# Patient Record
Sex: FEMALE | Race: WHITE | NOT HISPANIC OR LATINO | Employment: PART TIME | URBAN - METROPOLITAN AREA
[De-identification: names, ages, dates, MRNs, and addresses within clinical notes are randomized per-mention and may not be internally consistent; named-entity substitution may affect disease eponyms.]

---

## 2017-01-05 ENCOUNTER — GENERIC CONVERSION - ENCOUNTER (OUTPATIENT)
Dept: OTHER | Facility: OTHER | Age: 62
End: 2017-01-05

## 2017-01-17 ENCOUNTER — ALLSCRIPTS OFFICE VISIT (OUTPATIENT)
Dept: OTHER | Facility: OTHER | Age: 62
End: 2017-01-17

## 2017-01-17 DIAGNOSIS — N83.299 OTHER OVARIAN CYST, UNSPECIFIED SIDE: ICD-10-CM

## 2017-01-17 DIAGNOSIS — Z01.419 ENCOUNTER FOR GYNECOLOGICAL EXAMINATION WITHOUT ABNORMAL FINDING: ICD-10-CM

## 2017-01-17 DIAGNOSIS — Z12.31 ENCOUNTER FOR SCREENING MAMMOGRAM FOR MALIGNANT NEOPLASM OF BREAST: ICD-10-CM

## 2017-03-29 ENCOUNTER — ALLSCRIPTS OFFICE VISIT (OUTPATIENT)
Dept: OTHER | Facility: OTHER | Age: 62
End: 2017-03-29

## 2017-05-01 ENCOUNTER — ALLSCRIPTS OFFICE VISIT (OUTPATIENT)
Dept: OTHER | Facility: OTHER | Age: 62
End: 2017-05-01

## 2017-05-01 DIAGNOSIS — K76.0 FATTY (CHANGE OF) LIVER, NOT ELSEWHERE CLASSIFIED: ICD-10-CM

## 2017-05-01 DIAGNOSIS — R10.11 RIGHT UPPER QUADRANT PAIN: ICD-10-CM

## 2017-05-01 DIAGNOSIS — E03.9 HYPOTHYROIDISM: ICD-10-CM

## 2017-05-01 DIAGNOSIS — R73.9 HYPERGLYCEMIA: ICD-10-CM

## 2017-05-01 DIAGNOSIS — E78.5 HYPERLIPIDEMIA: ICD-10-CM

## 2017-05-16 ENCOUNTER — APPOINTMENT (OUTPATIENT)
Dept: LAB | Facility: CLINIC | Age: 62
End: 2017-05-16
Payer: COMMERCIAL

## 2017-05-16 ENCOUNTER — GENERIC CONVERSION - ENCOUNTER (OUTPATIENT)
Dept: OTHER | Facility: OTHER | Age: 62
End: 2017-05-16

## 2017-05-16 ENCOUNTER — HOSPITAL ENCOUNTER (OUTPATIENT)
Dept: ULTRASOUND IMAGING | Facility: CLINIC | Age: 62
Discharge: HOME | End: 2017-05-16
Payer: COMMERCIAL

## 2017-05-16 DIAGNOSIS — R10.11 RIGHT UPPER QUADRANT PAIN: ICD-10-CM

## 2017-05-16 DIAGNOSIS — R73.9 HYPERGLYCEMIA: ICD-10-CM

## 2017-05-16 DIAGNOSIS — E78.5 HYPERLIPIDEMIA: ICD-10-CM

## 2017-05-16 DIAGNOSIS — K76.0 FATTY (CHANGE OF) LIVER, NOT ELSEWHERE CLASSIFIED: ICD-10-CM

## 2017-05-16 DIAGNOSIS — E03.9 HYPOTHYROIDISM: ICD-10-CM

## 2017-05-16 LAB
ALBUMIN SERPL BCP-MCNC: 3.9 G/DL (ref 3.5–5)
ALP SERPL-CCNC: 84 U/L (ref 46–116)
ALT SERPL W P-5'-P-CCNC: 27 U/L (ref 12–78)
ANION GAP SERPL CALCULATED.3IONS-SCNC: 5 MMOL/L (ref 4–13)
AST SERPL W P-5'-P-CCNC: 14 U/L (ref 5–45)
BILIRUB SERPL-MCNC: 0.36 MG/DL (ref 0.2–1)
BUN SERPL-MCNC: 15 MG/DL (ref 5–25)
CALCIUM SERPL-MCNC: 8.9 MG/DL (ref 8.3–10.1)
CHLORIDE SERPL-SCNC: 103 MMOL/L (ref 100–108)
CHOLEST SERPL-MCNC: 154 MG/DL (ref 50–200)
CO2 SERPL-SCNC: 29 MMOL/L (ref 21–32)
CREAT SERPL-MCNC: 0.69 MG/DL (ref 0.6–1.3)
ERYTHROCYTE [DISTWIDTH] IN BLOOD BY AUTOMATED COUNT: 14.2 % (ref 11.6–15.1)
EST. AVERAGE GLUCOSE BLD GHB EST-MCNC: 131 MG/DL
GFR SERPL CREATININE-BSD FRML MDRD: >60 ML/MIN/1.73SQ M
GLUCOSE P FAST SERPL-MCNC: 105 MG/DL (ref 65–99)
HBA1C MFR BLD: 6.2 % (ref 4.2–6.3)
HCT VFR BLD AUTO: 44.3 % (ref 34.8–46.1)
HDLC SERPL-MCNC: 33 MG/DL (ref 40–60)
HGB BLD-MCNC: 14.7 G/DL (ref 11.5–15.4)
LDLC SERPL CALC-MCNC: 85 MG/DL (ref 0–100)
LIPASE SERPL-CCNC: 270 U/L (ref 73–393)
MCH RBC QN AUTO: 30.8 PG (ref 26.8–34.3)
MCHC RBC AUTO-ENTMCNC: 33.2 G/DL (ref 31.4–37.4)
MCV RBC AUTO: 93 FL (ref 82–98)
PLATELET # BLD AUTO: 230 THOUSANDS/UL (ref 149–390)
PMV BLD AUTO: 10.9 FL (ref 8.9–12.7)
POTASSIUM SERPL-SCNC: 3.8 MMOL/L (ref 3.5–5.3)
PROT SERPL-MCNC: 7.2 G/DL (ref 6.4–8.2)
RBC # BLD AUTO: 4.77 MILLION/UL (ref 3.81–5.12)
SODIUM SERPL-SCNC: 137 MMOL/L (ref 136–145)
TRIGL SERPL-MCNC: 178 MG/DL
TSH SERPL DL<=0.05 MIU/L-ACNC: 1.66 UIU/ML (ref 0.36–3.74)
WBC # BLD AUTO: 7.73 THOUSAND/UL (ref 4.31–10.16)

## 2017-05-16 PROCEDURE — 83036 HEMOGLOBIN GLYCOSYLATED A1C: CPT

## 2017-05-16 PROCEDURE — 80061 LIPID PANEL: CPT

## 2017-05-16 PROCEDURE — 84443 ASSAY THYROID STIM HORMONE: CPT

## 2017-05-16 PROCEDURE — 80053 COMPREHEN METABOLIC PANEL: CPT

## 2017-05-16 PROCEDURE — 76705 ECHO EXAM OF ABDOMEN: CPT

## 2017-05-16 PROCEDURE — 36415 COLL VENOUS BLD VENIPUNCTURE: CPT

## 2017-05-16 PROCEDURE — 85027 COMPLETE CBC AUTOMATED: CPT

## 2017-05-16 PROCEDURE — 83690 ASSAY OF LIPASE: CPT

## 2017-05-29 ENCOUNTER — GENERIC CONVERSION - ENCOUNTER (OUTPATIENT)
Dept: OTHER | Facility: OTHER | Age: 62
End: 2017-05-29

## 2017-07-24 ENCOUNTER — ALLSCRIPTS OFFICE VISIT (OUTPATIENT)
Dept: OTHER | Facility: OTHER | Age: 62
End: 2017-07-24

## 2017-07-24 DIAGNOSIS — K76.0 FATTY (CHANGE OF) LIVER, NOT ELSEWHERE CLASSIFIED: ICD-10-CM

## 2017-07-24 DIAGNOSIS — R10.9 ABDOMINAL PAIN: ICD-10-CM

## 2017-08-14 ENCOUNTER — TRANSCRIBE ORDERS (OUTPATIENT)
Dept: ADMINISTRATIVE | Facility: HOSPITAL | Age: 62
End: 2017-08-14

## 2017-08-14 ENCOUNTER — APPOINTMENT (OUTPATIENT)
Dept: LAB | Facility: CLINIC | Age: 62
End: 2017-08-14
Payer: COMMERCIAL

## 2017-08-14 DIAGNOSIS — R10.9 ABDOMINAL PAIN: ICD-10-CM

## 2017-08-14 DIAGNOSIS — K76.0 FATTY (CHANGE OF) LIVER, NOT ELSEWHERE CLASSIFIED: ICD-10-CM

## 2017-08-14 PROCEDURE — 86704 HEP B CORE ANTIBODY TOTAL: CPT

## 2017-08-14 PROCEDURE — 86705 HEP B CORE ANTIBODY IGM: CPT

## 2017-08-14 PROCEDURE — 86803 HEPATITIS C AB TEST: CPT

## 2017-08-14 PROCEDURE — 86255 FLUORESCENT ANTIBODY SCREEN: CPT

## 2017-08-14 PROCEDURE — 87340 HEPATITIS B SURFACE AG IA: CPT

## 2017-08-14 PROCEDURE — 83516 IMMUNOASSAY NONANTIBODY: CPT

## 2017-08-14 PROCEDURE — 82784 ASSAY IGA/IGD/IGG/IGM EACH: CPT

## 2017-08-14 PROCEDURE — 36415 COLL VENOUS BLD VENIPUNCTURE: CPT

## 2017-08-15 ENCOUNTER — GENERIC CONVERSION - ENCOUNTER (OUTPATIENT)
Dept: OTHER | Facility: OTHER | Age: 62
End: 2017-08-15

## 2017-08-15 LAB
ENDOMYSIUM IGA SER QL: NEGATIVE
GLIADIN PEPTIDE IGA SER-ACNC: 6 UNITS (ref 0–19)
GLIADIN PEPTIDE IGG SER-ACNC: 2 UNITS (ref 0–19)
HBV CORE AB SER QL: NORMAL
HBV CORE IGM SER QL: NORMAL
HBV SURFACE AG SER QL: NORMAL
HCV AB SER QL: NORMAL
IGA SERPL-MCNC: 215 MG/DL (ref 87–352)
TTG IGA SER-ACNC: <2 U/ML (ref 0–3)
TTG IGG SER-ACNC: <2 U/ML (ref 0–5)

## 2017-08-16 ENCOUNTER — GENERIC CONVERSION - ENCOUNTER (OUTPATIENT)
Dept: OTHER | Facility: OTHER | Age: 62
End: 2017-08-16

## 2017-09-18 ENCOUNTER — ALLSCRIPTS OFFICE VISIT (OUTPATIENT)
Dept: OTHER | Facility: OTHER | Age: 62
End: 2017-09-18

## 2017-10-04 RX ORDER — CLOTRIMAZOLE AND BETAMETHASONE DIPROPIONATE 10; .64 MG/G; MG/G
CREAM TOPICAL 2 TIMES DAILY
Status: ON HOLD | COMMUNITY
End: 2017-10-09 | Stop reason: ALTCHOICE

## 2017-10-04 RX ORDER — LOSARTAN POTASSIUM 50 MG/1
25 TABLET ORAL DAILY
COMMUNITY
End: 2018-02-06

## 2017-10-04 RX ORDER — LEVOTHYROXINE SODIUM 0.12 MG/1
125 TABLET ORAL EVERY MORNING
COMMUNITY
End: 2018-02-06

## 2017-10-07 ENCOUNTER — ANESTHESIA EVENT (OUTPATIENT)
Dept: GASTROENTEROLOGY | Facility: MEDICAL CENTER | Age: 62
End: 2017-10-07
Payer: COMMERCIAL

## 2017-10-09 ENCOUNTER — HOSPITAL ENCOUNTER (OUTPATIENT)
Facility: MEDICAL CENTER | Age: 62
Setting detail: OUTPATIENT SURGERY
Discharge: HOME | End: 2017-10-09
Attending: INTERNAL MEDICINE | Admitting: INTERNAL MEDICINE
Payer: COMMERCIAL

## 2017-10-09 ENCOUNTER — GENERIC CONVERSION - ENCOUNTER (OUTPATIENT)
Dept: GASTROENTEROLOGY | Facility: MEDICAL CENTER | Age: 62
End: 2017-10-09

## 2017-10-09 ENCOUNTER — ANESTHESIA (OUTPATIENT)
Dept: GASTROENTEROLOGY | Facility: MEDICAL CENTER | Age: 62
End: 2017-10-09
Payer: COMMERCIAL

## 2017-10-09 VITALS
RESPIRATION RATE: 18 BRPM | DIASTOLIC BLOOD PRESSURE: 65 MMHG | HEIGHT: 67 IN | SYSTOLIC BLOOD PRESSURE: 137 MMHG | OXYGEN SATURATION: 94 % | TEMPERATURE: 98.9 F | WEIGHT: 230 LBS | HEART RATE: 69 BPM | BODY MASS INDEX: 36.1 KG/M2

## 2017-10-09 DIAGNOSIS — Z12.9 ENCOUNTER FOR SCREENING FOR MALIGNANT NEOPLASM: ICD-10-CM

## 2017-10-09 PROCEDURE — 88305 TISSUE EXAM BY PATHOLOGIST: CPT | Performed by: INTERNAL MEDICINE

## 2017-10-09 RX ORDER — PROPOFOL 10 MG/ML
INJECTION, EMULSION INTRAVENOUS AS NEEDED
Status: DISCONTINUED | OUTPATIENT
Start: 2017-10-09 | End: 2017-10-09 | Stop reason: SURG

## 2017-10-09 RX ORDER — SODIUM CHLORIDE 9 MG/ML
125 INJECTION, SOLUTION INTRAVENOUS CONTINUOUS
Status: DISCONTINUED | OUTPATIENT
Start: 2017-10-09 | End: 2017-10-09 | Stop reason: HOSPADM

## 2017-10-09 RX ADMIN — SODIUM CHLORIDE 125 ML/HR: 0.9 INJECTION, SOLUTION INTRAVENOUS at 07:13

## 2017-10-09 RX ADMIN — PROPOFOL 50 MG: 10 INJECTION, EMULSION INTRAVENOUS at 07:39

## 2017-10-09 RX ADMIN — PROPOFOL 50 MG: 10 INJECTION, EMULSION INTRAVENOUS at 07:46

## 2017-10-09 RX ADMIN — PROPOFOL 150 MG: 10 INJECTION, EMULSION INTRAVENOUS at 07:36

## 2017-10-09 RX ADMIN — PROPOFOL 50 MG: 10 INJECTION, EMULSION INTRAVENOUS at 07:43

## 2017-10-09 RX ADMIN — PROPOFOL 50 MG: 10 INJECTION, EMULSION INTRAVENOUS at 07:41

## 2017-10-09 RX ADMIN — PROPOFOL 50 MG: 10 INJECTION, EMULSION INTRAVENOUS at 07:52

## 2017-10-09 NOTE — ANESTHESIA PREPROCEDURE EVALUATION
Review of Systems/Medical History  Patient summary reviewed  Chart reviewed  No history of anesthetic complications     Cardiovascular  Hyperlipidemia, Hypertension ,    Pulmonary  Smoker ex-smoker more than 10 packs per year , ,        GI/Hepatic  Negative GI/hepatic ROS          Kidney stones,        Endo/Other  History of thyroid disease , hypothyroidism,      GYN  Negative gynecology ROS          Hematology   Musculoskeletal  Obesity  morbid obesity,        Neurology  Negative neurology ROS      Psychology   Negative psychology ROS            Physical Exam    Airway    Mallampati score: III  TM Distance: >3 FB  Neck ROM: full     Dental   No notable dental hx     Cardiovascular  Rhythm: regular, Rate: normal, Murmur,     Pulmonary  Pulmonary exam normal Breath sounds clear to auscultation,     Other Findings        Anesthesia Plan  ASA Score- 2       Anesthesia Type- IV sedation with anesthesia        Induction- intravenous      Informed Consent  Anesthetic plan and risks discussed with patient

## 2017-10-09 NOTE — OP NOTE
**** GI/ENDOSCOPY REPORT ****     PATIENT NAME: Karly Daniels ------ VISIT ID:  Patient ID: [id number]   YOB: 1955     INTRODUCTION: Colonoscopy - A 62 female patient presents for an outpatient   Colonoscopy at [address] [place] [address]  PREVIOUS COLONOSCOPY:10 years ago     INDICATIONS: Average-risk screening  CONSENT:  The benefits, risks, and alternatives to the procedure were   discussed and informed consent was obtained from the patient  PREPARATION: EKG, pulse, pulse oximetry and blood pressure were monitored   throughout the procedure  The patient was identified by myself both   verbally and by visual inspection of ID band  Airway Assessment   Classification: Airway class 2 - Visualization of the soft palate, fauces   and uvula  ASA Classification: See anesthesia record  MEDICATIONS: Anesthesia-check records     PROCEDURE:  The endoscope was passed without difficulty through the anus   under direct visualization and advanced to the cecum, confirmed by   appendiceal orifice and ileocecal valve  The scope was withdrawn and the   mucosa was carefully examined  The quality of the preparation was   excellent  Cecal Intubation Time: 6 minutes(s) Scope Withdrawal Time: 12   minutes(s)     RECTAL EXAM: Normal rectal exam      FINDINGS:  A single polyp, measuring 4 mm in size, was found in the   ascending colon  The polyp was completely removed by cold biopsy   polypectomy  The polyp was retrieved  There was a single large area of   angioectasia/AVM in the descending colon  It was not bleeding  Two   polyps, measuring 3 and 4 mm in size, were found in the descending colon  All polyps were completely removed by cold biopsy polypectomy  One polyp   was on a stalk required a clip placement as it was oozing after   polypectomy  The polyps were retrieved  A single polyp, measuring 6 mm in   size, was found in the sigmoid colon      The polyp was completely removed by cold snare polypectomy  The polyp was retrieved  There was evidence of   moderately severe diverticulosis in the entire colon  Diminutive internal   hemorrhoids were found  COMPLICATIONS: There were no complications  IMPRESSIONS: A single polyp found in the ascending colon; removed by cold   biopsy polypectomy  Angioectasia/AVM found in the descending colon  Two   polyps found in the descending colon; all polyps removed by cold biopsy   polypectomy  A single polyp found in the sigmoid colon; removed by cold   snare polypectomy  Moderately severe diverticulosis found in the entire   colon  Internal hemorrhoids  RECOMMENDATIONS: Follow-up on the results of the biopsy specimens  Colonoscopy recommended in 3 years  ESTIMATED BLOOD LOSS:     PATHOLOGY SPECIMENS: Completely removed by cold biopsy polypectomy  All   polyps completely removed by cold biopsy polypectomy  Completely removed   by cold snare polypectomy  PROCEDURE CODES:     ICD-9 Codes: 211 3 Benign neoplasm of colon 747 61 Gastrointestinal vessel   anomaly 211 3 Benign neoplasm of colon 211 3 Benign neoplasm of colon   562 10 Diverticulosis of colon (without mention of hemorrhage)     ICD-10 Codes: Z12 11 Encounter for screening for malignant neoplasm of   colon K63 5 Polyp of colon K55 20 Angiodysplasia of colon without   hemorrhage K63 5 Polyp of colon K63 5 Polyp of colon K57 Diverticular   disease of intestine K64 9 Unspecified hemorrhoids     PERFORMED BY: ALAYNA Crowe  on 10/09/2017  Version 1, electronically signed by ALAYNA Arriaga  on 10/09/2017 at   08:05

## 2017-10-09 NOTE — DISCHARGE INSTRUCTIONS
Losartan (By mouth)   Losartan (fara-RISHI-tan)  Treats high blood pressure  Reduces the risk of stroke in patients with high blood pressure and an enlarged heart  Treats kidney disease in patients with diabetes  This medicine is an angiotensin receptor blocker (ARB)  Brand Name(s): Cozaar   There may be other brand names for this medicine  When This Medicine Should Not Be Used: This medicine is not right for everyone  Do not use it if you had an allergic reaction to losartan, or if you are pregnant  Do not use this medicine together with aliskiren if you have diabetes  How to Use This Medicine:   Tablet  · Take your medicine as directed  Your dose may need to be changed several times to find what works best for you  · Drink plenty of fluids if you exercise, sweat more than usual, or have diarrhea or vomiting  · Read and follow the patient instructions that come with this medicine  Talk to your doctor or pharmacist if you have any questions  · Missed dose: Take a dose as soon as you remember  If it is almost time for your next dose, wait until then and take a regular dose  Do not take extra medicine to make up for a missed dose  · Store the medicine in a closed container at room temperature, away from heat, moisture, and direct light  Drugs and Foods to Avoid:   Ask your doctor or pharmacist before using any other medicine, including over-the-counter medicines, vitamins, and herbal products  · Some medicines can affect how losartan works  Tell your doctor if you are using any of the following:   ¨ Lithium  ¨ Rifampin  ¨ A diuretic (water pill), such as spironolactone, triamterene, or amiloride  ¨ NSAID pain or arthritis medicine, such as aspirin, diclofenac, ibuprofen, or naproxen  ¨ Another blood pressure medicine, such as aliskiren, benazepril, enalapril, or lisinopril  · Ask your doctor before you use any medicine, supplement, or salt substitute that contains potassium    Warnings While Using This Medicine:   · It is not safe to take this medicine during pregnancy  It could harm an unborn baby  Tell your doctor right away if you become pregnant  · Tell your doctor if you are breastfeeding, or if you have kidney disease, liver disease, congestive heart failure, or diabetes  Tell your doctor if you have had angioedema that was caused by a blood pressure medicine  · This medicine could lower your blood pressure too much, especially when you first use it or if you are dehydrated  Stand or sit up slowly if you feel lightheaded or dizzy  · Your doctor will do lab tests at regular visits to check on the effects of this medicine  Keep all appointments  · Keep all medicine out of the reach of children  Never share your medicine with anyone  Possible Side Effects While Using This Medicine:   Call your doctor right away if you notice any of these side effects:  · Allergic reaction: Itching or hives, swelling in your face or hands, swelling or tingling in your mouth or throat, chest tightness, trouble breathing  · Change in how much or how often you urinate  · Confusion, weakness, uneven heartbeat, trouble breathing, numbness or tingling in your hands, feet, or lips  · Lightheadedness, dizziness, fainting  · Rapid weight gain, swelling in your hands, ankles, or feet  If you notice these less serious side effects, talk with your doctor:   · Diarrhea  · Tiredness  If you notice other side effects that you think are caused by this medicine, tell your doctor  Call your doctor for medical advice about side effects  You may report side effects to FDA at 5-199-FDA-6702  © 2017 [address] [employer] [employer] Information is for End User's use only and may not be sold, redistributed or otherwise used for commercial purposes  The above information is an  only  It is not intended as medical advice for individual conditions or treatments   Talk to your doctor, nurse or pharmacist before following any medical regimen to see if it is safe and effective for you  Colorectal Polyps   WHAT YOU NEED TO KNOW:   Colorectal polyps are small growths of tissue in the lining of the colon and rectum  Most polyps are hyperplastic polyps and are usually benign (noncancerous)  Certain types of polyps, called adenomatous polyps, may turn into cancer  DISCHARGE INSTRUCTIONS:   Follow up with your healthcare provider or gastroenterologist as directed: You may need to return for more tests, such as another colonoscopy  Write down your questions so you remember to ask them during your visits  Reduce your risk for colorectal polyps:   · Eat a variety of healthy foods:  Healthy foods include fruit, vegetables, whole-grain breads, low-fat dairy products, beans, lean meat, and fish  Ask if you need to be on a special diet  · Maintain a healthy weight:  Ask your healthcare provider if you need to lose weight and how much you need to lose  Ask for help with a weight loss program     · Exercise:  Begin to exercise slowly and do more as you get stronger  Talk with your healthcare provider before you start an exercise program      · Limit alcohol:  Your risk for polyps increases the more you drink  · Do not smoke: If you smoke, it is never too late to quit  Ask for information about how to stop  For support and more information:   · Randell Vega ([facility])  [address], [place] [address]  Phone: 4- 300 - [phone]  Web Address: www digestive  niddk nih gov  Contact your healthcare provider or gastroenterologist if:   · You have a fever  · You have chills, a cough, or feel weak and achy  · You have abdominal pain that does not go away or gets worse after you take medicine  · Your abdomen is swollen  · You are losing weight without trying  · You have questions or concerns about your condition or care    Seek care immediately or call 911 if:   · You have sudden shortness of breath  · You have a fast heart rate, fast breathing, or are too dizzy to stand up  · You have severe abdominal pain  · You see blood in your bowel movement  © 2017 [address] [employer] Information is for End User's use only and may not be sold, redistributed or otherwise used for commercial purposes  All illustrations and images included in CareNotes® are the copyrighted property of [employer]  or [employer]  The above information is an  only  It is not intended as medical advice for individual conditions or treatments  Talk to your doctor, nurse or pharmacist before following any medical regimen to see if it is safe and effective for you  Diverticulosis   WHAT YOU NEED TO KNOW:   What is diverticulosis? Diverticulosis is a condition that causes small pockets called diverticula to form in your intestine  These pockets make it difficult for bowel movements to pass through your digestive system  What causes diverticulosis? Diverticula form when muscles have to work hard to move bowel movements through the intestine  The force causes bulges to form at weak areas in the intestine  This may happen if you eat foods that are low in fiber  Fiber helps give your bowel movements more bulk so they are larger and easier to move through your colon  The following may increase your risk of diverticulosis:  · A history of constipation    · Age 36 or older    · Obesity    · Lack of exercise  What are the signs and symptoms of diverticulosis? Diverticulosis usually does not cause any signs or symptoms  It may cause any of the following in some people:  · Pain or discomfort in your lower abdomen    · Abdominal bloating    · Constipation or diarrhea  How is diverticulosis diagnosed? Your healthcare provider will examine you and ask about your bowel movements, diet, and symptoms   He or she will also ask about any medical conditions you have or medicines you take  You may need any of the following:  · Blood tests  may be done to check for signs of inflammation  · A barium enema  is an x-ray of your colon that may show diverticula  A tube is put into your anus, and a liquid called barium is put through the tube  Barium is used so that healthcare providers can see your colon more clearly  · Flexible sigmoidoscopy  is a test to look for any changes in your lower intestines and rectum  It may also show the cause of any bleeding or pain  A soft, bendable tube with a light on the end will be put into your anus  It will then be moved forward into your intestine  · A colonoscopy  is used to look at your whole colon  A scope (long bendable tube with a light on the end) is used to take pictures  This test may show diverticula  · A CT scan , or CAT scan, may show diverticula  You may be given contrast liquid before the scan  Tell the healthcare provider if you have ever had an allergic reaction to contrast liquid  How is diverticulosis managed? The goal of treatment is to manage any symptoms you have and prevent other problems such as diverticulitis  Diverticulitis is swelling or infection of the diverticula  Your healthcare provider may recommend any of the following:  · Eat a variety of high-fiber foods  High-fiber foods help you have regular bowel movements  High-fiber foods include cooked beans, fruits, vegetables, and some cereals  Most adults need 25 to 35 grams of fiber each day  Your healthcare provider may recommend that you have more  Ask your healthcare provider how much fiber you need  Increase fiber slowly  You may have abdominal discomfort, bloating, and gas if you add fiber to your diet too quickly  You may need to take a fiber supplement if you are not getting enough fiber from food  · Medicines  to soften your bowel movements may be given  You may also need medicines to treat symptoms such as bloating and pain      · Drink liquids as directed  You may need to drink 2 to 3 liters (8 to 12 cups) of liquids every day  Ask your healthcare provider how much liquid to drink each day and which liquids are best for you  · Apply heat  on your abdomen for 20 to 30 minutes every 2 hours for as many days as directed  Heat helps decrease pain and muscle spasms  How can I help prevent diverticulitis or other symptoms? The following may help decrease your risk for diverticulitis or symptoms, such as bleeding  Talk to your provider about these or other things you can do to prevent problems that may occur with diverticulosis  · Exercise regularly  Ask your healthcare provider about the best exercise plan for you  Exercise can help you have regular bowel movements  Get 30 minutes of exercise on most days of the week  · Maintain a healthy weight  Ask your healthcare provider how much you should weigh  Ask him or her to help you create a weight loss plan if you are overweight  · Do not smoke  Nicotine and other chemicals in cigarettes increase your risk for diverticulitis  Ask your healthcare provider for information if you currently smoke and need help to quit  E-cigarettes or smokeless tobacco still contain nicotine  Talk to your healthcare provider before you use these products  · Ask your healthcare provider if it is safe to take NSAIDs  NSAIDs may increase your risk of diverticulitis  When should I seek immediate care? · You have severe pain on the left side of your lower abdomen  · Your bowel movements are bright or dark red  When should I contact my healthcare provider? · You have a fever and chills  · You feel dizzy or lightheaded  · You have nausea, or you are vomiting  · You have a change in your bowel movements  · You have questions or concerns about your condition or care  CARE AGREEMENT:   You have the right to help plan your care  Learn about your health condition and how it may be treated  Discuss treatment options with your caregivers to decide what care you want to receive  You always have the right to refuse treatment  The above information is an  only  It is not intended as medical advice for individual conditions or treatments  Talk to your doctor, nurse or pharmacist before following any medical regimen to see if it is safe and effective for you  © 2017 [address] [employer] Information is for End User's use only and may not be sold, redistributed or otherwise used for commercial purposes  All illustrations and images included in CareNotes® are the copyrighted property of [employer]  or [address]  Diverticulosis Diet   AMBULATORY CARE:   A diverticulosis diet  includes high-fiber foods  High-fiber foods help you have regular bowel movements  Extra fiber may decrease your risk of forming new diverticula (small pockets) in your intestine  A high-fiber diet may also help prevent diverticulitis  Diverticulitis is a painful condition that occurs when diverticula become inflamed or infected  You do not need to avoid nuts, seeds, corn, or popcorn while you are on a diverticulosis diet  Contact your healthcare provider if:   · You have questions about a high-fiber diet  · You have a change in your bowel movements  · You have an upset stomach  · You have a fever  · You have pain in your lower abdomen on the left side  · You have questions about your condition or care  Amount of fiber you need: You may need 25 to 35 grams of fiber each day  Ask your dietitian or healthcare provider how much fiber you should have  Increase your intake of fiber slowly  When you eat more fiber, you may have gas and feel bloated  You may need to take a fiber supplement if you do not get enough fiber from food  Drink plenty of liquids as you increase the fiber in your diet  Your dietitian or healthcare provider may recommend 8 eight-ounce cups or more each day   Ask which liquids are best for you  Foods that are high in fiber:   · Foods with at least 4 grams of fiber per serving:      ¨ ? to ½ cup of high-fiber cereal (check the nutrition label on the box)    ¨ ½ cup of blackberries or raspberries    ¨ 4 dried prunes    ¨ 1 cooked artichoke    ¨ ½ cup of cooked legumes, such as lentils, or red, kidney, and wall beans    · Foods with 1 to 3 grams of fiber per serving:      ¨ 1 slice of whole-wheat, pumpernickel, or rye bread    ¨ 4 whole-wheat crackers    ¨ ½ cup of cereal with 1 to 3 grams of fiber per serving (check the nutrition label on the box)    ¨ 1 piece of fruit, such as an apple, banana, pear, kiwi, or orange    ¨ 3 dates    ¨ ½ cup of canned apricots, fruit cocktail, peaches, or pears    ¨ ½ cup of raw or cooked vegetables, such as carrots, cauliflower, cabbage, spinach, squash, or corn  © 2017 [address] [facility] Information is for End User's use only and may not be sold, redistributed or otherwise used for commercial purposes  All illustrations and images included in CareNotes® are the copyrighted property of [employer]  or [employer]  The above information is an  only  It is not intended as medical advice for individual conditions or treatments  Talk to your doctor, nurse or pharmacist before following any medical regimen to see if it is safe and effective for you  Hemorrhoids   WHAT YOU NEED TO KNOW:   Hemorrhoids are swollen blood vessels inside your rectum (internal hemorrhoids) or on your anus (external hemorrhoids)  Sometimes a hemorrhoid may prolapse  This means it extends out of your anus  DISCHARGE INSTRUCTIONS:   Seek care immediately if:   · You have severe pain in your rectum or around your anus  · You have severe pain in your abdomen and you are vomiting  · You have bleeding from your anus that soaks through your underwear    Contact your healthcare provider if:   · You have frequent and painful bowel movements  · Your hemorrhoid looks or feels more swollen than usual      · You do not have a bowel movement for 2 days or more  · You see or feel tissue coming through your anus  · You have questions or concerns about your condition or care  Medicines: You may  need any of the following:  · Medicine  may be given to decrease pain, swelling, and itching  The medicine may come as a pad, cream, or ointment  · Stool softeners  help treat or prevent constipation  · NSAIDs , such as ibuprofen, help decrease swelling, pain, and fever  NSAIDs can cause stomach bleeding or kidney problems in certain people  If you take blood thinner medicine, always ask your healthcare provider if NSAIDs are safe for you  Always read the medicine label and follow directions  · Take your medicine as directed  Contact your healthcare provider if you think your medicine is not helping or if you have side effects  Tell him or her if you are allergic to any medicine  Keep a list of the medicines, vitamins, and herbs you take  Include the amounts, and when and why you take them  Bring the list or the pill bottles to follow-up visits  Carry your medicine list with you in case of an emergency  Manage your symptoms:   · Apply ice on your anus for 15 to 20 minutes every hour or as directed  Use an ice pack, or put crushed ice in a plastic bag  Cover it with a towel before you apply it to your anus  Ice helps prevent tissue damage and decreases swelling and pain  · Take a sitz bath  Fill a bathtub with 4 to 6 inches of warm water  You may also use a sitz bath pan that fits inside a toilet bowl  Sit in the sitz bath for 15 minutes  Do this 3 times a day, and after each bowel movement  The warm water can help decrease pain and swelling  · Keep your anal area clean  Gently wash the area with warm water daily  Soap may irritate the area   After a bowel movement, wipe with moist towelettes or wet toilet paper  Dry toilet paper can irritate the area  Prevent hemorrhoids:   · Do not strain to have a bowel movement  Do not sit on the toilet too long  These actions can increase pressure on the tissues in your rectum and anus  · Drink plenty of liquids  Liquids can help prevent constipation  Ask how much liquid to drink each day and which liquids are best for you  · Eat a variety of high-fiber foods  Examples include fruits, vegetables, and whole grains  Ask your healthcare provider how much fiber you need each day  You may need to take a fiber supplement  · Exercise as directed  Exercise, such as walking, may make it easier to have a bowel movement  Ask your healthcare provider to help you create an exercise plan  · Do not have anal sex  Anal sex can weaken the skin around your rectum and anus  · Avoid heavy lifting  This can cause straining and increase your risk for another hemorrhoid  Follow up with your healthcare provider as directed:  Write down your questions so you remember to ask them during your visits  © 2017 [address] [facility] Information is for End User's use only and may not be sold, redistributed or otherwise used for commercial purposes  All illustrations and images included in CareNotes® are the copyrighted property of [employer]  or [employer]  The above information is an  only  It is not intended as medical advice for individual conditions or treatments  Talk to your doctor, nurse or pharmacist before following any medical regimen to see if it is safe and effective for you

## 2017-10-21 ENCOUNTER — GENERIC CONVERSION - ENCOUNTER (OUTPATIENT)
Dept: OTHER | Facility: OTHER | Age: 62
End: 2017-10-21

## 2017-10-30 ENCOUNTER — APPOINTMENT (OUTPATIENT)
Dept: URGENT CARE | Facility: CLINIC | Age: 62
End: 2017-10-30
Payer: OTHER MISCELLANEOUS

## 2017-10-30 PROCEDURE — 99283 EMERGENCY DEPT VISIT LOW MDM: CPT

## 2017-10-30 PROCEDURE — G0382 LEV 3 HOSP TYPE B ED VISIT: HCPCS

## 2017-12-07 ENCOUNTER — APPOINTMENT (OUTPATIENT)
Dept: LAB | Facility: CLINIC | Age: 62
End: 2017-12-07
Payer: COMMERCIAL

## 2017-12-07 ENCOUNTER — HOSPITAL ENCOUNTER (OUTPATIENT)
Dept: ULTRASOUND IMAGING | Facility: CLINIC | Age: 62
Discharge: HOME | End: 2017-12-07
Payer: COMMERCIAL

## 2017-12-07 ENCOUNTER — TRANSCRIBE ORDERS (OUTPATIENT)
Dept: ADMINISTRATIVE | Facility: HOSPITAL | Age: 62
End: 2017-12-07

## 2017-12-07 DIAGNOSIS — N83.299 OTHER OVARIAN CYST, UNSPECIFIED SIDE: ICD-10-CM

## 2017-12-07 DIAGNOSIS — R73.9 BLOOD GLUCOSE ELEVATED: Primary | ICD-10-CM

## 2017-12-07 DIAGNOSIS — R73.9 BLOOD GLUCOSE ELEVATED: ICD-10-CM

## 2017-12-07 DIAGNOSIS — Z12.31 ENCOUNTER FOR SCREENING MAMMOGRAM FOR MALIGNANT NEOPLASM OF BREAST: ICD-10-CM

## 2017-12-07 DIAGNOSIS — Z01.419 ENCOUNTER FOR GYNECOLOGICAL EXAMINATION WITHOUT ABNORMAL FINDING: ICD-10-CM

## 2017-12-07 LAB
ALBUMIN SERPL BCP-MCNC: 3.9 G/DL (ref 3.5–5)
ALP SERPL-CCNC: 79 U/L (ref 46–116)
ALT SERPL W P-5'-P-CCNC: 30 U/L (ref 12–78)
ANION GAP SERPL CALCULATED.3IONS-SCNC: 7 MMOL/L (ref 4–13)
AST SERPL W P-5'-P-CCNC: 23 U/L (ref 5–45)
BILIRUB SERPL-MCNC: 0.44 MG/DL (ref 0.2–1)
BUN SERPL-MCNC: 13 MG/DL (ref 5–25)
CALCIUM SERPL-MCNC: 8.7 MG/DL (ref 8.3–10.1)
CHLORIDE SERPL-SCNC: 102 MMOL/L (ref 100–108)
CHOLEST SERPL-MCNC: 162 MG/DL (ref 50–200)
CO2 SERPL-SCNC: 28 MMOL/L (ref 21–32)
CREAT SERPL-MCNC: 0.72 MG/DL (ref 0.6–1.3)
EST. AVERAGE GLUCOSE BLD GHB EST-MCNC: 126 MG/DL
GFR SERPL CREATININE-BSD FRML MDRD: 91 ML/MIN/1.73SQ M
GLUCOSE P FAST SERPL-MCNC: 105 MG/DL (ref 65–99)
HBA1C MFR BLD: 6 % (ref 4.2–6.3)
HDLC SERPL-MCNC: 35 MG/DL (ref 40–60)
LDLC SERPL CALC-MCNC: 93 MG/DL (ref 0–100)
POTASSIUM SERPL-SCNC: 3.7 MMOL/L (ref 3.5–5.3)
PROT SERPL-MCNC: 7.6 G/DL (ref 6.4–8.2)
SODIUM SERPL-SCNC: 137 MMOL/L (ref 136–145)
TRIGL SERPL-MCNC: 168 MG/DL
TSH SERPL DL<=0.05 MIU/L-ACNC: 1.52 UIU/ML (ref 0.36–3.74)

## 2017-12-07 PROCEDURE — 80061 LIPID PANEL: CPT

## 2017-12-07 PROCEDURE — 76830 TRANSVAGINAL US NON-OB: CPT

## 2017-12-07 PROCEDURE — 80053 COMPREHEN METABOLIC PANEL: CPT

## 2017-12-07 PROCEDURE — 84443 ASSAY THYROID STIM HORMONE: CPT

## 2017-12-07 PROCEDURE — 76856 US EXAM PELVIC COMPLETE: CPT

## 2017-12-07 PROCEDURE — 83036 HEMOGLOBIN GLYCOSYLATED A1C: CPT

## 2017-12-07 PROCEDURE — 36415 COLL VENOUS BLD VENIPUNCTURE: CPT

## 2017-12-13 ENCOUNTER — GENERIC CONVERSION - ENCOUNTER (OUTPATIENT)
Dept: OTHER | Facility: OTHER | Age: 62
End: 2017-12-13

## 2017-12-15 ENCOUNTER — GENERIC CONVERSION - ENCOUNTER (OUTPATIENT)
Dept: OTHER | Facility: OTHER | Age: 62
End: 2017-12-15

## 2018-01-09 ENCOUNTER — HOSPITAL ENCOUNTER (OUTPATIENT)
Dept: MAMMOGRAPHY | Facility: CLINIC | Age: 63
Discharge: HOME | End: 2018-01-09
Payer: COMMERCIAL

## 2018-01-09 DIAGNOSIS — Z12.31 ENCOUNTER FOR SCREENING MAMMOGRAM FOR MALIGNANT NEOPLASM OF BREAST: ICD-10-CM

## 2018-01-09 DIAGNOSIS — Z01.419 ENCOUNTER FOR GYNECOLOGICAL EXAMINATION WITHOUT ABNORMAL FINDING: ICD-10-CM

## 2018-01-09 PROCEDURE — 77063 BREAST TOMOSYNTHESIS BI: CPT

## 2018-01-09 PROCEDURE — 77067 SCR MAMMO BI INCL CAD: CPT

## 2018-01-10 NOTE — MISCELLANEOUS
Message   Recorded as Task   Date: 08/08/2016 08:22 AM, Created By: Courtney Torres   Task Name: Follow Up   Assigned To: [facility] [place] [facility]   Regarding Patient: Carmen Jiang, Status: In Progress   JermaineChase Blackwell - 08 Aug 2016 8:22 AM     TASK CREATED  Caller: Self; General Medical Question; [phone] (Home); [phone] x,,,,, (Work)  Received call from pt  on [facility] triage line  Pt  states that she is having severe pain in her R neck, shoulder, and arm; per pt  this pain also travels about half-way down her back  Pt  states that she had the same pain in 2011, and has epidural injections and they worked until now  Pt  states that this pain began last week  Pt  states she has been taking Aleve, which cuts down on the pain, but does not take the pain away  Pt  rates pain as a "twelve" out of 10  Pt  states that she can't turn her neck, and cannot sleep well because she cannot get comfortable  Pt  is scheduled for first available CON w/ Dr Haleigh Appiah on 8/31, however, pt  states that she is wondering if there is anything like an anti-inflammatory that could be called in prior to her appt  Pt  also wondering if Dr Haleigh Appiah would like any imaging done prior to her appt  such as an x-ray  Pt  was advised that I will s/w Dr Haleigh Appiah and c/b w/ his recommendations  Pt  appreciative, and states she will be at home until 1130 am, then she will be heading to work for the day and can be reached at her cell phone  Please advise  Thank you  Valentin Mon - 08 Aug 2016 9:03 AM     TASK REPLIED TO: Previously Assigned To Valentin Mon  can call in medrol dosepack and get mri? Dulce Rojas - 08 Aug 2016 9:15 AM     TASK REASSIGNED: Previously Assigned To Zulema Salas - 08 Aug 2016 9:24 AM     TASK EDITED  S/w pt  and advised of above  Pt  agreeable w/ plan  Pt  was advised to avoid any NSAIDs on Medrol Dose Bob, okay to take Tylenol if needed   Pt  verbalized understanding  Pt  also states that she works in [employer], will  MRI rx before she goes to work today  Pt  was advised that I will place MRI order at  once it is available  Pt  appreciative  Medrol Dose Bob Rx called into [employer] on file at this time  Valentin Mon - 08 Aug 2016 9:48 AM     TASK REPLIED TO: Previously Assigned To Valentin Mon  [facility] 08 Aug 2016 10:06 AM     TASK REPLIED TO: Previously Assigned To [facility] [place] [facility]  MRI order placed at  for  at this time  WillisRonald siddiqitlin - 08 Aug 2016 10:06 AM     TASK IN PROGRESS        Active Problems    1  Complex ovarian cyst (620 2) (N83 20)   2  Costochondritis (733 6) (M94 0)   3  Degeneration of cervical intervertebral disc (722 4) (M50 90)   4  Encounter for screening colonoscopy (V76 51) (Z12 11)   5  Endometriosis (617 9) (N80 9)   6  History of self breast exam   7  Hyperglycemia (790 29) (R73 9)   8  Hyperlipidemia (272 4) (E78 5)   9  Hypertension (401 9) (I10)   10  Hypothyroidism (244 9) (E03 9)   11  Insomnia due to medical condition (327 01) (G47 01)   12  Left low back pain (724 2) (M54 5)   13  Left ventricular hypertrophy (429 3) (I51 7)   14  Lumbosacral pain (724 2,724 6) (M54 5,M54 89)   15  Mass of right breast (611 72) (N63)   16  Mitral valvular disorder (424 0) (I05 9)   17  Nephrolithiasis (592 0) (N20 0)   18  Nicotine dependence (305 1) (F17 200)   19  Non-toxic multinodular goiter (241 1) (E04 2)   20  Obesity (278 00) (E66 9)   21  Pain of left sacroiliac joint (724 6) (M53 3)   22  Parametrial Neoplasm (239 5)   23  Primary insomnia (307 42) (F51 01)   24  Rhinitis (472 0) (J31 0)   25  Right cervical radiculopathy (723 4) (M54 12)   26  Thoracic neuritis (724 4) (M54 14)   27  Visit for screening mammogram (V76 12) (Z12 31)    Current Meds   1   Clotrimazole-Betamethasone 1-0 05 % External Cream; APPLY SPARINGLY TO THE AFFECTED AREA(S) TWICE DAILY; Therapy: 59CKH5700 to (Arnulfo Verde)  Requested for: 82CEA4474; Last   Rx:42Msw6927 Ordered   2  Fluticasone Propionate 50 MCG/ACT Nasal Suspension; USE 2 SPRAYS IN EACH   NOSTRIL ONCE DAILY; Therapy: 49SZB6840 to (Last Rx:42Vwu2268)  Requested for: 11JCJ7459 Ordered   3  Furosemide 20 MG Oral Tablet; TAKE ONE TABLET BY MOUTH IN THE MORNING AS   NEEDED FOR EDEMA; Therapy: 38QYZ6139 to (Valentino Adair)  Requested for: 08Apr2015; Last   Rx:08Apr2015 Ordered   4  Levothyroxine Sodium 125 MCG Oral Tablet; TAKE ONE TABLET BY MOUTH EVERY   MORNING; Therapy: 13YMA6663 to (Last Rx:87Fyo5186)  Requested for: 51Asr5492 Ordered   5  Losartan Potassium 50 MG Oral Tablet; take one tablet by mouth every day; Therapy: 17Fjp9309 to (Last Rx:57Ybu2888)  Requested for: 34Cae2237 Ordered   6  Medrol 4 MG Oral Tablet Therapy Pack (MethylPREDNISolone); TAKE AS DIRECTED   ON PACKAGE; Therapy: 08Aug2016 to Recorded   7  Phentermine HCl - 37 5 MG Oral Tablet (Adipex-P); TAKE ONE HALF TABLET BY   MOUTH IN THE MORNING AND TAKE ONE HALF TABLETAT NOON;   Therapy: 78RRP6800 to (Last Rx:18Apr2016) Ordered   8  Zolpidem Tartrate 5 MG Oral Tablet; TAKE 1 TABLET AT BEDTIME AS NEEDED; Therapy: 77Jwr4708 to (Evaluate:35Ncs4285); Last Rx:70Meh6294 Ordered    Allergies    1  Aztreonam   2  Carbapenems   3  Cephalosporins   4  Erythromycin Base TABS   5  FLU (SPLT)   6  Griseofulvin   7   Penicillins    Signatures   Electronically signed by : Sally Ceja RN; Aug  8 2016 10:06AM EST                       (Author)

## 2018-01-11 NOTE — PROGRESS NOTES
Assessment    1  Spondylosis of cervical region without myelopathy or radiculopathy (721 0) (G22 712)    Plan    · Follow-up visit in 1 year Evaluation and Treatment  Follow-up  Status: Hold For -  Scheduling  Requested for: 52ZNO3833   Ordered; For: Spondylosis of cervical region without myelopathy or radiculopathy; Ordered By: Gwendolyn Aranda Performed:  Due: 52ADQ6424    Discussion/Summary    67-year-old woman with cervical spondylosis and stenosis without radiculopathy or myelopathy  I reviewed her history, physical examination and detail with her today  A total of 12 minutes is spent with the patient which more and 50% was spent in direct counseling and coordination of care  She is doing very well clinically and has no objective findings of myelopathy or radiculopathy  She's not taking any regular pain medications  I reiterated that she may benefit slightly higher risk of acute spinal cord injury in the event of extreme range motion of the cervical spine, however surgical prophylactic decompression is not necessarily recommended  All her questions were answered to her satisfaction  I reviewed the signs and symptoms of progressive cervical radiculopathy and myelopathy with her today and asked her to contact my office should any concerns arise  Otherwise, I will see her again in one years time for ongoing clinical surveillance  She is in agreement with this course of action  The patient was counseled regarding instructions for management, risk factor reductions, prognosis, patient and family education, impressions, importance of compliance with treatment  total time of encounter was 12 minutes and 10 minutes was spent counseling  The patient has the current Goals: Maintain current level of functioning quality of life  The patent has the current Barriers: None  Patient is able to Self-Care       Self Referrals: No      Chief Complaint  Follow up      History of Present Illness  Pleasant 67-year-old woman last seen in August 2016  Since then she underwent cervical epidural steroid injection and had complete resolution of her right upper extremity radicular symptoms  She is not taking any regular pain medications at present  She has occasional neck pain which seems to be related to stress  Otherwise she denies any pain, weakness or numbness in her arms and legs or difficulties with bowel and bladder function or change in perineal sensation  She denies any difficulties with handwriting, fine motor tasks or with balance and gait  She denies any Lhermitte's phenomenon  Overall she feels very well and is please with her quality of life  Review of Systems    Constitutional: No fever, no chills, feels well, no tiredness, no recent weight gain or weight loss  Eyes: No complaints of eye pain, no red eyes, no eyesight problems, no discharge, no dry eyes, no itching of eyes  ENT: no complaints of earache, no loss of hearing, no nose bleeds, no nasal discharge, no sore throat, no hoarseness  Cardiovascular: No complaints of slow heart rate, no fast heart rate, no chest pain, no palpitations, no leg claudication, no lower extremity edema  Respiratory: No complaints of shortness of breath, no wheezing, no cough, no SOB on exertion, no orthopnea, no PND  Gastrointestinal: No complaints of abdominal pain, no constipation, no nausea or vomiting, no diarrhea, no bloody stools  Musculoskeletal: No complaints of arthralgias, no myalgias, no joint swelling or stiffness, no limb pain or swelling  Integumentary: No complaints of skin rash or lesions, no itching, no skin wounds, no breast pain or lump  Neurological: No complaints of headache, no confusion, no convulsions, no numbness, no dizziness or fainting, no tingling, no limb weakness, no difficulty walking  Psychiatric: Not suicidal, no sleep disturbance, no anxiety or depression, no change in personality, no emotional problems     Endocrine: No complaints of proptosis, no hot flashes, no muscle weakness, no deepening of the voice, no feelings of weakness  Hematologic/Lymphatic: No complaints of swollen glands, no swollen glands in the neck, does not bleed easily, does not bruise easily  ROS reviewed  Active Problems    1  Complex ovarian cyst (620 2) (N83 299)   2  Costochondritis (733 6) (M94 0)   3  Endometriosis (617 9) (N80 9)   4  History of self breast exam   5  Hyperglycemia (790 29) (R73 9)   6  Hyperlipidemia (272 4) (E78 5)   7  Hypertension (401 9) (I10)   8  Hypothyroidism (244 9) (E03 9)   9  Insomnia due to medical condition (327 01) (G47 01)   10  Left ventricular hypertrophy (429 3) (I51 7)   11  Lumbosacral pain (724 2,724 6) (M54 5)   12  Mass of right breast (611 72) (N63)   13  Mitral valvular disorder (394 9) (I05 9)   14  Nephrolithiasis (592 0) (N20 0)   15  Nicotine dependence (305 1) (F17 200)   16  Night sweats (780 8) (R61)   17  Non-toxic multinodular goiter (241 1) (E04 2)   18  Obesity (278 00) (E66 9)   19  Parametrial Neoplasm (239 5)   20  Primary insomnia (307 42) (F51 01)   21  Rhinitis (472 0) (J31 0)   22  Right cervical radiculopathy (723 4) (M54 12)   23  Thoracic neuritis (724 4) (M54 14)   24  Visit for routine gyn exam (V72 31) (Z01 419)   25  Visit for screening mammogram (V76 12) (Z12 31)    Past Medical History    1  History of Acute Cystitis (595 0)   2  History of Acute upper respiratory infection (465 9) (J06 9)   3  History of Candidiasis, cutaneous (112 3) (B37 2)   4  History of Cervical disc disease (722 91) (M50 90)   5  History of Cervical stenosis of spine (723 0) (M48 02)   6  History of Dry skin (701 1) (L85 3)   7  History of Encounter for screening mammogram for malignant neoplasm of breast   (V76 12) (Z12 31)   8  History of acute bronchitis (V12 69) (Z87 09)   9  History of aortic valve stenosis (V12 59) (Z86 79)   10  History of bicuspid aortic valve (V13 65) (Z87 74)   11   History of cardiac murmur (V12 59) (Z86 79)   12  History of hypertension (V12 59) (Z86 79)   13  History of hypothyroidism (V12 29) (Z86 39)   14  History of renal calculi (V13 01) (Z87 442)   15  History of seasonal allergies (V15 09) (Z88 9)   16  History of thyroid disease (V12 29) (Z86 39)   17  History of upper respiratory infection (V12 09) (Z87 09)   18  History of Left low back pain (724 2) (M54 5)   19  History of Pain of left sacroiliac joint (724 6) (M53 3)   20  History of Ptosis, left (374 30) (H02 402)   21  History of Screening for human papillomavirus (HPV) (V73 81) (Z11 51)   22  History of Summary Of Previous Pregnancies  3  (Total No )   23  History of Summary Of Previous Pregnancies Para 2  (Deliveries)   24  History of Tenosynovitis Of The Finger(S) (727 05)   25  History of Trigger ring finger of left hand (727 03) (M65 342)   26  History of Wears contact lenses (V41 0) (Z97 3)   27  History of Wears reading eyeglasses (V49 89) (Z78 9)    The active problems and past medical history were reviewed and updated today  Surgical History    1  History of  Section Low Transverse   2  History of Gallbladder Surgery   3  History of Neuroplasty Decompression Median Nerve At Carpal Tunnel   4  History of Neuroplasty Median Nerve At Carpal Tunnel   5  History of Plantar Fasciectomy   6  History of Reported Prior Surgical / Procedural History   7  History of Tubal Ligation    The surgical history was reviewed and updated today  Family History  Mother    1  Family history of    2  Family history of Diabetes Mellitus (V18 0)   3  Family history of Alzheimer's disease (V17 2) (Z82 0)   4  Family history of heart disease (V17 49) (Z82 49)   5  Family history of Hypertension (V17 49)  Family History    6  Family history of Back disorder   7  Family history of Cancer   8  Family history of heart disease (V17 49) (Z82 49)   9   Family history of thyroid disease (V18 19) (Z83 49)    The family history was reviewed and updated today  Social History    · Being A Social Drinker   · Caffeine Use   · Currently sexually active   · Drinks wine (V49 89) (Z78 9)   · Former smoker (V15 82) (V20 658)   · History of Former smoker (V15 82) (F13 109)   · Functioning activity level   · High school or GED   · Denied: History of drug use   · Lives with    · Lives with spouse   · Denied: History of Marijuana   · Marital History - Currently    · Occupation   · Part-time employment   · Buddhism Affiliation Nondenominational   · Tubal ligation (V26 51) (Z98 51)   · Two children   · Uses Safety Equipment - Seatbelts  The social history was reviewed and updated today  Current Meds   1  Chantix Starting Month Bob 0 5 MG X 11 & 1 MG X 42 Oral Tablet; TAKE ONE 0 5MG   TABLET DAILY ON DAYS 1-3, THEN ONE 0 5MG TABLET TWICE DAILY ON DAYS 4-7,   THEN ONE 1MG TABLET TWICE DAILY THEREAFTER; Therapy: 17Xaz0355 to (Last Rx:67Yko0653) Ordered   2  Clotrimazole-Betamethasone 1-0 05 % External Cream; APPLY SPARINGLY TO THE   AFFECTED AREA(S) TWICE DAILY; Therapy: 65OLF7181 to (UGGARKXO:19TUK4290)  Requested for: 48ZNS1670; Last   Rx:17Jan2017 Ordered   3  Levothyroxine Sodium 125 MCG Oral Tablet; TAKE ONE TABLET BY MOUTH EVERY   MORNING; Therapy: 72RUD4635 to (Last Rx:06Mar2017)  Requested for: 75QQK6309 Ordered   4  Losartan Potassium 50 MG Oral Tablet; take one tablet by mouth every day; Therapy: 01Ogt8520 to (Last Rx:06Mar2017)  Requested for: 74RZM8674 Ordered    The medication list was reviewed and updated today  Allergies    1  Aztreonam   2  Carbapenems   3  Cephalosporins   4  Erythromycin Base TABS   5  FLU (SPLT)   6  Griseofulvin   7   Penicillins    Vitals  Vital Signs    Recorded: 15PLC0704 09:05AM   Temperature 97 2 F   Heart Rate 80   Respiration 18   Systolic 368   Diastolic 90   Height 5 ft 7 in   Weight 232 lb    BMI Calculated 36 34   BSA Calculated 2 15   Pain Scale 0     Physical Exam Constitutional Patient appears the stated age  No signs of acute distress present  No involuntary movement  Patient is cooperative  obese  Musculo: Spine Cervical Spine: Normal    Skin warm and dry  No rashes, lesions or ecchymosis  Neurologic - Mental Status: Alert and Oriented x3  Mood and affect: Affect is normal   Memory is intact  Motor System - Upper Extremities: 5/5 power in upper extremities  Muscle tone: Normal bilaterally  Muscle Bulk: Normal bilaterally  Motor System - Lower Extremities: Muscle tone: Normal bilaterally  Muscle Bulk: Normal bilaterally  Reflexes: No clonus  Ram sign was not present:   Coordination: Finger to nose was normal    Sensory: Sensation grossly intact to light touch  Gait and Station: Able to heal toe gait and Romberg negative  Health Management  History of Encounter for screening colonoscopy   COLONOSCOPY; every 10 years; Last 53AQG8864; Next Due: 31DDH3862;  Active    Future Appointments    Date/Time Provider Specialty Site   05/01/2017 06:20 PM Yaneth Curry MD Family Medicine Elpidio Dumont MD     Signatures   Electronically signed by : ALAYNA Lin ; Mar 29 2017  9:28AM EST                       (Author)

## 2018-01-12 VITALS
HEIGHT: 67 IN | BODY MASS INDEX: 36.26 KG/M2 | WEIGHT: 231 LBS | DIASTOLIC BLOOD PRESSURE: 62 MMHG | SYSTOLIC BLOOD PRESSURE: 118 MMHG

## 2018-01-12 NOTE — RESULT NOTES
Verified Results  (1) CBC/PLT/DIFF 86LPP0810 07:52AM Jamal Mt     Test Name Result Flag Reference   WBC COUNT 7 12 Thousand/uL  4 31-10 16   RBC COUNT 4 69 Million/uL  3 81-5 12   HEMOGLOBIN 14 2 g/dL  11 5-15 4   HEMATOCRIT 43 1 %  34 8-46  1   MCV 92 fL  82-98   MCH 30 3 pg  26 8-34 3   MCHC 32 9 g/dL  31 4-37 4   RDW 14 2 %  11 6-15 1   MPV 10 8 fL  8 9-12 7   PLATELET COUNT 119 Thousands/uL  149-390   nRBC AUTOMATED 0 /100 WBCs     NEUTROPHILS RELATIVE PERCENT 54 %  43-75   LYMPHOCYTES RELATIVE PERCENT 31 %  14-44   MONOCYTES RELATIVE PERCENT 10 %  4-12   EOSINOPHILS RELATIVE PERCENT 5 %  0-6   BASOPHILS RELATIVE PERCENT 0 %  0-1   NEUTROPHILS ABSOLUTE COUNT 3 79 Thousands/µL  1 85-7 62   LYMPHOCYTES ABSOLUTE COUNT 2 22 Thousands/µL  0 60-4 47   MONOCYTES ABSOLUTE COUNT 0 68 Thousand/µL  0 17-1 22   EOSINOPHILS ABSOLUTE COUNT 0 38 Thousand/µL  0 00-0 61   BASOPHILS ABSOLUTE COUNT 0 03 Thousands/µL  0 00-0 10     (1) COMPREHENSIVE METABOLIC PANEL 13ZZV8840 64:91KJ [facility] Kidney Disease Education Program recommendations are as follows:  GFR calculation is accurate only with a steady state creatinine  Chronic Kidney disease less than 60 ml/min/1 73 sq  meters  Kidney failure less than 15 ml/min/1 73 sq  meters  Test Name Result Flag Reference   GLUCOSE,RANDM 101 mg/dL     If the patient is fasting, the ADA then defines impaired fasting glucose as > 100 mg/dL and diabetes as > or equal to 123 mg/dL     SODIUM 140 mmol/L  136-145   POTASSIUM 4 0 mmol/L  3 5-5 3   CHLORIDE 104 mmol/L  100-108   CARBON DIOXIDE 29 mmol/L  21-32   ANION GAP (CALC) 7 mmol/L  4-13   BLOOD UREA NITROGEN 14 mg/dL  5-25   CREATININE 0 75 mg/dL  0 60-1 30   Standardized to IDMS reference method   CALCIUM 8 8 mg/dL  8 3-10 1   BILI, TOTAL 0 47 mg/dL  0 20-1 00   ALK PHOSPHATAS 88 U/L     ALT (SGPT) 27 U/L  12-78   AST(SGOT) 21 U/L  5-45   ALBUMIN 3 9 g/dL  3 5-5 0   TOTAL PROTEIN 7 1 g/dL  6 4-8 2 eGFR Non-African American      >60 0 ml/min/1 73sq m     (1) HEMOGLOBIN A1C 19Apr2016 07:52AM Angelo Pacheco   5 7-6 4% impaired fasting glucose  >=6 5% diagnosis of diabetes    Falsely low levels are seen in conditions linked to short RBC life span-  hemolytic anemia, and splenomegaly  Falsely elevated levels are seen in situations where there is an increased production of RBC- receipt of erythropoietin or blood transfusions  Adopted from ADA-Clinical Practice Recommendations     Test Name Result Flag Reference   HEMOGLOBIN A1C 6 1 % H 4 0-5 6   EST  AVG  GLUCOSE 128 mg/dl       (1) LIPID PANEL, FASTING 19Apr2016 07:52AM Reymundo Fleming   Triglyceride:         Normal              <150 mg/dl       Borderline High    150-199 mg/dl       High               200-499 mg/dl       Very High          >499 mg/dl  Cholesterol:         Desirable        <200 mg/dl      Borderline High  200-239 mg/dl      High             >239 mg/dl  HDL Cholesterol:        High    >59 mg/dL      Low     <41 mg/dL  LDL CALCULATED:    This screening LDL is a calculated result  It does not have the accuracy of the Direct Measured LDL in the monitoring of patients with hyperlipidemia and/or statin therapy  Direct Measure LDL (YTW620) must be ordered separately in these patients  Test Name Result Flag Reference   CHOLESTEROL 165 mg/dL     HDL,DIRECT 33 mg/dL L 40-60   Specimen collection should occur prior to Metamizole administration due to the potential for falsely depressed results  LDL CHOLESTEROL CALCULATED 95 mg/dL  0-100   TRIGLYCERIDES 187 mg/dL H <=150   Specimen collection should occur prior to N-Acetylcysteine or Metamizole administration due to the potential for falsely depressed results  (1) TSH WITH FT4 REFLEX 19Apr2016 07:52AM Reymundo Fleming   Patients undergoing fluorescein dye angiography may retain small amounts of fluorescein in the body for 48-72 hours post procedure   Samples containing fluorescein can produce falsely depressed TSH values  If the patient had this procedure,a specimen should be resubmitted post fluorescein clearance  The recommended reference ranges for TSH during pregnancy are as follows:  First trimester 0 1 to 2 5 uIU/mL  Second trimester  0 2 to 3 0 uIU/mL  Third trimester 0 3 to 3 0 uIU/m     Test Name Result Flag Reference   TSH 2 010 uIU/mL  0 358-3 740       Discussion/Summary    All labs good for sugar, cholesterol and thyroid      Patient aware     Fabrizio Whitten MA   04/20/2016 8:35 am1       1 Amended By: Loreto Blakcburn; Apr 20 2016 8:34 AM EST

## 2018-01-12 NOTE — MISCELLANEOUS
Message   Recorded as Task   Date: 08/26/2016 11:21 AM, Created By: Yomi Amaya   Task Name: Follow Up   Assigned To: [facility] [place] [facility]   Regarding Patient: Aj Rocha, Status: Active   Comment:    Sofía Nichols - 26 Aug 2016 11:21 AM     TASK CREATED  Caller: Self; General Medical Question; [phone] (Home)  S/w pt, questions r/t medication and dental procedure  pt states she is having a crown put on next week  is Gabapentin ok? Advised pt - gabapentin is not a blood thinner or an opioid  Make your dentist aware of all medications  Pt verbalized understanding and appreciation  Active Problems    1  Cervical stenosis of spine (723 0) (M48 02)   2  Complex ovarian cyst (620 2) (N83 20)   3  Costochondritis (733 6) (M94 0)   4  Degeneration of cervical intervertebral disc (722 4) (M50 90)   5  Encounter for screening colonoscopy (V76 51) (Z12 11)   6  Endometriosis (617 9) (N80 9)   7  History of self breast exam   8  Hyperglycemia (790 29) (R73 9)   9  Hyperlipidemia (272 4) (E78 5)   10  Hypertension (401 9) (I10)   11  Hypothyroidism (244 9) (E03 9)   12  Insomnia due to medical condition (327 01) (G47 01)   13  Left low back pain (724 2) (M54 5)   14  Left ventricular hypertrophy (429 3) (I51 7)   15  Lumbosacral pain (724 2,724 6) (M54 5,M54 89)   16  Mass of right breast (611 72) (N63)   17  Mitral valvular disorder (424 0) (I05 9)   18  Nephrolithiasis (592 0) (N20 0)   19  Nicotine dependence (305 1) (F17 200)   20  Non-toxic multinodular goiter (241 1) (E04 2)   21  Obesity (278 00) (E66 9)   22  Pain of left sacroiliac joint (724 6) (M53 3)   23  Parametrial Neoplasm (239 5)   24  Primary insomnia (307 42) (F51 01)   25  Rhinitis (472 0) (J31 0)   26  Right cervical radiculopathy (723 4) (M54 12)   27  Thoracic neuritis (724 4) (M54 14)   28  Visit for screening mammogram (V76 12) (Z12 31)    Current Meds   1   Clotrimazole-Betamethasone 1-0 05 % External Cream; APPLY SPARINGLY TO THE   AFFECTED AREA(S) TWICE DAILY; Therapy: 65ZCE9055 to (Clayton Davis)  Requested for: 48SXM9774; Last   Rx:11Mfp6651 Ordered   2  Fluticasone Propionate 50 MCG/ACT Nasal Suspension; USE 2 SPRAYS IN EACH   NOSTRIL ONCE DAILY; Therapy: 65MBG8697 to (Last Rx:86Jqd4529)  Requested for: 38DJN0646 Ordered   3  Furosemide 20 MG Oral Tablet; TAKE ONE TABLET BY MOUTH IN THE MORNING AS   NEEDED FOR EDEMA; Therapy: 47OFK0908 to (Esme Steele)  Requested for: 08Apr2015; Last   Rx:08Apr2015 Ordered   4  Gabapentin 100 MG Oral Capsule; Take 1 po tid x 7 days, then 2 po tid x 7 days, then   3 po tid; Therapy: 63Qxe6005 to (Michelle Roberts)  Requested for: 45Kdj5913; Last   Rx:84Phc3175; Status: [address] to Pharmacy - Awaiting Verification   Ordered   5  Levothyroxine Sodium 125 MCG Oral Tablet; TAKE ONE TABLET BY MOUTH EVERY   MORNING; Therapy: 91CLJ0075 to (Last Rx:98Gsk2498)  Requested for: 14Fix2104 Ordered   6  Losartan Potassium 50 MG Oral Tablet; take one tablet by mouth every day; Therapy: 80Ndy8185 to (Last Rx:51Jkq9082)  Requested for: 83Yhb3338 Ordered   7  Phentermine HCl - 37 5 MG Oral Tablet (Adipex-P); TAKE ONE HALF TABLET BY   MOUTH IN THE MORNING AND TAKE ONE HALF TABLETAT NOON;   Therapy: 25IED8366 to (Last Rx:18Apr2016) Ordered    Allergies    1  Aztreonam   2  Carbapenems   3  Cephalosporins   4  Erythromycin Base TABS   5  FLU (SPLT)   6  Griseofulvin   7   Penicillins    Signatures   Electronically signed by : Jean-Claude Hagan, ; Aug 26 2016  2:18PM EST                       (Author)

## 2018-01-12 NOTE — RESULT NOTES
Discussion/Summary    No problem with pancreas or bile ducts but her liver is very enlarged with severe fatty liver  This can cause pain in the upper R side of abdomen  Advise evaluation with [facility] or with [facility] in [place]    pt aware ems 5/16/17         Verified Results  US ABDOMEN LIMITED 03NSX7465 07:06AM Simone Longo Order Number: [id number]    - Patient Instructions: To schedule this appointment, please contact Central Scheduling at [phone]  Test Name Result Flag Reference   US ABDOMEN LIMITED (Report)     RIGHT UPPER QUADRANT ULTRASOUND     INDICATION: Right upper quadrant abdominal pain  Prior cholecystectomy  Hepatic steatosis  COMPARISON: CT performed April 9, 2014  Ultrasound performed February 8, 2008  TECHNIQUE:  Real-time ultrasound of the right upper quadrant was performed with a curvilinear transducer with both volumetric sweeps and still imaging techniques  FINDINGS:     PANCREAS: Portions of the pancreas are obscured by bowel gas  Visualized portions of the pancreas are unremarkable  AORTA AND IVC: Visualized portions are normal for patient age  LIVER:   Size: Enlarged  The liver measures 22 cm in the midclavicular line  Contour: Surface contour is smooth  Parenchyma: There is marked diffuse increased echogenicity with smooth echotexture and significant beam attenuation with loss of periportal echogenicity  Most consistent with severe hepatic steatosis  No evidence of suspicious sonographically detectable solid hepatic mass  Limited imaging of the main portal vein shows it to be patent and hepatopetal       BILIARY:   Gallbladder is surgically absent  No intrahepatic biliary dilatation  CBD measures 5 mm  No choledocholithiasis  KIDNEY:    Right kidney measures 12 5 cm  Within normal limits  ASCITES:  None  IMPRESSION:     Hepatomegaly and hepatic steatosis         Workstation performed: [id number]     Signed by: Jonh Espinal MD   5/16/17

## 2018-01-12 NOTE — RESULT NOTES
Discussion/Summary   Celiac serologies are neg     Verified Results  (1) CELIAC DISEASE AB PROFILE 17Inj6619 08:10AM Judson Pinon Order Number: [id number]     Test Name Result Flag Reference   tTG IGG <2 U/mL  0 - 5   Negative        0 - 5                                Weak Positive   6 - 9                                Positive           >9   tTG IGA <2 U/mL  0 - 3   Negative        0 -  3                                Weak Positive   4 - 10                                Positive           >10   Tissue Transglutaminase (tTG) has been identified   as the endomysial antigen  Studies have demonstr-   ated that endomysial IgA antibodies have over 99%   specificity for gluten sensitive enteropathy     GLIADA 6 units  0 - 19   Negative                   0 - 19                     Weak Positive             20 - 30                     Moderate to Strong Positive   >30   GLIADG 2 units  0 - 19   Negative                   0 - 19                     Weak Positive             20 - 30                     Moderate to Strong Positive   >30   ENDOMYSIAL AB IGA Negative  Negative   Performed at:  [facility] [place] [address]  [id number]  : Germaine Marin MD, Phone:  [phone]    mg/dL  26 - 033

## 2018-01-13 VITALS
DIASTOLIC BLOOD PRESSURE: 92 MMHG | SYSTOLIC BLOOD PRESSURE: 152 MMHG | TEMPERATURE: 97.8 F | WEIGHT: 233.8 LBS | HEIGHT: 67 IN | BODY MASS INDEX: 36.7 KG/M2 | HEART RATE: 88 BPM

## 2018-01-13 VITALS
HEIGHT: 67 IN | BODY MASS INDEX: 36.41 KG/M2 | SYSTOLIC BLOOD PRESSURE: 164 MMHG | WEIGHT: 232 LBS | TEMPERATURE: 97.2 F | HEART RATE: 80 BPM | RESPIRATION RATE: 18 BRPM | DIASTOLIC BLOOD PRESSURE: 90 MMHG

## 2018-01-13 NOTE — RESULT NOTES
Verified Results  * XR SPINE LUMBAR 2 OR 3 VIEWS 20Apr2016 12:27PM Alison Elinor Order Number: [id number]     Test Name Result Flag Reference   XR SPINE LUMBAR 2 OR 3 VIEWS (Report)     LUMBAR SPINE     INDICATION: Chronic low back pain  COMPARISON: CT abdomen pelvis April 9, 2014     VIEWS: AP, lateral and coned-down projections; 3 images     FINDINGS:     Grade 1 anterolisthesis L4 on L5, unchanged  There is no radiographic evidence of acute fracture or destructive osseous lesion  Osseous structures demineralized  Disc space narrowing throughout the lumbar spine, most pronounced L3-L4  Diffuse facet hypertrophic changes  Visualized soft tissues appear unremarkable  IMPRESSION:   Stable degenerative changes  Workstation performed: [id number]     Signed by:   Sandra Roger DO   4/20/16     XR SPINE THORACIC 2 VIEW 20Apr2016 12:27PM Alison Elinor Order Number: [id number]     Test Name Result Flag Reference   XR SPINE THORACIC 2 VW (Report)     THORACIC SPINE     INDICATION: Chronic mid back pain  COMPARISON: None     VIEWS: AP and lateral projections; 2 images     FINDINGS:     Thoracic vertebrae demonstrate normal stature and alignment  Osseous structures demineralized  Increase in the thoracic kyphotic curvature  Disc space narrowing diffusely throughout the thoracic spine with degenerative endplate changes  Anterior osteophytic spur formation and lateral syndesmophyte formation  Diffuse facet hypertrophic changes  There is no fracture or pathologic bone lesion  There is no displacement of the paraspinal line  The pedicles are intact  IMPRESSION:   Moderate degenerative changes of the thoracic spine  Workstation performed: [id number]     Signed by:   Sandra Roger DO   4/20/16       Discussion/Summary    Lots of bone spurs along the thoracic spine likely cause for her pain     [facility] (Dr Chuy Brock)    pt aware ems 4/20/20161       1 Amended By: Ronaldo Parrish; Apr 20 2016 5:23 PM EST

## 2018-01-14 VITALS
WEIGHT: 232.6 LBS | HEART RATE: 92 BPM | TEMPERATURE: 98.4 F | HEIGHT: 67 IN | DIASTOLIC BLOOD PRESSURE: 80 MMHG | SYSTOLIC BLOOD PRESSURE: 148 MMHG | BODY MASS INDEX: 36.51 KG/M2

## 2018-01-14 NOTE — MISCELLANEOUS
Message   Recorded as Task   Date: 11/28/2016 09:56 AM, Created By: Dimitrios Guadarrama   Task Name: Med Renewal Request   Assigned To: [facility] [place] [facility]   Regarding Patient: Sofi Kulkarni, Status: In Progress   Comment:    Rosenda Duong - 28 Nov 2016 9:56 AM     TASK CREATED  Caller: Self; Renew Medication; [phone] (Home); [phone] x,,,,, (Work)  received a vmlom from [birth year]36 today from the pt  requesting a refill on the Gabapentin 400mg QID, last prescribed on 10/21 , she stated she uses [facility]  thanks   Dillon Fan - 28 Nov 2016 10:14 AM     TASK REPLIED TO: Previously Assigned To [facility] [place] [facility]  I escribed a 90 day supply to [facility]  However, she will need an OV in 3 months before we can send any further scripts in the future  Rosenda Duong - 28 Nov 2016 10:53 AM     TASK EDITED  Attempted to call the pt  and left a detailed mom in regards to the previous task  Rosenda Duong - 28 Nov 2016 10:53 AM     TASK IN PROGRESS        Active Problems    1  Cervical disc disease (722 91) (M50 90)   2  Cervical stenosis of spine (723 0) (M48 02)   3  Complex ovarian cyst (620 2) (N83 299)   4  Costochondritis (733 6) (M94 0)   5  Dry skin (701 1) (L85 3)   6  Encounter for screening colonoscopy (V76 51) (Z12 11)   7  Endometriosis (617 9) (N80 9)   8  History of self breast exam   9  Hyperglycemia (790 29) (R73 9)   10  Hyperlipidemia (272 4) (E78 5)   11  Hypertension (401 9) (I10)   12  Hypothyroidism (244 9) (E03 9)   13  Insomnia due to medical condition (327 01) (G47 01)   14  Left low back pain (724 2) (M54 5)   15  Left ventricular hypertrophy (429 3) (I51 7)   16  Lumbosacral pain (724 2,724 6) (M54 5,M54 89)   17  Mass of right breast (611 72) (N63)   18  Mitral valvular disorder (424 0) (I05 9)   19  Nephrolithiasis (592 0) (N20 0)   20  Nicotine dependence (305 1) (F17 200)   21  Night sweats (780 8) (R61)   22   Non-toxic multinodular goiter (241 1) (E04 2) 23  Obesity (278 00) (E66 9)   24  Pain of left sacroiliac joint (724 6) (M53 3)   25  Parametrial Neoplasm (239 5)   26  Primary insomnia (307 42) (F51 01)   27  Rhinitis (472 0) (J31 0)   28  Right cervical radiculopathy (723 4) (M54 12)   29  Thoracic neuritis (724 4) (M54 14)   30  Visit for screening mammogram (V76 12) (Z12 31)    Current Meds   1  Clotrimazole-Betamethasone 1-0 05 % External Cream; APPLY SPARINGLY TO THE   AFFECTED AREA(S) TWICE DAILY; Therapy: 52VFN1451 to (Valdensaniya Breed)  Requested for: 90VVA6373; Last   Rx:79Vpy5040 Ordered   2  Fluticasone Propionate 50 MCG/ACT Nasal Suspension; USE 2 SPRAYS IN EACH   NOSTRIL ONCE DAILY; Therapy: 08NDL8915 to (Last Rx:44Ome0838)  Requested for: 88OCA0442 Ordered   3  Furosemide 20 MG Oral Tablet; TAKE ONE TABLET BY MOUTH IN THE MORNING AS   NEEDED FOR EDEMA; Therapy: 74LRL6310 to ([id number])  Requested for: 08Apr2015; Last   Rx:23Ned8064 Ordered   4  Gabapentin 400 MG Oral Capsule; TAKE 1 CAPSULE 4 TIMES DAILY; Therapy: 99Vdc2863 to (Evaluate:77Yxi1457)  Requested for: 70CBP2134; Last   Rx:64Iji4275 Ordered   5  Levothyroxine Sodium 125 MCG Oral Tablet; TAKE ONE TABLET BY MOUTH EVERY   MORNING; Therapy: 70YIN6036 to (Last Rx:99Tab2378)  Requested for: 27Fpp4481 Ordered   6  Losartan Potassium 50 MG Oral Tablet; take one tablet by mouth every day; Therapy: 86Rir6725 to (Last Rx:94Eaz1338)  Requested for: 01Kod7635 Ordered    Allergies    1  Aztreonam   2  Carbapenems   3  Cephalosporins   4  Erythromycin Base TABS   5  FLU (SPLT)   6  Griseofulvin   7   Penicillins    Signatures   Electronically signed by : Preeti Bundy, ; Nov 30 2016  8:15AM EST                       (Author)

## 2018-01-14 NOTE — RESULT NOTES
Discussion/Summary   All labs excellent for sugar, cholesterol and thyroid     Verified Results  (1) CBC/ PLT (NO DIFF) 02VXV7820 07:57AM [employer] [employer] Order Number: [id number]     Test Name Result Flag Reference   HEMATOCRIT 44 3 %  34 8-46 1   HEMOGLOBIN 14 7 g/dL  11 5-15 4   MCHC 33 2 g/dL  31 4-37 4   MCH 30 8 pg  26 8-34 3   MCV 93 fL  82-98   PLATELET COUNT 724 Thousands/uL  149-390   RBC COUNT 4 77 Million/uL  3 81-5 12   RDW 14 2 %  11 6-15 1   WBC COUNT 7 73 Thousand/uL  4 31-10 16   MPV 10 9 fL  8 9-12 7     (1) COMPREHENSIVE METABOLIC PANEL 24WDB9473 68:47XN [employer] Order Number: [id number]     Test Name Result Flag Reference   SODIUM 137 mmol/L  136-145   POTASSIUM 3 8 mmol/L  3 5-5 3   CHLORIDE 103 mmol/L  100-108   CARBON DIOXIDE 29 mmol/L  21-32   ANION GAP (CALC) 5 mmol/L  4-13   BLOOD UREA NITROGEN 15 mg/dL  5-25   CREATININE 0 69 mg/dL  0 60-1 30   Standardized to IDMS reference method   CALCIUM 8 9 mg/dL  8 3-10 1   BILI, TOTAL 0 36 mg/dL  0 20-1 00   ALK PHOSPHATAS 84 U/L     ALT (SGPT) 27 U/L  12-78   AST(SGOT) 14 U/L  5-45   ALBUMIN 3 9 g/dL  3 5-5 0   TOTAL PROTEIN 7 2 g/dL  6 4-8 2   eGFR Non-African American      >60 0 ml/min/1 73sq [facility] Disease Education Program recommendations are as follows:  GFR calculation is accurate only with a steady state creatinine  Chronic Kidney disease less than 60 ml/min/1 73 sq  meters  Kidney failure less than 15 ml/min/1 73 sq  meters  GLUCOSE FASTING 105 mg/dL H 65-99     (1) HEMOGLOBIN A1C 54CSK7525 07:57AM [employer] Order Number: [id number]     Test Name Result Flag Reference   HEMOGLOBIN A1C 6 2 %  4 2-6 3   EST  AVG   GLUCOSE 131 mg/dl       (1) LIPID PANEL, FASTING 34FUL3796 07:57AM [employer] Order Number: [id number]     Test Name Result Flag Reference   CHOLESTEROL 154 mg/dL     HDL,DIRECT 33 mg/dL L 40-60   Specimen collection should occur prior to Metamizole administration due to the potential for falsely depressed results  LDL CHOLESTEROL CALCULATED 85 mg/dL  0-100   Triglyceride:         Normal              <150 mg/dl       Borderline High    150-199 mg/dl       High               200-499 mg/dl       Very High          >499 mg/dl  Cholesterol:         Desirable        <200 mg/dl      Borderline High  200-239 mg/dl      High             >239 mg/dl  HDL Cholesterol:        High    >59 mg/dL      Low     <41 mg/dL  LDL CALCULATED:    This screening LDL is a calculated result  It does not have the accuracy of the Direct Measured LDL in the monitoring of patients with hyperlipidemia and/or statin therapy  Direct Measure LDL (VRB877) must be ordered separately in these patients  TRIGLYCERIDES 178 mg/dL H <=150   Specimen collection should occur prior to N-Acetylcysteine or Metamizole administration due to the potential for falsely depressed results  (1) LIPASE 30HUA2748 07:57AM [employer] Order Number: [id number]     Test Name Result Flag Reference   LIPASE 270 u/L       (1) TSH WITH FT4 REFLEX 66DAH9145 07:57AM [employer] Order Number: [id number]     Test Name Result Flag Reference   TSH 1 660 uIU/mL  0 358-3 740   Patients undergoing fluorescein dye angiography may retain small amounts of fluorescein in the body for 48-72 hours post procedure  Samples containing fluorescein can produce falsely depressed TSH values  If the patient had this procedure,a specimen should be resubmitted post fluorescein clearance            The recommended reference ranges for TSH during pregnancy are as follows:  First trimester 0 1 to 2 5 uIU/mL  Second trimester  0 2 to 3 0 uIU/mL  Third trimester 0 3 to 3 0 uIU/m

## 2018-01-15 ENCOUNTER — ALLSCRIPTS OFFICE VISIT (OUTPATIENT)
Dept: OTHER | Facility: OTHER | Age: 63
End: 2018-01-15

## 2018-01-15 VITALS
BODY MASS INDEX: 36.26 KG/M2 | SYSTOLIC BLOOD PRESSURE: 140 MMHG | HEART RATE: 86 BPM | WEIGHT: 231 LBS | DIASTOLIC BLOOD PRESSURE: 80 MMHG | TEMPERATURE: 97.6 F | HEIGHT: 67 IN | OXYGEN SATURATION: 97 %

## 2018-01-15 DIAGNOSIS — Z12.31 ENCOUNTER FOR SCREENING MAMMOGRAM FOR MALIGNANT NEOPLASM OF BREAST: ICD-10-CM

## 2018-01-15 NOTE — RESULT NOTES
Message   Recorded as Task   Date: 10/24/2016 03:24 PM, Created By: Elena Umaña   Task Name: Follow Up   Assigned To: [facility] [place] [facility]   Regarding Patient: Lashawn Chaudhari, Status: In Progress   Comment:    Elena Umaña - 24 Oct 2016 3:24 PM     TASK CREATED  S/p BRIAN on 10/21/16 w/Dr Peewee Ma in [place]  No f/u scheduled    Please call 10/28/16   Marcy Pritchard - 27 Oct 2016 1:50 PM     TASK EDITED                 Msg left C# for pt to cb   [facility] - 28 Oct 2016 11:47 AM     TASK EDITED                 I spoke with pt, states this was her third injection and didn't get any releif  States the numbness and tingling comes and goes, doesn't matter what she is doing  Continues with Gabapentin 400 mg QID  I did schedule follow up for 11/8/16 at 0815 with DR Mon  Advised I will discuss with DR Peewee Ma and CB if there would be any recommendations prior to OV  *************   Valentin Mon - 28 Oct 2016 12:21 PM     TASK REPLIED TO: Previously Assigned To [facility] [place] [facility]  I don't necessarily have anything else to offer except the gabapentin and to follow through with surgical reevaluation   Sofía Nichols - 28 Oct 2016 1:02 PM     TASK EDITED  Lmom to cb on home #  Left a detailed message on machine as per release of info on file  Advised pt of above  If no refills / questions re: gabapentin - may want to consider rescheduling / cancelling ov  Provided cb number to discuss          Signatures   Electronically signed by : Tracy Quinones, ; Oct 31 2016  4:15PM EST                       (Author)

## 2018-01-15 NOTE — RESULT NOTES
Message   Recorded as Task   Date: 09/02/2016 11:29 AM, Created By: Leonardo Campo   Task Name: Follow Up   Assigned To: Betty Carney   Regarding Patient: Kory Gary, Status: Active   Comment:    Thelma Gaytan - 02 Sep 2016 11:29 AM     TASK CREATED  Patient stopped in at the office today stating   -she seen Dr Lefty Daley, he recommends that patient should try a cervical injection before jumping into surgery  -patient is now having right arm numbness down to the hand, does not go into the fingers  -still taking the Gabapentin with a little relief    -patient would like to know if she should be seeing a neurologist?  -can patient be scheduled for injection?  -patient's call back number [phone]    -Dr Hola Gallo notes are on your desk  Valentin Mon - 02 Sep 2016 12:14 PM     TASK REPLIED TO: Previously Assigned To Betty Carney  BRIAN X 2 , cervical stenosis and cervical radicul;itis   Betty Carney - 02 Sep 2016 1:15 PM     TASK REPLIED TO: Previously Assigned To Betty Carney  procedures scheduled #1 on 9/9/2016-#2 on 9/23/2016  pt has a f/u on 9/13/2016 w/you for her Gabapentin she was wondering if she should keep or change?      pt denies bld thinners/antbx, pt aware if becomes ill/fever/antbx to call office to r/s, npo 1 hr prior, wear easy to remove blouse no earrings/necklaces, need for , pt verbally understands  ************   Valentin Mon - 02 Sep 2016 1:31 PM     TASK REPLIED TO: Previously Assigned To [facility] [place] [facility]  can change until after 2nd inj-2 weeks later, how is she doing with gabapentin?    Sofía Nichols - 02 Sep 2016 1:58 PM     TASK REASSIGNED: Previously Assigned To [facility] [place] [facility]   Betty Carney - 08 Sep 2016 1:25 PM     TASK EDITED  late entry l/m for pt to rtc in regards to gabapentin and changing f/u appt on 9/7/2016   Betty Carney - 09 Sep 2016 9:14 AM     TASK REPLIED TO: Previously Assigned To Valentin Mon  pt stopped by my office to r/s f/u   pt states she is fine with the gabapentin no reactions to the med, feels helping very little pt is taking 3 pills 3x's a day should she continue with dosage       Valentin Mon - 09 Sep 2016 9:19 AM     TASK REPLIED TO: Previously Assigned To Valentin Mon  [place]

## 2018-01-15 NOTE — RESULT NOTES
Discussion/Summary   all of the polyp besides the one in the ascending colon were benign  The one in ascending colon was a tubular adenoma  Repeat colonoscopy should be performed in 5y based on this     Verified Results  (1) TISSUE EXAM 27VEM2868 07:45AM Nilsa Stephenson     Test Name Result Flag Reference   LAB AP CASE REPORT (Report)     Surgical Pathology Report             Case: [id number]                   Authorizing Provider: Netta Jones MD      Collected:      10/09/2017 0745        Ordering Location:   [facility]    Received:      10/09/2017 C/ Juanito Jc  Endoscopy                            Pathologist:      Magdalena Castellon MD                                 Specimens:  A) - Polyp, Colorectal, ascending colon polyp (cold forceps)                      B) - Polyp, Colorectal, descending colon polyp (cold forceps) x 2                   C) - Polyp, Colorectal, sigmoid polyp (hot snare)   LAB AP FINAL DIAGNOSIS (Report)     A  Ascending colon polyp (biopsy):  - Fragments of tubular adenoma  - No high grade dysplasia     B  Descending colon polyp x2 (biopsy):  - Fragments of hyperplastic polyp  - Negative for dysplasia     C  Sigmoid colon polyp (polypectomy):  - Cauterized hyperplastic polyp  - Negative for dysplasia     Case signed out at [facility], [address] [place] [address] 89      Electronically signed by Magdalena Castellon MD on 10/16/2017 at 1:54 PM   LAB AP SURGICAL ADDITIONAL INFORMATION (Report)     All controls performed with the immunohistochemical stains reported above   reacted appropriately  These tests were developed and their performance   characteristics determined by [facility] or   [facility]  They may not be cleared or approved by the [place]  Food and Drug Administration  The FDA has determined that such clearance   or approval is not necessary  These tests are used for clinical purposes     They should not be regarded as investigational or for research  This   laboratory has been approved by [place] 88, designated as a high-complexity   laboratory and is qualified to perform these tests  LAB AP GROSS DESCRIPTION (Report)     A  The specimen is received in formalin, labeled with the patient's name   and medical record number, and is designated ascending colon polyp  The   specimen consists of 2 tan-pink soft tissue fragments measuring 0 4 cm and   0 6 cm in greatest dimension  Entirely submitted  One cassette  B  The specimen is received in formalin, labeled with the patient's name   and medical record number, and is designated Descending colon polyp ? ?2  The specimen consists of 2 tan-pink soft tissue fragments measuring 0 2 cm   and 0 6 cm in greatest dimension  Entirely submitted  One cassette  C  The specimen is received in formalin, labeled with the patient's name   and medical record number, and is designated Sigmoid polyp  The   specimen consists of one tan-pink soft tissue fragment measuring 0 3 cm in   greatest dimension  Entirely submitted  One cassette  Note: The estimated total formalin fixation time based upon information   provided by the submitting clinician and the standard processing schedule   is under 72 hours   MAS

## 2018-01-15 NOTE — MISCELLANEOUS
Message   Recorded as Task   Date: 01/05/2017 09:48 AM, Created By: Dawood Moses   Task Name: Medical Complaint Callback   Assigned To: [address]   Regarding Patient: Troy Sevilla, Status: Active   Comment:    CaseMaudet - 05 Jan 2017 9:48 AM     TASK CREATED  Caller: Self; [phone] (Home); [phone] (Work)  Tad Tess and  both has the Adacel shot about 6 yrs ago  They were friends over the weekend and now the friends were diagnosed with pertussis  The wife is in the hospital  Alex Gurpreet is worried about this, should they be concerned? Is their shot still effective? Brandi Millan - 05 Jan 2017 11:41 AM     TASK EDITED  Left message   Brandi Millan - 05 Jan 2017 2:26 PM     TASK EDITED  Patient notified - should be fine since she has had shot within 10 years  Active Problems    1  Cervical disc disease (722 91) (M50 90)   2  Cervical stenosis of spine (723 0) (M48 02)   3  Complex ovarian cyst (620 2) (N83 299)   4  Costochondritis (733 6) (M94 0)   5  Dry skin (701 1) (L85 3)   6  Encounter for screening colonoscopy (V76 51) (Z12 11)   7  Endometriosis (617 9) (N80 9)   8  History of self breast exam   9  Hyperglycemia (790 29) (R73 9)   10  Hyperlipidemia (272 4) (E78 5)   11  Hypertension (401 9) (I10)   12  Hypothyroidism (244 9) (E03 9)   13  Insomnia due to medical condition (327 01) (G47 01)   14  Left low back pain (724 2) (M54 5)   15  Left ventricular hypertrophy (429 3) (I51 7)   16  Lumbosacral pain (724 2,724 6) (M54 5)   17  Mass of right breast (611 72) (N63)   18  Mitral valvular disorder (394 9) (I05 9)   19  Nephrolithiasis (592 0) (N20 0)   20  Nicotine dependence (305 1) (F17 200)   21  Night sweats (780 8) (R61)   22  Non-toxic multinodular goiter (241 1) (E04 2)   23  Obesity (278 00) (E66 9)   24  Pain of left sacroiliac joint (724 6) (M53 3)   25  Parametrial Neoplasm (239 5)   26  Primary insomnia (307 42) (F51 01)   27   Ptosis, left (374 30) (H02 402)   28  Rhinitis (472 0) (J31 0)   29  Right cervical radiculopathy (723 4) (M54 12)   30  Thoracic neuritis (724 4) (M54 14)   31  Visit for screening mammogram (V76 12) (Z12 31)    Current Meds   1  Chantix Starting Month Bob 0 5 MG X 11 & 1 MG X 42 Oral Tablet; TAKE ONE 0 5MG   TABLET DAILY ON DAYS 1-3, THEN ONE 0 5MG TABLET TWICE DAILY ON DAYS 4-7,   THEN ONE 1MG TABLET TWICE DAILY THEREAFTER; Therapy: 46Rhh5632 to (Last Rx:89Mig3133) Ordered   2  Clotrimazole-Betamethasone 1-0 05 % External Cream; APPLY SPARINGLY TO THE   AFFECTED AREA(S) TWICE DAILY; Therapy: 37FYO7383 to (Lulu Cuello)  Requested for: 89UCS4211; Last   Rx:93Pfl5869 Ordered   3  Fluticasone Propionate 50 MCG/ACT Nasal Suspension; USE 2 SPRAYS IN EACH   NOSTRIL ONCE DAILY; Therapy: 46IER4525 to (Last Rx:92Yjv9454)  Requested for: 09YJP9835 Ordered   4  Levothyroxine Sodium 125 MCG Oral Tablet; TAKE ONE TABLET BY MOUTH EVERY   MORNING; Therapy: 01OVF5209 to (Evaluate:50Lst3910)  Requested for: 88Rwi6459; Last   Rx:92Uwz7283 Ordered   5  Losartan Potassium 50 MG Oral Tablet; take one tablet by mouth every day; Therapy: 50Obx0199 to (Evaluate:31Nbk9997)  Requested for: 95Fis6356; Last   Rx:37Jhr2689 Ordered   6  Zolpidem Tartrate 5 MG Oral Tablet; Take 1 tablet by mouth at bedtime; Therapy: 43Low7132 to (Evaluate:31Mar2017)  Requested for: 50Qsq8845; Last   Rx:83Mhn4228 Ordered    Allergies    1  Aztreonam   2  Carbapenems   3  Cephalosporins   4  Erythromycin Base TABS   5  FLU (SPLT)   6  Griseofulvin   7   Penicillins    Signatures   Electronically signed by : Jefe Doan, ; Jan 5 2017  2:28PM EST                       (Author)

## 2018-01-16 NOTE — MISCELLANEOUS
Message   Recorded as Task   Date: 05/11/2016 08:18 AM, Created By: Clarence Vargas   Task Name: Miscellaneous   Assigned To: [facility] [place]   Regarding Patient: Cori Churchill, Status: In Progress   Tonya Berumen - 11 May 2016 8:18 AM     TASK CREATED  Patient called and Prednisone was to be sent to [facility] and was sent to [facility] mail order instead  Please send to [facility]  @ [phone] so she can start taking right away  Thank you  Dillon Fan - 11 May 2016 8:56 AM     TASK REASSIGNED: Previously Assigned To Dillon Fan  Please call the patient and advise her that I called and left a message to cancel the [facility] script for Prednisone  I escribed it to the [facility] as she requested  She is to take the Prednisone as per the printed instructions that were given to her at the [address] [place] [address] Rd  Stephanie Menjivar - 11 May 2016 9:42 AM     TASK IN PROGRESS   Stephanie Menjivar - 11 May 2016 9:45 AM     TASK EDITED  Lmom for pt to return call  Sofía Nichols - 11 May 2016 9:52 AM     TASK EDITED  s/w pt, advised of above  Pt verbalized understanding and appreciation  Will c/b w/ any question or concerns  Active Problems    1  Candidiasis, cutaneous (112 3) (B37 2)   2  Complex ovarian cyst (620 2) (N83 20)   3  Costochondritis (733 6) (M94 0)   4  Degeneration of cervical intervertebral disc (722 4) (M50 90)   5  Encounter for screening colonoscopy (V76 51) (Z12 11)   6  Endometriosis (617 9) (N80 9)   7  History of self breast exam   8  Hyperglycemia (790 29) (R73 9)   9  Hyperlipidemia (272 4) (E78 5)   10  Hypertension (401 9) (I10)   11  Hypothyroidism (244 9) (E03 9)   12  Insomnia due to medical condition (327 01) (G47 01)   13  Left low back pain (724 2) (M54 5)   14  Left ventricular hypertrophy (429 3) (I51 7)   15  Lumbosacral pain (724 2,724 6) (M54 5,M54 89)   16  Mass of right breast (611 72) (N63)   17  Mitral valvular disorder (424 0) (I05 9)   18   Nephrolithiasis (592 0) (N20 0)   19  Nicotine dependence (305 1) (F17 200)   20  Non-toxic multinodular goiter (241 1) (E04 2)   21  Obesity (278 00) (E66 9)   22  Pain of left sacroiliac joint (724 6) (M53 3)   23  Parametrial Neoplasm (239 5)   24  Primary insomnia (307 42) (F51 01)   25  Thoracic neuritis (724 4) (M54 14)   26  Visit for screening mammogram (V76 12) (Z12 31)    Current Meds   1  Clotrimazole-Betamethasone 1-0 05 % External Cream; APPLY SPARINGLY TO THE   AFFECTED AREA(S) TWICE DAILY; Therapy: 20IEN6897 to (Nito Boswell)  Requested for: 53DGL1594; Last   Rx:86Awb6099 Ordered   2  Furosemide 20 MG Oral Tablet; TAKE ONE TABLET BY MOUTH IN THE MORNING AS   NEEDED FOR EDEMA; Therapy: 47RKM5663 to (Lulú Glynn)  Requested for: 08Apr2015; Last   Rx:08Apr2015 Ordered   3  Levothyroxine Sodium 125 MCG Oral Tablet; TAKE ONE TABLET BY MOUTH EVERY   MORNING; Therapy: 84ZHZ2319 to (Last Rx:71Wbn1208)  Requested for: 14Hdo4675 Ordered   4  Losartan Potassium 50 MG Oral Tablet; take one tablet by mouth every day; Therapy: 00Ake8836 to (Last Rx:41Njl1229)  Requested for: 64Pse7057 Ordered   5  Phentermine HCl - 37 5 MG Oral Tablet (Adipex-P); TAKE ONE HALF TABLET BY   MOUTH IN THE MORNING AND TAKE ONE HALF TABLETAT NOON;   Therapy: 94VLN5127 to (Last Rx:18Apr2016) Ordered   6  PredniSONE 5 MG Oral Tablet; Take as directed according to titration schedule given; Therapy: 68YMK5034 to (Evaluate:57Btd4036)  Requested for: 77LYV1003; Last   Rx:93Yvq2442 Ordered   7  Zolpidem Tartrate 5 MG Oral Tablet; TAKE 1 TABLET AT BEDTIME AS NEEDED; Therapy: 25Apr2016 to (Evaluate:67Wgj8204); Last Rx:26Cio8569 Ordered    Allergies    1  Aztreonam   2  Carbapenems   3  Cephalosporins   4  Erythromycin Base TABS   5  FLU (SPLT)   6  Griseofulvin   7   Penicillins    Signatures   Electronically signed by : Preeti Bundy, ; May 11 2016  9:53AM EST                       (Author)

## 2018-01-16 NOTE — PROGRESS NOTES
Assessment   1  Hypertension (401 9) (I10)  2  Hypothyroidism (244 9) (E03 9)  3  Hyperlipidemia (272 4) (E78 5)  4  Hyperglycemia (790 29) (R73 9)  5  Obesity (278 00) (E66 9)1   6  Non-toxic multinodular goiter (241 1) (E04 2)1      1 Amended By: Angela Hernández; [place] 15 2018 6:58 PM EST      Discussion/Summary      BP adequate control good initial response to Contrave, continue at same dose, reviewed in detail and copy given to patient good profile with adequate HDL 6 0, encouraged ongoing weight loss for optimum control ovarian nodule and thickened endometrium- will be having endometrial biopsy with Dr Jaret Gómez  Possible side effects of new medications were reviewed with the patient/guardian today  The treatment plan was reviewed with the patient/guardian  The patient/guardian understands and agrees with the treatment plan       Self Referrals: No      Chief Complaint   Patient is here today for follow up of chronic conditions described in HPI  History of Present Illness   4 month followup  with Contrave- some gassiness and brain was in a fog, cut back to 2 and 1  Having strong dreams cuts her cravings  flashes are present  with Dr Jaret Gómez for L ovarian cyst and thickened endometrium      Review of Systems        Constitutional: recent 5 lb weight loss, but-- not feeling poorly  Cardiovascular: no chest pain,-- no intermittent leg claudication,-- no palpitations-- and-- no lower extremity edema  Respiratory: no shortness of breath,-- no cough-- and-- no shortness of breath during exertion  Gastrointestinal: no abdominal pain  Musculoskeletal: no arthralgias-- and-- no joint swelling  Integumentary: no rashes  Neurological: no headache  Hematologic/Lymphatic: no tendency for easy bruising  Active Problems   1  Abdominal pain (789 00) (R10 9)  2  Abdominal pain, RUQ (789 01) (R10 11)  3  Acute constipation (564 00) (K59 00)  4  Cancer screening (V76 9) (Z12 9)  5  Complex ovarian cyst (620 2) (N83 299)  6  Costochondritis (733 6) (M94 0)  7  Dry eye syndrome (375 15) (H04 129)  8  Encounter for screening mammogram for malignant neoplasm of breast (V76 12)     (Z12 31)  9  Fatty infiltration of liver (571 8) (K76 0)  10  Hyperglycemia (790 29) (R73 9)  11  Hyperlipidemia (272 4) (E78 5)  12  Hypertension (401 9) (I10)  13  Hypothyroidism (244 9) (E03 9)  14  Insomnia due to medical condition (327 01) (G47 01)  15  Left ventricular hypertrophy (429 3) (I51 7)  16  Lumbosacral pain (724 2,724 6) (M54 5)  17  Mitral valvular disorder (394 9) (I05 9)  18  Nephrolithiasis (592 0) (N20 0)  19  Nicotine dependence (305 1) (F17 200)  20  Night sweats (780 8) (R61)  21  Non-toxic multinodular goiter (241 1) (E04 2)  22  Obesity (278 00) (E66 9)  23  Rhinitis (472 0) (J31 0)  24  Spondylosis of cervical region without myelopathy or radiculopathy (721 0) (M47 812)  25  Thoracic neuritis (724 4) (M54 14)    Past Medical History   1  History of Cervical disc disease (722 91) (M50 90)  2  History of Cervical stenosis of spine (723 0) (M48 02)  3  History of bicuspid aortic valve (V13 65) (Z87 74)  4  History of endometriosis (V13 29) (Z87 42)  5  History of hypertension (V12 59) (Z86 79)  6  History of hypothyroidism (V12 29) (Z86 39)  7  History of renal calculi (V13 01) (Z87 442)  8  History of seasonal allergies (V15 09) (Z88 9)  9  History of Left low back pain (724 2) (M54 5)  10  History of Pain of left sacroiliac joint (724 6) (M53 3)  11  History of Ptosis, left (374 30) (H02 402)  12  History of Right cervical radiculopathy (723 4) (M54 12)  13  History of Wears contact lenses (V41 0) (Z97 3)  14  History of Wears reading eyeglasses (V49 89) (Z97 3)     The active problems and past medical history were reviewed and updated today  Surgical History   1  History of  Section Low Transverse  2  History of Gallbladder Surgery  3   History of Neuroplasty Decompression Median Nerve At Carpal Tunnel  4  History of Neuroplasty Median Nerve At Carpal Tunnel  5  History of Plantar Fasciectomy  6  History of Reported Prior Surgical / Procedural History  7  History of Tubal Ligation     The surgical history was reviewed and updated today  Family History   Mother   1  Family history of   2  Family history of Diabetes Mellitus (V18 0)  3  Family history of Alzheimer's disease (V17 2) (Z82 0)  4  Family history of heart disease (V17 49) (Z82 49)  5  Family history of Hypertension (V17 49)  Family History   6  Family history of Back disorder  7  Family history of Cancer  8  Family history of heart disease (V17 49) (Z82 49)  9  Family history of thyroid disease (V18 19) (Z83 49)     The family history was reviewed and updated today  Social History    · Being A Social Drinker   · Caffeine Use   · Currently sexually active   · Drinks wine (V49 89) (Z78 9)   · Former smoker (V15 82) (Z87 891)   · High school or GED   · Denied: History of drug use   · Lives with    · Denied: History of Marijuana   · Marital History - Currently    · Part-time employment   · [employer] Affiliation Hindu[employer]   · Tubal ligation (V26 51) (Z98 51)   · Two children   · Uses Safety Equipment - Seatbelts  The social history was reviewed and updated today  The social history was reviewed and is unchanged  Current Meds   1  Clotrimazole-Betamethasone 1-0 05 % External Cream; APPLY SPARINGLY TO THE     AFFECTED AREA(S) TWICE DAILY; Therapy: 64IZH0500 to (Evaluate:04Xyj4238)  Requested for: 07GOL6191; Last     Rx:85Zzx1778 Ordered  2  Contrave 8-90 MG Oral Tablet Extended Release 12 Hour; TAKE ONE TO TWO TABLETS     BY MOUTH EVERY DAY TO TWICE DAILY AS PER dosing schedule; Therapy: 25Csk0574 to (Evaluate:09Rsw2399)  Requested for: 51GBA9096; Last     Rx:61Bjr2034 Ordered  3   Hyoscyamine Sulfate 0 125 MG Sublingual Tablet Sublingual; PLACE 1 TABLET UNDER     THE TONGUE  3 TIMES DAILY AS NEEDED; Therapy: 98YNU6265 to (Deonte Lazar)  Requested for: 72YFG0442; Last     Rx:21Thq4022 Ordered  4  Levothyroxine Sodium 125 MCG Oral Tablet; TAKE ONE TABLET BY MOUTH EVERY     MORNING; Therapy: 46BQX9921 to (Last Rx:06Mar2017)  Requested for: 61OQC9087 Ordered  5  Losartan Potassium 50 MG Oral Tablet; take one tablet by mouth every day; Therapy: 37Wls9113 to (Last Rx:06Mar2017)  Requested for: 18RHK7254 Ordered  6  Polyethylene Glycol 3350 Oral Packet; MIX 1 PACKET IN 8 OUNCES OF LIQUID AND     DRINK ONCE DAILY; Therapy: 64NQN7555 to (Evaluate:70Mwq8536)  Requested for: 11ICL2599; Last     Rx:06Pef5600 Ordered  7  Suprep Bowel Prep Kit 17 5-3 13-1 6 GM/180ML Oral Solution; USE AS DIRECTED; Therapy: 56FFH6341 to (Last Rx:94Oxw6766)  Requested for: 16Sdo0754 Ordered     The medication list was reviewed and updated today  Allergies   1  Aztreonam  2  Carbapenems  3  Cephalosporins  4  Erythromycin Base TABS  5  FLU (SPLT)  6  Griseofulvin  7  Penicillins    Vitals   Vital Signs    Recorded: 16QCM9704 06:28PM   Temperature 99 1 F   Heart Rate 76   Respiration 14   Systolic 718   Diastolic 82   Height 5 ft 7 in   Weight 227 lb 6 4 oz   BMI Calculated 35 62   BSA Calculated 2 14     Physical Exam        Constitutional      General appearance: No acute distress, well appearing and well nourished  Pulmonary      Respiratory effort: No increased work of breathing or signs of respiratory distress  Auscultation of lungs: Clear to auscultation  Cardiovascular      Auscultation of heart: Normal rate and rhythm, normal S1 and S2, without murmurs  Examination of extremities for edema and/or varicosities: Normal        Carotid pulses: Normal        Lymphatic      Palpation of lymph nodes in neck: No lymphadenopathy         Musculoskeletal      Gait and station: Normal        Psychiatric      Orientation to person, place, and time: Normal        Mood and affect: Normal  Health Management   History of Encounter for screening colonoscopy   COLONOSCOPY; every 10 years;  Last 69IWJ8866; Next Due: 22YOP2889; Near Due    Future Appointments      Date/Time Provider Specialty Site   01/29/2018 10:05 AM Quinn Chu DO Obstetrics/Gynecology [facility]   02/06/2018 08:30 AM Quinn Chu DO [facility]stetrics[facility]necology [facility]   02/06/2018 09:00 AM Clive Score, Nurse Schedule  [facility]   02/06/2018 08:15 AM Clive Score, Ultrasound [facility]     Signatures    Electronically signed by : Hailey Vogel MD; [place] 15 2018  6:57PM EST                       (Author)     Electronically signed by : Hailey Vogel MD; [place] 15 2018  6:58PM EST                       (Author)

## 2018-01-16 NOTE — MISCELLANEOUS
Message   Recorded as Task   Date: 04/25/2016 10:00 AM, Created By: Berta Anthony   Task Name: Medical Complaint Callback   [facility]   Regarding Patient: Candelaria Jhaveri, Status: Active   Comment:    Berta Anthony - 25 Apr 2016 10:00 AM     TASK CREATED  Patient calling because she was started on Fentermine last week and she was told to call if she has trouble sleeping  She stated that she is having trouble sleeping and is asking for something to be called into [employer] [phone]  Jacqueline can be reached at [phone]  Cristal Kearns - 25 Apr 2016 10:02 AM     TASK REASSIGNED: Previously Assigned To [address] - 25 Apr 2016 12:46 PM     TASK REPLIED TO: Previously Assigned To Harsh RODRIGUEZ to call Zolpidem Rx   Cristal Kearns - 25 Apr 2016 1:07 PM     TASK EDITED  LM informing pt   Cristal Kearns - 25 Apr 2016 1:07 PM     TASK EDITED  med called in        Active Problems    1  Candidiasis, cutaneous (112 3) (B37 2)   2  Complex ovarian cyst (620 2) (N83 20)   3  Costochondritis (733 6) (M94 0)   4  Degeneration of cervical intervertebral disc (722 4) (M50 90)   5  Encounter for screening colonoscopy (V76 51) (Z12 11)   6  Endometriosis (617 9) (N80 9)   7  History of self breast exam   8  Hyperglycemia (790 29) (R73 9)   9  Hyperlipidemia (272 4) (E78 5)   10  Hypertension (401 9) (I10)   11  Hypothyroidism (244 9) (E03 9)   12  Insomnia due to medical condition (327 01) (G47 01)   13  Left ventricular hypertrophy (429 3) (I51 7)   14  Lumbosacral pain (724 2,724 6) (M54 5,M54 89)   15  Mass of right breast (611 72) (N63)   16  Mitral valvular disorder (424 0) (I05 9)   17  Nephrolithiasis (592 0) (N20 0)   18  Nicotine dependence (305 1) (F17 200)   19  Non-toxic multinodular goiter (241 1) (E04 2)   20  Obesity (278 00) (E66 9)   21  Parametrial Neoplasm (239 5)   22  Primary insomnia (307 42) (F51 01)   23   Thoracic neuritis (724 4) (M54 14)   24  Visit for screening mammogram (V76 12) (Z12 31)    Current Meds   1  Clotrimazole-Betamethasone 1-0 05 % External Cream; APPLY SPARINGLY TO THE   AFFECTED AREA(S) TWICE DAILY; Therapy: 13HAF9381 to (Marcial Jurist)  Requested for: 03KFE8007; Last   Rx:54Qmc1645 Ordered   2  Furosemide 20 MG Oral Tablet; TAKE ONE TABLET BY MOUTH IN THE MORNING AS   NEEDED FOR EDEMA; Therapy: 90IGL2057 to (21 )  Requested for: 41Dcj8558; Last   Rx:43Fgk2740 Ordered   3  Levothyroxine Sodium 125 MCG Oral Tablet; TAKE ONE TABLET BY MOUTH EVERY   MORNING; Therapy: 37ECW2335 to (Last Rx:84Gur3967)  Requested for: 06Gak3996 Ordered   4  Losartan Potassium 50 MG Oral Tablet; take one tablet by mouth every day; Therapy: 95Ozp9666 to (Last Rx:73Awe9041)  Requested for: 43Ntz5812 Ordered   5  Phentermine HCl - 37 5 MG Oral Tablet; TAKE ONE HALF TABLET BY MOUTH IN THE   MORNING AND TAKE ONE HALF TABLETAT NOON;   Therapy: 32SOE5018 to (Last Rx:08Rjy5155) Ordered    Allergies    1  Aztreonam   2  Carbapenems   3  Cephalosporins   4  Erythromycin Base TABS   5  FLU (SPLT)   6  Griseofulvin   7   Penicillins    Plan  Insomnia due to medical condition    · Zolpidem Tartrate 5 MG Oral Tablet; TAKE 1 TABLET AT BEDTIME AS NEEDED    Signatures   Electronically signed by : Maria G Sorenson MD; Apr 25 2016  1:28PM EST                       (Co-author)

## 2018-01-17 NOTE — MISCELLANEOUS
Message   Date: 19 Oct 2016 9:09 AM EST, Recorded By: Xuan Cabello For: Elena Parker: Elvin Steven, Self   Phone: [phone] (Home), [phone] x,,,,, (Work)   Reason: Other   script sent for [employer]        Active Problems    1  Cervical disc disease (722 91) (M50 90)   2  Cervical stenosis of spine (723 0) (M48 02)   3  Complex ovarian cyst (620 2) (N83 299)   4  Costochondritis (733 6) (M94 0)   5  Dry skin (701 1) (L85 3)   6  Encounter for screening colonoscopy (V76 51) (Z12 11)   7  Endometriosis (617 9) (N80 9)   8  History of self breast exam   9  Hyperglycemia (790 29) (R73 9)   10  Hyperlipidemia (272 4) (E78 5)   11  Hypertension (401 9) (I10)   12  Hypothyroidism (244 9) (E03 9)   13  Insomnia due to medical condition (327 01) (G47 01)   14  Left low back pain (724 2) (M54 5)   15  Left ventricular hypertrophy (429 3) (I51 7)   16  Lumbosacral pain (724 2,724 6) (M54 5,M54 89)   17  Mass of right breast (611 72) (N63)   18  Mitral valvular disorder (424 0) (I05 9)   19  Nephrolithiasis (592 0) (N20 0)   20  Nicotine dependence (305 1) (F17 200)   21  Night sweats (780 8) (R61)   22  Non-toxic multinodular goiter (241 1) (E04 2)   23  Obesity (278 00) (E66 9)   24  Pain of left sacroiliac joint (724 6) (M53 3)   25  Parametrial Neoplasm (239 5)   26  Primary insomnia (307 42) (F51 01)   27  Rhinitis (472 0) (J31 0)   28  Right cervical radiculopathy (723 4) (M54 12)   29  Thoracic neuritis (724 4) (M54 14)   30  Visit for screening mammogram (V76 12) (Z12 31)    Current Meds   1  Clotrimazole-Betamethasone 1-0 05 % External Cream; APPLY SPARINGLY TO THE   AFFECTED AREA(S) TWICE DAILY; Therapy: 76OXL7699 to (Abby Gipson)  Requested for: 82AKQ1649; Last   Rx:70Pxm0559 Ordered   2  Fluticasone Propionate 50 MCG/ACT Nasal Suspension; USE 2 SPRAYS IN EACH   NOSTRIL ONCE DAILY; Therapy: 37NBV4927 to (Last Rx:01Gix3473)  Requested for: 58TGM3025 Ordered   3   Furosemide 20 MG Oral Tablet; TAKE ONE TABLET BY MOUTH IN THE MORNING AS   NEEDED FOR EDEMA; Therapy: 57XIS6774 to (485 9679)  Requested for: 08Apr2015; Last   Rx:08Apr2015 Ordered   4  Gabapentin 400 MG Oral Capsule; TAKE 1 CAPSULE 4 TIMES DAILY; Therapy: 00Chk9792 to (Evaluate:23Oct2016)  Requested for: 70STQ3604; Last   Rx:55Jqb0563 Ordered   5  Levothyroxine Sodium 125 MCG Oral Tablet; TAKE ONE TABLET BY MOUTH EVERY   MORNING; Therapy: 81PPK7957 to (Last Rx:98Ccr4642)  Requested for: 98Ued1887 Ordered   6  Losartan Potassium 50 MG Oral Tablet; take one tablet by mouth every day; Therapy: 56Ezx5121 to (Last Rx:63Ckj6589)  Requested for: 04Pae4322 Ordered    Allergies    1  Aztreonam   2  Carbapenems   3  Cephalosporins   4  Erythromycin Base TABS   5  FLU (SPLT)   6  Griseofulvin   7  Penicillins    Plan  Visit for screening mammogram    · * MAMMO SCREENING BILATERAL W CAD; Status:Hold For - Scheduling,Retrospective  Authorization;  Requested IIS:17HPK7374;     Signatures   Electronically signed by : Nina Lindsay, ; Oct 19 2016  9:10AM EST                       (Author)

## 2018-01-18 NOTE — RESULT NOTES
Discussion/Summary   Hepatitis panel within normal limits     Verified Results  (1) CHRONIC HEPATITIS PANEL 87Naz5744 08:10AM [facility] Order Number: [id number]     Test Name Result Flag Reference   HEPATITIS B SURFACE ANTIGEN Non-reactive  Non-reactive, NonReactive - Confirmed   HEPATITIS C ANTIBODY Non-reactive  Non-reactive   HEPATITIS B CORE IGM ANTIBODY Non-reactive  Non-reactive   HEPATITIS B CORE TOTAL ANTIBODY Non-reactive  Non-reactive

## 2018-01-23 VITALS
RESPIRATION RATE: 14 BRPM | HEIGHT: 67 IN | SYSTOLIC BLOOD PRESSURE: 142 MMHG | TEMPERATURE: 99.1 F | DIASTOLIC BLOOD PRESSURE: 82 MMHG | BODY MASS INDEX: 35.69 KG/M2 | WEIGHT: 227.4 LBS | HEART RATE: 76 BPM

## 2018-01-23 NOTE — MISCELLANEOUS
Message   Recorded as Task   Date: 12/12/2017 08:03 AM, Created By: Elliott Chacon   Task Name: Go to Result   Assigned To: Vishnu Pepe   Regarding Patient: Argenis Rousseau, Status: In Progress   Comment:    Elliott Chacon - 12 Dec 2017 8:03 AM     TASK CREATED  ovarian nodule is table but her endometrium is thickend with a possible cyst, should schedule a SIS and possible emb to further evaluate   Liliya Leal - 12 Dec 2017 1:05 PM     TASK IN PROGRESS   Liliya Leal - 13 Dec 2017 10:46 AM     TASK EDITED  pt will schedule appt           Active Problems    1  Abdominal pain (789 00) (R10 9)   2  Abdominal pain, RUQ (789 01) (R10 11)   3  Acute constipation (564 00) (K59 00)   4  Cancer screening (V76 9) (Z12 9)   5  Complex ovarian cyst (620 2) (N83 299)   6  Costochondritis (733 6) (M94 0)   7  Dry eye syndrome (375 15) (H04 129)   8  Fatty infiltration of liver (571 8) (K76 0)   9  Hyperglycemia (790 29) (R73 9)   10  Hyperlipidemia (272 4) (E78 5)   11  Hypertension (401 9) (I10)   12  Hypothyroidism (244 9) (E03 9)   13  Insomnia due to medical condition (327 01) (G47 01)   14  Left ventricular hypertrophy (429 3) (I51 7)   15  Lumbosacral pain (724 2,724 6) (M54 5)   16  Mitral valvular disorder (394 9) (I05 9)   17  Nephrolithiasis (592 0) (N20 0)   18  Nicotine dependence (305 1) (F17 200)   19  Night sweats (780 8) (R61)   20  Non-toxic multinodular goiter (241 1) (E04 2)   21  Obesity (278 00) (E66 9)   22  Rhinitis (472 0) (J31 0)   23  Spondylosis of cervical region without myelopathy or radiculopathy (721 0) (M47 812)   24  Thoracic neuritis (724 4) (M54 14)    Current Meds   1  Clotrimazole-Betamethasone 1-0 05 % External Cream; APPLY SPARINGLY TO THE   AFFECTED AREA(S) TWICE DAILY; Therapy: 54GEG0922 to (Evaluate:06Xpi8681)  Requested for: 38OUE9680; Last   Rx:97Vzr9570 Ordered   2   Contrave 8-90 MG Oral Tablet Extended Release 12 Hour; TAKE ONE TO TWO TABLETS   BY MOUTH EVERY DAY TO TWICE DAILY AS PER dosing schedule; Therapy: 26Kcb8921 to (Evaluate:36Bld9908); Last Rx:11Any3041 Ordered   3  Hyoscyamine Sulfate 0 125 MG Sublingual Tablet Sublingual; PLACE 1 TABLET   UNDER THE TONGUE  3 TIMES DAILY AS NEEDED; Therapy: 53ACQ7433 to [phone])  Requested for: 32SCJ1353; Last   Rx:29Wei4753 Ordered   4  Levothyroxine Sodium 125 MCG Oral Tablet; TAKE ONE TABLET BY MOUTH EVERY   MORNING; Therapy: 19VZK4344 to (Last Rx:06Mar2017)  Requested for: 77TUH9981 Ordered   5  Losartan Potassium 50 MG Oral Tablet; take one tablet by mouth every day; Therapy: 07Ubi9519 to (Last Rx:06Mar2017)  Requested for: 64QEP6748 Ordered   6  Polyethylene Glycol 3350 Oral Packet; MIX 1 PACKET IN 8 OUNCES OF LIQUID AND   DRINK ONCE DAILY; Therapy: 88QCF5966 to (Evaluate:07Rmw9069)  Requested for: 95LVM7956; Last   Rx:08Tls1434 Ordered   7  Suprep Bowel Prep Kit 17 5-3 13-1 6 GM/180ML Oral Solution; USE AS DIRECTED; Therapy: 10YMY8691 to (Last Rx:47Utz4630)  Requested for: 77Eef5351 Ordered    Allergies    1  Aztreonam   2  Carbapenems   3  Cephalosporins   4  Erythromycin Base TABS   5  FLU (SPLT)   6  Griseofulvin   7   Penicillins    Signatures   Electronically signed by : Jose Granados, ; Dec 13 2017 10:46AM EST                       (Author)

## 2018-01-29 ENCOUNTER — OFFICE VISIT (OUTPATIENT)
Dept: OBGYN CLINIC | Facility: CLINIC | Age: 63
End: 2018-01-29
Payer: COMMERCIAL

## 2018-01-29 VITALS
DIASTOLIC BLOOD PRESSURE: 82 MMHG | HEIGHT: 67 IN | WEIGHT: 227 LBS | BODY MASS INDEX: 35.63 KG/M2 | SYSTOLIC BLOOD PRESSURE: 138 MMHG

## 2018-01-29 DIAGNOSIS — Z01.419 ENCOUNTER FOR ANNUAL ROUTINE GYNECOLOGICAL EXAMINATION: ICD-10-CM

## 2018-01-29 DIAGNOSIS — Z12.4 CERVICAL CANCER SCREENING: Primary | ICD-10-CM

## 2018-01-29 DIAGNOSIS — Z11.51 SCREENING FOR HPV (HUMAN PAPILLOMAVIRUS): ICD-10-CM

## 2018-01-29 DIAGNOSIS — Z12.31 ENCOUNTER FOR SCREENING MAMMOGRAM FOR BREAST CANCER: ICD-10-CM

## 2018-01-29 DIAGNOSIS — N76.3 CHRONIC VULVITIS: ICD-10-CM

## 2018-01-29 PROBLEM — K76.0 FATTY INFILTRATION OF LIVER: Status: ACTIVE | Noted: 2017-05-01

## 2018-01-29 PROBLEM — M47.812 SPONDYLOSIS OF CERVICAL REGION WITHOUT MYELOPATHY OR RADICULOPATHY: Status: ACTIVE | Noted: 2017-03-29

## 2018-01-29 PROBLEM — R10.9 ABDOMINAL PAIN: Status: ACTIVE | Noted: 2017-07-24

## 2018-01-29 PROBLEM — R10.11 ABDOMINAL PAIN, RUQ: Status: ACTIVE | Noted: 2017-05-01

## 2018-01-29 PROBLEM — H04.129 DRY EYE SYNDROME: Status: ACTIVE | Noted: 2017-09-18

## 2018-01-29 PROCEDURE — 87624 HPV HI-RISK TYP POOLED RSLT: CPT | Performed by: OBSTETRICS & GYNECOLOGY

## 2018-01-29 PROCEDURE — G0145 SCR C/V CYTO,THINLAYER,RESCR: HCPCS | Performed by: OBSTETRICS & GYNECOLOGY

## 2018-01-29 PROCEDURE — 99396 PREV VISIT EST AGE 40-64: CPT | Performed by: OBSTETRICS & GYNECOLOGY

## 2018-01-29 RX ORDER — NALTREXONE HYDROCHLORIDE AND BUPROPION HYDROCHLORIDE 8; 90 MG/1; MG/1
2 TABLET, EXTENDED RELEASE ORAL 2 TIMES DAILY
COMMUNITY
Start: 2017-12-20 | End: 2018-12-06 | Stop reason: ALTCHOICE

## 2018-01-29 RX ORDER — LIFITEGRAST 50 MG/ML
SOLUTION/ DROPS OPHTHALMIC
COMMUNITY
Start: 2017-12-26

## 2018-01-29 RX ORDER — LEVOTHYROXINE SODIUM 0.12 MG/1
1 TABLET ORAL
COMMUNITY
Start: 2012-11-14 | End: 2018-02-06

## 2018-01-29 RX ORDER — CLOTRIMAZOLE AND BETAMETHASONE DIPROPIONATE 10; .64 MG/G; MG/G
CREAM TOPICAL
COMMUNITY
Start: 2017-12-01 | End: 2018-01-29 | Stop reason: SDUPTHER

## 2018-01-29 RX ORDER — LOSARTAN POTASSIUM 50 MG/1
1 TABLET ORAL DAILY
COMMUNITY
Start: 2012-08-02 | End: 2018-02-06

## 2018-01-29 RX ORDER — CLOTRIMAZOLE AND BETAMETHASONE DIPROPIONATE 10; .64 MG/G; MG/G
CREAM TOPICAL 2 TIMES DAILY
Qty: 30 G | Refills: 1 | Status: SHIPPED | OUTPATIENT
Start: 2018-01-29 | End: 2019-02-06

## 2018-01-29 NOTE — PROGRESS NOTES
Assessment/Plan:    She had a normal 3D mammogram earlier this month this was reviewed with her    Echogenic focus on her left ovary - stable, will continue to follow with US    Endometrial thickening on US -she is asymptomatic, her endometrium measured 8 mm  She is scheduled for a sonohysterogram next week along with a possible endometrial biopsy  I reviewed the ultrasound in detail with her including her ovary and the endometrium and we discussed the plan  She is aware that she may need a D&C if it appears that she has an endometrial polyp  Pap and HPV done    Lotrisone renewed for her vulvitis  No problem-specific Assessment & Plan notes found for this encounter  Diagnoses and all orders for this visit:    Encounter for annual routine gynecological examination    Encounter for screening mammogram for breast cancer  -     Mammo screening bilateral w 3d & cad; Future    Chronic vulvitis  -     clotrimazole-betamethasone (LOTRISONE) 1-0 05 % cream; Apply topically 2 (two) times a day    Other orders  -     CONTRAVE 8-90 MG TB12; Take 90 mg by mouth daily  -     Discontinue: clotrimazole-betamethasone (LOTRISONE) 1-0 05 % cream;   -     XIIDRA 5 % op solution;           Subjective:      Patient ID: Melania Manning is a 65 y o  female  Patient here for yearly, she has no gyn complaints  She had an ultrasound last month to follow a stable small echogenic area on her left ovary  It is felt to be an old endometrioma or hemorrhagic cyst   It has been getting smaller although the last 2 ultrasounds have fluctuated slightly  Incidentally, her endometrium was noted to be 8 mm  She has had no bleeding  She has an SIS scheduled for next week  She just had a normal mammogram   She is due for a Pap today  She has no other complaints        Gynecologic Exam         The following portions of the patient's history were reviewed and updated as appropriate: allergies, current medications, past family history, past medical history, past social history, past surgical history and problem list     Review of Systems   All other systems reviewed and are negative  Objective:     Physical Exam   Constitutional: She appears well-developed  Neck: No tracheal deviation present  No thyromegaly present  Cardiovascular: Normal rate and regular rhythm  Pulmonary/Chest: Effort normal and breath sounds normal  Right breast exhibits no inverted nipple, no mass, no nipple discharge, no skin change and no tenderness  Left breast exhibits no inverted nipple, no mass, no nipple discharge, no skin change and no tenderness  Breasts are symmetrical    Examined sitting and supine   Abdominal: Soft  She exhibits no distension and no mass  There is no tenderness  Genitourinary: Rectum normal, vagina normal and uterus normal  There is no rash, tenderness, lesion or injury on the right labia  There is no rash, tenderness, lesion or injury on the left labia  Cervix exhibits no motion tenderness, no discharge and no friability  Right adnexum displays no mass, no tenderness and no fullness  Left adnexum displays no mass, no tenderness and no fullness

## 2018-01-31 LAB — HPV RRNA GENITAL QL NAA+PROBE: NORMAL

## 2018-02-01 LAB
LAB AP GYN PRIMARY INTERPRETATION: NORMAL
Lab: NORMAL

## 2018-02-02 ENCOUNTER — TELEPHONE (OUTPATIENT)
Dept: OBGYN CLINIC | Facility: CLINIC | Age: 63
End: 2018-02-02

## 2018-02-06 ENCOUNTER — ULTRASOUND (OUTPATIENT)
Dept: OBGYN CLINIC | Facility: CLINIC | Age: 63
End: 2018-02-06
Payer: COMMERCIAL

## 2018-02-06 ENCOUNTER — PROCEDURE VISIT (OUTPATIENT)
Dept: OBGYN CLINIC | Facility: CLINIC | Age: 63
End: 2018-02-06
Payer: COMMERCIAL

## 2018-02-06 ENCOUNTER — CLINICAL SUPPORT (OUTPATIENT)
Dept: OBGYN CLINIC | Facility: CLINIC | Age: 63
End: 2018-02-06
Payer: COMMERCIAL

## 2018-02-06 VITALS
DIASTOLIC BLOOD PRESSURE: 80 MMHG | HEIGHT: 67 IN | BODY MASS INDEX: 35.16 KG/M2 | WEIGHT: 224 LBS | SYSTOLIC BLOOD PRESSURE: 122 MMHG

## 2018-02-06 DIAGNOSIS — R93.89 INCREASED ENDOMETRIAL STRIPE THICKNESS: Primary | ICD-10-CM

## 2018-02-06 DIAGNOSIS — N84.0 ENDOMETRIAL POLYP: Primary | ICD-10-CM

## 2018-02-06 DIAGNOSIS — R93.89 ENDOMETRIAL THICKENING ON ULTRA SOUND: Primary | ICD-10-CM

## 2018-02-06 DIAGNOSIS — N84.0 ENDOMETRIAL POLYP: ICD-10-CM

## 2018-02-06 PROCEDURE — 58340 CATHETER FOR HYSTEROGRAPHY: CPT | Performed by: OBSTETRICS & GYNECOLOGY

## 2018-02-06 PROCEDURE — PREOP: Performed by: OBSTETRICS & GYNECOLOGY

## 2018-02-06 PROCEDURE — 99213 OFFICE O/P EST LOW 20 MIN: CPT | Performed by: OBSTETRICS & GYNECOLOGY

## 2018-02-06 NOTE — PROGRESS NOTES
Sonohysterogram  Date/Time: 2/6/2018 8:57 AM  Performed by: Sofie Mason  Authorized by: Sofie Mason     Consent:     Consent obtained:  Verbal and written    Consent given by:  Patient    Procedural risks discussed:  Bleeding    Patient questions answered: yes      Patient agrees, verbalizes understanding, and wants to proceed: yes      Educational handouts given: no    Pre-procedure:     Prepped with: Betadine    Procedure:     Cervix cleaned and prepped: yes      Cervix dilated: no      Tenaculum applied to cervix: no      Uterus sounded: no      Catheter inserted: yes      Uterine cavity distended with saline: yes    Post-procedure:     Patient observed: yes            There is a filling defect on the SIS  Most likely, this is an endometrial polyp  I reviewed this with the patient on the ultrasound monitor  I recommended a D&C, hysteroscopy to remove the polyp and sample her endometrium  D&C, hysteroscopy reviewed with pt in detail  Risks reviewed including bleeding, infection, 1% risk of uterine perforation with rare risk of injury to surrounding structures and need for further surgery  Post op course reviewed in detail as well including activity restrictions  Pt's questions were answered  Pt to schedule          Derrell Goddard DO

## 2018-02-06 NOTE — PROGRESS NOTES
AMB US Pelvic Non OB  Date/Time: 2/6/2018 8:09 AM  Performed by: Ike Garnica  Authorized by: Grecia Tan     Procedure details:     Indications comment:  Postmenopausal endometrial thickening    Technique:  Transvaginal US, Non-OB    Position: lithotomy exam    Uterine findings:     Diameter (mm):  35    Length (mm):  76    Width (mm):  37    Endometrium thickness (mm):  9 3  Cervix findings:      Nabothian cysts are present within the cervix  Left ovary findings:     Diameter (mm):  13 3    Length (mm):  25 8    Width (mm):  17 4    Flow:  Present  Right ovary findings:     Diameter (mm):  15 1    Length (mm):  24    Width (mm):  14 7    Flow:  Present  Other findings:     Free pelvic fluid: not identified      Free peritoneal fluid: not identified    Post-Procedure Details:     Impression:  Anteverted uterus is inhomogeneous throughout and contains varicosities throughout the myometrium  The endometrium is thickened and contains some fluid as well  The left ovary demonstrates a 1 0cm echogenic mass with posterior shadowing consistent with a dermoid  The right ovary appears within normal limits  Tolerance: Tolerated well, no immediate complications    Complications: no complications    Sonohysterogram  Date/Time: 2/6/2018 8:28 AM  Performed by: Grecia Tan  Authorized by: Grecia Tan     Consent:     Consent obtained:  Written    Consent given by:  Patient    Patient questions answered: yes      Patient agrees, verbalizes understanding, and wants to proceed: yes    Pre-procedure:     Prepped with: Betadine    Procedure:     Cervix cleaned and prepped: yes      Catheter inserted: yes      Uterine cavity distended with saline: yes    Post-procedure:     No complications: no      Post procedure instructions given to patient: yes    Comments:      Sonohysterogram demonstrates a polyp within the endometrium

## 2018-02-08 PROBLEM — N84.0 ENDOMETRIAL POLYP: Status: ACTIVE | Noted: 2018-02-08

## 2018-02-14 ENCOUNTER — PREP FOR PROCEDURE (OUTPATIENT)
Dept: OBGYN CLINIC | Facility: CLINIC | Age: 63
End: 2018-02-14

## 2018-02-14 DIAGNOSIS — Z01.818 PREOP TESTING: Primary | ICD-10-CM

## 2018-02-15 ENCOUNTER — PREP FOR PROCEDURE (OUTPATIENT)
Dept: OBGYN CLINIC | Facility: CLINIC | Age: 63
End: 2018-02-15

## 2018-02-15 DIAGNOSIS — N84.0 ENDOMETRIAL POLYP: Primary | ICD-10-CM

## 2018-02-15 RX ORDER — SODIUM CHLORIDE, SODIUM LACTATE, POTASSIUM CHLORIDE, CALCIUM CHLORIDE 600; 310; 30; 20 MG/100ML; MG/100ML; MG/100ML; MG/100ML
125 INJECTION, SOLUTION INTRAVENOUS CONTINUOUS
Status: CANCELLED | OUTPATIENT
Start: 2018-02-26

## 2018-02-15 RX ORDER — LOSARTAN POTASSIUM 50 MG/1
25 TABLET ORAL DAILY
COMMUNITY
End: 2018-03-08 | Stop reason: SDUPTHER

## 2018-02-15 RX ORDER — LEVOTHYROXINE SODIUM 0.12 MG/1
125 TABLET ORAL DAILY
COMMUNITY
End: 2018-03-08 | Stop reason: SDUPTHER

## 2018-02-15 NOTE — PRE-PROCEDURE INSTRUCTIONS
Pre-Surgery Instructions:   Medication Instructions    clotrimazole-betamethasone (LOTRISONE) 1-0 05 % cream Patient was instructed by Physician and understands   CONTRAVE 8-90 MG TB12 Instructed patient per Anesthesia Guidelines   levothyroxine 125 mcg tablet Instructed patient per Anesthesia Guidelines   losartan (COZAAR) 50 mg tablet Instructed patient per Anesthesia Guidelines   XIIDRA 5 % op solution Instructed patient per Anesthesia Guidelines  Before your operation, you play an important role in decreasing your risk for infection by washing with special antiseptic soap  This is an effective way to reduce bacteria on the skin which may help to prevent infections at the surgical site  Please read the following directions in advance  1  In the week before your operation purchase a 4 ounce bottle of antiseptic soap containing chlorhexidine gluconate 4%  Some brand names include: Aplicare, Endure, and Hibiclens  The cost is usually less than $5 00  · For your convenience, the [address] [place] [address] carries the soap  · It may also be available at your doctor's office or pre-admission testing center, and at most retail pharmacies  · If you are allergic or sensitive to soaps containing chlorhexidine gluconate (CHG), please let your doctor know so another antiseptic soap can be suggested  · CHG antiseptic soap is for external use only  2      The day before your operation follow these directions carefully to get ready  · Place clean lines (sheets) on your bed; you should sleep on clean sheets after your evening shower  · Get clean towels and washcloths ready - you need enough for 2 showers  · Set aside clean underwear, pajamas, and clothing to wear after the shower  Reminders:  · DO NOT use any other soap or body rinse on your skin during or after the antiseptic showers    · DO NOT use lotion , powder, deodorant, or perfume/aftershave of any kind on your skin after your antiseptic shower  · DO NOT shave any body parts in the 24 hours/the day before your operation  · DO NOT get the antiseptic soap in your eyes, ears, nose, mouth, or vaginal area  3      You will need to shower the night before AND the morning of your Surgery  Shower 1:  · The evening before your operation, take the fist shower  · First, shampoo your hair with regular shampoo and rinse it completely before you use the anitseptic soap  After washing and rinsing your hair, rinse your body  · Next, use a clean wash cloth to apply the antiseptic soap and wash your body from the neck down to your toes using 1/2 bottle of the antiseptic soap  You will use the other 1/2 bottle for the second shower  · Clean the area where your incision will be; later this area well for about 2 minutes  · If you ar having head or neck surgery, wash areas with the antiseptic soap  · Rinse yourself completely with running water  · Use a clean towel to dry off  · Wear clean underwear and clothing/pajamas  Shower 2:  · The Morning of your operation, take the second shower following the same steps as Shower 1 using the second 1/2 of the bottle of antiseptic soap  · Use clean cloths and towels to was and dry yourself off  · Wear clean underwear and clothing

## 2018-02-20 ENCOUNTER — HOSPITAL ENCOUNTER (OUTPATIENT)
Dept: NON INVASIVE DIAGNOSTICS | Facility: HOSPITAL | Age: 63
Discharge: HOME | End: 2018-02-20
Attending: OBSTETRICS & GYNECOLOGY
Payer: COMMERCIAL

## 2018-02-20 ENCOUNTER — APPOINTMENT (OUTPATIENT)
Dept: LAB | Facility: HOSPITAL | Age: 63
End: 2018-02-20
Attending: OBSTETRICS & GYNECOLOGY
Payer: COMMERCIAL

## 2018-02-20 DIAGNOSIS — N84.0 ENDOMETRIAL POLYP: ICD-10-CM

## 2018-02-20 LAB
ERYTHROCYTE [DISTWIDTH] IN BLOOD BY AUTOMATED COUNT: 14.3 % (ref 11.6–15.1)
HCT VFR BLD AUTO: 41.9 % (ref 34.8–46.1)
HGB BLD-MCNC: 14.1 G/DL (ref 11.5–15.4)
MCH RBC QN AUTO: 31.1 PG (ref 26.8–34.3)
MCHC RBC AUTO-ENTMCNC: 33.7 G/DL (ref 31.4–37.4)
MCV RBC AUTO: 92 FL (ref 82–98)
PLATELET # BLD AUTO: 214 THOUSANDS/UL (ref 149–390)
PMV BLD AUTO: 10.5 FL (ref 8.9–12.7)
RBC # BLD AUTO: 4.54 MILLION/UL (ref 3.81–5.12)
WBC # BLD AUTO: 8.56 THOUSAND/UL (ref 4.31–10.16)

## 2018-02-20 PROCEDURE — 36415 COLL VENOUS BLD VENIPUNCTURE: CPT

## 2018-02-20 PROCEDURE — 85027 COMPLETE CBC AUTOMATED: CPT

## 2018-02-20 PROCEDURE — 93005 ELECTROCARDIOGRAM TRACING: CPT

## 2018-02-20 PROCEDURE — 93010 ELECTROCARDIOGRAM REPORT: CPT | Performed by: INTERNAL MEDICINE

## 2018-02-21 LAB
ATRIAL RATE: 68 BPM
P AXIS: 53 DEGREES
PR INTERVAL: 178 MS
QRS AXIS: 42 DEGREES
QRSD INTERVAL: 92 MS
QT INTERVAL: 394 MS
QTC INTERVAL: 418 MS
T WAVE AXIS: 48 DEGREES
VENTRICULAR RATE: 68 BPM

## 2018-02-22 ENCOUNTER — ANESTHESIA EVENT (OUTPATIENT)
Dept: PERIOP | Facility: HOSPITAL | Age: 63
End: 2018-02-22
Payer: COMMERCIAL

## 2018-02-22 ENCOUNTER — TELEPHONE (OUTPATIENT)
Dept: FAMILY MEDICINE CLINIC | Facility: HOSPITAL | Age: 63
End: 2018-02-22

## 2018-02-22 NOTE — TELEPHONE ENCOUNTER
Do you want her to schedule a pre op visit? Dr Duc Argueta office said she is having a hysterectomy on Monday and they wanted you to sign off for surgery  Please advise

## 2018-02-22 NOTE — TELEPHONE ENCOUNTER
James Henry from Dr Gordy Vidal office called  Dr Debbie Grewal has Genia Garner scheduled for surgery on Monday 2/26  Anesthesia wants a clearance from Dr Pipo Henry [phone]

## 2018-02-23 NOTE — TELEPHONE ENCOUNTER
Spoke to Dr Bernardino Lee office and Herlinda Aase is out today  Information is being given to the   They will call back if they have any questions

## 2018-02-23 NOTE — TELEPHONE ENCOUNTER
I reviewed her EKG and did a letter of medical clearance for Dr Harleen Combs  She doesn't need an appointment

## 2018-02-26 ENCOUNTER — ANESTHESIA (OUTPATIENT)
Dept: PERIOP | Facility: HOSPITAL | Age: 63
End: 2018-02-26
Payer: COMMERCIAL

## 2018-02-26 ENCOUNTER — HOSPITAL ENCOUNTER (OUTPATIENT)
Facility: HOSPITAL | Age: 63
Setting detail: OUTPATIENT SURGERY
Discharge: HOME | End: 2018-02-26
Attending: OBSTETRICS & GYNECOLOGY | Admitting: OBSTETRICS & GYNECOLOGY
Payer: COMMERCIAL

## 2018-02-26 VITALS
BODY MASS INDEX: 34.53 KG/M2 | OXYGEN SATURATION: 94 % | TEMPERATURE: 98.3 F | RESPIRATION RATE: 16 BRPM | HEART RATE: 70 BPM | SYSTOLIC BLOOD PRESSURE: 164 MMHG | DIASTOLIC BLOOD PRESSURE: 81 MMHG | WEIGHT: 220 LBS | HEIGHT: 67 IN

## 2018-02-26 DIAGNOSIS — N84.0 ENDOMETRIAL POLYP: ICD-10-CM

## 2018-02-26 PROCEDURE — 58558 HYSTEROSCOPY BIOPSY: CPT | Performed by: OBSTETRICS & GYNECOLOGY

## 2018-02-26 PROCEDURE — 88305 TISSUE EXAM BY PATHOLOGIST: CPT | Performed by: OBSTETRICS & GYNECOLOGY

## 2018-02-26 PROCEDURE — 88305 TISSUE EXAM BY PATHOLOGIST: CPT | Performed by: PATHOLOGY

## 2018-02-26 RX ORDER — MIDAZOLAM HYDROCHLORIDE 1 MG/ML
INJECTION INTRAMUSCULAR; INTRAVENOUS AS NEEDED
Status: DISCONTINUED | OUTPATIENT
Start: 2018-02-26 | End: 2018-02-26 | Stop reason: SURG

## 2018-02-26 RX ORDER — FENTANYL CITRATE/PF 50 MCG/ML
25 SYRINGE (ML) INJECTION
Status: DISCONTINUED | OUTPATIENT
Start: 2018-02-26 | End: 2018-02-26 | Stop reason: HOSPADM

## 2018-02-26 RX ORDER — ONDANSETRON 2 MG/ML
INJECTION INTRAMUSCULAR; INTRAVENOUS AS NEEDED
Status: DISCONTINUED | OUTPATIENT
Start: 2018-02-26 | End: 2018-02-26 | Stop reason: SURG

## 2018-02-26 RX ORDER — MAGNESIUM HYDROXIDE 1200 MG/15ML
LIQUID ORAL AS NEEDED
Status: DISCONTINUED | OUTPATIENT
Start: 2018-02-26 | End: 2018-02-26 | Stop reason: HOSPADM

## 2018-02-26 RX ORDER — FENTANYL CITRATE 50 UG/ML
INJECTION, SOLUTION INTRAMUSCULAR; INTRAVENOUS AS NEEDED
Status: DISCONTINUED | OUTPATIENT
Start: 2018-02-26 | End: 2018-02-26 | Stop reason: SURG

## 2018-02-26 RX ORDER — PROPOFOL 10 MG/ML
INJECTION, EMULSION INTRAVENOUS AS NEEDED
Status: DISCONTINUED | OUTPATIENT
Start: 2018-02-26 | End: 2018-02-26 | Stop reason: SURG

## 2018-02-26 RX ORDER — ONDANSETRON 2 MG/ML
4 INJECTION INTRAMUSCULAR; INTRAVENOUS EVERY 6 HOURS PRN
Status: DISCONTINUED | OUTPATIENT
Start: 2018-02-26 | End: 2018-02-26 | Stop reason: HOSPADM

## 2018-02-26 RX ORDER — ONDANSETRON 2 MG/ML
4 INJECTION INTRAMUSCULAR; INTRAVENOUS ONCE AS NEEDED
Status: DISCONTINUED | OUTPATIENT
Start: 2018-02-26 | End: 2018-02-26 | Stop reason: HOSPADM

## 2018-02-26 RX ORDER — IBUPROFEN 400 MG/1
600 TABLET ORAL EVERY 6 HOURS PRN
Status: DISCONTINUED | OUTPATIENT
Start: 2018-02-26 | End: 2018-02-26 | Stop reason: HOSPADM

## 2018-02-26 RX ORDER — SODIUM CHLORIDE, SODIUM LACTATE, POTASSIUM CHLORIDE, CALCIUM CHLORIDE 600; 310; 30; 20 MG/100ML; MG/100ML; MG/100ML; MG/100ML
125 INJECTION, SOLUTION INTRAVENOUS CONTINUOUS
Status: DISCONTINUED | OUTPATIENT
Start: 2018-02-26 | End: 2018-02-26 | Stop reason: HOSPADM

## 2018-02-26 RX ORDER — LIDOCAINE HYDROCHLORIDE 10 MG/ML
INJECTION, SOLUTION EPIDURAL; INFILTRATION; INTRACAUDAL; PERINEURAL AS NEEDED
Status: DISCONTINUED | OUTPATIENT
Start: 2018-02-26 | End: 2018-02-26 | Stop reason: HOSPADM

## 2018-02-26 RX ADMIN — FENTANYL CITRATE 25 MCG: 50 INJECTION INTRAMUSCULAR; INTRAVENOUS at 13:39

## 2018-02-26 RX ADMIN — PROPOFOL 200 MG: 10 INJECTION, EMULSION INTRAVENOUS at 12:30

## 2018-02-26 RX ADMIN — SODIUM CHLORIDE, SODIUM LACTATE, POTASSIUM CHLORIDE, AND CALCIUM CHLORIDE 125 ML/HR: .6; .31; .03; .02 INJECTION, SOLUTION INTRAVENOUS at 11:21

## 2018-02-26 RX ADMIN — ONDANSETRON 4 MG: 2 INJECTION INTRAMUSCULAR; INTRAVENOUS at 12:30

## 2018-02-26 RX ADMIN — MIDAZOLAM HYDROCHLORIDE 2 MG: 1 INJECTION, SOLUTION INTRAMUSCULAR; INTRAVENOUS at 12:30

## 2018-02-26 RX ADMIN — SODIUM CHLORIDE, SODIUM LACTATE, POTASSIUM CHLORIDE, AND CALCIUM CHLORIDE: .6; .31; .03; .02 INJECTION, SOLUTION INTRAVENOUS at 12:27

## 2018-02-26 RX ADMIN — FENTANYL CITRATE 100 MCG: 50 INJECTION, SOLUTION INTRAMUSCULAR; INTRAVENOUS at 12:30

## 2018-02-26 NOTE — ANESTHESIA POSTPROCEDURE EVALUATION
Post-Op Assessment Note      CV Status:  Stable    Mental Status:  Alert and awake    Hydration Status:  Euvolemic    PONV Controlled:  Controlled    Airway Patency:  Patent    Post Op Vitals Reviewed: Yes          Staff: Anesthesiologist           BP (P) 158/78 (02/26/18 1315)    Temp (P) 98 4 °F (36 9 °C) (02/26/18 1315)    Pulse (P) 83 (02/26/18 1315)   Resp (P) 20 (02/26/18 1315)    SpO2

## 2018-02-26 NOTE — ANESTHESIA PREPROCEDURE EVALUATION
Review of Systems/Medical History  Patient summary reviewed  Chart reviewed      Cardiovascular   Pulmonary       GI/Hepatic            Endo/Other     GYN       Hematology   Musculoskeletal       Neurology   Psychology           Physical Exam    Airway    Mallampati score: II  TM Distance: >3 FB  Neck ROM: full     Dental   No notable dental hx     Cardiovascular  Rhythm: regular, Rate: normal, Cardiovascular exam normal    Pulmonary  Pulmonary exam normal     Other Findings        Anesthesia Plan  ASA Score- 2     Anesthesia Type- general with ASA Monitors  Additional Monitors:   Airway Plan:         Plan Factors-Patient not instructed to abstain from smoking on day of procedure  Patient did not smoke on day of surgery  Induction- intravenous  Postoperative Plan- Plan for postoperative opioid use  Informed Consent- Anesthetic plan and risks discussed with patient

## 2018-02-27 NOTE — OP NOTE
OPERATIVE REPORT  PATIENT NAME: Melania Manning    :  1955  MRN: [id number]  Pt Location: [facility] OR ROOM 01    SURGERY DATE: 2018    Surgeon(s) and Role:     Thom Colin DO - Primary    Preop Diagnosis:  Endometrial polyp [N84 0]    Post-Op Diagnosis Codes:     * Endometrial polyp [N84 0]    Procedure(s) (LRB):  DILATATION AND CURETTAGE (D&C) WITH HYSTEROSCOPY (N/A)    Specimen(s):  ID Type Source Tests Collected by Time Destination   1 : endometrial curettings and pollyp Tissue Endometrium TISSUE EXAM Jacki Gil DO 2018  1:00 PM        Estimated Blood Loss:   Minimal    Hysteroscopic I 275cc    O 225cc    US - 200cc clear urine    Drains:       Anesthesia Type:   General    Operative Indications:  Endometrial polyp [N84 0]  Endometrial thickening    Operative Findings:  Uterine sounded to 9cm  Polyp noted in endometrium  Ostia appeared normal  No adnexal masses  Normal sized, anteverted uterus    Complications:   None    Procedure and Technique:  Patient brought to the operating room and identified as Moon Ma  She was placed under general anesthesia without difficulty  In the dorsal lithotomy position she was sterilely prepped and draped  Time-out procedure was performed  Her bladder was emptied of 200 cc of clear urine  A weighted speculum and a Chu retractor were placed in the vagina the anterior lip of the cervix was grasped with a tenaculum  The uterus was then sounded to 9 cm  The cervix was then dilated up to a 20   5 dilator  The diagnostic hysteroscope was introduced into the uterus without difficulty  There was an endometrial polyp noted that seemed to be coming from the fundus  The fundus was smooth and atrophic looking in both ostia appeared normal  It seemed that there was tissue in the lower uterine segment  The hysteroscope was removed and a small sharp curette was used to curette the endometrium    Only a small amount of tissue was noted and a uterine cry was noticed almost immediately  The myoma graspers were then used to remove the polyp without difficulty  Another look was taken with the hysteroscope and it was felt that all tissue was adequately sampled  At this time it was decided to terminate the procedure  All instruments were removed from the cervix and vagina  All sponge, needle and instrument counts were normal   The patient was awakened and taken to the recovery room in good condition  This is the end of the operative report on Leatha Iraheta     I was present for the entire procedure    Patient Disposition:  PACU     SIGNATURE: Ezekiel Colin DO  DATE: February 26, 2018  TIME: 7:51 PM

## 2018-03-01 ENCOUNTER — TELEPHONE (OUTPATIENT)
Dept: OBGYN CLINIC | Facility: CLINIC | Age: 63
End: 2018-03-01

## 2018-03-01 NOTE — TELEPHONE ENCOUNTER
----- Message from Carrington Osorio DO sent at 3/1/2018  1:15 PM EST -----  Please let Blair Magallanes know her [facility]  pathology showed benign endometrium and a benign polyp

## 2018-03-05 ENCOUNTER — OFFICE VISIT (OUTPATIENT)
Dept: OBGYN CLINIC | Facility: CLINIC | Age: 63
End: 2018-03-05
Payer: COMMERCIAL

## 2018-03-05 VITALS — DIASTOLIC BLOOD PRESSURE: 84 MMHG | SYSTOLIC BLOOD PRESSURE: 136 MMHG | WEIGHT: 226.4 LBS | BODY MASS INDEX: 35.46 KG/M2

## 2018-03-05 DIAGNOSIS — N83.202 CYST OF LEFT OVARY: ICD-10-CM

## 2018-03-05 DIAGNOSIS — N84.0 ENDOMETRIAL POLYP: Primary | ICD-10-CM

## 2018-03-05 PROCEDURE — 99213 OFFICE O/P EST LOW 20 MIN: CPT | Performed by: OBSTETRICS & GYNECOLOGY

## 2018-03-05 NOTE — PROGRESS NOTES
Patient presents for postop visit  She is status post D&C hysteroscopy for an incidental finding of a mildly thickened endometrium and an endometrial polyp noted on SIS  Her pathology showed normal benign polyp and inactive endometrium  She did very well after her D&C  She did start spotting on Friday and still has a small amount of spotting  She is wearing a panty liner and changing it once a day, the flow is very light  No other complaints    She has had a long standing stable left ovarian cyst, it has the appearance of an old endometrioma or possibly a dermoid  She just had this checked in December and we will continue to check it yearly  Ultrasound ordered for December of this year  She will then return for her yearly next January  The bleeding should subside but she will call with any concerns

## 2018-03-08 DIAGNOSIS — E03.9 ACQUIRED HYPOTHYROIDISM: Primary | ICD-10-CM

## 2018-03-08 DIAGNOSIS — I10 ESSENTIAL HYPERTENSION: ICD-10-CM

## 2018-03-08 RX ORDER — LOSARTAN POTASSIUM 50 MG/1
TABLET ORAL
Qty: 90 TABLET | Refills: 0 | Status: SHIPPED | OUTPATIENT
Start: 2018-03-08 | End: 2018-05-23 | Stop reason: SDUPTHER

## 2018-03-08 RX ORDER — LEVOTHYROXINE SODIUM 0.12 MG/1
TABLET ORAL
Qty: 90 TABLET | Refills: 0 | Status: SHIPPED | OUTPATIENT
Start: 2018-03-08 | End: 2018-05-23 | Stop reason: SDUPTHER

## 2018-05-10 PROCEDURE — 88305 TISSUE EXAM BY PATHOLOGIST: CPT | Performed by: PATHOLOGY

## 2018-05-11 ENCOUNTER — LAB REQUISITION (OUTPATIENT)
Dept: LAB | Facility: HOSPITAL | Age: 63
End: 2018-05-11
Payer: COMMERCIAL

## 2018-05-11 DIAGNOSIS — D48.5 NEOPLASM OF UNCERTAIN BEHAVIOR OF SKIN: ICD-10-CM

## 2018-05-23 ENCOUNTER — OFFICE VISIT (OUTPATIENT)
Dept: FAMILY MEDICINE CLINIC | Facility: HOSPITAL | Age: 63
End: 2018-05-23
Payer: COMMERCIAL

## 2018-05-23 VITALS
SYSTOLIC BLOOD PRESSURE: 130 MMHG | HEIGHT: 67 IN | BODY MASS INDEX: 36.7 KG/M2 | TEMPERATURE: 98.1 F | HEART RATE: 68 BPM | DIASTOLIC BLOOD PRESSURE: 84 MMHG | WEIGHT: 233.8 LBS

## 2018-05-23 DIAGNOSIS — Q23.1 BICUSPID AORTIC VALVE: ICD-10-CM

## 2018-05-23 DIAGNOSIS — I10 ESSENTIAL HYPERTENSION: Primary | ICD-10-CM

## 2018-05-23 DIAGNOSIS — C44.622 SQUAMOUS CELL CANCER OF SKIN OF HAND, RIGHT: ICD-10-CM

## 2018-05-23 DIAGNOSIS — E66.09 CLASS 2 OBESITY DUE TO EXCESS CALORIES WITHOUT SERIOUS COMORBIDITY WITH BODY MASS INDEX (BMI) OF 36.0 TO 36.9 IN ADULT: ICD-10-CM

## 2018-05-23 DIAGNOSIS — E03.9 ACQUIRED HYPOTHYROIDISM: ICD-10-CM

## 2018-05-23 DIAGNOSIS — E78.2 MIXED HYPERLIPIDEMIA: ICD-10-CM

## 2018-05-23 PROBLEM — Q23.81 BICUSPID AORTIC VALVE: Status: ACTIVE | Noted: 2018-05-23

## 2018-05-23 PROCEDURE — 99214 OFFICE O/P EST MOD 30 MIN: CPT | Performed by: FAMILY MEDICINE

## 2018-05-23 RX ORDER — LEVOTHYROXINE SODIUM 0.12 MG/1
125 TABLET ORAL DAILY
Qty: 90 TABLET | Refills: 3 | Status: SHIPPED | OUTPATIENT
Start: 2018-05-23 | End: 2018-06-05 | Stop reason: SDUPTHER

## 2018-05-23 RX ORDER — LOSARTAN POTASSIUM 50 MG/1
50 TABLET ORAL DAILY
Qty: 90 TABLET | Refills: 3 | Status: SHIPPED | OUTPATIENT
Start: 2018-05-23 | End: 2018-06-05 | Stop reason: SDUPTHER

## 2018-05-23 NOTE — PROGRESS NOTES
Assessment/Plan:         Diagnoses and all orders for this visit:    Essential hypertension  -     losartan (COZAAR) 50 mg tablet; Take 1 tablet (50 mg total) by mouth daily    Acquired hypothyroidism  -     levothyroxine 125 mcg tablet; Take 1 tablet (125 mcg total) by mouth daily    Mixed hyperlipidemia    Class 2 obesity due to excess calories without serious comorbidity with body mass index (BMI) of 36 0 to 36 9 in adult  Comments:  OK to D/C Contrave for lack of efficacy    Bicuspid aortic valve  -     Echo complete with contrast if indicated; Future    Squamous cell cancer of skin of hand, right  Comments: Follow with Dr Lucina Caceres          Subjective:      Patient ID: Uri Cordero is a 65 y o  female  Feeling well overall  No recent illness or injury   " Obsessive use of hand "  Has squamous cell ca on R hand, to have complete excision on June 7th  Very frustrated with inability to lose weight  Cant really say she is making progress with Contrave and in fact gained weight  May go with Weight Watchers  The following portions of the patient's history were reviewed and updated as appropriate: allergies, current medications, past family history, past medical history, past social history, past surgical history and problem list     Review of Systems   Constitutional: Negative for fatigue and fever  HENT: Negative for congestion and sinus pressure  Respiratory: Negative for cough  Endocrine: Negative  Genitourinary: Negative for pelvic pain  Musculoskeletal: Negative  Hematological: Negative  Objective:      /84 (Patient Position: Sitting, Cuff Size: Large)   Pulse 68   Temp 98 1 °F (36 7 °C) (Tympanic)   Ht 5' 7" (1 702 m)   Wt 106 kg (233 lb 12 8 oz)   BMI 36 62 kg/m²          Physical Exam   Constitutional: She is oriented to person, place, and time  She appears well-developed and well-nourished  Neck: Thyromegaly present     Cardiovascular: Normal rate and regular rhythm  Pulmonary/Chest: Effort normal and breath sounds normal    Neurological: She is alert and oriented to person, place, and time  Psychiatric: She has a normal mood and affect  Her behavior is normal  Judgment and thought content normal    Nursing note and vitals reviewed

## 2018-05-30 ENCOUNTER — TELEPHONE (OUTPATIENT)
Dept: FAMILY MEDICINE CLINIC | Facility: HOSPITAL | Age: 63
End: 2018-05-30

## 2018-05-30 ENCOUNTER — HOSPITAL ENCOUNTER (OUTPATIENT)
Dept: RADIOLOGY | Facility: HOSPITAL | Age: 63
Discharge: HOME | End: 2018-05-30
Payer: COMMERCIAL

## 2018-05-30 DIAGNOSIS — N20.0 NEPHROLITHIASIS: ICD-10-CM

## 2018-05-30 DIAGNOSIS — N20.0 NEPHROLITHIASIS: Primary | ICD-10-CM

## 2018-05-30 PROCEDURE — 74018 RADEX ABDOMEN 1 VIEW: CPT

## 2018-05-30 NOTE — TELEPHONE ENCOUNTER
Pt wants to know if we could possibly put in an xray order to check for kidney stones since she's been seen prior for them and is having pain again

## 2018-06-01 ENCOUNTER — TELEPHONE (OUTPATIENT)
Dept: FAMILY MEDICINE CLINIC | Facility: HOSPITAL | Age: 63
End: 2018-06-01

## 2018-06-01 NOTE — TELEPHONE ENCOUNTER
PATIENT IS ASKING FOR XRAY RESULTS DONE 05/30/2018 - [facility] AT WORK AT [facility]

## 2018-06-05 DIAGNOSIS — E03.9 ACQUIRED HYPOTHYROIDISM: ICD-10-CM

## 2018-06-05 DIAGNOSIS — I10 ESSENTIAL HYPERTENSION: ICD-10-CM

## 2018-06-05 RX ORDER — LEVOTHYROXINE SODIUM 0.12 MG/1
125 TABLET ORAL DAILY
Qty: 90 TABLET | Refills: 1 | Status: SHIPPED | OUTPATIENT
Start: 2018-06-05 | End: 2018-10-03 | Stop reason: SDUPTHER

## 2018-06-05 RX ORDER — LOSARTAN POTASSIUM 50 MG/1
50 TABLET ORAL DAILY
Qty: 90 TABLET | Refills: 1 | Status: SHIPPED | OUTPATIENT
Start: 2018-06-05 | End: 2018-10-03 | Stop reason: SDUPTHER

## 2018-06-06 ENCOUNTER — OFFICE VISIT (OUTPATIENT)
Dept: FAMILY MEDICINE CLINIC | Facility: HOSPITAL | Age: 63
End: 2018-06-06
Payer: COMMERCIAL

## 2018-06-06 VITALS
BODY MASS INDEX: 37.1 KG/M2 | DIASTOLIC BLOOD PRESSURE: 86 MMHG | HEART RATE: 60 BPM | TEMPERATURE: 98.8 F | HEIGHT: 67 IN | WEIGHT: 236.4 LBS | SYSTOLIC BLOOD PRESSURE: 154 MMHG

## 2018-06-06 DIAGNOSIS — S39.012A STRAIN OF LUMBAR REGION, INITIAL ENCOUNTER: Primary | ICD-10-CM

## 2018-06-06 PROCEDURE — 99213 OFFICE O/P EST LOW 20 MIN: CPT | Performed by: FAMILY MEDICINE

## 2018-06-06 PROCEDURE — 3008F BODY MASS INDEX DOCD: CPT | Performed by: FAMILY MEDICINE

## 2018-06-06 RX ORDER — CELECOXIB 200 MG/1
200 CAPSULE ORAL 2 TIMES DAILY
Qty: 30 CAPSULE | Refills: 0 | Status: SHIPPED | OUTPATIENT
Start: 2018-06-06 | End: 2018-11-27 | Stop reason: ALTCHOICE

## 2018-06-06 NOTE — PROGRESS NOTES
Assessment/Plan:         Diagnoses and all orders for this visit:    Strain of lumbar region, initial encounter  Comments:  Use SalonPas patch to the area since she is limited on medication prior to surgery  Orders:  -     celecoxib (CeleBREX) 200 mg capsule; Take 1 capsule (200 mg total) by mouth 2 (two) times a day      May start this after surgery is completed tomorrow    Subjective:      Patient ID: Aileen Burger is a 65 y o  female  Having L flank pain that she feels is MS related  Worse when sh is twisting her trunk    Pain over the past 2 weeks and was using Aleve with benefit but having surgery tomorrow    Had KUB for her suspicion that it was related to past kidney stones  The following portions of the patient's history were reviewed and updated as appropriate: allergies, current medications, past family history, past medical history, past social history, past surgical history and problem list     Review of Systems   Constitutional: Negative for fever  HENT: Negative for congestion  Eyes: Negative for visual disturbance  Respiratory: Negative  Cardiovascular: Negative  Gastrointestinal: Negative for abdominal pain, constipation and diarrhea  Endocrine: Negative  Genitourinary: Negative for dysuria and urgency  Musculoskeletal: Positive for back pain and myalgias  Skin: Negative for rash  Objective:      /86 (BP Location: Left arm, Patient Position: Sitting, Cuff Size: Large)   Pulse 60   Temp 98 8 °F (37 1 °C) (Tympanic)   Ht 5' 7" (1 702 m)   Wt 107 kg (236 lb 6 4 oz)   BMI 37 03 kg/m²          Physical Exam   Constitutional: She is oriented to person, place, and time  She appears well-developed and well-nourished  Cardiovascular: Normal rate and regular rhythm  Pulmonary/Chest: Effort normal and breath sounds normal    Musculoskeletal: She exhibits no edema  Thoracic back: She exhibits tenderness          Back:    Neurological: She is alert and oriented to person, place, and time  Psychiatric: She has a normal mood and affect   Her behavior is normal  Judgment and thought content normal

## 2018-06-25 ENCOUNTER — HOSPITAL ENCOUNTER (OUTPATIENT)
Dept: NON INVASIVE DIAGNOSTICS | Facility: CLINIC | Age: 63
Discharge: HOME | End: 2018-06-25
Payer: COMMERCIAL

## 2018-06-25 DIAGNOSIS — Q23.1 BICUSPID AORTIC VALVE: ICD-10-CM

## 2018-06-25 PROCEDURE — 93306 TTE W/DOPPLER COMPLETE: CPT

## 2018-06-25 PROCEDURE — 93306 TTE W/DOPPLER COMPLETE: CPT | Performed by: INTERNAL MEDICINE

## 2018-07-07 ENCOUNTER — APPOINTMENT (OUTPATIENT)
Dept: LAB | Facility: CLINIC | Age: 63
End: 2018-07-07
Payer: COMMERCIAL

## 2018-07-07 ENCOUNTER — TRANSCRIBE ORDERS (OUTPATIENT)
Dept: ADMINISTRATIVE | Facility: HOSPITAL | Age: 63
End: 2018-07-07

## 2018-07-07 DIAGNOSIS — Z00.8 HEALTH EXAMINATION IN POPULATION SURVEY: ICD-10-CM

## 2018-07-07 DIAGNOSIS — Z00.8 HEALTH EXAMINATION IN POPULATION SURVEY: Primary | ICD-10-CM

## 2018-07-07 LAB
CHOLEST SERPL-MCNC: 141 MG/DL (ref 50–200)
EST. AVERAGE GLUCOSE BLD GHB EST-MCNC: 120 MG/DL
HBA1C MFR BLD: 5.8 % (ref 4.2–6.3)
HDLC SERPL-MCNC: 30 MG/DL (ref 40–60)
LDLC SERPL CALC-MCNC: 74 MG/DL (ref 0–100)
NONHDLC SERPL-MCNC: 111 MG/DL
TRIGL SERPL-MCNC: 184 MG/DL

## 2018-07-07 PROCEDURE — 80061 LIPID PANEL: CPT

## 2018-07-07 PROCEDURE — 83036 HEMOGLOBIN GLYCOSYLATED A1C: CPT

## 2018-07-07 PROCEDURE — 36415 COLL VENOUS BLD VENIPUNCTURE: CPT

## 2018-09-11 ENCOUNTER — TELEPHONE (OUTPATIENT)
Dept: OBGYN CLINIC | Facility: CLINIC | Age: 63
End: 2018-09-11

## 2018-10-03 ENCOUNTER — OFFICE VISIT (OUTPATIENT)
Dept: FAMILY MEDICINE CLINIC | Facility: HOSPITAL | Age: 63
End: 2018-10-03
Payer: COMMERCIAL

## 2018-10-03 VITALS
DIASTOLIC BLOOD PRESSURE: 84 MMHG | WEIGHT: 238.2 LBS | SYSTOLIC BLOOD PRESSURE: 138 MMHG | HEART RATE: 68 BPM | BODY MASS INDEX: 38.28 KG/M2 | TEMPERATURE: 98.1 F | HEIGHT: 66 IN

## 2018-10-03 DIAGNOSIS — R73.9 HYPERGLYCEMIA: ICD-10-CM

## 2018-10-03 DIAGNOSIS — I10 ESSENTIAL HYPERTENSION: Primary | ICD-10-CM

## 2018-10-03 DIAGNOSIS — E03.9 ACQUIRED HYPOTHYROIDISM: ICD-10-CM

## 2018-10-03 DIAGNOSIS — E66.01 CLASS 2 SEVERE OBESITY WITH SERIOUS COMORBIDITY AND BODY MASS INDEX (BMI) OF 38.0 TO 38.9 IN ADULT, UNSPECIFIED OBESITY TYPE (HCC): ICD-10-CM

## 2018-10-03 DIAGNOSIS — E78.2 MIXED HYPERLIPIDEMIA: ICD-10-CM

## 2018-10-03 PROCEDURE — 99213 OFFICE O/P EST LOW 20 MIN: CPT | Performed by: FAMILY MEDICINE

## 2018-10-03 PROCEDURE — 3075F SYST BP GE 130 - 139MM HG: CPT | Performed by: FAMILY MEDICINE

## 2018-10-03 PROCEDURE — 3079F DIAST BP 80-89 MM HG: CPT | Performed by: FAMILY MEDICINE

## 2018-10-03 RX ORDER — LOSARTAN POTASSIUM 50 MG/1
50 TABLET ORAL DAILY
Qty: 90 TABLET | Refills: 3 | Status: SHIPPED | OUTPATIENT
Start: 2018-10-03 | End: 2019-12-02 | Stop reason: SDUPTHER

## 2018-10-03 RX ORDER — LEVOTHYROXINE SODIUM 0.12 MG/1
125 TABLET ORAL DAILY
Qty: 90 TABLET | Refills: 3 | Status: SHIPPED | OUTPATIENT
Start: 2018-10-03 | End: 2019-12-02 | Stop reason: SDUPTHER

## 2018-10-03 NOTE — PROGRESS NOTES
Assessment/Plan:         Diagnoses and all orders for this visit:    Essential hypertension  Comments:  Labile but stable BP control  Orders:  -     losartan (COZAAR) 50 mg tablet; Take 1 tablet (50 mg total) by mouth daily  -     CBC and differential; Future    Acquired hypothyroidism  Comments:  Clinically euthyroid  Orders:  -     levothyroxine 125 mcg tablet; Take 1 tablet (125 mcg total) by mouth daily  -     TSH, 3rd generation with Free T4 reflex; Future    Class 2 severe obesity with serious comorbidity and body mass index (BMI) of 38 0 to 38 9 in adult, unspecified obesity type ([facility] [address] )  Comments:  Urged continue to work on weight loss  Hyperglycemia  -     Comprehensive metabolic panel; Future  -     HEMOGLOBIN A1C W/ EAG ESTIMATION; Future    Mixed hyperlipidemia  -     Lipid Panel with Direct LDL reflex; Future          Subjective:      Patient ID: Cornel Adorno is a 65 y o  female  4 month followup  Feeling well overall  No recent illness or injury  Discussed echocardiogram in detail  Aortic valve not well visualized but no stenosis  BP's have been consistently good  Not using Contrave for weight control        The following portions of the patient's history were reviewed and updated as appropriate: allergies, current medications, past family history, past medical history, past social history, past surgical history and problem list     Review of Systems   Cardiovascular: Negative for chest pain  Gastrointestinal: Negative for abdominal pain  Musculoskeletal: Negative for arthralgias  Neurological: Negative for dizziness  Psychiatric/Behavioral: Negative for decreased concentration and dysphoric mood  All other systems reviewed and are negative  Objective:      /84   Pulse 68   Temp 98 1 °F (36 7 °C) (Tympanic)   Ht 5' 6" (1 676 m)   Wt 108 kg (238 lb 3 2 oz)   BMI 38 45 kg/m²          Physical Exam   Constitutional: She is oriented to person, place, and time  She appears well-developed and well-nourished  Neck: Thyromegaly present  Cardiovascular: Normal rate and regular rhythm  Murmur heard  Pulmonary/Chest: Effort normal and breath sounds normal    Musculoskeletal: She exhibits no edema  Neurological: She is alert and oriented to person, place, and time  Psychiatric: She has a normal mood and affect  Her behavior is normal  Judgment and thought content normal    Nursing note and vitals reviewed

## 2018-10-30 ENCOUNTER — TELEPHONE (OUTPATIENT)
Dept: OBGYN CLINIC | Facility: HOSPITAL | Age: 63
End: 2018-10-30

## 2018-10-30 NOTE — TELEPHONE ENCOUNTER
Patient sees Dr Octavio Leahy   [phone]  Will not be available between 10:30am-17pm    Patient has last seen Dr Octavio Leahy on 11/8/2016  Patient is having lt side back pain now & would like to schedule  Please advise if the patient needs a new intake or how to schedule  Patient has not seen another pain management doc, patient states that she has been feeling great since she was last seen

## 2018-11-05 ENCOUNTER — APPOINTMENT (OUTPATIENT)
Dept: RADIOLOGY | Facility: CLINIC | Age: 63
End: 2018-11-05
Payer: COMMERCIAL

## 2018-11-05 ENCOUNTER — CONSULT (OUTPATIENT)
Dept: PAIN MEDICINE | Facility: CLINIC | Age: 63
End: 2018-11-05
Payer: COMMERCIAL

## 2018-11-05 VITALS
WEIGHT: 237 LBS | BODY MASS INDEX: 38.09 KG/M2 | HEART RATE: 80 BPM | HEIGHT: 66 IN | SYSTOLIC BLOOD PRESSURE: 138 MMHG | DIASTOLIC BLOOD PRESSURE: 88 MMHG

## 2018-11-05 DIAGNOSIS — R10.9 LEFT FLANK PAIN: Primary | ICD-10-CM

## 2018-11-05 DIAGNOSIS — M79.10 MUSCLE PAIN: ICD-10-CM

## 2018-11-05 DIAGNOSIS — R10.9 LEFT FLANK PAIN: ICD-10-CM

## 2018-11-05 PROCEDURE — 99244 OFF/OP CNSLTJ NEW/EST MOD 40: CPT | Performed by: ANESTHESIOLOGY

## 2018-11-05 PROCEDURE — 73502 X-RAY EXAM HIP UNI 2-3 VIEWS: CPT

## 2018-11-05 PROCEDURE — 72114 X-RAY EXAM L-S SPINE BENDING: CPT

## 2018-11-05 RX ORDER — PREDNISONE 10 MG/1
TABLET ORAL
Qty: 21 TABLET | Refills: 0 | Status: SHIPPED | OUTPATIENT
Start: 2018-11-05 | End: 2018-11-05 | Stop reason: ALTCHOICE

## 2018-11-05 RX ORDER — PREDNISONE 10 MG/1
TABLET ORAL
Qty: 21 TABLET | Refills: 0 | Status: SHIPPED | OUTPATIENT
Start: 2018-11-05 | End: 2018-11-27 | Stop reason: ALTCHOICE

## 2018-11-05 NOTE — PROGRESS NOTES
Assessment:  1  Left flank pain    2  Muscle pain        Plan: At this point the patient's pain persists despite time, relative rest, activity modification, and nonsteroidal anti-inflammatories  Her pain is significantly interfering with her daily living activities  I believe is appropriate to order an radiograph of the lumbar spine to rule out any significant etiology of her symptoms  I will start her on a titrating dose of oral prednisone to address any inflammatory component of the patient's pain  She understands she should not take nonsteroidal anti-inflammatories until she is finished with this steroid  If she has any problems or questions she will give us a call  I would like her to undergo a course of physical therapy, myofascial release TENS unit ultrasound, a prescription was provided  We will read group in approximately 3-4 weeks time certainly sooner if needed  My impressions and treatment recommendations were discussed in detail with the patient who verbalized understanding and had no further questions  Discharge instructions were provided  I personally saw and examined the patient and I agree with the above discussed plan of care  Orders Placed This Encounter   Procedures    X-ray lumbar spine complete with bending     Standing Status:   Future     Standing Expiration Date:   11/5/2022     Scheduling Instructions:      Bring along any outside films relating to this procedure  Order Specific Question:   Reason for Exam:     Answer:   Left-sided low back pain    XR hip/pelv 2-3 vws left if performed     Standing Status:   Future     Standing Expiration Date:   11/5/2022     Scheduling Instructions:      Bring along any outside films relating to this procedure             Order Specific Question:   Reason for Exam:     Answer:   pain    Ambulatory referral to Physical Therapy     Standing Status:   Future     Standing Expiration Date:   5/5/2019     Referral Priority: Routine     Referral Type:   Physical Therapy     Referral Reason:   Specialty Services Required     Requested Specialty:   Physical Therapy     Number of Visits Requested:   1     Expiration Date:   11/5/2019     New Medications Ordered This Visit   Medications    predniSONE 10 mg tablet     Sig: Take according to titration schedule     Dispense:  21 tablet     Refill:  0       History of Present Illness:    Harpreet Austin is a 59 y o  female with 4-6 week history of left-sided flank pain radiating superiorly  She is unaware of any clear precipitating event denies any trauma or injury  She denies any bowel or bladder difficulty she denies any abdominal pain  At time her pain is severe rating is 10/10 on the visual analog scale is intermittent  D and relaxation aggravated by standing, bending, walking, exercise and coughing and sneezing  Exercise heat nice have provided no to moderate relief  She has taken Aleve which helps take the edge off   escribes pain as burning sharp and shooting denies weakness of the upper lower limbs  Her pain is decreased with sitting    I have personally reviewed and/or updated the patient's past medical history, past surgical history, family history, social history, current medications, allergies, and vital signs today  Review of Systems:    Review of Systems   Constitutional: Negative for fever and unexpected weight change  HENT: Negative for trouble swallowing  Eyes: Negative for visual disturbance  Respiratory: Negative for shortness of breath and wheezing  Cardiovascular: Negative for chest pain and palpitations  Gastrointestinal: Negative for constipation, diarrhea, nausea and vomiting  Endocrine: Negative for cold intolerance, heat intolerance and polydipsia  Genitourinary: Negative for difficulty urinating and frequency  Musculoskeletal: Negative for arthralgias, gait problem, joint swelling and myalgias  Skin: Negative for rash     Neurological: Negative for dizziness, seizures, syncope, weakness and headaches  Hematological: Does not bruise/bleed easily  Psychiatric/Behavioral: Negative for dysphoric mood  All other systems reviewed and are negative        Patient Active Problem List   Diagnosis    Complex ovarian cyst    Dry eye syndrome    Fatty infiltration of liver    Hyperglycemia    Abdominal pain, RUQ    Abdominal pain    Hyperlipidemia    Hypertension    Hypothyroidism    Insomnia due to medical condition    Left ventricular hypertrophy    Lumbosacral pain    Mitral valvular disorder    Nephrolithiasis    Nicotine dependence    Night sweats    Non-toxic multinodular goiter    Obesity    Rhinitis    Spondylosis of cervical region without myelopathy or radiculopathy    Thoracic neuritis    Endometrial polyp    Bicuspid aortic valve    Squamous cell cancer of skin of hand, right    Left flank pain    Muscle pain       Past Medical History:   Diagnosis Date    Abdominal pain     Bicuspid aortic valve     LAST ASSESSED [id number]    Cervical disc disease     last assessed [id number]    Cervical stenosis of spine     LAST ASSESSED [id number]    Disease of thyroid gland     hypothyroid    Dry eyes     Endometriosis     Hyperglycemia     Hyperlipidemia     Hypertension     Hypothyroidism     Left ventricular hypertrophy     Mitral valvular disorder     Nephrolithiasis     Ovarian cyst, complex     Ptosis     LAST ASSESSED [id number]    Renal calculi     Right cervical radiculopathy     LAST ASSESSED [id number]    Seasonal allergies        Past Surgical History:   Procedure Laterality Date    CARPAL TUNNEL RELEASE       SECTION      CHOLECYSTECTOMY      COLONOSCOPY      NEUROPLASTY / TRANSPOSITION MEDIAN NERVE AT CARPAL TUNNEL      NEUROPLASTY / TRANSPOSITION MEDIAN NERVE AT CARPAL TUNNEL  [facility]    OTHER SURGICAL HISTORY      surgically removed skin patch for skin cancer    PLANTAR FASCIECTOMY      MI COLONOSCOPY FLX DX W/COLLJ SPEC WHEN PFRMD N/A 10/9/2017    Procedure: COLONOSCOPY;  Surgeon: Marleny Gonzalez MD;  Location: [facility]; Service: Gastroenterology    MI HYSTEROSCOPY,W/ENDO BX N/A 2/26/2018    Procedure: DILATATION AND CURETTAGE (D&C) WITH HYSTEROSCOPY;  Surgeon: Ortiz Guevara DO;  Location:  MAIN OR;  Service: Gynecology    TUBAL LIGATION         Family History   Problem Relation Age of Onset    Diabetes Mother     Alzheimer's disease Mother     Heart disease Mother     Hypertension Mother     Other Family         back disorder    Cancer Family     Heart disease Family     Thyroid disease Family     No Known Problems Father        Social History     Occupational History    part time employment      Social History Main Topics    Smoking status: Former Smoker     Packs/day: 1 00     Years: 20 00     Quit date: 2012    Smokeless tobacco: Never Used    Alcohol use Yes      Comment: occasional drinks wine    Drug use: No    Sexual activity: Yes     Partners: Male      Comment: tubal ligation        Current Outpatient Prescriptions on File Prior to Visit   Medication Sig    celecoxib (CeleBREX) 200 mg capsule Take 1 capsule (200 mg total) by mouth 2 (two) times a day    clotrimazole-betamethasone (LOTRISONE) 1-0 05 % cream Apply topically 2 (two) times a day    CONTRAVE 8-90 MG TB12 Take 2 tablets by mouth 2 (two) times a day      levothyroxine 125 mcg tablet Take 1 tablet (125 mcg total) by mouth daily    losartan (COZAAR) 50 mg tablet Take 1 tablet (50 mg total) by mouth daily    XIIDRA 5 % op solution      No current facility-administered medications on file prior to visit          Allergies   Allergen Reactions    Erythromycin GI Intolerance     Reaction Date: 11Jul2011;     Penicillins Other (See Comments)     unknown    Aztreonam Rash     Action Taken: Allergy added by [employer] to replace Penicillins [employer];     Carbapenems Rash     Action Taken: Allergy added by [employer] to replace Penicillins [employer];     Cephalosporins Rash     Action Taken: Allergy added by [employer] to replace Penicillins [employer];     Flu Virus Vaccine Hives, Rash and GI Intolerance    Griseofulvin Rash     Action Taken: Allergy added by [employer] to replace Penicillins [employer]; Physical Exam:    /88   Pulse 80   Ht 5' 6" (1 676 m)   Wt 108 kg (237 lb)   BMI 38 25 kg/m²     Constitutional: normal, well developed, well nourished, alert, in no distress and non-toxic and no overt pain behavior  and obese  Eyes: anicteric  HEENT: grossly intact  Neck: supple, symmetric, trachea midline and no masses   Pulmonary:even and unlabored  Cardiovascular:No edema or pitting edema present  Skin:Normal without rashes or lesions and well hydrated  Psychiatric:Mood and affect appropriate  Neurologic:Cranial Nerves II-XII grossly intact  Musculoskeletal:normal   Inspection:  Normal station and gait  Normal lumbar lordotic curve with no significant scoliosis or spinal step-off  Skin intact without erythema  No gross mass or muscle atrophy  Palpation:  There is no tenderness to palpation overlying the sacroiliac joint as well as the ischial bursa bilateral   No significant tenderness over the greater trochanteric bursa bilaterally  There is tenderness superior to the left ASIS  Motor/Strength:  5/5 strength in the bilateral lower limbs  The patient is able to heel and/toe walk  Tandem gait is intact  Reflexes: Deep tendon reflex are 2+ and symmetrical bilateral patella and Achilles  Sensation:   Sensation intact to light touch and pinprick in the bilateral lower limbs  Proprioception is intact at bilateral hallux  Maneuvers: Negative bilateral straight leg raising  Negative Milind's maneuver              Imaging    XRAY ABDOMEN 5/30/18 @[facility]      INDICATION:   N20 0: Calculus of kidney      COMPARISON:  November 18, 2015     VIEWS:  AP supine        FINDINGS:     No radiopaque renal calculi seen      No radiopaque ureteral calculi identified      Nonobstructive bowel gas pattern  Surgical clips are seen in the right upper quadrant      No acute osseous abnormality is seen      IMPRESSION:     No radiopaque urinary tract calculi          XRAY LUMBAR SPINE  4/20/18 @ [facility]      INDICATION:  Chronic low back pain      COMPARISON:  CT abdomen pelvis April 9, 2014     VIEWS:  AP, lateral and coned-down projections; 3 images     FINDINGS:     Grade 1 anterolisthesis L4 on L5, unchanged      There is no radiographic evidence of acute fracture or destructive osseous lesion      Osseous structures demineralized  Disc space narrowing throughout the lumbar spine, most pronounced L3-L4  Diffuse facet hypertrophic changes      Visualized soft tissues appear unremarkable      IMPRESSION:  Stable degenerative changes  I have personally reviewed pertinent films in PACS

## 2018-11-06 ENCOUNTER — TELEPHONE (OUTPATIENT)
Dept: PAIN MEDICINE | Facility: CLINIC | Age: 63
End: 2018-11-06

## 2018-11-06 ENCOUNTER — EVALUATION (OUTPATIENT)
Dept: PHYSICAL THERAPY | Facility: CLINIC | Age: 63
End: 2018-11-06
Payer: COMMERCIAL

## 2018-11-06 DIAGNOSIS — R10.9 LEFT FLANK PAIN: ICD-10-CM

## 2018-11-06 DIAGNOSIS — M79.10 MUSCLE PAIN: ICD-10-CM

## 2018-11-06 PROCEDURE — G8990 OTHER PT/OT CURRENT STATUS: HCPCS | Performed by: PHYSICAL THERAPIST

## 2018-11-06 PROCEDURE — 97112 NEUROMUSCULAR REEDUCATION: CPT | Performed by: PHYSICAL THERAPIST

## 2018-11-06 PROCEDURE — 97162 PT EVAL MOD COMPLEX 30 MIN: CPT | Performed by: PHYSICAL THERAPIST

## 2018-11-06 PROCEDURE — 88305 TISSUE EXAM BY PATHOLOGIST: CPT | Performed by: PATHOLOGY

## 2018-11-06 PROCEDURE — G8991 OTHER PT/OT GOAL STATUS: HCPCS | Performed by: PHYSICAL THERAPIST

## 2018-11-06 NOTE — TELEPHONE ENCOUNTER
----- Message from Usama Rascon DO sent at 11/6/2018 10:55 AM EST -----  Radiographs lumbar spine:Persistent multilevel degenerative spondylosis and grade 1 anterolisthesis L4-5    Mild dynamic instability exhibited by the degenerative anterolisthesis at the L4-5 level

## 2018-11-06 NOTE — PROGRESS NOTES
PT Evaluation     Today's date: 2018  Patient name: Torrey Seals  : 1955  MRN: [id number]  Referring provider: Bailey Giraldo DO  Dx:   Encounter Diagnosis     ICD-10-CM    1  Left flank pain R10 9 Ambulatory referral to Physical Therapy   2  Muscle pain M79 10 Ambulatory referral to Physical Therapy       Start Time: 9518  Stop Time: 1044  Total time in clinic (min): 46 minutes    Assessment/Plan     This patient presents w/ L lumbar pain of several months duration  Exhibits symptoms consistent w/ directional classification - flexion preference  She exhibits decreased ROM lumbar spine and hips, decreased core strength, impaired function and pain  This patient is a good candidate for skilled physical therapy to reduce pain and improve function  Interventions to include manual therapy, ROM, stretching, strengthening, therapeutic activities, neuromuscular re-ed and instruction in HEP  Frequency and duration: 2 x/week for 6 weeks    STGs: 4 weeks  1  Increase ROM B hip extension to 5 degrees  2  Decrease pain 2-3 levels  3  Increase ROM L hip IR to at least 20 degrees                LTGs: 6-8 weeks  1  Independent HEP  2  Increase core strength  3  Increase FOTO score to predicted level: 65  4  Able to perform usual activities, including bending, without pain 2/10  5  Able to walk usual distances without pain >2/10  6           Able to tolerate standing for at least 30 minutes without pain >2/10        Subjective   This is a 59 y o  female who reports onset of symptoms several months ago (at least May, 2018)  Mechanism of injury: insidious onset  Current complaint: sharp pain L lumbar region; pain is not always there but when it is, it is excruciating and last about 5 minutes; denies sleep disturbance  Aggravating factors: standing, vacuuming, bending, walking; denies paresthesia, denies bowel/ bladder changes  Relieving factors: Aleve, lying down, sititng  Previous treatment: none for this  Dx tests: x-rays  Occupation:  Leisure: gardening, walking    Patients goal: be pain-free      Objective  Pain scale: 0-10/10  Posture: L iliac crest elevated, L greater trochanter elevated in stance    Trunk ROM     Flexion AROM     Severe limitation         Extension  AROM   Moderate limitation *          R lateral flexion AROM    WNL         L lateral flexion AROM    WNL*           R rotation AROM   WNL          L rotation AROM     WNL           * pain / comparable sign    Special tests:    PSLR (-)    Palpation: tender L QL region, comparable sign w/ PA's L3,4,5    Myotomal testing    L2 - Psoas =  5/5    L3 - Quadriceps =  5/5    L4/5 - Ant   Tibialis = 5/5    L5 - Glut med = 5/5    L5 - EHL = 5/5    S1-2 -  Glut charles = 5/5       Hip                   R  AROM  L      R  PROM   L    R  Strength  L  Flexion  WFL     WFL    Extension  0                 0    Abd  WFL      WFL    IR  30                3            Flowsheet Rows      Most Recent Value   PT/OT G-Codes   Current Score  48   Projected Score  65   FOTO information reviewed  Yes   Assessment Type  Evaluation   G code set  Other PT/OT Primary   Other PT Primary Current Status ()  CK   Other PT Primary Goal Status ()  CJ          Precautions: none    Daily Treatment Diary     Manual  11/6            PAs/UPA's L3,4,5             SL mob             TrP release L QL                                           Exercise Diary  11/6            Bike             TM -elevated             K->C             LTR             Pelvic tilts A-P instruc            Abdominal bracing             Seated flexion stretch             QL stretch 20"x4            Partial curl ups             TB: LPD             Hip flexor stretch standing 1'ea                                                                                                                                     Modalities              CP prn

## 2018-11-06 NOTE — TELEPHONE ENCOUNTER
Reviewed hip and lumbar spine imaging results as per [facility]  Pt verbalized understanding  Stated that she continues w/ oral steroid and PT  Pt confirmed 11/27 ov w/ DG  Stated that she would prefer to see [facility]  Rescheduled 11/27 ov for 11/27 at 1100 w/ [facility]  Pt verbalized understanding and appreciation       Forwarding to [employer]

## 2018-11-06 NOTE — TELEPHONE ENCOUNTER
----- Message from Rosetta Katz DO sent at 11/6/2018 10:55 AM EST -----  Hip radiographs:  Progressive mild degenerative arthritis both hips    No acute osseous abnormality

## 2018-11-07 ENCOUNTER — OFFICE VISIT (OUTPATIENT)
Dept: PHYSICAL THERAPY | Facility: CLINIC | Age: 63
End: 2018-11-07
Payer: COMMERCIAL

## 2018-11-07 ENCOUNTER — LAB REQUISITION (OUTPATIENT)
Dept: LAB | Facility: HOSPITAL | Age: 63
End: 2018-11-07
Payer: COMMERCIAL

## 2018-11-07 DIAGNOSIS — R10.9 LEFT FLANK PAIN: Primary | ICD-10-CM

## 2018-11-07 DIAGNOSIS — D48.5 NEOPLASM OF UNCERTAIN BEHAVIOR OF SKIN: ICD-10-CM

## 2018-11-07 DIAGNOSIS — M79.10 MUSCLE PAIN: ICD-10-CM

## 2018-11-07 PROCEDURE — 97112 NEUROMUSCULAR REEDUCATION: CPT | Performed by: PHYSICAL THERAPIST

## 2018-11-07 PROCEDURE — 97110 THERAPEUTIC EXERCISES: CPT | Performed by: PHYSICAL THERAPIST

## 2018-11-07 NOTE — PROGRESS NOTES
Daily Note     Today's date: 2018  Patient name: Franklin Calderon  : 1955  MRN: [id number]  Referring provider: Marshal Gregg DO  Dx:   Encounter Diagnosis     ICD-10-CM    1  Left flank pain R10 9    2  Muscle pain M79 10                   Subjective: received x-ray results - revealed degenerative changes lumbar spine and hips (anterolisthesis / spondylosis most pronounced at L3-4 and L4-5      Objective: See treatment diary below      Assessment: Progressed exercises  Tolerated treatment well  Patient would benefit from continued PT      Plan: Continue per plan of care   with emphasis on core strengthening, increasing hip extension ROM and flexion based exercises        Precautions: none    Daily Treatment Diary     Manual             UPA's L3,4,5  Gr lll  JR           SL mob             TrP release L QL                                           Exercise Diary             Bike             TM -elevated             K->C             LTR             Pelvic tilts A-P instruc 10x           Abdominal bracing w/ pressure cuff  10x15"           Seated flexion stretch pball  10x10"           QL stretch 20"x4 20"x4           Partial curl ups  10x5"           TB: LPD             Hip flexor stretch standing 1'ea 1'ea           DLS supine marches  02X                                                                                                                       Modalities              CP prn

## 2018-11-12 ENCOUNTER — OFFICE VISIT (OUTPATIENT)
Dept: PHYSICAL THERAPY | Facility: CLINIC | Age: 63
End: 2018-11-12
Payer: COMMERCIAL

## 2018-11-12 DIAGNOSIS — R10.9 LEFT FLANK PAIN: Primary | ICD-10-CM

## 2018-11-12 DIAGNOSIS — M79.10 MUSCLE PAIN: ICD-10-CM

## 2018-11-12 PROCEDURE — 97112 NEUROMUSCULAR REEDUCATION: CPT | Performed by: PHYSICAL THERAPIST

## 2018-11-12 PROCEDURE — 97110 THERAPEUTIC EXERCISES: CPT | Performed by: PHYSICAL THERAPIST

## 2018-11-12 NOTE — PROGRESS NOTES
Daily Note     Today's date: 2018  Patient name: Sara Wilson  : 1955  MRN: [id number]  Referring provider: Erich Cowden, DO  Dx:   Encounter Diagnosis     ICD-10-CM    1  Left flank pain R10 9    2  Muscle pain M79 10                   Subjective: states that her pain is intermittent, cannot associate it with any particular activity or position but notes that when she has pain stretching helps    Objective: See treatment diary below      Assessment: Tolerated treatment well  Mild pain w/ L PA L 4, L5 - improved w/ mobilization  Patient would benefit from continued PT      Plan: Continue per plan of care   with emphasis on core strengthening, increasing hip extension ROM and flexion based exercises        Precautions: none    Daily Treatment Diary     Manual            UPA's L3,4,5  Gr lll  JR JR          SL mob grade ll             TrP release L QL                                           Exercise Diary            Bike   8'          TM -elevated             K->C B w/ pball   20"x5          LTR w/ Pball   10x          Pelvic tilts A-P instruc 10x 10x          Abdominal bracing w/ pressure cuff  10x 15" 10x15"          Seated flexion stretch pball  10x 10" 10x 10"          QL stretch 20"x4 20"x4 20"x4          Partial curl ups  10x5" 10x5"          TB: LPD             Hip flexor stretch standing 1'ea 1'ea 1' ea          DLS supine marches  96M 10x w/cuff                                                                                                                      Modalities              CP prn

## 2018-11-14 ENCOUNTER — OFFICE VISIT (OUTPATIENT)
Dept: PHYSICAL THERAPY | Facility: CLINIC | Age: 63
End: 2018-11-14
Payer: COMMERCIAL

## 2018-11-14 DIAGNOSIS — M79.10 MUSCLE PAIN: ICD-10-CM

## 2018-11-14 DIAGNOSIS — R10.9 LEFT FLANK PAIN: Primary | ICD-10-CM

## 2018-11-14 PROCEDURE — 97112 NEUROMUSCULAR REEDUCATION: CPT | Performed by: PHYSICAL THERAPIST

## 2018-11-14 PROCEDURE — 97140 MANUAL THERAPY 1/> REGIONS: CPT | Performed by: PHYSICAL THERAPIST

## 2018-11-14 NOTE — PROGRESS NOTES
Daily Note     Today's date: 2018  Patient name: Lorene Tapia  : 1955  MRN: [id number]  Referring provider: Bautista Centeno DO  Dx:   Encounter Diagnosis     ICD-10-CM    1  Left flank pain R10 9    2  Muscle pain M79 10                   Subjective: states her back feels good today; has some other aches from doing exercises      Objective: See treatment diary below      Assessment: Tolerated treatment well  Trial of IASTM lumbar paraspinals; myofascial tightness especially noted in L4-5 region  Reported feeling better p manuals  Patient would benefit from continued PT      Plan: Continue per plan of care   with emphasis on core strengthening, increasing hip extension ROM and flexion based exercises        Precautions: none    Daily Treatment Diary     Manual           UPA's L3,4,5  Gr lll  JR JR          SL mob grade ll             TrP release L QL             IASTM B lumbar paraspinas    JR                          Exercise Diary           Bike   8' 8'         TM -elevated             K->C B w/ pball   20"x5 20"x5         LTR w/ Pball   10x 10x         Pelvic tilts A-P instruc 10x 10x 15x         Abdominal bracing w/ pressure cuff  10x 15" 10x 15" 10x 15"         Seated flexion stretch pball  10x 10" 10x 10" 10x10"         QL stretch 20"x4 20"x4 20"x4 20"x4         Partial curl ups w/ pball  10x5" 10x5" 10x5"         TB: LPD    GTB 20x         Hip flexor stretch standing 1'ea 1'ea 1' ea 1' ea         DLS supine marches  35I 10x w/cuff 10x w/cuff                                                                                                                     Modalities              CP prn

## 2018-11-19 ENCOUNTER — OFFICE VISIT (OUTPATIENT)
Dept: PHYSICAL THERAPY | Facility: CLINIC | Age: 63
End: 2018-11-19
Payer: COMMERCIAL

## 2018-11-19 DIAGNOSIS — R10.9 LEFT FLANK PAIN: Primary | ICD-10-CM

## 2018-11-19 DIAGNOSIS — M79.10 MUSCLE PAIN: ICD-10-CM

## 2018-11-19 PROCEDURE — 97140 MANUAL THERAPY 1/> REGIONS: CPT | Performed by: PHYSICAL THERAPIST

## 2018-11-19 PROCEDURE — 97112 NEUROMUSCULAR REEDUCATION: CPT | Performed by: PHYSICAL THERAPIST

## 2018-11-19 NOTE — PROGRESS NOTES
Daily Note     Today's date: 2018  Patient name: Sara Wilson  : 1955  MRN: [id number]  Referring provider: Erich Cowden, DO  Dx:   Encounter Diagnosis     ICD-10-CM    1  Left flank pain R10 9    2  Muscle pain M79 10                   Subjective: Reports she has not had much pain since last visit, did a lot of housework over the weekend as was sore afterward but feels good this morning      Objective: See treatment diary below      Assessment: Tolerated treatment well  Significant myofascial tightness B lumbar region (R > L)  Patient would benefit from continued PT      Plan: Continue per plan of care   with emphasis on core strengthening, increasing hip extension ROM and flexion based exercises        Precautions: none    Daily Treatment Diary     Manual          UPA's L3,4,5  Gr lll  JR JR          SL mob grade ll             TrP release L QL             IASTM B lumbar Orlie Picket            Exercise Diary          Bike   8' 8' 8'        TM -elevated             K->C B w/ pball   20"x5 20"x5 20"x5        LTR w/ Pball   10x 10x 10x        Pelvic tilts A-P instruc 10x 10x 15x 15x        Abdominal bracing w/ pressure cuff  10x 15" 10x 15" 10x 15" w/ex        Seated flexion stretch pball  10x 10" 10x 10" 10x10" 10x10"        QL stretch 20"x4 20"x4 20"x4 20"x4 20"x4        Partial curl ups w/ pball  10x5" 10x5" 10x5" 10x5"        TB: LPD    GTB 20x nv        Hip flexor stretch standing 1'ea 1'ea 1' ea 1' ea 1' ea        DLS supine marches  81T 10x w/cuff 10x w/cuff 10x no cuf                                                                                                                    Modalities              CP prn

## 2018-11-20 ENCOUNTER — APPOINTMENT (OUTPATIENT)
Dept: PHYSICAL THERAPY | Facility: CLINIC | Age: 63
End: 2018-11-20
Payer: COMMERCIAL

## 2018-11-26 ENCOUNTER — OFFICE VISIT (OUTPATIENT)
Dept: PHYSICAL THERAPY | Facility: CLINIC | Age: 63
End: 2018-11-26
Payer: COMMERCIAL

## 2018-11-26 DIAGNOSIS — M79.10 MUSCLE PAIN: ICD-10-CM

## 2018-11-26 DIAGNOSIS — R10.9 LEFT FLANK PAIN: Primary | ICD-10-CM

## 2018-11-26 PROCEDURE — 97112 NEUROMUSCULAR REEDUCATION: CPT | Performed by: PHYSICAL THERAPIST

## 2018-11-26 PROCEDURE — 97140 MANUAL THERAPY 1/> REGIONS: CPT | Performed by: PHYSICAL THERAPIST

## 2018-11-26 NOTE — PROGRESS NOTES
Daily Note     Today's date: 2018  Patient name: Cornel Adorno  : 1955  MRN: [id number]  Referring provider: Malika Henry DO  Dx:   Encounter Diagnosis     ICD-10-CM    1  Left flank pain R10 9    2  Muscle pain M79 10        Start Time: 1529  Stop Time: 1615  Total time in clinic (min): 46 minutes    Subjective: Reports she she felt good p lv but later that night developed severe pain in the L side which has not resolved since; has tried heat and exercises but they don't relieve it      Objective: See treatment diary below      Assessment: Multiple TrP's L QL  Tolerated treatment well w/ significant relief p TrP release  Patient would benefit from continued PT      Plan: Continue per plan of care   with emphasis on core strengthening, increasing hip extension ROM and flexion based exercises        Precautions: none    Daily Treatment Diary     Manual         UPA's L3,4,5  Gr lll  JR JR          SL mob grade ll             TrP release / stretch L QL      JR       IASTM B lumbar Nani Lobstein            Exercise Diary         Bike   8' 8' 8' np       TM -elevated             K->C B w/ pball   20"x5 20"x5 20"x5 20"x5       LTR w/ Pball   10x 10x 10x 10x       Pelvic tilts A-P instruc 10x 10x 15x 15x 15x       Abdominal bracing w/ pressure cuff  10x 15" 10x 15" 10x 15" w/ex 10x10" w/cuff       Seated flexion stretch pball  10x 10" 10x 10" 10x10" 10x10" 10x10"       QL stretch 20"x4 20"x4 20"x4 20"x4 20"x4 20"x4        Partial curl ups w/ pball  10x5" 10x5" 10x5" 10x5" np       TB: LPD    GTB 20x nv nv       Hip flexor stretch standing 1'ea 1'ea 1' ea 1' ea 1' ea np       DLS supine marches  01N 10x w/cuff 10x w/cuff 10x no cuf 10x w/cuff                                                                                                                    Modalities              CP prn

## 2018-11-27 ENCOUNTER — OFFICE VISIT (OUTPATIENT)
Dept: PAIN MEDICINE | Facility: CLINIC | Age: 63
End: 2018-11-27
Payer: COMMERCIAL

## 2018-11-27 VITALS
BODY MASS INDEX: 38.09 KG/M2 | WEIGHT: 237 LBS | HEIGHT: 66 IN | DIASTOLIC BLOOD PRESSURE: 90 MMHG | SYSTOLIC BLOOD PRESSURE: 140 MMHG | HEART RATE: 70 BPM

## 2018-11-27 DIAGNOSIS — M79.18 MYOFASCIAL PAIN SYNDROME: ICD-10-CM

## 2018-11-27 DIAGNOSIS — M47.816 LUMBAR SPONDYLOSIS: Primary | ICD-10-CM

## 2018-11-27 PROCEDURE — 99214 OFFICE O/P EST MOD 30 MIN: CPT | Performed by: ANESTHESIOLOGY

## 2018-11-27 RX ORDER — METHOCARBAMOL 750 MG/1
750 TABLET, FILM COATED ORAL EVERY 8 HOURS SCHEDULED
Qty: 90 TABLET | Refills: 0 | Status: SHIPPED | OUTPATIENT
Start: 2018-11-27 | End: 2019-02-06

## 2018-11-27 NOTE — PROGRESS NOTES
Assessment:  1  Lumbar spondylosis    2  Myofascial pain syndrome        Plan:    The patient's low back pain persists despite time, relative rest, activity modification and therapy  Based on the patient's symptoms and examination, I suspect that her pain is being generated by the lumbar facet joints  The facet joints are only one of many possible low back pain generators  Unfortunately, studies have demonstrated that history and examination alone are unreliable  We will schedule the patient for diagnostic lumbar medial branch blockade using a double block paradigm  If the patient receives significant pain relief of appropriate duration with bupivicaine 0 25%, we will confirm with bupivicaine 0 75%  If the patient demonstrates appropriate response to medial branch blockade we will schedule for radiofrequency ablation of the blocked nerves to provide long-term pain relief  In the interim I am prescribe Robaxin 750 mg 1 tab p o  T i d  P r n  Pain  I did review the potential side effects of Robaxin, not limited to, but including dizziness, drowsiness, weakness, tired feeling, nausea, diarrhea, constipation, blurred vision, headache, swelling, dry mouth; or loss of balance or coordination  In the office today, we reviewed the nature of the patient's pathology in depth using  diagrams and models  We discussed the approach we would use for the medial branch block and provided literature for home review  The patient understands the risks associated with the procedure including bleeding, infection, tissue injury, allergic reaction and paralysis and provided written and verbal consent  in the office today  My impressions and treatment recommendations were discussed in detail with the patient who verbalized understanding and had no further questions  Discharge instructions were provided  I personally saw and examined the patient and I agree with the above discussed plan of care      Orders Placed This Encounter   Procedures    FL spine and pain procedure     Standing Status:   Future     Standing Expiration Date:   11/27/2022     Order Specific Question:   Reason for Exam:     Answer:   Left L2,3,4,5 MBB #1     Order Specific Question:   Anticoagulant hold needed? Answer:   no     New Medications Ordered This Visit   Medications    methocarbamol (ROBAXIN) 750 mg tablet     Sig: Take 1 tablet (750 mg total) by mouth every 8 (eight) hours     Dispense:  90 tablet     Refill:  0       History of Present Illness:    Mohan Harding is a 59 y o  female who presents and follow-up from evaluation on November 5th  She has been undergoing physical therapy 2 times a week has tried a course oral steroids but her pain persists which she rates a 7 to 8/10 on the visual analog scale  Described as burning and sharp and shooting left side of her low back without radiation  Standing and walking increases or symptoms while sitting relieves it  I have personally reviewed and/or updated the patient's past medical history, past surgical history, family history, social history, current medications, allergies, and vital signs today  Review of Systems:    Review of Systems   Respiratory: Negative for shortness of breath  Cardiovascular: Negative for chest pain  Gastrointestinal: Negative for constipation, diarrhea, nausea and vomiting  Musculoskeletal: Positive for gait problem (Difficulty walking )  Negative for arthralgias, joint swelling and myalgias  Skin: Negative for rash  Neurological: Negative for dizziness, seizures and weakness  All other systems reviewed and are negative        Patient Active Problem List   Diagnosis    Complex ovarian cyst    Dry eye syndrome    Fatty infiltration of liver    Hyperglycemia    Abdominal pain, RUQ    Abdominal pain    Hyperlipidemia    Hypertension    Hypothyroidism    Insomnia due to medical condition    Left ventricular hypertrophy    Lumbosacral pain    Mitral valvular disorder    Nephrolithiasis    Nicotine dependence    Night sweats    Non-toxic multinodular goiter    Obesity    Rhinitis    Spondylosis of cervical region without myelopathy or radiculopathy    Thoracic neuritis    Endometrial polyp    Bicuspid aortic valve    Squamous cell cancer of skin of hand, right    Left flank pain    Muscle pain       Past Medical History:   Diagnosis Date    Abdominal pain     Bicuspid aortic valve     LAST ASSESSED [id number]    Cervical disc disease     last assessed [id number]    Cervical stenosis of spine     LAST ASSESSED [id number]    Disease of thyroid gland     hypothyroid    Dry eyes     Endometriosis     Hyperglycemia     Hyperlipidemia     Hypertension     Hypothyroidism     Left ventricular hypertrophy     Mitral valvular disorder     Nephrolithiasis     Ovarian cyst, complex     Ptosis     LAST ASSESSED [id number]    Renal calculi     Right cervical radiculopathy     LAST ASSESSED [id number]    Seasonal allergies        Past Surgical History:   Procedure Laterality Date    CARPAL TUNNEL RELEASE       SECTION      CHOLECYSTECTOMY      COLONOSCOPY      NEUROPLASTY / TRANSPOSITION MEDIAN NERVE AT CARPAL TUNNEL      NEUROPLASTY / TRANSPOSITION MEDIAN NERVE AT CARPAL TUNNEL      [facility]    OTHER SURGICAL HISTORY      surgically removed skin patch for skin cancer    PLANTAR FASCIECTOMY      [place] COLONOSCOPY FLX DX W/COLLJ SPEC WHEN PFRMD N/A 10/9/2017    Procedure: COLONOSCOPY;  Surgeon: Huong Ma MD;  Location: [facility];   Service: Gastroenterology    [place] HYSTEROSCOPY,W/ENDO BX N/A 2018    Procedure: DILATATION AND CURETTAGE (D&C) WITH HYSTEROSCOPY;  Surgeon: Daya Hickey DO;  Location: [facility];  Service: Gynecology    TUBAL LIGATION         Family History   Problem Relation Age of Onset    Diabetes Mother     Alzheimer's disease Mother     Heart disease Mother    Fede Polio Hypertension Mother     Other Family         back disorder    Cancer Family     Heart disease Family     Thyroid disease Family     No Known Problems Father        Social History     Occupational History    part time employment      Social History Main Topics    Smoking status: Former Smoker     Packs/day: 1 00     Years: 20 00     Quit date: 2012    Smokeless tobacco: Never Used    Alcohol use Yes      Comment: occasional drinks wine    Drug use: No    Sexual activity: Yes     Partners: Male      Comment: tubal ligation        Current Outpatient Prescriptions on File Prior to Visit   Medication Sig    clotrimazole-betamethasone (LOTRISONE) 1-0 05 % cream Apply topically 2 (two) times a day    CONTRAVE 8-90 MG TB12 Take 2 tablets by mouth 2 (two) times a day      levothyroxine 125 mcg tablet Take 1 tablet (125 mcg total) by mouth daily    losartan (COZAAR) 50 mg tablet Take 1 tablet (50 mg total) by mouth daily    XIIDRA 5 % op solution     [DISCONTINUED] celecoxib (CeleBREX) 200 mg capsule Take 1 capsule (200 mg total) by mouth 2 (two) times a day    [DISCONTINUED] predniSONE 10 mg tablet Take according to titration schedule (Patient not taking: Reported on 11/27/2018 )     No current facility-administered medications on file prior to visit  Allergies   Allergen Reactions    Erythromycin GI Intolerance     Reaction Date: 11Jul2011;     Penicillins Other (See Comments)     unknown    Aztreonam Rash     Action Taken: Allergy added by [employer] to replace Penicillins Cross Reactors;     Carbapenems Rash     Action Taken: Allergy added by [employer] to replace Penicillins [employer];     Cephalosporins Rash     Action Taken: Allergy added by [employer] to replace Penicillins [employer];     Flu Virus Vaccine Hives, Rash and GI Intolerance    Griseofulvin Rash     Action Taken: Allergy added by [employer] to replace Penicillins [employer];         Physical Exam:    /90 Pulse 70   Ht 5' 6" (1 676 m)   Wt 108 kg (237 lb)   BMI 38 25 kg/m²     Constitutional: normal, well developed, well nourished, alert, in no distress and non-toxic and no overt pain behavior   and obese  Eyes: anicteric  HEENT: grossly intact  Neck: supple, symmetric, trachea midline and no masses   Pulmonary:even and unlabored  Cardiovascular:No edema or pitting edema present  Skin:Normal without rashes or lesions and well hydrated  Psychiatric:Mood and affect appropriate  Neurologic:Cranial Nerves II-XII grossly intact  Musculoskeletal:normal, no motor deficit appreciated the lower limbs there is pain with lumbar extension    Imaging  XRAY LEFT HIP  11/5/18 @[facility]     INDICATION: 81-year-old female, left-sided pain     COMPARISON:  11/18/2015 [facility]     VIEWS:  XR HIP/PELV 2-3 VWS LEFT  W PELVIS   Images: 3     FINDINGS:  Mild degenerative arthritis both hips has progressed     There is no acute fracture or dislocation      No lytic or blastic osseous lesions      Soft tissues are unremarkable      The visualized lumbar spine is unremarkable      IMPRESSION:  Progressive mild degenerative arthritis both hips     No acute osseous abnormality    XRAY LUMBAR SPINE 11/5/18 @[facility]     INDICATION: 81-year-old female, left-sided pain     COMPARISON:  4/20/2016 x-rays     VIEWS:  XR SPINE LUMBAR COMPLETE W BENDING MINIMUM 6 VIEWS  Images: 9, including neutral, flexion and extension direct lateral views     FINDINGS:  Grade 1 degenerative anterolisthesis is again evident at the L4-5 level which appears slightly increased in severity on the flexion views      There is no evidence of acute fracture or destructive osseous lesion      Persistent multilevel degenerative spondylosis, most pronounced L3-4, L4-5     The pedicles appear intact      Soft tissues are unremarkable      IMPRESSION:  Persistent multilevel degenerative spondylosis and grade 1 anterolisthesis L4-5     Mild dynamic instability exhibited by the degenerative anterolisthesis at the L4-5 level       I have personally reviewed pertinent films in PACS

## 2018-11-28 ENCOUNTER — APPOINTMENT (OUTPATIENT)
Dept: PHYSICAL THERAPY | Facility: CLINIC | Age: 63
End: 2018-11-28
Payer: COMMERCIAL

## 2018-12-03 ENCOUNTER — APPOINTMENT (OUTPATIENT)
Dept: PHYSICAL THERAPY | Facility: CLINIC | Age: 63
End: 2018-12-03
Payer: COMMERCIAL

## 2018-12-05 ENCOUNTER — OFFICE VISIT (OUTPATIENT)
Dept: PHYSICAL THERAPY | Facility: CLINIC | Age: 63
End: 2018-12-05
Payer: COMMERCIAL

## 2018-12-05 DIAGNOSIS — M79.10 MUSCLE PAIN: ICD-10-CM

## 2018-12-05 DIAGNOSIS — R10.9 LEFT FLANK PAIN: Primary | ICD-10-CM

## 2018-12-05 PROCEDURE — 97112 NEUROMUSCULAR REEDUCATION: CPT | Performed by: PHYSICAL THERAPIST

## 2018-12-05 PROCEDURE — G8991 OTHER PT/OT GOAL STATUS: HCPCS | Performed by: PHYSICAL THERAPIST

## 2018-12-05 PROCEDURE — 97140 MANUAL THERAPY 1/> REGIONS: CPT | Performed by: PHYSICAL THERAPIST

## 2018-12-05 PROCEDURE — G8990 OTHER PT/OT CURRENT STATUS: HCPCS | Performed by: PHYSICAL THERAPIST

## 2018-12-05 NOTE — PROGRESS NOTES
PT Re-Evaluation     Today's date: 2018  Patient name: Artis Del Valle  : 1955  MRN: [id number]  Referring provider: Garrett Gil DO  Dx:   Encounter Diagnosis     ICD-10-CM    1  Left flank pain R10 9    2  Muscle pain M79 10        Start Time: 94  Stop Time:   Total time in clinic (min): 49 minutes    Assessment/Plan    This patient demonstrates some improvement since beginning therapy but continues to have high pain levels  Patient would benefit from continued therapy to reduce pain and improve function  Interventions to include manual therapy, ROM, stretching, strengthening,  therapeutic activities, neuromuscular re-ed and instruction in HEP  Frequency: 2 times weekly  Duration:  4-6 weeks    Frequency and duration:  x/week for  weeks    STGs: 4 weeks  1  Increase ROM by 10-20 degrees  2  Decrease pain 2-3 levels  3  LTGs: 6-8 weeks  1  Independent HEP  2  Increase strength  3  Increase FOTO score to predicted level            STGs: 4 weeks  1  Increase ROM B hip extension to 5 degrees not  met  2  Decrease pain 2-3 levels - met  3  Increase ROM L hip IR to at least 20 degrees - partially met             LTGs: 6-8 weeks  1  Independent HEP - partially met  2  Increase core strength  3  Increase FOTO score to predicted level: 65 - partially met  4  Able to perform usual activities, including bending, without pain 2/10  5  Able to walk usual distances without pain >2/10  6  Able to tolerate standing for at least 30 minutes without pain >2/10      Subjective  L lower thoracic and upper lumbar pain wraps around side; has been taking Robaxin since Tue last week; Now is also having L sided neck pain; follow up with pain management tomorrow; considering nerve ablation     Back symptoms are always there but vary in intensity; patient states that symptoms do not seem to be linked to any particular position or activity; states that she gets some relief with holding her side with her hand    Patients goal: be pain-free - not met    Objective  Pain scale: 2-7/10    Trunk ROM     Flexion AROM     Severe limitation         Extension  AROM   Moderate limitation *          R lateral flexion AROM    WNL         L lateral flexion AROM    WNL        R rotation AROM   WNL          L rotation AROM     WNL           * pain / comparable sign    Palpation: tender L QL region    Myotomal testing    L2 - Psoas =  5/5    L3 - Quadriceps =  5/5    L4/5 - Ant   Tibialis = 5/5    L5 - Glut med = 5/5    L5 - EHL = 5/5    S1-2 -  Glut charles = 5/5       Hip                   R  AROM  L      R  PROM   L    R  Strength  L  Flexion  [facility]    Extension  -13             -5    Abd  [facility]    IR  35              11              Precautions: none    Daily Treatment Diary     Manual  11/6 11/7 11/12 11/14 11/19 11/26 12/5      UPA's L3,4,5  Gr lll  JR JR          SL mob grade lll       JR      TrP release / stretch L QL      JR JR      IASTM B lumbar [facility]     JR            Exercise Diary  11/6 11/7 11/12 11/14 11/19 11/26 12/5      Bike   8' 8' 8' np       TM -elevated             K->C B w/ pball   20"x5 20"x5 20"x5 20"x5       LTR w/ Pball   10x 10x 10x 10x 10x      Pelvic tilts A-P instruc 10x 10x 15x 15x 15x       Abdominal bracing w/ pressure cuff  10x 15" 10x 15" 10x 15" w/ex 10x10" w/cuff       Seated flexion stretch pball  10x 10" 10x 10" 10x10" 10x10" 10x10" 10x10"      QL stretch 20"x4 20"x4 20"x4 20"x4 20"x4 20"x4  20"x4      Partial curl ups w/ pball  10x5" 10x5" 10x5" 10x5" np       TB: LPD    GTB 20x nv nv       Hip flexor stretch standing 1'ea 1'ea 1' ea 1' ea 1' ea np       DLS supine marches  93S 10x w/cuff 10x w/cuff 10x no cuf 10x w/cuff                                                                                                                    Modalities              CP prn

## 2018-12-06 ENCOUNTER — HOSPITAL ENCOUNTER (OUTPATIENT)
Dept: RADIOLOGY | Facility: CLINIC | Age: 63
Discharge: HOME | End: 2018-12-06
Admitting: ANESTHESIOLOGY
Payer: COMMERCIAL

## 2018-12-06 VITALS
RESPIRATION RATE: 18 BRPM | HEART RATE: 78 BPM | TEMPERATURE: 97.9 F | OXYGEN SATURATION: 96 % | SYSTOLIC BLOOD PRESSURE: 176 MMHG | DIASTOLIC BLOOD PRESSURE: 84 MMHG

## 2018-12-06 DIAGNOSIS — M47.816 LUMBAR SPONDYLOSIS: ICD-10-CM

## 2018-12-06 PROCEDURE — 64494 INJ PARAVERT F JNT L/S 2 LEV: CPT | Performed by: ANESTHESIOLOGY

## 2018-12-06 PROCEDURE — 64495 INJ PARAVERT F JNT L/S 3 LEV: CPT | Performed by: ANESTHESIOLOGY

## 2018-12-06 PROCEDURE — 64493 INJ PARAVERT F JNT L/S 1 LEV: CPT | Performed by: ANESTHESIOLOGY

## 2018-12-06 RX ORDER — BUPIVACAINE HCL/PF 2.5 MG/ML
10 VIAL (ML) INJECTION ONCE
Status: COMPLETED | OUTPATIENT
Start: 2018-12-06 | End: 2018-12-06

## 2018-12-06 RX ADMIN — BUPIVACAINE HYDROCHLORIDE 4 ML: 2.5 INJECTION, SOLUTION EPIDURAL; INFILTRATION; INTRACAUDAL at 10:05

## 2018-12-06 NOTE — H&P
History of Present Illness: The patient is a 59 y o  female who presents with complaints of low back pain      Patient Active Problem List   Diagnosis    Complex ovarian cyst    Dry eye syndrome    Fatty infiltration of liver    Hyperglycemia    Abdominal pain, RUQ    Abdominal pain    Hyperlipidemia    Hypertension    Hypothyroidism    Insomnia due to medical condition    Left ventricular hypertrophy    Lumbosacral pain    Mitral valvular disorder    Nephrolithiasis    Nicotine dependence    Night sweats    Non-toxic multinodular goiter    Obesity    Rhinitis    Spondylosis of cervical region without myelopathy or radiculopathy    Thoracic neuritis    Endometrial polyp    Bicuspid aortic valve    Squamous cell cancer of skin of hand, right    Left flank pain    Muscle pain       Past Medical History:   Diagnosis Date    Abdominal pain     Bicuspid aortic valve     LAST ASSESSED [id number]    Cervical disc disease     last assessed [id number]    Cervical stenosis of spine     LAST ASSESSED [id number]    Disease of thyroid gland     hypothyroid    Dry eyes     Endometriosis     Hyperglycemia     Hyperlipidemia     Hypertension     Hypothyroidism     Left ventricular hypertrophy     Mitral valvular disorder     Nephrolithiasis     Ovarian cyst, complex     Ptosis     LAST ASSESSED [id number]    Renal calculi     Right cervical radiculopathy     LAST ASSESSED [id number]    Seasonal allergies        Past Surgical History:   Procedure Laterality Date    CARPAL TUNNEL RELEASE       SECTION      CHOLECYSTECTOMY      COLONOSCOPY      NEUROPLASTY / TRANSPOSITION MEDIAN NERVE AT CARPAL TUNNEL      NEUROPLASTY / TRANSPOSITION MEDIAN NERVE AT CARPAL TUNNEL      [facility]    OTHER SURGICAL HISTORY      surgically removed skin patch for skin cancer    PLANTAR FASCIECTOMY      [place] COLONOSCOPY FLX DX W/COLLJ SPEC WHEN PFRMD N/A 10/9/2017    Procedure: COLONOSCOPY;  Surgeon: Leopold Ewings, MD;  Location: [facility]; Service: Gastroenterology    [place] HYSTEROSCOPY,W/ENDO BX N/A 2/26/2018    Procedure: DILATATION AND CURETTAGE (D&C) WITH HYSTEROSCOPY;  Surgeon: Kacie Armas DO;  Location: [facility];  Service: Gynecology    TUBAL LIGATION           Current Outpatient Prescriptions:     clotrimazole-betamethasone (LOTRISONE) 1-0 05 % cream, Apply topically 2 (two) times a day, Disp: 30 g, Rfl: 1    CONTRAVE 8-90 MG TB12, Take 2 tablets by mouth 2 (two) times a day  , Disp: , Rfl:     levothyroxine 125 mcg tablet, Take 1 tablet (125 mcg total) by mouth daily, Disp: 90 tablet, Rfl: 3    losartan (COZAAR) 50 mg tablet, Take 1 tablet (50 mg total) by mouth daily, Disp: 90 tablet, Rfl: 3    methocarbamol (ROBAXIN) 750 mg tablet, Take 1 tablet (750 mg total) by mouth every 8 (eight) hours, Disp: 90 tablet, Rfl: 0    XIIDRA 5 % op solution, , Disp: , Rfl:     Current Facility-Administered Medications:     bupivacaine (PF) (MARCAINE) 0 25 % injection 10 mL, 10 mL, Perineural, Once, Valentin Mon DO    Allergies   Allergen Reactions    Erythromycin GI Intolerance     Reaction Date: 11Jul2011;     Penicillins Other (See Comments)     unknown    Aztreonam Rash     Action Taken: Allergy added by [employer] to replace Penicillins Cross [employer]ors;     Carbapenems Rash     Action Taken: Allergy added by [employer] to replace Penicillins [employer];     Cephalosporins Rash     Action Taken: Allergy added by [employer] to replace Penicillins [employer];     Flu Virus Vaccine Hives, Rash and GI Intolerance    Griseofulvin Rash     Action Taken: Allergy added by [employer] to replace Penicillins [employer]; Physical Exam:   General: Awake, Alert, Oriented x 3, Mood and affect appropriate  Respiratory: Respirations even and unlabored  Cardiovascular: Peripheral pulses intact; no edema  Musculoskeletal Exam:  Pain with lumbar extension    ASA Score: III         Assessment:   1  Lumbar spondylosis        Plan: Left L2,3,4,5 MBB #1

## 2018-12-06 NOTE — DISCHARGE INSTRUCTIONS

## 2018-12-07 ENCOUNTER — TELEPHONE (OUTPATIENT)
Dept: PAIN MEDICINE | Facility: CLINIC | Age: 63
End: 2018-12-07

## 2018-12-07 NOTE — TELEPHONE ENCOUNTER
S/w pt, reviewed pain diary  Confirmed, pt had no relief / sometimes worse except for ~ 2 hours (3-4 hours after the procedure) while she was sitting down, watching tv  Pt stated that she sometimes has relief while sitting, but sometimes has shooting pain while sitting  Pt stated that her pain varies  Advised pt, will dw dr Earnest Faustin and cb on Monday to advise of the next step  Pt verbalized understanding and appreciation

## 2018-12-10 ENCOUNTER — APPOINTMENT (OUTPATIENT)
Dept: PHYSICAL THERAPY | Facility: CLINIC | Age: 63
End: 2018-12-10
Payer: COMMERCIAL

## 2018-12-10 NOTE — TELEPHONE ENCOUNTER
S/w pt, advised of above  Pt stated taht she cannot see Dr Teodora Stanton because she has [facility]  Questioned why she would be referred to a doctor outside of [facility] if she has [facility] [employer]  Advised pt, per , no doctor in [facility] - to Dr Maxine Campbell knowledge - offers the same tx  Offered an appt w/ DG for TPI's per   Pt stated taht she would like to see a neruologist, not a PA  Advised pt again, there is no neurologist to 's knowledge in the [facility]'s network that offers the same tx as Dr Teodora Stanton  DG does perform tpi's - an injection of steroid and lidocane into a bundle of nerves designed to reduce swelling and numb the areas in an effort to provide relief  Pt verbalized understanding and scheduled ov w/ DG on 12/31 at 10:30

## 2018-12-12 ENCOUNTER — OFFICE VISIT (OUTPATIENT)
Dept: PHYSICAL THERAPY | Facility: CLINIC | Age: 63
End: 2018-12-12
Payer: COMMERCIAL

## 2018-12-12 DIAGNOSIS — R10.9 LEFT FLANK PAIN: ICD-10-CM

## 2018-12-12 DIAGNOSIS — M79.10 MUSCLE PAIN: Primary | ICD-10-CM

## 2018-12-12 PROCEDURE — 97112 NEUROMUSCULAR REEDUCATION: CPT | Performed by: PHYSICAL THERAPIST

## 2018-12-12 NOTE — PROGRESS NOTES
Daily Note     Today's date: 2018  Patient name: Sara Wilson  : 1955  MRN: [id number]  Referring provider: Erich Cowden, DO  Dx:   Encounter Diagnosis     ICD-10-CM    1  Muscle pain M79 10    2  Left flank pain R10 9        Start Time: 1533  Stop Time: 1615  Total time in clinic (min): 42 minutes    Subjective: states her back is feeling better today      Objective: See treatment diary below      Assessment: Exhibits decreased sensory awareness in back, impaired proprioception  Trial of laterality training revealed poor accuracy (60-70%) and increased processing speed (2 5-3 sec)  Would likely benefit from [address] in addtition to current therapy program  Tolerated treatment well  Reported feeling good at end of session  Patient would benefit from continued PT      Plan: Continue per plan of care   Incorporate pain neuroscience education and GMI into program           Precautions: none    Daily Treatment Diary     Manual       UPA's L3,4,5  Gr lll  JR JR          SL mob grade lll       JR      TrP release / stretch L QL      JR JR      IASTM B lumbar [facility]     JR            Exercise Diary       Bike   8' 8' 8' np       TM -elevated             K->C B w/ pball   20"x5 20"x5 20"x5 20"x5 20"x5 20"x5     LTR w/ Pball   10x 10x 10x 10x 10x 10x     Pelvic tilts A-P instruc 10x 10x 15x 15x 15x       Abdominal bracing w/ pressure cuff  10x 15" 10x 15" 10x 15" w/ex 10x10" w/cuff       Seated flexion stretch pball  10x 10" 10x 10" 10x10" 10x10" 10x10" 10x10" 10x10"     QL stretch 20"x4 20"x4 20"x4 20"x4 20"x4 20"x4  20"x4 20"x5     Partial curl ups w/ pball  10x5" 10x5" 10x5" 10x5" np 10x5" 10x5"     TB: LPD    GTB 20x nv nv       Hip flexor stretch standing 1'ea 1'ea 1' ea 1' ea 1' ea np       DLS supine marches  64L 10x w/cuff 10x w/cuff 10x no cuf 10x w/cuff  10x w/cuff 10x w/ cuff     Bird dog 10x                                                                                   GMI        See below     GMI: 12/12 - performed laterality training w/ recognize nena (context back)    Modalities              CP prn

## 2018-12-17 ENCOUNTER — APPOINTMENT (OUTPATIENT)
Dept: PHYSICAL THERAPY | Facility: CLINIC | Age: 63
End: 2018-12-17
Payer: COMMERCIAL

## 2018-12-19 ENCOUNTER — OFFICE VISIT (OUTPATIENT)
Dept: PHYSICAL THERAPY | Facility: CLINIC | Age: 63
End: 2018-12-19
Payer: COMMERCIAL

## 2018-12-19 DIAGNOSIS — R10.9 LEFT FLANK PAIN: ICD-10-CM

## 2018-12-19 DIAGNOSIS — M79.10 MUSCLE PAIN: Primary | ICD-10-CM

## 2018-12-19 PROCEDURE — 97110 THERAPEUTIC EXERCISES: CPT | Performed by: PHYSICAL THERAPIST

## 2018-12-19 PROCEDURE — 97112 NEUROMUSCULAR REEDUCATION: CPT | Performed by: PHYSICAL THERAPIST

## 2018-12-19 NOTE — PROGRESS NOTES
Daily Note     Today's date: 2018  Patient name: Usama Mccarthy  : 1955  MRN: [id number]  Referring provider: Love Castillo DO  Dx:   Encounter Diagnosis     ICD-10-CM    1  Muscle pain M79 10    2  Left flank pain R10 9        Start Time: 1538          Subjective: reports feeling good today and didn't even take the Robaxin today      Objective: See treatment diary below      Assessment: Reported no pain during today's session  Is responding well to therapy with significant pain reduction  Difficulty w/ laterality exercises - decreased accuracy, increased processing time  Will continue with the addition of GMI as well as pain neuroscience education as indicated  Patient would benefit from continued PT       Plan: Continue per plan of care   Incorporate pain neuroscience education and GMI into program           Precautions: none    Daily Treatment Diary     Manual      UPA's L3,4,5  Gr lll  JR JR          SL mob grade lll       JR      TrP release / stretch L QL      JR JR      IASTM B lumbar [facility]     JR            Exercise Diary      Bike   8' 8' 8' np       TM -elevated             K->C B w/ pball   20"x5 20"x5 20"x5 20"x5 20"x5 20"x5 20"x5    LTR w/ Pball   10x 10x 10x 10x 10x 10x 10x    Pelvic tilts A-P instruc 10x 10x 15x 15x 15x       Abdominal bracing w/ pressure cuff  10x 15" 10x 15" 10x 15" w/ex 10x10" w/cuff   10x10"    Seated flexion stretch pball  10x 10" 10x 10" 10x10" 10x10" 10x10" 10x10" 10x10" 10x10"    QL stretch 20"x4 20"x4 20"x4 20"x4 20"x4 20"x4  20"x4 20"x5 20"x5    Partial curl ups w/ pball  10x5" 10x5" 10x5" 10x5" np 10x5" 10x5" 10x5"    TB: LPD    GTB 20x nv nv   GTB 20x    Hip flexor stretch standing 1'ea 1'ea 1' ea 1' ea 1' ea np   1' ea    DLS supine marches  95U 10x w/cuff 10x w/cuff 10x no cuf 10x w/cuff  10x w/cuff 10x w/ cuff 10x w/cuff    Bird dog 10x 10x                                                                                  GMI        See below See below    GMI: 12/12 - performed laterality training w/ recognize nena (context back)  12/19- recognize back context and vanilla 50-70% accuracy  Modalities              CP prn

## 2019-01-02 ENCOUNTER — OFFICE VISIT (OUTPATIENT)
Dept: PHYSICAL THERAPY | Facility: CLINIC | Age: 64
End: 2019-01-02
Payer: COMMERCIAL

## 2019-01-02 DIAGNOSIS — R10.9 LEFT FLANK PAIN: ICD-10-CM

## 2019-01-02 DIAGNOSIS — M79.10 MUSCLE PAIN: Primary | ICD-10-CM

## 2019-01-02 PROCEDURE — 97110 THERAPEUTIC EXERCISES: CPT | Performed by: PHYSICAL THERAPIST

## 2019-01-02 PROCEDURE — 97112 NEUROMUSCULAR REEDUCATION: CPT | Performed by: PHYSICAL THERAPIST

## 2019-01-02 PROCEDURE — G8992 OTHER PT/OT  D/C STATUS: HCPCS | Performed by: PHYSICAL THERAPIST

## 2019-01-02 PROCEDURE — G8991 OTHER PT/OT GOAL STATUS: HCPCS | Performed by: PHYSICAL THERAPIST

## 2019-01-02 NOTE — PROGRESS NOTES
PT Discharge    Today's date: 2019  Patient name: Eleni Zarate  : 1955  MRN: [id number]  Referring provider: Luis Cagle DO  Dx:   Encounter Diagnosis     ICD-10-CM    1  Muscle pain M79 10    2  Left flank pain R10 9        Start Time: 1700  Stop Time: 1752  Total time in clinic (min): 52 minutes    Assessment/Plan  This patient demonstrates improvement since beginning therapy and has met most of the LTG's which were set for her  She is independent with HEP and is ready for DC to self-directed program     STGs: 4 weeks  1  Increase ROM B hip extension to 5 degrees MET  2  Decrease pain 2-3 levels - MET  3  Increase ROM L hip IR to at least 20 degrees - partially met             LTGs: 6-8 weeks  1  Independent HEP - MET  2  Increase core strength -  MET  3  Increase FOTO score to predicted level: 65 - MET  4  Able to perform usual activities, including bending, without pain 2/10 - MET  5  Able to walk usual distances without pain >2/10 - MET  6  Able to tolerate standing for at least 30 minutes without pain >2/10 - MET        Subjective  States that her back feels good and has been good for the past week; some difficulty w/ prolonged standing over the holidays but symptoms resolve quickly    Patients goal: be pain-free - MET      Objective  Pain scale: 0-1/10    Trunk ROM     Flexion AROM     Severe limitation         Extension  AROM   Moderate limitation        R lateral flexion AROM    WNL         L lateral flexion AROM    WNL        R rotation AROM   WNL          L rotation AROM     WNL          Myotomal testing    L2 - Psoas =  5/5    L3 - Quadriceps =  5/5    L4/5 - Ant   Tibialis = 5/5    L5 - Glut med = 5/5    L5 - EHL = 5/5    S1-2 -  Glut charles = 5/5     Abdominal strength 3/5    Hip                   R  AROM  L      R  PROM   L    R  Strength  L  Flexion  [facility]     WFL    Extension  0            0    Abd  [facility]      WFL    IR  33            10      Standing tolerance : one hour  Ambulation: able to walk usual distances without difficulty    Precautions: none    Daily Treatment Diary     Manual  11/6 11/7 11/12 11/14 11/19 11/26 12/5 12/12 12/19 1/2/19   UPA's L3,4,5  Gr lll  JR JR          SL mob grade lll       JR      TrP release / stretch L QL      JR JR      IASTM B lumbar [facility]     JR            Exercise Diary  11/6 11/7 11/12 11/14 11/19 11/26 12/5 12/12 12/19 1/2/19   Bike   8' 8' 8' np       TM -elevated             K->C B w/ pball   20"x5 20"x5 20"x5 20"x5 20"x5 20"x5 20"x5 20"x5   LTR w/ Pball   10x 10x 10x 10x 10x 10x 10x 10x   Pelvic tilts A-P instruc 10x 10x 15x 15x 15x       Abdominal bracing w/ pressure cuff  10x 15" 10x 15" 10x 15" w/ex 10x10" w/cuff   10x10" 10x10"   Seated flexion stretch pball  10x 10" 10x 10" 10x10" 10x10" 10x10" 10x10" 10x10" 10x10" 10x10"   QL stretch 20"x4 20"x4 20"x4 20"x4 20"x4 20"x4  20"x4 20"x5 20"x5 DC   Partial curl ups w/ pball  10x5" 10x5" 10x5" 10x5" np 10x5" 10x5" 10x5" 10x5"   TB: LPD    GTB 20x nv nv   GTB 20x np   Hip flexor stretch standing 1'ea 1'ea 1' ea 1' ea 1' ea np   1' ea 1'ea   DLS supine marches  77N 10x w/cuff 10x w/cuff 10x no cuf 10x w/cuff  10x w/cuff 10x w/ cuff 10x w/cuff 10x w/cuff   Bird dog        10x 10x 10x                                                                                 GMI        See below See below    GMI: 12/12 - performed laterality training w/ recognize nena (context back)  12/19- recognize back context and vanilla 50-70% accuracy    1/2/19: issued written hep  Modalities              CP prn

## 2019-01-04 ENCOUNTER — TELEPHONE (OUTPATIENT)
Dept: FAMILY MEDICINE CLINIC | Facility: HOSPITAL | Age: 64
End: 2019-01-04

## 2019-01-04 NOTE — TELEPHONE ENCOUNTER
Pt takes losartan and levothyroxine  She was informed both were recalled, she would like alternatives rx'd please to [facility], could we possibly please call her with the names of the alternative meds rxd?

## 2019-01-07 ENCOUNTER — APPOINTMENT (OUTPATIENT)
Dept: PHYSICAL THERAPY | Facility: CLINIC | Age: 64
End: 2019-01-07
Payer: COMMERCIAL

## 2019-01-07 NOTE — TELEPHONE ENCOUNTER
Patient called back - advised that she contact her pharmacy to see if the brands that were dispensed are part of the recall  She will let us know if we need to do anything else for her

## 2019-01-09 ENCOUNTER — APPOINTMENT (OUTPATIENT)
Dept: PHYSICAL THERAPY | Facility: CLINIC | Age: 64
End: 2019-01-09
Payer: COMMERCIAL

## 2019-01-14 ENCOUNTER — APPOINTMENT (OUTPATIENT)
Dept: PHYSICAL THERAPY | Facility: CLINIC | Age: 64
End: 2019-01-14
Payer: COMMERCIAL

## 2019-01-16 ENCOUNTER — APPOINTMENT (OUTPATIENT)
Dept: PHYSICAL THERAPY | Facility: CLINIC | Age: 64
End: 2019-01-16
Payer: COMMERCIAL

## 2019-02-06 ENCOUNTER — OFFICE VISIT (OUTPATIENT)
Dept: FAMILY MEDICINE CLINIC | Facility: HOSPITAL | Age: 64
End: 2019-02-06
Payer: COMMERCIAL

## 2019-02-06 VITALS
SYSTOLIC BLOOD PRESSURE: 138 MMHG | OXYGEN SATURATION: 98 % | WEIGHT: 234.4 LBS | HEIGHT: 65 IN | TEMPERATURE: 98.1 F | HEART RATE: 72 BPM | DIASTOLIC BLOOD PRESSURE: 86 MMHG | BODY MASS INDEX: 39.05 KG/M2

## 2019-02-06 DIAGNOSIS — C44.311 BASAL CELL CARCINOMA (BCC) OF ALA NASI: ICD-10-CM

## 2019-02-06 DIAGNOSIS — E78.2 MIXED HYPERLIPIDEMIA: ICD-10-CM

## 2019-02-06 DIAGNOSIS — I10 ESSENTIAL HYPERTENSION: Primary | ICD-10-CM

## 2019-02-06 DIAGNOSIS — E03.9 ACQUIRED HYPOTHYROIDISM: ICD-10-CM

## 2019-02-06 DIAGNOSIS — E66.01 CLASS 2 SEVERE OBESITY WITH SERIOUS COMORBIDITY AND BODY MASS INDEX (BMI) OF 39.0 TO 39.9 IN ADULT, UNSPECIFIED OBESITY TYPE (HCC): ICD-10-CM

## 2019-02-06 PROCEDURE — 3075F SYST BP GE 130 - 139MM HG: CPT | Performed by: FAMILY MEDICINE

## 2019-02-06 PROCEDURE — 3079F DIAST BP 80-89 MM HG: CPT | Performed by: FAMILY MEDICINE

## 2019-02-06 PROCEDURE — 99214 OFFICE O/P EST MOD 30 MIN: CPT | Performed by: FAMILY MEDICINE

## 2019-02-06 PROCEDURE — 3008F BODY MASS INDEX DOCD: CPT | Performed by: FAMILY MEDICINE

## 2019-02-06 NOTE — PROGRESS NOTES
Assessment/Plan:         Diagnoses and all orders for this visit:    Essential hypertension    Basal cell carcinoma (BCC) of ala nasi    Mixed hyperlipidemia    Acquired hypothyroidism    Class 2 severe obesity with serious comorbidity and body mass index (BMI) of 39 0 to 39 9 in adult, unspecified obesity type (HCC)          Subjective:      Patient ID: Maria A Wilkes is a 59 y o  female  4 month follow up  Had evaluation with Dr Panda Viavr for facet blocks and was advised to seek trigger injections with  Dr Randall Burch  Basal cell ca removed from nose by Dr La Bateman, had Mohs surgery as well           The following portions of the patient's history were reviewed and updated as appropriate: allergies, current medications, past family history, past medical history, past social history, past surgical history and problem list     Review of Systems   Constitutional: Negative for unexpected weight change  HENT: Negative  Eyes: Negative for visual disturbance  Respiratory: Negative  Cardiovascular: Negative  Gastrointestinal: Negative  Musculoskeletal: Positive for arthralgias  Neurological: Negative  Hematological: Negative  Psychiatric/Behavioral: Negative  All other systems reviewed and are negative  Objective:      /86 (Patient Position: Sitting, Cuff Size: Standard)   Pulse 72   Temp 98 1 °F (36 7 °C) (Tympanic)   Ht 5' 5" (1 651 m)   Wt 106 kg (234 lb 6 4 oz)   SpO2 98%   BMI 39 01 kg/m²          Physical Exam   Constitutional: She appears well-developed and well-nourished  HENT:   Nose:       Neck: Thyromegaly present  Cardiovascular: Normal rate and regular rhythm  Murmur heard  Pulmonary/Chest: Breath sounds normal    Musculoskeletal: She exhibits no edema  Skin: No rash noted  Psychiatric: She has a normal mood and affect  Her behavior is normal  Judgment and thought content normal    Nursing note and vitals reviewed  BMI Counseling:  Body mass index is 39 01 kg/m²  Discussed the patient's BMI with her  The BMI is above average  BMI counseling and education was provided to the patient  Nutrition recommendations include reducing portion sizes and moderation in carbohydrate intake  Exercise recommendations include exercising 3-5 times per week

## 2019-02-11 ENCOUNTER — HOSPITAL ENCOUNTER (OUTPATIENT)
Dept: MAMMOGRAPHY | Facility: CLINIC | Age: 64
Discharge: HOME | End: 2019-02-11
Payer: COMMERCIAL

## 2019-02-11 VITALS — HEIGHT: 65 IN | BODY MASS INDEX: 38.99 KG/M2 | WEIGHT: 234 LBS

## 2019-02-11 DIAGNOSIS — Z12.31 ENCOUNTER FOR SCREENING MAMMOGRAM FOR BREAST CANCER: ICD-10-CM

## 2019-02-11 PROCEDURE — 77063 BREAST TOMOSYNTHESIS BI: CPT

## 2019-02-11 PROCEDURE — 77067 SCR MAMMO BI INCL CAD: CPT

## 2019-03-25 ENCOUNTER — OFFICE VISIT (OUTPATIENT)
Dept: FAMILY MEDICINE CLINIC | Facility: HOSPITAL | Age: 64
End: 2019-03-25
Payer: COMMERCIAL

## 2019-03-25 VITALS
DIASTOLIC BLOOD PRESSURE: 88 MMHG | WEIGHT: 235 LBS | BODY MASS INDEX: 39.15 KG/M2 | SYSTOLIC BLOOD PRESSURE: 140 MMHG | HEART RATE: 78 BPM | TEMPERATURE: 99 F | HEIGHT: 65 IN

## 2019-03-25 DIAGNOSIS — R68.89 FLU-LIKE SYMPTOMS: Primary | ICD-10-CM

## 2019-03-25 LAB
FLUAV AG SPEC QL: NOT DETECTED
FLUBV AG SPEC QL: NOT DETECTED
RSV B RNA SPEC QL NAA+PROBE: NOT DETECTED

## 2019-03-25 PROCEDURE — 87631 RESP VIRUS 3-5 TARGETS: CPT | Performed by: NURSE PRACTITIONER

## 2019-03-25 PROCEDURE — 99214 OFFICE O/P EST MOD 30 MIN: CPT | Performed by: NURSE PRACTITIONER

## 2019-03-25 PROCEDURE — 3008F BODY MASS INDEX DOCD: CPT | Performed by: NURSE PRACTITIONER

## 2019-03-25 PROCEDURE — 1036F TOBACCO NON-USER: CPT | Performed by: NURSE PRACTITIONER

## 2019-03-25 RX ORDER — OSELTAMIVIR PHOSPHATE 75 MG/1
75 CAPSULE ORAL EVERY 12 HOURS SCHEDULED
Qty: 10 CAPSULE | Refills: 0 | Status: SHIPPED | OUTPATIENT
Start: 2019-03-25 | End: 2019-03-30

## 2019-03-25 NOTE — PROGRESS NOTES
Assessment/Plan:     Possible influenza although had flu shot  Pt requesting checking flu swab  Treat with tamiflu in meantime  Recommend Coricidin for symptoms  Call with worsening sob and wheezing  Diagnoses and all orders for this visit:    Flu-like symptoms  -     oseltamivir (TAMIFLU) 75 mg capsule; Take 1 capsule (75 mg total) by mouth every 12 (twelve) hours for 5 days  -     INFLUENZA A/B AND RSV, PCR          Subjective:     Patient ID: Don Concepcion is a 59 y o  female  Body aches and cough  Chest feels tight  Sneezing  No sore throat  Had flu shot  Some sob and wheezing  Started yesterday  No fever  Had chills yesterday  Was just on a cruise  Unsure of flu exposure  No ear pain  Sinus pressure  Has HA>       Review of Systems   Constitutional: Positive for chills, fatigue and fever  HENT: Positive for congestion and sinus pressure  Negative for ear pain and sore throat  Respiratory: Positive for cough, shortness of breath and wheezing  Gastrointestinal: Positive for diarrhea  Negative for abdominal pain, nausea and vomiting  Musculoskeletal: Positive for myalgias  Neurological: Positive for headaches  The following portions of the patient's history were reviewed and updated as appropriate: allergies, current medications, past family history, past medical history, past social history, past surgical history and problem list     Objective:  Vitals:    03/25/19 0959   BP: 140/88   Pulse: 78   Temp: 99 °F (37 2 °C)      Physical Exam   Constitutional: She is oriented to person, place, and time  She appears well-developed and well-nourished  HENT:   Right Ear: Tympanic membrane, external ear and ear canal normal    Left Ear: Tympanic membrane, external ear and ear canal normal    Mouth/Throat: Uvula is midline, oropharynx is clear and moist and mucous membranes are normal    Cardiovascular: Normal rate, regular rhythm and normal heart sounds     No murmur heard   Pulmonary/Chest: Effort normal and breath sounds normal    Lymphadenopathy:     She has no cervical adenopathy  Neurological: She is alert and oriented to person, place, and time  Skin: Skin is warm and dry  Psychiatric: She has a normal mood and affect

## 2019-04-08 ENCOUNTER — APPOINTMENT (OUTPATIENT)
Dept: LAB | Facility: CLINIC | Age: 64
End: 2019-04-08
Payer: COMMERCIAL

## 2019-04-08 DIAGNOSIS — I10 ESSENTIAL HYPERTENSION: ICD-10-CM

## 2019-04-08 DIAGNOSIS — E03.9 ACQUIRED HYPOTHYROIDISM: ICD-10-CM

## 2019-04-08 DIAGNOSIS — R73.9 HYPERGLYCEMIA: ICD-10-CM

## 2019-04-08 DIAGNOSIS — E78.2 MIXED HYPERLIPIDEMIA: ICD-10-CM

## 2019-04-08 LAB
ALBUMIN SERPL BCP-MCNC: 4 G/DL (ref 3.5–5)
ALP SERPL-CCNC: 79 U/L (ref 46–116)
ALT SERPL W P-5'-P-CCNC: 40 U/L (ref 12–78)
ANION GAP SERPL CALCULATED.3IONS-SCNC: 4 MMOL/L (ref 4–13)
AST SERPL W P-5'-P-CCNC: 29 U/L (ref 5–45)
BASOPHILS # BLD AUTO: 0.09 THOUSANDS/ΜL (ref 0–0.1)
BASOPHILS NFR BLD AUTO: 1 % (ref 0–1)
BILIRUB SERPL-MCNC: 0.52 MG/DL (ref 0.2–1)
BUN SERPL-MCNC: 22 MG/DL (ref 5–25)
CALCIUM SERPL-MCNC: 8.7 MG/DL (ref 8.3–10.1)
CHLORIDE SERPL-SCNC: 106 MMOL/L (ref 100–108)
CHOLEST SERPL-MCNC: 152 MG/DL (ref 50–200)
CO2 SERPL-SCNC: 30 MMOL/L (ref 21–32)
CREAT SERPL-MCNC: 0.74 MG/DL (ref 0.6–1.3)
EOSINOPHIL # BLD AUTO: 0.89 THOUSAND/ΜL (ref 0–0.61)
EOSINOPHIL NFR BLD AUTO: 10 % (ref 0–6)
ERYTHROCYTE [DISTWIDTH] IN BLOOD BY AUTOMATED COUNT: 14.2 % (ref 11.6–15.1)
EST. AVERAGE GLUCOSE BLD GHB EST-MCNC: 128 MG/DL
GFR SERPL CREATININE-BSD FRML MDRD: 87 ML/MIN/1.73SQ M
GLUCOSE P FAST SERPL-MCNC: 117 MG/DL (ref 65–99)
HBA1C MFR BLD: 6.1 % (ref 4.2–6.3)
HCT VFR BLD AUTO: 43.1 % (ref 34.8–46.1)
HDLC SERPL-MCNC: 32 MG/DL (ref 40–60)
HGB BLD-MCNC: 14.3 G/DL (ref 11.5–15.4)
IMM GRANULOCYTES # BLD AUTO: 0.03 THOUSAND/UL (ref 0–0.2)
IMM GRANULOCYTES NFR BLD AUTO: 0 % (ref 0–2)
LDLC SERPL CALC-MCNC: 86 MG/DL (ref 0–100)
LYMPHOCYTES # BLD AUTO: 2.29 THOUSANDS/ΜL (ref 0.6–4.47)
LYMPHOCYTES NFR BLD AUTO: 25 % (ref 14–44)
MCH RBC QN AUTO: 31.1 PG (ref 26.8–34.3)
MCHC RBC AUTO-ENTMCNC: 33.2 G/DL (ref 31.4–37.4)
MCV RBC AUTO: 94 FL (ref 82–98)
MONOCYTES # BLD AUTO: 0.85 THOUSAND/ΜL (ref 0.17–1.22)
MONOCYTES NFR BLD AUTO: 9 % (ref 4–12)
NEUTROPHILS # BLD AUTO: 4.93 THOUSANDS/ΜL (ref 1.85–7.62)
NEUTS SEG NFR BLD AUTO: 55 % (ref 43–75)
NRBC BLD AUTO-RTO: 0 /100 WBCS
PLATELET # BLD AUTO: 222 THOUSANDS/UL (ref 149–390)
PMV BLD AUTO: 10.8 FL (ref 8.9–12.7)
POTASSIUM SERPL-SCNC: 4.3 MMOL/L (ref 3.5–5.3)
PROT SERPL-MCNC: 7.2 G/DL (ref 6.4–8.2)
RBC # BLD AUTO: 4.6 MILLION/UL (ref 3.81–5.12)
SODIUM SERPL-SCNC: 140 MMOL/L (ref 136–145)
TRIGL SERPL-MCNC: 172 MG/DL
TSH SERPL DL<=0.05 MIU/L-ACNC: 1.38 UIU/ML (ref 0.36–3.74)
WBC # BLD AUTO: 9.08 THOUSAND/UL (ref 4.31–10.16)

## 2019-04-08 PROCEDURE — 83036 HEMOGLOBIN GLYCOSYLATED A1C: CPT

## 2019-04-08 PROCEDURE — 85025 COMPLETE CBC W/AUTO DIFF WBC: CPT

## 2019-04-08 PROCEDURE — 36415 COLL VENOUS BLD VENIPUNCTURE: CPT

## 2019-04-08 PROCEDURE — 80061 LIPID PANEL: CPT

## 2019-04-08 PROCEDURE — 80053 COMPREHEN METABOLIC PANEL: CPT

## 2019-04-08 PROCEDURE — 84443 ASSAY THYROID STIM HORMONE: CPT

## 2019-05-06 ENCOUNTER — CLINICAL SUPPORT (OUTPATIENT)
Dept: FAMILY MEDICINE CLINIC | Facility: HOSPITAL | Age: 64
End: 2019-05-06
Payer: COMMERCIAL

## 2019-05-06 DIAGNOSIS — Z23 ENCOUNTER FOR IMMUNIZATION: Primary | ICD-10-CM

## 2019-05-06 PROCEDURE — 90471 IMMUNIZATION ADMIN: CPT | Performed by: FAMILY MEDICINE

## 2019-05-06 PROCEDURE — 90750 HZV VACC RECOMBINANT IM: CPT | Performed by: FAMILY MEDICINE

## 2019-05-31 ENCOUNTER — OFFICE VISIT (OUTPATIENT)
Dept: URGENT CARE | Facility: CLINIC | Age: 64
End: 2019-05-31
Payer: COMMERCIAL

## 2019-05-31 VITALS
SYSTOLIC BLOOD PRESSURE: 163 MMHG | WEIGHT: 230 LBS | DIASTOLIC BLOOD PRESSURE: 79 MMHG | HEIGHT: 66 IN | OXYGEN SATURATION: 96 % | BODY MASS INDEX: 36.96 KG/M2 | RESPIRATION RATE: 16 BRPM | TEMPERATURE: 98.1 F | HEART RATE: 80 BPM

## 2019-05-31 DIAGNOSIS — J20.9 ACUTE BRONCHITIS, UNSPECIFIED ORGANISM: Primary | ICD-10-CM

## 2019-05-31 PROCEDURE — 99213 OFFICE O/P EST LOW 20 MIN: CPT | Performed by: PHYSICIAN ASSISTANT

## 2019-05-31 PROCEDURE — S9088 SERVICES PROVIDED IN URGENT: HCPCS | Performed by: PHYSICIAN ASSISTANT

## 2019-05-31 RX ORDER — AZITHROMYCIN 250 MG/1
TABLET, FILM COATED ORAL
Qty: 6 TABLET | Refills: 0 | Status: SHIPPED | OUTPATIENT
Start: 2019-05-31 | End: 2019-06-06

## 2019-05-31 RX ORDER — BENZONATATE 100 MG/1
100 CAPSULE ORAL 3 TIMES DAILY PRN
Qty: 21 CAPSULE | Refills: 0 | Status: SHIPPED | OUTPATIENT
Start: 2019-05-31 | End: 2019-06-07

## 2019-06-03 ENCOUNTER — OFFICE VISIT (OUTPATIENT)
Dept: FAMILY MEDICINE CLINIC | Facility: HOSPITAL | Age: 64
End: 2019-06-03
Payer: COMMERCIAL

## 2019-06-03 VITALS
DIASTOLIC BLOOD PRESSURE: 76 MMHG | HEIGHT: 66 IN | SYSTOLIC BLOOD PRESSURE: 130 MMHG | HEART RATE: 78 BPM | OXYGEN SATURATION: 98 % | WEIGHT: 232.8 LBS | BODY MASS INDEX: 37.41 KG/M2 | TEMPERATURE: 98.7 F

## 2019-06-03 DIAGNOSIS — E03.9 ACQUIRED HYPOTHYROIDISM: ICD-10-CM

## 2019-06-03 DIAGNOSIS — E66.01 CLASS 2 SEVERE OBESITY DUE TO EXCESS CALORIES WITH SERIOUS COMORBIDITY AND BODY MASS INDEX (BMI) OF 38.0 TO 38.9 IN ADULT (HCC): ICD-10-CM

## 2019-06-03 DIAGNOSIS — K76.0 FATTY INFILTRATION OF LIVER: ICD-10-CM

## 2019-06-03 DIAGNOSIS — I10 ESSENTIAL HYPERTENSION: Primary | ICD-10-CM

## 2019-06-03 DIAGNOSIS — J20.9 BRONCHITIS, ACUTE, WITH BRONCHOSPASM: ICD-10-CM

## 2019-06-03 PROCEDURE — 3078F DIAST BP <80 MM HG: CPT | Performed by: FAMILY MEDICINE

## 2019-06-03 PROCEDURE — 3075F SYST BP GE 130 - 139MM HG: CPT | Performed by: FAMILY MEDICINE

## 2019-06-03 PROCEDURE — 99214 OFFICE O/P EST MOD 30 MIN: CPT | Performed by: FAMILY MEDICINE

## 2019-06-03 RX ORDER — ALBUTEROL SULFATE 90 UG/1
2 AEROSOL, METERED RESPIRATORY (INHALATION) EVERY 6 HOURS PRN
Qty: 1 INHALER | Refills: 2 | Status: SHIPPED | OUTPATIENT
Start: 2019-06-03 | End: 2020-03-10 | Stop reason: SDUPTHER

## 2019-06-18 PROCEDURE — 87186 SC STD MICRODIL/AGAR DIL: CPT | Performed by: NURSE PRACTITIONER

## 2019-06-18 PROCEDURE — 87147 CULTURE TYPE IMMUNOLOGIC: CPT | Performed by: NURSE PRACTITIONER

## 2019-06-18 PROCEDURE — 87070 CULTURE OTHR SPECIMN AEROBIC: CPT | Performed by: NURSE PRACTITIONER

## 2019-06-18 PROCEDURE — 87205 SMEAR GRAM STAIN: CPT | Performed by: NURSE PRACTITIONER

## 2019-06-18 PROCEDURE — 87077 CULTURE AEROBIC IDENTIFY: CPT | Performed by: NURSE PRACTITIONER

## 2019-06-19 ENCOUNTER — LAB REQUISITION (OUTPATIENT)
Dept: LAB | Facility: HOSPITAL | Age: 64
End: 2019-06-19
Payer: COMMERCIAL

## 2019-06-19 DIAGNOSIS — L30.8 OTHER SPECIFIED DERMATITIS: ICD-10-CM

## 2019-06-21 LAB
BACTERIA WND AEROBE CULT: ABNORMAL
BACTERIA WND AEROBE CULT: ABNORMAL
GRAM STN SPEC: ABNORMAL
GRAM STN SPEC: ABNORMAL

## 2019-06-26 ENCOUNTER — TELEPHONE (OUTPATIENT)
Dept: FAMILY MEDICINE CLINIC | Facility: HOSPITAL | Age: 64
End: 2019-06-26

## 2019-06-26 DIAGNOSIS — J45.991 COUGH VARIANT ASTHMA: Primary | ICD-10-CM

## 2019-06-26 RX ORDER — PREDNISONE 10 MG/1
TABLET ORAL
Qty: 16 TABLET | Refills: 0 | Status: SHIPPED | OUTPATIENT
Start: 2019-06-26 | End: 2019-09-20

## 2019-06-27 ENCOUNTER — TELEPHONE (OUTPATIENT)
Dept: FAMILY MEDICINE CLINIC | Facility: HOSPITAL | Age: 64
End: 2019-06-27

## 2019-06-28 ENCOUNTER — OFFICE VISIT (OUTPATIENT)
Dept: FAMILY MEDICINE CLINIC | Facility: HOSPITAL | Age: 64
End: 2019-06-28
Payer: COMMERCIAL

## 2019-06-28 ENCOUNTER — APPOINTMENT (OUTPATIENT)
Dept: LAB | Facility: HOSPITAL | Age: 64
End: 2019-06-28
Payer: COMMERCIAL

## 2019-06-28 ENCOUNTER — APPOINTMENT (OUTPATIENT)
Dept: RADIOLOGY | Facility: CLINIC | Age: 64
End: 2019-06-28
Payer: COMMERCIAL

## 2019-06-28 ENCOUNTER — TELEPHONE (OUTPATIENT)
Dept: FAMILY MEDICINE CLINIC | Facility: HOSPITAL | Age: 64
End: 2019-06-28

## 2019-06-28 VITALS
SYSTOLIC BLOOD PRESSURE: 148 MMHG | BODY MASS INDEX: 36.8 KG/M2 | HEART RATE: 82 BPM | WEIGHT: 229 LBS | DIASTOLIC BLOOD PRESSURE: 82 MMHG | TEMPERATURE: 98.3 F | HEIGHT: 66 IN

## 2019-06-28 DIAGNOSIS — R05.8 SPASMODIC COUGH: ICD-10-CM

## 2019-06-28 DIAGNOSIS — R05.9 COUGH: Primary | ICD-10-CM

## 2019-06-28 DIAGNOSIS — R93.89 ABNORMAL CXR: ICD-10-CM

## 2019-06-28 DIAGNOSIS — R05.8 SPASMODIC COUGH: Primary | ICD-10-CM

## 2019-06-28 PROBLEM — J20.9 BRONCHITIS, ACUTE, WITH BRONCHOSPASM: Status: ACTIVE | Noted: 2019-06-28

## 2019-06-28 LAB
ANION GAP SERPL CALCULATED.3IONS-SCNC: 4 MMOL/L (ref 4–13)
BASOPHILS # BLD MANUAL: 0.08 THOUSAND/UL (ref 0–0.1)
BASOPHILS NFR MAR MANUAL: 1 % (ref 0–1)
BUN SERPL-MCNC: 18 MG/DL (ref 5–25)
CALCIUM SERPL-MCNC: 9.7 MG/DL (ref 8.3–10.1)
CHLORIDE SERPL-SCNC: 105 MMOL/L (ref 100–108)
CO2 SERPL-SCNC: 31 MMOL/L (ref 21–32)
CREAT SERPL-MCNC: 0.83 MG/DL (ref 0.6–1.3)
EOSINOPHIL # BLD MANUAL: 0.08 THOUSAND/UL (ref 0–0.4)
EOSINOPHIL NFR BLD MANUAL: 1 % (ref 0–6)
ERYTHROCYTE [DISTWIDTH] IN BLOOD BY AUTOMATED COUNT: 13.4 % (ref 11.6–15.1)
GFR SERPL CREATININE-BSD FRML MDRD: 76 ML/MIN/1.73SQ M
GLUCOSE SERPL-MCNC: 148 MG/DL (ref 65–140)
HCT VFR BLD AUTO: 43.9 % (ref 34.8–46.1)
HGB BLD-MCNC: 14.5 G/DL (ref 11.5–15.4)
LYMPHOCYTES # BLD AUTO: 1.01 THOUSAND/UL (ref 0.6–4.47)
LYMPHOCYTES # BLD AUTO: 13 % (ref 14–44)
MCH RBC QN AUTO: 30.7 PG (ref 26.8–34.3)
MCHC RBC AUTO-ENTMCNC: 33 G/DL (ref 31.4–37.4)
MCV RBC AUTO: 93 FL (ref 82–98)
MONOCYTES # BLD AUTO: 0.31 THOUSAND/UL (ref 0–1.22)
MONOCYTES NFR BLD: 4 % (ref 4–12)
NEUTROPHILS # BLD MANUAL: 6.28 THOUSAND/UL (ref 1.85–7.62)
NEUTS SEG NFR BLD AUTO: 81 % (ref 43–75)
NRBC BLD AUTO-RTO: 0 /100 WBCS
PLATELET # BLD AUTO: 217 THOUSANDS/UL (ref 149–390)
PLATELET BLD QL SMEAR: ADEQUATE
PMV BLD AUTO: 10.1 FL (ref 8.9–12.7)
POTASSIUM SERPL-SCNC: 3.8 MMOL/L (ref 3.5–5.3)
RBC # BLD AUTO: 4.72 MILLION/UL (ref 3.81–5.12)
RBC MORPH BLD: NORMAL
SODIUM SERPL-SCNC: 140 MMOL/L (ref 136–145)
TOTAL CELLS COUNTED SPEC: 100
WBC # BLD AUTO: 7.75 THOUSAND/UL (ref 4.31–10.16)

## 2019-06-28 PROCEDURE — 85007 BL SMEAR W/DIFF WBC COUNT: CPT | Performed by: INTERNAL MEDICINE

## 2019-06-28 PROCEDURE — 99214 OFFICE O/P EST MOD 30 MIN: CPT | Performed by: INTERNAL MEDICINE

## 2019-06-28 PROCEDURE — 85027 COMPLETE CBC AUTOMATED: CPT | Performed by: INTERNAL MEDICINE

## 2019-06-28 PROCEDURE — 71046 X-RAY EXAM CHEST 2 VIEWS: CPT

## 2019-06-28 PROCEDURE — 3008F BODY MASS INDEX DOCD: CPT | Performed by: INTERNAL MEDICINE

## 2019-06-28 PROCEDURE — 36415 COLL VENOUS BLD VENIPUNCTURE: CPT | Performed by: INTERNAL MEDICINE

## 2019-06-28 PROCEDURE — 1036F TOBACCO NON-USER: CPT | Performed by: INTERNAL MEDICINE

## 2019-06-28 PROCEDURE — 80048 BASIC METABOLIC PNL TOTAL CA: CPT | Performed by: INTERNAL MEDICINE

## 2019-06-28 RX ORDER — PROMETHAZINE HYDROCHLORIDE AND CODEINE PHOSPHATE 6.25; 1 MG/5ML; MG/5ML
5 SYRUP ORAL EVERY 8 HOURS PRN
Qty: 180 ML | Refills: 0 | Status: SHIPPED | OUTPATIENT
Start: 2019-06-28 | End: 2019-09-20

## 2019-06-28 RX ORDER — FLUTICASONE PROPIONATE 50 MCG
2 SPRAY, SUSPENSION (ML) NASAL DAILY
Qty: 1 BOTTLE | Refills: 3 | Status: SHIPPED | OUTPATIENT
Start: 2019-06-28 | End: 2020-03-16

## 2019-07-01 ENCOUNTER — TELEPHONE (OUTPATIENT)
Dept: FAMILY MEDICINE CLINIC | Facility: HOSPITAL | Age: 64
End: 2019-07-01

## 2019-07-01 NOTE — TELEPHONE ENCOUNTER
Saw Dr Osorio Bender on Friday  She is still coughing and feels that she may have some allergies  She recently got a new prescription of her Losartan and she is questioning that maybe there is a new ingredient in her medication and that is what is causing her cough  She works at the [employer] [place], and one of the doctors told her that GERD can cause a cough  She wanted to know if you were able to review her chart from last week and then also what your opinion was on the chronic cough  She also said the doctor from work said that she could possibly stop her blood pressure medication for a week and see if that helps her cough  She didn't want to do that with out talking to you about it  Please advise

## 2019-07-01 NOTE — TELEPHONE ENCOUNTER
PATIENT WAS SEEN FRIDAY - SHE WAS TOLD THAT DR ANTUNEZ WOULD REVIEW HER VISIT - SHE WANTS HIS OPINION - PATIENT WOULD ALSO LIKE TO SPEAK TO A NURSE, SHE HAS A FEW QUESTIONS - PLEASE CALL BACK ON MOBILE

## 2019-07-01 NOTE — TELEPHONE ENCOUNTER
Yes she should hold the Losartan for a week to see  Unlikely that GERD is the cause of that severe a cough  She still does need the chest CT because of the abnormal CXR

## 2019-07-02 ENCOUNTER — HOSPITAL ENCOUNTER (OUTPATIENT)
Dept: CT IMAGING | Facility: CLINIC | Age: 64
Discharge: HOME | End: 2019-07-02
Payer: COMMERCIAL

## 2019-07-02 DIAGNOSIS — R05.9 COUGH: ICD-10-CM

## 2019-07-02 DIAGNOSIS — R93.89 ABNORMAL CXR: ICD-10-CM

## 2019-07-02 DIAGNOSIS — R93.89 ABNORMAL CXR: Primary | ICD-10-CM

## 2019-07-02 PROCEDURE — 71260 CT THORAX DX C+: CPT

## 2019-07-02 RX ORDER — LEVOFLOXACIN 750 MG/1
750 TABLET ORAL EVERY 24 HOURS
Qty: 5 TABLET | Refills: 0 | Status: SHIPPED | OUTPATIENT
Start: 2019-07-02 | End: 2019-07-07

## 2019-07-02 RX ADMIN — IOHEXOL 85 ML: 350 INJECTION, SOLUTION INTRAVENOUS at 09:06

## 2019-07-08 ENCOUNTER — CLINICAL SUPPORT (OUTPATIENT)
Dept: FAMILY MEDICINE CLINIC | Facility: HOSPITAL | Age: 64
End: 2019-07-08
Payer: COMMERCIAL

## 2019-07-08 ENCOUNTER — TELEPHONE (OUTPATIENT)
Dept: FAMILY MEDICINE CLINIC | Facility: HOSPITAL | Age: 64
End: 2019-07-08

## 2019-07-08 DIAGNOSIS — Z23 ENCOUNTER FOR IMMUNIZATION: Primary | ICD-10-CM

## 2019-07-08 DIAGNOSIS — J18.9 PNEUMONIA OF RIGHT LOWER LOBE DUE TO INFECTIOUS ORGANISM: Primary | ICD-10-CM

## 2019-07-08 PROCEDURE — 90471 IMMUNIZATION ADMIN: CPT | Performed by: FAMILY MEDICINE

## 2019-07-08 PROCEDURE — 90750 HZV VACC RECOMBINANT IM: CPT | Performed by: FAMILY MEDICINE

## 2019-07-08 NOTE — TELEPHONE ENCOUNTER
Patient recently had cxr and chest ct w/ results of probable pneumonia  Advised to repeat ct in 3 mo to check for malignancy  Patient worried about waiting so long    Wondering if she could have repeat cxr sooner to check resolution of the pneumonia     pcb

## 2019-07-15 ENCOUNTER — APPOINTMENT (OUTPATIENT)
Dept: RADIOLOGY | Facility: CLINIC | Age: 64
End: 2019-07-15
Payer: COMMERCIAL

## 2019-07-15 DIAGNOSIS — J18.9 PNEUMONIA OF RIGHT LOWER LOBE DUE TO INFECTIOUS ORGANISM: ICD-10-CM

## 2019-07-15 PROCEDURE — 71046 X-RAY EXAM CHEST 2 VIEWS: CPT

## 2019-07-16 ENCOUNTER — TELEPHONE (OUTPATIENT)
Dept: FAMILY MEDICINE CLINIC | Facility: HOSPITAL | Age: 64
End: 2019-07-16

## 2019-07-16 NOTE — TELEPHONE ENCOUNTER
Gena CXR was basically unchanged from the last one and it can often take awhile to actually improve by imaging  Is her cough doing better?

## 2019-07-16 NOTE — TELEPHONE ENCOUNTER
Test ordered by Dr Clarence Sapp and is a Dr Clarence Sapp pt - likely still in his task box - can send request to him to address

## 2019-09-20 ENCOUNTER — OFFICE VISIT (OUTPATIENT)
Dept: PULMONOLOGY | Facility: CLINIC | Age: 64
End: 2019-09-20
Payer: COMMERCIAL

## 2019-09-20 VITALS
DIASTOLIC BLOOD PRESSURE: 90 MMHG | SYSTOLIC BLOOD PRESSURE: 160 MMHG | OXYGEN SATURATION: 97 % | WEIGHT: 233 LBS | HEART RATE: 68 BPM | TEMPERATURE: 98.1 F | BODY MASS INDEX: 36.57 KG/M2 | HEIGHT: 67 IN

## 2019-09-20 DIAGNOSIS — J18.9 COMMUNITY ACQUIRED PNEUMONIA OF RIGHT LOWER LOBE OF LUNG: ICD-10-CM

## 2019-09-20 DIAGNOSIS — R05.8 COUGH WITH SPUTUM: Primary | ICD-10-CM

## 2019-09-20 DIAGNOSIS — Z87.891 FORMER SMOKER: ICD-10-CM

## 2019-09-20 PROBLEM — R10.11 ABDOMINAL PAIN, RUQ: Status: RESOLVED | Noted: 2017-05-01 | Resolved: 2019-09-20

## 2019-09-20 PROBLEM — R10.9 LEFT FLANK PAIN: Status: RESOLVED | Noted: 2018-11-05 | Resolved: 2019-09-20

## 2019-09-20 PROBLEM — M79.10 MUSCLE PAIN: Status: RESOLVED | Noted: 2018-11-05 | Resolved: 2019-09-20

## 2019-09-20 PROCEDURE — 99244 OFF/OP CNSLTJ NEW/EST MOD 40: CPT | Performed by: INTERNAL MEDICINE

## 2019-09-20 RX ORDER — GUAIFENESIN 600 MG
1200 TABLET, EXTENDED RELEASE 12 HR ORAL EVERY 12 HOURS SCHEDULED
COMMUNITY
End: 2020-09-11 | Stop reason: ALTCHOICE

## 2019-09-20 NOTE — ASSESSMENT & PLAN NOTE
Reviewed CT scan from July, demonstrating this area of infiltrate in the right lower lobe  There may also be an area of ill-defined ground-glass opacification in the anterior right upper lobe  She has about 3 months out from her last CT scan, and it is the appropriate time to repeat this scan  Will order a dry CT of the chest to evaluate

## 2019-09-20 NOTE — ASSESSMENT & PLAN NOTE
Reviewed common etiologies with patient  Postnasal drip is possible, but patient has lifelong history of allergies and has never had this issue before  Late onset asthma is a possibility, will evaluate with pulmonary function testing  She is on an angiotensin receptor blocker, which I would defer changing to another medication until other, more likely etiologies have been ruled out  Finally, GERD is possible but again will leave for treating once more likely etiologies ruled out  As a former smoker, COPD is also in the differential, although she does not know wheezing  Regardless, will evaluate for fixed obstruction with PFTs  Finally, CT scan will rule in or out the presence of a persistent lesion in either of the 2 areas described above, which we can intervene upon if present

## 2019-09-20 NOTE — PROGRESS NOTES
Pulmonary Consultation   Jose Diaz 59 y o  female MRN: [id number]  9/20/2019      Assessment:    Community acquired pneumonia of right lower lobe of lung [facility])  Reviewed CT scan from July, demonstrating this area of infiltrate in the right lower lobe  There may also be an area of ill-defined ground-glass opacification in the anterior right upper lobe  She has about 3 months out from her last CT scan, and it is the appropriate time to repeat this scan  Will order a dry CT of the chest to evaluate  Cough with sputum  Reviewed common etiologies with patient  Postnasal drip is possible, but patient has lifelong history of allergies and has never had this issue before  Late onset asthma is a possibility, will evaluate with pulmonary function testing  She is on an angiotensin receptor blocker, which I would defer changing to another medication until other, more likely etiologies have been ruled out  Finally, GERD is possible but again will leave for treating once more likely etiologies ruled out  As a former smoker, COPD is also in the differential, although she does not know wheezing  Regardless, will evaluate for fixed obstruction with PFTs  Finally, CT scan will rule in or out the presence of a persistent lesion in either of the 2 areas described above, which we can intervene upon if present  Plan:    Diagnoses and all orders for this visit:    Cough with sputum  -     Complete PFT without post bronchodilator; Future    Community acquired pneumonia of right lower lobe of lung ([address] )  -     CT chest without contrast; Future    Former smoker  -     Complete PFT without post bronchodilator; Future    Return in about 3 months (around 12/20/2019)  History of Present Illness   HPI:  Jose Diaz is a 59 y o  female who presents with a chief complaint of chronic cough    The patient notes that in May, she started to developed the sudden onset of cough with bronchospasm, initially not productive of any sputum  She was treated in an urgent care with an oral antibiotic and steroids, but noticed no improvement  At the time, she was demonstrating spasms of cough, which were persistent and triggered by things like having conversation  She did not have associated shortness of breath  She did not have fevers, chills, or other infectious symptoms aside from the cough  She had 1 episode which was provoked by exposure to a new cleaning solvent, but otherwise no significant exposures of which she was aware  She does have a cabin up in the [facility], where she is not known to have exposures to mold but has likely more exposure to environmental allergens  She persisted with symptoms until July, when she visited her PCP  A chest x-ray was performed, which identified a right lower lobe lesion  This was followed up with CT scan, demonstrating probable pneumonia in that location, some more medial basilar atelectasis, and by my interpretation an area of ground-glass opacity in the anterior or apical segment of the right upper lobe  A follow-up chest x-ray was performed shortly after, which did not show any significant interval change  She was again prescribed an antibiotic, which did not seem to help much either  She is however, markedly improved today, which she thinks is attributable to nothing other than time  At present she still has occasional episodes of coughing, but much less regularly  She has some clear sputum production  She has been taking guaifenesin with dextromethorphan for months, which she thinks may be helping  She has not had any ongoing fevers  She does have issues with sinus congestion, although that seems to be improved as well  Twelve point review of systems otherwise revealed only the presence of night sweats, which sound more likely related to being menopausal, and are non drenching  She has not had changes in her weight      Review of Systems   Constitutional: Negative for appetite change, chills, diaphoresis, fever and unexpected weight change  HENT: Positive for postnasal drip and sneezing  Negative for ear pain, rhinorrhea, sore throat and trouble swallowing  Respiratory: Positive for cough  Cardiovascular: Negative for chest pain  Musculoskeletal: Negative for myalgias  Neurological: Negative for headaches  All other systems reviewed and are negative  Historical Information   Past Medical History:   Diagnosis Date    Abdominal pain     Bicuspid aortic valve     LAST ASSESSED [id number]    Cervical disc disease     last assessed [id number]    Cervical stenosis of spine     LAST ASSESSED [id number]    Disease of thyroid gland     hypothyroid    Dry eyes     Endometriosis     Hyperglycemia     Hyperlipidemia     Hypertension     Hypothyroidism     Left ventricular hypertrophy     Mitral valvular disorder     Nephrolithiasis     Ovarian cyst, complex     Ptosis     LAST ASSESSED [id number]    Renal calculi     Right cervical radiculopathy     LAST ASSESSED [id number]    Seasonal allergies      Past Surgical History:   Procedure Laterality Date    BASAL CELL CARCINOMA EXCISION      CARPAL TUNNEL RELEASE       SECTION      CHOLECYSTECTOMY      COLONOSCOPY      NEUROPLASTY / TRANSPOSITION MEDIAN NERVE AT CARPAL TUNNEL      NEUROPLASTY / TRANSPOSITION MEDIAN NERVE AT CARPAL TUNNEL  [facility]    OTHER SURGICAL HISTORY      surgically removed skin patch for skin cancer    PLANTAR FASCIECTOMY      LA COLONOSCOPY FLX DX W/COLLJ SPEC WHEN PFRMD N/A 10/9/2017    Procedure: COLONOSCOPY;  Surgeon: Turner Ruiz MD;  Location: [facility];   Service: Gastroenterology    LA HYSTEROSCOPY,W/ENDO BX N/A 2018    Procedure: DILATATION AND CURETTAGE (D&C) WITH HYSTEROSCOPY;  Surgeon: Toya Tsang DO;  Location: [facility] OR;  Service: Gynecology    TUBAL LIGATION       Family History   Problem Relation Age of Onset    Diabetes Mother    [facility] Alzheimer's disease Mother     Heart disease Mother     Hypertension Mother     Other Family         back disorder    Cancer Family     Heart disease Family     Thyroid disease Family     No Known Problems Father      Meds/Allergies     Current Outpatient Medications:     albuterol (VENTOLIN HFA) 90 mcg/act inhaler, Inhale 2 puffs every 6 (six) hours as needed for wheezing, Disp: 1 Inhaler, Rfl: 2    fluticasone (FLONASE) 50 mcg/act nasal spray, 2 sprays into each nostril daily, Disp: 1 Bottle, Rfl: 3    guaiFENesin (MUCINEX) 600 mg 12 hr tablet, Take 1,200 mg by mouth every 12 (twelve) hours, Disp: , Rfl:     levothyroxine 125 mcg tablet, Take 1 tablet (125 mcg total) by mouth daily, Disp: 90 tablet, Rfl: 3    Loratadine (CLARITIN PO), Take by mouth, Disp: , Rfl:     losartan (COZAAR) 50 mg tablet, Take 1 tablet (50 mg total) by mouth daily, Disp: 90 tablet, Rfl: 3    XIIDRA 5 % op solution, , Disp: , Rfl:   Allergies   Allergen Reactions    Erythromycin GI Intolerance     Reaction Date: 11Jul2011;     Penicillins Other (See Comments)     unknown    Aztreonam Rash     Action Taken: Allergy added by [employer] to replace Penicillins [employer];     Carbapenems Rash     Action Taken: Allergy added by [employer] to replace Penicillins [employer];     Cephalosporins Rash     Action Taken: Allergy added by [employer] to replace Penicillins [employer];     Flu Virus Vaccine Hives, Rash and GI Intolerance    Griseofulvin Rash     Action Taken: Allergy added by [employer] to replace Penicillins [employer];        Vitals: Blood pressure 160/90, pulse 68, temperature 98 1 °F (36 7 °C), temperature source Tympanic, height 5' 7" (1 702 m), weight 106 kg (233 lb), SpO2 97 %  Body mass index is 36 49 kg/m²  Oxygen Therapy  SpO2: 97 %  Oxygen Therapy: None (Room air)      Physical Exam  Physical Exam   Constitutional: She is oriented to person, place, and time   She appears well-developed and well-nourished  No distress  HENT:   Head: Normocephalic and atraumatic  Mouth/Throat: No oropharyngeal exudate  Eyes: Pupils are equal, round, and reactive to light  EOM are normal    Neck: Neck supple  No JVD present  Cardiovascular: Normal rate and regular rhythm  Exam reveals no gallop and no friction rub  Murmur heard  There is a slight, 1 to 2/6 murmur heard along the left sternal border  Pulmonary/Chest: Effort normal and breath sounds normal  No respiratory distress  She has no wheezes  She has no rales  Musculoskeletal: She exhibits no edema  Neurological: She is alert and oriented to person, place, and time  Skin: Skin is warm and dry  No rash noted  Labs: I have personally reviewed pertinent lab results  Lab Results   Component Value Date    WBC 7 75 06/28/2019    HGB 14 5 06/28/2019    HCT 43 9 06/28/2019    MCV 93 06/28/2019     06/28/2019     Lab Results   Component Value Date    GLUCOSE 104 08/14/2015    CALCIUM 9 7 06/28/2019     08/14/2015    K 3 8 06/28/2019    CO2 31 06/28/2019     06/28/2019    BUN 18 06/28/2019    CREATININE 0 83 06/28/2019     No results found for: IGE  Lab Results   Component Value Date    ALT 40 04/08/2019    AST 29 04/08/2019    ALKPHOS 79 04/08/2019    BILITOT 0 42 08/14/2015       No significant abnormalities noted on routine blood work  There is a wound culture from back in June growing Serratia and Staph aureus  Imaging and other studies: I have personally reviewed pertinent films in PACS  Findings as discussed above  EKG, Pathology, and Other Studies: I have personally reviewed pertinent films in PACS    ALAYNA Dukes    [facility]  Answers for HPI/ROS submitted by the patient on 9/13/2019   Primary symptoms  Do you experience frequent throat clearing?: Yes  Do you have a wet cough?: Yes  Chronicity: chronic  When did you first notice your symptoms?: more than 1 month ago  How often do your symptoms occur?: 2 to 4 times per day  Since you first noticed this problem, how has it changed?: gradually improving  Do you have shortness of breath that occurs with effort or exertion?: No  Do you have ear congestion?: No  Do you have heartburn?: No  Do you have fatigue?: No  Do you have nasal congestion?: Yes  Do you have shortness of breath when lying flat?: No  Do you have shortness of breath when you wake up?: No  Do you have sweats?: Yes  Have you experienced weight loss?: No  Which of the following makes your symptoms worse?: change in weather, exposure to fumes  Which of the following makes your symptoms better?: cold air, OTC cough suppressant, steroid inhaler

## 2019-09-25 ENCOUNTER — HOSPITAL ENCOUNTER (OUTPATIENT)
Dept: PULMONOLOGY | Facility: HOSPITAL | Age: 64
Discharge: HOME | End: 2019-09-25
Attending: INTERNAL MEDICINE
Payer: COMMERCIAL

## 2019-09-25 DIAGNOSIS — Z87.891 FORMER SMOKER: ICD-10-CM

## 2019-09-25 DIAGNOSIS — R05.8 COUGH WITH SPUTUM: ICD-10-CM

## 2019-09-25 PROCEDURE — 94760 N-INVAS EAR/PLS OXIMETRY 1: CPT

## 2019-09-25 PROCEDURE — 94726 PLETHYSMOGRAPHY LUNG VOLUMES: CPT | Performed by: INTERNAL MEDICINE

## 2019-09-25 PROCEDURE — 94729 DIFFUSING CAPACITY: CPT | Performed by: INTERNAL MEDICINE

## 2019-09-25 PROCEDURE — 94726 PLETHYSMOGRAPHY LUNG VOLUMES: CPT

## 2019-09-25 PROCEDURE — 94060 EVALUATION OF WHEEZING: CPT

## 2019-09-25 PROCEDURE — 94729 DIFFUSING CAPACITY: CPT

## 2019-09-25 PROCEDURE — 94010 BREATHING CAPACITY TEST: CPT | Performed by: INTERNAL MEDICINE

## 2019-09-25 RX ORDER — ALBUTEROL SULFATE 2.5 MG/3ML
2.5 SOLUTION RESPIRATORY (INHALATION) ONCE AS NEEDED
Status: DISCONTINUED | OUTPATIENT
Start: 2019-09-25 | End: 2019-09-29 | Stop reason: HOSPADM

## 2019-09-26 ENCOUNTER — HOSPITAL ENCOUNTER (OUTPATIENT)
Dept: CT IMAGING | Facility: CLINIC | Age: 64
Discharge: HOME | End: 2019-09-26
Payer: COMMERCIAL

## 2019-09-26 DIAGNOSIS — J18.9 COMMUNITY ACQUIRED PNEUMONIA OF RIGHT LOWER LOBE OF LUNG: ICD-10-CM

## 2019-09-26 PROCEDURE — 71250 CT THORAX DX C-: CPT

## 2019-10-02 ENCOUNTER — DOCUMENTATION (OUTPATIENT)
Dept: PULMONOLOGY | Facility: CLINIC | Age: 64
End: 2019-10-02

## 2019-10-02 NOTE — PROGRESS NOTES
Called patient to advise her that CT scan showed resolution of her previous opacity  PFTs are likely inconsistent with obstruction but there is some abnormality to the flow-volume loop, in conjunction with decreased FVC compared to SVC, suggesting some obstruction may exist   Would repeat in a year or so  Will space appointment out to that date

## 2019-10-07 ENCOUNTER — TELEPHONE (OUTPATIENT)
Dept: FAMILY MEDICINE CLINIC | Facility: HOSPITAL | Age: 64
End: 2019-10-07

## 2019-10-08 ENCOUNTER — CLINICAL SUPPORT (OUTPATIENT)
Dept: FAMILY MEDICINE CLINIC | Facility: HOSPITAL | Age: 64
End: 2019-10-08
Payer: COMMERCIAL

## 2019-10-08 DIAGNOSIS — Z23 ENCOUNTER FOR IMMUNIZATION: Primary | ICD-10-CM

## 2019-10-08 PROCEDURE — 90471 IMMUNIZATION ADMIN: CPT | Performed by: FAMILY MEDICINE

## 2019-10-08 PROCEDURE — 90682 RIV4 VACC RECOMBINANT DNA IM: CPT | Performed by: FAMILY MEDICINE

## 2019-10-08 PROCEDURE — 90732 PPSV23 VACC 2 YRS+ SUBQ/IM: CPT | Performed by: FAMILY MEDICINE

## 2019-10-08 PROCEDURE — 90472 IMMUNIZATION ADMIN EACH ADD: CPT | Performed by: FAMILY MEDICINE

## 2019-11-04 ENCOUNTER — OFFICE VISIT (OUTPATIENT)
Dept: FAMILY MEDICINE CLINIC | Facility: HOSPITAL | Age: 64
End: 2019-11-04
Payer: COMMERCIAL

## 2019-11-04 VITALS
TEMPERATURE: 97.2 F | BODY MASS INDEX: 37.04 KG/M2 | WEIGHT: 236 LBS | HEART RATE: 71 BPM | HEIGHT: 67 IN | DIASTOLIC BLOOD PRESSURE: 90 MMHG | SYSTOLIC BLOOD PRESSURE: 142 MMHG

## 2019-11-04 DIAGNOSIS — E66.01 CLASS 2 SEVERE OBESITY DUE TO EXCESS CALORIES WITH SERIOUS COMORBIDITY AND BODY MASS INDEX (BMI) OF 36.0 TO 36.9 IN ADULT (HCC): ICD-10-CM

## 2019-11-04 DIAGNOSIS — E03.9 ACQUIRED HYPOTHYROIDISM: ICD-10-CM

## 2019-11-04 DIAGNOSIS — J18.9 COMMUNITY ACQUIRED PNEUMONIA OF RIGHT LOWER LOBE OF LUNG: ICD-10-CM

## 2019-11-04 DIAGNOSIS — K76.0 FATTY INFILTRATION OF LIVER: ICD-10-CM

## 2019-11-04 DIAGNOSIS — E78.2 MIXED HYPERLIPIDEMIA: ICD-10-CM

## 2019-11-04 DIAGNOSIS — I10 ESSENTIAL HYPERTENSION: Primary | ICD-10-CM

## 2019-11-04 PROCEDURE — 99214 OFFICE O/P EST MOD 30 MIN: CPT | Performed by: FAMILY MEDICINE

## 2019-11-04 NOTE — PROGRESS NOTES
Assessment/Plan:         Diagnoses and all orders for this visit:    Essential hypertension  Comments:  Good BP control    Acquired hypothyroidism  Comments:  Clinically euthyroid    Mixed hyperlipidemia    Class 2 severe obesity due to excess calories with serious comorbidity and body mass index (BMI) of 36 0 to 36 9 in adult [facility])    Community acquired pneumonia of right lower lobe of lung ([address] )  Comments:  Clinically resolved    Fatty infiltration of liver          Subjective:      Patient ID: Cee Boss is a 62 y o  female  4 month follow up  Had a prolonged episode of pneumonia over three months    CT showed resolved RLL abnormality  Had several antibiotics without benefit, Mucinex did well  Finally is feeling better, cough is no longer interfering  The following portions of the patient's history were reviewed and updated as appropriate: allergies, current medications, past family history, past medical history, past social history, past surgical history and problem list     Review of Systems   Constitutional: Negative for unexpected weight change  HENT: Negative  Respiratory: Positive for cough and wheezing  Cardiovascular: Negative  Gastrointestinal: Negative  Musculoskeletal: Negative  Neurological: Negative  Hematological: Negative  Psychiatric/Behavioral: Negative  All other systems reviewed and are negative  Objective:      /90   Pulse 71   Temp (!) 97 2 °F (36 2 °C)   Ht 5' 7" (1 702 m)   Wt 107 kg (236 lb)   BMI 36 96 kg/m²          Physical Exam   Constitutional: She is oriented to person, place, and time  She appears well-developed and well-nourished  Neck: Thyromegaly present  Cardiovascular: Normal rate and regular rhythm  Murmur heard  Pulmonary/Chest: Effort normal and breath sounds normal    Neurological: She is alert and oriented to person, place, and time  Psychiatric: She has a normal mood and affect   Her behavior is normal  Thought content normal    Nursing note and vitals reviewed

## 2019-11-27 ENCOUNTER — TELEPHONE (OUTPATIENT)
Dept: FAMILY MEDICINE CLINIC | Facility: HOSPITAL | Age: 64
End: 2019-11-27

## 2019-11-27 DIAGNOSIS — B00.9 HERPES SIMPLEX: Primary | ICD-10-CM

## 2019-11-27 RX ORDER — VALACYCLOVIR HYDROCHLORIDE 500 MG/1
500 TABLET, FILM COATED ORAL 2 TIMES DAILY
Qty: 10 TABLET | Refills: 0 | Status: SHIPPED | OUTPATIENT
Start: 2019-11-27 | End: 2020-03-16

## 2019-11-27 NOTE — TELEPHONE ENCOUNTER
Patient has a cold sore on her lip and is asking if Dr Bonny Vo can call [employer] in for her  She said he's prescribed this for her in the past  She uses [employer] in [facility] by [address]

## 2019-12-02 DIAGNOSIS — I10 ESSENTIAL HYPERTENSION: ICD-10-CM

## 2019-12-02 DIAGNOSIS — E03.9 ACQUIRED HYPOTHYROIDISM: ICD-10-CM

## 2019-12-02 RX ORDER — LEVOTHYROXINE SODIUM 0.12 MG/1
125 TABLET ORAL DAILY
Qty: 90 TABLET | Refills: 3 | Status: SHIPPED | OUTPATIENT
Start: 2019-12-02 | End: 2020-12-01

## 2019-12-02 RX ORDER — LOSARTAN POTASSIUM 50 MG/1
50 TABLET ORAL DAILY
Qty: 90 TABLET | Refills: 3 | Status: SHIPPED | OUTPATIENT
Start: 2019-12-02 | End: 2020-03-18

## 2019-12-16 ENCOUNTER — LAB REQUISITION (OUTPATIENT)
Dept: LAB | Facility: HOSPITAL | Age: 64
End: 2019-12-16
Payer: COMMERCIAL

## 2019-12-16 DIAGNOSIS — L73.8 OTHER SPECIFIED FOLLICULAR DISORDERS: ICD-10-CM

## 2019-12-16 DIAGNOSIS — L08.0 PYODERMA: ICD-10-CM

## 2019-12-16 PROCEDURE — 87205 SMEAR GRAM STAIN: CPT | Performed by: NURSE PRACTITIONER

## 2019-12-16 PROCEDURE — 88305 TISSUE EXAM BY PATHOLOGIST: CPT | Performed by: PATHOLOGY

## 2019-12-16 PROCEDURE — 87147 CULTURE TYPE IMMUNOLOGIC: CPT | Performed by: NURSE PRACTITIONER

## 2019-12-16 PROCEDURE — 87077 CULTURE AEROBIC IDENTIFY: CPT | Performed by: NURSE PRACTITIONER

## 2019-12-16 PROCEDURE — 87070 CULTURE OTHR SPECIMN AEROBIC: CPT | Performed by: NURSE PRACTITIONER

## 2019-12-16 PROCEDURE — 87186 SC STD MICRODIL/AGAR DIL: CPT | Performed by: NURSE PRACTITIONER

## 2019-12-17 ENCOUNTER — LAB REQUISITION (OUTPATIENT)
Dept: LAB | Facility: HOSPITAL | Age: 64
End: 2019-12-17
Payer: COMMERCIAL

## 2019-12-17 DIAGNOSIS — D48.5 NEOPLASM OF UNCERTAIN BEHAVIOR OF SKIN: ICD-10-CM

## 2019-12-19 LAB
BACTERIA WND AEROBE CULT: ABNORMAL
GRAM STN SPEC: ABNORMAL

## 2020-02-03 ENCOUNTER — OFFICE VISIT (OUTPATIENT)
Dept: PAIN MEDICINE | Facility: CLINIC | Age: 65
End: 2020-02-03
Payer: COMMERCIAL

## 2020-02-03 ENCOUNTER — TELEPHONE (OUTPATIENT)
Dept: PAIN MEDICINE | Facility: CLINIC | Age: 65
End: 2020-02-03

## 2020-02-03 VITALS
HEIGHT: 67 IN | SYSTOLIC BLOOD PRESSURE: 138 MMHG | WEIGHT: 236 LBS | BODY MASS INDEX: 37.04 KG/M2 | DIASTOLIC BLOOD PRESSURE: 88 MMHG | HEART RATE: 73 BPM

## 2020-02-03 DIAGNOSIS — G89.29 CHRONIC BILATERAL LOW BACK PAIN WITHOUT SCIATICA: ICD-10-CM

## 2020-02-03 DIAGNOSIS — M54.50 CHRONIC BILATERAL LOW BACK PAIN WITHOUT SCIATICA: ICD-10-CM

## 2020-02-03 DIAGNOSIS — M54.2 NECK PAIN: Primary | ICD-10-CM

## 2020-02-03 PROCEDURE — 3008F BODY MASS INDEX DOCD: CPT | Performed by: ANESTHESIOLOGY

## 2020-02-03 PROCEDURE — 3079F DIAST BP 80-89 MM HG: CPT | Performed by: ANESTHESIOLOGY

## 2020-02-03 PROCEDURE — 3075F SYST BP GE 130 - 139MM HG: CPT | Performed by: ANESTHESIOLOGY

## 2020-02-03 PROCEDURE — 99214 OFFICE O/P EST MOD 30 MIN: CPT | Performed by: ANESTHESIOLOGY

## 2020-02-03 RX ORDER — PREDNISONE 10 MG/1
TABLET ORAL
Qty: 21 TABLET | Refills: 0 | Status: SHIPPED | OUTPATIENT
Start: 2020-02-03 | End: 2020-02-03 | Stop reason: ALTCHOICE

## 2020-02-03 RX ORDER — PREDNISONE 10 MG/1
TABLET ORAL
Qty: 21 TABLET | Refills: 0 | Status: SHIPPED | OUTPATIENT
Start: 2020-02-03 | End: 2020-03-16

## 2020-02-03 NOTE — PROGRESS NOTES
Assessment:  1  Neck pain    2  Chronic bilateral low back pain without sciatica        Plan:  Patient presents in follow-up regarding her chronic neck and low back pain  Overall she is improving but she did have a significant flare up which again is slowly resolving  Will start her on a titrating dose of oral prednisone to address any inflammatory component of the patient's pain  Patient understands she will not take any nonsteroidal anti-inflammatories until she is finished with course oral steroids  Regards to her low back pain depending how she is seeing would consider diagnostic medial branch blocks but will re-evaluate if needed  This note is created using dictation transcription  It may contain typographical errors, grammatical errors, improperly dictated words, background noise and other errors  History of Present Illness: The patient is a 62 y o  female last seen on 12/6/18 who presents for a follow up office visit in regards to secondary to left-sided neck and low back pain     The patient currently reports she was doing well until approximately two weeks ago when she had a significant flare up specifically of her neck and left arm  This has come down somewhat currently reports her pain as 4/10 on the visual analog scale  Pain is intermittent describes burning sharp and shooting worse with standing and walking relieved with sitting and relaxation  Her pain is significant interfering with daily living activities  I have personally reviewed and/or updated the patient's past medical history, past surgical history, family history, social history, current medications, allergies, and vital signs today  Review of Systems:    Review of Systems   Respiratory: Negative for shortness of breath  Cardiovascular: Negative for chest pain  Gastrointestinal: Negative for constipation, diarrhea, nausea and vomiting  Musculoskeletal: Positive for gait problem   Negative for arthralgias, joint swelling and myalgias  Skin: Negative for rash  Neurological: Negative for dizziness, seizures and weakness  All other systems reviewed and are negative  Past Medical History:   Diagnosis Date    Abdominal pain     Bicuspid aortic valve     LAST ASSESSED [id number]    Cervical disc disease     last assessed [id number]    Cervical stenosis of spine     LAST ASSESSED [id number]    Disease of thyroid gland     hypothyroid    Dry eyes     Endometriosis     Hyperglycemia     Hyperlipidemia     Hypertension     Hypothyroidism     Left ventricular hypertrophy     Mitral valvular disorder     Nephrolithiasis     Ovarian cyst, complex     Ptosis     LAST ASSESSED [id number]    Renal calculi     Right cervical radiculopathy     LAST ASSESSED [id number]    Seasonal allergies        Past Surgical History:   Procedure Laterality Date    BASAL CELL CARCINOMA EXCISION      CARPAL TUNNEL RELEASE       SECTION      CHOLECYSTECTOMY      COLONOSCOPY      NEUROPLASTY / TRANSPOSITION MEDIAN NERVE AT CARPAL TUNNEL      NEUROPLASTY / TRANSPOSITION MEDIAN NERVE AT CARPAL TUNNEL      [facility]    OTHER SURGICAL HISTORY      surgically removed skin patch for skin cancer    PLANTAR FASCIECTOMY      PA COLONOSCOPY FLX DX W/COLLJ SPEC WHEN PFRMD N/A 10/9/2017    Procedure: COLONOSCOPY;  Surgeon: Michael Cantor MD;  Location: [facility] GI LAB;   Service: Gastroenterology    PA HYSTEROSCOPY,W/ENDO BX N/A 2018    Procedure: DILATATION AND CURETTAGE (D&C) WITH HYSTEROSCOPY;  Surgeon: Domenico Potter DO;  Location: [facility] OR;  Service: Gynecology    TUBAL LIGATION         Family History   Problem Relation Age of Onset    Diabetes Mother     Alzheimer's disease Mother     Heart disease Mother     Hypertension Mother     Other Family         back disorder    Cancer Family     Heart disease Family     Thyroid disease Family     No Known Problems Father        Social History Occupational History    Occupation: part time employment   Tobacco Use    Smoking status: Current Some Day Smoker     Packs/day: 1 00     Years: 20 00     Pack years: 20 00     Last attempt to quit: 2012     Years since quittin 0    Smokeless tobacco: Never Used   Substance and Sexual Activity    Alcohol use: Yes     Comment: occasional drinks wine    Drug use: No    Sexual activity: Yes     Partners: Male     Comment: tubal ligation          Current Outpatient Medications:     albuterol (VENTOLIN HFA) 90 mcg/act inhaler, Inhale 2 puffs every 6 (six) hours as needed for wheezing, Disp: 1 Inhaler, Rfl: 2    levothyroxine 125 mcg tablet, Take 1 tablet (125 mcg total) by mouth daily, Disp: 90 tablet, Rfl: 3    Loratadine (CLARITIN PO), Take by mouth, Disp: , Rfl:     losartan (COZAAR) 50 mg tablet, Take 1 tablet (50 mg total) by mouth daily, Disp: 90 tablet, Rfl: 3    valACYclovir (VALTREX) 500 mg tablet, Take 1 tablet (500 mg total) by mouth 2 (two) times a day for 5 days, Disp: 10 tablet, Rfl: 0    XIIDRA 5 % op solution, , Disp: , Rfl:     fluticasone (FLONASE) 50 mcg/act nasal spray, 2 sprays into each nostril daily (Patient not taking: Reported on 2019), Disp: 1 Bottle, Rfl: 3    guaiFENesin (MUCINEX) 600 mg 12 hr tablet, Take 1,200 mg by mouth every 12 (twelve) hours, Disp: , Rfl:     predniSONE 10 mg tablet, Take according to titration schedule, Disp: 21 tablet, Rfl: 0    Allergies   Allergen Reactions    Erythromycin GI Intolerance     Reaction Date: 2011;     Penicillins Other (See Comments)     unknown    Aztreonam Rash     Action Taken: Allergy added by [employer] to replace Penicillins [employer]ors;     Carbapenems Rash     Action Taken: Allergy added by [employer] to replace Penicillins [employer];     Cephalosporins Rash     Action Taken: Allergy added by [employer] to replace Penicillins [employer];     Flu Virus Vaccine Hives, Rash and GI Intolerance    Griseofulvin Rash     Action Taken: Allergy added by [employer] to replace Penicillins [place]; Physical Exam:    /88 (BP Location: Left arm, Patient Position: Sitting, Cuff Size: Large)   Pulse 73   Ht 5' 7" (1 702 m)   Wt 107 kg (236 lb)   BMI 36 96 kg/m²     Constitutional:normal, well developed, well nourished, alert, in no distress and non-toxic and no overt pain behavior  and obese  Eyes:anicteric  HEENT:grossly intact  Neck:supple, symmetric, trachea midline and no masses   Pulmonary:even and unlabored  Cardiovascular:No edema or pitting edema present  Skin:Normal without rashes or lesions and well hydrated  Psychiatric:Mood and affect appropriate  Neurologic:Cranial Nerves II-XII grossly intact  Musculoskeletal:normal, no motor deficits appreciated in the bilateral upper limbs  There is tenderness palpation left superior trapezius and left cervical spine her spinals  Negative Spurling maneuver positive pain with lumbar extension      Imaging  MRI CERVICAL SPINE WITHOUT CONTRAST     INDICATION:  Right shoulder pain radiating [facility] down by     COMPARISON:  MR 1/20/2010     TECHNIQUE:  Sagittal T1, sagittal T2, sagittal inversion recovery, axial T2, axial  2D merge          IMAGE QUALITY:  Diagnostic     FINDINGS:     ALIGNMENT:  Straightening of normal cervical lordosis with multilevel mid cervical disc disease resulting in posterior displacement of the cord from the C4-5 to the C6-7 disc space inclusive      MARROW SIGNAL:  Normal marrow signal is identified within the visualized bony structures    No discrete marrow lesion      CERVICAL AND VISUALIZED THORACIC CORD:  Normal signal within the visualized cord      PREVERTEBRAL AND PARASPINAL SOFT TISSUES:  Prevertebral and paraspinal soft tissues are unremarkable      VISUALIZED POSTERIOR FOSSA:  The visualized posterior fossa demonstrates no abnormal signal      CERVICAL DISC SPACES:          C2-C3:  Minor increase in tiny central protrusion  Slight progression of bilateral facet arthrosis     C3-C4:  Tiny central protrusion  Minor bilateral facet arthrosis which has progressed      C4-C5:  Reduction disc height  Increase in volume of central protrusion which extends asymmetrically to the left  The AP dimension of the canal is reduced to approximately 6 mm, the cord is deformed  Significant bilateral foraminal stenosis resulting   from progression of facet and uncinate arthrosis  Correlate for bilateral C5 radiculitis      C5-C6:  Broad-based circumferential bulge with complex protrusion  Cord is deformed, canal less than 6 mm in AP dimension, severe bilateral foraminal stenosis  Disease has progressed  Bilateral foraminal stenosis more severe to the left likely   significant  Correlate for bilateral C6 radiculitis      C6-C7:  Broad-based protrusion, asymmetric to the left  Sac reduced to approximately 9 mm centrally      C7-T1:  Normal      UPPER THORACIC DISC SPACES:  Normal      IMPRESSION:     Progression of cervical spondylosis and osteoarthritis resulting in significant canal stenosis at C4-C5 and C5-C6  Elevation of the posterior longitudinal ligament C5 and C6 which could be segmentally ossified  Despite central flattening of the cord,   cord signal remains normal      Potentially significant left foraminal stenosis C4-5 through C6-7 inclusive, correlate for bilateral C5, C6 and C7 radiculitis  I have personally reviewed pertinent films in PACS

## 2020-02-04 ENCOUNTER — TELEPHONE (OUTPATIENT)
Dept: PAIN MEDICINE | Facility: CLINIC | Age: 65
End: 2020-02-04

## 2020-02-06 ENCOUNTER — TRANSCRIBE ORDERS (OUTPATIENT)
Dept: ADMINISTRATIVE | Facility: HOSPITAL | Age: 65
End: 2020-02-06

## 2020-02-06 DIAGNOSIS — Z12.31 ENCOUNTER FOR SCREENING MAMMOGRAM FOR MALIGNANT NEOPLASM OF BREAST: Primary | ICD-10-CM

## 2020-02-07 DIAGNOSIS — Z12.31 VISIT FOR SCREENING MAMMOGRAM: Primary | ICD-10-CM

## 2020-02-14 NOTE — TELEPHONE ENCOUNTER
S/w pt for update after steroid pack, she said it did help  Pt said she went for a massage on Monday and her neck felt like it was being compressed for the first few days after but now today it feels better  Pt asked if she can resume her Aleve, RN informed pt that she can  Pt wants to know if there is anything SL can give her to take along as needed for pain for a cruise that she has in 2 weeks?

## 2020-02-14 NOTE — TELEPHONE ENCOUNTER
Her call us in one week to see how she is doing and then can prescribe something if still in pain after resuming Aleve

## 2020-02-15 ENCOUNTER — HOSPITAL ENCOUNTER (OUTPATIENT)
Dept: BONE DENSITY | Facility: CLINIC | Age: 65
Discharge: HOME | End: 2020-02-15
Payer: COMMERCIAL

## 2020-02-15 VITALS — WEIGHT: 236 LBS | HEIGHT: 67 IN | BODY MASS INDEX: 37.04 KG/M2

## 2020-02-15 DIAGNOSIS — Z12.31 ENCOUNTER FOR SCREENING MAMMOGRAM FOR MALIGNANT NEOPLASM OF BREAST: ICD-10-CM

## 2020-02-15 PROCEDURE — 77067 SCR MAMMO BI INCL CAD: CPT

## 2020-02-15 PROCEDURE — 77063 BREAST TOMOSYNTHESIS BI: CPT

## 2020-02-21 ENCOUNTER — OFFICE VISIT (OUTPATIENT)
Dept: FAMILY MEDICINE CLINIC | Facility: HOSPITAL | Age: 65
End: 2020-02-21
Payer: COMMERCIAL

## 2020-02-21 VITALS
TEMPERATURE: 97.5 F | WEIGHT: 237 LBS | HEART RATE: 64 BPM | HEIGHT: 67 IN | SYSTOLIC BLOOD PRESSURE: 148 MMHG | DIASTOLIC BLOOD PRESSURE: 88 MMHG | BODY MASS INDEX: 37.2 KG/M2

## 2020-02-21 DIAGNOSIS — K11.20 SIALOADENITIS: Primary | ICD-10-CM

## 2020-02-21 DIAGNOSIS — H61.21 IMPACTED CERUMEN OF RIGHT EAR: ICD-10-CM

## 2020-02-21 PROCEDURE — 3077F SYST BP >= 140 MM HG: CPT | Performed by: FAMILY MEDICINE

## 2020-02-21 PROCEDURE — 69209 REMOVE IMPACTED EAR WAX UNI: CPT | Performed by: FAMILY MEDICINE

## 2020-02-21 PROCEDURE — 3079F DIAST BP 80-89 MM HG: CPT | Performed by: FAMILY MEDICINE

## 2020-02-21 PROCEDURE — 99213 OFFICE O/P EST LOW 20 MIN: CPT | Performed by: FAMILY MEDICINE

## 2020-02-21 RX ORDER — CLINDAMYCIN HYDROCHLORIDE 150 MG/1
150 CAPSULE ORAL EVERY 8 HOURS SCHEDULED
Qty: 21 CAPSULE | Refills: 0 | Status: SHIPPED | OUTPATIENT
Start: 2020-02-21 | End: 2020-02-26 | Stop reason: SDUPTHER

## 2020-02-21 NOTE — PROGRESS NOTES
Ear cerumen removal  Date/Time: 2/21/2020 3:56 PM  Performed by: Willy Willis MD  Authorized by: Willy Willis MD     Patient location:  Clinic  Other Assisting Provider: No    Consent:     Consent obtained:  Verbal    Consent given by:  Patient    Risks discussed:  Pain and incomplete removal    Alternatives discussed:  Observation  Universal protocol:     Procedure explained and questions answered to patient or proxy's satisfaction: yes      Patient identity confirmed:  Verbally with patient  Procedure details:     Local anesthetic:  None    Location:  R ear    Procedure type: irrigation only      Approach:  Natural orifice  Post-procedure details:     Complication:  None    Patient tolerance of procedure: Tolerated well, no immediate complications      Assessment/Plan:         Diagnoses and all orders for this visit:    Sialoadenitis  Comments:  Unlikely parotitis since afebrile and unilateral   Orders:  -     clindamycin (CLEOCIN) 150 mg capsule; Take 1 capsule (150 mg total) by mouth every 8 (eight) hours for 7 days    Impacted cerumen of right ear  -     Ear cerumen removal          Subjective:      Patient ID: Piter Martino is a 62 y o  female  Acute visit for R ear pain  Noted over past 2 days  Will be traveling next week    Hurts to bite or chew in the mandibular, not temporal area    No hearing difficulty or drainage, no sinus congestion    Had a tooth R maxillary worked on about 2 weeks ago, was given antibiotic at that time      The following portions of the patient's history were reviewed and updated as appropriate: allergies, current medications, past family history, past medical history, past social history, past surgical history and problem list     Review of Systems   Constitutional: Negative for unexpected weight change  HENT: Positive for ear pain  Negative for congestion, dental problem, sinus pain and trouble swallowing  Eyes: Negative  Respiratory: Negative      Cardiovascular: Negative  Gastrointestinal: Negative  Musculoskeletal: Negative  Neurological: Negative  Hematological: Negative  Psychiatric/Behavioral: Negative  All other systems reviewed and are negative  Objective:      /88   Pulse 64   Temp 97 5 °F (36 4 °C)   Ht 5' 7" (1 702 m)   Wt 108 kg (237 lb)   BMI 37 12 kg/m²          Physical Exam   Constitutional: She appears well-developed and well-nourished  HENT:   Head: Normocephalic  Right Ear: External ear normal  A foreign body is present  Left Ear: Tympanic membrane and ear canal normal    Mouth/Throat: Uvula is midline, oropharynx is clear and moist and mucous membranes are normal    Tenderness and mild swelling without erythema   Neck: No thyroid mass present  Cardiovascular: Normal rate, regular rhythm, normal heart sounds and intact distal pulses  Pulmonary/Chest: Effort normal and breath sounds normal    Nursing note and vitals reviewed

## 2020-02-26 ENCOUNTER — TELEPHONE (OUTPATIENT)
Dept: FAMILY MEDICINE CLINIC | Facility: HOSPITAL | Age: 65
End: 2020-02-26

## 2020-02-26 DIAGNOSIS — K11.20 SIALOADENITIS: ICD-10-CM

## 2020-02-26 RX ORDER — CLINDAMYCIN HYDROCHLORIDE 300 MG/1
300 CAPSULE ORAL EVERY 8 HOURS SCHEDULED
Qty: 21 CAPSULE | Refills: 0 | Status: SHIPPED | OUTPATIENT
Start: 2020-02-26 | End: 2020-03-04

## 2020-02-26 RX ORDER — CLINDAMYCIN HYDROCHLORIDE 300 MG/1
300 CAPSULE ORAL EVERY 8 HOURS SCHEDULED
Qty: 21 CAPSULE | Refills: 0 | Status: SHIPPED | OUTPATIENT
Start: 2020-02-26 | End: 2020-02-26 | Stop reason: SDUPTHER

## 2020-02-26 NOTE — TELEPHONE ENCOUNTER
Pt was on antibiotics, going away and she is leaving on vacation  She is hacking and wants antibiotics to take along    PCB

## 2020-02-26 NOTE — TELEPHONE ENCOUNTER
I did a higher strength of the Clindamycin  I'm sorry I sent it at first to [facility]- please call there to cancel that  I did send it to [facility]

## 2020-02-26 NOTE — TELEPHONE ENCOUNTER
Patient on last day of abx  Still is not feeling well - only slightly better  Said she was instructed to call when she finished abx if no better    [facility]ses [facility]    PCB - ok to leave message

## 2020-03-09 ENCOUNTER — OFFICE VISIT (OUTPATIENT)
Dept: FAMILY MEDICINE CLINIC | Facility: HOSPITAL | Age: 65
End: 2020-03-09
Payer: COMMERCIAL

## 2020-03-09 ENCOUNTER — HOSPITAL ENCOUNTER (OUTPATIENT)
Dept: RADIOLOGY | Facility: HOSPITAL | Age: 65
Discharge: HOME | End: 2020-03-09
Payer: COMMERCIAL

## 2020-03-09 VITALS
HEIGHT: 67 IN | TEMPERATURE: 98 F | WEIGHT: 239 LBS | HEART RATE: 87 BPM | SYSTOLIC BLOOD PRESSURE: 156 MMHG | DIASTOLIC BLOOD PRESSURE: 88 MMHG | BODY MASS INDEX: 37.51 KG/M2

## 2020-03-09 DIAGNOSIS — R05.8 SPASMODIC COUGH: ICD-10-CM

## 2020-03-09 DIAGNOSIS — R05.8 SPASMODIC COUGH: Primary | ICD-10-CM

## 2020-03-09 DIAGNOSIS — I10 ESSENTIAL HYPERTENSION: ICD-10-CM

## 2020-03-09 PROCEDURE — 3077F SYST BP >= 140 MM HG: CPT | Performed by: FAMILY MEDICINE

## 2020-03-09 PROCEDURE — 99213 OFFICE O/P EST LOW 20 MIN: CPT | Performed by: FAMILY MEDICINE

## 2020-03-09 PROCEDURE — 3008F BODY MASS INDEX DOCD: CPT | Performed by: FAMILY MEDICINE

## 2020-03-09 PROCEDURE — 3079F DIAST BP 80-89 MM HG: CPT | Performed by: FAMILY MEDICINE

## 2020-03-09 RX ORDER — PROMETHAZINE HYDROCHLORIDE AND CODEINE PHOSPHATE 6.25; 1 MG/5ML; MG/5ML
5 SYRUP ORAL EVERY 4 HOURS PRN
Qty: 120 ML | Refills: 0 | Status: SHIPPED | OUTPATIENT
Start: 2020-03-09 | End: 2020-08-24 | Stop reason: ALTCHOICE

## 2020-03-09 RX ORDER — MONTELUKAST SODIUM 10 MG/1
10 TABLET ORAL
Qty: 30 TABLET | Refills: 1 | Status: SHIPPED | OUTPATIENT
Start: 2020-03-09 | End: 2020-04-07 | Stop reason: SDUPTHER

## 2020-03-09 NOTE — PROGRESS NOTES
Assessment/Plan:         Diagnoses and all orders for this visit:    Spasmodic cough  -     Cancel: XR chest pa & lateral; Future  -     montelukast (SINGULAIR) 10 mg tablet; Take 1 tablet (10 mg total) by mouth daily at bedtime  -     promethazine-codeine (PHENERGAN WITH CODEINE) 6 25-10 mg/5 mL syrup; Take 5 mL by mouth every 4 (four) hours as needed for cough    Essential hypertension  Comments:  Again consider Losartan as factor in cough exacerbation despite being on it for years          Subjective:      Patient ID: Fady Lee is a 62 y o  female  Acute visit for cough  Was on cruise to [place] and developed a dry cough the day before she got home    Completed the Cleocin and is using Albuterol inhaler with benefit      The following portions of the patient's history were reviewed and updated as appropriate: allergies, current medications, past family history, past medical history, past social history, past surgical history and problem list     Review of Systems   HENT: Negative  Respiratory: Positive for cough, choking, chest tightness and wheezing  Cardiovascular: Negative  Gastrointestinal: Negative  All other systems reviewed and are negative  Objective:      /88   Pulse 87   Temp 98 °F (36 7 °C)   Ht 5' 7" (1 702 m)   Wt 108 kg (239 lb)   BMI 37 43 kg/m²          Physical Exam   Constitutional: She is oriented to person, place, and time  She appears well-developed and well-nourished  Cardiovascular: Normal rate, regular rhythm, normal heart sounds and intact distal pulses  Pulmonary/Chest: Effort normal  She has wheezes  She has no rales  Neurological: She is alert and oriented to person, place, and time  Nursing note and vitals reviewed

## 2020-03-10 DIAGNOSIS — J20.9 BRONCHITIS, ACUTE, WITH BRONCHOSPASM: ICD-10-CM

## 2020-03-10 RX ORDER — ALBUTEROL SULFATE 90 UG/1
2 AEROSOL, METERED RESPIRATORY (INHALATION) EVERY 6 HOURS PRN
Qty: 1 INHALER | Refills: 2 | Status: SHIPPED | OUTPATIENT
Start: 2020-03-10 | End: 2021-04-26 | Stop reason: SDUPTHER

## 2020-03-11 ENCOUNTER — TELEPHONE (OUTPATIENT)
Dept: FAMILY MEDICINE CLINIC | Facility: HOSPITAL | Age: 65
End: 2020-03-11

## 2020-03-11 DIAGNOSIS — I10 ESSENTIAL HYPERTENSION: Primary | ICD-10-CM

## 2020-03-11 RX ORDER — AMLODIPINE BESYLATE 5 MG/1
5 TABLET ORAL DAILY
Qty: 30 TABLET | Refills: 5 | Status: SHIPPED | OUTPATIENT
Start: 2020-03-11 | End: 2020-03-25 | Stop reason: SDUPTHER

## 2020-03-11 NOTE — TELEPHONE ENCOUNTER
Spoke to patient  Still waking up at night choking from the cough  She has been taking singular and a dose of promethazine-codeine at bedtime  Does not take the cough medication during the day  Also using the inhaler when she wakes up in the middle of the night  Symptoms the same since her appt  Occasional phlegm with cough  No runny nose  No fever

## 2020-03-12 ENCOUNTER — TELEPHONE (OUTPATIENT)
Dept: FAMILY MEDICINE CLINIC | Facility: HOSPITAL | Age: 65
End: 2020-03-12

## 2020-03-12 DIAGNOSIS — J20.9 BRONCHITIS, ACUTE, WITH BRONCHOSPASM: Primary | ICD-10-CM

## 2020-03-12 RX ORDER — BENZONATATE 200 MG/1
200 CAPSULE ORAL 3 TIMES DAILY PRN
Qty: 90 CAPSULE | Refills: 1 | Status: SHIPPED | OUTPATIENT
Start: 2020-03-12 | End: 2020-09-08

## 2020-03-12 NOTE — TELEPHONE ENCOUNTER
PATIENT WANTS REFILL ON HER BENZONATATE 200MG SENT TO [facility] - I COULD NOT FIND ON MED LIST - PLEASE ADVISE

## 2020-03-16 ENCOUNTER — OFFICE VISIT (OUTPATIENT)
Dept: PULMONOLOGY | Facility: CLINIC | Age: 65
End: 2020-03-16
Payer: COMMERCIAL

## 2020-03-16 VITALS
HEART RATE: 82 BPM | SYSTOLIC BLOOD PRESSURE: 160 MMHG | DIASTOLIC BLOOD PRESSURE: 90 MMHG | OXYGEN SATURATION: 97 % | BODY MASS INDEX: 35.28 KG/M2 | WEIGHT: 224.8 LBS | TEMPERATURE: 99.2 F | HEIGHT: 67 IN

## 2020-03-16 DIAGNOSIS — J45.20 MILD INTERMITTENT ASTHMA WITHOUT COMPLICATION: Primary | ICD-10-CM

## 2020-03-16 DIAGNOSIS — R05.8 SPASMODIC COUGH: ICD-10-CM

## 2020-03-16 PROBLEM — J20.9 BRONCHITIS, ACUTE, WITH BRONCHOSPASM: Status: RESOLVED | Noted: 2019-06-28 | Resolved: 2020-03-16

## 2020-03-16 PROBLEM — J18.9 COMMUNITY ACQUIRED PNEUMONIA OF RIGHT LOWER LOBE OF LUNG: Status: RESOLVED | Noted: 2019-09-20 | Resolved: 2020-03-16

## 2020-03-16 PROCEDURE — 3080F DIAST BP >= 90 MM HG: CPT | Performed by: INTERNAL MEDICINE

## 2020-03-16 PROCEDURE — 3008F BODY MASS INDEX DOCD: CPT | Performed by: INTERNAL MEDICINE

## 2020-03-16 PROCEDURE — 3077F SYST BP >= 140 MM HG: CPT | Performed by: INTERNAL MEDICINE

## 2020-03-16 PROCEDURE — 1036F TOBACCO NON-USER: CPT | Performed by: INTERNAL MEDICINE

## 2020-03-16 PROCEDURE — 99214 OFFICE O/P EST MOD 30 MIN: CPT | Performed by: INTERNAL MEDICINE

## 2020-03-16 RX ORDER — FLUTICASONE FUROATE AND VILANTEROL 100; 25 UG/1; UG/1
1 POWDER RESPIRATORY (INHALATION) DAILY
Qty: 1 INHALER | Refills: 5 | Status: SHIPPED | OUTPATIENT
Start: 2020-03-16 | End: 2020-09-08 | Stop reason: SDUPTHER

## 2020-03-16 NOTE — PROGRESS NOTES
Pulmonary Follow Up Note   Aristeo Lundy 62 y o  female MRN: [id number]  3/16/2020      Assessment:    Spasmodic cough  The current episode appears to be precipitated by an acute viral illness from which the patient is suffering, although it is possible that her allergies are playing component as well  I feel like the latter is less likely due to the absence of ongoing sinus disease  She responds well to the p r n  Albuterol  To be clear: This patient does not have fevers, shortness of breath, does not have known exposures on her trip, and has a clear chest x-ray, and so I do not feel she merits testing for corona virus  Mild intermittent asthma without complication  Pulmonary function testing performed after her last visit was negative for a fixed obstruction  Given her significant allergic symptoms, and hyper reactivity to non noxious stimuli including different sense and perfumes, I suspect asthma is her most likely diagnosis  She is responding appropriately to bronchodilators subjectively  Since her triggers include occasional viral illnesses and are somewhat unpredictable, we elected to start Breo 100/25, and I gave the patient the flexibility to take it only during seasons which tend to be provoking her allergic symptoms  Plan:    Diagnoses and all orders for this visit:    Mild intermittent asthma without complication  -     fluticasone-vilanterol (BREO ELLIPTA) 100-25 mcg/inh inhaler; Inhale 1 puff daily Rinse mouth after use  Spasmodic cough      Return in about 6 months (around 9/16/2020)  History of Present Illness   HPI:  Aristeo Lundy is a 62 y o  female who presents for follow-up of cough  Of note, the patient was recently on a cruise in [place] and had the onset of a cough 1 week prior to presentation  She previously saw her primary care provider who ordered a chest x-ray which was clear  The patient has not had any fevers  The cough is not productive    The patient has not had any shortness of breath, and there were no known sick contacts or cases of corona virus on the cruise ship she was on  Her symptoms are relatively stagnant, not getting better or getting worse  She has coughing spasms multiple times per day, responsive to albuterol  These are worse at night  She had no other concerning symptoms  At her last visit, we put her through pulmonary function testing which did not reveal fixed obstruction  She did not have a bronchodilator administered  She does note that she has significant sinus disease and postnasal drip, along with bronchospastic reactions to things like perfumes and colognes  She will otherwise have bronchospasm during times of acute illness and during change of seasons when her allergies are out of control  I told her most likely underlying diagnosis was asthma, although we do not have testing that confirms that  Review of Systems   Constitutional: Negative for chills, fatigue and fever  HENT: Negative for postnasal drip, sinus pressure, sinus pain and sneezing  Respiratory: Positive for cough  Negative for shortness of breath and wheezing  Skin: Negative for rash  All other systems reviewed and are negative        Historical Information   Past Medical History:   Diagnosis Date    Abdominal pain     Bicuspid aortic valve     LAST ASSESSED [id number]    Cervical disc disease     last assessed [id number]    Cervical stenosis of spine     LAST ASSESSED [id number]    Disease of thyroid gland     hypothyroid    Dry eyes     Endometriosis     Hyperglycemia     Hyperlipidemia     Hypertension     Hypothyroidism     Left ventricular hypertrophy     Mitral valvular disorder     Nephrolithiasis     Ovarian cyst, complex     Ptosis     LAST ASSESSED [id number]    Renal calculi     Right cervical radiculopathy     LAST ASSESSED [id number]    Seasonal allergies      Past Surgical History:   Procedure Laterality Date    BASAL CELL CARCINOMA EXCISION      CARPAL TUNNEL RELEASE       SECTION      CHOLECYSTECTOMY      COLONOSCOPY      NEUROPLASTY / TRANSPOSITION MEDIAN NERVE AT CARPAL TUNNEL      NEUROPLASTY / TRANSPOSITION MEDIAN NERVE AT CARPAL TUNNEL      [facility]    OTHER SURGICAL HISTORY      surgically removed skin patch for skin cancer    PLANTAR FASCIECTOMY      NC COLONOSCOPY FLX DX W/COLLJ SPEC WHEN PFRMD N/A 10/9/2017    Procedure: COLONOSCOPY;  Surgeon: Michael Cantor MD;  Location: [facility];   Service: Gastroenterology    NC HYSTEROSCOPY,W/ENDO BX N/A 2018    Procedure: DILATATION AND CURETTAGE (D&C) WITH HYSTEROSCOPY;  Surgeon: Domenico Potter DO;  Location:  MAIN OR;  Service: Gynecology    TUBAL LIGATION       Family History   Problem Relation Age of Onset    Diabetes Mother     Alzheimer's disease Mother     Heart disease Mother     Hypertension Mother     Other Family         back disorder    Cancer Family     Heart disease Family     Thyroid disease Family     No Known Problems Father     No Known Problems Sister     No Known Problems Daughter     No Known Problems Sister      Meds/Allergies     Current Outpatient Medications:     albuterol (Ventolin HFA) 90 mcg/act inhaler, Inhale 2 puffs every 6 (six) hours as needed for wheezing, Disp: 1 Inhaler, Rfl: 2    amLODIPine (NORVASC) 5 mg tablet, Take 1 tablet (5 mg total) by mouth daily, Disp: 30 tablet, Rfl: 5    benzonatate (TESSALON) 200 MG capsule, Take 1 capsule (200 mg total) by mouth 3 (three) times a day as needed for cough, Disp: 90 capsule, Rfl: 1    guaiFENesin (MUCINEX) 600 mg 12 hr tablet, Take 1,200 mg by mouth every 12 (twelve) hours, Disp: , Rfl:     levothyroxine 125 mcg tablet, Take 1 tablet (125 mcg total) by mouth daily, Disp: 90 tablet, Rfl: 3    Loratadine (CLARITIN PO), Take by mouth, Disp: , Rfl:     losartan (COZAAR) 50 mg tablet, Take 1 tablet (50 mg total) by mouth daily, Disp: 90 tablet, Rfl: 3    montelukast (SINGULAIR) 10 mg tablet, Take 1 tablet (10 mg total) by mouth daily at bedtime, Disp: 30 tablet, Rfl: 1    promethazine-codeine (PHENERGAN WITH CODEINE) 6 25-10 mg/5 mL syrup, Take 5 mL by mouth every 4 (four) hours as needed for cough, Disp: 120 mL, Rfl: 0    XIIDRA 5 % op solution, , Disp: , Rfl:     fluticasone-vilanterol (BREO ELLIPTA) 100-25 mcg/inh inhaler, Inhale 1 puff daily Rinse mouth after use , Disp: 1 Inhaler, Rfl: 5  Allergies   Allergen Reactions    Erythromycin GI Intolerance     Reaction Date: 11Jul2011;     Penicillins Other (See Comments)     unknown    Aztreonam Rash     Action Taken: Allergy added by [employer] to replace Penicillins [employer];     Carbapenems Rash     Action Taken: Allergy added by [employer] to replace Penicillins [employer];     Cephalosporins Rash     Action Taken: Allergy added by [employer] to replace Penicillins [employer];     Flu Virus Vaccine Hives, Rash and GI Intolerance    Griseofulvin Rash     Action Taken: Allergy added by [employer] to replace Penicillins [employer];        Vitals: Blood pressure 160/90, pulse 82, temperature 99 2 °F (37 3 °C), temperature source Tympanic, height 5' 7" (1 702 m), weight 102 kg (224 lb 12 8 oz), SpO2 97 %  Body mass index is 35 21 kg/m²  Oxygen Therapy  SpO2: 97 %  Oxygen Therapy: None (Room air)      Physical Exam  Physical Exam   Constitutional: She is oriented to person, place, and time  She appears well-developed and well-nourished  No distress  HENT:   Head: Normocephalic and atraumatic  Mouth/Throat: No oropharyngeal exudate  Neck: No JVD present  Cardiovascular: Normal rate, regular rhythm and normal heart sounds  Exam reveals no gallop and no friction rub  No murmur heard  Pulmonary/Chest: Effort normal and breath sounds normal  No respiratory distress  She has no wheezes  She has no rales  Lymphadenopathy:     She has no cervical adenopathy     Neurological: She is alert and oriented to person, place, and time  Skin: Skin is warm and dry  No rash noted  Vitals reviewed  Labs: I have personally reviewed pertinent lab results  Lab Results   Component Value Date    WBC 7 75 06/28/2019    HGB 14 5 06/28/2019    HCT 43 9 06/28/2019    MCV 93 06/28/2019     06/28/2019     Lab Results   Component Value Date    GLUCOSE 104 08/14/2015    CALCIUM 9 7 06/28/2019     08/14/2015    K 3 8 06/28/2019    CO2 31 06/28/2019     06/28/2019    BUN 18 06/28/2019    CREATININE 0 83 06/28/2019     No results found for: IGE  Lab Results   Component Value Date    ALT 40 04/08/2019    AST 29 04/08/2019    ALKPHOS 79 04/08/2019    BILITOT 0 42 08/14/2015     Imaging and other studies: I have personally reviewed pertinent films in PACS  Agree that chest x-ray from Thursday was within normal limits  Pulmonary function testing:   FEV1/FVC ratio 78 %    FEV1 73 % predicted  FVC 73 % predicted   % predicted   % predicted  DLCO corrected for hemoglobin 65 % predicted    EKG, Pathology, and Other Studies: I have personally reviewed pertinent films in PACS    [facility]

## 2020-03-16 NOTE — ASSESSMENT & PLAN NOTE
The current episode appears to be precipitated by an acute viral illness from which the patient is suffering, although it is possible that her allergies are playing component as well  I feel like the latter is less likely due to the absence of ongoing sinus disease  She responds well to the p r n  Albuterol  To be clear: This patient does not have fevers, shortness of breath, does not have known exposures on her trip, and has a clear chest x-ray, and so I do not feel she merits testing for corona virus

## 2020-03-16 NOTE — ASSESSMENT & PLAN NOTE
Pulmonary function testing performed after her last visit was negative for a fixed obstruction  Given her significant allergic symptoms, and hyper reactivity to non noxious stimuli including different sense and perfumes, I suspect asthma is her most likely diagnosis  She is responding appropriately to bronchodilators subjectively  Since her triggers include occasional viral illnesses and are somewhat unpredictable, we elected to start Breo 100/25, and I gave the patient the flexibility to take it only during seasons which tend to be provoking her allergic symptoms

## 2020-03-18 ENCOUNTER — OFFICE VISIT (OUTPATIENT)
Dept: FAMILY MEDICINE CLINIC | Facility: HOSPITAL | Age: 65
End: 2020-03-18
Payer: COMMERCIAL

## 2020-03-18 VITALS
HEART RATE: 82 BPM | WEIGHT: 238.4 LBS | HEIGHT: 65 IN | OXYGEN SATURATION: 94 % | DIASTOLIC BLOOD PRESSURE: 78 MMHG | BODY MASS INDEX: 39.72 KG/M2 | SYSTOLIC BLOOD PRESSURE: 162 MMHG | TEMPERATURE: 97.9 F

## 2020-03-18 DIAGNOSIS — K76.0 FATTY INFILTRATION OF LIVER: ICD-10-CM

## 2020-03-18 DIAGNOSIS — E66.01 CLASS 2 SEVERE OBESITY DUE TO EXCESS CALORIES WITH SERIOUS COMORBIDITY AND BODY MASS INDEX (BMI) OF 39.0 TO 39.9 IN ADULT (HCC): ICD-10-CM

## 2020-03-18 DIAGNOSIS — Q23.1 BICUSPID AORTIC VALVE: ICD-10-CM

## 2020-03-18 DIAGNOSIS — E78.2 MIXED HYPERLIPIDEMIA: ICD-10-CM

## 2020-03-18 DIAGNOSIS — J45.20 MILD INTERMITTENT ASTHMA WITHOUT COMPLICATION: ICD-10-CM

## 2020-03-18 DIAGNOSIS — R73.9 HYPERGLYCEMIA: ICD-10-CM

## 2020-03-18 DIAGNOSIS — I10 ESSENTIAL HYPERTENSION: Primary | ICD-10-CM

## 2020-03-18 DIAGNOSIS — E03.9 ACQUIRED HYPOTHYROIDISM: ICD-10-CM

## 2020-03-18 PROCEDURE — 3077F SYST BP >= 140 MM HG: CPT | Performed by: FAMILY MEDICINE

## 2020-03-18 PROCEDURE — 99214 OFFICE O/P EST MOD 30 MIN: CPT | Performed by: FAMILY MEDICINE

## 2020-03-18 PROCEDURE — 3078F DIAST BP <80 MM HG: CPT | Performed by: FAMILY MEDICINE

## 2020-03-18 PROCEDURE — 1036F TOBACCO NON-USER: CPT | Performed by: FAMILY MEDICINE

## 2020-03-18 NOTE — PROGRESS NOTES
Assessment/Plan:         Diagnoses and all orders for this visit:    Essential hypertension  -     CBC and differential; Future  -     Comprehensive metabolic panel; Future    Acquired hypothyroidism  -     TSH, 3rd generation with Free T4 reflex; Future    Mild intermittent asthma without complication    Fatty infiltration of liver    Class 2 severe obesity due to excess calories with serious comorbidity and body mass index (BMI) of 39 0 to 39 9 in adult (HCC)    Hyperglycemia  -     HEMOGLOBIN A1C W/ EAG ESTIMATION; Future    Mixed hyperlipidemia  -     Lipid Panel with Direct LDL reflex; Future    Bicuspid aortic valve  -     Echo complete with contrast if indicated; Future          Subjective:      Patient ID: Jyoti Almonte is a 62 y o  female  4 month follow up    Recent evaluation with Dr Milad Xavier for evaluation of cough  We discussed adding in Nexium at bedtime for possible silent reflux especially since cough is worse at night when lying down  Reviewed studies done so far  Discussed need also for recheck of echo in view of bicuspid aortic valve  The following portions of the patient's history were reviewed and updated as appropriate: allergies, current medications, past family history, past medical history, past social history, past surgical history and problem list     Review of Systems   Constitutional: Negative for unexpected weight change  HENT: Positive for postnasal drip  Respiratory: Positive for cough and wheezing  Cardiovascular: Negative  Gastrointestinal: Negative  Negative for abdominal pain  Genitourinary: Negative  Musculoskeletal: Negative  Neurological: Negative  Hematological: Negative  Psychiatric/Behavioral: Negative  All other systems reviewed and are negative          Objective:      /78   Pulse 82   Temp 97 9 °F (36 6 °C) (Tympanic)   Ht 5' 5" (1 651 m)   Wt 108 kg (238 lb 6 4 oz)   SpO2 94%   BMI 39 67 kg/m²          Physical Exam Constitutional: She is oriented to person, place, and time  She appears well-developed and well-nourished  HENT:   Head: Normocephalic and atraumatic  Right Ear: External ear normal    Left Ear: External ear normal    Eyes: Pupils are equal, round, and reactive to light  Neck: Thyromegaly present  Cardiovascular: Normal rate and regular rhythm  Murmur heard  Pulmonary/Chest: Effort normal  She has decreased breath sounds  She has no wheezes  She has no rhonchi  She has no rales  Musculoskeletal: She exhibits no edema  Neurological: She is oriented to person, place, and time  Skin: No erythema  Psychiatric: She has a normal mood and affect  Her behavior is normal  Judgment and thought content normal    Nursing note and vitals reviewed

## 2020-03-25 DIAGNOSIS — I10 ESSENTIAL HYPERTENSION: ICD-10-CM

## 2020-03-25 RX ORDER — AMLODIPINE BESYLATE 5 MG/1
10 TABLET ORAL DAILY
Qty: 30 TABLET | Refills: 5 | Status: SHIPPED | OUTPATIENT
Start: 2020-03-25 | End: 2020-03-25 | Stop reason: SDUPTHER

## 2020-03-25 RX ORDER — AMLODIPINE BESYLATE 10 MG/1
10 TABLET ORAL DAILY
Qty: 30 TABLET | Refills: 0 | Status: SHIPPED | OUTPATIENT
Start: 2020-03-25 | End: 2020-04-20 | Stop reason: SDUPTHER

## 2020-03-25 NOTE — TELEPHONE ENCOUNTER
10mgs once daily- this is the new rx per Dr Quintana Handler at last visit    Pt only wants 30 days to [facility] [place] this time and then will call back later to change to bigger supply to mail order

## 2020-04-07 DIAGNOSIS — R05.8 SPASMODIC COUGH: ICD-10-CM

## 2020-04-07 RX ORDER — MONTELUKAST SODIUM 10 MG/1
10 TABLET ORAL
Qty: 30 TABLET | Refills: 1 | Status: SHIPPED | OUTPATIENT
Start: 2020-04-07 | End: 2020-08-24 | Stop reason: ALTCHOICE

## 2020-04-20 DIAGNOSIS — I10 ESSENTIAL HYPERTENSION: ICD-10-CM

## 2020-04-20 RX ORDER — AMLODIPINE BESYLATE 10 MG/1
10 TABLET ORAL DAILY
Qty: 90 TABLET | Refills: 2 | Status: SHIPPED | OUTPATIENT
Start: 2020-04-20 | End: 2020-11-13

## 2020-06-12 ENCOUNTER — TELEPHONE (OUTPATIENT)
Dept: FAMILY MEDICINE CLINIC | Facility: HOSPITAL | Age: 65
End: 2020-06-12

## 2020-06-12 DIAGNOSIS — R60.1 GENERALIZED EDEMA: Primary | ICD-10-CM

## 2020-06-12 RX ORDER — FUROSEMIDE 20 MG/1
20 TABLET ORAL DAILY
Qty: 30 TABLET | Refills: 5 | Status: SHIPPED | OUTPATIENT
Start: 2020-06-12 | End: 2020-09-11 | Stop reason: ALTCHOICE

## 2020-07-14 ENCOUNTER — HOSPITAL ENCOUNTER (OUTPATIENT)
Dept: NON INVASIVE DIAGNOSTICS | Age: 65
Discharge: HOME | End: 2020-07-14
Payer: COMMERCIAL

## 2020-07-14 DIAGNOSIS — Q23.1 BICUSPID AORTIC VALVE: ICD-10-CM

## 2020-07-14 PROCEDURE — 93306 TTE W/DOPPLER COMPLETE: CPT | Performed by: INTERNAL MEDICINE

## 2020-07-14 PROCEDURE — 93306 TTE W/DOPPLER COMPLETE: CPT

## 2020-07-21 ENCOUNTER — TELEPHONE (OUTPATIENT)
Dept: FAMILY MEDICINE CLINIC | Facility: HOSPITAL | Age: 65
End: 2020-07-21

## 2020-07-21 NOTE — TELEPHONE ENCOUNTER
Would advise to try the 20 mg 1/2 tab bid rather then 20 mg bid - increasing dose of diuretic can lead to electrolyte abnormalities and kidney failure - would rather try 20 mg 1/2 tab bid - swelling always worse later in day as we are on our feet all day - would advise elevate legs intermittently though out the day and avoid adding salt to diet and avoid high sodium foods (chips/pretzels/deli meats/boxed or canned foods), also avoiding NSAIDS (Ibuprofen/Aleve/Advil/Motrin/Excedrin) as they cause swelling as well

## 2020-08-24 ENCOUNTER — OFFICE VISIT (OUTPATIENT)
Dept: FAMILY MEDICINE CLINIC | Facility: HOSPITAL | Age: 65
End: 2020-08-24
Payer: COMMERCIAL

## 2020-08-24 VITALS
HEIGHT: 65 IN | BODY MASS INDEX: 38.15 KG/M2 | DIASTOLIC BLOOD PRESSURE: 86 MMHG | HEART RATE: 93 BPM | SYSTOLIC BLOOD PRESSURE: 144 MMHG | TEMPERATURE: 97.7 F | WEIGHT: 229 LBS

## 2020-08-24 DIAGNOSIS — E78.2 MIXED HYPERLIPIDEMIA: ICD-10-CM

## 2020-08-24 DIAGNOSIS — I10 ESSENTIAL HYPERTENSION: Primary | ICD-10-CM

## 2020-08-24 DIAGNOSIS — Q23.1 BICUSPID AORTIC VALVE: ICD-10-CM

## 2020-08-24 DIAGNOSIS — E03.9 ACQUIRED HYPOTHYROIDISM: ICD-10-CM

## 2020-08-24 DIAGNOSIS — R73.9 HYPERGLYCEMIA: ICD-10-CM

## 2020-08-24 DIAGNOSIS — Z23 ENCOUNTER FOR IMMUNIZATION: ICD-10-CM

## 2020-08-24 DIAGNOSIS — I05.9 MITRAL VALVULAR DISORDER: ICD-10-CM

## 2020-08-24 DIAGNOSIS — K76.0 FATTY INFILTRATION OF LIVER: ICD-10-CM

## 2020-08-24 PROCEDURE — 1036F TOBACCO NON-USER: CPT | Performed by: FAMILY MEDICINE

## 2020-08-24 PROCEDURE — 3008F BODY MASS INDEX DOCD: CPT | Performed by: FAMILY MEDICINE

## 2020-08-24 PROCEDURE — 99214 OFFICE O/P EST MOD 30 MIN: CPT | Performed by: FAMILY MEDICINE

## 2020-08-24 PROCEDURE — 90715 TDAP VACCINE 7 YRS/> IM: CPT

## 2020-08-24 PROCEDURE — 3079F DIAST BP 80-89 MM HG: CPT | Performed by: FAMILY MEDICINE

## 2020-08-24 PROCEDURE — 90471 IMMUNIZATION ADMIN: CPT

## 2020-08-24 PROCEDURE — 3077F SYST BP >= 140 MM HG: CPT | Performed by: FAMILY MEDICINE

## 2020-08-24 NOTE — PROGRESS NOTES
Assessment/Plan:         Diagnoses and all orders for this visit:    Essential hypertension  Comments:  Labile hypertension  Moderate edema from Amlodipine  Keep same until cardiac evaluation  Orders:  -     Ambulatory referral to Cardiology; Future    Mitral valvular disorder  Comments:  Moderate mitral disease, likely reason for murmur  Orders:  -     Ambulatory referral to Cardiology; Future    Bicuspid aortic valve  Comments:  Uncertain diagnosis- indicates assessed in 2012 but subsequent echos dont bear this out  Acquired hypothyroidism  Comments:  Clinically euthyroid    Hyperglycemia    Fatty infiltration of liver    Mixed hyperlipidemia    Encounter for immunization  -     Cancel: influenza vaccine, quadrivalent, recombinant, PF, 0 5 mL, for patients 18 yr+ (FLUBLOK)  -     TDAP VACCINE GREATER THAN OR EQUAL TO 8YO IM          Subjective:      Patient ID: Maria A Wilkes is a 62 y o  female  3 month follow up  No recent illness or injury  No COVID contact  Hasnt been dining out     Medication list reviewed, not thrilled about her medication for BP  Having a lot of edema even with taking 1/2 BID of Amlodipine  No benefit with Furosemide  Reviewed most recent echo- poorly viewed aortic valve  Moderate sclerosis of mitral valve      The following portions of the patient's history were reviewed and updated as appropriate: allergies, current medications, past family history, past medical history, past social history, past surgical history and problem list     Review of Systems   Constitutional: Positive for fatigue  Negative for unexpected weight change  HENT: Negative  Respiratory: Positive for cough  Negative for shortness of breath and wheezing  Cardiovascular: Positive for leg swelling  Genitourinary: Negative  Musculoskeletal: Negative  Neurological: Negative  Psychiatric/Behavioral: Negative  All other systems reviewed and are negative          Objective:      /86 Pulse 93   Temp 97 7 °F (36 5 °C)   Ht 5' 5" (1 651 m)   Wt 104 kg (229 lb)   BMI 38 11 kg/m²          Physical Exam  Vitals signs and nursing note reviewed  Constitutional:       Appearance: Normal appearance  HENT:      Head: Normocephalic  Cardiovascular:      Rate and Rhythm: Normal rate and regular rhythm  Heart sounds: Murmur present  Pulmonary:      Effort: Pulmonary effort is normal       Breath sounds: Normal breath sounds  Musculoskeletal:         General: Swelling present  Right lower leg: Edema present  Left lower leg: Edema present  Neurological:      Mental Status: She is alert  Psychiatric:         Mood and Affect: Mood normal          Behavior: Behavior normal          Thought Content: Thought content normal          Judgment: Judgment normal        BMI Counseling: Body mass index is 38 11 kg/m²  The BMI is above normal  Nutrition recommendations include reducing portion sizes, decreasing overall calorie intake and moderation in carbohydrate intake  Exercise recommendations include exercising 3-5 times per week

## 2020-08-25 ENCOUNTER — APPOINTMENT (OUTPATIENT)
Dept: LAB | Facility: CLINIC | Age: 65
End: 2020-08-25
Payer: COMMERCIAL

## 2020-08-25 DIAGNOSIS — E78.2 MIXED HYPERLIPIDEMIA: ICD-10-CM

## 2020-08-25 DIAGNOSIS — E03.9 ACQUIRED HYPOTHYROIDISM: ICD-10-CM

## 2020-08-25 DIAGNOSIS — I10 ESSENTIAL HYPERTENSION: ICD-10-CM

## 2020-08-25 DIAGNOSIS — R73.9 HYPERGLYCEMIA: ICD-10-CM

## 2020-08-25 LAB
ALBUMIN SERPL BCP-MCNC: 4 G/DL (ref 3.5–5)
ALP SERPL-CCNC: 92 U/L (ref 46–116)
ALT SERPL W P-5'-P-CCNC: 38 U/L (ref 12–78)
ANION GAP SERPL CALCULATED.3IONS-SCNC: 6 MMOL/L (ref 4–13)
AST SERPL W P-5'-P-CCNC: 25 U/L (ref 5–45)
BASOPHILS # BLD AUTO: 0.09 THOUSANDS/ΜL (ref 0–0.1)
BASOPHILS NFR BLD AUTO: 1 % (ref 0–1)
BILIRUB SERPL-MCNC: 0.67 MG/DL (ref 0.2–1)
BUN SERPL-MCNC: 16 MG/DL (ref 5–25)
CALCIUM SERPL-MCNC: 9.1 MG/DL (ref 8.3–10.1)
CHLORIDE SERPL-SCNC: 105 MMOL/L (ref 100–108)
CHOLEST SERPL-MCNC: 170 MG/DL (ref 50–200)
CO2 SERPL-SCNC: 28 MMOL/L (ref 21–32)
CREAT SERPL-MCNC: 0.73 MG/DL (ref 0.6–1.3)
EOSINOPHIL # BLD AUTO: 0.73 THOUSAND/ΜL (ref 0–0.61)
EOSINOPHIL NFR BLD AUTO: 8 % (ref 0–6)
ERYTHROCYTE [DISTWIDTH] IN BLOOD BY AUTOMATED COUNT: 13.7 % (ref 11.6–15.1)
EST. AVERAGE GLUCOSE BLD GHB EST-MCNC: 131 MG/DL
GFR SERPL CREATININE-BSD FRML MDRD: 88 ML/MIN/1.73SQ M
GLUCOSE P FAST SERPL-MCNC: 119 MG/DL (ref 65–99)
HBA1C MFR BLD: 6.2 %
HCT VFR BLD AUTO: 46.7 % (ref 34.8–46.1)
HDLC SERPL-MCNC: 31 MG/DL
HGB BLD-MCNC: 15.4 G/DL (ref 11.5–15.4)
IMM GRANULOCYTES # BLD AUTO: 0.04 THOUSAND/UL (ref 0–0.2)
IMM GRANULOCYTES NFR BLD AUTO: 0 % (ref 0–2)
LDLC SERPL CALC-MCNC: 104 MG/DL (ref 0–100)
LYMPHOCYTES # BLD AUTO: 1.76 THOUSANDS/ΜL (ref 0.6–4.47)
LYMPHOCYTES NFR BLD AUTO: 18 % (ref 14–44)
MCH RBC QN AUTO: 30.1 PG (ref 26.8–34.3)
MCHC RBC AUTO-ENTMCNC: 33 G/DL (ref 31.4–37.4)
MCV RBC AUTO: 91 FL (ref 82–98)
MONOCYTES # BLD AUTO: 0.96 THOUSAND/ΜL (ref 0.17–1.22)
MONOCYTES NFR BLD AUTO: 10 % (ref 4–12)
NEUTROPHILS # BLD AUTO: 6.14 THOUSANDS/ΜL (ref 1.85–7.62)
NEUTS SEG NFR BLD AUTO: 63 % (ref 43–75)
NRBC BLD AUTO-RTO: 0 /100 WBCS
PLATELET # BLD AUTO: 239 THOUSANDS/UL (ref 149–390)
PMV BLD AUTO: 10.6 FL (ref 8.9–12.7)
POTASSIUM SERPL-SCNC: 3.3 MMOL/L (ref 3.5–5.3)
PROT SERPL-MCNC: 7.8 G/DL (ref 6.4–8.2)
RBC # BLD AUTO: 5.12 MILLION/UL (ref 3.81–5.12)
SODIUM SERPL-SCNC: 139 MMOL/L (ref 136–145)
TRIGL SERPL-MCNC: 173 MG/DL
TSH SERPL DL<=0.05 MIU/L-ACNC: 1.69 UIU/ML (ref 0.36–3.74)
WBC # BLD AUTO: 9.72 THOUSAND/UL (ref 4.31–10.16)

## 2020-08-25 PROCEDURE — 84443 ASSAY THYROID STIM HORMONE: CPT

## 2020-08-25 PROCEDURE — 85025 COMPLETE CBC W/AUTO DIFF WBC: CPT

## 2020-08-25 PROCEDURE — 80053 COMPREHEN METABOLIC PANEL: CPT

## 2020-08-25 PROCEDURE — 36415 COLL VENOUS BLD VENIPUNCTURE: CPT

## 2020-08-25 PROCEDURE — 83036 HEMOGLOBIN GLYCOSYLATED A1C: CPT

## 2020-08-25 PROCEDURE — 80061 LIPID PANEL: CPT

## 2020-09-08 ENCOUNTER — OFFICE VISIT (OUTPATIENT)
Dept: PULMONOLOGY | Facility: CLINIC | Age: 65
End: 2020-09-08
Payer: COMMERCIAL

## 2020-09-08 VITALS
SYSTOLIC BLOOD PRESSURE: 140 MMHG | BODY MASS INDEX: 38.15 KG/M2 | HEART RATE: 88 BPM | HEIGHT: 65 IN | WEIGHT: 229 LBS | TEMPERATURE: 97.4 F | OXYGEN SATURATION: 98 % | DIASTOLIC BLOOD PRESSURE: 80 MMHG

## 2020-09-08 DIAGNOSIS — R05.8 SPASMODIC COUGH: ICD-10-CM

## 2020-09-08 DIAGNOSIS — J31.0 CHRONIC RHINITIS: Primary | ICD-10-CM

## 2020-09-08 DIAGNOSIS — J45.20 MILD INTERMITTENT ASTHMA WITHOUT COMPLICATION: ICD-10-CM

## 2020-09-08 PROCEDURE — 99214 OFFICE O/P EST MOD 30 MIN: CPT | Performed by: INTERNAL MEDICINE

## 2020-09-08 RX ORDER — PANTOPRAZOLE SODIUM 40 MG/1
40 TABLET, DELAYED RELEASE ORAL DAILY
Qty: 30 TABLET | Refills: 5 | Status: SHIPPED | OUTPATIENT
Start: 2020-09-08 | End: 2020-09-22 | Stop reason: SDUPTHER

## 2020-09-08 RX ORDER — FEXOFENADINE HCL 180 MG/1
180 TABLET ORAL DAILY
Qty: 30 TABLET | Refills: 5 | Status: SHIPPED | OUTPATIENT
Start: 2020-09-08 | End: 2021-05-17 | Stop reason: SDUPTHER

## 2020-09-08 RX ORDER — FLUTICASONE FUROATE AND VILANTEROL 100; 25 UG/1; UG/1
1 POWDER RESPIRATORY (INHALATION) DAILY
Qty: 3 INHALER | Refills: 1 | Status: SHIPPED | OUTPATIENT
Start: 2020-09-08 | End: 2020-09-22 | Stop reason: SDUPTHER

## 2020-09-08 NOTE — PROGRESS NOTES
Pulmonary Follow Up Note   Gretchen Sprague 62 y o  female MRN: [id number]  9/8/2020      Assessment:    Spasmodic cough  Her asthma is very well controlled, and she has no ongoing allergic rhinitis symptoms, so I think the most likely etiology is GERD  I started her on protonix, and advised her to call if she is not improved in 2 weeks  Mild intermittent asthma without complication  She is currently not requiring rescue inhaler usage  I would like to see her go a full calendar year without symptoms but at her next visit we can consider de-escalation to flovent  Plan:    Diagnoses and all orders for this visit:    Chronic rhinitis  -     fexofenadine (ALLEGRA) 180 MG tablet; Take 1 tablet (180 mg total) by mouth daily    Mild intermittent asthma without complication  -     fluticasone-vilanterol (BREO ELLIPTA) 100-25 mcg/inh inhaler; Inhale 1 puff daily Rinse mouth after use  Spasmodic cough  -     pantoprazole (PROTONIX) 40 mg tablet; Take 1 tablet (40 mg total) by mouth daily    Return in about 6 months (around 3/8/2021)  History of Present Illness   HPI:  Gretchen Sprague is a 62 y o  female who returns for follow-up of asthma  In the interim since her last appointment, she has started the [facility] and no longer needs her rescue inhaler  She continues to have allergic rhinitis if she does not take a second-generation antihistamine, but feels that the Claritin is causing her to have issues with insomnia  She was looking for a change in that therapy today  Her primary complaint today was a persistent cough  This cough is present on a daily basis, occurs in spasms about 8 or 9 times per day  But is more common when she is active, and when she lays down at night  She denies significant heartburn, but has not been on a PPI previously  The cough is mostly dry, she feels that she liberates some mucus but that it never fully comes out  She denies fevers or other signs of infection      Answers for HPI/ROS submitted by the patient on 2020   Primary symptoms  Do you experience frequent throat clearing?: Yes  Do you have a wet cough?: Yes  Chronicity: chronic  When did you first notice your symptoms?: more than 1 month ago  How often do your symptoms occur?: constantly  Since you first noticed this problem, how has it changed?: gradually improving  Do you have shortness of breath that occurs with effort or exertion?: Yes  Do you have ear congestion?: No  Do you have heartburn?: No  Do you have fatigue?: Yes  Do you have nasal congestion?: No  Do you have shortness of breath when lying flat?: No  Do you have shortness of breath when you wake up?: No  Do you have sweats?: Yes  Have you experienced weight loss?: No    Review of Systems   Constitutional: Negative for appetite change and fever  HENT: Positive for postnasal drip  Negative for ear pain, rhinorrhea, sneezing, sore throat and trouble swallowing  Respiratory: Positive for cough  Cardiovascular: Negative for chest pain  Neurological: Negative for headaches  All other systems reviewed and are negative      Historical Information   Past Medical History:   Diagnosis Date    Abdominal pain     Bicuspid aortic valve     LAST ASSESSED [id number]    Cervical disc disease     last assessed [id number]    Cervical stenosis of spine     LAST ASSESSED [id number]    Disease of thyroid gland     hypothyroid    Dry eyes     Endometriosis     Hyperglycemia     Hyperlipidemia     Hypertension     Hypothyroidism     Left ventricular hypertrophy     Mitral valvular disorder     Nephrolithiasis     Ovarian cyst, complex     Ptosis     LAST ASSESSED [id number]    Renal calculi     Right cervical radiculopathy     LAST ASSESSED [id number]    Seasonal allergies      Past Surgical History:   Procedure Laterality Date    BASAL CELL CARCINOMA EXCISION      CARPAL TUNNEL RELEASE       SECTION      CHOLECYSTECTOMY      COLONOSCOPY      NEUROPLASTY / TRANSPOSITION MEDIAN NERVE AT CARPAL TUNNEL      NEUROPLASTY / TRANSPOSITION MEDIAN NERVE AT CARPAL TUNNEL  [facility]    OTHER SURGICAL HISTORY      surgically removed skin patch for skin cancer    PLANTAR FASCIECTOMY      NH COLONOSCOPY FLX DX W/COLLJ SPEC WHEN PFRMD N/A 10/9/2017    Procedure: COLONOSCOPY;  Surgeon: Molina Cannon MD;  Location: [facility] LAB;   Service: Gastroenterology    NH HYSTEROSCOPY,W/ENDO BX N/A 2/26/2018    Procedure: DILATATION AND CURETTAGE (D&C) WITH HYSTEROSCOPY;  Surgeon: Tabby Henriquez DO;  Location: [facility] OR;  Service: Gynecology    TUBAL LIGATION       Family History   Problem Relation Age of Onset    Diabetes Mother     Alzheimer's disease Mother     Heart disease Mother     Hypertension Mother     Other Family         back disorder    Cancer Family     Heart disease Family     Thyroid disease Family     No Known Problems Father     No Known Problems Sister     No Known Problems Daughter     No Known Problems Sister      Meds/Allergies     Current Outpatient Medications:     albuterol (Ventolin HFA) 90 mcg/act inhaler, Inhale 2 puffs every 6 (six) hours as needed for wheezing, Disp: 1 Inhaler, Rfl: 2    amLODIPine (NORVASC) 10 mg tablet, Take 1 tablet (10 mg total) by mouth daily, Disp: 90 tablet, Rfl: 2    fluticasone-vilanterol (BREO ELLIPTA) 100-25 mcg/inh inhaler, Inhale 1 puff daily Rinse mouth after use , Disp: 3 Inhaler, Rfl: 1    furosemide (LASIX) 20 mg tablet, Take 1 tablet (20 mg total) by mouth daily, Disp: 30 tablet, Rfl: 5    guaiFENesin (MUCINEX) 600 mg 12 hr tablet, Take 1,200 mg by mouth every 12 (twelve) hours, Disp: , Rfl:     levothyroxine 125 mcg tablet, Take 1 tablet (125 mcg total) by mouth daily, Disp: 90 tablet, Rfl: 3    XIIDRA 5 % op solution, , Disp: , Rfl:     fexofenadine (ALLEGRA) 180 MG tablet, Take 1 tablet (180 mg total) by mouth daily, Disp: 30 tablet, Rfl: 5    pantoprazole (PROTONIX) 40 mg tablet, Take 1 tablet (40 mg total) by mouth daily, Disp: 30 tablet, Rfl: 5  Allergies   Allergen Reactions    Erythromycin GI Intolerance     Reaction Date: 11Jul2011;     Penicillins Other (See Comments)     unknown    Aztreonam Rash     Action Taken: Allergy added by [employer] to replace Penicillins [employer];     Carbapenems Rash     Action Taken: Allergy added by [employer] to replace Penicillins [employer];     Cephalosporins Rash     Action Taken: Allergy added by [employer] to replace Penicillins [employer];     Flu Virus Vaccine Hives, Rash and GI Intolerance    Griseofulvin Rash     Action Taken: Allergy added by [employer] to replace Penicillins [employer];      Vitals: Blood pressure 140/80, pulse 88, temperature (!) 97 4 °F (36 3 °C), height 5' 5" (1 651 m), weight 104 kg (229 lb), SpO2 98 %  Body mass index is 38 11 kg/m²  Oxygen Therapy  SpO2: 98 %  Oxygen Therapy: None (Room air)    Physical Exam  Physical Exam  Vitals signs reviewed  Constitutional:       General: She is not in acute distress  Appearance: Normal appearance  HENT:      Head: Normocephalic and atraumatic  Eyes:      General: No scleral icterus  Conjunctiva/sclera: Conjunctivae normal    Cardiovascular:      Rate and Rhythm: Normal rate and regular rhythm  Heart sounds: Murmur present  No friction rub  No gallop  Comments: 3/6 systolic ejection murmur along the upper bilateral sternal borders  There is a crisp A2 component to her 2nd heart sound  Pulmonary:      Effort: Pulmonary effort is normal  No respiratory distress  Breath sounds: Normal breath sounds  No wheezing, rhonchi or rales  Skin:     General: Skin is warm and dry  Findings: No rash  Neurological:      Mental Status: She is alert and oriented to person, place, and time  Mental status is at baseline  Labs: I have personally reviewed pertinent lab results    Lab Results   Component Value Date    WBC 9 72 08/25/2020    HGB 15 4 08/25/2020    HCT 46 7 (H) 08/25/2020    MCV 91 08/25/2020     08/25/2020     Lab Results   Component Value Date    GLUCOSE 104 08/14/2015    CALCIUM 9 1 08/25/2020     08/14/2015    K 3 3 (L) 08/25/2020    CO2 28 08/25/2020     08/25/2020    BUN 16 08/25/2020    CREATININE 0 73 08/25/2020     No results found for: IGE  Lab Results   Component Value Date    ALT 38 08/25/2020    AST 25 08/25/2020    ALKPHOS 92 08/25/2020    BILITOT 0 42 08/14/2015     Imaging and other studies: I have personally reviewed pertinent films in PACS    Pulmonary function testing:   FEV1/FVC ratio 70 a %    FEV1 73 % predicted  FVC 73 % predicted  Not assessed for response to bronchodilators   % predicted   % predicted  DLCO corrected for hemoglobin 65 % predicted    EKG, Pathology, and Other Studies: I have personally reviewed pertinent films in [facility]

## 2020-09-08 NOTE — ASSESSMENT & PLAN NOTE
She is currently not requiring rescue inhaler usage  I would like to see her go a full calendar year without symptoms but at her next visit we can consider de-escalation to flovent

## 2020-09-08 NOTE — ASSESSMENT & PLAN NOTE
Her asthma is very well controlled, and she has no ongoing allergic rhinitis symptoms, so I think the most likely etiology is GERD  I started her on protonix, and advised her to call if she is not improved in 2 weeks

## 2020-09-11 ENCOUNTER — CONSULT (OUTPATIENT)
Dept: CARDIOLOGY CLINIC | Facility: CLINIC | Age: 65
End: 2020-09-11
Payer: COMMERCIAL

## 2020-09-11 VITALS
BODY MASS INDEX: 38.15 KG/M2 | DIASTOLIC BLOOD PRESSURE: 88 MMHG | WEIGHT: 229 LBS | TEMPERATURE: 98.2 F | SYSTOLIC BLOOD PRESSURE: 168 MMHG | HEART RATE: 86 BPM | HEIGHT: 65 IN

## 2020-09-11 DIAGNOSIS — I34.0 NONRHEUMATIC MITRAL VALVE REGURGITATION: ICD-10-CM

## 2020-09-11 DIAGNOSIS — I35.9 AORTIC VALVE DISEASE: Primary | ICD-10-CM

## 2020-09-11 DIAGNOSIS — I10 ESSENTIAL HYPERTENSION: ICD-10-CM

## 2020-09-11 PROCEDURE — 99244 OFF/OP CNSLTJ NEW/EST MOD 40: CPT | Performed by: INTERNAL MEDICINE

## 2020-09-11 PROCEDURE — 93000 ELECTROCARDIOGRAM COMPLETE: CPT | Performed by: INTERNAL MEDICINE

## 2020-09-11 RX ORDER — LOSARTAN POTASSIUM 50 MG/1
50 TABLET ORAL DAILY
Qty: 90 TABLET | Refills: 3 | Status: SHIPPED | OUTPATIENT
Start: 2020-09-11 | End: 2020-11-13

## 2020-09-11 NOTE — PROGRESS NOTES
Cardiology Follow Up    Lizzy Marilee  1955  [id number]  [address] [facility]  [address]  [address]  [phone]    1  Aortic valve disease     2  Essential hypertension  Ambulatory referral to Cardiology    Labile hypertension  Moderate edema from Amlodipine  Keep same until cardiac evaluation   3  Nonrheumatic mitral valve regurgitation  Ambulatory referral to Cardiology    POCT ECG    Moderate mitral disease, likely reason for murmur  History:  60-year-old female with longstanding history of hypertension presents today is new patient to establish herself for treatment of hypertension and abnormal valvular function on 2D echocardiogram   She has had hypertension for many years  She had been low on losartan 50 mg daily which she tolerated well  She had some asthma and her losartan was discontinued  No improvement in symptoms of asthma  She was placed on amlodipine 10 mg daily  Blood pressure continues to run high and she has dependent lower extremity edema particularly at the end of the day  She has mild dyspnea with excessive exertion but she states nothing out of the ordinary  Recent cough and sputum production  She denies chest pain orthopnea paroxysmal nocturnal dyspnea or syncope      Patient Active Problem List   Diagnosis    Complex ovarian cyst    Dry eye syndrome    Fatty infiltration of liver    Hyperglycemia    Abdominal pain    Mixed hyperlipidemia    Essential hypertension    Acquired hypothyroidism    Left ventricular hypertrophy    Lumbosacral pain    Mitral valvular disorder    Nephrolithiasis    Non-toxic multinodular goiter    Class 2 severe obesity with serious comorbidity and body mass index (BMI) of 39 0 to 39 9 in adult (HCC)    Rhinitis    Spondylosis of cervical region without myelopathy or radiculopathy    Thoracic neuritis    Endometrial polyp    Bicuspid aortic valve    Squamous cell cancer of skin of hand, right    Basal cell carcinoma (BCC) of ala nasi    Former smoker    Cough with sputum    Sialoadenitis    Impacted cerumen of right ear    Spasmodic cough    Mild intermittent asthma without complication    Aortic valve disease     Past Medical History:   Diagnosis Date    Abdominal pain     Bicuspid aortic valve     LAST ASSESSED [id number]    Cervical disc disease     last assessed [id number]    Cervical stenosis of spine     LAST ASSESSED [id number]    Disease of thyroid gland     hypothyroid    Dry eyes     Endometriosis     Hyperglycemia     Hyperlipidemia     Hypertension     Hypothyroidism     Left ventricular hypertrophy     Mitral valvular disorder     Nephrolithiasis     Ovarian cyst, complex     Ptosis     LAST ASSESSED [id number]    Renal calculi     Right cervical radiculopathy     LAST ASSESSED [id number]    Seasonal allergies      Social History     Socioeconomic History    Marital status: /Civil Union     Spouse name: Not on file    Number of children: 2    Years of education: Not on file    Highest education level: Not on file   Occupational History    Occupation: part time employment   Social Needs    Financial resource strain: Not on file    Food insecurity     Worry: Not on file     Inability: Not on file   [employer] needs     Medical: Not on file     Non-medical: Not on file   Tobacco Use    Smoking status: Former Smoker     Packs/day: 1 00     Years: 20 00     Pack years: 20 00     Last attempt to quit:      Years since quittin 7    Smokeless tobacco: Never Used   Substance and Sexual Activity    Alcohol use: Yes     Comment: occasional drinks wine    Drug use: No    Sexual activity: Yes     Partners: Male     Comment: tubal ligation    Lifestyle    Physical activity     Days per week: Not on file     Minutes per session: Not on file    Stress: Not on file   Relationships  Social connections     Talks on phone: Not on file     Gets together: Not on file     Attends Sabianist service: Not on file     Active member of club or organization: Not on file     Attends meetings of clubs or organizations: Not on file     Relationship status: Not on file    Intimate partner violence     Fear of current or ex partner: Not on file     Emotionally abused: Not on file     Physically abused: Not on file     Forced sexual activity: Not on file   Other Topics Concern    Not on file   Social History Narrative    Caffeine use     Highschool or GED    Lives with      Confucianist affiliation Gnosticist    Always uses seatbelt      Family History   Problem Relation Age of Onset    Diabetes Mother     Alzheimer's disease Mother     Heart disease Mother     Hypertension Mother     Other Family         back disorder    Cancer Family     Heart disease Family     Thyroid disease Family     No Known Problems Father     No Known Problems Sister     No Known Problems Daughter     No Known Problems Sister      Past Surgical History:   Procedure Laterality Date    BASAL CELL CARCINOMA EXCISION      CARPAL TUNNEL RELEASE       SECTION      CHOLECYSTECTOMY      COLONOSCOPY      NEUROPLASTY / [address] AT CARPAL TUNNEL      NEUROPLASTY / [address]  [address] [facility]    OTHER SURGICAL HISTORY      surgically removed skin patch for skin cancer    PLANTAR FASCIECTOMY      VA COLONOSCOPY FLX DX W/COLLJ SPEC WHEN PFRMD N/A 10/9/2017    Procedure: COLONOSCOPY;  Surgeon: Damien Mancia MD;  Location: [facility] GI LAB;   Service: Gastroenterology    VA HYSTEROSCOPY,W/ENDO BX N/A 2018    Procedure: DILATATION AND CURETTAGE (D&C) WITH HYSTEROSCOPY;  Surgeon: Ezekiel Colin DO;  Location: [facility] OR;  Service: Gynecology    TUBAL LIGATION         Current Outpatient Medications:     albuterol (Ventolin HFA) 90 mcg/act inhaler, Inhale 2 puffs every 6 (six) hours as needed for wheezing, Disp: 1 Inhaler, Rfl: 2    amLODIPine (NORVASC) 10 mg tablet, Take 1 tablet (10 mg total) by mouth daily, Disp: 90 tablet, Rfl: 2    fexofenadine (ALLEGRA) 180 MG tablet, Take 1 tablet (180 mg total) by mouth daily, Disp: 30 tablet, Rfl: 5    fluticasone-vilanterol (BREO ELLIPTA) 100-25 mcg/inh inhaler, Inhale 1 puff daily Rinse mouth after use , Disp: 3 Inhaler, Rfl: 1    levothyroxine 125 mcg tablet, Take 1 tablet (125 mcg total) by mouth daily, Disp: 90 tablet, Rfl: 3    pantoprazole (PROTONIX) 40 mg tablet, Take 1 tablet (40 mg total) by mouth daily, Disp: 30 tablet, Rfl: 5    XIIDRA 5 % op solution, , Disp: , Rfl:   Allergies   Allergen Reactions    Erythromycin GI Intolerance     Reaction Date: 11Jul2011;     Penicillins Other (See Comments)     unknown    Aztreonam Rash     Action Taken: Allergy added by [employer] to replace Penicillins [employer];     Carbapenems Rash     Action Taken: Allergy added by [employer] to replace Penicillins [employer];     Cephalosporins Rash     Action Taken: Allergy added by [employer] to replace Penicillins [employer];     Flu Virus Vaccine Hives, Rash and GI Intolerance    Griseofulvin Rash     Action Taken: Allergy added by [employer] to replace Penicillins [employer];        Labs:  Appointment on 08/25/2020   Component Date Value    WBC 08/25/2020 9 72     RBC 08/25/2020 5 12     Hemoglobin 08/25/2020 15 4     Hematocrit 08/25/2020 46 7*    MCV 08/25/2020 91     MCH 08/25/2020 30 1     MCHC 08/25/2020 33 0     RDW 08/25/2020 13 7     MPV 08/25/2020 10 6     Platelets 61/26/9207 239     nRBC 08/25/2020 0     Neutrophils Relative 08/25/2020 63     Immat GRANS % 08/25/2020 0     Lymphocytes Relative 08/25/2020 18     Monocytes Relative 08/25/2020 10     Eosinophils Relative 08/25/2020 8*    Basophils Relative 08/25/2020 1     Neutrophils Absolute 08/25/2020 6 14     Immature Grans Absolute 08/25/2020 0 04     Lymphocytes Absolute 08/25/2020 1 76     Monocytes Absolute 08/25/2020 0 96     Eosinophils Absolute 08/25/2020 0 73*    Basophils Absolute 08/25/2020 0 09     Sodium 08/25/2020 139     Potassium 08/25/2020 3 3*    Chloride 08/25/2020 105     CO2 08/25/2020 28     ANION GAP 08/25/2020 6     BUN 08/25/2020 16     Creatinine 08/25/2020 0 73     Glucose, Fasting 08/25/2020 119*    Calcium 08/25/2020 9 1     AST 08/25/2020 25     ALT 08/25/2020 38     Alkaline Phosphatase 08/25/2020 92     Total Protein 08/25/2020 7 8     Albumin 08/25/2020 4 0     Total Bilirubin 08/25/2020 0 67     eGFR 08/25/2020 88     Hemoglobin A1C 08/25/2020 6 2*    EAG 08/25/2020 131     Cholesterol 08/25/2020 170     Triglycerides 08/25/2020 173*    HDL, Direct 08/25/2020 31*    LDL Calculated 08/25/2020 104*    TSH 3RD GENERATON 08/25/2020 1 690      Imaging: No results found  Review of Systems:  Review of Systems   Constitutional: Negative for fatigue  HENT: Negative for hearing loss  Eyes: Negative for discharge  Respiratory: Positive for cough  Negative for shortness of breath  Cardiovascular: Positive for leg swelling  Negative for chest pain and palpitations  Gastrointestinal: Negative for abdominal pain  Endocrine: Negative for polyuria  Genitourinary: Positive for frequency  Negative for dysuria  Musculoskeletal: Positive for back pain  Negative for myalgias  Skin: Negative for rash  Neurological: Negative for syncope  Hematological: Does not bruise/bleed easily  Psychiatric/Behavioral: Negative for confusion and sleep disturbance  Physical Exam:  Physical Exam  Vitals signs and nursing note reviewed  Constitutional:       Appearance: She is well-developed  HENT:      Head: Normocephalic and atraumatic  Neck:      Musculoskeletal: Normal range of motion and neck supple  Vascular: No JVD     Cardiovascular:      Rate and Rhythm: Normal rate and regular rhythm  Heart sounds: Murmur present  Systolic murmur present with a grade of 1/6  Pulmonary:      Effort: Pulmonary effort is normal       Breath sounds: Normal breath sounds  Abdominal:      General: Bowel sounds are normal       Palpations: Abdomen is soft  Musculoskeletal: Normal range of motion  Skin:     General: Skin is warm and dry  Neurological:      Mental Status: She is alert and oriented to person, place, and time  Psychiatric:         Behavior: Behavior normal          Thought Content: Thought content normal          Judgment: Judgment normal          Discussion/Summary:  I reviewed her 2D echocardiogram   She has a mild increase in her transaortic velocity which has been persistent since since 2012  Her velocity is 2 33 across the valve is 2 3 m/sec  There has been mention of a potential bicuspid aortic valve in the past however other echocardiograms have said that she has a tricuspid valve  Upon review of the most recent echocardiogram the the valve is hard to see well secondary to her body habitus  She may have a fused commissure which result it is in the slightly increased velocity  She also has mild mitral regurgitation with mitral annular calcification and mild concentric left ventricular hypertrophy with preserved left her systolic function  In any event at this point none of her valvular pathology is significant and my recommendation would be to check an echocardiogram again next year  If if there is evidence of progression in disease we can always do a transesophageal echocardiogram   I suspect her murmur is from the increased aortic velocity  We also discussed her hypertension  As noted she had recently been switched from losartan to amlodipine 10 mg daily  With a 10 mg of will amlodipine she has had some lower extremity edema will which she has found to be a nuisance    At this time I will reinstitute losartan 50 mg daily and cut her amlodipine back to 5  I will see her in 2 months for blood pressure check

## 2020-09-16 DIAGNOSIS — I10 ESSENTIAL HYPERTENSION: Primary | ICD-10-CM

## 2020-09-22 DIAGNOSIS — R05.8 SPASMODIC COUGH: ICD-10-CM

## 2020-09-22 DIAGNOSIS — J45.20 MILD INTERMITTENT ASTHMA WITHOUT COMPLICATION: ICD-10-CM

## 2020-09-22 RX ORDER — FLUTICASONE FUROATE AND VILANTEROL 100; 25 UG/1; UG/1
1 POWDER RESPIRATORY (INHALATION) DAILY
Qty: 3 INHALER | Refills: 3 | Status: SHIPPED | OUTPATIENT
Start: 2020-09-22 | End: 2021-03-23

## 2020-09-22 RX ORDER — PANTOPRAZOLE SODIUM 40 MG/1
40 TABLET, DELAYED RELEASE ORAL DAILY
Qty: 90 TABLET | Refills: 3 | Status: SHIPPED | OUTPATIENT
Start: 2020-09-22 | End: 2021-09-07 | Stop reason: SDUPTHER

## 2020-10-19 ENCOUNTER — LAB (OUTPATIENT)
Dept: LAB | Facility: HOSPITAL | Age: 65
End: 2020-10-19
Attending: INTERNAL MEDICINE
Payer: COMMERCIAL

## 2020-10-19 DIAGNOSIS — I10 ESSENTIAL HYPERTENSION: ICD-10-CM

## 2020-10-19 LAB
ANION GAP SERPL CALCULATED.3IONS-SCNC: 2 MMOL/L (ref 4–13)
BUN SERPL-MCNC: 14 MG/DL (ref 5–25)
CALCIUM SERPL-MCNC: 9.3 MG/DL (ref 8.3–10.1)
CHLORIDE SERPL-SCNC: 106 MMOL/L (ref 100–108)
CO2 SERPL-SCNC: 31 MMOL/L (ref 21–32)
CREAT SERPL-MCNC: 0.9 MG/DL (ref 0.6–1.3)
GFR SERPL CREATININE-BSD FRML MDRD: 68 ML/MIN/1.73SQ M
GLUCOSE P FAST SERPL-MCNC: 114 MG/DL (ref 65–99)
POTASSIUM SERPL-SCNC: 3.5 MMOL/L (ref 3.5–5.3)
SODIUM SERPL-SCNC: 139 MMOL/L (ref 136–145)

## 2020-10-19 PROCEDURE — 36415 COLL VENOUS BLD VENIPUNCTURE: CPT

## 2020-10-19 PROCEDURE — 80048 BASIC METABOLIC PNL TOTAL CA: CPT

## 2020-11-13 ENCOUNTER — OFFICE VISIT (OUTPATIENT)
Dept: CARDIOLOGY CLINIC | Facility: CLINIC | Age: 65
End: 2020-11-13
Payer: COMMERCIAL

## 2020-11-13 VITALS
SYSTOLIC BLOOD PRESSURE: 150 MMHG | DIASTOLIC BLOOD PRESSURE: 82 MMHG | WEIGHT: 231 LBS | OXYGEN SATURATION: 97 % | HEART RATE: 84 BPM | HEIGHT: 65 IN | BODY MASS INDEX: 38.49 KG/M2

## 2020-11-13 DIAGNOSIS — I10 ESSENTIAL HYPERTENSION: Primary | ICD-10-CM

## 2020-11-13 DIAGNOSIS — I35.9 AORTIC VALVE DISEASE: ICD-10-CM

## 2020-11-13 DIAGNOSIS — I05.9 MITRAL VALVULAR DISORDER: ICD-10-CM

## 2020-11-13 PROCEDURE — 99214 OFFICE O/P EST MOD 30 MIN: CPT | Performed by: INTERNAL MEDICINE

## 2020-11-13 RX ORDER — AMLODIPINE BESYLATE 5 MG/1
5 TABLET ORAL DAILY
Qty: 90 TABLET | Refills: 3 | Status: SHIPPED | OUTPATIENT
Start: 2020-11-13 | End: 2021-11-05 | Stop reason: SDUPTHER

## 2020-11-13 RX ORDER — LOSARTAN POTASSIUM 100 MG/1
100 TABLET ORAL DAILY
Qty: 90 TABLET | Refills: 3 | Status: SHIPPED | OUTPATIENT
Start: 2020-11-13 | End: 2021-12-06

## 2020-11-16 ENCOUNTER — OFFICE VISIT (OUTPATIENT)
Dept: DERMATOLOGY | Facility: CLINIC | Age: 65
End: 2020-11-16
Payer: COMMERCIAL

## 2020-11-16 VITALS — BODY MASS INDEX: 38.32 KG/M2 | HEIGHT: 65 IN | TEMPERATURE: 97.6 F | WEIGHT: 230 LBS

## 2020-11-16 DIAGNOSIS — L91.8 SKIN TAG: ICD-10-CM

## 2020-11-16 DIAGNOSIS — L30.9 DERMATITIS: Primary | ICD-10-CM

## 2020-11-16 DIAGNOSIS — D22.70 MULTIPLE BENIGN MELANOCYTIC NEVI OF UPPER AND LOWER EXTREMITIES AND TRUNK: ICD-10-CM

## 2020-11-16 DIAGNOSIS — L81.4 SOLAR LENTIGO: ICD-10-CM

## 2020-11-16 DIAGNOSIS — D18.01 CHERRY ANGIOMA: ICD-10-CM

## 2020-11-16 DIAGNOSIS — L82.1 SEBORRHEIC KERATOSIS: ICD-10-CM

## 2020-11-16 DIAGNOSIS — D22.5 MULTIPLE BENIGN MELANOCYTIC NEVI OF UPPER AND LOWER EXTREMITIES AND TRUNK: ICD-10-CM

## 2020-11-16 DIAGNOSIS — D22.60 MULTIPLE BENIGN MELANOCYTIC NEVI OF UPPER AND LOWER EXTREMITIES AND TRUNK: ICD-10-CM

## 2020-11-16 DIAGNOSIS — D48.5 NEOPLASM OF UNCERTAIN BEHAVIOR OF SKIN: ICD-10-CM

## 2020-11-16 PROCEDURE — 99204 OFFICE O/P NEW MOD 45 MIN: CPT | Performed by: DERMATOLOGY

## 2020-11-16 PROCEDURE — 88305 TISSUE EXAM BY PATHOLOGIST: CPT | Performed by: STUDENT IN AN ORGANIZED HEALTH CARE EDUCATION/TRAINING PROGRAM

## 2020-11-16 PROCEDURE — 11102 TANGNTL BX SKIN SINGLE LES: CPT | Performed by: DERMATOLOGY

## 2020-11-16 RX ORDER — CLINDAMYCIN PHOSPHATE 10 MG/ML
SOLUTION TOPICAL
Qty: 69 PACKAGE | Refills: 7 | Status: SHIPPED | OUTPATIENT
Start: 2020-11-16 | End: 2021-11-15 | Stop reason: SDUPTHER

## 2020-11-23 ENCOUNTER — COSMETIC (OUTPATIENT)
Dept: DERMATOLOGY | Facility: CLINIC | Age: 65
End: 2020-11-23

## 2020-11-23 ENCOUNTER — TELEMEDICINE (OUTPATIENT)
Dept: FAMILY MEDICINE CLINIC | Facility: HOSPITAL | Age: 65
End: 2020-11-23
Payer: COMMERCIAL

## 2020-11-23 VITALS
WEIGHT: 239 LBS | HEART RATE: 79 BPM | DIASTOLIC BLOOD PRESSURE: 87 MMHG | SYSTOLIC BLOOD PRESSURE: 151 MMHG | HEIGHT: 65 IN | BODY MASS INDEX: 39.82 KG/M2

## 2020-11-23 VITALS — WEIGHT: 230 LBS | BODY MASS INDEX: 36.1 KG/M2 | TEMPERATURE: 98.7 F | HEIGHT: 67 IN

## 2020-11-23 DIAGNOSIS — E03.9 ACQUIRED HYPOTHYROIDISM: ICD-10-CM

## 2020-11-23 DIAGNOSIS — I05.9 MITRAL VALVULAR DISORDER: ICD-10-CM

## 2020-11-23 DIAGNOSIS — I10 ESSENTIAL HYPERTENSION: Primary | ICD-10-CM

## 2020-11-23 DIAGNOSIS — R73.9 HYPERGLYCEMIA: ICD-10-CM

## 2020-11-23 DIAGNOSIS — I35.9 AORTIC VALVE DISEASE: ICD-10-CM

## 2020-11-23 DIAGNOSIS — E66.01 CLASS 2 SEVERE OBESITY DUE TO EXCESS CALORIES WITH SERIOUS COMORBIDITY AND BODY MASS INDEX (BMI) OF 39.0 TO 39.9 IN ADULT (HCC): ICD-10-CM

## 2020-11-23 DIAGNOSIS — E78.2 MIXED HYPERLIPIDEMIA: ICD-10-CM

## 2020-11-23 DIAGNOSIS — L91.8 SKIN TAG: Primary | ICD-10-CM

## 2020-11-23 PROCEDURE — NC001 PR NO CHARGE: Performed by: DERMATOLOGY

## 2020-11-23 PROCEDURE — 99214 OFFICE O/P EST MOD 30 MIN: CPT | Performed by: FAMILY MEDICINE

## 2020-12-01 DIAGNOSIS — E03.9 ACQUIRED HYPOTHYROIDISM: ICD-10-CM

## 2020-12-01 RX ORDER — LEVOTHYROXINE SODIUM 0.12 MG/1
TABLET ORAL
Qty: 90 TABLET | Refills: 1 | Status: SHIPPED | OUTPATIENT
Start: 2020-12-01 | End: 2021-05-31

## 2020-12-30 ENCOUNTER — IMMUNIZATIONS (OUTPATIENT)
Dept: FAMILY MEDICINE CLINIC | Facility: HOSPITAL | Age: 65
End: 2020-12-30
Payer: COMMERCIAL

## 2020-12-30 DIAGNOSIS — Z23 ENCOUNTER FOR IMMUNIZATION: ICD-10-CM

## 2020-12-30 PROCEDURE — 91301 SARS-COV-2 / COVID-19 MRNA VACCINE (MODERNA) 100 MCG: CPT

## 2020-12-30 PROCEDURE — 0011A SARS-COV-2 / COVID-19 MRNA VACCINE (MODERNA) 100 MCG: CPT

## 2021-01-18 ENCOUNTER — OFFICE VISIT (OUTPATIENT)
Dept: DERMATOLOGY | Facility: CLINIC | Age: 66
End: 2021-01-18
Payer: COMMERCIAL

## 2021-01-18 VITALS — WEIGHT: 236 LBS | HEIGHT: 67 IN | TEMPERATURE: 98.4 F | BODY MASS INDEX: 37.04 KG/M2

## 2021-01-18 DIAGNOSIS — B07.9 VERRUCA VULGARIS: Primary | ICD-10-CM

## 2021-01-18 PROCEDURE — 88305 TISSUE EXAM BY PATHOLOGIST: CPT | Performed by: STUDENT IN AN ORGANIZED HEALTH CARE EDUCATION/TRAINING PROGRAM

## 2021-01-18 PROCEDURE — 11301 SHAVE SKIN LESION 0.6-1.0 CM: CPT | Performed by: DERMATOLOGY

## 2021-01-18 NOTE — PROGRESS NOTES
[facility] Follow Up Note    Patient Name: Bibi Bird  Encounter Date: 1/18/2021    Today's Chief Concerns:  [facility] Concern #1:  Itchy raised lesion on left inner thigh    Current Medications:    Current Outpatient Medications:     albuterol (Ventolin HFA) 90 mcg/act inhaler, Inhale 2 puffs every 6 (six) hours as needed for wheezing, Disp: 1 Inhaler, Rfl: 2    amLODIPine (NORVASC) 5 mg tablet, Take 1 tablet (5 mg total) by mouth daily, Disp: 90 tablet, Rfl: 3    clindamycin (CLEOCIN T) 1 %, Apply to underarms once daily, Disp: 69 Package, Rfl: 7    fexofenadine (ALLEGRA) 180 MG tablet, Take 1 tablet (180 mg total) by mouth daily, Disp: 30 tablet, Rfl: 5    fluticasone-vilanterol (BREO ELLIPTA) 100-25 mcg/inh inhaler, Inhale 1 puff daily Rinse mouth after use , Disp: 3 Inhaler, Rfl: 3    levothyroxine 125 mcg tablet, TAKE ONE TABLET BY MOUTH EVERY DAY , Disp: 90 tablet, Rfl: 1    losartan (COZAAR) 100 MG tablet, Take 1 tablet (100 mg total) by mouth daily (Patient taking differently: Take 200 mg by mouth daily Taking 200 mg), Disp: 90 tablet, Rfl: 3    pantoprazole (PROTONIX) 40 mg tablet, Take 1 tablet (40 mg total) by mouth daily, Disp: 90 tablet, Rfl: 3    XIIDRA 5 % op solution, , Disp: , Rfl:     CONSTITUTIONAL:   There were no vitals filed for this visit  Today's Height:   5'7  Today's Weight (in kilograms):   107 049 kgs  Today's Temperature:   98 4 F    Specific Alerts:    Have you been seen by a [facility]atologist in the last 3 years? YES    Are you pregnant or planning to become pregnant? No    Are you currently or planning to be nursing or breast feeding?  No    Allergies   Allergen Reactions    Erythromycin GI Intolerance     Reaction Date: 11Jul2011;     Penicillins Other (See Comments)     unknown    Aztreonam Rash     Action Taken: Allergy added by [employer] to replace Penicillins [employer];     Carbapenems Rash     Action Taken: Allergy added by [employer] to replace Penicillins [employer];     Cephalosporins Rash     Action Taken: Allergy added by [employer] to replace Penicillins [employer];     Flu Virus Vaccine Hives, Rash and GI Intolerance    Griseofulvin Rash     Action Taken: Allergy added by [employer] to replace Penicillins [employer]; May we call your Preferred Phone number to discuss your specific medical information? YES    May we leave a detailed message that includes your specific medical information? YES    Have you traveled outside of the [facility] in the past 3 months? YES    Do you currently have a pacemaker or defibrillator? No    Do you have any artificial heart valves, joints, plates, screws, rods, stents, pins, etc? No   - If Yes, were any placed within the last 2 years? Do you require any medications prior to a surgical procedure? No   - If Yes, for which procedure? - If Yes, what medications to you require? Are you taking any medications that cause you to bleed more easily ("blood thinners") No    Have you ever experienced a rapid heartbeat with epinephrine? No    Have you ever been treated with "gold" (gold sodium thiomalate) therapy? No    [facility] can help with wrinkles, "laugh lines," facial volume loss, "double chin," "love handles," age spots, and more  Are you interested in learning today about some of the skin enhancement procedures that we offer? (If Yes, please provide more detail) No    Review of Systems:  Have you recently had or currently have any of the following?     · Fever or chills: No  · Night Sweats: No  · Headaches: No  · Weight Gain: No  · Weight Loss: No  · Blurry Vision: No  · Nausea: No  · Vomiting: No  · Diarrhea: No  · Blood in Stool: No  · Abdominal Pain: No  · Itchy Skin: YES  · Painful Joints: No  · Swollen Joints: No  · Muscle Pain: No  · Irregular Mole: No  · Sun Burn: No  · Dry Skin: No  · Skin Color Changes: No  · Scar or Keloid: No  · Cold Sores/Fever Blisters: No  · Bacterial Infections/MRSA: No  · Anxiety: No  · Depression: No  · Suicidal or Homicidal Thoughts: No      PSYCH: Normal mood and affect  EYES: Normal conjunctiva  ENT: Normal lips and oral mucosa  CARDIOVASCULAR: No edema  RESPIRATORY: Normal respirations  HEME/LYMPH/IMMUNO:  No regional lymphadenopathy except as noted below in ASSESSMENT AND PLAN BY DIAGNOSIS    FULL ORGAN SYSTEM SKIN EXAM (SKIN)   Left Leg, Foot, Toes Normal except as noted below in Assessment     Ears, Face Normal except as noted below in Assessment   Neck, Cervical Chain Nodes Normal except as noted below in Assessment   Right Hand/Fingers Normal except as noted below in Assessment   Left Hand/Fingers Normal except as noted below in Assessment         1  VERRUCA VULGARIS ("Common Warts")    Physical Exam:   Anatomic Location Affected:  Left upper thigh   Morphological Description:   0 6 cm pink scaly papule   Pertinent Negatives: No Lymphadenopathy    Additional History of Present Condition:  Patient had shave biopsy of area in November 2020  Pathology showed Verruca Vulgaris  Wart has reoccurred and the patient states it is very itchy     Assessment and Plan:  Based on a thorough discussion of this condition and the management approach to it (including a comprehensive discussion of the known risks, side effects and potential benefits of treatment), the patient (family) agrees to implement the following specific plan:   Discussed Cryotherapy performed in office   Discussed shave removal in office  Patient chose this option        PROCEDURE SHAVE REMOVAL NOTE:     Performing Physician: Maia Moss   Anatomic Location; Clinical Description with size (cm); Pre-Op Diagnosis:   o Left upper thigh,  0 6 cm pink scaly papule, Verruca Vulgaris   Post-op diagnosis: Same      Local anesthesia: 1% xylocaine with epi       Topical anesthesia: None     Hemostasis: Electrocautery       After obtaining informed consent at which time there was a discussion about the purpose of biopsy  and low risks of infection and bleeding  The area was prepped and draped in the usual fashion  Anesthesia was obtained with 1% lidocaine with epinephrine  A shave biopsy to an appropriate sampling depth was obtained with a sterile blade (such as a 15-blade or DermaBlade)  The resulting wound was covered with surgical ointment and bandaged appropriately  The patient tolerated the procedure well without complications and was without signs of functional compromise  Specimen has been sent for review by Dermatopathology  Standard post-procedure care has been explained and has been included in written form within the patient's copy of Informed Consent  INFORMED CONSENT DISCUSSION AND POST-OPERATIVE INSTRUCTIONS FOR PATIENT    I   RATIONALE FOR PROCEDURE  I understand that a skin biopsy allows the Dermatologist to test a lesion or rash under the microscope to obtain a diagnosis  It usually involves numbing the area with numbing medication and removing a small piece of skin; sometimes the area will be closed with sutures  In this specific procedure, sutures are not usually needed  If any sutures are placed, then they are usually need to be removed in 2 weeks or less  I understand that my Dermatologist recommends that a skin "shave" biopsy be performed today  A local anesthetic, similar to the kind that a dentist uses when filling a cavity, will be injected with a very small needle into the skin area to be sampled  The injected skin and tissue underneath "will go to sleep and become numb so no pain should be felt afterwards  An instrument shaped like a tiny "razor blade" (shave biopsy instrument) will be used to cut a small piece of tissue and skin from the area so that a sample of tissue can be taken and examined more closely under the microscope    A slight amount of bleeding will occur, but it will be stopped with direct pressure and a pressure bandage and any other appropriate methods  I understands that a scar will form where the wound was created  Surgical ointment will be applied to help protect the wound  Sutures are not usually needed  II   RISKS AND POTENTIAL COMPLICATIONS   I understand the risks and potential complications of a skin biopsy include but are not limited to the following:   Bleeding   Infection   Pain   Scar/keloid   Skin discoloration   Incomplete Removal   Recurrence   Nerve Damage/Numbness/Loss of Function   Allergic Reaction to Anesthesia   Biopsies are diagnostic procedures and based on findings additional treatment or evaluation may be required   Loss or destruction of specimen resulting in no additional findings    My Dermatologist has explained to me the nature of the condition, the nature of the procedure, and the benefits to be reasonably expected compared with alternative approaches  My Dermatologist has discussed the likelihood of major risks or complications of this procedure including the specific risks listed above, such as bleeding, infection, and scarring/keloid  I understand that a scar is expected after this procedure  I understand that my physician cannot predict if the scar will form a "keloid," which extends beyond the borders of the wound that is created  A keloid is a thick, painful, and bumpy scar  A keloid can be difficult to treat, as it does not always respond well to therapy, which includes injecting cortisone directly into the keloid every few weeks  While this usually reduces the pain and size of the scar, it does not eliminate it  I understand that photographs may be taken before and after the procedure  These will be maintained as part of the medical providers confidential records and may not be made available to me    I further authorize the medical provider to use the photographs for teaching purposes or to illustrate scientific papers, books, or lectures if in his/her judgment, medical research, education, or science may benefit from its use  I have had an opportunity to fully inquire about the risks and benefits of this procedure and its alternatives  I have been given ample time and opportunity to ask questions and to seek a second opinion if I wished to do so  I acknowledge that there have specifically been no guarantees as to the cosmetic results from the procedure  I am aware that with any procedure there is always the possibility of an unexpected complication  III  POST-PROCEDURAL CARE (WHAT YOU WILL NEED TO DO "AFTER THE BIOPSY" TO OPTIMIZE HEALING)     Keep the area clean and dry  Try NOT to remove the bandage or get it wet for the first 24 hours   Gently clean the area and apply surgical ointment (such as Vaseline petrolatum ointment, which is available "over the counter" and not a prescription) to the biopsy site for up to 2 weeks straight  This acts to protect the wound from the outside world   Sutures are not usually placed in this procedure  If any sutures were placed, return for suture removal as instructed (generally 1 week for the face, 2 weeks for the body)   Take Acetaminophen (Tylenol) for discomfort, if no contraindications  Ibuprofen or aspirin could make bleeding worse   Call our office immediately for signs of infection: fever, chills, increased redness, warmth, tenderness, discomfort/pain, or pus or foul smell coming from the wound  WHAT TO DO IF THERE IS ANY BLEEDING? If a small amount of bleeding is noticed, place a clean cloth over the area and apply firm pressure for ten minutes  Check the wound after 10 minutes of direct pressure  If bleeding persists, try one more time for an additional 10 minutes of direct pressure on the area    If the bleeding becomes heavier or does not stop after the second attempt, or if you have any other questions about this procedure, then please call your [facility]ologist by calling [phone] (SKIN)  I hereby acknowledge that I have reviewed and verified the site with my Dermatologist and have requested and authorized my Dermatologist to proceed with the procedure          Scribe Attestation    I,:  Ramón Henriquez am acting as a scribe while in the presence of the attending physician :       I,:  Yg Ocasio MD personally performed the services described in this documentation    as scribed in my presence :

## 2021-01-18 NOTE — PATIENT INSTRUCTIONS
1  VERRUCA VULGARIS ("Common Warts")      Verruca Vulgaris  A verruca is a common growth of the skin caused by infection by human papilloma virus (HPV)  There are many strains of the virus that cause different types of warts on the body  The virus infects the most superficial layers of the skin, causing increased production of skin cells and thickening  Warts can be spread through direct contact with infected skin and may spread to other parts of the body if scratched or picked  A verruca is more commonly called a "wart " Warts are particularly common in school-aged children but can arise at any age  Patients who have a history of eczema are especially prone due to impaired skin barrier  Those taking immunosuppressive drugs or with HIV infections may experience prolonged symptoms despite treatment  Warts generally have a rough surface with a tiny black dot sometimes observed in the middle of each scaly spot  They can range in size from a small bump to large scaly growths  Common warts are often found on the backs of fingers or toes, around the nails, and on the knees  Plantar warts can grow inwardly on the soles of the feet causing pain  There are many possible ways to treat warts and sometimes several different treatments are needed to get the warts to go away completely  There is no single perfect treatment for warts, and successful treatment can take many months  In-office treatments usually require multiple visits, and include:  1) Cryotherapy  a cold spray with liquid nitrogen will destroy the infected cells but may lead to discomfort and blistering  It may also leave a permanent white cj or scar  2) Electrosurgery (curettage and cautery) can be used for large resistant warts which involves shaving the growth down and burning the base  It is performed under local anesthesia and may leave a permanent scar    3) Candida (yeast) antigen injections   These are extracts of the common yeast (Candida) that cannot cause an infection  The medication is injected into/under the wart  It is thought to stimulate the immune system to recognize the wart virus and attack it  Multiple injections are often needed about one month apart  There are also several at-home wart treatments:    1) Soak the warts in warm water for 5 minutes every night followed by gentle filing with a nail file or pumice stone  2) Topical salicylic acid or similar compounds work by removing the dead surface skin cells  a  Apply the medicine directly to the wart, wait for it to dry completely, then cover with duct tape overnight   b  Repeat until the wart is gone, which can take 2-4 months  c  Do not use on the face or groin area   d  If the wart paint makes the skin sore, stop treatment until the discomfort has settled, then recommence as above   e  Take care to keep the chemical off normal skin  3) Podophyllin is a cytotoxic agent used in some products and must not be used in pregnancy or women considering pregnancy  4) Some prescription medications include   a  Topical retinoids (adapalene, tretinoin, tazarotene), 5-fluorouracil (Efudex) or imiquimod (Aldara) creams are sometimes used to treat flat warts or warts on the face and other sensitive anatomical areas  They are usually applied directly to the warts once a day for 2-4 months and can be irritating  These treatments should only be used as directed by your health care provider  b  Systemic treatment with oral cimetidine (Tagamet) may help boost the immune system against the wart virus in patients, some of the time  Initiation of cimetidine therapy should ONLY be done under the supervision of your health care provider, who can discuss possible side effects and drug-to-drug interactions of this specific treatment         PROCEDURE SHAVE BIOPSY NOTE:      After obtaining informed consent  at which time there was a discussion about the purpose of biopsy  and low risks of infection and bleeding  The area was prepped and draped in the usual fashion  Anesthesia was obtained with 1% lidocaine with epinephrine  A shave biopsy to an appropriate sampling depth was obtained with a sterile blade (such as a 15-blade or DermaBlade)  The resulting wound was covered with surgical ointment and bandaged appropriately  The patient tolerated the procedure well without complications and was without signs of functional compromise  Specimen has been sent for review by Dermatopathology  Standard post-procedure care has been explained and has been included in written form within the patient's copy of Informed Consent  INFORMED CONSENT DISCUSSION AND POST-OPERATIVE INSTRUCTIONS FOR PATIENT    I   RATIONALE FOR PROCEDURE  I understand that a skin biopsy allows the Dermatologist to test a lesion or rash under the microscope to obtain a diagnosis  It usually involves numbing the area with numbing medication and removing a small piece of skin; sometimes the area will be closed with sutures  In this specific procedure, sutures are not usually needed  If any sutures are placed, then they are usually need to be removed in 2 weeks or less  I understand that my Dermatologist recommends that a skin "shave" biopsy be performed today  A local anesthetic, similar to the kind that a dentist uses when filling a cavity, will be injected with a very small needle into the skin area to be sampled  The injected skin and tissue underneath "will go to sleep and become numb so no pain should be felt afterwards  An instrument shaped like a tiny "razor blade" (shave biopsy instrument) will be used to cut a small piece of tissue and skin from the area so that a sample of tissue can be taken and examined more closely under the microscope  A slight amount of bleeding will occur, but it will be stopped with direct pressure and a pressure bandage and any other appropriate methods    I understands that a scar will form where the wound was created  Surgical ointment will be applied to help protect the wound  Sutures are not usually needed  II   RISKS AND POTENTIAL COMPLICATIONS   I understand the risks and potential complications of a skin biopsy include but are not limited to the following:   Bleeding   Infection   Pain   Scar/keloid   Skin discoloration   Incomplete Removal   Recurrence   Nerve Damage/Numbness/Loss of Function   Allergic Reaction to Anesthesia   Biopsies are diagnostic procedures and based on findings additional treatment or evaluation may be required   Loss or destruction of specimen resulting in no additional findings    My Dermatologist has explained to me the nature of the condition, the nature of the procedure, and the benefits to be reasonably expected compared with alternative approaches  My Dermatologist has discussed the likelihood of major risks or complications of this procedure including the specific risks listed above, such as bleeding, infection, and scarring/keloid  I understand that a scar is expected after this procedure  I understand that my physician cannot predict if the scar will form a "keloid," which extends beyond the borders of the wound that is created  A keloid is a thick, painful, and bumpy scar  A keloid can be difficult to treat, as it does not always respond well to therapy, which includes injecting cortisone directly into the keloid every few weeks  While this usually reduces the pain and size of the scar, it does not eliminate it  I understand that photographs may be taken before and after the procedure  These will be maintained as part of the medical providers confidential records and may not be made available to me    I further authorize the medical provider to use the photographs for teaching purposes or to illustrate scientific papers, books, or lectures if in his/her judgment, medical research, education, or science may benefit from its use     I have had an opportunity to fully inquire about the risks and benefits of this procedure and its alternatives  I have been given ample time and opportunity to ask questions and to seek a second opinion if I wished to do so  I acknowledge that there have specifically been no guarantees as to the cosmetic results from the procedure  I am aware that with any procedure there is always the possibility of an unexpected complication  III  POST-PROCEDURAL CARE (WHAT YOU WILL NEED TO DO "AFTER THE BIOPSY" TO OPTIMIZE HEALING)     Keep the area clean and dry  Try NOT to remove the bandage or get it wet for the first 24 hours   Gently clean the area and apply surgical ointment (such as Vaseline petrolatum ointment, which is available "over the counter" and not a prescription) to the biopsy site for up to 2 weeks straight  This acts to protect the wound from the outside world   Sutures are not usually placed in this procedure  If any sutures were placed, return for suture removal as instructed (generally 1 week for the face, 2 weeks for the body)   Take Acetaminophen (Tylenol) for discomfort, if no contraindications  Ibuprofen or aspirin could make bleeding worse   Call our office immediately for signs of infection: fever, chills, increased redness, warmth, tenderness, discomfort/pain, or pus or foul smell coming from the wound  WHAT TO DO IF THERE IS ANY BLEEDING? If a small amount of bleeding is noticed, place a clean cloth over the area and apply firm pressure for ten minutes  Check the wound after 10 minutes of direct pressure  If bleeding persists, try one more time for an additional 10 minutes of direct pressure on the area  If the bleeding becomes heavier or does not stop after the second attempt, or if you have any other questions about this procedure, then please call your [facility] Dermatologist by calling [phone] (SKIN)       I hereby acknowledge that I have reviewed and verified the site with my Dermatologist and have requested and authorized my Dermatologist to proceed with the procedure

## 2021-01-21 ENCOUNTER — TELEPHONE (OUTPATIENT)
Dept: DERMATOLOGY | Facility: CLINIC | Age: 66
End: 2021-01-21

## 2021-01-21 NOTE — TELEPHONE ENCOUNTER
Please call patient to discuss biopsy results   Wart as expected  We burned the base at tie of biopsy; no further treatment needed  Call our office if it ever regrows

## 2021-01-22 NOTE — TELEPHONE ENCOUNTER
Relayed information to patient  Patient verbalized understanding and agrees with plan  Patient will call with any other concerns

## 2021-01-25 ENCOUNTER — HOSPITAL ENCOUNTER (OUTPATIENT)
Dept: MAMMOGRAPHY | Facility: CLINIC | Age: 66
Discharge: HOME | End: 2021-01-25
Payer: COMMERCIAL

## 2021-01-25 ENCOUNTER — APPOINTMENT (OUTPATIENT)
Dept: LAB | Facility: CLINIC | Age: 66
End: 2021-01-25
Payer: COMMERCIAL

## 2021-01-25 ENCOUNTER — TRANSCRIBE ORDERS (OUTPATIENT)
Dept: ADMINISTRATIVE | Facility: HOSPITAL | Age: 66
End: 2021-01-25

## 2021-01-25 VITALS — HEIGHT: 67 IN | WEIGHT: 236 LBS | BODY MASS INDEX: 37.04 KG/M2

## 2021-01-25 DIAGNOSIS — I10 ESSENTIAL HYPERTENSION: ICD-10-CM

## 2021-01-25 DIAGNOSIS — E03.9 ACQUIRED HYPOTHYROIDISM: ICD-10-CM

## 2021-01-25 DIAGNOSIS — E78.2 MIXED HYPERLIPIDEMIA: ICD-10-CM

## 2021-01-25 DIAGNOSIS — Z12.31 VISIT FOR SCREENING MAMMOGRAM: ICD-10-CM

## 2021-01-25 DIAGNOSIS — Z12.31 SCREENING MAMMOGRAM, ENCOUNTER FOR: Primary | ICD-10-CM

## 2021-01-25 DIAGNOSIS — R73.9 HYPERGLYCEMIA: ICD-10-CM

## 2021-01-25 LAB
ALBUMIN SERPL BCP-MCNC: 4.2 G/DL (ref 3.5–5)
ALP SERPL-CCNC: 102 U/L (ref 46–116)
ALT SERPL W P-5'-P-CCNC: 37 U/L (ref 12–78)
ANION GAP SERPL CALCULATED.3IONS-SCNC: 4 MMOL/L (ref 4–13)
AST SERPL W P-5'-P-CCNC: 23 U/L (ref 5–45)
BILIRUB SERPL-MCNC: 0.52 MG/DL (ref 0.2–1)
BUN SERPL-MCNC: 20 MG/DL (ref 5–25)
CALCIUM SERPL-MCNC: 9.8 MG/DL (ref 8.3–10.1)
CHLORIDE SERPL-SCNC: 107 MMOL/L (ref 100–108)
CHOLEST SERPL-MCNC: 159 MG/DL (ref 50–200)
CO2 SERPL-SCNC: 30 MMOL/L (ref 21–32)
CREAT SERPL-MCNC: 0.77 MG/DL (ref 0.6–1.3)
ERYTHROCYTE [DISTWIDTH] IN BLOOD BY AUTOMATED COUNT: 13.9 % (ref 11.6–15.1)
EST. AVERAGE GLUCOSE BLD GHB EST-MCNC: 128 MG/DL
GFR SERPL CREATININE-BSD FRML MDRD: 82 ML/MIN/1.73SQ M
GLUCOSE P FAST SERPL-MCNC: 121 MG/DL (ref 65–99)
HBA1C MFR BLD: 6.1 %
HCT VFR BLD AUTO: 47.6 % (ref 34.8–46.1)
HDLC SERPL-MCNC: 37 MG/DL
HGB BLD-MCNC: 15.5 G/DL (ref 11.5–15.4)
LDLC SERPL CALC-MCNC: 99 MG/DL (ref 0–100)
MCH RBC QN AUTO: 30 PG (ref 26.8–34.3)
MCHC RBC AUTO-ENTMCNC: 32.6 G/DL (ref 31.4–37.4)
MCV RBC AUTO: 92 FL (ref 82–98)
PLATELET # BLD AUTO: 237 THOUSANDS/UL (ref 149–390)
PMV BLD AUTO: 10.2 FL (ref 8.9–12.7)
POTASSIUM SERPL-SCNC: 4.3 MMOL/L (ref 3.5–5.3)
PROT SERPL-MCNC: 7.8 G/DL (ref 6.4–8.2)
RBC # BLD AUTO: 5.17 MILLION/UL (ref 3.81–5.12)
SODIUM SERPL-SCNC: 141 MMOL/L (ref 136–145)
TRIGL SERPL-MCNC: 113 MG/DL
TSH SERPL DL<=0.05 MIU/L-ACNC: 2.19 UIU/ML (ref 0.36–3.74)
WBC # BLD AUTO: 10.22 THOUSAND/UL (ref 4.31–10.16)

## 2021-01-25 PROCEDURE — 36415 COLL VENOUS BLD VENIPUNCTURE: CPT

## 2021-01-25 PROCEDURE — 80061 LIPID PANEL: CPT

## 2021-01-25 PROCEDURE — 80053 COMPREHEN METABOLIC PANEL: CPT

## 2021-01-25 PROCEDURE — 84443 ASSAY THYROID STIM HORMONE: CPT

## 2021-01-25 PROCEDURE — 85027 COMPLETE CBC AUTOMATED: CPT

## 2021-01-25 PROCEDURE — 83036 HEMOGLOBIN GLYCOSYLATED A1C: CPT

## 2021-01-26 ENCOUNTER — IMMUNIZATIONS (OUTPATIENT)
Dept: FAMILY MEDICINE CLINIC | Facility: HOSPITAL | Age: 66
End: 2021-01-26

## 2021-01-26 DIAGNOSIS — Z23 ENCOUNTER FOR IMMUNIZATION: Primary | ICD-10-CM

## 2021-01-26 PROCEDURE — 0012A SARS-COV-2 / COVID-19 MRNA VACCINE (MODERNA) 100 MCG: CPT

## 2021-01-26 PROCEDURE — 91301 SARS-COV-2 / COVID-19 MRNA VACCINE (MODERNA) 100 MCG: CPT

## 2021-03-08 ENCOUNTER — HOSPITAL ENCOUNTER (OUTPATIENT)
Dept: MAMMOGRAPHY | Facility: CLINIC | Age: 66
Discharge: HOME | End: 2021-03-08
Payer: COMMERCIAL

## 2021-03-08 VITALS — BODY MASS INDEX: 37.04 KG/M2 | HEIGHT: 67 IN | WEIGHT: 236 LBS

## 2021-03-08 DIAGNOSIS — Z12.31 SCREENING MAMMOGRAM, ENCOUNTER FOR: ICD-10-CM

## 2021-03-08 PROCEDURE — 77063 BREAST TOMOSYNTHESIS BI: CPT

## 2021-03-08 PROCEDURE — 77067 SCR MAMMO BI INCL CAD: CPT

## 2021-03-22 ENCOUNTER — OFFICE VISIT (OUTPATIENT)
Dept: FAMILY MEDICINE CLINIC | Facility: HOSPITAL | Age: 66
End: 2021-03-22
Payer: COMMERCIAL

## 2021-03-22 VITALS
SYSTOLIC BLOOD PRESSURE: 126 MMHG | TEMPERATURE: 96.5 F | HEIGHT: 67 IN | BODY MASS INDEX: 37.35 KG/M2 | DIASTOLIC BLOOD PRESSURE: 80 MMHG | HEART RATE: 88 BPM | WEIGHT: 238 LBS

## 2021-03-22 DIAGNOSIS — E03.9 ACQUIRED HYPOTHYROIDISM: ICD-10-CM

## 2021-03-22 DIAGNOSIS — E66.01 CLASS 2 SEVERE OBESITY DUE TO EXCESS CALORIES WITH SERIOUS COMORBIDITY AND BODY MASS INDEX (BMI) OF 37.0 TO 37.9 IN ADULT (HCC): ICD-10-CM

## 2021-03-22 DIAGNOSIS — K58.8 OTHER IRRITABLE BOWEL SYNDROME: Primary | ICD-10-CM

## 2021-03-22 DIAGNOSIS — R73.9 HYPERGLYCEMIA: ICD-10-CM

## 2021-03-22 DIAGNOSIS — J45.20 MILD INTERMITTENT ASTHMA WITHOUT COMPLICATION: ICD-10-CM

## 2021-03-22 DIAGNOSIS — I10 ESSENTIAL HYPERTENSION: ICD-10-CM

## 2021-03-22 PROCEDURE — 99214 OFFICE O/P EST MOD 30 MIN: CPT | Performed by: FAMILY MEDICINE

## 2021-03-22 RX ORDER — DICYCLOMINE HYDROCHLORIDE 10 MG/1
10 CAPSULE ORAL
Qty: 30 CAPSULE | Refills: 1 | Status: SHIPPED | OUTPATIENT
Start: 2021-03-22 | End: 2021-09-07

## 2021-03-22 NOTE — PROGRESS NOTES
Assessment/Plan:         Diagnoses and all orders for this visit:    Other irritable bowel syndrome  -     dicyclomine (BENTYL) 10 mg capsule; Take 1 capsule (10 mg total) by mouth 4 (four) times a day (before meals and at bedtime)    Essential hypertension    Acquired hypothyroidism    Mild intermittent asthma without complication    Class 2 severe obesity due to excess calories with serious comorbidity and body mass index (BMI) of 37 0 to 37 9 in adult (HCC)    Hyperglycemia  -     metFORMIN (GLUCOPHAGE) 500 mg tablet; Take 1 tablet (500 mg total) by mouth daily with breakfast  -     Comprehensive metabolic panel; Future  -     HEMOGLOBIN A1C W/ EAG ESTIMATION; Future          Subjective:      Patient ID: Maricarmen Mejias is a 60 y o  female  3 month follow up    Feeling well overall  No recent illness or injury  No COVID concern  Had COVID vax without problem    Artur Gonzalez to Pain Management for LBP next week  Limiting her walking    Seeing Dr Osiris Degroot for Pulmonary follow up tomorrow  Cough is overall better, sticking with Breo and Augmentin    Discussed return to Metformin as she had been on before  Did well on it at the time    Seeing Dr Jayde Palmer for 6 month check in May  The following portions of the patient's history were reviewed and updated as appropriate: allergies, current medications, past family history, past medical history, past social history, past surgical history and problem list     Review of Systems      Objective:      /80 (BP Location: Left arm, Patient Position: Sitting, Cuff Size: Large)   Pulse 88   Temp (!) 96 5 °F (35 8 °C) (Temporal)   Ht 5' 7" (1 702 m)   Wt 108 kg (238 lb)   BMI 37 28 kg/m²          Physical Exam  Vitals signs and nursing note reviewed  Constitutional:       Appearance: She is obese  HENT:      Head: Normocephalic and atraumatic  Neck:      Vascular: No carotid bruit  Cardiovascular:      Rate and Rhythm: Normal rate and regular rhythm        Pulses: Normal pulses  Heart sounds: Normal heart sounds  Pulmonary:      Effort: Pulmonary effort is normal       Breath sounds: Normal breath sounds  Abdominal:      General: There is distension  Palpations: Abdomen is soft  Neurological:      General: No focal deficit present  Mental Status: She is alert and oriented to person, place, and time  Psychiatric:         Mood and Affect: Mood normal          Behavior: Behavior normal          Thought Content:  Thought content normal          Judgment: Judgment normal

## 2021-03-23 ENCOUNTER — OFFICE VISIT (OUTPATIENT)
Dept: PULMONOLOGY | Facility: CLINIC | Age: 66
End: 2021-03-23
Payer: COMMERCIAL

## 2021-03-23 VITALS
HEIGHT: 67 IN | SYSTOLIC BLOOD PRESSURE: 144 MMHG | OXYGEN SATURATION: 100 % | TEMPERATURE: 97.1 F | HEART RATE: 78 BPM | BODY MASS INDEX: 37.23 KG/M2 | WEIGHT: 237.2 LBS | DIASTOLIC BLOOD PRESSURE: 80 MMHG

## 2021-03-23 DIAGNOSIS — J30.89 NON-SEASONAL ALLERGIC RHINITIS DUE TO OTHER ALLERGIC TRIGGER: Primary | ICD-10-CM

## 2021-03-23 DIAGNOSIS — J45.20 MILD INTERMITTENT ASTHMA WITHOUT COMPLICATION: ICD-10-CM

## 2021-03-23 PROCEDURE — 99213 OFFICE O/P EST LOW 20 MIN: CPT | Performed by: INTERNAL MEDICINE

## 2021-03-23 RX ORDER — FLUTICASONE PROPIONATE 50 MCG
2 SPRAY, SUSPENSION (ML) NASAL 2 TIMES DAILY
Qty: 3 BOTTLE | Refills: 3 | Status: SHIPPED | OUTPATIENT
Start: 2021-03-23 | End: 2022-03-21

## 2021-03-23 NOTE — PROGRESS NOTES
Pulmonary Follow Up Note   Suman Abbott 60 y o  female MRN: [id number]  3/23/2021    Assessment:    Rhinitis  I initiated flonase 2 sprays each side BID  Continue allegra  Mild intermittent asthma without complication  I will try to de-escalate to Arnuity, but advised her if the cost is higher or if she has worsening symptoms we will re-initiate Breo  Continue albuterol PRN with usage < 2x/week  Plan:    Diagnoses and all orders for this visit:    Non-seasonal allergic rhinitis due to other allergic trigger  -     fluticasone (FLONASE) 50 mcg/act nasal spray; 2 sprays into each nostril 2 (two) times a day    Mild intermittent asthma without complication  -     fluticasone (ARNUITY ELLIPTA) 100 MCG/ACT AEPB inhaler; Inhale 1 puff daily Rinse mouth after use  Return in about 6 months (around 9/23/2021)  History of Present Illness   HPI:  Suman Abbott is a 60 y o  female who returns for routine follow up  She reports no new issues since her last appointment  Rescue inhaler usage is less than 2 times per week by a significant margin  She is about complete with her [place] and we are interested in stepping down to Arnuity, especially considering a relatively high cost to her Breo  She does unfortunately report continued cough, especially at night, with associated postnasal drip  This is controlled fairly well during the day but her Allegra does not last the full 24 hours  She has never used a nasal spray  She otherwise reports no new complaints      Answers for HPI/ROS submitted by the patient on 3/22/2021   Primary symptoms  Chronicity: chronic  When did you first notice your symptoms?: more than 1 year ago  How often do your symptoms occur?: constantly  Since you first noticed this problem, how has it changed?: gradually improving  Do you have shortness of breath that occurs with effort or exertion?: No  Do you have ear congestion?: No  Do you have heartburn?: No  Do you have fatigue?: No  Do you have nasal congestion?: Yes  Do you have shortness of breath when lying flat?: No  Do you have shortness of breath when you wake up?: No  Do you have sweats?: No  Have you experienced weight loss?: No  Which of the following makes your symptoms worse?: change in weather, pollen  Which of the following makes your symptoms better?: cold air, steroid inhaler    Review of Systems   Constitutional: Negative for appetite change and fever  HENT: Positive for postnasal drip  Negative for ear pain, rhinorrhea, sneezing, sore throat and trouble swallowing  Respiratory: Positive for cough  Cardiovascular: Negative for chest pain  Musculoskeletal: Negative for myalgias  Neurological: Negative for headaches  All other systems reviewed and are negative        Historical Information   Past Medical History:   Diagnosis Date    Abdominal pain     Asthma     Basal cell carcinoma     Bicuspid aortic valve     LAST ASSESSED [id number]    Cervical disc disease     last assessed [id number]    Cervical stenosis of spine     LAST ASSESSED [id number]    Disease of thyroid gland     hypothyroid    Dry eyes     Endometriosis     Hyperglycemia     Hyperlipidemia     Hypertension     Hypothyroidism     Left ventricular hypertrophy     Mitral valvular disorder     Nephrolithiasis     Ovarian cyst, complex     Ptosis     LAST ASSESSED [id number]    Renal calculi     Right cervical radiculopathy     LAST ASSESSED [id number]    Seasonal allergies     Squamous cell skin cancer     Thyroid disease      Past Surgical History:   Procedure Laterality Date    BASAL CELL CARCINOMA EXCISION      CARPAL TUNNEL RELEASE       SECTION      CHOLECYSTECTOMY      COLONOSCOPY      NEUROPLASTY / TRANSPOSITION MEDIAN NERVE AT CARPAL TUNNEL      NEUROPLASTY / TRANSPOSITION MEDIAN NERVE AT CARPAL TUNNEL      [facility]    OTHER SURGICAL HISTORY      surgically removed skin patch for skin cancer    PLANTAR FASCIECTOMY  OR COLONOSCOPY FLX DX W/COLLJ SPEC WHEN PFRMD N/A 10/9/2017    Procedure: COLONOSCOPY;  Surgeon: Jazmine Kapoor MD;  Location: [facility] GI LAB;   Service: Gastroenterology    OR HYSTEROSCOPY,W/ENDO BX N/A 2/26/2018    Procedure: DILATATION AND CURETTAGE (D&C) WITH HYSTEROSCOPY;  Surgeon: Romaine Fajardo DO;  Location[facility] OR;  Service: Gynecology    SKIN BIOPSY      TUBAL LIGATION       Family History   Problem Relation Age of Onset    Diabetes Mother     Alzheimer's disease Mother     Heart disease Mother     Hypertension Mother     Other Family         back disorder    Cancer Family     Heart disease Family     Thyroid disease Family     No Known Problems Father     No Known Problems Sister     No Known Problems Daughter     No Known Problems Maternal Grandmother     No Known Problems Maternal Grandfather     No Known Problems Paternal Grandmother     No Known Problems Paternal Grandfather     No Known Problems Sister     No Known Problems Maternal Aunt     No Known Problems Maternal Aunt     No Known Problems Maternal Aunt     No Known Problems Maternal Aunt     No Known Problems Maternal Aunt          Meds/Allergies     Current Outpatient Medications:     albuterol (Ventolin HFA) 90 mcg/act inhaler, Inhale 2 puffs every 6 (six) hours as needed for wheezing, Disp: 1 Inhaler, Rfl: 2    amLODIPine (NORVASC) 5 mg tablet, Take 1 tablet (5 mg total) by mouth daily, Disp: 90 tablet, Rfl: 3    clindamycin (CLEOCIN T) 1 %, Apply to underarms once daily, Disp: 69 Package, Rfl: 7    dicyclomine (BENTYL) 10 mg capsule, Take 1 capsule (10 mg total) by mouth 4 (four) times a day (before meals and at bedtime), Disp: 30 capsule, Rfl: 1    fexofenadine (ALLEGRA) 180 MG tablet, Take 1 tablet (180 mg total) by mouth daily, Disp: 30 tablet, Rfl: 5    levothyroxine 125 mcg tablet, TAKE ONE TABLET BY MOUTH EVERY DAY , Disp: 90 tablet, Rfl: 1    losartan (COZAAR) 100 MG tablet, Take 1 tablet (100 mg total) by mouth daily (Patient taking differently: Take 200 mg by mouth daily Taking 200 mg), Disp: 90 tablet, Rfl: 3    metFORMIN (GLUCOPHAGE) 500 mg tablet, Take 1 tablet (500 mg total) by mouth daily with breakfast, Disp: 90 tablet, Rfl: 3    pantoprazole (PROTONIX) 40 mg tablet, Take 1 tablet (40 mg total) by mouth daily, Disp: 90 tablet, Rfl: 3    XIIDRA 5 % op solution, , Disp: , Rfl:     fluticasone (ARNUITY ELLIPTA) 100 MCG/ACT AEPB inhaler, Inhale 1 puff daily Rinse mouth after use , Disp: 3 Inhaler, Rfl: 3    fluticasone (FLONASE) 50 mcg/act nasal spray, 2 sprays into each nostril 2 (two) times a day, Disp: 3 Bottle, Rfl: 3  Allergies   Allergen Reactions    Erythromycin GI Intolerance     Reaction Date: 11Jul2011;     Penicillins Other (See Comments)     unknown    Aztreonam Rash     Action Taken: Allergy added by [employer] to replace Penicillins Cross Reactors;     Carbapenems Rash     Action Taken: Allergy added by [employer] to replace Penicillins [employer];     Cephalosporins Rash     Action Taken: Allergy added by [employer] to replace Penicillins [employer];     Flu Virus Vaccine Hives, Rash and GI Intolerance    Griseofulvin Rash     Action Taken: Allergy added by [employer] to replace Penicillins Cross [employer]ors;      Vitals: Blood pressure 144/80, pulse 78, temperature (!) 97 1 °F (36 2 °C), temperature source Tympanic, height 5' 7" (1 702 m), weight 108 kg (237 lb 3 2 oz), SpO2 100 %  Body mass index is 37 15 kg/m²  Oxygen Therapy  SpO2: 100 %  Oxygen Therapy: None (Room air)    Physical Exam  Physical Exam  Vitals signs reviewed  Constitutional:       General: She is not in acute distress  Appearance: Normal appearance  She is well-developed  She is not ill-appearing  HENT:      Head: Normocephalic and atraumatic  Eyes:      General: No scleral icterus  Conjunctiva/sclera: Conjunctivae normal    Neck:      Musculoskeletal: Neck supple  Vascular: No JVD  Cardiovascular:      Rate and Rhythm: Normal rate and regular rhythm  Heart sounds: Murmur present  No friction rub  No gallop  Comments: Stable 2/6 LENNIE at the [facility]  Pulmonary:      Effort: Pulmonary effort is normal  No respiratory distress  Breath sounds: Normal breath sounds  No wheezing or rales  Skin:     General: Skin is warm and dry  Findings: No rash  Neurological:      General: No focal deficit present  Mental Status: She is alert and oriented to person, place, and time  Mental status is at baseline  Psychiatric:         Mood and Affect: Mood normal          Behavior: Behavior normal        Labs: I have personally reviewed pertinent lab results  Lab Results   Component Value Date    WBC 10 22 (H) 01/25/2021    HGB 15 5 (H) 01/25/2021    HCT 47 6 (H) 01/25/2021    MCV 92 01/25/2021     01/25/2021     Lab Results   Component Value Date    GLUCOSE 104 08/14/2015    CALCIUM 9 8 01/25/2021     08/14/2015    K 4 3 01/25/2021    CO2 30 01/25/2021     01/25/2021    BUN 20 01/25/2021    CREATININE 0 77 01/25/2021     No results found for: IGE  Lab Results   Component Value Date    ALT 37 01/25/2021    AST 23 01/25/2021    ALKPHOS 102 01/25/2021    BILITOT 0 42 08/14/2015     No prior allergy testing  Labs per above otherwise normal     Imaging and other studies: I have personally reviewed pertinent reports  EKG, Pathology, and Other Studies: I have personally reviewed pertinent reports  Janet Chessman, M D    [place] [facility]

## 2021-03-23 NOTE — ASSESSMENT & PLAN NOTE
I will try to de-escalate to Arnuity, but advised her if the cost is higher or if she has worsening symptoms we will re-initiate Breo  Continue albuterol PRN with usage < 2x/week

## 2021-03-30 ENCOUNTER — OFFICE VISIT (OUTPATIENT)
Dept: PAIN MEDICINE | Facility: CLINIC | Age: 66
End: 2021-03-30
Payer: COMMERCIAL

## 2021-03-30 VITALS
HEART RATE: 72 BPM | WEIGHT: 237 LBS | BODY MASS INDEX: 37.2 KG/M2 | TEMPERATURE: 98.6 F | HEIGHT: 67 IN | DIASTOLIC BLOOD PRESSURE: 68 MMHG | SYSTOLIC BLOOD PRESSURE: 132 MMHG

## 2021-03-30 DIAGNOSIS — G89.29 CHRONIC BILATERAL LOW BACK PAIN WITHOUT SCIATICA: ICD-10-CM

## 2021-03-30 DIAGNOSIS — M47.816 LUMBAR SPONDYLOSIS: Primary | ICD-10-CM

## 2021-03-30 DIAGNOSIS — M54.50 CHRONIC BILATERAL LOW BACK PAIN WITHOUT SCIATICA: ICD-10-CM

## 2021-03-30 PROCEDURE — 99214 OFFICE O/P EST MOD 30 MIN: CPT | Performed by: ANESTHESIOLOGY

## 2021-03-30 NOTE — PROGRESS NOTES
Assessment  1  Lumbar spondylosis    2  Chronic bilateral low back pain without sciatica        Plan      The patient's low back pain persists despite time, relative rest, activity modification and therapy  She did well in the initial medial branch in 2018, we will confirm with bupivicaine 0 75%  If the patient demonstrates appropriate response to medial branch blockade we will schedule for radiofrequency ablation of the blocked nerves to provide long-term pain relief  In the office today, we reviewed the nature of the patient's pathology in depth using  diagrams and models  We discussed the approach we would use for the medial branch block and provided literature for home review  The patient understands the risks associated with the procedure including bleeding, infection, tissue injury, allergic reaction and paralysis and provided written and verbal consent  in the office today  My impressions and treatment recommendations were discussed in detail with the patient who verbalized understanding and had no further questions  Discharge instructions were provided  I personally saw and examined the patient and I agree with the above discussed plan of care  This note is created using dictation transcription  It may contain typographical errors, grammatical errors, improperly dictated words, background noise and other errors  Orders Placed This Encounter   Procedures    FL spine and pain procedure     Standing Status:   Future     Standing Expiration Date:   3/30/2025     Order Specific Question:   Reason for Exam:     Answer:   bilateral L3,4,5 MBB with 0 75% bupiv     Order Specific Question:   Anticoagulant hold needed? Answer:   no     No orders of the defined types were placed in this encounter  History of Present Illness    Harpreet Shah is a 60 y o  female  I seen in the past underwent one diagnostic medial branch block but due to insurance issues and never followed through    She did well which she obtained over 80% reduction symptoms  Her pain is across her low back without radiation worse with bending somewhat relieved with rest and relaxation  She currently rates between eight to 9/10 on the visual analog scale is intermittent denies any bowel or bladder dysfunction  I have personally reviewed and/or updated the patient's past medical history, past surgical history, family history, social history, current medications, allergies, and vital signs today  Review of Systems   Constitutional: Negative for fever and unexpected weight change  HENT: Negative for trouble swallowing  Eyes: Negative for visual disturbance  Respiratory: Negative for shortness of breath and wheezing  Cardiovascular: Negative for chest pain and palpitations  Gastrointestinal: Negative for constipation, diarrhea, nausea and vomiting  Endocrine: Negative for cold intolerance, heat intolerance and polydipsia  Genitourinary: Negative for difficulty urinating and frequency  Musculoskeletal: Negative for arthralgias, gait problem, joint swelling and myalgias  Skin: Negative for rash  Neurological: Negative for dizziness, seizures, syncope, weakness and headaches  Hematological: Does not bruise/bleed easily  Psychiatric/Behavioral: Negative for dysphoric mood  All other systems reviewed and are negative        Patient Active Problem List   Diagnosis    Complex ovarian cyst    Dry eye syndrome    Fatty infiltration of liver    Hyperglycemia    Abdominal pain    Mixed hyperlipidemia    Essential hypertension    Acquired hypothyroidism    Left ventricular hypertrophy    Lumbosacral pain    Mitral valvular disorder    Nephrolithiasis    Non-toxic multinodular goiter    Class 2 severe obesity with serious comorbidity and body mass index (BMI) of 37 0 to 37 9 in adult (HCC)    Rhinitis    Spondylosis of cervical region without myelopathy or radiculopathy    Thoracic neuritis    Endometrial polyp    Bicuspid aortic valve    Squamous cell cancer of skin of hand, right    Basal cell carcinoma (BCC) of [place]    Former smoker    Cough with sputum    Sialoadenitis    Impacted cerumen of right ear    Spasmodic cough    Mild intermittent asthma without complication    Aortic valve disease    Lumbar spondylosis       Past Medical History:   Diagnosis Date    Abdominal pain     Asthma     Basal cell carcinoma     Bicuspid aortic valve     LAST ASSESSED [id number]    Cervical disc disease     last assessed [id number]    Cervical stenosis of spine     LAST ASSESSED [id number]    Disease of thyroid gland     hypothyroid    Dry eyes     Endometriosis     Hyperglycemia     Hyperlipidemia     Hypertension     Hypothyroidism     Left ventricular hypertrophy     Mitral valvular disorder     Nephrolithiasis     Ovarian cyst, complex     Ptosis     LAST ASSESSED [id number]    Renal calculi     Right cervical radiculopathy     LAST ASSESSED [id number]    Seasonal allergies     Squamous cell skin cancer     Thyroid disease        Past Surgical History:   Procedure Laterality Date    BASAL CELL CARCINOMA EXCISION      CARPAL TUNNEL RELEASE       SECTION      CHOLECYSTECTOMY      COLONOSCOPY      NEUROPLASTY / TRANSPOSITION MEDIAN NERVE AT CARPAL TUNNEL      NEUROPLASTY / TRANSPOSITION MEDIAN NERVE AT CARPAL TUNNEL  [facility]    OTHER SURGICAL HISTORY      surgically removed skin patch for skin cancer    PLANTAR FASCIECTOMY      ND COLONOSCOPY FLX DX W/COLLJ SPEC WHEN PFRMD N/A 10/9/2017    Procedure: COLONOSCOPY;  Surgeon: Clayton Jones MD;  Location: [facility] GI LAB;   Service: Gastroenterology    ND HYSTEROSCOPY,W/ENDO BX N/A 2018    Procedure: DILATATION AND CURETTAGE (D&C) WITH HYSTEROSCOPY;  Surgeon: Vicente Simpson DO;  Location: [facility];  Service: Gynecology    SKIN BIOPSY      TUBAL LIGATION         Family History   Problem Relation Age of Onset    Diabetes Mother     Alzheimer's disease Mother     Heart disease Mother     Hypertension Mother     Other Family         back disorder    Cancer Family     Heart disease Family     Thyroid disease Family     No Known Problems Father     No Known Problems Sister     No Known Problems Daughter     No Known Problems Maternal Grandmother     No Known Problems Maternal Grandfather     No Known Problems Paternal Grandmother     No Known Problems Paternal Grandfather     No Known Problems Sister     No Known Problems Maternal Aunt     No Known Problems Maternal Aunt     No Known Problems Maternal Aunt     No Known Problems Maternal Aunt     No Known Problems Maternal Aunt        Social History     Occupational History    Occupation: part time employment   Tobacco Use    Smoking status: Former Smoker     Packs/day: 1 00     Years: 20 00     Pack years: 20 00     Types: Cigarettes     Quit date:      Years since quittin 2    Smokeless tobacco: Never Used   Substance and Sexual Activity    Alcohol use:  Yes     Alcohol/week: 8 0 standard drinks     Types: 4 Glasses of wine, 4 Standard drinks or equivalent per week     Frequency: Monthly or less     Comment: ocassionally     Drug use: No    Sexual activity: Yes     Partners: Male     Birth control/protection: Female Sterilization     Comment: tubal ligation        Current Outpatient Medications on File Prior to Visit   Medication Sig    albuterol (Ventolin HFA) 90 mcg/act inhaler Inhale 2 puffs every 6 (six) hours as needed for wheezing    amLODIPine (NORVASC) 5 mg tablet Take 1 tablet (5 mg total) by mouth daily    clindamycin (CLEOCIN T) 1 % Apply to underarms once daily    dicyclomine (BENTYL) 10 mg capsule Take 1 capsule (10 mg total) by mouth 4 (four) times a day (before meals and at bedtime)    fexofenadine (ALLEGRA) 180 MG tablet Take 1 tablet (180 mg total) by mouth daily    fluticasone (ARNUITY ELLIPTA) 100 MCG/ACT AEPB inhaler Inhale 1 puff daily Rinse mouth after use   fluticasone (FLONASE) 50 mcg/act nasal spray 2 sprays into each nostril 2 (two) times a day    levothyroxine 125 mcg tablet TAKE ONE TABLET BY MOUTH EVERY DAY   losartan (COZAAR) 100 MG tablet Take 1 tablet (100 mg total) by mouth daily (Patient taking differently: Take 200 mg by mouth daily Taking 200 mg)    metFORMIN (GLUCOPHAGE) 500 mg tablet Take 1 tablet (500 mg total) by mouth daily with breakfast    pantoprazole (PROTONIX) 40 mg tablet Take 1 tablet (40 mg total) by mouth daily    XIIDRA 5 % op solution      No current facility-administered medications on file prior to visit  Allergies   Allergen Reactions    Erythromycin GI Intolerance     Reaction Date: 11Jul2011;     Penicillins Other (See Comments)     unknown    Aztreonam Rash     Action Taken: Allergy added by [employer] to replace Penicillins Cross [employer]ors;     Carbapenems Rash     Action Taken: Allergy added by [employer] to replace Penicillins [employer];     Cephalosporins Rash     Action Taken: Allergy added by [employer] to replace Penicillins [employer];     Flu Virus Vaccine Hives, Rash and GI Intolerance    Griseofulvin Rash     Action Taken: Allergy added by [employer] to replace Penicillins [employer]; Physical Exam    /68 (BP Location: Left arm, Patient Position: Sitting, Cuff Size: Standard)   Pulse 72   Temp 98 6 °F (37 °C)   Ht 5' 7" (1 702 m)   Wt 108 kg (237 lb)   BMI 37 12 kg/m²     Constitutional: normal, well developed, well nourished, alert, in no distress and non-toxic and no overt pain behavior   and obese  Eyes: anicteric  HEENT: grossly intact  Neck: supple, symmetric, trachea midline and no masses   Pulmonary:even and unlabored  Cardiovascular:No edema or pitting edema present  Skin:Normal without rashes or lesions and well hydrated  Psychiatric:Mood and affect appropriate  Neurologic:Cranial Nerves II-XII grossly intact  Musculoskeletal:normal,   Inspection:  Normal station and gait  Normal lumbar lordotic curve with no significant scoliosis or spinal step-off  Skin intact without erythema  No gross mass or muscle atrophy  Palpation:  There is no tenderness to palpation overlying the sacroiliac joint as well as the ischial bursa bilateral   No significant tenderness over the greater trochanteric bursa bilaterally  Motor/Strength:  5/5 strength in the bilateral lower limbs  The patient is able to heel and/toe walk  Tandem gait is intact  Reflexes: Deep tendon reflex are  Diminished but symmetrical bilateral patella and Achilles  Sensation:   Sensation intact to light touch and pinprick in the bilateral lower limbs  Proprioception is intact at bilateral hallux  Maneuvers: Negative bilateral straight leg raising  Negative Milind's maneuver  Positive pain with lumbar extension positive lumbar facet loading            Imaging  LUMBAR SPINE     INDICATION: 78-year-old female, left-sided pain     COMPARISON:  4/20/2016 x-rays     VIEWS:  XR SPINE LUMBAR COMPLETE W BENDING MINIMUM 6 VIEWS  Images: 9, including neutral, flexion and extension direct lateral views     FINDINGS:  Grade 1 degenerative anterolisthesis is again evident at the L4-5 level which appears slightly increased in severity on the flexion views      There is no evidence of acute fracture or destructive osseous lesion      Persistent multilevel degenerative spondylosis, most pronounced L3-4, L4-5     The pedicles appear intact      Soft tissues are unremarkable      IMPRESSION:  Persistent multilevel degenerative spondylosis and grade 1 anterolisthesis L4-5     Mild dynamic instability exhibited by the degenerative anterolisthesis at the L4-5 level       I have personally reviewed pertinent films in PACS

## 2021-04-15 ENCOUNTER — HOSPITAL ENCOUNTER (OUTPATIENT)
Dept: RADIOLOGY | Facility: CLINIC | Age: 66
Discharge: HOME | End: 2021-04-15
Attending: ANESTHESIOLOGY | Admitting: ANESTHESIOLOGY
Payer: COMMERCIAL

## 2021-04-15 VITALS
SYSTOLIC BLOOD PRESSURE: 160 MMHG | TEMPERATURE: 97.8 F | DIASTOLIC BLOOD PRESSURE: 84 MMHG | OXYGEN SATURATION: 98 % | HEART RATE: 65 BPM | RESPIRATION RATE: 20 BRPM

## 2021-04-15 DIAGNOSIS — M47.816 LUMBAR SPONDYLOSIS: ICD-10-CM

## 2021-04-15 PROCEDURE — 64493 INJ PARAVERT F JNT L/S 1 LEV: CPT | Performed by: ANESTHESIOLOGY

## 2021-04-15 PROCEDURE — 64494 INJ PARAVERT F JNT L/S 2 LEV: CPT | Performed by: ANESTHESIOLOGY

## 2021-04-15 RX ORDER — BUPIVACAINE HYDROCHLORIDE 7.5 MG/ML
10 INJECTION, SOLUTION EPIDURAL; RETROBULBAR ONCE
Status: COMPLETED | OUTPATIENT
Start: 2021-04-15 | End: 2021-04-15

## 2021-04-15 RX ADMIN — BUPIVACAINE HYDROCHLORIDE 6 ML: 7.5 INJECTION, SOLUTION EPIDURAL; RETROBULBAR at 14:47

## 2021-04-15 NOTE — H&P
History of Present Illness: The patient is a 60 y o  female who presents with complaints of  Lobectomy      Patient Active Problem List   Diagnosis    Complex ovarian cyst    Dry eye syndrome    Fatty infiltration of liver    Hyperglycemia    Abdominal pain    Mixed hyperlipidemia    Essential hypertension    Acquired hypothyroidism    Left ventricular hypertrophy    Lumbosacral pain    Mitral valvular disorder    Nephrolithiasis    Non-toxic multinodular goiter    Class 2 severe obesity with serious comorbidity and body mass index (BMI) of 37 0 to 37 9 in adult (HCC)    Rhinitis    Spondylosis of cervical region without myelopathy or radiculopathy    Thoracic neuritis    Endometrial polyp    Bicuspid aortic valve    Squamous cell cancer of skin of hand, right    Basal cell carcinoma (BCC) of ala nasi    Former smoker    Cough with sputum    Sialoadenitis    Impacted cerumen of right ear    Spasmodic cough    Mild intermittent asthma without complication    Aortic valve disease    Lumbar spondylosis       Past Medical History:   Diagnosis Date    Abdominal pain     Asthma     Basal cell carcinoma     Bicuspid aortic valve     LAST ASSESSED [id number]    Cervical disc disease     last assessed [id number]    Cervical stenosis of spine     LAST ASSESSED [id number]    Disease of thyroid gland     hypothyroid    Dry eyes     Endometriosis     Hyperglycemia     Hyperlipidemia     Hypertension     Hypothyroidism     Left ventricular hypertrophy     Mitral valvular disorder     Nephrolithiasis     Ovarian cyst, complex     Ptosis     LAST ASSESSED [id number]    Renal calculi     Right cervical radiculopathy     LAST ASSESSED [id number]    Seasonal allergies     Squamous cell skin cancer     Thyroid disease        Past Surgical History:   Procedure Laterality Date    BASAL CELL CARCINOMA EXCISION      CARPAL TUNNEL RELEASE       SECTION      CHOLECYSTECTOMY      COLONOSCOPY      NEUROPLASTY / TRANSPOSITION MEDIAN NERVE AT CARPAL TUNNEL      NEUROPLASTY / TRANSPOSITION MEDIAN NERVE AT CARPAL TUNNEL  2001    [facility]    OTHER SURGICAL HISTORY      surgically removed skin patch for skin cancer    PLANTAR FASCIECTOMY      [place] COLONOSCOPY FLX DX W/COLLJ SPEC WHEN PFRMD N/A 10/9/2017    Procedure: COLONOSCOPY;  Surgeon: Jolie Valerio MD;  Location: [facility];   Service: Gastroenterology    [place] HYSTEROSCOPY,W/ENDO BX N/A 2/26/2018    Procedure: DILATATION AND CURETTAGE (D&C) WITH HYSTEROSCOPY;  Surgeon: Lucio Iyer DO;  Location: [facility] OR;  Service: Gynecology    SKIN BIOPSY      TUBAL LIGATION           Current Outpatient Medications:     albuterol (Ventolin HFA) 90 mcg/act inhaler, Inhale 2 puffs every 6 (six) hours as needed for wheezing, Disp: 1 Inhaler, Rfl: 2    amLODIPine (NORVASC) 5 mg tablet, Take 1 tablet (5 mg total) by mouth daily, Disp: 90 tablet, Rfl: 3    clindamycin (CLEOCIN T) 1 %, Apply to underarms once daily, Disp: 69 Package, Rfl: 7    dicyclomine (BENTYL) 10 mg capsule, Take 1 capsule (10 mg total) by mouth 4 (four) times a day (before meals and at bedtime), Disp: 30 capsule, Rfl: 1    fexofenadine (ALLEGRA) 180 MG tablet, Take 1 tablet (180 mg total) by mouth daily, Disp: 30 tablet, Rfl: 5    fluticasone (ARNUITY ELLIPTA) 100 MCG/ACT AEPB inhaler, Inhale 1 puff daily Rinse mouth after use , Disp: 3 Inhaler, Rfl: 3    fluticasone (FLONASE) 50 mcg/act nasal spray, 2 sprays into each nostril 2 (two) times a day, Disp: 3 Bottle, Rfl: 3    levothyroxine 125 mcg tablet, TAKE ONE TABLET BY MOUTH EVERY DAY , Disp: 90 tablet, Rfl: 1    losartan (COZAAR) 100 MG tablet, Take 1 tablet (100 mg total) by mouth daily (Patient taking differently: Take 200 mg by mouth daily Taking 200 mg), Disp: 90 tablet, Rfl: 3    metFORMIN (GLUCOPHAGE) 500 mg tablet, Take 1 tablet (500 mg total) by mouth daily with breakfast, Disp: 90 tablet, Rfl: 3    pantoprazole (PROTONIX) 40 mg tablet, Take 1 tablet (40 mg total) by mouth daily, Disp: 90 tablet, Rfl: 3    XIIDRA 5 % op solution, , Disp: , Rfl:     Current Facility-Administered Medications:     bupivacaine (PF) (MARCAINE) 0 75 % injection 10 mL, 10 mL, Other, Once, Valentin Mon,     Allergies   Allergen Reactions    Erythromycin GI Intolerance     Reaction Date: 11Jul2011;     Penicillins Other (See Comments)     unknown    Aztreonam Rash     Action Taken: Allergy added by [employer] to replace Penicillins Cross Reactors;     Carbapenems Rash     Action Taken: Allergy added by [employer] to replace Penicillins [employer];     Cephalosporins Rash     Action Taken: Allergy added by [employer] to replace Penicillins [employer];     Flu Virus Vaccine Hives, Rash and GI Intolerance    Griseofulvin Rash     Action Taken: Allergy added by [employer] to replace Penicillins [employer]; Physical Exam:   General: Awake, Alert, Oriented x 3, Mood and affect appropriate  Respiratory: Respirations even and unlabored  Cardiovascular: Peripheral pulses intact; no edema  Musculoskeletal Exam:  Pain with lumbar extension    ASA Score: III         Assessment:   1   Lumbar spondylosis        Plan: bilateral L3,4,5 MBB with 0 75% bupiv

## 2021-04-15 NOTE — DISCHARGE INSTRUCTIONS
ACTIVITY  · Please do activities that will bring on the normal pain that we are rating  For example, if vacuuming or walking increases the pain, do that  This will give the most accurate response to the diary  · You may shower, but no tub baths today, or applied heat  CARE OF THE INJECTION SITE  · This area may be numb for several hours after the injection  · Notify [facility] if you have any of the following:  redness, drainage, swelling, or fever above 100°F     SPECIAL INSTRUCTIONS  · Please return the MBB diary to our office by mail, fax, or drop it off  MEDICATIONS  · Please do not take any break through or short acting pain medications for 8 hours after the block  · Continue to take all routine medications  · Our office may have instructed you to hold some medications  As no general anesthesia was used in today's procedure, you should not experience any side effects related to anesthesia  If you have a problem specifically related to your procedure, please call our office at [phone]  Problems not related to your procedure should be directed to your primary care physician

## 2021-04-19 ENCOUNTER — TELEPHONE (OUTPATIENT)
Dept: RADIOLOGY | Facility: CLINIC | Age: 66
End: 2021-04-19

## 2021-04-20 NOTE — TELEPHONE ENCOUNTER
Went over pre procedure instructions, no vaccinations 2 weeks prior/post proc, NPO 1 hr prior, if sick or on abx needs to call to rs, wear loose, comf clothing- no buttons/zippers, needs   Pt verbalized understanding  COVID disclaimer/ screening completed  Pt states they have NOT had COVID/no COVID vaccine and are aware no vaccines two weeks before and two weeks after procedure  Because of possible immunosuppression due to steroid, pt is aware that he should practice more strict social distancing, masking, handwashing for 2-3 weeks following procedure  Reviewed check in process with pt- will enter building no earlier than arrival time given, agreed to wear mask for duration of appt,  will wait in car

## 2021-04-26 DIAGNOSIS — J20.9 BRONCHITIS, ACUTE, WITH BRONCHOSPASM: ICD-10-CM

## 2021-04-26 RX ORDER — ALBUTEROL SULFATE 90 UG/1
2 AEROSOL, METERED RESPIRATORY (INHALATION) EVERY 6 HOURS PRN
Qty: 3 INHALER | Refills: 3 | Status: SHIPPED | OUTPATIENT
Start: 2021-04-26

## 2021-05-03 ENCOUNTER — OFFICE VISIT (OUTPATIENT)
Dept: DERMATOLOGY | Facility: CLINIC | Age: 66
End: 2021-05-03
Payer: COMMERCIAL

## 2021-05-03 VITALS — HEIGHT: 67 IN | BODY MASS INDEX: 35 KG/M2 | WEIGHT: 223 LBS | TEMPERATURE: 98.7 F

## 2021-05-03 DIAGNOSIS — D48.5 NEOPLASM OF UNCERTAIN BEHAVIOR OF SKIN: Primary | ICD-10-CM

## 2021-05-03 PROCEDURE — 88305 TISSUE EXAM BY PATHOLOGIST: CPT | Performed by: STUDENT IN AN ORGANIZED HEALTH CARE EDUCATION/TRAINING PROGRAM

## 2021-05-03 PROCEDURE — 11102 TANGNTL BX SKIN SINGLE LES: CPT | Performed by: STUDENT IN AN ORGANIZED HEALTH CARE EDUCATION/TRAINING PROGRAM

## 2021-05-03 PROCEDURE — 99213 OFFICE O/P EST LOW 20 MIN: CPT | Performed by: STUDENT IN AN ORGANIZED HEALTH CARE EDUCATION/TRAINING PROGRAM

## 2021-05-03 NOTE — PROGRESS NOTES
[facility] Follow Up Note    Patient Name: Lizzy Hunt  Encounter Date: 5/3/2021    Today's Chief Concerns:  Bj Mujica Concern #1:  Warts   Concern #2:    Bj Mujica Concern #3:      Current Medications:    Current Outpatient Medications:     albuterol (Ventolin HFA) 90 mcg/act inhaler, Inhale 2 puffs every 6 (six) hours as needed for wheezing, Disp: 3 Inhaler, Rfl: 3    amLODIPine (NORVASC) 5 mg tablet, Take 1 tablet (5 mg total) by mouth daily, Disp: 90 tablet, Rfl: 3    clindamycin (CLEOCIN T) 1 %, Apply to underarms once daily, Disp: 69 Package, Rfl: 7    dicyclomine (BENTYL) 10 mg capsule, Take 1 capsule (10 mg total) by mouth 4 (four) times a day (before meals and at bedtime), Disp: 30 capsule, Rfl: 1    fexofenadine (ALLEGRA) 180 MG tablet, Take 1 tablet (180 mg total) by mouth daily, Disp: 30 tablet, Rfl: 5    fluticasone (ARNUITY ELLIPTA) 100 MCG/ACT AEPB inhaler, Inhale 1 puff daily Rinse mouth after use , Disp: 3 Inhaler, Rfl: 3    fluticasone (FLONASE) 50 mcg/act nasal spray, 2 sprays into each nostril 2 (two) times a day, Disp: 3 Bottle, Rfl: 3    levothyroxine 125 mcg tablet, TAKE ONE TABLET BY MOUTH EVERY DAY , Disp: 90 tablet, Rfl: 1    metFORMIN (GLUCOPHAGE) 500 mg tablet, Take 1 tablet (500 mg total) by mouth daily with breakfast, Disp: 90 tablet, Rfl: 3    pantoprazole (PROTONIX) 40 mg tablet, Take 1 tablet (40 mg total) by mouth daily, Disp: 90 tablet, Rfl: 3    XIIDRA 5 % op solution, , Disp: , Rfl:     losartan (COZAAR) 100 MG tablet, Take 1 tablet (100 mg total) by mouth daily (Patient not taking: Reported on 5/3/2021), Disp: 90 tablet, Rfl: 3    CONSTITUTIONAL:   Vitals:    05/03/21 0851   Temp: 98 7 °F (37 1 °C)   Weight: 101 kg (223 lb)   Height: 5' 7" (1 702 m)       Specific Alerts:    Have you been seen by a [facility]atologist in the last 3 years? YES    Are you pregnant or planning to become pregnant?  No    Are you currently or planning to be nursing or breast feeding? No    Allergies   Allergen Reactions    Erythromycin GI Intolerance     Reaction Date: 11Jul2011;     Penicillins Other (See Comments)     unknown    Aztreonam Rash     Action Taken: Allergy added by [employer] to replace Penicillins [employer];     Carbapenems Rash     Action Taken: Allergy added by [employer] to replace Penicillins [employer];     Cephalosporins Rash     Action Taken: Allergy added by [employer] to replace Penicillins [employer];     Flu Virus Vaccine Hives, Rash and GI Intolerance    Griseofulvin Rash     Action Taken: Allergy added by [employer] to replace Penicillins [employer]; May we call your Preferred Phone number to discuss your specific medical information? YES    May we leave a detailed message that includes your specific medical information? YES    Have you traveled outside of the [facility] in the past 3 months? No    Do you currently have a pacemaker or defibrillator? No    Do you have any artificial heart valves, joints, plates, screws, rods, stents, pins, etc? No   - If Yes, were any placed within the last 2 years? Do you require any medications prior to a surgical procedure? No   - If Yes, for which procedure? - If Yes, what medications to you require? Are you taking any medications that cause you to bleed more easily ("blood thinners") No    Have you ever experienced a rapid heartbeat with epinephrine? No    Have you ever been treated with "gold" (gold sodium thiomalate) therapy? No    [facility] can help with wrinkles, "laugh lines," facial volume loss, "double chin," "love handles," age spots, and more  Are you interested in learning today about some of the skin enhancement procedures that we offer? (If Yes, please provide more detail) No    Review of Systems:  Have you recently had or currently have any of the following?     · Fever or chills: No  · Night Sweats: No  · Headaches: No  · Weight Gain: No  · Weight Loss: No  · Blurry Vision: No  · Nausea: No  · Vomiting: No  · Diarrhea: No  · Blood in Stool: No  · Abdominal Pain: No  · Itchy Skin: No  · Painful Joints: No  · Swollen Joints: No  · Muscle Pain: No  · Irregular Mole: No  · Sun Burn: No  · Dry Skin: No  · Skin Color Changes: No  · Scar or Keloid: No  · Cold Sores/Fever Blisters: No  · Bacterial Infections/MRSA: No  · Anxiety: No  · Depression: No  · Suicidal or Homicidal Thoughts: No      PSYCH: Normal mood and affect  EYES: Normal conjunctiva  ENT: Normal lips and oral mucosa  CARDIOVASCULAR: No edema  RESPIRATORY: Normal respirations  HEME/LYMPH/IMMUNO:  No regional lymphadenopathy except as noted below in ASSESSMENT AND PLAN BY DIAGNOSIS    FULL ORGAN SYSTEM SKIN EXAM (SKIN)  Left Arm/Hand/Fingers Normal except as noted below in Assessment       NEOPLASM OF UNCERTAIN BEHAVIOR OF SKIN    Physical Exam:   (Anatomic Location); (Size and Morphological Description); (Differential Diagnosis):  o Left elbow; 6 x 6 mm Keratotic papule; Squamous Cell Carcinoma versus Verruca    Assessment and Plan:   I have discussed with the patient that a sample of skin via a "skin biopsy would be potentially helpful to further make a specific diagnosis under the microscope   Based on a thorough discussion of this condition and the management approach to it (including a comprehensive discussion of the known risks, side effects and potential benefits of treatment), the patient (family) agrees to implement the following specific plan:    o Procedure:  Skin Biopsy  After a thorough discussion of treatment options and risk/benefits/alternatives (including but not limited to local pain, scarring, dyspigmentation, blistering, possible superinfection, and inability to confirm a diagnosis via histopathology), verbal and written consent were obtained and portion of the rash was biopsied for tissue sample    See below for consent that was obtained from patient and subsequent Procedure Note  PROCEDURE SHAVE BIOPSY NOTE:     Performing Physician: Vasyl Lock Anatomic Location; Clinical Description with size (cm); Pre-Op Diagnosis:   o Left elbow; 6 x 6 mm Keratotic papule; Squamous Cell Carcinoma versus Verruca   Post-op diagnosis: Same      Local anesthesia: 1% xylocaine with epi       Topical anesthesia: None     Hemostasis: Aluminum chloride       After obtaining informed consent  at which time there was a discussion about the purpose of biopsy  and low risks of infection and bleeding  The area was prepped and draped in the usual fashion  Anesthesia was obtained with 1% lidocaine with epinephrine  A shave biopsy to an appropriate sampling depth was obtained with a sterile blade (such as a 15-blade or DermaBlade)  The resulting wound was covered with surgical ointment and bandaged appropriately  The patient tolerated the procedure well without complications and was without signs of functional compromise  Specimen has been sent for review by Dermatopathology  Standard post-procedure care has been explained and has been included in written form within the patient's copy of Informed Consent  INFORMED CONSENT DISCUSSION AND POST-OPERATIVE INSTRUCTIONS FOR PATIENT    I   RATIONALE FOR PROCEDURE  I understand that a skin biopsy allows the Dermatologist to test a lesion or rash under the microscope to obtain a diagnosis  It usually involves numbing the area with numbing medication and removing a small piece of skin; sometimes the area will be closed with sutures  In this specific procedure, sutures are not usually needed  If any sutures are placed, then they are usually need to be removed in 2 weeks or less  I understand that my Dermatologist recommends that a skin "shave" biopsy be performed today  A local anesthetic, similar to the kind that a dentist uses when filling a cavity, will be injected with a very small needle into the skin area to be sampled    The injected skin and tissue underneath "will go to sleep and become numb so no pain should be felt afterwards  An instrument shaped like a tiny "razor blade" (shave biopsy instrument) will be used to cut a small piece of tissue and skin from the area so that a sample of tissue can be taken and examined more closely under the microscope  A slight amount of bleeding will occur, but it will be stopped with direct pressure and a pressure bandage and any other appropriate methods  I understands that a scar will form where the wound was created  Surgical ointment will be applied to help protect the wound  Sutures are not usually needed  II   RISKS AND POTENTIAL COMPLICATIONS   I understand the risks and potential complications of a skin biopsy include but are not limited to the following:   Bleeding   Infection   Pain   Scar/keloid   Skin discoloration   Incomplete Removal   Recurrence   Nerve Damage/Numbness/Loss of Function   Allergic Reaction to Anesthesia   Biopsies are diagnostic procedures and based on findings additional treatment or evaluation may be required   Loss or destruction of specimen resulting in no additional findings    My Dermatologist has explained to me the nature of the condition, the nature of the procedure, and the benefits to be reasonably expected compared with alternative approaches  My Dermatologist has discussed the likelihood of major risks or complications of this procedure including the specific risks listed above, such as bleeding, infection, and scarring/keloid  I understand that a scar is expected after this procedure  I understand that my physician cannot predict if the scar will form a "keloid," which extends beyond the borders of the wound that is created  A keloid is a thick, painful, and bumpy scar  A keloid can be difficult to treat, as it does not always respond well to therapy, which includes injecting cortisone directly into the keloid every few weeks  While this usually reduces the pain and size of the scar, it does not eliminate it  I understand that photographs may be taken before and after the procedure  These will be maintained as part of the medical providers confidential records and may not be made available to me  I further authorize the medical provider to use the photographs for teaching purposes or to illustrate scientific papers, books, or lectures if in his/her judgment, medical research, education, or science may benefit from its use  I have had an opportunity to fully inquire about the risks and benefits of this procedure and its alternatives  I have been given ample time and opportunity to ask questions and to seek a second opinion if I wished to do so  I acknowledge that there have specifically been no guarantees as to the cosmetic results from the procedure  I am aware that with any procedure there is always the possibility of an unexpected complication  III  POST-PROCEDURAL CARE (WHAT YOU WILL NEED TO DO "AFTER THE BIOPSY" TO OPTIMIZE HEALING)     Keep the area clean and dry  Try NOT to remove the bandage or get it wet for the first 24 hours   Gently clean the area and apply surgical ointment (such as Vaseline petrolatum ointment, which is available "over the counter" and not a prescription) to the biopsy site for up to 2 weeks straight  This acts to protect the wound from the outside world   Sutures are not usually placed in this procedure  If any sutures were placed, return for suture removal as instructed (generally 1 week for the face, 2 weeks for the body)   Take Acetaminophen (Tylenol) for discomfort, if no contraindications  Ibuprofen or aspirin could make bleeding worse   Call our office immediately for signs of infection: fever, chills, increased redness, warmth, tenderness, discomfort/pain, or pus or foul smell coming from the wound  WHAT TO DO IF THERE IS ANY BLEEDING?   If a small amount of bleeding is noticed, place a clean cloth over the area and apply firm pressure for ten minutes  Check the wound after 10 minutes of direct pressure  If bleeding persists, try one more time for an additional 10 minutes of direct pressure on the area  If the bleeding becomes heavier or does not stop after the second attempt, or if you have any other questions about this procedure, then please call your [address] [place] [address]'s Dermatologist by calling [phone] (SKIN)  I hereby acknowledge that I have reviewed and verified the site with my Dermatologist and have requested and authorized my Dermatologist to proceed with the procedure                Scribe Attestation    I,:  Yumiko Muniz am acting as a scribe while in the presence of the attending physician :       I,:  Emy Palencia MD personally performed the services described in this documentation    as scribed in my presence :

## 2021-05-03 NOTE — PATIENT INSTRUCTIONS
NEOPLASM OF UNCERTAIN BEHAVIOR OF SKIN      Assessment and Plan:   I have discussed with the patient that a sample of skin via a "skin biopsy would be potentially helpful to further make a specific diagnosis under the microscope   Based on a thorough discussion of this condition and the management approach to it (including a comprehensive discussion of the known risks, side effects and potential benefits of treatment), the patient (family) agrees to implement the following specific plan:    o Procedure:  Skin Biopsy  After a thorough discussion of treatment options and risk/benefits/alternatives (including but not limited to local pain, scarring, dyspigmentation, blistering, possible superinfection, and inability to confirm a diagnosis via histopathology), verbal and written consent were obtained and portion of the rash was biopsied for tissue sample  See below for consent that was obtained from patient and subsequent Procedure Note  PROCEDURE SHAVE BIOPSY NOTE:      After obtaining informed consent  at which time there was a discussion about the purpose of biopsy  and low risks of infection and bleeding  The area was prepped and draped in the usual fashion  Anesthesia was obtained with 1% lidocaine with epinephrine  A shave biopsy to an appropriate sampling depth was obtained with a sterile blade (such as a 15-blade or DermaBlade)  The resulting wound was covered with surgical ointment and bandaged appropriately  The patient tolerated the procedure well without complications and was without signs of functional compromise  Specimen has been sent for review by Dermatopathology  Standard post-procedure care has been explained and has been included in written form within the patient's copy of Informed Consent      INFORMED CONSENT DISCUSSION AND POST-OPERATIVE INSTRUCTIONS FOR PATIENT    I   RATIONALE FOR PROCEDURE  I understand that a skin biopsy allows the Dermatologist to test a lesion or rash under the microscope to obtain a diagnosis  It usually involves numbing the area with numbing medication and removing a small piece of skin; sometimes the area will be closed with sutures  In this specific procedure, sutures are not usually needed  If any sutures are placed, then they are usually need to be removed in 2 weeks or less  I understand that my Dermatologist recommends that a skin "shave" biopsy be performed today  A local anesthetic, similar to the kind that a dentist uses when filling a cavity, will be injected with a very small needle into the skin area to be sampled  The injected skin and tissue underneath "will go to sleep and become numb so no pain should be felt afterwards  An instrument shaped like a tiny "razor blade" (shave biopsy instrument) will be used to cut a small piece of tissue and skin from the area so that a sample of tissue can be taken and examined more closely under the microscope  A slight amount of bleeding will occur, but it will be stopped with direct pressure and a pressure bandage and any other appropriate methods  I understands that a scar will form where the wound was created  Surgical ointment will be applied to help protect the wound  Sutures are not usually needed  II   RISKS AND POTENTIAL COMPLICATIONS   I understand the risks and potential complications of a skin biopsy include but are not limited to the following:   Bleeding   Infection   Pain   Scar/keloid   Skin discoloration   Incomplete Removal   Recurrence   Nerve Damage/Numbness/Loss of Function   Allergic Reaction to Anesthesia   Biopsies are diagnostic procedures and based on findings additional treatment or evaluation may be required   Loss or destruction of specimen resulting in no additional findings    My Dermatologist has explained to me the nature of the condition, the nature of the procedure, and the benefits to be reasonably expected compared with alternative approaches  My Dermatologist has discussed the likelihood of major risks or complications of this procedure including the specific risks listed above, such as bleeding, infection, and scarring/keloid  I understand that a scar is expected after this procedure  I understand that my physician cannot predict if the scar will form a "keloid," which extends beyond the borders of the wound that is created  A keloid is a thick, painful, and bumpy scar  A keloid can be difficult to treat, as it does not always respond well to therapy, which includes injecting cortisone directly into the keloid every few weeks  While this usually reduces the pain and size of the scar, it does not eliminate it  I understand that photographs may be taken before and after the procedure  These will be maintained as part of the medical providers confidential records and may not be made available to me  I further authorize the medical provider to use the photographs for teaching purposes or to illustrate scientific papers, books, or lectures if in his/her judgment, medical research, education, or science may benefit from its use  I have had an opportunity to fully inquire about the risks and benefits of this procedure and its alternatives  I have been given ample time and opportunity to ask questions and to seek a second opinion if I wished to do so  I acknowledge that there have specifically been no guarantees as to the cosmetic results from the procedure  I am aware that with any procedure there is always the possibility of an unexpected complication  III  POST-PROCEDURAL CARE (WHAT YOU WILL NEED TO DO "AFTER THE BIOPSY" TO OPTIMIZE HEALING)     Keep the area clean and dry  Try NOT to remove the bandage or get it wet for the first 24 hours       Gently clean the area and apply surgical ointment (such as Vaseline petrolatum ointment, which is available "over the counter" and not a prescription) to the biopsy site for up to 2 weeks straight  This acts to protect the wound from the outside world   Sutures are not usually placed in this procedure  If any sutures were placed, return for suture removal as instructed (generally 1 week for the face, 2 weeks for the body)   Take Acetaminophen (Tylenol) for discomfort, if no contraindications  Ibuprofen or aspirin could make bleeding worse   Call our office immediately for signs of infection: fever, chills, increased redness, warmth, tenderness, discomfort/pain, or pus or foul smell coming from the wound  WHAT TO DO IF THERE IS ANY BLEEDING? If a small amount of bleeding is noticed, place a clean cloth over the area and apply firm pressure for ten minutes  Check the wound after 10 minutes of direct pressure  If bleeding persists, try one more time for an additional 10 minutes of direct pressure on the area  If the bleeding becomes heavier or does not stop after the second attempt, or if you have any other questions about this procedure, then please call your [facility]'s Dermatologist by calling [phone] (SKIN)  I hereby acknowledge that I have reviewed and verified the site with my Dermatologist and have requested and authorized my Dermatologist to proceed with the procedure

## 2021-05-06 NOTE — RESULT ENCOUNTER NOTE
Please call the patient regarding her normal result showing wart, as expected  Would schedule for a follow up freezing treatment in office in around 4-6 weeks  Can cancel if lesion does not recur  TY!

## 2021-05-07 ENCOUNTER — TELEPHONE (OUTPATIENT)
Dept: DERMATOLOGY | Facility: CLINIC | Age: 66
End: 2021-05-07

## 2021-05-07 NOTE — TELEPHONE ENCOUNTER
Spoke with patient discussed biopsy results   Patient understands and will call office if it returns

## 2021-05-07 NOTE — TELEPHONE ENCOUNTER
----- Message from Uzma Gilliland MD sent at 5/6/2021  4:59 PM EDT -----  Please call the patient regarding her normal result showing wart, as expected  Would schedule for a follow up freezing treatment in office in around 4-6 weeks  Can cancel if lesion does not recur  TY!

## 2021-05-07 NOTE — TELEPHONE ENCOUNTER
I have left a message for the patient to call back in regards to her biopsy results and instructions from Dr Maryanne Franco

## 2021-05-17 DIAGNOSIS — J31.0 CHRONIC RHINITIS: ICD-10-CM

## 2021-05-17 RX ORDER — FEXOFENADINE HCL 180 MG/1
180 TABLET ORAL DAILY
Qty: 30 TABLET | Refills: 5 | Status: SHIPPED | OUTPATIENT
Start: 2021-05-17 | End: 2022-04-11

## 2021-05-20 ENCOUNTER — HOSPITAL ENCOUNTER (OUTPATIENT)
Dept: RADIOLOGY | Facility: CLINIC | Age: 66
Discharge: HOME | End: 2021-05-20
Attending: ANESTHESIOLOGY | Admitting: ANESTHESIOLOGY
Payer: COMMERCIAL

## 2021-05-20 ENCOUNTER — TELEPHONE (OUTPATIENT)
Dept: PAIN MEDICINE | Facility: CLINIC | Age: 66
End: 2021-05-20

## 2021-05-20 VITALS
RESPIRATION RATE: 20 BRPM | TEMPERATURE: 97.2 F | DIASTOLIC BLOOD PRESSURE: 89 MMHG | SYSTOLIC BLOOD PRESSURE: 160 MMHG | OXYGEN SATURATION: 93 % | HEART RATE: 70 BPM

## 2021-05-20 DIAGNOSIS — M47.816 LUMBAR SPONDYLOSIS: ICD-10-CM

## 2021-05-20 PROCEDURE — 64635 DESTROY LUMB/SAC FACET JNT: CPT | Performed by: ANESTHESIOLOGY

## 2021-05-20 PROCEDURE — 64636 DESTROY L/S FACET JNT ADDL: CPT | Performed by: ANESTHESIOLOGY

## 2021-05-20 RX ADMIN — LIDOCAINE HYDROCHLORIDE 3 ML: 20 INJECTION, SOLUTION EPIDURAL; INFILTRATION; INTRACAUDAL at 14:51

## 2021-05-20 NOTE — TELEPHONE ENCOUNTER
Patient is S/P a Right L3-L5 RFA c/ SL on 5/20/21  Next RFA scheduled for 6/3/21  Next OVS scheduled for 7/12/21  Please call on 5/21/21 post RFA   Thanks

## 2021-05-20 NOTE — H&P
History of Present Illness:  The patient is a 60 y o  female who presents with complaints of low back pain    Patient Active Problem List   Diagnosis    Complex ovarian cyst    Dry eye syndrome    Fatty infiltration of liver    Hyperglycemia    Abdominal pain    Mixed hyperlipidemia    Essential hypertension    Acquired hypothyroidism    Left ventricular hypertrophy    Lumbosacral pain    Mitral valvular disorder    Nephrolithiasis    Non-toxic multinodular goiter    Class 2 severe obesity with serious comorbidity and body mass index (BMI) of 37 0 to 37 9 in adult (HCC)    Rhinitis    Spondylosis of cervical region without myelopathy or radiculopathy    Thoracic neuritis    Endometrial polyp    Bicuspid aortic valve    Squamous cell cancer of skin of hand, right    Basal cell carcinoma (BCC) of ala nasi    Former smoker    Cough with sputum    Sialoadenitis    Impacted cerumen of right ear    Spasmodic cough    Mild intermittent asthma without complication    Aortic valve disease    Lumbar spondylosis       Past Medical History:   Diagnosis Date    Abdominal pain     Asthma     Basal cell carcinoma     Bicuspid aortic valve     LAST ASSESSED [id number]    Cervical disc disease     last assessed [id number]    Cervical stenosis of spine     LAST ASSESSED [id number]    Disease of thyroid gland     hypothyroid    Dry eyes     Endometriosis     Hyperglycemia     Hyperlipidemia     Hypertension     Hypothyroidism     Left ventricular hypertrophy     Mitral valvular disorder     Nephrolithiasis     Ovarian cyst, complex     Ptosis     LAST ASSESSED [id number]    Renal calculi     Right cervical radiculopathy     LAST ASSESSED [id number]    Seasonal allergies     Squamous cell skin cancer     Thyroid disease        Past Surgical History:   Procedure Laterality Date    BASAL CELL CARCINOMA EXCISION      CARPAL TUNNEL RELEASE       SECTION      CHOLECYSTECTOMY      COLONOSCOPY      NEUROPLASTY / TRANSPOSITION MEDIAN NERVE AT CARPAL TUNNEL      NEUROPLASTY / TRANSPOSITION MEDIAN NERVE AT CARPAL TUNNEL  2001    [facility]    OTHER SURGICAL HISTORY      surgically removed skin patch for skin cancer    PLANTAR FASCIECTOMY      MS COLONOSCOPY FLX DX W/COLLJ SPEC WHEN PFRMD N/A 10/9/2017    Procedure: COLONOSCOPY;  Surgeon: Jimmie Carrasco MD;  Location: [facility] GI LAB;   Service: Gastroenterology    MS HYSTEROSCOPY,W/ENDO BX N/A 2/26/2018    Procedure: DILATATION AND CURETTAGE (D&C) WITH HYSTEROSCOPY;  Surgeon: Shashi Gallagher DO;  Location: [facility];  Service: Gynecology    SKIN BIOPSY      TUBAL LIGATION           Current Outpatient Medications:     albuterol (Ventolin HFA) 90 mcg/act inhaler, Inhale 2 puffs every 6 (six) hours as needed for wheezing, Disp: 3 Inhaler, Rfl: 3    amLODIPine (NORVASC) 5 mg tablet, Take 1 tablet (5 mg total) by mouth daily, Disp: 90 tablet, Rfl: 3    clindamycin (CLEOCIN T) 1 %, Apply to underarms once daily, Disp: 69 Package, Rfl: 7    dicyclomine (BENTYL) 10 mg capsule, Take 1 capsule (10 mg total) by mouth 4 (four) times a day (before meals and at bedtime), Disp: 30 capsule, Rfl: 1    fexofenadine (ALLEGRA) 180 MG tablet, Take 1 tablet (180 mg total) by mouth daily, Disp: 30 tablet, Rfl: 5    fluticasone (ARNUITY ELLIPTA) 100 MCG/ACT AEPB inhaler, Inhale 1 puff daily Rinse mouth after use , Disp: 3 Inhaler, Rfl: 3    fluticasone (FLONASE) 50 mcg/act nasal spray, 2 sprays into each nostril 2 (two) times a day, Disp: 3 Bottle, Rfl: 3    levothyroxine 125 mcg tablet, TAKE ONE TABLET BY MOUTH EVERY DAY , Disp: 90 tablet, Rfl: 1    losartan (COZAAR) 100 MG tablet, Take 1 tablet (100 mg total) by mouth daily (Patient not taking: Reported on 5/3/2021), Disp: 90 tablet, Rfl: 3    metFORMIN (GLUCOPHAGE) 500 mg tablet, Take 1 tablet (500 mg total) by mouth daily with breakfast, Disp: 90 tablet, Rfl: 3    pantoprazole (PROTONIX) 40 mg tablet, Take 1 tablet (40 mg total) by mouth daily, Disp: 90 tablet, Rfl: 3    XIIDRA 5 % op solution, , Disp: , Rfl:     Current Facility-Administered Medications:     lidocaine (PF) (XYLOCAINE-MPF) 2 % injection 5 mL, 5 mL, Perineural, Once, Valentin Mon,     Allergies   Allergen Reactions    Erythromycin GI Intolerance     Reaction Date: 11Jul2011;     Penicillins Other (See Comments)     unknown    Aztreonam Rash     Action Taken: Allergy added by [employer] to replace Penicillins Cross Reactors;     Carbapenems Rash     Action Taken: Allergy added by [employer] to replace Penicillins [employer];     Cephalosporins Rash     Action Taken: Allergy added by [employer] to replace Penicillins [employer];     Flu Virus Vaccine Hives, Rash and GI Intolerance    Griseofulvin Rash     Action Taken: Allergy added by [employer] to replace Penicillins [employer]; Physical Exam:   General: Awake, Alert, Oriented x 3, Mood and affect appropriate  Respiratory: Respirations even and unlabored  Cardiovascular: Peripheral pulses intact; no edema  Musculoskeletal Exam:  Pain with lumbar extension    ASA Score: III         Assessment:   1   Lumbar spondylosis        Plan: LEFT L3-5 RFA

## 2021-05-20 NOTE — DISCHARGE INSTRUCTIONS

## 2021-05-21 NOTE — TELEPHONE ENCOUNTER
S/w pt, c/o pain s/t procedure  + relief w/ heat  Advised pt to allow 2-3 days for relief of pain s/t procedure, allow 2-6 weeks for the full effect  Cb if s/s infection present: redness, drainage, swelling at the site or fever 100+, or if a sunburn like sensation in the area of the procedure presents  Ok to continue w/ rest, ice / heat, medication as prescribed  Pt verbalized understanding and appreciation  Confirmed rfa of 6/3/2021 and fu ov of 7/12/2021  Will cb if questions / concerns arise

## 2021-05-31 DIAGNOSIS — E03.9 ACQUIRED HYPOTHYROIDISM: ICD-10-CM

## 2021-05-31 RX ORDER — LEVOTHYROXINE SODIUM 0.12 MG/1
TABLET ORAL
Qty: 90 TABLET | Refills: 0 | Status: SHIPPED | OUTPATIENT
Start: 2021-05-31 | End: 2021-09-02

## 2021-06-01 DIAGNOSIS — E03.9 ACQUIRED HYPOTHYROIDISM: ICD-10-CM

## 2021-06-01 RX ORDER — LEVOTHYROXINE SODIUM 0.12 MG/1
TABLET ORAL
Qty: 90 TABLET | Refills: 0 | OUTPATIENT
Start: 2021-06-01

## 2021-06-03 ENCOUNTER — HOSPITAL ENCOUNTER (OUTPATIENT)
Dept: RADIOLOGY | Facility: CLINIC | Age: 66
Discharge: HOME | End: 2021-06-03
Attending: ANESTHESIOLOGY
Payer: COMMERCIAL

## 2021-06-03 ENCOUNTER — TELEPHONE (OUTPATIENT)
Dept: PAIN MEDICINE | Facility: CLINIC | Age: 66
End: 2021-06-03

## 2021-06-03 VITALS
SYSTOLIC BLOOD PRESSURE: 157 MMHG | TEMPERATURE: 97.7 F | RESPIRATION RATE: 20 BRPM | HEART RATE: 70 BPM | OXYGEN SATURATION: 93 % | DIASTOLIC BLOOD PRESSURE: 101 MMHG

## 2021-06-03 DIAGNOSIS — M47.816 LUMBAR SPONDYLOSIS: ICD-10-CM

## 2021-06-03 PROCEDURE — 64636 DESTROY L/S FACET JNT ADDL: CPT | Performed by: ANESTHESIOLOGY

## 2021-06-03 PROCEDURE — 64635 DESTROY LUMB/SAC FACET JNT: CPT | Performed by: ANESTHESIOLOGY

## 2021-06-03 RX ADMIN — LIDOCAINE HYDROCHLORIDE 3 ML: 20 INJECTION, SOLUTION EPIDURAL; INFILTRATION; INTRACAUDAL at 13:54

## 2021-06-03 NOTE — TELEPHONE ENCOUNTER
Patient is S/P a Right L3-L5 RFA c/ SL on 6/3/21  Next OVS scheduled for 7/12/21  Please call on 6/4/21 post RFA   Thanks

## 2021-06-03 NOTE — H&P
History of Present Illness:  The patient is a 60 y o  female who presents with complaints of low back pain    Patient Active Problem List   Diagnosis    Complex ovarian cyst    Dry eye syndrome    Fatty infiltration of liver    Hyperglycemia    Abdominal pain    Mixed hyperlipidemia    Essential hypertension    Acquired hypothyroidism    Left ventricular hypertrophy    Lumbosacral pain    Mitral valvular disorder    Nephrolithiasis    Non-toxic multinodular goiter    Class 2 severe obesity with serious comorbidity and body mass index (BMI) of 37 0 to 37 9 in adult (HCC)    Rhinitis    Spondylosis of cervical region without myelopathy or radiculopathy    Thoracic neuritis    Endometrial polyp    Bicuspid aortic valve    Squamous cell cancer of skin of hand, right    Basal cell carcinoma (BCC) of ala nasi    Former smoker    Cough with sputum    Sialoadenitis    Impacted cerumen of right ear    Spasmodic cough    Mild intermittent asthma without complication    Aortic valve disease    Lumbar spondylosis       Past Medical History:   Diagnosis Date    Abdominal pain     Asthma     Basal cell carcinoma     Bicuspid aortic valve     LAST ASSESSED [id number]    Cervical disc disease     last assessed [id number]    Cervical stenosis of spine     LAST ASSESSED [id number]    Disease of thyroid gland     hypothyroid    Dry eyes     Endometriosis     Hyperglycemia     Hyperlipidemia     Hypertension     Hypothyroidism     Left ventricular hypertrophy     Mitral valvular disorder     Nephrolithiasis     Ovarian cyst, complex     Ptosis     LAST ASSESSED [id number]    Renal calculi     Right cervical radiculopathy     LAST ASSESSED [id number]    Seasonal allergies     Squamous cell skin cancer     Thyroid disease        Past Surgical History:   Procedure Laterality Date    BASAL CELL CARCINOMA EXCISION      CARPAL TUNNEL RELEASE       SECTION      CHOLECYSTECTOMY      COLONOSCOPY      NEUROPLASTY / TRANSPOSITION MEDIAN NERVE AT CARPAL TUNNEL      NEUROPLASTY / TRANSPOSITION MEDIAN NERVE AT CARPAL TUNNEL  2001    [facility]    OTHER SURGICAL HISTORY      surgically removed skin patch for skin cancer    PLANTAR FASCIECTOMY      WI COLONOSCOPY FLX DX W/COLLJ SPEC WHEN PFRMD N/A 10/9/2017    Procedure: COLONOSCOPY;  Surgeon: Jose L Bean MD;  Location: [facility];   Service: Gastroenterology    WI HYSTEROSCOPY,W/ENDO BX N/A 2/26/2018    Procedure: DILATATION AND CURETTAGE (D&C) WITH HYSTEROSCOPY;  Surgeon: Ras Arroyo DO;  Location: [facility];  Service: Gynecology    SKIN BIOPSY      TUBAL LIGATION           Current Outpatient Medications:     albuterol (Ventolin HFA) 90 mcg/act inhaler, Inhale 2 puffs every 6 (six) hours as needed for wheezing, Disp: 3 Inhaler, Rfl: 3    amLODIPine (NORVASC) 5 mg tablet, Take 1 tablet (5 mg total) by mouth daily, Disp: 90 tablet, Rfl: 3    clindamycin (CLEOCIN T) 1 %, Apply to underarms once daily, Disp: 69 Package, Rfl: 7    dicyclomine (BENTYL) 10 mg capsule, Take 1 capsule (10 mg total) by mouth 4 (four) times a day (before meals and at bedtime), Disp: 30 capsule, Rfl: 1    fexofenadine (ALLEGRA) 180 MG tablet, Take 1 tablet (180 mg total) by mouth daily, Disp: 30 tablet, Rfl: 5    fluticasone (ARNUITY ELLIPTA) 100 MCG/ACT AEPB inhaler, Inhale 1 puff daily Rinse mouth after use , Disp: 3 Inhaler, Rfl: 3    fluticasone (FLONASE) 50 mcg/act nasal spray, 2 sprays into each nostril 2 (two) times a day, Disp: 3 Bottle, Rfl: 3    levothyroxine 125 mcg tablet, TAKE ONE TABLET BY MOUTH EVERY DAY , Disp: 90 tablet, Rfl: 0    losartan (COZAAR) 100 MG tablet, Take 1 tablet (100 mg total) by mouth daily (Patient not taking: Reported on 5/3/2021), Disp: 90 tablet, Rfl: 3    metFORMIN (GLUCOPHAGE) 500 mg tablet, Take 1 tablet (500 mg total) by mouth daily with breakfast, Disp: 90 tablet, Rfl: 3    pantoprazole (PROTONIX) 40 mg tablet, Take 1 tablet (40 mg total) by mouth daily, Disp: 90 tablet, Rfl: 3    XIIDRA 5 % op solution, , Disp: , Rfl:     Current Facility-Administered Medications:     lidocaine (PF) (XYLOCAINE-MPF) 2 % injection 5 mL, 5 mL, Perineural, Once, Valentin Mon,     Allergies   Allergen Reactions    Erythromycin GI Intolerance     Reaction Date: 11Jul2011;     Penicillins Other (See Comments)     unknown    Aztreonam Rash     Action Taken: Allergy added by [employer] to replace Penicillins Cross [employer]ors;     Carbapenems Rash     Action Taken: Allergy added by [employer] to replace Penicillins [employer];     Cephalosporins Rash     Action Taken: Allergy added by [employer] to replace Penicillins [employer];     Flu Virus Vaccine Hives, Rash and GI Intolerance    Griseofulvin Rash     Action Taken: Allergy added by [employer] to replace Penicillins [employer]; Physical Exam:   General: Awake, Alert, Oriented x 3, Mood and affect appropriate  Respiratory: Respirations even and unlabored  Cardiovascular: Peripheral pulses intact; no edema  Musculoskeletal Exam:  Decreased range of motion lumbar spine    ASA Score: III         Assessment:   1   Lumbar spondylosis        Plan: RIGHT L3-5 RFA

## 2021-06-03 NOTE — DISCHARGE INSTRUCTIONS

## 2021-06-04 NOTE — TELEPHONE ENCOUNTER
S/w pt, stated that she is doing very well  Advised pt to allow 2-6 weeks for the full effect  Cb if s/s infection present: redness, drainage, swelling at the site or fever 100+, or if a sunburn like sensation in the area of the procedure presents  Ok to continue w/ rest, ice / heat, medication as prescribed/ directed  Confirmed 7/12/2021 fu ov  cb if questions / concerns arise  Pt verbalized understanding and appreciation

## 2021-06-14 ENCOUNTER — APPOINTMENT (OUTPATIENT)
Dept: LAB | Facility: CLINIC | Age: 66
End: 2021-06-14

## 2021-06-14 ENCOUNTER — TRANSCRIBE ORDERS (OUTPATIENT)
Dept: ADMINISTRATIVE | Facility: HOSPITAL | Age: 66
End: 2021-06-14

## 2021-06-14 ENCOUNTER — APPOINTMENT (OUTPATIENT)
Dept: LAB | Facility: CLINIC | Age: 66
End: 2021-06-14
Payer: COMMERCIAL

## 2021-06-14 DIAGNOSIS — R73.9 HYPERGLYCEMIA: ICD-10-CM

## 2021-06-14 DIAGNOSIS — Z00.8 HEALTH EXAMINATION IN POPULATION SURVEYS: Primary | ICD-10-CM

## 2021-06-14 DIAGNOSIS — Z00.8 HEALTH EXAMINATION IN POPULATION SURVEYS: ICD-10-CM

## 2021-06-14 LAB
ALBUMIN SERPL BCP-MCNC: 4.3 G/DL (ref 3.5–5)
ALP SERPL-CCNC: 81 U/L (ref 46–116)
ALT SERPL W P-5'-P-CCNC: 34 U/L (ref 12–78)
ANION GAP SERPL CALCULATED.3IONS-SCNC: 5 MMOL/L (ref 4–13)
AST SERPL W P-5'-P-CCNC: 27 U/L (ref 5–45)
BILIRUB SERPL-MCNC: 0.51 MG/DL (ref 0.2–1)
BUN SERPL-MCNC: 18 MG/DL (ref 5–25)
CALCIUM SERPL-MCNC: 9.6 MG/DL (ref 8.3–10.1)
CHLORIDE SERPL-SCNC: 107 MMOL/L (ref 100–108)
CHOLEST SERPL-MCNC: 160 MG/DL (ref 50–200)
CO2 SERPL-SCNC: 28 MMOL/L (ref 21–32)
CREAT SERPL-MCNC: 0.63 MG/DL (ref 0.6–1.3)
EST. AVERAGE GLUCOSE BLD GHB EST-MCNC: 126 MG/DL
GFR SERPL CREATININE-BSD FRML MDRD: 95 ML/MIN/1.73SQ M
GLUCOSE P FAST SERPL-MCNC: 104 MG/DL (ref 65–99)
HBA1C MFR BLD: 6 %
HDLC SERPL-MCNC: 32 MG/DL
LDLC SERPL CALC-MCNC: 92 MG/DL (ref 0–100)
NONHDLC SERPL-MCNC: 128 MG/DL
POTASSIUM SERPL-SCNC: 3.9 MMOL/L (ref 3.5–5.3)
PROT SERPL-MCNC: 7.7 G/DL (ref 6.4–8.2)
SODIUM SERPL-SCNC: 140 MMOL/L (ref 136–145)
TRIGL SERPL-MCNC: 180 MG/DL

## 2021-06-14 PROCEDURE — 83036 HEMOGLOBIN GLYCOSYLATED A1C: CPT

## 2021-06-14 PROCEDURE — 80061 LIPID PANEL: CPT

## 2021-06-14 PROCEDURE — 80053 COMPREHEN METABOLIC PANEL: CPT

## 2021-06-14 PROCEDURE — 36415 COLL VENOUS BLD VENIPUNCTURE: CPT

## 2021-06-28 ENCOUNTER — OFFICE VISIT (OUTPATIENT)
Dept: FAMILY MEDICINE CLINIC | Facility: HOSPITAL | Age: 66
End: 2021-06-28
Payer: COMMERCIAL

## 2021-06-28 VITALS
WEIGHT: 230.6 LBS | TEMPERATURE: 98.8 F | HEART RATE: 76 BPM | HEIGHT: 67 IN | SYSTOLIC BLOOD PRESSURE: 128 MMHG | OXYGEN SATURATION: 96 % | BODY MASS INDEX: 36.19 KG/M2 | DIASTOLIC BLOOD PRESSURE: 76 MMHG

## 2021-06-28 DIAGNOSIS — E78.2 MIXED HYPERLIPIDEMIA: ICD-10-CM

## 2021-06-28 DIAGNOSIS — I10 ESSENTIAL HYPERTENSION: Primary | ICD-10-CM

## 2021-06-28 DIAGNOSIS — E66.01 CLASS 2 SEVERE OBESITY DUE TO EXCESS CALORIES WITH SERIOUS COMORBIDITY AND BODY MASS INDEX (BMI) OF 36.0 TO 36.9 IN ADULT (HCC): ICD-10-CM

## 2021-06-28 DIAGNOSIS — R73.9 HYPERGLYCEMIA: ICD-10-CM

## 2021-06-28 DIAGNOSIS — J45.20 MILD INTERMITTENT ASTHMA WITHOUT COMPLICATION: ICD-10-CM

## 2021-06-28 DIAGNOSIS — E03.9 ACQUIRED HYPOTHYROIDISM: ICD-10-CM

## 2021-06-28 PROCEDURE — 99214 OFFICE O/P EST MOD 30 MIN: CPT | Performed by: FAMILY MEDICINE

## 2021-06-28 NOTE — PROGRESS NOTES
Assessment/Plan:         Diagnoses and all orders for this visit:    Essential hypertension  Comments:  Excellent BP control    Acquired hypothyroidism  Comments:  Clinically euthyroid    Mild intermittent asthma without complication  Comments:  Stable pulmonary status    Mixed hyperlipidemia  Comments:  Adequate lipid profile on Zetia    Class 2 severe obesity due to excess calories with serious comorbidity and body mass index (BMI) of 36 0 to 36 9 in adult [facility])    Hyperglycemia  Comments:  Excellent A1c at 6 0   Continue Metformin          Subjective:      Patient ID: Irma Munoz is a 60 y o  female  3 month follow up  Happy with ablation done by Dr Anupam Reid, happy with result  Had R and L side done consecutively  No pain at this point    No recent illness or injury    Lost 8 lbs and is at a plateau  Does protein shakes, lunch salad  Advised her to increase exercise    Reviewed labs in detail  A1c down to 6 0 on Metformin  Lipids some decrease in HDL- urged increase exercise    No asthma flares      The following portions of the patient's history were reviewed and updated as appropriate: allergies, current medications, past family history, past medical history, past social history, past surgical history and problem list     Review of Systems   Constitutional: Positive for unexpected weight change  HENT: Negative  Eyes: Negative for visual disturbance  Respiratory: Negative  Cardiovascular: Negative  Gastrointestinal: Negative  Genitourinary: Negative  Musculoskeletal: Negative  Neurological: Negative  Psychiatric/Behavioral: Negative  All other systems reviewed and are negative          Objective:      /76   Pulse 76   Temp 98 8 °F (37 1 °C) (Tympanic)   Ht 5' 7" (1 702 m)   Wt 105 kg (230 lb 9 6 oz)   SpO2 96%   BMI 36 12 kg/m²          Physical Exam

## 2021-07-13 ENCOUNTER — OFFICE VISIT (OUTPATIENT)
Dept: PAIN MEDICINE | Facility: CLINIC | Age: 66
End: 2021-07-13
Payer: COMMERCIAL

## 2021-07-13 VITALS
HEIGHT: 67 IN | BODY MASS INDEX: 35.63 KG/M2 | TEMPERATURE: 97.3 F | SYSTOLIC BLOOD PRESSURE: 148 MMHG | DIASTOLIC BLOOD PRESSURE: 86 MMHG | HEART RATE: 76 BPM | WEIGHT: 227 LBS

## 2021-07-13 DIAGNOSIS — M47.816 LUMBAR SPONDYLOSIS: Primary | ICD-10-CM

## 2021-07-13 PROCEDURE — 99213 OFFICE O/P EST LOW 20 MIN: CPT | Performed by: ANESTHESIOLOGY

## 2021-07-13 NOTE — PROGRESS NOTES
Assessment  1  Lumbar spondylosis        Plan    This point time patient has complete resolution of her low back pain  I did discuss through [facility] regeneration her pain may return anywhere from nine months to two years  If that occurs we could repeat one diagnostic medial branch block followed by radiofrequency  If she has any problems or questions in the future she understands to give our office a call  My impressions and treatment recommendations were discussed in detail with the patient who verbalized understanding and had no further questions  Discharge instructions were provided  I personally saw and examined the patient and I agree with the above discussed plan of care  History of Present Illness    Malachi Low is a 60 y o  female presents and follow-up from lumbar radiofrequency  Overall she has complete resolution of symptoms  She denies any pain she has many but increase her daily living activities  I have personally reviewed and/or updated the patient's past medical history, past surgical history, family history, social history, current medications, allergies, and vital signs today  Review of Systems   Constitutional: Negative for fever and unexpected weight change  HENT: Negative for trouble swallowing  Eyes: Negative for visual disturbance  Respiratory: Negative for shortness of breath and wheezing  Cardiovascular: Negative for chest pain and palpitations  Gastrointestinal: Negative for constipation, diarrhea, nausea and vomiting  Endocrine: Negative for cold intolerance, heat intolerance and polydipsia  Genitourinary: Negative for difficulty urinating and frequency  Musculoskeletal: Negative for arthralgias, gait problem, joint swelling and myalgias  Skin: Negative for rash  Neurological: Negative for dizziness, seizures, syncope, weakness and headaches  Hematological: Does not bruise/bleed easily     Psychiatric/Behavioral: Negative for dysphoric mood    All other systems reviewed and are negative        Patient Active Problem List   Diagnosis    Complex ovarian cyst    Dry eye syndrome    Fatty infiltration of liver    Hyperglycemia    Abdominal pain    Mixed hyperlipidemia    Essential hypertension    Acquired hypothyroidism    Left ventricular hypertrophy    Lumbosacral pain    Mitral valvular disorder    Nephrolithiasis    Non-toxic multinodular goiter    Class 2 severe obesity with serious comorbidity and body mass index (BMI) of 36 0 to 36 9 in adult (HCC)    Rhinitis    Spondylosis of cervical region without myelopathy or radiculopathy    Thoracic neuritis    Endometrial polyp    Bicuspid aortic valve    Squamous cell cancer of skin of hand, right    Basal cell carcinoma (BCC) of [place]    Former smoker    Cough with sputum    Sialoadenitis    Impacted cerumen of right ear    Spasmodic cough    Mild intermittent asthma without complication    Aortic valve disease    Lumbar spondylosis       Past Medical History:   Diagnosis Date    Abdominal pain     Asthma     Basal cell carcinoma     Bicuspid aortic valve     LAST ASSESSED [id number]    Cervical disc disease     last assessed [id number]    Cervical stenosis of spine     LAST ASSESSED [id number]    Disease of thyroid gland     hypothyroid    Dry eyes     Endometriosis     Hyperglycemia     Hyperlipidemia     Hypertension     Hypothyroidism     Left ventricular hypertrophy     Mitral valvular disorder     Nephrolithiasis     Ovarian cyst, complex     Ptosis     LAST ASSESSED [id number]    Renal calculi     Right cervical radiculopathy     LAST ASSESSED [id number]    Seasonal allergies     Squamous cell skin cancer     Thyroid disease        Past Surgical History:   Procedure Laterality Date    BASAL CELL CARCINOMA EXCISION      CARPAL TUNNEL RELEASE       SECTION      CHOLECYSTECTOMY      COLONOSCOPY      NEUROPLASTY / TRANSPOSITION MEDIAN NERVE AT CARPAL TUNNEL      NEUROPLASTY / TRANSPOSITION MEDIAN NERVE AT CARPAL TUNNEL      [facility]    OTHER SURGICAL HISTORY      surgically removed skin patch for skin cancer    PLANTAR FASCIECTOMY      VT COLONOSCOPY FLX DX W/COLLJ SPEC WHEN PFRMD N/A 10/9/2017    Procedure: COLONOSCOPY;  Surgeon: Autry Jeans, MD;  Location: [facility] GI LAB; Service: Gastroenterology    VT HYSTEROSCOPY,W/ENDO BX N/A 2018    Procedure: DILATATION AND CURETTAGE (D&C) WITH HYSTEROSCOPY;  Surgeon: Heladio Smith DO;  Location: [facility] OR;  Service: Gynecology    SKIN BIOPSY      TUBAL LIGATION         Family History   Problem Relation Age of Onset    Diabetes Mother     Alzheimer's disease Mother     Heart disease Mother     Hypertension Mother     Other Family         back disorder    Cancer Family     Heart disease Family     Thyroid disease Family     No Known Problems Father     No Known Problems Sister     No Known Problems Daughter     No Known Problems Maternal Grandmother     No Known Problems Maternal Grandfather     No Known Problems Paternal Grandmother     No Known Problems Paternal Grandfather     No Known Problems Sister     No Known Problems Maternal Aunt     No Known Problems Maternal Aunt     No Known Problems Maternal Aunt     No Known Problems Maternal Aunt     No Known Problems Maternal Aunt        Social History     Occupational History    Occupation: part time employment   Tobacco Use    Smoking status: Former Smoker     Packs/day: 1 00     Years: 20 00     Pack years: 20 00     Types: Cigarettes     Quit date:      Years since quittin 5    Smokeless tobacco: Never Used   Vaping Use    Vaping Use: Never used   Substance and Sexual Activity    Alcohol use:  Yes     Alcohol/week: 8 0 standard drinks     Types: 4 Glasses of wine, 4 Standard drinks or equivalent per week     Comment: ocassionally     Drug use: No    Sexual activity: Yes     Partners: Male     Birth control/protection: Female Sterilization     Comment: tubal ligation        Current Outpatient Medications on File Prior to Visit   Medication Sig    albuterol (Ventolin HFA) 90 mcg/act inhaler Inhale 2 puffs every 6 (six) hours as needed for wheezing    amLODIPine (NORVASC) 5 mg tablet Take 1 tablet (5 mg total) by mouth daily    clindamycin (CLEOCIN T) 1 % Apply to underarms once daily    fexofenadine (ALLEGRA) 180 MG tablet Take 1 tablet (180 mg total) by mouth daily    fluticasone (ARNUITY ELLIPTA) 100 MCG/ACT AEPB inhaler Inhale 1 puff daily Rinse mouth after use   fluticasone (FLONASE) 50 mcg/act nasal spray 2 sprays into each nostril 2 (two) times a day    levothyroxine 125 mcg tablet TAKE ONE TABLET BY MOUTH EVERY DAY   losartan (COZAAR) 100 MG tablet Take 1 tablet (100 mg total) by mouth daily    metFORMIN (GLUCOPHAGE) 500 mg tablet Take 1 tablet (500 mg total) by mouth daily with breakfast    pantoprazole (PROTONIX) 40 mg tablet Take 1 tablet (40 mg total) by mouth daily    XIIDRA 5 % op solution     dicyclomine (BENTYL) 10 mg capsule Take 1 capsule (10 mg total) by mouth 4 (four) times a day (before meals and at bedtime) (Patient not taking: Reported on 6/28/2021)     No current facility-administered medications on file prior to visit  Allergies   Allergen Reactions    Erythromycin GI Intolerance     Reaction Date: 11Jul2011;     Penicillins Other (See Comments)     unknown    Aztreonam Rash     Action Taken: Allergy added by [employer] to replace Penicillins Cross Reactors;     Carbapenems Rash     Action Taken: Allergy added by [employer] to replace Penicillins [employer];     Cephalosporins Rash     Action Taken: Allergy added by [employer] to replace Penicillins [employer];     Flu Virus Vaccine Hives, Rash and GI Intolerance    Griseofulvin Rash     Action Taken: Allergy added by [employer] to replace Penicillins [employer];         Physical Exam    /86 (BP Location: Left arm, Patient Position: Sitting, Cuff Size: Standard)   Pulse 76   Temp (!) 97 3 °F (36 3 °C)   Ht 5' 7" (1 702 m)   Wt 103 kg (227 lb)   BMI 35 55 kg/m²     Constitutional: normal, well developed, well nourished, alert, in no distress and non-toxic and no overt pain behavior   and obese  Eyes: anicteric  HEENT: grossly intact  Neck: supple, symmetric, trachea midline and no masses   Pulmonary:even and unlabored  Cardiovascular:No edema or pitting edema present  Skin:Normal without rashes or lesions and well hydrated  Psychiatric:Mood and affect appropriate  Neurologic:Cranial Nerves II-XII grossly intact  Musculoskeletal:normal

## 2021-08-04 ENCOUNTER — OFFICE VISIT (OUTPATIENT)
Dept: OBGYN CLINIC | Facility: CLINIC | Age: 66
End: 2021-08-04
Payer: COMMERCIAL

## 2021-08-04 ENCOUNTER — APPOINTMENT (OUTPATIENT)
Dept: RADIOLOGY | Facility: AMBULARY SURGERY CENTER | Age: 66
End: 2021-08-04
Attending: SURGERY
Payer: COMMERCIAL

## 2021-08-04 VITALS
WEIGHT: 231 LBS | BODY MASS INDEX: 36.26 KG/M2 | HEIGHT: 67 IN | SYSTOLIC BLOOD PRESSURE: 143 MMHG | HEART RATE: 68 BPM | RESPIRATION RATE: 17 BRPM | DIASTOLIC BLOOD PRESSURE: 83 MMHG

## 2021-08-04 DIAGNOSIS — M79.644 PAIN OF RIGHT THUMB: Primary | ICD-10-CM

## 2021-08-04 DIAGNOSIS — M18.11 PRIMARY OSTEOARTHRITIS OF FIRST CARPOMETACARPAL JOINT OF RIGHT HAND: ICD-10-CM

## 2021-08-04 DIAGNOSIS — M79.644 PAIN OF RIGHT THUMB: ICD-10-CM

## 2021-08-04 PROCEDURE — 20600 DRAIN/INJ JOINT/BURSA W/O US: CPT | Performed by: SURGERY

## 2021-08-04 PROCEDURE — 99204 OFFICE O/P NEW MOD 45 MIN: CPT | Performed by: SURGERY

## 2021-08-04 PROCEDURE — 73140 X-RAY EXAM OF FINGER(S): CPT

## 2021-08-04 RX ORDER — LIDOCAINE HYDROCHLORIDE 10 MG/ML
0.5 INJECTION, SOLUTION EPIDURAL; INFILTRATION; INTRACAUDAL; PERINEURAL
Status: COMPLETED | OUTPATIENT
Start: 2021-08-04 | End: 2021-08-04

## 2021-08-04 RX ORDER — TRIAMCINOLONE ACETONIDE 40 MG/ML
20 INJECTION, SUSPENSION INTRA-ARTICULAR; INTRAMUSCULAR
Status: COMPLETED | OUTPATIENT
Start: 2021-08-04 | End: 2021-08-04

## 2021-08-04 RX ORDER — BUPIVACAINE HYDROCHLORIDE 2.5 MG/ML
0.5 INJECTION, SOLUTION INFILTRATION; PERINEURAL
Status: COMPLETED | OUTPATIENT
Start: 2021-08-04 | End: 2021-08-04

## 2021-08-04 RX ADMIN — BUPIVACAINE HYDROCHLORIDE 0.5 ML: 2.5 INJECTION, SOLUTION INFILTRATION; PERINEURAL at 16:47

## 2021-08-04 RX ADMIN — LIDOCAINE HYDROCHLORIDE 0.5 ML: 10 INJECTION, SOLUTION EPIDURAL; INFILTRATION; INTRACAUDAL; PERINEURAL at 16:47

## 2021-08-04 RX ADMIN — TRIAMCINOLONE ACETONIDE 20 MG: 40 INJECTION, SUSPENSION INTRA-ARTICULAR; INTRAMUSCULAR at 16:47

## 2021-08-04 NOTE — PATIENT INSTRUCTIONS
What is it [facility] Arthritis? In a normal joint, cartilage covers the end of the bones and serves as a shock absorber to allow smooth, pain-free movement  In osteoarthritis (OA, or degenerative arthritis) the cartilage layer wears out, resulting in direct contact between the bones and producing pain and deformity  One of the most common joints to develop OA in the hand is the base of the thumb  The thumb basal joint, also called the carpometacarpal [place]) joint, is a specialized saddle shaped joint that is formed by a small bone of the wrist (trapezium) and the first bone of the thumb (metacarpal)  The saddle shaped joint allows the thumb to have a wide range of motions, including up, down, across the palm, and the ability to pinch (see Figure 1)  Who gets it? OA at the base of the thumb is more commonly seen in women over the age of 36  The exact cause is unknown, but genetics, previous injuries such as fractures or dislocations, and generalized joint laxity may predispose towards development of this type of arthritis  What are the symptoms and signs? The most common symptom is pain at the base of the thumb  The pain can be aggravated by activities that require pinching, such as opening jars, turning door knobs or keys, and writing  Severity can also progress to pain at rest and pain at night  In more severe cases, progressive destruction and mal-alignment of the joint occurs, and a bump develops at the base of the thumb as the metacarpal moves out of the saddle joint  This shift in the joint can cause limited motion and weakness, making pinch difficult (see Figure 2)  The next joint above the [facility] may compensate by loosening, causing it to bend further back (hyperextension)  How is the diagnosis made? The diagnosis is made by history and physical evaluation  Pressure and movement such as twisting will produce pain at the joint  A grinding sensation may also be present at the joint (see Figure 3)  X-rays are used to confirm the diagnosis, although symptom severity often does not correlate with x-ray findings  What are the treatment options? Less severe thumb arthritis will usually respond to non-surgical care  Arthritis medication, splinting and limited cortisone injections may help alleviate pain  A hand therapist might provide a variety of rigid and non-rigid splints which can be used while sleeping or during activities  Patients with advanced disease or who fail non-surgical treatment may be candidates for surgical reconstruction  A variety of surgical techniques are available that can successfully reduce or eliminate pain  Surgical procedures include removal of arthritic bone and joint reconstruction (arthroplasty), joint fusion, bone realignment, and even arthroscopy in select cases  A consultation with your hand surgeon can help decide the best option for you (see Figure 4)  © 2012 American [employer] for [employer] of the Hand  www handcare  org

## 2021-08-04 NOTE — PROGRESS NOTES
Lizbeth ANDRADE  Attending, Orthopaedic Surgery  Hand, Wrist, and Elbow Surgery  [facility]      ORTHOPAEDIC HAND, WRIST, AND ELBOW OFFICE  VISIT       ASSESSMENT/PLAN:      60 yr old female with Right [facility] OA  Patient provided with a right CMC joint injection today  Comfort cool brace for day time activities   Voltaren gel sent to pharmacy on file    The patient verbalized understanding of exam findings and treatment plan  We engaged in the shared decision-making process and treatment options were discussed at length with the patient  Surgical and conservative management discussed today along with risks and benefits  Diagnoses and all orders for this visit:    Pain of right thumb  -     XR thumb right first digit-min 2v; Future  -     Thumb Cude comf/Cool  -     Small joint arthrocentesis: R thumb CMC  -     Diclofenac Sodium (VOLTAREN) 1 %; Apply 2 g topically 4 (four) times a day    Primary osteoarthritis of first carpometacarpal joint of right hand  -     Thumb Cude comf/Cool  -     Small joint arthrocentesis: R thumb CMC  -     Diclofenac Sodium (VOLTAREN) 1 %; Apply 2 g topically 4 (four) times a day        Follow Up:  Return for 6-8 weeks  To Do Next Visit:  Re-evaluation of current issue      General Discussions:  [facility] Arthritis: The anatomy and physiology of carpometacarpal joint arthritis was discussed with the patient today in the office  Deterioration of the articular cartilage eventually leads to hypermobility at the thumb [facility] joint, resulting in joint subluxation, osteophyte formation, cystic changes within the trapezium and base of the first metacarpal, as well as subchondral sclerosis  Eventually, pain, limited mobility, and compensatory hyperextension at the metacarpophalangeal joint may develop  While normal activity and usage of the thumb joint may provide a painful experience to the patient, this typically does not result in damage to the thumb or hand  Treatment options include resting thumb spica splints to decreased joint edema, pain, and inflammation  Therapy exercises to strengthen the thenar musculature may relieve pain, but do not alter the overall continued development of osteoarthritis  Oral medications, topical medications, corticosteroid injections may decrease pain and increase overall function  Eventually, approximately 5% of patients may require surgical intervention  ____________________________________________________________________________________________________________________________________________      CHIEF COMPLAINT:  Chief Complaint   Patient presents with    Right Hand - Pain    Right Thumb - Pain       SUBJECTIVE:  Sylvia Orosco is a 60y o  year old RHD female who presents for initial evaluation of right thumb pain  Patient reports pain started a few months ago without injury  She has increased pain with gripping activities  She denies any numbness or tingling          Pain/symptom timing:  Worse during the day when active  Pain/symptom context:  Worse with activites and work  Pain/symptom modifying factors:  Rest makes better, activities make worse  Pain/symptom associated signs/symptoms: none    Prior treatment   · NSAIDsYes   · Injections No   · Bracing/Orthotics No    Physical Therapy No     I have personally reviewed all the relevant PMH, PSH, SH, FH, Medications and allergies      PAST MEDICAL HISTORY:  Past Medical History:   Diagnosis Date    Abdominal pain     Asthma     Basal cell carcinoma     Bicuspid aortic valve     LAST ASSESSED [id number]    Cervical disc disease     last assessed [id number]    Cervical stenosis of spine     LAST ASSESSED [id number]    Disease of thyroid gland     hypothyroid    Dry eyes     Endometriosis  Hyperglycemia     Hyperlipidemia     Hypertension     Hypothyroidism     Left ventricular hypertrophy     Mitral valvular disorder     Nephrolithiasis     Ovarian cyst, complex     Ptosis     LAST ASSESSED [id number]    Renal calculi     Right cervical radiculopathy     LAST ASSESSED [id number]    Seasonal allergies     Squamous cell skin cancer     Thyroid disease        PAST SURGICAL HISTORY:  Past Surgical History:   Procedure Laterality Date    BASAL CELL CARCINOMA EXCISION      CARPAL TUNNEL RELEASE       SECTION      CHOLECYSTECTOMY      COLONOSCOPY      NEUROPLASTY / TRANSPOSITION MEDIAN NERVE AT CARPAL TUNNEL      NEUROPLASTY / TRANSPOSITION MEDIAN NERVE AT CARPAL TUNNEL      [facility]    OTHER SURGICAL HISTORY      surgically removed skin patch for skin cancer    PLANTAR FASCIECTOMY      MN COLONOSCOPY FLX DX W/COLLJ SPEC WHEN PFRMD N/A 10/9/2017    Procedure: COLONOSCOPY;  Surgeon: Anastacio Ponce MD;  Location: [facility] GI LAB;   Service: Gastroenterology    [place] HYSTEROSCOPY,W/ENDO BX N/A 2018    Procedure: DILATATION AND CURETTAGE (D&C) WITH HYSTEROSCOPY;  Surgeon: Felicita Dash DO;  Location: [facility] OR;  Service: Gynecology    SKIN BIOPSY      TUBAL LIGATION         FAMILY HISTORY:  Family History   Problem Relation Age of Onset    Diabetes Mother     Alzheimer's disease Mother     Heart disease Mother     Hypertension Mother     Other Family         back disorder    Cancer Family     Heart disease Family     Thyroid disease Family     No Known Problems Father     No Known Problems Sister     No Known Problems Daughter     No Known Problems Maternal Grandmother     No Known Problems Maternal Grandfather     No Known Problems Paternal Grandmother     No Known Problems Paternal Grandfather     No Known Problems Sister     No Known Problems Maternal Aunt     No Known Problems Maternal Aunt     No Known Problems Maternal Aunt     No Known Problems Maternal Aunt     No Known Problems Maternal Aunt        SOCIAL HISTORY:  Social History     Tobacco Use    Smoking status: Former Smoker     Packs/day: 1 00     Years: 20 00     Pack years: 20 00     Types: Cigarettes     Quit date:      Years since quittin 5    Smokeless tobacco: Never Used   Vaping Use    Vaping Use: Never used   Substance Use Topics    Alcohol use:  Yes     Alcohol/week: 8 0 standard drinks     Types: 4 Glasses of wine, 4 Standard drinks or equivalent per week     Comment: ocassionally     Drug use: No       MEDICATIONS:    Current Outpatient Medications:     albuterol (Ventolin HFA) 90 mcg/act inhaler, Inhale 2 puffs every 6 (six) hours as needed for wheezing, Disp: 3 Inhaler, Rfl: 3    amLODIPine (NORVASC) 5 mg tablet, Take 1 tablet (5 mg total) by mouth daily, Disp: 90 tablet, Rfl: 3    clindamycin (CLEOCIN T) 1 %, Apply to underarms once daily, Disp: 69 Package, Rfl: 7    fexofenadine (ALLEGRA) 180 MG tablet, Take 1 tablet (180 mg total) by mouth daily, Disp: 30 tablet, Rfl: 5    fluticasone (ARNUITY ELLIPTA) 100 MCG/ACT AEPB inhaler, Inhale 1 puff daily Rinse mouth after use , Disp: 3 Inhaler, Rfl: 3    fluticasone (FLONASE) 50 mcg/act nasal spray, 2 sprays into each nostril 2 (two) times a day, Disp: 3 Bottle, Rfl: 3    levothyroxine 125 mcg tablet, TAKE ONE TABLET BY MOUTH EVERY DAY , Disp: 90 tablet, Rfl: 0    losartan (COZAAR) 100 MG tablet, Take 1 tablet (100 mg total) by mouth daily, Disp: 90 tablet, Rfl: 3    metFORMIN (GLUCOPHAGE) 500 mg tablet, Take 1 tablet (500 mg total) by mouth daily with breakfast, Disp: 90 tablet, Rfl: 3    pantoprazole (PROTONIX) 40 mg tablet, Take 1 tablet (40 mg total) by mouth daily, Disp: 90 tablet, Rfl: 3    XIIDRA 5 % op solution, , Disp: , Rfl:     dicyclomine (BENTYL) 10 mg capsule, Take 1 capsule (10 mg total) by mouth 4 (four) times a day (before meals and at bedtime) (Patient not taking: Reported on 2021), Disp: 30 capsule, Rfl: 1    ALLERGIES:  Allergies   Allergen Reactions    Erythromycin GI Intolerance     Reaction Date: 11Jul2011;     Penicillins Other (See Comments)     unknown    Aztreonam Rash     Action Taken: Allergy added by [employer] to replace Penicillins [employer];     Carbapenems Rash     Action Taken: Allergy added by [employer] to replace Penicillins [employer];     Cephalosporins Rash     Action Taken: Allergy added by [employer] to replace Penicillins [employer];     Flu Virus Vaccine Hives, Rash and GI Intolerance    Griseofulvin Rash     Action Taken: Allergy added by [employer] to replace Penicillins [employer];            REVIEW OF SYSTEMS:  Review of Systems    VITALS:  Vitals:    08/04/21 1615   BP: 143/83   Pulse: 68   Resp: 17       LABS:  HgA1c:   Lab Results   Component Value Date    HGBA1C 6 0 (H) 06/14/2021     BMP:   Lab Results   Component Value Date    GLUCOSE 104 08/14/2015    CALCIUM 9 6 06/14/2021     08/14/2015    K 3 9 06/14/2021    CO2 28 06/14/2021     06/14/2021    BUN 18 06/14/2021    CREATININE 0 63 06/14/2021       _____________________________________________________  PHYSICAL EXAMINATION:  General: well developed and well nourished, alert, oriented times 3 and appears comfortable  Psychiatric: Normal  HEENT: Normocephalic, Atraumatic Trachea Midline, No torticollis  Pulmonary: No audible wheezing or respiratory distress   Abdomen/GI: Non tender, non distended   Cardiovascular: No pitting edema, 2+ radial pulse   Skin: No masses, erythema, lacerations, fluctation, ulcerations  Neurovascular: Sensation Intact to the Median, Ulnar, Radial Nerve, Motor Intact to the Median, Ulnar, Radial Nerve and Pulses Intact  Musculoskeletal: Normal, except as noted in detailed exam and in HPI        MUSCULOSKELETAL EXAMINATION:  right CMC Exam:  No adduction contracture  No hyperextension deformity of MCP joint  Positive localized tenderness over radial and dorsal aspect of thumb (CMC joint)  Grind test is Positive for pain and Negative for crepitus  Metacarpal load shift test positive  No triggering or tenderness over the A1 pulley  No pain with Finkelsteins maneuver     ___________________________________________________  STUDIES REVIEWED:  I have personally reviewed AP lateral and oblique radiographs of right hand  which demonstrate no fracture or dislocation, moderate degenerative changes CMC joint           PROCEDURES PERFORMED:  Small joint arthrocentesis: R thumb CMC  [place] Protocol:  Consent given by: patient  Patient understanding: patient states understanding of the procedure being performed  Site marked: the operative site was marked  Patient identity confirmed: verbally with patient    Supporting Documentation  Indications: pain   Procedure Details  Location: thumb - R thumb CMC  Preparation: Patient was prepped and draped in the usual sterile fashion  Needle size: 25 G  Ultrasound guidance: no  Approach: radial  Medications administered: 0 5 mL lidocaine (PF) 1 %; 20 mg triamcinolone acetonide 40 mg/mL; 0 5 mL bupivacaine 0 25 %    Patient tolerance: patient tolerated the procedure well with no immediate complications  Dressing:  Sterile dressing applied             _____________________________________________________      Scribe Attestation    I,:  Robert Leon am acting as a scribe while in the presence of the attending physician :       I,:  Nusrat Resendez MD personally performed the services described in this documentation    as scribed in my presence :

## 2021-08-25 ENCOUNTER — TELEPHONE (OUTPATIENT)
Dept: DERMATOLOGY | Facility: CLINIC | Age: 66
End: 2021-08-25

## 2021-09-02 DIAGNOSIS — E03.9 ACQUIRED HYPOTHYROIDISM: ICD-10-CM

## 2021-09-02 RX ORDER — LEVOTHYROXINE SODIUM 0.12 MG/1
TABLET ORAL
Qty: 90 TABLET | Refills: 0 | Status: SHIPPED | OUTPATIENT
Start: 2021-09-02 | End: 2021-11-20

## 2021-09-07 ENCOUNTER — OFFICE VISIT (OUTPATIENT)
Dept: PULMONOLOGY | Facility: CLINIC | Age: 66
End: 2021-09-07
Payer: COMMERCIAL

## 2021-09-07 VITALS
WEIGHT: 229.8 LBS | DIASTOLIC BLOOD PRESSURE: 80 MMHG | HEART RATE: 71 BPM | HEIGHT: 67 IN | TEMPERATURE: 98 F | BODY MASS INDEX: 36.07 KG/M2 | SYSTOLIC BLOOD PRESSURE: 130 MMHG | OXYGEN SATURATION: 97 %

## 2021-09-07 DIAGNOSIS — R05.8 SPASMODIC COUGH: ICD-10-CM

## 2021-09-07 DIAGNOSIS — J45.20 MILD INTERMITTENT ASTHMA WITHOUT COMPLICATION: ICD-10-CM

## 2021-09-07 DIAGNOSIS — Z87.891 FORMER SMOKER: Primary | ICD-10-CM

## 2021-09-07 PROCEDURE — 99214 OFFICE O/P EST MOD 30 MIN: CPT | Performed by: INTERNAL MEDICINE

## 2021-09-07 RX ORDER — PANTOPRAZOLE SODIUM 40 MG/1
40 TABLET, DELAYED RELEASE ORAL 2 TIMES DAILY
Qty: 180 TABLET | Refills: 0 | Status: SHIPPED | OUTPATIENT
Start: 2021-09-07 | End: 2022-02-04 | Stop reason: SDUPTHER

## 2021-09-07 NOTE — ASSESSMENT & PLAN NOTE
We have attributed this finding to a combination acid reflux, likely associated with her hiatal hernia, as well as postnasal drip  Continue Flonase  At the patient's request, will trial increasing Protonix to b i d

## 2021-09-07 NOTE — PROGRESS NOTES
Pulmonary Follow Up Note   Jackson Heller 60 y o  female MRN: [id number]  9/7/2021    Assessment:    Mild intermittent asthma without complication  Hortensia Sandra is doing well Arnuity, Allegra, Flonase  I advised her that it may be reasonable to try coming off Arnuity, especially with how expensive that medication can be, but she is unsure if she feels comfortable with this  Rescue inhaler use is minimal   Continue current medications  Will follow-up again in 6 months  Former smoker    Hortensia Sandra is a candidate for lung cancer screening and I ordered that test for her today  Spasmodic cough    We have attributed this finding to a combination acid reflux, likely associated with her hiatal hernia, as well as postnasal drip  Continue Flonase  At the patient's request, will trial increasing Protonix to b i d  Plan:    Diagnoses and all orders for this visit:    Former smoker  -     CT lung screening program; Future    Spasmodic cough  -     pantoprazole (PROTONIX) 40 mg tablet; Take 1 tablet (40 mg total) by mouth 2 (two) times a day    Mild intermittent asthma without complication      Return in about 6 months (around 3/7/2022)  History of Present Illness   HPI:  Jackson Heller is a 60 y o  female who   Returns for follow-up of asthma and chronic cough  The patient reports no acute complaints since her last appointment  She did not notice any significant difference changing from [facility] to [address] [place] [address] [place], although she notes the Arnuity is only approximately 20 dollars cheaper per month  Her rescue inhaler use remains fairly minimal, she estimates this to be no more than 3 times per month  She continues to have throat clearing with some mucus production, which is likely attributable to allergic rhinitis with postnasal drip, verses some persistent reflux related to her hiatal hernia  She was interested in increasing her proton pump inhibitor to twice daily    She has had no exacerbations of her lung disease since her last appointment  She had no other acute complaints  We did discuss the utility of lung cancer screening, including risks and benefits  [facility] decision support tool was used for impact estimation  Patient was agreeable to proceed to screening       Review of Systems   HENT: Negative for postnasal drip and rhinorrhea  Respiratory: Positive for cough  Negative for shortness of breath and wheezing  All other systems reviewed and are negative  Historical Information   Past Medical History:   Diagnosis Date    Abdominal pain     Asthma     Basal cell carcinoma     Bicuspid aortic valve     LAST ASSESSED [id number]    Cervical disc disease     last assessed [id number]    Cervical stenosis of spine     LAST ASSESSED [id number]    Disease of thyroid gland     hypothyroid    Dry eyes     Endometriosis     Hyperglycemia     Hyperlipidemia     Hypertension     Hypothyroidism     Left ventricular hypertrophy     Mitral valvular disorder     Nephrolithiasis     Ovarian cyst, complex     Ptosis     LAST ASSESSED [id number]    Renal calculi     Right cervical radiculopathy     LAST ASSESSED [id number]    Seasonal allergies     Squamous cell skin cancer     Thyroid disease      Past Surgical History:   Procedure Laterality Date    BASAL CELL CARCINOMA EXCISION      CARPAL TUNNEL RELEASE       SECTION      CHOLECYSTECTOMY      COLONOSCOPY      NEUROPLASTY / TRANSPOSITION MEDIAN NERVE AT CARPAL TUNNEL      NEUROPLASTY / TRANSPOSITION MEDIAN NERVE AT CARPAL TUNNEL  [facility]    OTHER SURGICAL HISTORY      surgically removed skin patch for skin cancer    PLANTAR FASCIECTOMY      [place] COLONOSCOPY FLX DX W/COLLJ SPEC WHEN PFRMD N/A 10/9/2017    Procedure: COLONOSCOPY;  Surgeon: Grace Lyman MD;  Location: [facility] GI LAB;   Service: Gastroenterology    [place] HYSTEROSCOPY,W/ENDO BX N/A 2018    Procedure: DILATATION AND CURETTAGE (D&C) WITH HYSTEROSCOPY;  Surgeon: Du DO Lake;  Location: [facility] OR;  Service: Gynecology    SKIN BIOPSY      TUBAL LIGATION       Family History   Problem Relation Age of Onset    Diabetes Mother     Alzheimer's disease Mother     Heart disease Mother     Hypertension Mother     Other Family         back disorder    Cancer Family     Heart disease Family     Thyroid disease Family     No Known Problems Father     No Known Problems Sister     No Known Problems Daughter     No Known Problems Maternal Grandmother     No Known Problems Maternal Grandfather     No Known Problems Paternal Grandmother     No Known Problems Paternal Grandfather     No Known Problems Sister     No Known Problems Maternal Aunt     No Known Problems Maternal Aunt     No Known Problems Maternal Aunt     No Known Problems Maternal Aunt     No Known Problems Maternal Aunt        Meds/Allergies     Current Outpatient Medications:     albuterol (Ventolin HFA) 90 mcg/act inhaler, Inhale 2 puffs every 6 (six) hours as needed for wheezing, Disp: 3 Inhaler, Rfl: 3    amLODIPine (NORVASC) 5 mg tablet, Take 1 tablet (5 mg total) by mouth daily, Disp: 90 tablet, Rfl: 3    clindamycin (CLEOCIN T) 1 %, Apply to underarms once daily, Disp: 69 Package, Rfl: 7    Diclofenac Sodium (VOLTAREN) 1 %, Apply 2 g topically 4 (four) times a day, Disp: 150 g, Rfl: 2    fexofenadine (ALLEGRA) 180 MG tablet, Take 1 tablet (180 mg total) by mouth daily, Disp: 30 tablet, Rfl: 5    fluticasone (ARNUITY ELLIPTA) 100 MCG/ACT AEPB inhaler, Inhale 1 puff daily Rinse mouth after use , Disp: 3 Inhaler, Rfl: 3    fluticasone (FLONASE) 50 mcg/act nasal spray, 2 sprays into each nostril 2 (two) times a day, Disp: 3 Bottle, Rfl: 3    levothyroxine 125 mcg tablet, TAKE ONE TABLET BY MOUTH EVERY DAY , Disp: 90 tablet, Rfl: 0    losartan (COZAAR) 100 MG tablet, Take 1 tablet (100 mg total) by mouth daily, Disp: 90 tablet, Rfl: 3    metFORMIN (GLUCOPHAGE) 500 mg tablet, Take 1 tablet (500 mg total) by mouth daily with breakfast, Disp: 90 tablet, Rfl: 3    pantoprazole (PROTONIX) 40 mg tablet, Take 1 tablet (40 mg total) by mouth 2 (two) times a day, Disp: 180 tablet, Rfl: 0    XIIDRA 5 % op solution, , Disp: , Rfl:   Allergies   Allergen Reactions    Erythromycin GI Intolerance     Reaction Date: 11Jul2011;     Penicillins Other (See Comments)     unknown    Aztreonam Rash     Action Taken: Allergy added by [employer] to replace Penicillins [employer];     Carbapenems Rash     Action Taken: Allergy added by [employer] to replace Penicillins [employer];     Cephalosporins Rash     Action Taken: Allergy added by [employer] to replace Penicillins [employer];     Flu Virus Vaccine Hives, Rash and GI Intolerance    Griseofulvin Rash     Action Taken: Allergy added by [employer] to replace Penicillins [employer];        Vitals: Blood pressure 130/80, pulse 71, temperature 98 °F (36 7 °C), height 5' 7" (1 702 m), weight 104 kg (229 lb 12 8 oz), SpO2 97 %  Body mass index is 35 99 kg/m²  Oxygen Therapy  SpO2: 97 %  Oxygen Therapy: None (Room air)    Physical Exam  Physical Exam  Vitals reviewed  Constitutional:       General: She is not in acute distress  Appearance: Normal appearance  She is well-developed  She is not ill-appearing  HENT:      Head: Normocephalic and atraumatic  Eyes:      General: No scleral icterus  Conjunctiva/sclera: Conjunctivae normal    Neck:      Vascular: No JVD  Cardiovascular:      Rate and Rhythm: Normal rate and regular rhythm  Heart sounds: Murmur heard  No friction rub  No gallop  Comments: Stable 3/6 systolic ejection murmur at the right upper sternal border consistent with the patient's known bicuspid aortic valve  Pulmonary:      Effort: Pulmonary effort is normal  No respiratory distress  Breath sounds: Normal breath sounds  No wheezing or rales  Musculoskeletal:      Cervical back: Neck supple  Right lower leg: No edema  Left lower leg: No edema  Skin:     General: Skin is warm and dry  Findings: No rash  Neurological:      General: No focal deficit present  Mental Status: She is alert and oriented to person, place, and time  Mental status is at baseline  Psychiatric:         Mood and Affect: Mood normal          Behavior: Behavior normal      Labs: I have personally reviewed pertinent lab results  Lab Results   Component Value Date    WBC 10 22 (H) 01/25/2021    HGB 15 5 (H) 01/25/2021    HCT 47 6 (H) 01/25/2021    MCV 92 01/25/2021     01/25/2021     Lab Results   Component Value Date    GLUCOSE 104 08/14/2015    CALCIUM 9 6 06/14/2021     08/14/2015    K 3 9 06/14/2021    CO2 28 06/14/2021     06/14/2021    BUN 18 06/14/2021    CREATININE 0 63 06/14/2021     No results found for: IGE  Lab Results   Component Value Date    ALT 34 06/14/2021    AST 27 06/14/2021    ALKPHOS 81 06/14/2021    BILITOT 0 42 08/14/2015       Imaging and other studies: I have personally reviewed pertinent films in PACS    EKG, Pathology, and Other Studies: I have personally reviewed pertinent reports  [facility]

## 2021-09-07 NOTE — ASSESSMENT & PLAN NOTE
Aide Steele is doing well Arnuity, Allegra, Flonase  I advised her that it may be reasonable to try coming off Arnuity, especially with how expensive that medication can be, but she is unsure if she feels comfortable with this  Rescue inhaler use is minimal   Continue current medications  Will follow-up again in 6 months

## 2021-09-15 ENCOUNTER — TELEPHONE (OUTPATIENT)
Dept: FAMILY MEDICINE CLINIC | Facility: HOSPITAL | Age: 66
End: 2021-09-15

## 2021-09-17 ENCOUNTER — TELEPHONE (OUTPATIENT)
Dept: FAMILY MEDICINE CLINIC | Facility: HOSPITAL | Age: 66
End: 2021-09-17

## 2021-09-17 NOTE — TELEPHONE ENCOUNTER
Patient called asking if patient needs blood work done for her appt in Oct?? Please call cell you can leave a mesg

## 2021-09-21 ENCOUNTER — ANNUAL EXAM (OUTPATIENT)
Dept: OBGYN CLINIC | Facility: CLINIC | Age: 66
End: 2021-09-21
Payer: COMMERCIAL

## 2021-09-21 VITALS
BODY MASS INDEX: 37.51 KG/M2 | WEIGHT: 233.4 LBS | DIASTOLIC BLOOD PRESSURE: 76 MMHG | HEIGHT: 66 IN | SYSTOLIC BLOOD PRESSURE: 138 MMHG

## 2021-09-21 DIAGNOSIS — Z12.31 ENCOUNTER FOR SCREENING MAMMOGRAM FOR BREAST CANCER: ICD-10-CM

## 2021-09-21 DIAGNOSIS — N83.299 COMPLEX OVARIAN CYST: ICD-10-CM

## 2021-09-21 DIAGNOSIS — Z11.51 SCREENING FOR HPV (HUMAN PAPILLOMAVIRUS): ICD-10-CM

## 2021-09-21 DIAGNOSIS — Z12.4 CERVICAL CANCER SCREENING: Primary | ICD-10-CM

## 2021-09-21 DIAGNOSIS — Z78.0 ASYMPTOMATIC MENOPAUSE: ICD-10-CM

## 2021-09-21 DIAGNOSIS — R23.2 HOT FLASHES: ICD-10-CM

## 2021-09-21 DIAGNOSIS — Z01.419 ENCOUNTER FOR ANNUAL ROUTINE GYNECOLOGICAL EXAMINATION: ICD-10-CM

## 2021-09-21 DIAGNOSIS — B37.2 CUTANEOUS CANDIDIASIS: ICD-10-CM

## 2021-09-21 PROCEDURE — 99396 PREV VISIT EST AGE 40-64: CPT | Performed by: OBSTETRICS & GYNECOLOGY

## 2021-09-21 PROCEDURE — G0476 HPV COMBO ASSAY CA SCREEN: HCPCS | Performed by: OBSTETRICS & GYNECOLOGY

## 2021-09-21 PROCEDURE — G0145 SCR C/V CYTO,THINLAYER,RESCR: HCPCS | Performed by: OBSTETRICS & GYNECOLOGY

## 2021-09-21 RX ORDER — KETOCONAZOLE 20 MG/G
CREAM TOPICAL DAILY
Qty: 30 G | Refills: 0 | Status: SHIPPED | OUTPATIENT
Start: 2021-09-21 | End: 2021-12-06

## 2021-09-21 NOTE — PROGRESS NOTES
Assessment/Plan:     Pap and HPV done today    mammogram reviewed with her including breast density  RX given for March     Discussed self breast exams    colon cancer screening - colonoscopy is up to date    Hot flashes - discussed possible causes, will order TSH, CBC, CMP  It may be related to her blood sugar  We discussed conservative measures for hot flashes , including dietary changes, exercise and over-the-counter options  Ovarian nodule - US ordered     baseline DEXA ordered for after her    discussed preventive care, regular exercise and a healthy diet , smoking cessation      No problem-specific Assessment & Plan notes found for this encounter  Diagnoses and all orders for this visit:    Cervical cancer screening  -     Liquid-based pap, screening  -     HPV High Risk    Encounter for annual routine gynecological examination    Complex ovarian cyst  -     US pelvis complete w transvaginal; Future    Encounter for screening mammogram for breast cancer  -     Mammo screening bilateral w 3d & cad; Future    Asymptomatic menopause  -     DXA bone density spine hip and pelvis; Future    Hot flashes  -     Comprehensive metabolic panel; Future  -     TSH, 3rd generation with Free T4 reflex; Future  -     CBC and differential; Future    Screening for HPV (human papillomavirus)  -     Liquid-based pap, screening  -     HPV High Risk    Cutaneous candidiasis  -     ketoconazole (NIZORAL) 2 % cream; Apply topically daily for 7 days          Subjective:      Patient ID: Edson Alberto is a 60 y o  female  Patient here for yearly  She has not been here since 2018 when she was postop from a D&C, hysteroscopy  Approximately 3-4 months ago she started having hot flashes  She is sleeping well  She has flashes during the day and at night  She went through menopause approximately 20 years ago and did not have significant flashes at the time      She was started on metformin earlier this year for elevated blood sugar  She has a very small nodule on her left ovary either an old endometrioma or a dermoid that we have been watching with ultrasound every year  She is overdue for this  Normal 3D mammogram in March showed scattered fibroglandular densities and average risk  The following portions of the patient's history were reviewed and updated as appropriate: allergies, current medications, past family history, past medical history, past social history, past surgical history and problem list     Review of Systems   Constitutional: Negative  Gastrointestinal: Negative  Genitourinary: Negative  Objective:      /76   Ht 5' 6" (1 676 m)   Wt 106 kg (233 lb 6 4 oz)   BMI 37 67 kg/m²          Physical Exam  Vitals reviewed  Constitutional:       Appearance: She is well-developed  Neck:      Thyroid: No thyromegaly  Trachea: No tracheal deviation  Cardiovascular:      Rate and Rhythm: Normal rate and regular rhythm  Pulmonary:      Effort: Pulmonary effort is normal       Breath sounds: Normal breath sounds  Chest:      Breasts: Breasts are symmetrical          Right: No inverted nipple, mass, nipple discharge, skin change or tenderness  Left: No inverted nipple, mass, nipple discharge, skin change or tenderness  Abdominal:      General: There is no distension  Palpations: Abdomen is soft  There is no mass  Tenderness: There is no abdominal tenderness  Genitourinary:     Labia:         Right: No rash, tenderness, lesion or injury  Left: No rash, tenderness, lesion or injury  Vagina: Normal       Cervix: No cervical motion tenderness, discharge or friability  Adnexa:         Right: No mass, tenderness or fullness  Left: No mass, tenderness or fullness          Rectum: Normal

## 2021-09-24 LAB
HPV HR 12 DNA CVX QL NAA+PROBE: NEGATIVE
HPV16 DNA CVX QL NAA+PROBE: NEGATIVE
HPV18 DNA CVX QL NAA+PROBE: NEGATIVE

## 2021-09-28 LAB
LAB AP GYN PRIMARY INTERPRETATION: NORMAL
Lab: NORMAL

## 2021-10-02 ENCOUNTER — APPOINTMENT (OUTPATIENT)
Dept: LAB | Facility: CLINIC | Age: 66
End: 2021-10-02
Payer: COMMERCIAL

## 2021-10-02 DIAGNOSIS — R73.9 HYPERGLYCEMIA: ICD-10-CM

## 2021-10-02 DIAGNOSIS — R23.2 HOT FLASHES: ICD-10-CM

## 2021-10-02 LAB
ALBUMIN SERPL BCP-MCNC: 3.8 G/DL (ref 3.5–5)
ALP SERPL-CCNC: 86 U/L (ref 46–116)
ALT SERPL W P-5'-P-CCNC: 26 U/L (ref 12–78)
ANION GAP SERPL CALCULATED.3IONS-SCNC: 2 MMOL/L (ref 4–13)
AST SERPL W P-5'-P-CCNC: 19 U/L (ref 5–45)
BASOPHILS # BLD AUTO: 0.1 THOUSANDS/ΜL (ref 0–0.1)
BASOPHILS NFR BLD AUTO: 1 % (ref 0–1)
BILIRUB SERPL-MCNC: 0.43 MG/DL (ref 0.2–1)
BUN SERPL-MCNC: 15 MG/DL (ref 5–25)
CALCIUM SERPL-MCNC: 9.7 MG/DL (ref 8.3–10.1)
CHLORIDE SERPL-SCNC: 104 MMOL/L (ref 100–108)
CO2 SERPL-SCNC: 31 MMOL/L (ref 21–32)
CREAT SERPL-MCNC: 0.67 MG/DL (ref 0.6–1.3)
EOSINOPHIL # BLD AUTO: 0.96 THOUSAND/ΜL (ref 0–0.61)
EOSINOPHIL NFR BLD AUTO: 10 % (ref 0–6)
ERYTHROCYTE [DISTWIDTH] IN BLOOD BY AUTOMATED COUNT: 14 % (ref 11.6–15.1)
EST. AVERAGE GLUCOSE BLD GHB EST-MCNC: 131 MG/DL
GFR SERPL CREATININE-BSD FRML MDRD: 93 ML/MIN/1.73SQ M
GLUCOSE P FAST SERPL-MCNC: 113 MG/DL (ref 65–99)
HBA1C MFR BLD: 6.2 %
HCT VFR BLD AUTO: 46 % (ref 34.8–46.1)
HGB BLD-MCNC: 15 G/DL (ref 11.5–15.4)
IMM GRANULOCYTES # BLD AUTO: 0.05 THOUSAND/UL (ref 0–0.2)
IMM GRANULOCYTES NFR BLD AUTO: 1 % (ref 0–2)
LYMPHOCYTES # BLD AUTO: 2 THOUSANDS/ΜL (ref 0.6–4.47)
LYMPHOCYTES NFR BLD AUTO: 21 % (ref 14–44)
MCH RBC QN AUTO: 30.4 PG (ref 26.8–34.3)
MCHC RBC AUTO-ENTMCNC: 32.6 G/DL (ref 31.4–37.4)
MCV RBC AUTO: 93 FL (ref 82–98)
MONOCYTES # BLD AUTO: 0.9 THOUSAND/ΜL (ref 0.17–1.22)
MONOCYTES NFR BLD AUTO: 9 % (ref 4–12)
NEUTROPHILS # BLD AUTO: 5.65 THOUSANDS/ΜL (ref 1.85–7.62)
NEUTS SEG NFR BLD AUTO: 58 % (ref 43–75)
NRBC BLD AUTO-RTO: 0 /100 WBCS
PLATELET # BLD AUTO: 220 THOUSANDS/UL (ref 149–390)
PMV BLD AUTO: 10.7 FL (ref 8.9–12.7)
POTASSIUM SERPL-SCNC: 4.1 MMOL/L (ref 3.5–5.3)
PROT SERPL-MCNC: 7.8 G/DL (ref 6.4–8.2)
RBC # BLD AUTO: 4.93 MILLION/UL (ref 3.81–5.12)
SODIUM SERPL-SCNC: 137 MMOL/L (ref 136–145)
TSH SERPL DL<=0.05 MIU/L-ACNC: 2.31 UIU/ML (ref 0.36–3.74)
WBC # BLD AUTO: 9.66 THOUSAND/UL (ref 4.31–10.16)

## 2021-10-02 PROCEDURE — 84443 ASSAY THYROID STIM HORMONE: CPT

## 2021-10-02 PROCEDURE — 80053 COMPREHEN METABOLIC PANEL: CPT

## 2021-10-02 PROCEDURE — 36415 COLL VENOUS BLD VENIPUNCTURE: CPT

## 2021-10-02 PROCEDURE — 83036 HEMOGLOBIN GLYCOSYLATED A1C: CPT

## 2021-10-02 PROCEDURE — 85025 COMPLETE CBC W/AUTO DIFF WBC: CPT

## 2021-10-04 ENCOUNTER — OFFICE VISIT (OUTPATIENT)
Dept: FAMILY MEDICINE CLINIC | Facility: HOSPITAL | Age: 66
End: 2021-10-04
Payer: COMMERCIAL

## 2021-10-04 VITALS
DIASTOLIC BLOOD PRESSURE: 78 MMHG | TEMPERATURE: 98.5 F | HEIGHT: 66 IN | WEIGHT: 233 LBS | BODY MASS INDEX: 37.45 KG/M2 | SYSTOLIC BLOOD PRESSURE: 126 MMHG | HEART RATE: 79 BPM

## 2021-10-04 DIAGNOSIS — K44.9 HIATAL HERNIA: ICD-10-CM

## 2021-10-04 DIAGNOSIS — K76.0 FATTY INFILTRATION OF LIVER: ICD-10-CM

## 2021-10-04 DIAGNOSIS — K21.00 GASTROESOPHAGEAL REFLUX DISEASE WITH ESOPHAGITIS WITHOUT HEMORRHAGE: ICD-10-CM

## 2021-10-04 DIAGNOSIS — E78.2 MIXED HYPERLIPIDEMIA: ICD-10-CM

## 2021-10-04 DIAGNOSIS — R73.9 HYPERGLYCEMIA: ICD-10-CM

## 2021-10-04 DIAGNOSIS — Z23 ENCOUNTER FOR IMMUNIZATION: ICD-10-CM

## 2021-10-04 DIAGNOSIS — I10 ESSENTIAL HYPERTENSION: Primary | ICD-10-CM

## 2021-10-04 DIAGNOSIS — J45.20 MILD INTERMITTENT ASTHMA WITHOUT COMPLICATION: ICD-10-CM

## 2021-10-04 DIAGNOSIS — E03.9 ACQUIRED HYPOTHYROIDISM: ICD-10-CM

## 2021-10-04 PROCEDURE — 99214 OFFICE O/P EST MOD 30 MIN: CPT | Performed by: FAMILY MEDICINE

## 2021-10-04 PROCEDURE — 90682 RIV4 VACC RECOMBINANT DNA IM: CPT

## 2021-10-04 PROCEDURE — 90471 IMMUNIZATION ADMIN: CPT

## 2021-10-12 ENCOUNTER — HOSPITAL ENCOUNTER (OUTPATIENT)
Dept: ULTRASOUND IMAGING | Facility: HOSPITAL | Age: 66
Discharge: HOME | End: 2021-10-12
Attending: OBSTETRICS & GYNECOLOGY
Payer: COMMERCIAL

## 2021-10-12 DIAGNOSIS — N83.299 COMPLEX OVARIAN CYST: ICD-10-CM

## 2021-10-12 PROCEDURE — 76856 US EXAM PELVIC COMPLETE: CPT

## 2021-10-12 PROCEDURE — 76830 TRANSVAGINAL US NON-OB: CPT

## 2021-11-05 DIAGNOSIS — I10 ESSENTIAL HYPERTENSION: ICD-10-CM

## 2021-11-07 RX ORDER — AMLODIPINE BESYLATE 5 MG/1
5 TABLET ORAL DAILY
Qty: 90 TABLET | Refills: 3 | Status: SHIPPED | OUTPATIENT
Start: 2021-11-07 | End: 2022-02-16 | Stop reason: SDUPTHER

## 2021-11-08 ENCOUNTER — CONSULT (OUTPATIENT)
Dept: GASTROENTEROLOGY | Facility: CLINIC | Age: 66
End: 2021-11-08
Payer: COMMERCIAL

## 2021-11-08 VITALS
WEIGHT: 237 LBS | HEART RATE: 68 BPM | OXYGEN SATURATION: 98 % | DIASTOLIC BLOOD PRESSURE: 72 MMHG | TEMPERATURE: 98.4 F | BODY MASS INDEX: 38.09 KG/M2 | HEIGHT: 66 IN | SYSTOLIC BLOOD PRESSURE: 132 MMHG

## 2021-11-08 DIAGNOSIS — R10.13 EPIGASTRIC ABDOMINAL PAIN: ICD-10-CM

## 2021-11-08 DIAGNOSIS — R05.8 COUGH WITH SPUTUM: ICD-10-CM

## 2021-11-08 DIAGNOSIS — K21.00 GASTROESOPHAGEAL REFLUX DISEASE WITH ESOPHAGITIS WITHOUT HEMORRHAGE: ICD-10-CM

## 2021-11-08 DIAGNOSIS — K44.9 HIATAL HERNIA: ICD-10-CM

## 2021-11-08 DIAGNOSIS — Z86.010 HISTORY OF COLON POLYPS: Primary | ICD-10-CM

## 2021-11-08 PROCEDURE — 99244 OFF/OP CNSLTJ NEW/EST MOD 40: CPT | Performed by: INTERNAL MEDICINE

## 2021-11-11 ENCOUNTER — TELEPHONE (OUTPATIENT)
Dept: GASTROENTEROLOGY | Facility: AMBULARY SURGERY CENTER | Age: 66
End: 2021-11-11

## 2021-11-15 ENCOUNTER — APPOINTMENT (OUTPATIENT)
Dept: LAB | Facility: CLINIC | Age: 66
End: 2021-11-15
Payer: COMMERCIAL

## 2021-11-15 ENCOUNTER — OFFICE VISIT (OUTPATIENT)
Dept: DERMATOLOGY | Facility: CLINIC | Age: 66
End: 2021-11-15
Payer: COMMERCIAL

## 2021-11-15 VITALS — TEMPERATURE: 99.3 F | HEIGHT: 67 IN | BODY MASS INDEX: 36.88 KG/M2 | WEIGHT: 235 LBS

## 2021-11-15 DIAGNOSIS — L82.1 SEBORRHEIC KERATOSIS: Primary | ICD-10-CM

## 2021-11-15 DIAGNOSIS — B07.9 VERRUCA VULGARIS: ICD-10-CM

## 2021-11-15 DIAGNOSIS — R10.13 EPIGASTRIC ABDOMINAL PAIN: ICD-10-CM

## 2021-11-15 DIAGNOSIS — L73.9 FOLLICULITIS: ICD-10-CM

## 2021-11-15 DIAGNOSIS — L30.9 DERMATITIS: ICD-10-CM

## 2021-11-15 PROCEDURE — 83516 IMMUNOASSAY NONANTIBODY: CPT

## 2021-11-15 PROCEDURE — 36415 COLL VENOUS BLD VENIPUNCTURE: CPT

## 2021-11-15 PROCEDURE — 86255 FLUORESCENT ANTIBODY SCREEN: CPT

## 2021-11-15 PROCEDURE — 99213 OFFICE O/P EST LOW 20 MIN: CPT | Performed by: DERMATOLOGY

## 2021-11-15 PROCEDURE — 82784 ASSAY IGA/IGD/IGG/IGM EACH: CPT

## 2021-11-15 RX ORDER — CLINDAMYCIN PHOSPHATE 10 MG/ML
SOLUTION TOPICAL
Qty: 60 EACH | Refills: 0 | Status: SHIPPED | OUTPATIENT
Start: 2021-11-15 | End: 2022-01-10 | Stop reason: ALTCHOICE

## 2021-11-17 LAB
ENDOMYSIUM IGA SER QL: NEGATIVE
GLIADIN PEPTIDE IGA SER-ACNC: 4 UNITS (ref 0–19)
GLIADIN PEPTIDE IGG SER-ACNC: 2 UNITS (ref 0–19)
IGA SERPL-MCNC: 268 MG/DL (ref 87–352)
TTG IGA SER-ACNC: <2 U/ML (ref 0–3)
TTG IGG SER-ACNC: <2 U/ML (ref 0–5)

## 2021-11-20 DIAGNOSIS — E03.9 ACQUIRED HYPOTHYROIDISM: ICD-10-CM

## 2021-11-20 RX ORDER — LEVOTHYROXINE SODIUM 0.12 MG/1
TABLET ORAL
Qty: 90 TABLET | Refills: 0 | Status: SHIPPED | OUTPATIENT
Start: 2021-11-20 | End: 2022-02-23

## 2021-11-29 ENCOUNTER — TELEPHONE (OUTPATIENT)
Dept: DERMATOLOGY | Facility: CLINIC | Age: 66
End: 2021-11-29

## 2021-12-06 ENCOUNTER — TELEPHONE (OUTPATIENT)
Dept: CARDIOLOGY CLINIC | Facility: CLINIC | Age: 66
End: 2021-12-06

## 2021-12-06 ENCOUNTER — OFFICE VISIT (OUTPATIENT)
Dept: CARDIOLOGY CLINIC | Facility: CLINIC | Age: 66
End: 2021-12-06
Payer: COMMERCIAL

## 2021-12-06 VITALS
HEIGHT: 67 IN | WEIGHT: 237.8 LBS | SYSTOLIC BLOOD PRESSURE: 122 MMHG | BODY MASS INDEX: 37.32 KG/M2 | DIASTOLIC BLOOD PRESSURE: 70 MMHG | HEART RATE: 70 BPM

## 2021-12-06 DIAGNOSIS — I10 ESSENTIAL HYPERTENSION: Primary | ICD-10-CM

## 2021-12-06 DIAGNOSIS — I35.9 AORTIC VALVE DISEASE: ICD-10-CM

## 2021-12-06 PROBLEM — Q23.81 BICUSPID AORTIC VALVE: Status: RESOLVED | Noted: 2018-05-23 | Resolved: 2021-12-06

## 2021-12-06 PROBLEM — Q23.1 BICUSPID AORTIC VALVE: Status: RESOLVED | Noted: 2018-05-23 | Resolved: 2021-12-06

## 2021-12-06 PROCEDURE — 99214 OFFICE O/P EST MOD 30 MIN: CPT | Performed by: INTERNAL MEDICINE

## 2021-12-06 PROCEDURE — 93000 ELECTROCARDIOGRAM COMPLETE: CPT | Performed by: INTERNAL MEDICINE

## 2021-12-06 RX ORDER — LOSARTAN POTASSIUM AND HYDROCHLOROTHIAZIDE 25; 100 MG/1; MG/1
1 TABLET ORAL DAILY
Qty: 90 TABLET | Refills: 3 | Status: SHIPPED | OUTPATIENT
Start: 2022-01-03 | End: 2022-01-19 | Stop reason: SDUPTHER

## 2021-12-06 RX ORDER — LOSARTAN POTASSIUM AND HYDROCHLOROTHIAZIDE 25; 100 MG/1; MG/1
1 TABLET ORAL DAILY
Qty: 30 TABLET | Refills: 0 | Status: SHIPPED | OUTPATIENT
Start: 2021-12-06 | End: 2022-02-16

## 2021-12-14 ENCOUNTER — TELEPHONE (OUTPATIENT)
Dept: DERMATOLOGY | Facility: CLINIC | Age: 66
End: 2021-12-14

## 2021-12-16 ENCOUNTER — APPOINTMENT (OUTPATIENT)
Dept: LAB | Facility: HOSPITAL | Age: 66
End: 2021-12-16
Attending: INTERNAL MEDICINE
Payer: COMMERCIAL

## 2021-12-16 ENCOUNTER — HOSPITAL ENCOUNTER (OUTPATIENT)
Dept: NON INVASIVE DIAGNOSTICS | Facility: HOSPITAL | Age: 66
Discharge: HOME | End: 2021-12-16
Payer: COMMERCIAL

## 2021-12-16 VITALS
BODY MASS INDEX: 37.2 KG/M2 | HEIGHT: 67 IN | DIASTOLIC BLOOD PRESSURE: 70 MMHG | SYSTOLIC BLOOD PRESSURE: 122 MMHG | WEIGHT: 237 LBS | HEART RATE: 67 BPM

## 2021-12-16 DIAGNOSIS — I10 ESSENTIAL HYPERTENSION: ICD-10-CM

## 2021-12-16 DIAGNOSIS — I35.9 AORTIC VALVE DISEASE: ICD-10-CM

## 2021-12-16 LAB
ANION GAP SERPL CALCULATED.3IONS-SCNC: 9 MMOL/L (ref 4–13)
BUN SERPL-MCNC: 19 MG/DL (ref 5–25)
CALCIUM SERPL-MCNC: 9.1 MG/DL (ref 8.3–10.1)
CHLORIDE SERPL-SCNC: 101 MMOL/L (ref 100–108)
CO2 SERPL-SCNC: 30 MMOL/L (ref 21–32)
CREAT SERPL-MCNC: 0.79 MG/DL (ref 0.6–1.3)
GFR SERPL CREATININE-BSD FRML MDRD: 78 ML/MIN/1.73SQ M
GLUCOSE P FAST SERPL-MCNC: 105 MG/DL (ref 65–99)
POTASSIUM SERPL-SCNC: 3.8 MMOL/L (ref 3.5–5.3)
SODIUM SERPL-SCNC: 140 MMOL/L (ref 136–145)

## 2021-12-16 PROCEDURE — 36415 COLL VENOUS BLD VENIPUNCTURE: CPT

## 2021-12-16 PROCEDURE — 80048 BASIC METABOLIC PNL TOTAL CA: CPT

## 2021-12-16 PROCEDURE — 93306 TTE W/DOPPLER COMPLETE: CPT | Performed by: INTERNAL MEDICINE

## 2021-12-16 PROCEDURE — 93306 TTE W/DOPPLER COMPLETE: CPT

## 2021-12-17 LAB
AORTIC ROOT: 3 CM
AORTIC VALVE MEAN VELOCITY: 15.8 M/S
APICAL FOUR CHAMBER EJECTION FRACTION: 75 %
ASCENDING AORTA: 2.8 CM
AV AREA BY CONTINUOUS VTI: 2.1 CM2
AV AREA PEAK VELOCITY: 2 CM2
AV LVOT MEAN GRADIENT: 7 MMHG
AV LVOT PEAK GRADIENT: 10 MMHG
AV MEAN GRADIENT: 11 MMHG
AV PEAK GRADIENT: 21 MMHG
AV VALVE AREA: 2.14 CM2
AVA (PLAN): 1.7 CM2
DOP CALC AO VTI: 44.47 CM
DOP CALC LVOT AREA: 2.83 CM2
DOP CALC LVOT DIAMETER: 1.9 CM
DOP CALC LVOT PEAK VEL VTI: 33.63 CM
DOP CALC LVOT PEAK VEL: 1.62 M/S
DOP CALC LVOT STROKE INDEX: 43.6 ML/M2
DOP CALC LVOT STROKE VOLUME: 95.3 CM3
DOP CALC MV VTI: 36.66 CM
E WAVE DECELERATION TIME: 331 MS
FRACTIONAL SHORTENING: 46 % (ref 28–44)
INTERVENTRICULAR SEPTUM IN DIASTOLE (PARASTERNAL SHORT AXIS VIEW): 1.2 CM
LAAS-AP2: 21.1 CM2
LAAS-AP4: 21.3 CM2
LEFT INTERNAL DIMENSION IN SYSTOLE: 2.6 CM (ref 2.1–4)
LEFT VENTRICULAR INTERNAL DIMENSION IN DIASTOLE: 4.8 CM (ref 7.3–10.88)
LEFT VENTRICULAR POSTERIOR WALL IN END DIASTOLE: 1.1 CM
LEFT VENTRICULAR STROKE VOLUME: 83 ML
MV E'TISSUE VEL-LAT: 6 CM/S
MV E'TISSUE VEL-SEP: 6 CM/S
MV MEAN GRADIENT: 3 MMHG
MV PEAK A VEL: 1.25 M/S
MV PEAK E VEL: 88 CM/S
MV PEAK GRADIENT: 8 MMHG
MV STENOSIS PRESSURE HALF TIME: 0 MS
MV VALVE AREA BY CONTINUITY EQUATION: 2.6 CM2
PA SYSTOLIC PRESSURE: 35 MMHG
RIGHT VENTRICLE ID DIMENSION: 3.3 CM
SL CV LV EF: 65
SL CV PED ECHO LEFT VENTRICLE DIASTOLIC VOLUME (MOD BIPLANE) 2D: 108 ML
SL CV PED ECHO LEFT VENTRICLE SYSTOLIC VOLUME (MOD BIPLANE) 2D: 25 ML
TRICUSPID VALVE PEAK REGURGITATION VELOCITY: 2.81 M/S
TRICUSPID VALVE S': 55 CM/S
TV PEAK GRADIENT: 32 MMHG
Z-SCORE OF LEFT VENTRICULAR DIMENSION IN END SYSTOLE: -6.19

## 2021-12-20 ENCOUNTER — OFFICE VISIT (OUTPATIENT)
Dept: DERMATOLOGY | Facility: CLINIC | Age: 66
End: 2021-12-20
Payer: COMMERCIAL

## 2021-12-20 VITALS — HEIGHT: 67 IN | BODY MASS INDEX: 37.37 KG/M2 | WEIGHT: 238.1 LBS | TEMPERATURE: 98.4 F

## 2021-12-20 DIAGNOSIS — Z87.2 HISTORY OF FOLLICULITIS: ICD-10-CM

## 2021-12-20 DIAGNOSIS — B07.9 VERRUCA VULGARIS: Primary | ICD-10-CM

## 2021-12-20 PROCEDURE — 99213 OFFICE O/P EST LOW 20 MIN: CPT | Performed by: DERMATOLOGY

## 2021-12-20 PROCEDURE — 17110 DESTRUCTION B9 LES UP TO 14: CPT | Performed by: DERMATOLOGY

## 2021-12-21 ENCOUNTER — OFFICE VISIT (OUTPATIENT)
Dept: OBGYN CLINIC | Facility: CLINIC | Age: 66
End: 2021-12-21
Payer: COMMERCIAL

## 2021-12-21 ENCOUNTER — TELEPHONE (OUTPATIENT)
Dept: CARDIOLOGY CLINIC | Facility: CLINIC | Age: 66
End: 2021-12-21

## 2021-12-21 VITALS
WEIGHT: 238 LBS | HEIGHT: 67 IN | DIASTOLIC BLOOD PRESSURE: 80 MMHG | BODY MASS INDEX: 37.35 KG/M2 | SYSTOLIC BLOOD PRESSURE: 130 MMHG

## 2021-12-21 DIAGNOSIS — M18.11 PRIMARY OSTEOARTHRITIS OF FIRST CARPOMETACARPAL JOINT OF RIGHT HAND: Primary | ICD-10-CM

## 2021-12-21 PROCEDURE — 99214 OFFICE O/P EST MOD 30 MIN: CPT | Performed by: ORTHOPAEDIC SURGERY

## 2021-12-21 PROCEDURE — 20600 DRAIN/INJ JOINT/BURSA W/O US: CPT | Performed by: ORTHOPAEDIC SURGERY

## 2021-12-21 RX ORDER — BETAMETHASONE SODIUM PHOSPHATE AND BETAMETHASONE ACETATE 3; 3 MG/ML; MG/ML
3 INJECTION, SUSPENSION INTRA-ARTICULAR; INTRALESIONAL; INTRAMUSCULAR; SOFT TISSUE
Status: COMPLETED | OUTPATIENT
Start: 2021-12-21 | End: 2021-12-21

## 2021-12-21 RX ORDER — LIDOCAINE HYDROCHLORIDE 10 MG/ML
1 INJECTION, SOLUTION INFILTRATION; PERINEURAL
Status: COMPLETED | OUTPATIENT
Start: 2021-12-21 | End: 2021-12-21

## 2021-12-21 RX ADMIN — BETAMETHASONE SODIUM PHOSPHATE AND BETAMETHASONE ACETATE 3 MG: 3; 3 INJECTION, SUSPENSION INTRA-ARTICULAR; INTRALESIONAL; INTRAMUSCULAR; SOFT TISSUE at 08:17

## 2021-12-21 RX ADMIN — LIDOCAINE HYDROCHLORIDE 1 ML: 10 INJECTION, SOLUTION INFILTRATION; PERINEURAL at 08:27

## 2021-12-23 ENCOUNTER — TELEPHONE (OUTPATIENT)
Dept: OBGYN CLINIC | Facility: HOSPITAL | Age: 66
End: 2021-12-23

## 2021-12-23 DIAGNOSIS — M18.11 PRIMARY OSTEOARTHRITIS OF FIRST CARPOMETACARPAL JOINT OF RIGHT HAND: ICD-10-CM

## 2021-12-23 DIAGNOSIS — M79.644 PAIN OF RIGHT THUMB: ICD-10-CM

## 2022-01-10 ENCOUNTER — OFFICE VISIT (OUTPATIENT)
Dept: FAMILY MEDICINE CLINIC | Facility: HOSPITAL | Age: 67
End: 2022-01-10
Payer: COMMERCIAL

## 2022-01-10 VITALS
HEART RATE: 81 BPM | BODY MASS INDEX: 36.73 KG/M2 | SYSTOLIC BLOOD PRESSURE: 125 MMHG | TEMPERATURE: 97.1 F | DIASTOLIC BLOOD PRESSURE: 80 MMHG | OXYGEN SATURATION: 97 % | HEIGHT: 67 IN | WEIGHT: 234 LBS

## 2022-01-10 DIAGNOSIS — I10 ESSENTIAL HYPERTENSION: ICD-10-CM

## 2022-01-10 DIAGNOSIS — K76.0 FATTY INFILTRATION OF LIVER: ICD-10-CM

## 2022-01-10 DIAGNOSIS — E78.2 MIXED HYPERLIPIDEMIA: Primary | ICD-10-CM

## 2022-01-10 DIAGNOSIS — E66.01 CLASS 2 SEVERE OBESITY DUE TO EXCESS CALORIES WITH SERIOUS COMORBIDITY AND BODY MASS INDEX (BMI) OF 36.0 TO 36.9 IN ADULT (HCC): ICD-10-CM

## 2022-01-10 DIAGNOSIS — R73.9 HYPERGLYCEMIA: ICD-10-CM

## 2022-01-10 DIAGNOSIS — E03.9 ACQUIRED HYPOTHYROIDISM: ICD-10-CM

## 2022-01-10 PROCEDURE — 99214 OFFICE O/P EST MOD 30 MIN: CPT | Performed by: FAMILY MEDICINE

## 2022-01-10 RX ORDER — LOSARTAN POTASSIUM 100 MG/1
100 TABLET ORAL DAILY
COMMUNITY
Start: 2021-12-06 | End: 2022-02-17 | Stop reason: SDUPTHER

## 2022-01-10 NOTE — PROGRESS NOTES
Assessment/Plan:         Diagnoses and all orders for this visit:    Mixed hyperlipidemia  -     Lipid Panel with Direct LDL reflex; Future    Fatty infiltration of liver    Acquired hypothyroidism  -     TSH, 3rd generation with Free T4 reflex; Future    Essential hypertension    Hyperglycemia  -     Comprehensive metabolic panel; Future  -     HEMOGLOBIN A1C W/ EAG ESTIMATION; Future    Class 2 severe obesity due to excess calories with serious comorbidity and body mass index (BMI) of 36 0 to 36 9 in adult [facility])    Other orders  -     losartan (COZAAR) 100 MG tablet; Take 100 mg by mouth daily          Subjective:      Patient ID: Artis Del Valle is a 73 y o  female  3 month follow up  No recent illness or injury    Will be having colonoscopy and EGD with Dr Rex Nguyen  Will also do Sanders to evaluate her phlegm as indicator of silent GERD    Had good echo with demonstration of three leaflet aortic valve    Had injection R thumb with Dr Krissy Jennings            The following portions of the patient's history were reviewed and updated as appropriate: allergies, current medications, past family history, past medical history, past social history, past surgical history and problem list     Review of Systems   HENT: Negative  Respiratory: Negative  Cardiovascular: Negative  Gastrointestinal: Negative  Genitourinary: Negative  Musculoskeletal: Positive for arthralgias and joint swelling  Psychiatric/Behavioral: Negative  All other systems reviewed and are negative  Objective:      /80 (BP Location: Right arm, Patient Position: Sitting, Cuff Size: Large)   Pulse 81   Temp (!) 97 1 °F (36 2 °C) (Tympanic)   Ht 5' 7" (1 702 m)   Wt 106 kg (234 lb)   SpO2 97%   BMI 36 65 kg/m²          Physical Exam  Vitals and nursing note reviewed  Neck:      Vascular: No carotid bruit  Cardiovascular:      Rate and Rhythm: Normal rate and regular rhythm  Heart sounds: Murmur heard         Pulmonary: Effort: Pulmonary effort is normal       Breath sounds: Normal breath sounds  Skin:     Findings: No rash  Neurological:      General: No focal deficit present  Mental Status: She is alert and oriented to person, place, and time  Psychiatric:         Mood and Affect: Mood normal          BMI Counseling: Body mass index is 36 65 kg/m²  The BMI is above normal  Nutrition recommendations include reducing portion sizes, decreasing overall calorie intake and moderation in carbohydrate intake  Exercise recommendations include exercising 3-5 times per week

## 2022-01-12 ENCOUNTER — TELEPHONE (OUTPATIENT)
Dept: GASTROENTEROLOGY | Facility: AMBULARY SURGERY CENTER | Age: 67
End: 2022-01-12

## 2022-01-12 NOTE — TELEPHONE ENCOUNTER
Patients GI provider:  Dr Aniyah Bunn    Number to return call: [phone]     Reason for call: Pt calling requesting to speak with someone regarding procedure questions       Scheduled procedure/appointment date if applicable: Apt/procedure 1-19-22

## 2022-01-18 ENCOUNTER — TELEPHONE (OUTPATIENT)
Dept: GASTROENTEROLOGY | Facility: HOSPITAL | Age: 67
End: 2022-01-18

## 2022-01-19 ENCOUNTER — HOSPITAL ENCOUNTER (OUTPATIENT)
Dept: GASTROENTEROLOGY | Facility: HOSPITAL | Age: 67
Setting detail: OUTPATIENT SURGERY
Discharge: HOME | End: 2022-01-19
Attending: INTERNAL MEDICINE
Payer: COMMERCIAL

## 2022-01-19 ENCOUNTER — ANESTHESIA (OUTPATIENT)
Dept: GASTROENTEROLOGY | Facility: HOSPITAL | Age: 67
End: 2022-01-19

## 2022-01-19 ENCOUNTER — ANESTHESIA EVENT (OUTPATIENT)
Dept: GASTROENTEROLOGY | Facility: HOSPITAL | Age: 67
End: 2022-01-19

## 2022-01-19 VITALS
OXYGEN SATURATION: 95 % | DIASTOLIC BLOOD PRESSURE: 85 MMHG | WEIGHT: 234 LBS | TEMPERATURE: 97.3 F | SYSTOLIC BLOOD PRESSURE: 135 MMHG | HEIGHT: 67 IN | HEART RATE: 79 BPM | RESPIRATION RATE: 18 BRPM | BODY MASS INDEX: 36.73 KG/M2

## 2022-01-19 DIAGNOSIS — R05.8 COUGH WITH SPUTUM: ICD-10-CM

## 2022-01-19 DIAGNOSIS — Z86.010 HISTORY OF COLON POLYPS: ICD-10-CM

## 2022-01-19 DIAGNOSIS — K21.00 GASTROESOPHAGEAL REFLUX DISEASE WITH ESOPHAGITIS WITHOUT HEMORRHAGE: ICD-10-CM

## 2022-01-19 DIAGNOSIS — K44.9 HIATAL HERNIA: ICD-10-CM

## 2022-01-19 PROCEDURE — 88305 TISSUE EXAM BY PATHOLOGIST: CPT | Performed by: PATHOLOGY

## 2022-01-19 PROCEDURE — 43239 EGD BIOPSY SINGLE/MULTIPLE: CPT | Performed by: INTERNAL MEDICINE

## 2022-01-19 PROCEDURE — 45385 COLONOSCOPY W/LESION REMOVAL: CPT | Performed by: INTERNAL MEDICINE

## 2022-01-19 RX ORDER — GLYCOPYRROLATE 0.2 MG/ML
INJECTION INTRAMUSCULAR; INTRAVENOUS AS NEEDED
Status: DISCONTINUED | OUTPATIENT
Start: 2022-01-19 | End: 2022-01-19

## 2022-01-19 RX ORDER — LIDOCAINE HYDROCHLORIDE 10 MG/ML
INJECTION, SOLUTION EPIDURAL; INFILTRATION; INTRACAUDAL; PERINEURAL AS NEEDED
Status: DISCONTINUED | OUTPATIENT
Start: 2022-01-19 | End: 2022-01-19

## 2022-01-19 RX ORDER — PROPOFOL 10 MG/ML
INJECTION, EMULSION INTRAVENOUS AS NEEDED
Status: DISCONTINUED | OUTPATIENT
Start: 2022-01-19 | End: 2022-01-19

## 2022-01-19 RX ORDER — FENTANYL CITRATE 50 UG/ML
INJECTION, SOLUTION INTRAMUSCULAR; INTRAVENOUS AS NEEDED
Status: DISCONTINUED | OUTPATIENT
Start: 2022-01-19 | End: 2022-01-19

## 2022-01-19 RX ORDER — SODIUM CHLORIDE 9 MG/ML
INJECTION, SOLUTION INTRAVENOUS CONTINUOUS PRN
Status: DISCONTINUED | OUTPATIENT
Start: 2022-01-19 | End: 2022-01-19

## 2022-01-19 RX ORDER — PROPOFOL 10 MG/ML
INJECTION, EMULSION INTRAVENOUS CONTINUOUS PRN
Status: DISCONTINUED | OUTPATIENT
Start: 2022-01-19 | End: 2022-01-19

## 2022-01-19 RX ORDER — KETAMINE HYDROCHLORIDE 50 MG/ML
INJECTION, SOLUTION, CONCENTRATE INTRAMUSCULAR; INTRAVENOUS AS NEEDED
Status: DISCONTINUED | OUTPATIENT
Start: 2022-01-19 | End: 2022-01-19

## 2022-01-19 RX ORDER — SODIUM CHLORIDE 9 MG/ML
125 INJECTION, SOLUTION INTRAVENOUS CONTINUOUS
Status: DISCONTINUED | OUTPATIENT
Start: 2022-01-19 | End: 2022-01-23 | Stop reason: HOSPADM

## 2022-01-19 RX ADMIN — SODIUM CHLORIDE: 0.9 INJECTION, SOLUTION INTRAVENOUS at 08:04

## 2022-01-19 RX ADMIN — PROPOFOL 30 MG: 10 INJECTION, EMULSION INTRAVENOUS at 08:20

## 2022-01-19 RX ADMIN — PROPOFOL 50 MG: 10 INJECTION, EMULSION INTRAVENOUS at 08:43

## 2022-01-19 RX ADMIN — FENTANYL CITRATE 25 MCG: 50 INJECTION INTRAMUSCULAR; INTRAVENOUS at 08:23

## 2022-01-19 RX ADMIN — PROPOFOL 50 MG: 10 INJECTION, EMULSION INTRAVENOUS at 08:12

## 2022-01-19 RX ADMIN — SODIUM CHLORIDE 125 ML/HR: 0.9 INJECTION, SOLUTION INTRAVENOUS at 07:34

## 2022-01-19 RX ADMIN — KETAMINE HYDROCHLORIDE 30 MG: 50 INJECTION, SOLUTION INTRAMUSCULAR; INTRAVENOUS at 08:10

## 2022-01-19 RX ADMIN — LIDOCAINE HYDROCHLORIDE 100 MG: 10 INJECTION, SOLUTION EPIDURAL; INFILTRATION; INTRACAUDAL; PERINEURAL at 08:10

## 2022-01-19 RX ADMIN — GLYCOPYRROLATE 0.2 MG: 0.2 INJECTION, SOLUTION INTRAMUSCULAR; INTRAVENOUS at 08:10

## 2022-01-19 RX ADMIN — PROPOFOL 30 MG: 10 INJECTION, EMULSION INTRAVENOUS at 08:17

## 2022-01-19 RX ADMIN — PROPOFOL 50 MG: 10 INJECTION, EMULSION INTRAVENOUS at 08:53

## 2022-01-19 RX ADMIN — PROPOFOL 100 MCG/KG/MIN: 10 INJECTION, EMULSION INTRAVENOUS at 08:12

## 2022-01-19 RX ADMIN — PROPOFOL 150 MG: 10 INJECTION, EMULSION INTRAVENOUS at 08:10

## 2022-01-19 NOTE — H&P
History and Physical - [facility]  Collin Bullock 73 y o  female MRN: [id number]                  HPI: Collin Bullock is a 73y o  year old female who presents for EGD/Colonoscopy for gerd, hiatal hernia and history of colon polyps  REVIEW OF SYSTEMS: Per the HPI, and otherwise unremarkable  Historical Information   Past Medical History:   Diagnosis Date    Abdominal pain     Asthma     Basal cell carcinoma     Bicuspid aortic valve     LAST ASSESSED [id number]    Cervical disc disease     last assessed [id number]    Cervical stenosis of spine     LAST ASSESSED [id number]    Disease of thyroid gland     hypothyroid    Dry eyes     Endometriosis     Hot flashes     Hyperglycemia     Hyperlipidemia     Hypertension     Hypothyroidism     Left ventricular hypertrophy     Mitral valvular disorder     Nephrolithiasis     Ovarian cyst, complex     Ptosis     LAST ASSESSED [id number]    Renal calculi     Right cervical radiculopathy     LAST ASSESSED [id number]    Seasonal allergies     Squamous cell skin cancer     Thyroid disease      Past Surgical History:   Procedure Laterality Date    BASAL CELL CARCINOMA EXCISION      CARPAL TUNNEL RELEASE       SECTION      CHOLECYSTECTOMY      COLONOSCOPY      NEUROPLASTY / TRANSPOSITION MEDIAN NERVE AT CARPAL TUNNEL      NEUROPLASTY / TRANSPOSITION MEDIAN NERVE AT CARPAL TUNNEL  [facility]    OTHER SURGICAL HISTORY      surgically removed skin patch for skin cancer    PLANTAR FASCIECTOMY      [place] COLONOSCOPY FLX DX W/COLLJ SPEC WHEN PFRMD N/A 10/9/2017    Procedure: COLONOSCOPY;  Surgeon: Shaniqua Devries MD;  Location: [facility] GI LAB;   Service: Gastroenterology    [place] HYSTEROSCOPY,W/ENDO BX N/A 2018    Procedure: DILATATION AND CURETTAGE (D&C) WITH HYSTEROSCOPY;  Surgeon: Lam Reno DO;  Location: [facility] OR;  Service: Gynecology    SKIN BIOPSY      TUBAL LIGATION       Social History   Social History Substance and Sexual Activity   Alcohol Use Yes    Alcohol/week: 8 0 standard drinks    Types: 4 Glasses of wine, 4 Standard drinks or equivalent per week    Comment: occasional     Social History     Substance and Sexual Activity   Drug Use No     Social History     Tobacco Use   Smoking Status Former Smoker    Packs/day: 1 00    Years: 20 00    Pack years: 20 00    Types: Cigarettes    Quit date: 2012    Years since quitting: 10 0   Smokeless Tobacco Never Used     Family History   Problem Relation Age of Onset    Diabetes Mother     Alzheimer's disease Mother     Heart disease Mother     Hypertension Mother     Other Family         back disorder    Cancer Family     Heart disease Family     Thyroid disease Family     No Known Problems Father     No Known Problems Sister     No Known Problems Daughter     No Known Problems Maternal Grandmother     No Known Problems Maternal Grandfather     No Known Problems Paternal Grandmother     No Known Problems Paternal Grandfather     No Known Problems Sister     No Known Problems Maternal Aunt     No Known Problems Maternal Aunt     No Known Problems Maternal Aunt     No Known Problems Maternal Aunt     No Known Problems Maternal Aunt        Meds/Allergies       Current Outpatient Medications:     albuterol (Ventolin HFA) 90 mcg/act inhaler    amLODIPine (NORVASC) 5 mg tablet    fexofenadine (ALLEGRA) 180 MG tablet    fluticasone (ARNUITY ELLIPTA) 100 MCG/ACT AEPB inhaler    fluticasone (FLONASE) 50 mcg/act nasal spray    levothyroxine 125 mcg tablet    losartan (COZAAR) 100 MG tablet    losartan-hydrochlorothiazide (HYZAAR) 100-25 MG per tablet    metFORMIN (GLUCOPHAGE) 500 mg tablet    Nutritional Supplements (ESTROVEN PO)    pantoprazole (PROTONIX) 40 mg tablet    XIIDRA 5 % op solution    Diclofenac Sodium (VOLTAREN) 1 %    ketoconazole (NIZORAL) 2 % cream    Current Facility-Administered Medications:     sodium chloride 0 9 % infusion, 125 mL/hr, Intravenous, Continuous, 125 mL/hr at 01/19/22 6182    Allergies   Allergen Reactions    Erythromycin GI Intolerance     Reaction Date: 11Jul2011;     Penicillins Other (See Comments)     unknown    Aztreonam Rash     Action Taken: Allergy added by [employer] to replace Penicillins [employer];     Carbapenems Rash     Action Taken: Allergy added by [employer] to replace Penicillins [employer];     Cephalosporins Rash     Action Taken: Allergy added by [employer] to replace Penicillins [employer];     Griseofulvin Rash     Action Taken: Allergy added by [employer] to replace Penicillins [employer];     Influenza Virus Vaccine Hives, Rash and GI Intolerance       Objective     /69   Pulse 69   Temp 98 8 °F (37 1 °C) (Tympanic)   Resp 18   Ht 5' 7" (1 702 m)   Wt 106 kg (234 lb)   SpO2 95%   BMI 36 65 kg/m²       PHYSICAL EXAM    Gen: NAD  Head: NCAT  CV: RRR  CHEST: Clear  ABD: soft, NT/ND  EXT: no edema      ASSESSMENT/PLAN:  Shanna Pardaa is a 73y o  year old female who presents for EGD/Colonoscopy for gerd, hiatal hernia and history of colon polyps  The patient is stable and optimized for the procedure, we reviewed risk and benefits  Risk include but not limited to infection, bleeding, perforation and missing a lesion

## 2022-01-19 NOTE — ANESTHESIA POSTPROCEDURE EVALUATION
Post-Op Assessment Note    CV Status:  Stable  Pain Score: 0    Pain management: adequate     Mental Status:  Arousable and sleepy   Hydration Status:  Euvolemic   PONV Controlled:  Controlled   Airway Patency:  Patent      Post Op Vitals Reviewed: Yes      Staff: CRNA         No complications documented      /92 (01/19/22 0903)    Temp (!) 97 3 °F (36 3 °C) (01/19/22 0903)    Pulse 66 (01/19/22 0903)   Resp 18 (01/19/22 0903)    SpO2 97 % (01/19/22 0903)

## 2022-01-19 NOTE — ANESTHESIA PREPROCEDURE EVALUATION
Procedure:  EGD  BRAVO PH MONITORING    Relevant Problems   CARDIO   (+) Essential hypertension   (+) Mixed hyperlipidemia      ENDO   (+) Acquired hypothyroidism      GI/HEPATIC   (+) Fatty infiltration of liver   (+) Gastroesophageal reflux disease with esophagitis without hemorrhage   (+) Hiatal hernia      /RENAL   (+) Nephrolithiasis      MUSCULOSKELETAL   (+) Lumbar spondylosis   (+) Lumbosacral pain   (+) Primary osteoarthritis of first carpometacarpal joint of right hand   (+) Spondylosis of cervical region without myelopathy or radiculopathy      PULMONARY   (+) Mild intermittent asthma without complication        Physical Exam    Airway    Mallampati score: III  TM Distance: >3 FB  Neck ROM: full     Dental   No notable dental hx     Cardiovascular  Cardiovascular exam normal    Pulmonary  Pulmonary exam normal     Other Findings      States GERD is more "phlegm" problem  Moderate asthma with daily morning inhaler use  Anesthesia Plan  ASA Score- 2     Anesthesia Type- IV sedation with anesthesia with ASA Monitors  Additional Monitors:   Airway Plan:           Plan Factors-Exercise tolerance (METS): >4 METS  Chart reviewed  Existing labs reviewed  Patient summary reviewed  Patient is not a current smoker  Induction- intravenous  Postoperative Plan-     Informed Consent- Anesthetic plan and risks discussed with patient

## 2022-01-20 ENCOUNTER — TELEPHONE (OUTPATIENT)
Dept: GASTROENTEROLOGY | Facility: CLINIC | Age: 67
End: 2022-01-20

## 2022-01-20 NOTE — TELEPHONE ENCOUNTER
Patients GI provider:  Dr Rex Nguyen    Number to return call: [phone]    Reason for call: Pt calling stating she had [address] Monitoring yesterday and she had a question regarding procedure  Pt did not specify, just that she wanted to speak with someone regarding this  Pt can be reached at above number      Scheduled procedure/appointment date if applicable: N/A

## 2022-01-20 NOTE — TELEPHONE ENCOUNTER
Patient had Bravo ph monitor placed yesterday  She called to ask when she should be pressing buttons on monitor  Instructed patient to press meal botton at the start of the meal and at the end of meal   Press supine button when lying down and again at the end of lying down  Press symptom buttons when symptoms occur- triangle for chest pain, [place] for regurgitation and square for heartburn  Patient verbalized understanding

## 2022-01-26 PROCEDURE — 91035 G-ESOPH REFLX TST W/ELECTROD: CPT | Performed by: INTERNAL MEDICINE

## 2022-01-27 ENCOUNTER — TELEPHONE (OUTPATIENT)
Dept: OTHER | Facility: OTHER | Age: 67
End: 2022-01-27

## 2022-01-27 NOTE — TELEPHONE ENCOUNTER
I called patient , we went over results per Dr Josefina Thompson    Patient has further follow up questions on results and medication management and would benefit from f/u in office     Please make appt   Thank you

## 2022-02-04 ENCOUNTER — OFFICE VISIT (OUTPATIENT)
Dept: GASTROENTEROLOGY | Facility: CLINIC | Age: 67
End: 2022-02-04
Payer: COMMERCIAL

## 2022-02-04 VITALS
SYSTOLIC BLOOD PRESSURE: 138 MMHG | HEIGHT: 67 IN | TEMPERATURE: 97.8 F | WEIGHT: 234 LBS | BODY MASS INDEX: 36.73 KG/M2 | HEART RATE: 70 BPM | OXYGEN SATURATION: 99 % | DIASTOLIC BLOOD PRESSURE: 78 MMHG

## 2022-02-04 DIAGNOSIS — K21.9 GASTROESOPHAGEAL REFLUX DISEASE WITHOUT ESOPHAGITIS: Primary | ICD-10-CM

## 2022-02-04 DIAGNOSIS — R05.8 SPASMODIC COUGH: ICD-10-CM

## 2022-02-04 PROCEDURE — 99214 OFFICE O/P EST MOD 30 MIN: CPT | Performed by: INTERNAL MEDICINE

## 2022-02-04 RX ORDER — PANTOPRAZOLE SODIUM 40 MG/1
40 TABLET, DELAYED RELEASE ORAL 2 TIMES DAILY
Qty: 180 TABLET | Refills: 6 | Status: SHIPPED | OUTPATIENT
Start: 2022-02-04 | End: 2022-05-05

## 2022-02-04 RX ORDER — FAMOTIDINE 40 MG/1
40 TABLET, FILM COATED ORAL DAILY
Qty: 90 TABLET | Refills: 6 | Status: SHIPPED | OUTPATIENT
Start: 2022-02-04 | End: 2022-05-05

## 2022-02-07 PROCEDURE — 88305 TISSUE EXAM BY PATHOLOGIST: CPT | Performed by: PATHOLOGY

## 2022-02-07 NOTE — PROGRESS NOTES
[facility]  Progress Note - Diane Houser 73 y o  female MRN: [id number]    Unit/Bed#:  Encounter: [id number]    Assessment/Plan:  1  Spasmodic cough  - pantoprazole (PROTONIX) 40 mg tablet; Take 1 tablet (40 mg total) by mouth 2 (two) times a day  Dispense: 180 tablet; Refill: 6    2  Gastroesophageal reflux disease without esophagitis  3  History of colon polyp  - famotidine (PEPCID) 40 MG tablet; Take 1 tablet (40 mg total) by mouth daily  Dispense: 90 tablet; Refill: 6  - Vitamin D 25 hydroxy; Future  - Iron Panel (Includes Ferritin, Iron Sat%, Iron, and TIBC); Future  - Zinc; Future  - Magnesium; Future    Up titrate to pantoprazole 40 mg b i d  currently on pantoprazole 40 mg daily  We can even consider changing to lansoprazole are Dexilant in the future  We can closely monitor labs such as vitamin-D, iron, zinc and magnesium  Continue taking Pepcid at night for optimal therapy  Extensive reviewed Bravo study which showed adequate control of reflux well on PPI therapy but diet controlled was immediately loss when off PPI for over 24-48 hours  Can consider esophageal manometry in the future and esophagram     Will plan to optimize PPI therapy to see if symptoms alleviate  I suspect chronic cough symptoms are multifactorial from she continues following up with ENT and pulmonology  -colonoscopy in 3 years due to history of colon polyp  Subjective:     She presents here for follow-up for management of acid reflux disease  She underwent 96 hour of Bravo study with her being on PPI on day 1  She had increased acid exposure time on the last day compared to previous days  Acid exposure time did increase over the duration of the study on each subsequent  Currently she is on PPI once daily and H2 blocker in the evening  Symptoms have improved of call of being on PPI therapy for prolonged period of time  She would like to hold off on surgical procedures at this time    Objective:     Vitals: Blood pressure 138/78, pulse 70, temperature 97 8 °F (36 6 °C), temperature source Tympanic, height 5' 7" (1 702 m), weight 106 kg (234 lb), SpO2 99 %  ,Body mass index is 36 65 kg/m²  [unfilled]    Physical Exam:    GEN: wn/wd, NAD  HEENT: MMM, no cervical or supraclavicular LAD, anciteric  CV: RRR, no m/r/g  CHEST: CTA b/l, no w/r/r  ABD: +BS, soft, NT/ND, no hepatosplenomegaly  EXT: no c/c/e  SKIN: no rashes  NEURO: aaox3      Invasive Devices  Report    None                         Lab, Imaging and other studies:     No visits with results within 1 Day(s) from this visit  Latest known visit with results is:   Hospital Outpatient Visit on 01/19/2022   Component Date Value    Case Report 01/19/2022                      Value:Surgical Pathology Report                         Case: [id number]                                   Authorizing Provider:  Falguni Mar MD           Collected:           01/19/2022 3113              Ordering Location:     [address] [place] [address]      Received:            01/19/2022 [address] [facility]                                                           Pathologist:           Nicolás Dutta MD                                                                 Specimens:   A) - Esophagus, r/o EOE                                                                             B) - Polyp, Colorectal, ascending colon polyp - cold snare                                          C) - Polyp, Colorectal, sigmoid colon polyp x2 - cold snare                                Final Diagnosis 01/19/2022                      Value: This result contains rich text formatting which cannot be displayed here   Note 01/19/2022                      Value: This result contains rich text formatting which cannot be displayed here   Additional Information 01/19/2022                      Value: This result contains rich text formatting which cannot be displayed here      Synoptic Checklist 01/19/2022                      Value:                            COLON/RECTUM POLYP FORM - GI - All Specimens                                                                                     :    Adenoma(s)      Gross Description 01/19/2022                      Value: This result contains rich text formatting which cannot be displayed here  I have personally reviewed pertinent reports  No current facility-administered medications for this visit               Answers for HPI/ROS submitted by the patient on 2/3/2022  Chronicity: recurrent  Onset: more than 1 year ago  Onset quality: sudden  Frequency: intermittently  Episode duration: 5 hours  Progression since onset: gradually worsening  Pain location: RUQ  Pain - numeric: 8/10  Pain quality: a sensation of fullness, sharp  Radiates to: does not radiate  anorexia: No  arthralgias: No  belching: No  constipation: No  diarrhea: No  dysuria: No  fever: No  flatus: No  frequency: No  headaches: No  hematochezia: No  hematuria: No  melena: No  myalgias: No  nausea: Yes  weight loss: No  vomiting: No  Aggravated by: eating  Relieved by: certain positions  Diagnostic workup: lower endoscopy, upper endoscopy

## 2022-02-08 ENCOUNTER — LAB REQUISITION (OUTPATIENT)
Dept: LAB | Facility: HOSPITAL | Age: 67
End: 2022-02-08
Payer: COMMERCIAL

## 2022-02-08 DIAGNOSIS — D48.5 NEOPLASM OF UNCERTAIN BEHAVIOR OF SKIN: ICD-10-CM

## 2022-02-16 DIAGNOSIS — I10 ESSENTIAL HYPERTENSION: ICD-10-CM

## 2022-02-17 DIAGNOSIS — I10 ESSENTIAL HYPERTENSION: Primary | ICD-10-CM

## 2022-02-17 RX ORDER — LOSARTAN POTASSIUM 100 MG/1
100 TABLET ORAL DAILY
Qty: 90 TABLET | Refills: 3 | Status: SHIPPED | OUTPATIENT
Start: 2022-02-17 | End: 2022-02-22 | Stop reason: ALTCHOICE

## 2022-02-17 RX ORDER — AMLODIPINE BESYLATE 5 MG/1
5 TABLET ORAL DAILY
Qty: 90 TABLET | Refills: 3 | Status: SHIPPED | OUTPATIENT
Start: 2022-02-17 | End: 2022-05-03 | Stop reason: SDUPTHER

## 2022-02-22 DIAGNOSIS — I10 ESSENTIAL HYPERTENSION: Primary | ICD-10-CM

## 2022-02-22 DIAGNOSIS — I35.9 AORTIC VALVE DISEASE: ICD-10-CM

## 2022-02-22 RX ORDER — LOSARTAN POTASSIUM AND HYDROCHLOROTHIAZIDE 25; 100 MG/1; MG/1
1 TABLET ORAL DAILY
Qty: 90 TABLET | Refills: 3 | Status: SHIPPED | OUTPATIENT
Start: 2022-02-22

## 2022-02-22 NOTE — TELEPHONE ENCOUNTER
Pt requesting Hyzaar Rx 100-25mg daily to [address]    Previous Rx canceled at procedure on 1/19/22

## 2022-02-23 DIAGNOSIS — E03.9 ACQUIRED HYPOTHYROIDISM: ICD-10-CM

## 2022-02-23 RX ORDER — LEVOTHYROXINE SODIUM 0.12 MG/1
TABLET ORAL
Qty: 90 TABLET | Refills: 0 | Status: SHIPPED | OUTPATIENT
Start: 2022-02-23 | End: 2022-05-18

## 2022-03-21 ENCOUNTER — OFFICE VISIT (OUTPATIENT)
Dept: PULMONOLOGY | Facility: CLINIC | Age: 67
End: 2022-03-21
Payer: COMMERCIAL

## 2022-03-21 VITALS
TEMPERATURE: 97.6 F | WEIGHT: 235.8 LBS | OXYGEN SATURATION: 98 % | RESPIRATION RATE: 16 BRPM | DIASTOLIC BLOOD PRESSURE: 77 MMHG | SYSTOLIC BLOOD PRESSURE: 140 MMHG | HEIGHT: 67 IN | BODY MASS INDEX: 37.01 KG/M2 | HEART RATE: 77 BPM

## 2022-03-21 DIAGNOSIS — R73.9 HYPERGLYCEMIA: ICD-10-CM

## 2022-03-21 DIAGNOSIS — J45.20 MILD INTERMITTENT ASTHMA WITHOUT COMPLICATION: ICD-10-CM

## 2022-03-21 PROCEDURE — 99214 OFFICE O/P EST MOD 30 MIN: CPT | Performed by: INTERNAL MEDICINE

## 2022-03-21 NOTE — PROGRESS NOTES
Pulmonary Follow Up Note   Arnav Phelan 73 y o  female MRN: [id number]  3/21/2022    Assessment:    Mild intermittent asthma without complication  Her rescue inhaler use is on the order of every two months, and she prefers to stay on [address]  Will continue this medication  She reports she is not currently taking Flonase, will observe has she does on Allegra alone with the upcoming pollen season  Plan:    Diagnoses and all orders for this visit:    Mild intermittent asthma without complication  -     fluticasone (ARNUITY ELLIPTA) 100 MCG/ACT AEPB inhaler; Inhale 1 puff daily Rinse mouth after use  Return in about 1 year (around 3/21/2023)  History of Present Illness   HPI:  Arnav Phelan is a 73 y o  female who presents for routine follow-up  She has had no acute issues since her last appointment  She continues with Arnuity daily and reports that she is needing her rescue inhaler only once every two months or so  Her upcoming exposure to the spring allergen  Makes her hesitant to deescalate medications at all, but she does note that she is not taking Flonase currently, although she is compliant with Allegra  No issues with shortness of breath  No other acute complaints      Answers for HPI/ROS submitted by the patient on 3/21/2022  Do you have a wet cough?: Yes  Chronicity: recurrent  When did you first notice your symptoms?: more than 1 year ago  How often do your symptoms occur?: intermittently  Since you first noticed this problem, how has it changed?: rapidly improving  Do you have shortness of breath that occurs with effort or exertion?: No  Do you have ear congestion?: No  Do you have heartburn?: No  Do you have fatigue?: No  Do you have nasal congestion?: No  Do you have shortness of breath when lying flat?: No  Do you have shortness of breath when you wake up?: No  Do you have sweats?: No  Have you experienced weight loss?: No  Which of the following makes your symptoms worse?: change in weather, emotional stress, pollen  Which of the following makes your symptoms better?: cold air, OTC cough suppressant, steroid inhaler    Review of Systems   Constitutional: Negative for appetite change and fever  HENT: Negative for ear pain, postnasal drip, rhinorrhea, sneezing, sore throat and trouble swallowing  Cardiovascular: Negative for chest pain  Musculoskeletal: Negative for myalgias  Allergic/Immunologic: Positive for environmental allergies  Neurological: Negative for headaches  All other systems reviewed and are negative  Historical Information   Past Medical History:   Diagnosis Date    Abdominal pain     Asthma     Basal cell carcinoma     Bicuspid aortic valve     LAST ASSESSED [id number]    Cervical disc disease     last assessed [id number]    Cervical stenosis of spine     LAST ASSESSED [id number]    Disease of thyroid gland     hypothyroid    Dry eyes     Endometriosis     Hot flashes     Hyperglycemia     Hyperlipidemia     Hypertension     Hypothyroidism     Left ventricular hypertrophy     Mitral valvular disorder     Nephrolithiasis     Ovarian cyst, complex     Ptosis     LAST ASSESSED [id number]    Renal calculi     Right cervical radiculopathy     LAST ASSESSED [id number]    Seasonal allergies     Squamous cell skin cancer     Thyroid disease      Past Surgical History:   Procedure Laterality Date    BASAL CELL CARCINOMA EXCISION      CARPAL TUNNEL RELEASE       SECTION      CHOLECYSTECTOMY      COLONOSCOPY      NEUROPLASTY / TRANSPOSITION MEDIAN NERVE AT CARPAL TUNNEL      NEUROPLASTY / TRANSPOSITION MEDIAN NERVE AT CARPAL TUNNEL      [facility]    OTHER SURGICAL HISTORY      surgically removed skin patch for skin cancer    PLANTAR FASCIECTOMY      NV COLONOSCOPY FLX DX W/COLLJ SPEC WHEN PFRMD N/A 10/9/2017    Procedure: COLONOSCOPY;  Surgeon: Shaniqua Devries MD;  Location[facility];   Service: Gastroenterology    NV HYSTEROSCOPY,W/ENDO BX N/A 2/26/2018    Procedure: DILATATION AND CURETTAGE (D&C) WITH HYSTEROSCOPY;  Surgeon: Christie Quevedo DO;  Location:  MAIN OR;  Service: Gynecology    SKIN BIOPSY      TUBAL LIGATION      UPPER GASTROINTESTINAL ENDOSCOPY       Family History   Problem Relation Age of Onset    Diabetes Mother     Alzheimer's disease Mother     Heart disease Mother     Hypertension Mother     Other Family         back disorder    Cancer Family     Heart disease Family     Thyroid disease Family     No Known Problems Father     No Known Problems Sister     No Known Problems Daughter     No Known Problems Maternal Grandmother     No Known Problems Maternal Grandfather     No Known Problems Paternal Grandmother     No Known Problems Paternal Grandfather     No Known Problems Sister     No Known Problems Maternal Aunt     No Known Problems Maternal Aunt     No Known Problems Maternal Aunt     No Known Problems Maternal Aunt     No Known Problems Maternal Aunt        Meds/Allergies     Current Outpatient Medications:     albuterol (Ventolin HFA) 90 mcg/act inhaler, Inhale 2 puffs every 6 (six) hours as needed for wheezing, Disp: 3 Inhaler, Rfl: 3    amLODIPine (NORVASC) 5 mg tablet, Take 1 tablet (5 mg total) by mouth daily, Disp: 90 tablet, Rfl: 3    famotidine (PEPCID) 40 MG tablet, Take 1 tablet (40 mg total) by mouth daily, Disp: 90 tablet, Rfl: 6    fexofenadine (ALLEGRA) 180 MG tablet, Take 1 tablet (180 mg total) by mouth daily, Disp: 30 tablet, Rfl: 5    fluticasone (ARNUITY ELLIPTA) 100 MCG/ACT AEPB inhaler, Inhale 1 puff daily Rinse mouth after use , Disp: 90 blister, Rfl: 3    levothyroxine 125 mcg tablet, TAKE ONE TABLET BY MOUTH EVERY DAY , Disp: 90 tablet, Rfl: 0    losartan-hydrochlorothiazide (HYZAAR) 100-25 MG per tablet, Take 1 tablet by mouth daily, Disp: 90 tablet, Rfl: 3    metFORMIN (GLUCOPHAGE) 500 mg tablet, Take 1 tablet (500 mg total) by mouth daily with breakfast, Disp: 90 tablet, Rfl: 3    pantoprazole (PROTONIX) 40 mg tablet, Take 1 tablet (40 mg total) by mouth 2 (two) times a day, Disp: 180 tablet, Rfl: 6    XIIDRA 5 % op solution, , Disp: , Rfl:     ketoconazole (NIZORAL) 2 % cream, Apply topically daily for 7 days, Disp: 30 g, Rfl: 0  Allergies   Allergen Reactions    Erythromycin GI Intolerance     Reaction Date: 11Jul2011;     Penicillins Other (See Comments)     unknown    Aztreonam Rash     Action Taken: Allergy added by [employer] to replace Penicillins [employer];     Carbapenems Rash     Action Taken: Allergy added by [employer] to replace Penicillins [employer];     Cephalosporins Rash     Action Taken: Allergy added by [employer] to replace Penicillins [employer];     Griseofulvin Rash     Action Taken: Allergy added by [employer] to replace Penicillins [employer];     Influenza Virus Vaccine Hives, Rash and GI Intolerance       Vitals: Blood pressure 140/77, pulse 77, temperature 97 6 °F (36 4 °C), temperature source Tympanic, resp  rate 16, height 5' 7" (1 702 m), weight 107 kg (235 lb 12 8 oz), SpO2 98 %  Body mass index is 36 93 kg/m²  Oxygen Therapy  SpO2: 98 %  Oxygen Therapy: None (Room air)    Physical Exam  Physical Exam  Vitals reviewed  Constitutional:       General: She is not in acute distress  Appearance: Normal appearance  She is well-developed  She is not ill-appearing  HENT:      Head: Normocephalic and atraumatic  Eyes:      General: No scleral icterus  Conjunctiva/sclera: Conjunctivae normal    Neck:      Vascular: No JVD  Cardiovascular:      Rate and Rhythm: Normal rate and regular rhythm  Heart sounds: Normal heart sounds  No murmur heard  No friction rub  No gallop  Pulmonary:      Effort: Pulmonary effort is normal  No respiratory distress  Breath sounds: Normal breath sounds  No wheezing or rales  Musculoskeletal:      Cervical back: Neck supple        Right lower leg: No edema  Left lower leg: No edema  Skin:     General: Skin is warm and dry  Findings: No rash  Neurological:      General: No focal deficit present  Mental Status: She is alert and oriented to person, place, and time  Mental status is at baseline  Psychiatric:         Mood and Affect: Mood normal          Behavior: Behavior normal          Labs: I have personally reviewed pertinent lab results  Lab Results   Component Value Date    WBC 9 66 10/02/2021    HGB 15 0 10/02/2021    HCT 46 0 10/02/2021    MCV 93 10/02/2021     10/02/2021     Lab Results   Component Value Date    GLUCOSE 104 08/14/2015    CALCIUM 9 1 12/16/2021     08/14/2015    K 3 8 12/16/2021    CO2 30 12/16/2021     12/16/2021    BUN 19 12/16/2021    CREATININE 0 79 12/16/2021     No results found for: IGE  Lab Results   Component Value Date    ALT 26 10/02/2021    AST 19 10/02/2021    ALKPHOS 86 10/02/2021    BILITOT 0 42 08/14/2015     Imaging and other studies: I have personally reviewed pertinent films in PACS    EKG, Pathology, and Other Studies: I have personally reviewed pertinent reports  [facility]

## 2022-03-21 NOTE — ASSESSMENT & PLAN NOTE
Her rescue inhaler use is on the order of every two months, and she prefers to stay on [address]  Will continue this medication  She reports she is not currently taking Flonase, will observe has she does on Allegra alone with the upcoming pollen season

## 2022-03-31 ENCOUNTER — APPOINTMENT (OUTPATIENT)
Dept: RADIOLOGY | Facility: CLINIC | Age: 67
End: 2022-03-31
Payer: COMMERCIAL

## 2022-03-31 ENCOUNTER — OFFICE VISIT (OUTPATIENT)
Dept: PAIN MEDICINE | Facility: CLINIC | Age: 67
End: 2022-03-31
Payer: COMMERCIAL

## 2022-03-31 ENCOUNTER — APPOINTMENT (OUTPATIENT)
Dept: LAB | Facility: CLINIC | Age: 67
End: 2022-03-31
Payer: COMMERCIAL

## 2022-03-31 VITALS
HEART RATE: 79 BPM | TEMPERATURE: 97.9 F | DIASTOLIC BLOOD PRESSURE: 88 MMHG | BODY MASS INDEX: 36.88 KG/M2 | SYSTOLIC BLOOD PRESSURE: 128 MMHG | HEIGHT: 67 IN | WEIGHT: 235 LBS

## 2022-03-31 DIAGNOSIS — E03.9 ACQUIRED HYPOTHYROIDISM: ICD-10-CM

## 2022-03-31 DIAGNOSIS — R42 DIZZINESS AFTER EXTENSION OF NECK: ICD-10-CM

## 2022-03-31 DIAGNOSIS — E78.2 MIXED HYPERLIPIDEMIA: ICD-10-CM

## 2022-03-31 DIAGNOSIS — K21.9 GASTROESOPHAGEAL REFLUX DISEASE WITHOUT ESOPHAGITIS: ICD-10-CM

## 2022-03-31 DIAGNOSIS — M47.816 LUMBAR SPONDYLOSIS: Primary | ICD-10-CM

## 2022-03-31 DIAGNOSIS — M54.12 CERVICAL MYELOPATHY WITH CERVICAL RADICULOPATHY (HCC): ICD-10-CM

## 2022-03-31 DIAGNOSIS — G95.9 CERVICAL MYELOPATHY WITH CERVICAL RADICULOPATHY (HCC): ICD-10-CM

## 2022-03-31 DIAGNOSIS — R42 DIZZINESS: ICD-10-CM

## 2022-03-31 DIAGNOSIS — R73.9 HYPERGLYCEMIA: ICD-10-CM

## 2022-03-31 LAB
25(OH)D3 SERPL-MCNC: 13.2 NG/ML (ref 30–100)
ALBUMIN SERPL BCP-MCNC: 4.2 G/DL (ref 3.5–5)
ALP SERPL-CCNC: 77 U/L (ref 46–116)
ALT SERPL W P-5'-P-CCNC: 37 U/L (ref 12–78)
ANION GAP SERPL CALCULATED.3IONS-SCNC: 6 MMOL/L (ref 4–13)
AST SERPL W P-5'-P-CCNC: 26 U/L (ref 5–45)
BILIRUB SERPL-MCNC: 0.81 MG/DL (ref 0.2–1)
BUN SERPL-MCNC: 19 MG/DL (ref 5–25)
CALCIUM SERPL-MCNC: 9.5 MG/DL (ref 8.3–10.1)
CHLORIDE SERPL-SCNC: 103 MMOL/L (ref 100–108)
CHOLEST SERPL-MCNC: 155 MG/DL
CO2 SERPL-SCNC: 29 MMOL/L (ref 21–32)
CREAT SERPL-MCNC: 0.77 MG/DL (ref 0.6–1.3)
EST. AVERAGE GLUCOSE BLD GHB EST-MCNC: 131 MG/DL
FERRITIN SERPL-MCNC: 68 NG/ML (ref 8–388)
GFR SERPL CREATININE-BSD FRML MDRD: 81 ML/MIN/1.73SQ M
GLUCOSE P FAST SERPL-MCNC: 113 MG/DL (ref 65–99)
HBA1C MFR BLD: 6.2 %
HDLC SERPL-MCNC: 35 MG/DL
IRON SATN MFR SERPL: 19 % (ref 15–50)
IRON SERPL-MCNC: 72 UG/DL (ref 50–170)
LDLC SERPL CALC-MCNC: 91 MG/DL (ref 0–100)
MAGNESIUM SERPL-MCNC: 2.2 MG/DL (ref 1.6–2.6)
POTASSIUM SERPL-SCNC: 3.6 MMOL/L (ref 3.5–5.3)
PROT SERPL-MCNC: 7.6 G/DL (ref 6.4–8.2)
SODIUM SERPL-SCNC: 138 MMOL/L (ref 136–145)
TIBC SERPL-MCNC: 379 UG/DL (ref 250–450)
TRIGL SERPL-MCNC: 147 MG/DL
TSH SERPL DL<=0.05 MIU/L-ACNC: 1.86 UIU/ML (ref 0.36–3.74)

## 2022-03-31 PROCEDURE — 82728 ASSAY OF FERRITIN: CPT

## 2022-03-31 PROCEDURE — 83036 HEMOGLOBIN GLYCOSYLATED A1C: CPT

## 2022-03-31 PROCEDURE — 80053 COMPREHEN METABOLIC PANEL: CPT

## 2022-03-31 PROCEDURE — 36415 COLL VENOUS BLD VENIPUNCTURE: CPT

## 2022-03-31 PROCEDURE — 83735 ASSAY OF MAGNESIUM: CPT

## 2022-03-31 PROCEDURE — 72052 X-RAY EXAM NECK SPINE 6/>VWS: CPT

## 2022-03-31 PROCEDURE — 80061 LIPID PANEL: CPT

## 2022-03-31 PROCEDURE — 84443 ASSAY THYROID STIM HORMONE: CPT

## 2022-03-31 PROCEDURE — 82306 VITAMIN D 25 HYDROXY: CPT

## 2022-03-31 PROCEDURE — 99215 OFFICE O/P EST HI 40 MIN: CPT | Performed by: ANESTHESIOLOGY

## 2022-03-31 PROCEDURE — 84630 ASSAY OF ZINC: CPT

## 2022-03-31 PROCEDURE — 83540 ASSAY OF IRON: CPT

## 2022-03-31 PROCEDURE — 83550 IRON BINDING TEST: CPT

## 2022-03-31 NOTE — PROGRESS NOTES
Assessment  1  Lumbar spondylosis    2  Dizziness    3  Dizziness after extension of neck    4  Cervical myelopathy with cervical radiculopathy [facility])        Plan    Rosy Self with apparent early signs of cervical myelopathy  Will obtain flexion-extension radiographs as well as cervical MRI trial any significant progression of her spinal stenosis  Neuro surgical re-evaluation may be warranted  In regards to her low back pain, as it persists despite time relative rest activity modification and she benefited from previous lumbar radiofrequency I think it is reasonable to pursue a diagnostic medial branch block using 0 75% bupivacaine  If she responds positive block she be a candidate for radiofrequency neurotomy of the block nerves  I did review the nature the patient's pathology in depth using diagrams and models, discussed the approach would use for the nerve block and radiofrequency and provided literature for home review  Patient understands the risks associated with procedure provided verbal consent  My impressions and treatment recommendations were discussed in detail with the patient who verbalized understanding and had no further questions  Discharge instructions were provided  I personally saw and examined the patient and I agree with the above discussed plan of care  This note is created using dictation transcription  It may contain typographical errors, grammatical errors, improperly dictated words, background noise and other errors  Orders Placed This Encounter   Procedures    XR spine cervical complete 6+ vw flex/ext/obl     Standing Status:   Future     Standing Expiration Date:   3/31/2026     Scheduling Instructions:      Bring along any outside films relating to this procedure   MRI cervical spine without contrast     Standing Status:   Future     Standing Expiration Date:   3/31/2026     Scheduling Instructions:       There is no preparation for this test  Please leave your jewelry and valuables at home, wedding rings are the exception  Magnetic nail polish must be removed prior to arrival for your test  Please bring your insurance cards, a form of photo ID and a list of your medications with you  Arrive 15 minutes prior to your appointment time in order to register  Please bring any prior CT or MRI studies of this area that were not performed at a [facility] facility  To schedule this appointment, please contact Central Scheduling at [phone]  Prior to your appointment, please make sure you complete the MRI Screening Form when you e-Check in for your appointment  This will be available starting 7 days before your appointment in [address]  You may receive an e-mail with an activation code if you do not have a [employer] account  If you do not have access to a device, we will complete your screening at your appointment  Order Specific Question:   What is the patient's sedation requirement? Answer:   No Sedation     Order Specific Question:   Release to patient through [employer]     Answer:   Immediate     Order Specific Question:   Is order priority selected as STAT? Answer:   No     Order Specific Question:   Reason for Exam (FREE TEXT)     Answer:   weakness    FL spine and pain procedure     Standing Status:   Future     Standing Expiration Date:   3/31/2026     Order Specific Question:   Reason for Exam:     Answer:   bilateral L3,4,5 MBB with 0 75% bupiv     Order Specific Question:   Anticoagulant hold needed? Answer:   no     No orders of the defined types were placed in this encounter  History of Present Illness    Mt Starr is a 73 y o  female who presents with two different issues her chronic low back pain started for the past month similar nature in character than one previous seen and treated with the lumbar rhizotomy  She also reports pain and dizziness when extension of her cervical spine    Overall she rates her symptoms 8/10 on the visual analog scale is interfering with daily living activities  Describes burning achy sharp worse with cervical extension in regards to her electrical shock sensation and low back with standing and walking exacerbates her symptoms  She denies any loss of bowel bladder function  I have personally reviewed and/or updated the patient's past medical history, past surgical history, family history, social history, current medications, allergies, and vital signs today  Review of Systems   Constitutional: Negative for fever and unexpected weight change  HENT: Negative for trouble swallowing  Eyes: Negative for visual disturbance  Respiratory: Negative for shortness of breath and wheezing  Cardiovascular: Negative for chest pain and palpitations  Gastrointestinal: Negative for constipation, diarrhea, nausea and vomiting  Endocrine: Negative for cold intolerance, heat intolerance and polydipsia  Genitourinary: Negative for difficulty urinating and frequency  Musculoskeletal: Positive for gait problem  Negative for arthralgias, joint swelling and myalgias  Skin: Negative for rash  Neurological: Negative for dizziness, seizures, syncope, weakness and headaches  Hematological: Does not bruise/bleed easily  Psychiatric/Behavioral: Negative for dysphoric mood  All other systems reviewed and are negative        Patient Active Problem List   Diagnosis    Complex ovarian cyst    Dry eye syndrome    Fatty infiltration of liver    Hyperglycemia    Abdominal pain    Mixed hyperlipidemia    Essential hypertension    Acquired hypothyroidism    Left ventricular hypertrophy    Lumbosacral pain    Mitral valvular disorder    Nephrolithiasis    Non-toxic multinodular goiter    Class 2 severe obesity with serious comorbidity and body mass index (BMI) of 36 0 to 36 9 in adult (HCC)    Rhinitis    Spondylosis of cervical region without myelopathy or radiculopathy    Thoracic neuritis    Endometrial polyp    Squamous cell cancer of skin of hand, right    Basal cell carcinoma (BCC) of ala nasi    Former smoker    Cough with sputum    Sialoadenitis    Impacted cerumen of right ear    Spasmodic cough    Mild intermittent asthma without complication    Aortic valve disease    Lumbar spondylosis    Gastroesophageal reflux disease with esophagitis without hemorrhage    Hiatal hernia    Primary osteoarthritis of first carpometacarpal joint of right hand       Past Medical History:   Diagnosis Date    Abdominal pain     Asthma     Basal cell carcinoma     Bicuspid aortic valve     LAST ASSESSED [id number]    Cervical disc disease     last assessed [id number]    Cervical stenosis of spine     LAST ASSESSED [id number]    Disease of thyroid gland     hypothyroid    Dry eyes     Endometriosis     Hot flashes     Hyperglycemia     Hyperlipidemia     Hypertension     Hypothyroidism     Left ventricular hypertrophy     Mitral valvular disorder     Nephrolithiasis     Ovarian cyst, complex     Ptosis     LAST ASSESSED [id number]    Renal calculi     Right cervical radiculopathy     LAST ASSESSED [id number]    Seasonal allergies     Squamous cell skin cancer     Thyroid disease        Past Surgical History:   Procedure Laterality Date    BASAL CELL CARCINOMA EXCISION      CARPAL TUNNEL RELEASE       SECTION      CHOLECYSTECTOMY      COLONOSCOPY      NEUROPLASTY / TRANSPOSITION MEDIAN NERVE AT CARPAL TUNNEL      NEUROPLASTY / TRANSPOSITION MEDIAN NERVE AT CARPAL TUNNEL  [facility]    OTHER SURGICAL HISTORY      surgically removed skin patch for skin cancer    PLANTAR FASCIECTOMY      [place] COLONOSCOPY FLX DX W/COLLJ SPEC WHEN PFRMD N/A 10/9/2017    Procedure: COLONOSCOPY;  Surgeon: Ronni Knowles MD;  Location: [facility];   Service: Gastroenterology    [place] HYSTEROSCOPY,W/ENDO BX N/A 2018    Procedure: DILATATION AND CURETTAGE (D&C) WITH HYSTEROSCOPY;  Surgeon: Denny Robert DO;  Location:  MAIN OR;  Service: Gynecology    SKIN BIOPSY      TUBAL LIGATION      UPPER GASTROINTESTINAL ENDOSCOPY         Family History   Problem Relation Age of Onset    Diabetes Mother     Alzheimer's disease Mother     Heart disease Mother     Hypertension Mother     Other Family         back disorder    Cancer Family     Heart disease Family     Thyroid disease Family     No Known Problems Father     No Known Problems Sister     No Known Problems Daughter     No Known Problems Maternal Grandmother     No Known Problems Maternal Grandfather     No Known Problems Paternal Grandmother     No Known Problems Paternal Grandfather     No Known Problems Sister     No Known Problems Maternal Aunt     No Known Problems Maternal Aunt     No Known Problems Maternal Aunt     No Known Problems Maternal Aunt     No Known Problems Maternal Aunt        Social History     Occupational History    Occupation: part time employment   Tobacco Use    Smoking status: Former Smoker     Packs/day: 1 00     Years: 20 00     Pack years: 20 00     Types: Cigarettes     Quit date: 2012     Years since quitting: 10 2    Smokeless tobacco: Never Used   Vaping Use    Vaping Use: Never used   Substance and Sexual Activity    Alcohol use:  Yes     Alcohol/week: 8 0 standard drinks     Types: 4 Glasses of wine, 4 Standard drinks or equivalent per week     Comment: occasional    Drug use: No    Sexual activity: Yes     Partners: Male     Birth control/protection: Female Sterilization     Comment: tubal ligation        Current Outpatient Medications on File Prior to Visit   Medication Sig    albuterol (Ventolin HFA) 90 mcg/act inhaler Inhale 2 puffs every 6 (six) hours as needed for wheezing    amLODIPine (NORVASC) 5 mg tablet Take 1 tablet (5 mg total) by mouth daily    famotidine (PEPCID) 40 MG tablet Take 1 tablet (40 mg total) by mouth daily    fexofenadine (ALLEGRA) 180 MG tablet Take 1 tablet (180 mg total) by mouth daily    fluticasone (ARNUITY ELLIPTA) 100 MCG/ACT AEPB inhaler Inhale 1 puff daily Rinse mouth after use   levothyroxine 125 mcg tablet TAKE ONE TABLET BY MOUTH EVERY DAY   losartan-hydrochlorothiazide (HYZAAR) 100-25 MG per tablet Take 1 tablet by mouth daily    metFORMIN (GLUCOPHAGE) 500 mg tablet Take 1 tablet (500 mg total) by mouth daily with breakfast    pantoprazole (PROTONIX) 40 mg tablet Take 1 tablet (40 mg total) by mouth 2 (two) times a day    XIIDRA 5 % op solution     ketoconazole (NIZORAL) 2 % cream Apply topically daily for 7 days     No current facility-administered medications on file prior to visit  Allergies   Allergen Reactions    Erythromycin GI Intolerance     Reaction Date: 11Jul2011;     Penicillins Other (See Comments)     unknown    Aztreonam Rash     Action Taken: Allergy added by [employer] to replace Penicillins Cross Reactors;     Carbapenems Rash     Action Taken: Allergy added by [employer] to replace Penicillins [employer];     Cephalosporins Rash     Action Taken: Allergy added by [employer] to replace Penicillins [employer];     Griseofulvin Rash     Action Taken: Allergy added by [employer] to replace Penicillins [employer];     Influenza Virus Vaccine Hives, Rash and GI Intolerance       Physical Exam    /88 (BP Location: Left arm, Patient Position: Sitting, Cuff Size: Standard)   Pulse 79   Temp 97 9 °F (36 6 °C)   Ht 5' 7" (1 702 m)   Wt 107 kg (235 lb)   BMI 36 81 kg/m²     Constitutional: normal, well developed, well nourished, alert, in no distress and non-toxic and no overt pain behavior   and obese  Eyes: anicteric  HEENT: grossly intact  Neck: supple, symmetric, trachea midline and no masses   Pulmonary:even and unlabored  Cardiovascular:No edema or pitting edema present  Skin:Normal without rashes or lesions and well hydrated  Psychiatric:Mood and affect appropriate  Neurologic:Cranial Nerves II-XII grossly intact  Musculoskeletal:normal, no focal motor deficit appreciated upper lower limbs aside from 3-4/5 bilateral  strength  Positive  Lhermitte Sign, negative Romberg positive pain with lumbar extension deep tendon reflexes diminished but symmetrical bilateral lower limbs 2+ symmetrical bilateral upper limbs     Imaging  MRI CERVICAL SPINE WITHOUT CONTRAST     INDICATION:  Right shoulder pain radiating senior living down by     COMPARISON:  MR 1/20/2010     TECHNIQUE:  Sagittal T1, sagittal T2, sagittal inversion recovery, axial T2, axial  2D merge          IMAGE QUALITY:  Diagnostic     FINDINGS:     ALIGNMENT:  Straightening of normal cervical lordosis with multilevel mid cervical disc disease resulting in posterior displacement of the cord from the C4-5 to the C6-7 disc space inclusive      MARROW SIGNAL:  Normal marrow signal is identified within the visualized bony structures  No discrete marrow lesion      CERVICAL AND VISUALIZED THORACIC CORD:  Normal signal within the visualized cord      PREVERTEBRAL AND PARASPINAL SOFT TISSUES:  Prevertebral and paraspinal soft tissues are unremarkable      VISUALIZED POSTERIOR FOSSA:  The visualized posterior fossa demonstrates no abnormal signal      CERVICAL DISC SPACES:          C2-C3:  Minor increase in tiny central protrusion  Slight progression of bilateral facet arthrosis     C3-C4:  Tiny central protrusion  Minor bilateral facet arthrosis which has progressed      C4-C5:  Reduction disc height  Increase in volume of central protrusion which extends asymmetrically to the left  The AP dimension of the canal is reduced to approximately 6 mm, the cord is deformed  Significant bilateral foraminal stenosis resulting   from progression of facet and uncinate arthrosis  Correlate for bilateral C5 radiculitis      C5-C6:  Broad-based circumferential bulge with complex protrusion    Cord is deformed, canal less than 6 mm in AP dimension, severe bilateral foraminal stenosis  Disease has progressed  Bilateral foraminal stenosis more severe to the left likely   significant  Correlate for bilateral C6 radiculitis      C6-C7:  Broad-based protrusion, asymmetric to the left  Sac reduced to approximately 9 mm centrally      C7-T1:  Normal      UPPER THORACIC DISC SPACES:  Normal      IMPRESSION:     Progression of cervical spondylosis and osteoarthritis resulting in significant canal stenosis at C4-C5 and C5-C6  Elevation of the posterior longitudinal ligament C5 and C6 which could be segmentally ossified  Despite central flattening of the cord,   cord signal remains normal      Potentially significant left foraminal stenosis C4-5 through C6-7 inclusive, correlate for bilateral C5, C6 and C7 radiculitis  I have personally reviewed pertinent films in PACS and my interpretation is Multilevel stenosis with spondylosis

## 2022-04-03 LAB — ZINC SERPL-MCNC: 84 UG/DL (ref 44–115)

## 2022-04-08 ENCOUNTER — HOSPITAL ENCOUNTER (OUTPATIENT)
Dept: RADIOLOGY | Facility: CLINIC | Age: 67
Discharge: HOME | End: 2022-04-08
Attending: ANESTHESIOLOGY
Payer: COMMERCIAL

## 2022-04-08 VITALS
SYSTOLIC BLOOD PRESSURE: 151 MMHG | RESPIRATION RATE: 20 BRPM | HEART RATE: 73 BPM | DIASTOLIC BLOOD PRESSURE: 90 MMHG | TEMPERATURE: 97.2 F | OXYGEN SATURATION: 94 %

## 2022-04-08 DIAGNOSIS — M47.816 LUMBAR SPONDYLOSIS: ICD-10-CM

## 2022-04-08 PROCEDURE — 64494 INJ PARAVERT F JNT L/S 2 LEV: CPT | Performed by: ANESTHESIOLOGY

## 2022-04-08 PROCEDURE — 64493 INJ PARAVERT F JNT L/S 1 LEV: CPT | Performed by: ANESTHESIOLOGY

## 2022-04-08 RX ORDER — BUPIVACAINE HYDROCHLORIDE 7.5 MG/ML
10 INJECTION, SOLUTION EPIDURAL; RETROBULBAR ONCE
Status: COMPLETED | OUTPATIENT
Start: 2022-04-08 | End: 2022-04-08

## 2022-04-08 RX ADMIN — BUPIVACAINE HYDROCHLORIDE 6 ML: 7.5 INJECTION, SOLUTION EPIDURAL; RETROBULBAR at 14:46

## 2022-04-08 NOTE — DISCHARGE INSTRUCTIONS
ACTIVITY  · Please do activities that will bring on the normal pain that we are rating  For example, if vacuuming or walking increases the pain, do that  This will give the most accurate response to the diary  · You may shower, but no tub baths today, or applied heat  CARE OF THE INJECTION SITE  · This area may be numb for several hours after the injection  · [facility]ify [facility] if you have any of the following:  redness, drainage, swelling, or fever above 100°F     SPECIAL INSTRUCTIONS  · Please return the MBB diary to our office by mail, fax, or drop it off  MEDICATIONS  · Please do not take any break through or short acting pain medications for 8 hours after the block  · Continue to take all routine medications  · Our office may have instructed you to hold some medications  As no general anesthesia was used in today's procedure, you should not experience any side effects related to anesthesia  If you have a problem specifically related to your procedure, please call our office at [phone]  Problems not related to your procedure should be directed to your primary care physician

## 2022-04-08 NOTE — H&P
History of Present Illness: The patient is a 73 y o  female who presents with complaints of low back pain      Patient Active Problem List   Diagnosis    Complex ovarian cyst    Dry eye syndrome    Fatty infiltration of liver    Hyperglycemia    Abdominal pain    Mixed hyperlipidemia    Essential hypertension    Acquired hypothyroidism    Left ventricular hypertrophy    Lumbosacral pain    Mitral valvular disorder    Nephrolithiasis    Non-toxic multinodular goiter    Class 2 severe obesity with serious comorbidity and body mass index (BMI) of 36 0 to 36 9 in adult (HCC)    Rhinitis    Spondylosis of cervical region without myelopathy or radiculopathy    Thoracic neuritis    Endometrial polyp    Squamous cell cancer of skin of hand, right    Basal cell carcinoma (BCC) of ala nasi    Former smoker    Cough with sputum    Sialoadenitis    Impacted cerumen of right ear    Spasmodic cough    Mild intermittent asthma without complication    Aortic valve disease    Lumbar spondylosis    Gastroesophageal reflux disease with esophagitis without hemorrhage    Hiatal hernia    Primary osteoarthritis of first carpometacarpal joint of right hand       Past Medical History:   Diagnosis Date    Abdominal pain     Asthma     Basal cell carcinoma     Bicuspid aortic valve     LAST ASSESSED [id number]    Cervical disc disease     last assessed [id number]    Cervical stenosis of spine     LAST ASSESSED [id number]    Disease of thyroid gland     hypothyroid    Dry eyes     Endometriosis     Hot flashes     Hyperglycemia     Hyperlipidemia     Hypertension     Hypothyroidism     Left ventricular hypertrophy     Mitral valvular disorder     Nephrolithiasis     Ovarian cyst, complex     Ptosis     LAST ASSESSED [id number]    Renal calculi     Right cervical radiculopathy     LAST ASSESSED [id number]    Seasonal allergies     Squamous cell skin cancer     Thyroid disease        Past Surgical History:   Procedure Laterality Date    BASAL CELL CARCINOMA EXCISION      CARPAL TUNNEL RELEASE       SECTION      CHOLECYSTECTOMY      COLONOSCOPY      NEUROPLASTY / TRANSPOSITION MEDIAN NERVE AT CARPAL TUNNEL      NEUROPLASTY / TRANSPOSITION MEDIAN NERVE AT CARPAL TUNNEL      [facility]    OTHER SURGICAL HISTORY      surgically removed skin patch for skin cancer    PLANTAR FASCIECTOMY      FL COLONOSCOPY FLX DX W/COLLJ SPEC WHEN PFRMD N/A 10/9/2017    Procedure: COLONOSCOPY;  Surgeon: Emily Nash MD;  Location: [facility] GI LAB;   Service: Gastroenterology    FL HYSTEROSCOPY,W/ENDO BX N/A 2018    Procedure: DILATATION AND CURETTAGE (D&C) WITH HYSTEROSCOPY;  Surgeon: Claudy Kenny DO;  Location: [facility] OR;  Service: Gynecology    SKIN BIOPSY      TUBAL LIGATION      UPPER GASTROINTESTINAL ENDOSCOPY           Current Outpatient Medications:     albuterol (Ventolin HFA) 90 mcg/act inhaler, Inhale 2 puffs every 6 (six) hours as needed for wheezing, Disp: 3 Inhaler, Rfl: 3    amLODIPine (NORVASC) 5 mg tablet, Take 1 tablet (5 mg total) by mouth daily, Disp: 90 tablet, Rfl: 3    famotidine (PEPCID) 40 MG tablet, Take 1 tablet (40 mg total) by mouth daily, Disp: 90 tablet, Rfl: 6    fexofenadine (ALLEGRA) 180 MG tablet, Take 1 tablet (180 mg total) by mouth daily, Disp: 30 tablet, Rfl: 5    fluticasone (ARNUITY ELLIPTA) 100 MCG/ACT AEPB inhaler, Inhale 1 puff daily Rinse mouth after use , Disp: 90 blister, Rfl: 3    ketoconazole (NIZORAL) 2 % cream, Apply topically daily for 7 days, Disp: 30 g, Rfl: 0    levothyroxine 125 mcg tablet, TAKE ONE TABLET BY MOUTH EVERY DAY , Disp: 90 tablet, Rfl: 0    losartan-hydrochlorothiazide (HYZAAR) 100-25 MG per tablet, Take 1 tablet by mouth daily, Disp: 90 tablet, Rfl: 3    metFORMIN (GLUCOPHAGE) 500 mg tablet, Take 1 tablet (500 mg total) by mouth daily with breakfast, Disp: 90 tablet, Rfl: 3    pantoprazole (PROTONIX) 40 mg tablet, Take 1 tablet (40 mg total) by mouth 2 (two) times a day, Disp: 180 tablet, Rfl: 6    XIIDRA 5 % op solution, , Disp: , Rfl:     Current Facility-Administered Medications:     bupivacaine (PF) (MARCAINE) 0 75 % injection 10 mL, 10 mL, Other, Once, Valentin Mon, DO    Allergies   Allergen Reactions    Erythromycin GI Intolerance     Reaction Date: 11Jul2011;     Penicillins Other (See Comments)     unknown    Aztreonam Rash     Action Taken: Allergy added by [employer] to replace Penicillins Cross Reactors;     Carbapenems Rash     Action Taken: Allergy added by [employer] to replace Penicillins [employer];     Cephalosporins Rash     Action Taken: Allergy added by [employer] to replace Penicillins [employer];     Griseofulvin Rash     Action Taken: Allergy added by [employer] to replace Penicillins [employer];     Influenza Virus Vaccine Hives, Rash and GI Intolerance       Physical Exam:   Vitals:    04/08/22 1440   BP: 119/81   Pulse: 74   Resp: 20   Temp: (!) 97 2 °F (36 2 °C)   SpO2: 93%     General: Awake, Alert, Oriented x 3, Mood and affect appropriate  Respiratory: Respirations even and unlabored  Cardiovascular: Peripheral pulses intact; no edema  Musculoskeletal Exam:  Pain with lumbar extension    ASA Score: III    Patient/Chart Verification  Patient ID Verified: Verbal  ID Band Applied: No  Consents Confirmed: Procedural,To be obtained in the Pre-Procedure area  H&P( within 30 days) Verified: To be obtained in the Pre-Procedure area  Allergies Reviewed: Yes  Anticoag/NSAID held?: NA  Currently on antibiotics?: No    Assessment:   1   Lumbar spondylosis        Plan: bilateral L3,4,5 MBB with 0 75% bupiv

## 2022-04-11 ENCOUNTER — OFFICE VISIT (OUTPATIENT)
Dept: FAMILY MEDICINE CLINIC | Facility: HOSPITAL | Age: 67
End: 2022-04-11
Payer: COMMERCIAL

## 2022-04-11 VITALS
SYSTOLIC BLOOD PRESSURE: 120 MMHG | DIASTOLIC BLOOD PRESSURE: 80 MMHG | OXYGEN SATURATION: 98 % | HEIGHT: 67 IN | BODY MASS INDEX: 36.73 KG/M2 | TEMPERATURE: 98.2 F | WEIGHT: 234 LBS | HEART RATE: 71 BPM

## 2022-04-11 DIAGNOSIS — E03.9 ACQUIRED HYPOTHYROIDISM: ICD-10-CM

## 2022-04-11 DIAGNOSIS — M54.50 LUMBOSACRAL PAIN: ICD-10-CM

## 2022-04-11 DIAGNOSIS — R73.9 HYPERGLYCEMIA: ICD-10-CM

## 2022-04-11 DIAGNOSIS — J31.0 CHRONIC RHINITIS: ICD-10-CM

## 2022-04-11 DIAGNOSIS — E78.2 MIXED HYPERLIPIDEMIA: ICD-10-CM

## 2022-04-11 DIAGNOSIS — L50.9 URTICARIA: ICD-10-CM

## 2022-04-11 DIAGNOSIS — E04.2 NON-TOXIC MULTINODULAR GOITER: ICD-10-CM

## 2022-04-11 DIAGNOSIS — I10 ESSENTIAL HYPERTENSION: Primary | ICD-10-CM

## 2022-04-11 DIAGNOSIS — E66.01 CLASS 2 SEVERE OBESITY DUE TO EXCESS CALORIES WITH SERIOUS COMORBIDITY AND BODY MASS INDEX (BMI) OF 36.0 TO 36.9 IN ADULT (HCC): ICD-10-CM

## 2022-04-11 DIAGNOSIS — K76.0 FATTY INFILTRATION OF LIVER: ICD-10-CM

## 2022-04-11 PROCEDURE — 99214 OFFICE O/P EST MOD 30 MIN: CPT | Performed by: FAMILY MEDICINE

## 2022-04-11 RX ORDER — FEXOFENADINE HCL 180 MG/1
TABLET ORAL
Qty: 30 TABLET | Refills: 5 | Status: SHIPPED | OUTPATIENT
Start: 2022-04-11 | End: 2022-04-11 | Stop reason: SDUPTHER

## 2022-04-11 RX ORDER — FEXOFENADINE HCL 180 MG/1
180 TABLET ORAL DAILY
Qty: 90 TABLET | Refills: 1 | Status: SHIPPED | OUTPATIENT
Start: 2022-04-11

## 2022-04-11 RX ORDER — PREDNISONE 10 MG/1
TABLET ORAL
Qty: 16 TABLET | Refills: 0 | Status: SHIPPED | OUTPATIENT
Start: 2022-04-11 | End: 2022-05-03

## 2022-04-11 NOTE — PROGRESS NOTES
Assessment/Plan:         Diagnoses and all orders for this visit:    Essential hypertension  Comments:  Excellent BP control    Acquired hypothyroidism  Comments:  TSH euthyroid    Non-toxic multinodular goiter    Fatty infiltration of liver  Comments:  LFT's WNL    Hyperglycemia    Mixed hyperlipidemia    Class 2 severe obesity due to excess calories with serious comorbidity and body mass index (BMI) of 36 0 to 36 9 in adult [facility])    Urticaria  -     predniSONE 10 mg tablet; Take 4 tablets today, then three tablets daily for 2 days, two tablets daily for 2 days, then one tablet daily for 2 days  Lumbosacral pain  Comments:  F/U with Pain Management          Subjective:      Patient ID: Aristeo Lundy is a 73 y o  female  3 month follow up  No recent illness or injury  Notes acute onset of hives after getting hair colored, never problem before  Taking Allegra and Benadryl    Had diagnostic test with Dr Yaya Daugherty to determine need for ablation  Has lower back pain but also positional neck pain when looking up  Labs reviewed in detail and copy given      The following portions of the patient's history were reviewed and updated as appropriate: allergies, current medications, past family history, past medical history, past social history, past surgical history and problem list     Review of Systems   HENT: Negative  Cardiovascular: Negative  Gastrointestinal: Negative  Genitourinary: Negative  Musculoskeletal: Negative  Skin: Positive for rash  Allergic/Immunologic: Positive for environmental allergies  Psychiatric/Behavioral: Negative  Objective:      /80 (BP Location: Left arm, Patient Position: Sitting, Cuff Size: Large)   Pulse 71   Temp 98 2 °F (36 8 °C) (Tympanic)   Ht 5' 7" (1 702 m)   Wt 106 kg (234 lb)   SpO2 98%   BMI 36 65 kg/m²          Physical Exam  Vitals and nursing note reviewed  Neck:      Vascular: No carotid bruit     Cardiovascular:      Rate and Rhythm: Normal rate and regular rhythm  Heart sounds: Murmur heard  Pulmonary:      Effort: Pulmonary effort is normal       Breath sounds: Normal breath sounds  Musculoskeletal:      Right lower leg: No edema  Left lower leg: No edema  Skin:     Findings: Rash present  Neurological:      General: No focal deficit present  Mental Status: She is alert and oriented to person, place, and time     Psychiatric:         Mood and Affect: Mood normal

## 2022-05-02 ENCOUNTER — TELEPHONE (OUTPATIENT)
Dept: PAIN MEDICINE | Facility: CLINIC | Age: 67
End: 2022-05-02

## 2022-05-02 ENCOUNTER — HOSPITAL ENCOUNTER (OUTPATIENT)
Dept: RADIOLOGY | Facility: CLINIC | Age: 67
Discharge: HOME | End: 2022-05-02
Attending: ANESTHESIOLOGY | Admitting: ANESTHESIOLOGY
Payer: COMMERCIAL

## 2022-05-02 VITALS
OXYGEN SATURATION: 97 % | RESPIRATION RATE: 20 BRPM | TEMPERATURE: 97.7 F | HEART RATE: 84 BPM | DIASTOLIC BLOOD PRESSURE: 84 MMHG | SYSTOLIC BLOOD PRESSURE: 138 MMHG

## 2022-05-02 DIAGNOSIS — M47.816 LUMBAR SPONDYLOSIS: ICD-10-CM

## 2022-05-02 DIAGNOSIS — M54.50 LOW BACK PAIN: ICD-10-CM

## 2022-05-02 PROCEDURE — 64635 DESTROY LUMB/SAC FACET JNT: CPT | Performed by: ANESTHESIOLOGY

## 2022-05-02 PROCEDURE — 64636 DESTROY L/S FACET JNT ADDL: CPT | Performed by: ANESTHESIOLOGY

## 2022-05-02 RX ADMIN — LIDOCAINE HYDROCHLORIDE 3 ML: 20 INJECTION, SOLUTION EPIDURAL; INFILTRATION; INTRACAUDAL at 13:39

## 2022-05-02 NOTE — TELEPHONE ENCOUNTER
Patient is s/p a Right L3,L4,L5 RFA c/ SL on 5/2/22  Next RFA scheduled for 5/16/22  Next OVS scheduled for 6/14/22  Please call on 5/3/22 post RFA    Thanks

## 2022-05-02 NOTE — H&P
History of Present Illness:  The patient is a 73 y o  female who presents with complaints of low back pain    Patient Active Problem List   Diagnosis    Complex ovarian cyst    Dry eye syndrome    Fatty infiltration of liver    Hyperglycemia    Abdominal pain    Mixed hyperlipidemia    Essential hypertension    Acquired hypothyroidism    Left ventricular hypertrophy    Lumbosacral pain    Mitral valvular disorder    Nephrolithiasis    Non-toxic multinodular goiter    Class 2 severe obesity with serious comorbidity and body mass index (BMI) of 36 0 to 36 9 in adult (HCC)    Rhinitis    Spondylosis of cervical region without myelopathy or radiculopathy    Thoracic neuritis    Endometrial polyp    Squamous cell cancer of skin of hand, right    Basal cell carcinoma (BCC) of ala nasi    Former smoker    Cough with sputum    Sialoadenitis    Impacted cerumen of right ear    Spasmodic cough    Mild intermittent asthma without complication    Aortic valve disease    Lumbar spondylosis    Gastroesophageal reflux disease with esophagitis without hemorrhage    Hiatal hernia    Primary osteoarthritis of first carpometacarpal joint of right hand    Urticaria       Past Medical History:   Diagnosis Date    Abdominal pain     Asthma     Basal cell carcinoma     Bicuspid aortic valve     LAST ASSESSED [id number]    Cervical disc disease     last assessed [id number]    Cervical stenosis of spine     LAST ASSESSED [id number]    Disease of thyroid gland     hypothyroid    Dry eyes     Endometriosis     Hot flashes     Hyperglycemia     Hyperlipidemia     Hypertension     Hypothyroidism     Left ventricular hypertrophy     Mitral valvular disorder     Nephrolithiasis     Ovarian cyst, complex     Ptosis     LAST ASSESSED [id number]    Renal calculi     Right cervical radiculopathy     LAST ASSESSED [id number]    Seasonal allergies     Squamous cell skin cancer     Thyroid disease Past Surgical History:   Procedure Laterality Date    BASAL CELL CARCINOMA EXCISION      CARPAL TUNNEL RELEASE       SECTION      CHOLECYSTECTOMY      COLONOSCOPY      NEUROPLASTY / TRANSPOSITION MEDIAN NERVE AT CARPAL TUNNEL      NEUROPLASTY / TRANSPOSITION MEDIAN NERVE AT CARPAL TUNNEL      [facility]    OTHER SURGICAL HISTORY      surgically removed skin patch for skin cancer    PLANTAR FASCIECTOMY      DC COLONOSCOPY FLX DX W/COLLJ SPEC WHEN PFRMD N/A 10/9/2017    Procedure: COLONOSCOPY;  Surgeon: Angeles Gaytan MD;  Location: [facility] GI LAB;   Service: Gastroenterology    DC HYSTEROSCOPY,W/ENDO BX N/A 2018    Procedure: DILATATION AND CURETTAGE (D&C) WITH HYSTEROSCOPY;  Surgeon: Thiago Huffman DO;  Location[facility] OR;  Service: Gynecology    SKIN BIOPSY      TUBAL LIGATION      UPPER GASTROINTESTINAL ENDOSCOPY           Current Outpatient Medications:     albuterol (Ventolin HFA) 90 mcg/act inhaler, Inhale 2 puffs every 6 (six) hours as needed for wheezing, Disp: 3 Inhaler, Rfl: 3    amLODIPine (NORVASC) 5 mg tablet, Take 1 tablet (5 mg total) by mouth daily, Disp: 90 tablet, Rfl: 3    famotidine (PEPCID) 40 MG tablet, Take 1 tablet (40 mg total) by mouth daily, Disp: 90 tablet, Rfl: 6    fexofenadine (ALLEGRA) 180 MG tablet, Take 1 tablet (180 mg total) by mouth daily, Disp: 90 tablet, Rfl: 1    fluticasone (ARNUITY ELLIPTA) 100 MCG/ACT AEPB inhaler, Inhale 1 puff daily Rinse mouth after use , Disp: 90 blister, Rfl: 3    ketoconazole (NIZORAL) 2 % cream, Apply topically daily for 7 days, Disp: 30 g, Rfl: 0    levothyroxine 125 mcg tablet, TAKE ONE TABLET BY MOUTH EVERY DAY , Disp: 90 tablet, Rfl: 0    losartan-hydrochlorothiazide (HYZAAR) 100-25 MG per tablet, Take 1 tablet by mouth daily, Disp: 90 tablet, Rfl: 3    metFORMIN (GLUCOPHAGE) 500 mg tablet, Take 1 tablet (500 mg total) by mouth daily with breakfast, Disp: 90 tablet, Rfl: 3    pantoprazole (PROTONIX) 40 mg tablet, Take 1 tablet (40 mg total) by mouth 2 (two) times a day, Disp: 180 tablet, Rfl: 6    predniSONE 10 mg tablet, Take 4 tablets today, then three tablets daily for 2 days, two tablets daily for 2 days, then one tablet daily for 2 days  , Disp: 16 tablet, Rfl: 0    XIIDRA 5 % op solution, , Disp: , Rfl:     Current Facility-Administered Medications:     lidocaine (PF) (XYLOCAINE-MPF) 2 % injection 5 mL, 5 mL, Perineural, Once, Valentin Mon DO    Allergies   Allergen Reactions    Erythromycin GI Intolerance     Reaction Date: 11Jul2011;     Penicillins Other (See Comments)     unknown    Aztreonam Rash     Action Taken: Allergy added by [employer] to replace Penicillins Cross Reactors;     Carbapenems Rash     Action Taken: Allergy added by [employer] to replace Penicillins [employer];     Cephalosporins Rash     Action Taken: Allergy added by [employer] to replace Penicillins [employer];     Griseofulvin Rash     Action Taken: Allergy added by [employer] to replace Penicillins [employer];     Influenza Virus Vaccine Hives, Rash and GI Intolerance       Physical Exam:   General: Awake, Alert, Oriented x 3, Mood and affect appropriate  Respiratory: Respirations even and unlabored  Cardiovascular: Peripheral pulses intact; no edema  Musculoskeletal Exam:  Normal gait and station    ASA Score: III         Assessment:   1  Lumbar spondylosis    2   Low back pain        Plan: RIGHT L3,4,5 RFA

## 2022-05-02 NOTE — DISCHARGE INSTRUCTIONS

## 2022-05-03 DIAGNOSIS — I10 ESSENTIAL HYPERTENSION: ICD-10-CM

## 2022-05-03 RX ORDER — AMLODIPINE BESYLATE 5 MG/1
5 TABLET ORAL DAILY
Qty: 90 TABLET | Refills: 3 | Status: SHIPPED | OUTPATIENT
Start: 2022-05-03

## 2022-05-03 NOTE — TELEPHONE ENCOUNTER
S/w pt, stated that she is doing "great" since her procedure yesterday  Advised pt to allow 2-6 weeks for the full effect  Cb if s/s infection present: redness, drainage, swelling at the site or fever 100+, or if a sunburn like sensation in the area of the procedure presents  Ok to continue w/ rest, ice / heat, medication as prescribed / directed  Pt verbalized understanding and appreciation  Pt questioned moving up her L sided rfa, stated that the pain in her L low back is significant  Advised pt, will d/w Dr Cortez Lopez  If possible, Santiago Thomason will cb to discuss a sooner opening  Pt verbalized understanding and appreciation

## 2022-05-16 ENCOUNTER — TELEPHONE (OUTPATIENT)
Dept: PAIN MEDICINE | Facility: CLINIC | Age: 67
End: 2022-05-16

## 2022-05-16 ENCOUNTER — HOSPITAL ENCOUNTER (OUTPATIENT)
Dept: RADIOLOGY | Facility: CLINIC | Age: 67
Discharge: HOME | End: 2022-05-16
Attending: ANESTHESIOLOGY | Admitting: ANESTHESIOLOGY
Payer: COMMERCIAL

## 2022-05-16 VITALS
RESPIRATION RATE: 20 BRPM | OXYGEN SATURATION: 92 % | DIASTOLIC BLOOD PRESSURE: 73 MMHG | HEART RATE: 72 BPM | SYSTOLIC BLOOD PRESSURE: 133 MMHG | TEMPERATURE: 97.9 F

## 2022-05-16 DIAGNOSIS — M47.816 LUMBAR SPONDYLOSIS: ICD-10-CM

## 2022-05-16 DIAGNOSIS — M54.50 LOW BACK PAIN: ICD-10-CM

## 2022-05-16 PROCEDURE — 64635 DESTROY LUMB/SAC FACET JNT: CPT | Performed by: ANESTHESIOLOGY

## 2022-05-16 PROCEDURE — 64636 DESTROY L/S FACET JNT ADDL: CPT | Performed by: ANESTHESIOLOGY

## 2022-05-16 RX ORDER — LIDOCAINE HYDROCHLORIDE 10 MG/ML
5 INJECTION, SOLUTION EPIDURAL; INFILTRATION; INTRACAUDAL; PERINEURAL ONCE
Status: COMPLETED | OUTPATIENT
Start: 2022-05-16 | End: 2022-05-16

## 2022-05-16 RX ADMIN — LIDOCAINE HYDROCHLORIDE 5 ML: 10 INJECTION, SOLUTION EPIDURAL; INFILTRATION; INTRACAUDAL; PERINEURAL at 12:30

## 2022-05-16 RX ADMIN — LIDOCAINE HYDROCHLORIDE 3 ML: 20 INJECTION, SOLUTION EPIDURAL; INFILTRATION; INTRACAUDAL at 12:32

## 2022-05-16 NOTE — TELEPHONE ENCOUNTER
Patient is S/P a Left L3-L5 RFA c/ SL on 5/16/22  Next ovs is on 6/14/22  Please call on 5/17/22 post RFA   Thanks

## 2022-05-16 NOTE — H&P
History of Present Illness: The patient is a 73 y o  female who presents with complaints of low back pain      Patient Active Problem List   Diagnosis    Complex ovarian cyst    Dry eye syndrome    Fatty infiltration of liver    Hyperglycemia    Abdominal pain    Mixed hyperlipidemia    Essential hypertension    Acquired hypothyroidism    Left ventricular hypertrophy    Lumbosacral pain    Mitral valvular disorder    Nephrolithiasis    Non-toxic multinodular goiter    Class 2 severe obesity with serious comorbidity and body mass index (BMI) of 36 0 to 36 9 in adult (HCC)    Rhinitis    Spondylosis of cervical region without myelopathy or radiculopathy    Thoracic neuritis    Endometrial polyp    Squamous cell cancer of skin of hand, right    Basal cell carcinoma (BCC) of ala nasi    Former smoker    Cough with sputum    Sialoadenitis    Impacted cerumen of right ear    Spasmodic cough    Mild intermittent asthma without complication    Aortic valve disease    Lumbar spondylosis    Gastroesophageal reflux disease with esophagitis without hemorrhage    Hiatal hernia    Primary osteoarthritis of first carpometacarpal joint of right hand    Urticaria       Past Medical History:   Diagnosis Date    Abdominal pain     Asthma     Basal cell carcinoma     Bicuspid aortic valve     LAST ASSESSED [id number]    Cervical disc disease     last assessed [id number]    Cervical stenosis of spine     LAST ASSESSED [id number]    Disease of thyroid gland     hypothyroid    Dry eyes     Endometriosis     Hot flashes     Hyperglycemia     Hyperlipidemia     Hypertension     Hypothyroidism     Left ventricular hypertrophy     Mitral valvular disorder     Nephrolithiasis     Ovarian cyst, complex     Ptosis     LAST ASSESSED [id number]    Renal calculi     Right cervical radiculopathy     LAST ASSESSED [id number]    Seasonal allergies     Squamous cell skin cancer     Thyroid disease Past Surgical History:   Procedure Laterality Date    BASAL CELL CARCINOMA EXCISION      CARPAL TUNNEL RELEASE       SECTION      CHOLECYSTECTOMY      COLONOSCOPY      NEUROPLASTY / TRANSPOSITION MEDIAN NERVE AT CARPAL TUNNEL      NEUROPLASTY / TRANSPOSITION MEDIAN NERVE AT CARPAL TUNNEL      [facility]    OTHER SURGICAL HISTORY      surgically removed skin patch for skin cancer    PLANTAR FASCIECTOMY      AK COLONOSCOPY FLX DX W/COLLJ SPEC WHEN PFRMD N/A 10/9/2017    Procedure: COLONOSCOPY;  Surgeon: Ney Hutchins MD;  Location: [facility] GI LAB;   Service: Gastroenterology    AK HYSTEROSCOPY,W/ENDO BX N/A 2018    Procedure: DILATATION AND CURETTAGE (D&C) WITH HYSTEROSCOPY;  Surgeon: Chip Shea DO;  Location: [facility] OR;  Service: Gynecology    SKIN BIOPSY      TUBAL LIGATION      UPPER GASTROINTESTINAL ENDOSCOPY           Current Outpatient Medications:     albuterol (Ventolin HFA) 90 mcg/act inhaler, Inhale 2 puffs every 6 (six) hours as needed for wheezing, Disp: 3 Inhaler, Rfl: 3    amLODIPine (NORVASC) 5 mg tablet, Take 1 tablet (5 mg total) by mouth daily, Disp: 90 tablet, Rfl: 3    famotidine (PEPCID) 40 MG tablet, Take 1 tablet (40 mg total) by mouth daily, Disp: 90 tablet, Rfl: 6    fexofenadine (ALLEGRA) 180 MG tablet, Take 1 tablet (180 mg total) by mouth daily, Disp: 90 tablet, Rfl: 1    fluticasone (ARNUITY ELLIPTA) 100 MCG/ACT AEPB inhaler, Inhale 1 puff daily Rinse mouth after use , Disp: 90 blister, Rfl: 3    ketoconazole (NIZORAL) 2 % cream, Apply topically daily for 7 days, Disp: 30 g, Rfl: 0    levothyroxine 125 mcg tablet, TAKE ONE TABLET BY MOUTH EVERY DAY , Disp: 90 tablet, Rfl: 0    losartan-hydrochlorothiazide (HYZAAR) 100-25 MG per tablet, Take 1 tablet by mouth daily, Disp: 90 tablet, Rfl: 3    metFORMIN (GLUCOPHAGE) 500 mg tablet, Take 1 tablet (500 mg total) by mouth daily with breakfast, Disp: 90 tablet, Rfl: 3    pantoprazole (PROTONIX) 40 mg tablet, Take 1 tablet (40 mg total) by mouth 2 (two) times a day, Disp: 180 tablet, Rfl: 6    XIIDRA 5 % op solution, , Disp: , Rfl:     Allergies   Allergen Reactions    Erythromycin GI Intolerance     Reaction Date: 11Jul2011;     Penicillins Other (See Comments)     unknown    Aztreonam Rash     Action Taken: Allergy added by [employer] to replace Penicillins [employer];     Carbapenems Rash     Action Taken: Allergy added by [employer] to replace Penicillins [employer];     Cephalosporins Rash     Action Taken: Allergy added by [employer] to replace Penicillins [employer];     Griseofulvin Rash     Action Taken: Allergy added by [employer] to replace Penicillins [employer];     Influenza Virus Vaccine Hives, Rash and GI Intolerance       Physical Exam:   General: Awake, Alert, Oriented x 3, Mood and affect appropriate  Respiratory: Respirations even and unlabored  Cardiovascular: Peripheral pulses intact; no edema  Musculoskeletal Exam:  Decreased range of motion lumbar spine    ASA Score: III         Assessment:   1  Lumbar spondylosis    2   Low back pain        Plan: LEFT L3,4,5 RFA

## 2022-05-16 NOTE — DISCHARGE INSTRUCTIONS
Medial Branch Radiofrequency Ablation     WHAT YOU NEED TO KNOW:   Medial branch radiofrequency ablation (RFA) is a procedure used to treat facet joint pain in your neck, mid back, or lower back  Facet joints are found at the back of each vertebra  A needle electrode is used to send electrical currents to the nerves in your facet joint  The electrical currents create heat that damages the nerve so it cannot send pain signals  ACTIVITY  Do not drive or operate machinery today  No strenuous activity today - bending, lifting, etc   You may shower today, but do not sit in a tub of water  Resume normal activities tomorrow as tolerated  CARE OF THE INJECTION SITE  If you have soreness or pain, apply ice to the area today (20 minutes on/20 minutes off)  Starting tomorrow, you may use warm, moist heat or ice if needed  Notify [facility] if you have any of the following: redness, drainage, swelling, or fever above 100°F     SPECIAL INSTRUCTIONS  Our office will call you tomorrow for a progress report and make an appointment for a follow up visit in 4 weeks  If you feel a sunburn-like sensation in the area of your procedure, call our office  MEDICATIONS  Continue to take all routine medications  Our office may have instructed you to hold some medications  As no general anesthesia was used in today's procedure, you should not experience any side effects related to anesthesia  If you have a problem specifically related to your procedure, please call our office at [phone]  Problems not related to your procedure should be directed to your primary care physician

## 2022-05-18 DIAGNOSIS — E03.9 ACQUIRED HYPOTHYROIDISM: ICD-10-CM

## 2022-05-18 RX ORDER — LEVOTHYROXINE SODIUM 0.12 MG/1
TABLET ORAL
Qty: 90 TABLET | Refills: 0 | Status: SHIPPED | OUTPATIENT
Start: 2022-05-18

## 2022-05-18 NOTE — TELEPHONE ENCOUNTER
S/w pt, stated that she had a lot of pain yesterday but is doing better today  Taking advil / Dimple Lanes for her pain  Advised pt to allow 2-6 weeks for the full effect  Cb if s/s infection present: redness, drainage, swelling at the site or fever 100+, or if a sunburn like sensation in the area of the procedure presents  Ok to continue w/ rest, ice / heat, medication as prescribed / directed  Fu as scheduled on 6/14, cb sooner if questions / issues arise  Pt verbalized understanding and appreciation

## 2022-06-14 ENCOUNTER — OFFICE VISIT (OUTPATIENT)
Dept: PAIN MEDICINE | Facility: CLINIC | Age: 67
End: 2022-06-14
Payer: COMMERCIAL

## 2022-06-14 VITALS
TEMPERATURE: 97.9 F | BODY MASS INDEX: 36.57 KG/M2 | DIASTOLIC BLOOD PRESSURE: 78 MMHG | HEIGHT: 67 IN | SYSTOLIC BLOOD PRESSURE: 128 MMHG | WEIGHT: 233 LBS

## 2022-06-14 DIAGNOSIS — M70.62 GREATER TROCHANTERIC BURSITIS OF LEFT HIP: ICD-10-CM

## 2022-06-14 DIAGNOSIS — G89.29 CHRONIC BILATERAL LOW BACK PAIN WITHOUT SCIATICA: ICD-10-CM

## 2022-06-14 DIAGNOSIS — M25.552 LEFT HIP PAIN: Primary | ICD-10-CM

## 2022-06-14 DIAGNOSIS — M54.50 CHRONIC BILATERAL LOW BACK PAIN WITHOUT SCIATICA: ICD-10-CM

## 2022-06-14 PROCEDURE — 99214 OFFICE O/P EST MOD 30 MIN: CPT | Performed by: ANESTHESIOLOGY

## 2022-06-14 NOTE — PROGRESS NOTES
Assessment  1  Left hip pain    2  Greater trochanteric bursitis of left hip    3  Chronic bilateral low back pain without sciatica        Plan    Patient presents and follow-up from lumbar radiofrequency  Overall she reports 95% reduction symptoms  Pain does not interfere with daily living activities and she finds it extremely successful  She does report left-sided lateral thigh pain which is consistent with greater trochanteric bursitis  Will schedule the patient for left greater trochanteric bursa injection that would serve both diagnostic and hopefully therapeutic purposes  Complete risks and benefits including bleeding, infection, tissue reaction, nerve injury and allergic reaction were discussed  The approach was demonstrated using models and literature was provided  Verbal and written consent was obtained  My impressions and treatment recommendations were discussed in detail with the patient who verbalized understanding and had no further questions  Discharge instructions were provided  I personally saw and examined the patient and I agree with the above discussed plan of care  This note is created using dictation transcription  It may contain typographical errors, grammatical errors, improperly dictated words, background noise and other errors  Orders Placed This Encounter   Procedures    FL spine and pain procedure     Standing Status:   Future     Standing Expiration Date:   6/14/2026     Order Specific Question:   Reason for Exam:     Answer:   Left GT bursa injection     Order Specific Question:   Anticoagulant hold needed? Answer:   no     No orders of the defined types were placed in this encounter  History of Present Illness    Magalie Oh is a 73 y o  female who presents and follow-up from lumbar rhizotomy overall she reports almost complete resolution of her low back pain    0/10 on the visual analog scale she gets occasional pain left lateral thigh in the evening describes occasional shooting and minimally interferes with daily living activities  Her low back pain does not interfere with daily living activities  Her pain is worse with lying on her left side and sitting  I have personally reviewed and/or updated the patient's past medical history, past surgical history, family history, social history, current medications, allergies, and vital signs today  Review of Systems   Constitutional: Negative for fever and unexpected weight change  HENT: Negative for trouble swallowing  Eyes: Negative for visual disturbance  Respiratory: Negative for shortness of breath and wheezing  Cardiovascular: Negative for chest pain and palpitations  Gastrointestinal: Negative for constipation, diarrhea, nausea and vomiting  Endocrine: Negative for cold intolerance, heat intolerance and polydipsia  Genitourinary: Negative for difficulty urinating and frequency  Musculoskeletal: Negative for arthralgias, gait problem, joint swelling and myalgias  Skin: Negative for rash  Neurological: Negative for dizziness, seizures, syncope, weakness and headaches  Hematological: Does not bruise/bleed easily  Psychiatric/Behavioral: Negative for dysphoric mood  All other systems reviewed and are negative        Patient Active Problem List   Diagnosis    Complex ovarian cyst    Dry eye syndrome    Fatty infiltration of liver    Hyperglycemia    Abdominal pain    Mixed hyperlipidemia    Essential hypertension    Acquired hypothyroidism    Left ventricular hypertrophy    Lumbosacral pain    Mitral valvular disorder    Nephrolithiasis    Non-toxic multinodular goiter    Class 2 severe obesity with serious comorbidity and body mass index (BMI) of 36 0 to 36 9 in adult (HCC)    Rhinitis    Spondylosis of cervical region without myelopathy or radiculopathy    Thoracic neuritis    Endometrial polyp    Squamous cell cancer of skin of hand, right    Basal cell carcinoma (BCC) of ala nasi    Former smoker    Cough with sputum    Sialoadenitis    Impacted cerumen of right ear    Spasmodic cough    Mild intermittent asthma without complication    Aortic valve disease    Lumbar spondylosis    Gastroesophageal reflux disease with esophagitis without hemorrhage    Hiatal hernia    Primary osteoarthritis of first carpometacarpal joint of right hand    Urticaria    Chronic bilateral low back pain without sciatica       Past Medical History:   Diagnosis Date    Abdominal pain     Asthma     Basal cell carcinoma     Bicuspid aortic valve     LAST ASSESSED [id number]    Cervical disc disease     last assessed [id number]    Cervical stenosis of spine     LAST ASSESSED [id number]    Disease of thyroid gland     hypothyroid    Dry eyes     Endometriosis     Hot flashes     Hyperglycemia     Hyperlipidemia     Hypertension     Hypothyroidism     Left ventricular hypertrophy     Mitral valvular disorder     Nephrolithiasis     Ovarian cyst, complex     Ptosis     LAST ASSESSED [id number]    Renal calculi     Right cervical radiculopathy     LAST ASSESSED [id number]    Seasonal allergies     Squamous cell skin cancer     Thyroid disease        Past Surgical History:   Procedure Laterality Date    BASAL CELL CARCINOMA EXCISION      CARPAL TUNNEL RELEASE       SECTION      CHOLECYSTECTOMY      COLONOSCOPY      NEUROPLASTY / TRANSPOSITION MEDIAN NERVE AT CARPAL TUNNEL      NEUROPLASTY / TRANSPOSITION MEDIAN NERVE AT CARPAL TUNNEL      [facility]    OTHER SURGICAL HISTORY      surgically removed skin patch for skin cancer    PLANTAR FASCIECTOMY      VT COLONOSCOPY FLX DX W/COLLJ SPEC WHEN PFRMD N/A 10/9/2017    Procedure: COLONOSCOPY;  Surgeon: Clayton Jones MD;  Location: [facility];   Service: Gastroenterology    VT HYSTEROSCOPY,W/ENDO BX N/A 2018    Procedure: DILATATION AND CURETTAGE (D&C) WITH HYSTEROSCOPY;  Surgeon: Willam Ramirez Hector Connelly DO;  Location: [facility] OR;  Service: Gynecology    SKIN BIOPSY      TUBAL LIGATION      UPPER GASTROINTESTINAL ENDOSCOPY         Family History   Problem Relation Age of Onset    Diabetes Mother     Alzheimer's disease Mother     Heart disease Mother     Hypertension Mother     Other Family         back disorder    Cancer Family     Heart disease Family     Thyroid disease Family     No Known Problems Father     No Known Problems Sister     No Known Problems Daughter     No Known Problems Maternal Grandmother     No Known Problems Maternal Grandfather     No Known Problems Paternal Grandmother     No Known Problems Paternal Grandfather     No Known Problems Sister     No Known Problems Maternal Aunt     No Known Problems Maternal Aunt     No Known Problems Maternal Aunt     No Known Problems Maternal Aunt     No Known Problems Maternal Aunt        Social History     Occupational History    Occupation: part time employment   Tobacco Use    Smoking status: Former Smoker     Packs/day: 1 00     Years: 20 00     Pack years: 20 00     Types: Cigarettes     Quit date: 2012     Years since quitting: 10 4    Smokeless tobacco: Never Used   Vaping Use    Vaping Use: Never used   Substance and Sexual Activity    Alcohol use:  Yes     Alcohol/week: 8 0 standard drinks     Types: 4 Glasses of wine, 4 Standard drinks or equivalent per week     Comment: occasional    Drug use: No    Sexual activity: Yes     Partners: Male     Birth control/protection: Female Sterilization     Comment: tubal ligation        Current Outpatient Medications on File Prior to Visit   Medication Sig    albuterol (Ventolin HFA) 90 mcg/act inhaler Inhale 2 puffs every 6 (six) hours as needed for wheezing    amLODIPine (NORVASC) 5 mg tablet Take 1 tablet (5 mg total) by mouth daily    fexofenadine (ALLEGRA) 180 MG tablet Take 1 tablet (180 mg total) by mouth daily    fluticasone (ARNUITY ELLIPTA) 100 MCG/ACT AEPB inhaler Inhale 1 puff daily Rinse mouth after use   levothyroxine 125 mcg tablet TAKE ONE TABLET BY MOUTH EVERY DAY   losartan-hydrochlorothiazide (HYZAAR) 100-25 MG per tablet Take 1 tablet by mouth daily    metFORMIN (GLUCOPHAGE) 500 mg tablet Take 1 tablet (500 mg total) by mouth daily with breakfast    XIIDRA 5 % op solution     famotidine (PEPCID) 40 MG tablet Take 1 tablet (40 mg total) by mouth daily    ketoconazole (NIZORAL) 2 % cream Apply topically daily for 7 days    pantoprazole (PROTONIX) 40 mg tablet Take 1 tablet (40 mg total) by mouth 2 (two) times a day     No current facility-administered medications on file prior to visit  Allergies   Allergen Reactions    Erythromycin GI Intolerance     Reaction Date: 11Jul2011;     Penicillins Other (See Comments)     unknown    Aztreonam Rash     Action Taken: Allergy added by [employer] to replace Penicillins Cross [employer];     Carbapenems Rash     Action Taken: Allergy added by [employer] to replace Penicillins [employer];     Cephalosporins Rash     Action Taken: Allergy added by [employer] to replace Penicillins [employer];     Griseofulvin Rash     Action Taken: Allergy added by [employer] to replace Penicillins [employer];     Influenza Virus Vaccine Hives, Rash and GI Intolerance       Physical Exam    /78 (BP Location: Left arm, Patient Position: Sitting, Cuff Size: Standard)   Temp 97 9 °F (36 6 °C)   Ht 5' 7" (1 702 m)   Wt 106 kg (233 lb)   BMI 36 49 kg/m²     Constitutional: normal, well developed, well nourished, alert, in no distress and non-toxic and no overt pain behavior   and obese  Eyes: anicteric  HEENT: grossly intact  Neck: supple, symmetric, trachea midline and no masses   Pulmonary:even and unlabored  Cardiovascular:No edema or pitting edema present  Skin:Normal without rashes or lesions and well hydrated  Psychiatric:Mood and affect appropriate  Neurologic:Cranial Nerves II-XII grossly intact  Musculoskeletal:normal, Difficulty going from sitting to standing sitting position; no obvious skin lesions or erythema lumbar sacral spine; mild tenderness in lumbar paravertebrals, no sacroiliac, there is left greater trochanteric tenderness; deep tendon reflexes are diminished but symmetrical bilateral patellar and achilles; no focal motor deficit appreciated lower limbs; negative bilateral straight leg raising

## 2022-06-16 ENCOUNTER — HOSPITAL ENCOUNTER (OUTPATIENT)
Dept: RADIOLOGY | Facility: CLINIC | Age: 67
Discharge: HOME | End: 2022-06-16
Attending: ANESTHESIOLOGY
Payer: COMMERCIAL

## 2022-06-16 VITALS
OXYGEN SATURATION: 92 % | RESPIRATION RATE: 18 BRPM | TEMPERATURE: 97.5 F | DIASTOLIC BLOOD PRESSURE: 76 MMHG | HEART RATE: 64 BPM | SYSTOLIC BLOOD PRESSURE: 131 MMHG

## 2022-06-16 DIAGNOSIS — M70.62 GREATER TROCHANTERIC BURSITIS OF LEFT HIP: ICD-10-CM

## 2022-06-16 DIAGNOSIS — M25.552 LEFT HIP PAIN: ICD-10-CM

## 2022-06-16 PROCEDURE — 77002 NEEDLE LOCALIZATION BY XRAY: CPT

## 2022-06-16 PROCEDURE — 77002 NEEDLE LOCALIZATION BY XRAY: CPT | Performed by: ANESTHESIOLOGY

## 2022-06-16 PROCEDURE — 20610 DRAIN/INJ JOINT/BURSA W/O US: CPT | Performed by: ANESTHESIOLOGY

## 2022-06-16 PROCEDURE — A9585 GADOBUTROL INJECTION: HCPCS | Performed by: ANESTHESIOLOGY

## 2022-06-16 RX ORDER — METHYLPREDNISOLONE ACETATE 80 MG/ML
80 INJECTION, SUSPENSION INTRA-ARTICULAR; INTRALESIONAL; INTRAMUSCULAR; PARENTERAL; SOFT TISSUE ONCE
Status: COMPLETED | OUTPATIENT
Start: 2022-06-16 | End: 2022-06-16

## 2022-06-16 RX ADMIN — LIDOCAINE HYDROCHLORIDE 2 ML: 20 INJECTION, SOLUTION EPIDURAL; INFILTRATION; INTRACAUDAL at 11:19

## 2022-06-16 RX ADMIN — METHYLPREDNISOLONE ACETATE 80 MG: 80 INJECTION, SUSPENSION INTRA-ARTICULAR; INTRALESIONAL; INTRAMUSCULAR; SOFT TISSUE at 11:18

## 2022-06-16 RX ADMIN — GADOBUTROL 0.5 ML: 604.72 INJECTION INTRAVENOUS at 11:17

## 2022-06-16 NOTE — H&P
Progress Notes  Pastor Santos DO (Physician)   Pain Medicine  Expand All Collapse All  Assessment  1  Left hip pain    2  Greater trochanteric bursitis of left hip    3  Chronic bilateral low back pain without sciatica          Plan     Patient presents and follow-up from lumbar radiofrequency  Overall she reports 95% reduction symptoms  Pain does not interfere with daily living activities and she finds it extremely successful        She does report left-sided lateral thigh pain which is consistent with greater trochanteric bursitis  Will schedule the patient for left greater trochanteric bursa injection that would serve both diagnostic and hopefully therapeutic purposes      Complete risks and benefits including bleeding, infection, tissue reaction, nerve injury and allergic reaction were discussed  The approach was demonstrated using models and literature was provided  Verbal and written consent was obtained      My impressions and treatment recommendations were discussed in detail with the patient who verbalized understanding and had no further questions  Discharge instructions were provided  I personally saw and examined the patient and I agree with the above discussed plan of care  This note is created using dictation transcription    It may contain typographical errors, grammatical errors, improperly dictated words, background noise and other errors            Orders Placed This Encounter   Procedures    FL spine and pain procedure       Standing Status:   Future       Standing Expiration Date:   6/14/2026       Order Specific Question:   Reason for Exam:       Answer:   Left GT bursa injection       Order Specific Question:   Anticoagulant hold needed?       Answer:   no      No orders of the defined types were placed in this encounter         History of Present Illness     Gabriel Green is a 73 y o  female who presents and follow-up from lumbar rhizotomy overall she reports almost complete resolution of her low back pain  0/10 on the visual analog scale she gets occasional pain left lateral thigh in the evening describes occasional shooting and minimally interferes with daily living activities  Her low back pain does not interfere with daily living activities  Her pain is worse with lying on her left side and sitting      I have personally reviewed and/or updated the patient's past medical history, past surgical history, family history, social history, current medications, allergies, and vital signs today       Review of Systems   Constitutional: Negative for fever and unexpected weight change  HENT: Negative for trouble swallowing  Eyes: Negative for visual disturbance  Respiratory: Negative for shortness of breath and wheezing  Cardiovascular: Negative for chest pain and palpitations  Gastrointestinal: Negative for constipation, diarrhea, nausea and vomiting  Endocrine: Negative for cold intolerance, heat intolerance and polydipsia  Genitourinary: Negative for difficulty urinating and frequency  Musculoskeletal: Negative for arthralgias, gait problem, joint swelling and myalgias  Skin: Negative for rash  Neurological: Negative for dizziness, seizures, syncope, weakness and headaches  Hematological: Does not bruise/bleed easily  Psychiatric/Behavioral: Negative for dysphoric mood     All other systems reviewed and are negative             Patient Active Problem List   Diagnosis    Complex ovarian cyst    Dry eye syndrome    Fatty infiltration of liver    Hyperglycemia    Abdominal pain    Mixed hyperlipidemia    Essential hypertension    Acquired hypothyroidism    Left ventricular hypertrophy    Lumbosacral pain    Mitral valvular disorder    Nephrolithiasis    Non-toxic multinodular goiter    Class 2 severe obesity with serious comorbidity and body mass index (BMI) of 36 0 to 36 9 in adult (HCC)    Rhinitis    Spondylosis of cervical region without myelopathy or radiculopathy    Thoracic neuritis    Endometrial polyp    Squamous cell cancer of skin of hand, right    Basal cell carcinoma (BCC) of ala nasi    Former smoker    Cough with sputum    Sialoadenitis    Impacted cerumen of right ear    Spasmodic cough    Mild intermittent asthma without complication    Aortic valve disease    Lumbar spondylosis    Gastroesophageal reflux disease with esophagitis without hemorrhage    Hiatal hernia    Primary osteoarthritis of first carpometacarpal joint of right hand    Urticaria    Chronic bilateral low back pain without sciatica         Medical History        Past Medical History:   Diagnosis Date    Abdominal pain      Asthma      Basal cell carcinoma      Bicuspid aortic valve       LAST ASSESSED [id number]    Cervical disc disease       last assessed [id number]    Cervical stenosis of spine       LAST ASSESSED [id number]    Disease of thyroid gland       hypothyroid    Dry eyes      Endometriosis      Hot flashes      Hyperglycemia      Hyperlipidemia      Hypertension      Hypothyroidism      Left ventricular hypertrophy      Mitral valvular disorder      Nephrolithiasis      Ovarian cyst, complex      Ptosis       LAST ASSESSED [id number]    Renal calculi      Right cervical radiculopathy       LAST ASSESSED [id number]    Seasonal allergies      Squamous cell skin cancer      Thyroid disease              Surgical History         Past Surgical History:   Procedure Laterality Date    BASAL CELL CARCINOMA EXCISION        CARPAL TUNNEL RELEASE         SECTION        CHOLECYSTECTOMY        COLONOSCOPY        NEUROPLASTY / TRANSPOSITION MEDIAN NERVE AT CARPAL TUNNEL        NEUROPLASTY / TRANSPOSITION MEDIAN NERVE AT CARPAL TUNNEL        [facility]    OTHER SURGICAL HISTORY         surgically removed skin patch for skin cancer    PLANTAR FASCIECTOMY        AR COLONOSCOPY FLX DX W/COLLJ SPEC WHEN PFRMD N/A 10/9/2017     Procedure: COLONOSCOPY;  Surgeon: Amrita Jay MD;  Location: [facility]; Service: Gastroenterology    [place] HYSTEROSCOPY,W/ENDO BX N/A 2/26/2018     Procedure: DILATATION AND CURETTAGE (D&C) WITH HYSTEROSCOPY;  Surgeon: Shiva Longoria DO;  Location: [facility];  Service: Gynecology    SKIN BIOPSY        TUBAL LIGATION        UPPER GASTROINTESTINAL ENDOSCOPY                      Family History   Problem Relation Age of Onset    Diabetes Mother      Alzheimer's disease Mother      Heart disease Mother      Hypertension Mother      Other Family           back disorder    Cancer Family      Heart disease Family      Thyroid disease Family      No Known Problems Father      No Known Problems Sister      No Known Problems Daughter      No Known Problems Maternal Grandmother      No Known Problems Maternal Grandfather      No Known Problems Paternal Grandmother      No Known Problems Paternal Grandfather      No Known Problems Sister      No Known Problems Maternal Aunt      No Known Problems Maternal Aunt      No Known Problems Maternal Aunt      No Known Problems Maternal Aunt      No Known Problems Maternal Aunt           Social History            Occupational History    Occupation: part time employment   Tobacco Use    Smoking status: Former Smoker       Packs/day: 1 00       Years: 20 00       Pack years: 20 00       Types: Cigarettes       Quit date: 2012       Years since quitting: 10 4    Smokeless tobacco: Never Used   Vaping Use    Vaping Use: Never used   Substance and Sexual Activity    Alcohol use:  Yes       Alcohol/week: 8 0 standard drinks       Types: 4 Glasses of wine, 4 Standard drinks or equivalent per week       Comment: occasional    Drug use: No    Sexual activity: Yes       Partners: Male       Birth control/protection: Female Sterilization       Comment: tubal ligation               Current Outpatient Medications on File Prior to Visit   Medication Sig    albuterol (Ventolin HFA) 90 mcg/act inhaler Inhale 2 puffs every 6 (six) hours as needed for wheezing    amLODIPine (NORVASC) 5 mg tablet Take 1 tablet (5 mg total) by mouth daily    fexofenadine (ALLEGRA) 180 MG tablet Take 1 tablet (180 mg total) by mouth daily    fluticasone (ARNUITY ELLIPTA) 100 MCG/ACT AEPB inhaler Inhale 1 puff daily Rinse mouth after use   levothyroxine 125 mcg tablet TAKE ONE TABLET BY MOUTH EVERY DAY   losartan-hydrochlorothiazide (HYZAAR) 100-25 MG per tablet Take 1 tablet by mouth daily    metFORMIN (GLUCOPHAGE) 500 mg tablet Take 1 tablet (500 mg total) by mouth daily with breakfast    XIIDRA 5 % op solution      famotidine (PEPCID) 40 MG tablet Take 1 tablet (40 mg total) by mouth daily    ketoconazole (NIZORAL) 2 % cream Apply topically daily for 7 days    pantoprazole (PROTONIX) 40 mg tablet Take 1 tablet (40 mg total) by mouth 2 (two) times a day      No current facility-administered medications on file prior to visit                Allergies   Allergen Reactions    Erythromycin GI Intolerance       Reaction Date: 11Jul2011;     Penicillins Other (See Comments)       unknown    Aztreonam Rash       Action Taken: Allergy added by [employer] to replace Penicillins Cross Reactors;     Carbapenems Rash       Action Taken: Allergy added by [employer] to replace Penicillins Cross Reactors;     Cephalosporins Rash       Action Taken: Allergy added by [employer] to replace Penicillins Cross Reactors;     Griseofulvin Rash       Action Taken: Allergy added by [employer] to replace Penicillins Cross Reactors;     Influenza Virus Vaccine Hives, Rash and GI Intolerance         Physical Exam     /78 (BP Location: Left arm, Patient Position: Sitting, Cuff Size: Standard)   Temp 97 9 °F (36 6 °C)   Ht 5' 7" (1 702 m)   Wt 106 kg (233 lb)   BMI 36 49 kg/m²      Constitutional: normal, well developed, well nourished, alert, in no distress and non-toxic and no overt pain behavior  and obese  Eyes: anicteric  HEENT: grossly intact  Neck: supple, symmetric, trachea midline and no masses   Pulmonary:even and unlabored  Cardiovascular:No edema or pitting edema present  Skin:Normal without rashes or lesions and well hydrated  Psychiatric:Mood and affect appropriate  Neurologic:Cranial Nerves II-XII grossly intact  Musculoskeletal:normal, Difficulty going from sitting to standing sitting position; no obvious skin lesions or erythema lumbar sacral spine; mild tenderness in lumbar paravertebrals, no sacroiliac, there is left greater trochanteric tenderness; deep tendon reflexes are diminished but symmetrical bilateral patellar and achilles; no focal motor deficit appreciated lower limbs; negative bilateral straight leg raising        ASA:III

## 2022-06-16 NOTE — DISCHARGE INSTRUCTIONS
Steroid Joint Injection   WHAT YOU NEED TO KNOW:   A steroid joint injection is a procedure to inject steroid medicine into a joint  Steroid medicine decreases pain and inflammation  The injection may also contain an anesthetic (numbing medicine) to decrease pain  It may be done to treat conditions such as arthritis, gout, or carpal tunnel syndrome  The injections may be given in your knee, ankle, shoulder, elbow, wrist, ankle or sacroiliac joint  Do not apply heat to any area that is numb  If you have discomfort or soreness at the injection site, you may apply ice today, 20 minutes on and 20 minutes off  Tomorrow you may use ice or warm, moist heat  Do not apply ice or heat directly to the skin  You may have an increase or change in the discomfort for 36-48 hours after your treatment  Apply ice and continue with any pain medicine you have been prescribed  Do not do anything strenuous today  You may shower, but no tub baths or hot tubs today  You may resume your normal activities tomorrow, but do not overdo it  Resume normal activities slowly when you are feeling better  If you experience redness, drainage or swelling at the injection site, or if you develop a fever above 100 degrees, please call [facility] at [phone] or go to the Emergency Room  Continue to take all routine medicines prescribed by your primary care physician unless otherwise instructed by our staff  Most blood thinners should be started again according to your regularly scheduled dosing  If you have any questions, please give our office a call  As no general anesthesia was used in today's procedure, you should not experience any side effects related to anesthesia  If you have a problem specifically related to your procedure, please call our office at [phone]  Problems not related to your procedure should be directed to your primary care physician

## 2022-06-23 ENCOUNTER — TELEPHONE (OUTPATIENT)
Dept: PAIN MEDICINE | Facility: CLINIC | Age: 67
End: 2022-06-23

## 2022-07-02 NOTE — DISCHARGE INSTRUCTIONS
Call with heavy bleeding or worsening pain, fever    Review printed instructions from office    Return to office in 2 weeks
I have reviewed and confirmed nurses' notes for patient's medications, allergies, medical history, and surgical history.

## 2022-07-08 DIAGNOSIS — J45.20 MILD INTERMITTENT ASTHMA WITHOUT COMPLICATION: ICD-10-CM

## 2022-07-11 RX ORDER — FLUTICASONE FUROATE 100 UG/1
POWDER RESPIRATORY (INHALATION)
Qty: 30 BLISTER | Refills: 11 | Status: SHIPPED | OUTPATIENT
Start: 2022-07-11

## 2022-07-25 ENCOUNTER — OFFICE VISIT (OUTPATIENT)
Dept: FAMILY MEDICINE CLINIC | Facility: HOSPITAL | Age: 67
End: 2022-07-25
Payer: COMMERCIAL

## 2022-07-25 ENCOUNTER — HOSPITAL ENCOUNTER (OUTPATIENT)
Dept: MAMMOGRAPHY | Facility: CLINIC | Age: 67
Discharge: HOME | End: 2022-07-25
Payer: COMMERCIAL

## 2022-07-25 ENCOUNTER — HOSPITAL ENCOUNTER (OUTPATIENT)
Dept: BONE DENSITY | Facility: CLINIC | Age: 67
Discharge: HOME | End: 2022-07-25
Payer: COMMERCIAL

## 2022-07-25 VITALS
WEIGHT: 231.6 LBS | DIASTOLIC BLOOD PRESSURE: 79 MMHG | OXYGEN SATURATION: 96 % | SYSTOLIC BLOOD PRESSURE: 124 MMHG | HEIGHT: 67 IN | BODY MASS INDEX: 36.35 KG/M2 | TEMPERATURE: 97.6 F | HEART RATE: 82 BPM

## 2022-07-25 VITALS — HEIGHT: 67 IN | WEIGHT: 231 LBS | BODY MASS INDEX: 36.26 KG/M2

## 2022-07-25 DIAGNOSIS — R73.9 HYPERGLYCEMIA: Primary | ICD-10-CM

## 2022-07-25 DIAGNOSIS — E55.9 VITAMIN D DEFICIENCY: ICD-10-CM

## 2022-07-25 DIAGNOSIS — Z23 ENCOUNTER FOR VACCINATION: ICD-10-CM

## 2022-07-25 DIAGNOSIS — K76.0 FATTY INFILTRATION OF LIVER: ICD-10-CM

## 2022-07-25 DIAGNOSIS — I10 ESSENTIAL HYPERTENSION: ICD-10-CM

## 2022-07-25 DIAGNOSIS — E66.01 CLASS 2 SEVERE OBESITY DUE TO EXCESS CALORIES WITH SERIOUS COMORBIDITY AND BODY MASS INDEX (BMI) OF 36.0 TO 36.9 IN ADULT (HCC): ICD-10-CM

## 2022-07-25 DIAGNOSIS — Z78.0 ASYMPTOMATIC MENOPAUSE: ICD-10-CM

## 2022-07-25 DIAGNOSIS — E03.9 ACQUIRED HYPOTHYROIDISM: ICD-10-CM

## 2022-07-25 DIAGNOSIS — E78.2 MIXED HYPERLIPIDEMIA: ICD-10-CM

## 2022-07-25 DIAGNOSIS — Z12.31 ENCOUNTER FOR SCREENING MAMMOGRAM FOR BREAST CANCER: ICD-10-CM

## 2022-07-25 PROCEDURE — 77080 DXA BONE DENSITY AXIAL: CPT

## 2022-07-25 PROCEDURE — 90677 PCV20 VACCINE IM: CPT

## 2022-07-25 PROCEDURE — 77063 BREAST TOMOSYNTHESIS BI: CPT

## 2022-07-25 PROCEDURE — 77067 SCR MAMMO BI INCL CAD: CPT

## 2022-07-25 PROCEDURE — 99214 OFFICE O/P EST MOD 30 MIN: CPT | Performed by: FAMILY MEDICINE

## 2022-07-25 PROCEDURE — 90471 IMMUNIZATION ADMIN: CPT

## 2022-07-25 NOTE — PROGRESS NOTES
Assessment/Plan:         Diagnoses and all orders for this visit:    Hyperglycemia  -     HEMOGLOBIN A1C W/ EAG ESTIMATION; Future  -     Semaglutide 3 MG TABS; Take 1 tablet by mouth in the morning    Essential hypertension  -     Comprehensive metabolic panel; Future    Acquired hypothyroidism  -     TSH, 3rd generation with Free T4 reflex; Future    Fatty infiltration of liver    Mixed hyperlipidemia  -     Lipid Panel with Direct LDL reflex; Future    Class 2 severe obesity due to excess calories with serious comorbidity and body mass index (BMI) of 36 0 to 36 9 in adult (HCC)  -     Semaglutide 3 MG TABS; Take 1 tablet by mouth in the morning    Vitamin D deficiency  -     Vitamin D 25 hydroxy; Future          Subjective:      Patient ID: Mohan Harding is a 73 y o  female  3 month follow up  No recent illness or injury    Discussed need for Prevnar 20    Had rhizotomy with good overall results but still has L posterior thigh pain  Getting chiropractic treatments regularly  Also had L hip injection for bursitis    Discussed weight control medications perhaps to take the place of Metformin- will try to get Rybelsus approved      The following portions of the patient's history were reviewed and updated as appropriate: allergies, current medications, past family history, past medical history, past social history, past surgical history and problem list     Review of Systems   Constitutional: Positive for fatigue  Negative for unexpected weight change  Respiratory: Negative  Cardiovascular: Negative  Musculoskeletal: Positive for arthralgias, back pain and gait problem  Hematological: Negative  Psychiatric/Behavioral: Negative  All other systems reviewed and are negative          Objective:      /79 (BP Location: Left arm, Patient Position: Sitting, Cuff Size: Large)   Pulse 82   Temp 97 6 °F (36 4 °C) (Tympanic)   Ht 5' 7" (1 702 m)   Wt 105 kg (231 lb 9 6 oz)   SpO2 96%   BMI 36 27 kg/m²          Physical Exam  Vitals and nursing note reviewed  Constitutional:       Appearance: She is obese  Cardiovascular:      Rate and Rhythm: Normal rate and regular rhythm  Pulses: Normal pulses  Heart sounds: Murmur heard  Pulmonary:      Effort: Pulmonary effort is normal       Breath sounds: Normal breath sounds  Neurological:      Mental Status: She is alert     Psychiatric:         Mood and Affect: Mood normal

## 2022-08-08 PROCEDURE — 88312 SPECIAL STAINS GROUP 1: CPT | Performed by: STUDENT IN AN ORGANIZED HEALTH CARE EDUCATION/TRAINING PROGRAM

## 2022-08-08 PROCEDURE — 88305 TISSUE EXAM BY PATHOLOGIST: CPT | Performed by: STUDENT IN AN ORGANIZED HEALTH CARE EDUCATION/TRAINING PROGRAM

## 2022-08-10 ENCOUNTER — LAB REQUISITION (OUTPATIENT)
Dept: LAB | Facility: HOSPITAL | Age: 67
End: 2022-08-10
Payer: COMMERCIAL

## 2022-08-10 DIAGNOSIS — L60.3 NAIL DYSTROPHY: ICD-10-CM

## 2022-08-22 ENCOUNTER — OFFICE VISIT (OUTPATIENT)
Dept: PHYSICAL THERAPY | Facility: CLINIC | Age: 67
End: 2022-08-22
Payer: COMMERCIAL

## 2022-08-22 ENCOUNTER — TELEPHONE (OUTPATIENT)
Dept: FAMILY MEDICINE CLINIC | Facility: HOSPITAL | Age: 67
End: 2022-08-22

## 2022-08-22 DIAGNOSIS — M25.552 LEFT HIP PAIN: Primary | ICD-10-CM

## 2022-08-22 DIAGNOSIS — B37.2 CUTANEOUS CANDIDIASIS: ICD-10-CM

## 2022-08-22 PROCEDURE — 97162 PT EVAL MOD COMPLEX 30 MIN: CPT | Performed by: PHYSICAL THERAPIST

## 2022-08-22 NOTE — PROGRESS NOTES
PT Evaluation     Today's date: 2022  Patient name: Usama Mccarthy  : 1955  MRN: [id number]  Referring provider: Sam Rowe PT  Dx:   Encounter Diagnosis     ICD-10-CM    1  Left hip pain  M25 552                   Assessment  Assessment details: Pt is a 73y o  year old female coming to outpatient PT through direct access with L LE pain onset about 3 months ago 2* unknown etiology  Pt presents with increased pain and TTP, decreased L hip ROM, decreased L hip strength, (+) SI joint abnormality, and overall decreased functional mobility  Pt would benefit from skilled PT services in order to address these deficits and reach maximum level of function  Impairments: abnormal or restricted ROM, activity intolerance, impaired physical strength, lacks appropriate home exercise program and pain with function  Understanding of Dx/Px/POC: good   Prognosis: good    Goals  STG's ( 3-4 weeks)  1  Pt will be independent in HEP  2  Pt will have normal SI joint allignment  LTG's ( 6- 8 weeks)  1  Improve FOTO score by 8-10 points  2  Pt will have less pain with walking activities  3  Pt will have less pain with sleeping, especially when laying on L side  4  Decrease pain to 2-3/10 at worst    Plan  Patient would benefit from: PT eval and skilled physical therapy  Planned modality interventions: low level laser therapy and cryotherapy  Planned therapy interventions: manual therapy, joint mobilization, neuromuscular re-education, home exercise program, functional ROM exercises, flexibility, stretching, therapeutic activities and therapeutic exercise  Frequency: 2x week  Duration in weeks: 4  Plan of Care beginning date: 2022  Plan of Care expiration date: 2022  Treatment plan discussed with: patient        Subjective Evaluation    History of Present Illness  Mechanism of injury: Pt reports her L hip and L LE pain started about 3 months ago 2* unknown etiology   Pt has a history of chronic LBP, but recently sx have been resolved through nerve ablation tx with pain management  Pt is having difficulty crossing her legs  Pt has increased L hip and leg pain with walking activities and has less pain when going up and down the steps and standing in the kitchen  No pain with sitting  Pt has increased pain when laying on her L side  Pt had a steriod injection in her L hip with relief from extreme pain  Pt has increased pain and difficulty with bending and squatting to  objects off the floor  Pt has some pain transferring sit to stand and getting out of a car  Pt has increased pain in posterior hip after prolonged sitting activities with work  Pt has painful clicking when she rolls in bed      Work: [employer]; 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  Gait: no abnormalities  Pain  At best pain ratin  At worst pain ratin  Location: L LE  Quality: radiating, sharp and tight  Aggravating factors: walking    Social Support  Steps to enter house: yes (3)  Stairs in house: yes (12)   Lives in: multiple-level home  Lives with: spouse    Employment status: working    Diagnostic Tests  No diagnostic tests performed  Treatments  Previous treatment: injection treatment  Patient Goals  Patient goals for therapy: decreased pain and return to sport/leisure activities  Patient goal: to be able to move my legs back and forth and to be able to walk        Objective     Neurological Testing     Sensation     Lumbar   Left   Intact: light touch    Right   Intact: light touch    Reflexes   Left   Patellar (L4): trace (1+)  Achilles (S1): normal (2+)    Right   Patellar (L4): trace (1+)  Achilles (S1): normal (2+)    Active Range of Motion     Lumbar   Flexion: 95 degrees   Extension: 15 degrees   Left lateral flexion: 15 degrees       Right lateral flexion: 15 degrees   Left rotation:  WFL  Right rotation:  [facility]  Left Hip   External rotation (90/90): 22 degrees   Internal rotation (90/90): 25 degrees with pain    Right Hip   External rotation (90/90): 20 degrees   Internal rotation (90/90): 35 degrees     Additional Active Range of Motion Details  In standing: symmetrical iliac crest and PSIS levels  (+) TTP L PSIS, L ITB, L greater trochanter  (+) SHIRLEY test- pt able to perform on L  Pain with SKTC  HS flexibility: R: 65*  L: 70* with opposite knee flexed  (+) supine to long sit test: L LE longer in supine to shorter in long sit  (+) hypomobility in lumbar spine    Strength/Myotome Testing     Left Hip   Planes of Motion   Flexion: 4 (pain)  Extension: 4 (pain)  Abduction: 4 (pain)  External rotation: 4 (pain)  Internal rotation: 4+    Right Hip   Planes of Motion   Flexion: 4+  Extension: 4  Abduction: 4+  External rotation: 4+  Internal rotation: 4+    Left Knee   Flexion: 5  Extension: 5    Right Knee   Flexion: 5  Extension: 5    Left Ankle/Foot   Dorsiflexion: 5    Right Ankle/Foot   Dorsiflexion: 5            Dx:  L hip pain  EPOC: 9/19/22 (direct access)  CO-MORBIDITIES: chronic LBP  PERSONAL FACTORS: wants to have less pian with walking and to be able to cross her legs  Precautions: none      Manuals             SI MET: L anter inom tors             SI shotgun             Cold laser L greater troch- prn             L glut med/ ITB MFR                          Neuro Re-Ed             DLS: abd bracing             bridges             clamshells             Standing bird dog                                                    Ther Ex             bike             HS stretch             Gentle SKTC             Gentle LTR             Hip flexor stretch on chair                                                    Ther Activity                                       Gait Training                                       Modalities             Cold pack- prn

## 2022-08-22 NOTE — TELEPHONE ENCOUNTER
Pt calling again asking if rx will be called in for 3mg or 5mg   Only has 6 days left of current med

## 2022-08-23 ENCOUNTER — TELEPHONE (OUTPATIENT)
Dept: FAMILY MEDICINE CLINIC | Facility: HOSPITAL | Age: 67
End: 2022-08-23

## 2022-08-23 DIAGNOSIS — R73.9 HYPERGLYCEMIA: ICD-10-CM

## 2022-08-23 DIAGNOSIS — E66.01 CLASS 2 SEVERE OBESITY DUE TO EXCESS CALORIES WITH SERIOUS COMORBIDITY AND BODY MASS INDEX (BMI) OF 36.0 TO 36.9 IN ADULT: Primary | ICD-10-CM

## 2022-08-23 RX ORDER — KETOCONAZOLE 20 MG/G
CREAM TOPICAL DAILY
Qty: 30 G | Refills: 0 | Status: SHIPPED | OUTPATIENT
Start: 2022-08-23 | End: 2022-10-07

## 2022-08-23 NOTE — TELEPHONE ENCOUNTER
Patient is looking for Rybelsis (?) (Robelsis (?) medication that she said needs a prior auth for  I don't see this med on her list at all but she says she only has 5 pills left and she needs this prior auth put through asap  What is status?

## 2022-08-23 NOTE — TELEPHONE ENCOUNTER
LM to call back  A coupon for the med was scanned in her chart on 7/25  Coupon is good for 3 months  Did she use the coupon?

## 2022-08-24 DIAGNOSIS — E03.9 ACQUIRED HYPOTHYROIDISM: ICD-10-CM

## 2022-08-24 RX ORDER — LEVOTHYROXINE SODIUM 0.12 MG/1
TABLET ORAL
Qty: 90 TABLET | Refills: 0 | Status: SHIPPED | OUTPATIENT
Start: 2022-08-24

## 2022-08-25 ENCOUNTER — OFFICE VISIT (OUTPATIENT)
Dept: PHYSICAL THERAPY | Facility: CLINIC | Age: 67
End: 2022-08-25
Payer: COMMERCIAL

## 2022-08-25 DIAGNOSIS — M25.552 LEFT HIP PAIN: Primary | ICD-10-CM

## 2022-08-25 PROCEDURE — 97112 NEUROMUSCULAR REEDUCATION: CPT | Performed by: PHYSICAL THERAPIST

## 2022-08-25 PROCEDURE — 97140 MANUAL THERAPY 1/> REGIONS: CPT | Performed by: PHYSICAL THERAPIST

## 2022-08-25 PROCEDURE — 97110 THERAPEUTIC EXERCISES: CPT | Performed by: PHYSICAL THERAPIST

## 2022-08-30 ENCOUNTER — OFFICE VISIT (OUTPATIENT)
Dept: PHYSICAL THERAPY | Facility: CLINIC | Age: 67
End: 2022-08-30
Payer: COMMERCIAL

## 2022-08-30 DIAGNOSIS — M25.552 LEFT HIP PAIN: Primary | ICD-10-CM

## 2022-08-30 PROCEDURE — 97140 MANUAL THERAPY 1/> REGIONS: CPT | Performed by: PHYSICAL THERAPIST

## 2022-08-30 PROCEDURE — 97110 THERAPEUTIC EXERCISES: CPT | Performed by: PHYSICAL THERAPIST

## 2022-08-30 PROCEDURE — 97112 NEUROMUSCULAR REEDUCATION: CPT | Performed by: PHYSICAL THERAPIST

## 2022-08-30 NOTE — PROGRESS NOTES
Daily Note     Today's date: 2022  Patient name: Olivia Kunz  : 1955  MRN: [id number]  Referring provider: Marilu Carbajal PT  Dx:   Encounter Diagnosis     ICD-10-CM    1  Left hip pain  M25 552                   Subjective: Pt reports her L hip usually feels a little stiff and painful in the morning 2* sleeping on her L side  Pain rated 5/10  Pt had increased pain after walking at the [employer] [place]      Objective: See treatment diary below      Assessment: Tolerated treatment well  Patient was instructed in self MFR wtih tennis ball  Pt had good technique with DLS ex      Plan: Continue per plan of care  continue to decrease pain       Dx: L hip pain  EPOC: 22 (direct access)  CO-MORBIDITIES: chronic LBP  PERSONAL FACTORS: wants to have less pian with walking and to be able to cross her legs  Precautions: none      Manuals            SI MET: L anter inom tors  th           SI shotgun  th           Cold laser L greater troch- prn             L glut med/ ITB MFR 12' th             15'           Neuro Re-Ed             DLS: abd bracing 5" 15 t/o           bridges 5" 10 10"x10           Clamshells in R S/L 15            Standing bird dog  10           DLS: march  10                                     Ther Ex             Bike L1 5' L1 5'           HS stretch with strap with slight adduction L +R 10" 5 10"x5           Gentle L SKTC 10" 5 10"x5           Gentle LTR 10" 5 to R 10"x5 to R           Hip flexor stretch on chair  3x20"           Self MFR with tennis ball at wall  instruct                                     Ther Activity                                       Gait Training                                       Modalities             Cold pack- prn

## 2022-09-06 ENCOUNTER — OFFICE VISIT (OUTPATIENT)
Dept: PHYSICAL THERAPY | Facility: CLINIC | Age: 67
End: 2022-09-06
Payer: COMMERCIAL

## 2022-09-06 DIAGNOSIS — M25.552 LEFT HIP PAIN: Primary | ICD-10-CM

## 2022-09-06 PROCEDURE — 97110 THERAPEUTIC EXERCISES: CPT | Performed by: PHYSICAL THERAPIST

## 2022-09-06 PROCEDURE — 97140 MANUAL THERAPY 1/> REGIONS: CPT | Performed by: PHYSICAL THERAPIST

## 2022-09-06 PROCEDURE — 97112 NEUROMUSCULAR REEDUCATION: CPT | Performed by: PHYSICAL THERAPIST

## 2022-09-06 NOTE — PROGRESS NOTES
Daily Note     Today's date: 2022  Patient name: Irma Munoz  : 1955  MRN: [id number]  Referring provider: Yandy Bolton PT  Dx:   Encounter Diagnosis     ICD-10-CM    1  Left hip pain  M25 552                   Subjective: Pt reports her hip is feeling good  L hip pain rated 2-3/10  Pt was able to walk and shop without aggravating her hip pain  Objective: See treatment diary below      Assessment: Tolerated treatment well  Patient exhibited good technique with therapeutic exercises  Pt continues to have moderate stretching with LTR stretch  Trial of laser today  Normal SI joint alignment  Plan: Continue per plan of care  Focus on decreasing pain and improving strength       Dx: L hip pain  EPOC: 22 (direct access)  CO-MORBIDITIES: chronic LBP  PERSONAL FACTORS: wants to have less pian with walking and to be able to cross her legs  Precautions: none      Manuals           SI MET: L anter inom tors  th normal          SI shotgun  th           Cold laser L greater troch- prn   th          L glut med/ ITB MFR 12'  th            15' 15'          Neuro Re-Ed             DLS: abd bracing 5" 15 t/o t/o          bridges 5" 10 10"x10 10"x10          Clamshells in R S/L 15  10x5"          Standing bird dog  10 10          DLS: march  10 10                                    Ther Ex             Bike L1 5' L1 5' L1 5'          HS stretch with strap with slight adduction L +R 10" 5 10"x5 10"x5          Gentle L SKTC 10" 5 10"x5 20"x3          Gentle LTR 10" 5 to R 10"x5 to R 10"x5          Hip flexor stretch on chair  3x20" 3x20"          Self MFR with tennis ball at wall  instruct                                     Ther Activity                                       Gait Training                                       Modalities             Cold pack- prn

## 2022-09-08 ENCOUNTER — OFFICE VISIT (OUTPATIENT)
Dept: PHYSICAL THERAPY | Facility: CLINIC | Age: 67
End: 2022-09-08
Payer: COMMERCIAL

## 2022-09-08 DIAGNOSIS — M25.552 LEFT HIP PAIN: Primary | ICD-10-CM

## 2022-09-08 PROCEDURE — 97110 THERAPEUTIC EXERCISES: CPT | Performed by: PHYSICAL THERAPIST

## 2022-09-08 PROCEDURE — 97112 NEUROMUSCULAR REEDUCATION: CPT | Performed by: PHYSICAL THERAPIST

## 2022-09-08 PROCEDURE — 97140 MANUAL THERAPY 1/> REGIONS: CPT | Performed by: PHYSICAL THERAPIST

## 2022-09-08 NOTE — PROGRESS NOTES
Daily Note     Today's date: 2022  Patient name: Jessi Palencia  : 1955  MRN: [id number]  Referring provider: Go Mancera, PT  Dx:   Encounter Diagnosis     ICD-10-CM    1  Left hip pain  M25 552                   Subjective: 0/10 pain levels currently upon arrival to therapy  Pt reports no change with laser treatment  Pt notes she is getting some intermittent pain across her low back  Objective: See treatment diary below      Assessment: Tolerated treatment well  Patient initiated increased ther ex program with step ups and sidestepping with tb  Issued updated HEP      Plan: Continue per plan of care   RA NV     Dx: L hip pain  EPOC: 22 (direct access)  CO-MORBIDITIES: chronic LBP  PERSONAL FACTORS: wants to have less pain with walking and to be able to cross her legs  Precautions: none      Manuals          SI MET: L anter inom tors  th normal          SI shotgun  th           Cold laser L greater troch- prn   th hold         L glut med/ ITB MFR 12'  th th           15' 15' 15'         Neuro Re-Ed             DLS: abd bracing 5" 15 t/o t/o          bridges 5" 10 10"x10 10"x10 10"x10         Clamshells in R S/L 15  10x5" 10x5"         Standing bird dog  10 10 10         DLS: march  10 10 10 dead bug         Sidestepping w/ TB    Y YB x2 laps                      Ther Ex             Bike L1 5' L1 5' L1 5' L2 6'         HS stretch with strap with slight adduction L +R 10" 5 10"x5 10"x5 10"x5         Gentle L SKTC 10" 5 10"x5 20"x3          Gentle LTR 10" 5 to R 10"x5 to R 10"x5 hold         Hip flexor stretch on chair  3x20" 3x20" 3x20"         Self MFR with tennis ball at wall  instruct                                     Ther Activity             Step ups FW    4"x10         Step ups lat    4" x10         Gait Training                                       Modalities             Cold pack- prn

## 2022-09-12 ENCOUNTER — TELEPHONE (OUTPATIENT)
Dept: PAIN MEDICINE | Facility: CLINIC | Age: 67
End: 2022-09-12

## 2022-09-12 ENCOUNTER — EVALUATION (OUTPATIENT)
Dept: PHYSICAL THERAPY | Facility: CLINIC | Age: 67
End: 2022-09-12
Payer: COMMERCIAL

## 2022-09-12 DIAGNOSIS — M54.50 LUMBOSACRAL PAIN: ICD-10-CM

## 2022-09-12 DIAGNOSIS — M79.18 MYOFASCIAL PAIN SYNDROME: ICD-10-CM

## 2022-09-12 DIAGNOSIS — G89.29 CHRONIC BILATERAL LOW BACK PAIN WITHOUT SCIATICA: ICD-10-CM

## 2022-09-12 DIAGNOSIS — M54.50 CHRONIC BILATERAL LOW BACK PAIN WITHOUT SCIATICA: ICD-10-CM

## 2022-09-12 DIAGNOSIS — M25.552 LEFT HIP PAIN: Primary | ICD-10-CM

## 2022-09-12 DIAGNOSIS — M25.552 LEFT HIP PAIN: ICD-10-CM

## 2022-09-12 DIAGNOSIS — M47.816 LUMBAR SPONDYLOSIS: ICD-10-CM

## 2022-09-12 DIAGNOSIS — M70.62 GREATER TROCHANTERIC BURSITIS OF LEFT HIP: Primary | ICD-10-CM

## 2022-09-12 PROCEDURE — 97110 THERAPEUTIC EXERCISES: CPT | Performed by: PHYSICAL THERAPIST

## 2022-09-12 PROCEDURE — 97140 MANUAL THERAPY 1/> REGIONS: CPT | Performed by: PHYSICAL THERAPIST

## 2022-09-12 PROCEDURE — 97112 NEUROMUSCULAR REEDUCATION: CPT | Performed by: PHYSICAL THERAPIST

## 2022-09-12 NOTE — TELEPHONE ENCOUNTER
S/w pt, stated that she had a L hip injection w/ SL a couple months ago then went to PT w/ + relief  PT has recommended the pt continue with PT as her pain seems to be myofascial  Pt stated that she has occasional poin in the L groin which PT suggested an x-ray to look into possible OA  Advised pt, the writer will d/w DG  Anticipate these orders will be placed today  OK to proceed w/ x-rays tomorrow and continue PT  The writer will cb if there is anything different or additional  Pt verbalized understanding and appreciation

## 2022-09-12 NOTE — PROGRESS NOTES
PT Re-Evaluation     Today's date: 2022  Patient name: Destiny Dent  : 1955  MRN: [id number]  Referring provider: Claus Arroyo PT  Dx:   Encounter Diagnosis     ICD-10-CM    1  Left hip pain  M25 552                   Assessment  Assessment details: Since starting skilled PT, pain levels are decreased, L hip ROM is improving, L hip strength improving with gradual functional progress  Recommend pt continue skilled PT to progress strengthening and decrease pain to improve ability to walk  Impairments: abnormal or restricted ROM, activity intolerance, impaired physical strength, lacks appropriate home exercise program and pain with function  Understanding of Dx/Px/POC: good   Prognosis: good    Goals  STG's ( 3-4 weeks)  1  Pt will be independent in HEP- met  2  Pt will have normal SI joint alignment met  LTG's ( 6- 8 weeks)   1  Improve FOTO score by 8-10 points- not met  2  Pt will have less pain with walking activities- partial met  3  Pt will have less pain with sleeping, especially when laying on L side- not met  4  Decrease pain to 2-3/10 at worst- met    Plan  Patient would benefit from: skilled physical therapy  Planned modality interventions: low level laser therapy and cryotherapy  Planned therapy interventions: manual therapy, joint mobilization, neuromuscular re-education, home exercise program, functional ROM exercises, flexibility, stretching, therapeutic activities and therapeutic exercise  Frequency: 2x week  Duration in weeks: 6  Plan of Care beginning date: 2022  Plan of Care expiration date: 10/24/2022  Treatment plan discussed with: patient        Subjective Evaluation    History of Present Illness  Mechanism of injury: I E: Pt reports her L hip and L LE pain started about 3 months ago 2* unknown etiology  Pt has a history of chronic LBP, but recently sx have been resolved through nerve ablation tx with pain management  Pt is having difficulty crossing her legs   Pt has increased L hip and leg pain with walking activities and has less pain when going up and down the steps and standing in the kitchen  No pain with sitting  Pt has increased pain when laying on her L side  Pt had a steriod injection in her L hip with relief from extreme pain  Pt has increased pain and difficulty with bending and squatting to  objects off the floor  Pt has some pain transferring sit to stand and getting out of a car  Pt has increased pain in posterior hip after prolonged sitting activities with work  Pt has painful clicking when she rolls in bed     22: Pt reports she is feeling much better since starting skilled PT  Pt is compliant in HEP  Pt reports slightly less pain with walking  Pt has mild and intermittent pain when standing and going up and down the steps  Pt has less pain when sitting at work and when getting in and out of the car  Pt has less pain when riding in the car    Work: [employer]; 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  Gait: no abnormalities  Pain  At best pain ratin  At worst pain ratin  Location: L LE  Quality: radiating and tight  Aggravating factors: walking    Social Support  Steps to enter house: yes (3)  Stairs in house: yes (12)   Lives in: multiple-level home  Lives with: spouse    Employment status: working    Diagnostic Tests  No diagnostic tests performed  Treatments  Previous treatment: injection treatment  Patient Goals  Patient goals for therapy: decreased pain and return to sport/leisure activities  Patient goal: to be able to move my legs back and forth and to be able to walk        Objective     Neurological Testing     Sensation     Lumbar   Left   Intact: light touch    Right   Intact: light touch    Reflexes   Left   Patellar (L4): trace (1+)  Achilles (S1): normal (2+)    Right   Patellar (L4): trace (1+)  Achilles (S1): normal (2+)    Active Range of Motion     Lumbar   Flexion: 95 degrees   Extension: 15 degrees   Left lateral flexion: 15 degrees       Right lateral flexion: 15 degrees   Left rotation:  WFL  Right rotation:  WFL  Left Hip   External rotation (90/90): 35 degrees   Internal rotation (90/90): 35 degrees     Right Hip   External rotation (90/90): 20 degrees   Internal rotation (90/90): 35 degrees     Additional Active Range of Motion Details  In standing: symmetrical iliac crest and PSIS levels  (+) TTP L PSIS, L ITB, L greater trochanter  (+) SHIRLEY test- pt able to perform on L  Pain with SKTC  HS flexibility: R: 65*  L: 70* with opposite knee flexed  (+) supine to long sit test: L LE longer in supine to shorter in long sit  (+) hypomobility in lumbar spine    Strength/Myotome Testing     Left Hip   Planes of Motion   Flexion: 4  Extension: 4+  Abduction: 4+  External rotation: 4+  Internal rotation: 4+    Right Hip   Planes of Motion   Flexion: 4+  Extension: 4  Abduction: 4+  External rotation: 4+  Internal rotation: 4+    Left Knee   Flexion: 5  Extension: 5    Right Knee   Flexion: 5  Extension: 5    Left Ankle/Foot   Dorsiflexion: 5    Right Ankle/Foot   Dorsiflexion: 5               Dx: L hip pain  EPOC: 9/19/22 (direct access)  CO-MORBIDITIES: chronic LBP  PERSONAL FACTORS: wants to have less pain with walking and to be able to cross her legs  Precautions: none      Manuals 8/25 8/30 9/6 9/8 9/12        SI MET: L anter inom tors  th normal          SI shotgun  th           Cold laser L greater troch- prn   th hold progress note th        L glut med/ ITB MFR 12' th th th th          13' 15' 15' 15'        Neuro Re-Ed             DLS: abd bracing 5" 15 t/o t/o  t/o        bridges 5" 10 10"x10 10"x10 10"x10 10"x10        Clamshells in R S/L 15  10x5" 10x5" 10x5" Y TB        Standing bird dog  10 10 10 10        DLS: march  10 10 10 dead bug 10 dead bug        Sidestepping w/ TB    Y YB x2 laps YTB x3 laps                     Ther Ex             Bike L1 5' L1 5' L1 5' L2 6' L2 6'        HS stretch with strap with slight adduction L +R 10" 5 10"x5 10"x5 10"x5 10"x5        Gentle L SKTC 10" 5 10"x5 20"x3  20"x3        Gentle LTR 10" 5 to R 10"x5 to R 10"x5 hold         Hip flexor stretch on chair  3x20" 3x20" 3x20" 3x20"        Self MFR with tennis ball at wall  instruct                                     Ther Activity             Step ups FW    4"x10 6"x10        Step ups lat    4" x10 6"x10        Gait Training                                       Modalities             Cold pack- prn

## 2022-09-14 ENCOUNTER — APPOINTMENT (OUTPATIENT)
Dept: RADIOLOGY | Facility: CLINIC | Age: 67
End: 2022-09-14
Payer: COMMERCIAL

## 2022-09-14 DIAGNOSIS — M70.62 GREATER TROCHANTERIC BURSITIS OF LEFT HIP: ICD-10-CM

## 2022-09-14 DIAGNOSIS — M25.552 LEFT HIP PAIN: ICD-10-CM

## 2022-09-14 PROCEDURE — 73502 X-RAY EXAM HIP UNI 2-3 VIEWS: CPT

## 2022-09-19 ENCOUNTER — TELEPHONE (OUTPATIENT)
Dept: PAIN MEDICINE | Facility: CLINIC | Age: 67
End: 2022-09-19

## 2022-09-19 NOTE — TELEPHONE ENCOUNTER
Can you please call the patient and let her know that her left hip x-ray showed moderate degenerative changes/osteoarthritis without any evidence of fractures  If she is having left hip and groin pain she can continue some physical therapy to see if that is helpful or we can consider a left hip injection with Dr Raegan Feliz if she would like to try that?

## 2022-09-20 ENCOUNTER — OFFICE VISIT (OUTPATIENT)
Dept: PHYSICAL THERAPY | Facility: CLINIC | Age: 67
End: 2022-09-20
Payer: COMMERCIAL

## 2022-09-20 DIAGNOSIS — M25.552 LEFT HIP PAIN: Primary | ICD-10-CM

## 2022-09-20 PROCEDURE — 97112 NEUROMUSCULAR REEDUCATION: CPT

## 2022-09-20 PROCEDURE — 97140 MANUAL THERAPY 1/> REGIONS: CPT

## 2022-09-20 PROCEDURE — 97110 THERAPEUTIC EXERCISES: CPT

## 2022-09-20 NOTE — PROGRESS NOTES
Daily Note     Today's date: 2022  Patient name: Maria A Wilkes  : 1955  MRN: [id number]  Referring provider: Amanda Rao, PT  Dx:   Encounter Diagnosis     ICD-10-CM    1  Left hip pain  M25 552                   Subjective: Pt reports that she is having minimal pain in her L hip this morning -2/10  Notes that she did get a new script from her MD  She has been compliant with her HEP  Objective: See treatment diary below      Assessment: Tolerated treatment well  She was able to complete all exercises asked of her today with no complaints  Denied any tenderness with manuals today feeling looser afterwards  Pt is making good progress towards her long term goals  Patient demonstrated fatigue post treatment, exhibited good technique with therapeutic exercises and would benefit from continued PT      Plan: Continue per plan of care        Dx: L hip pain  EPOC: 22 (direct access)  CO-MORBIDITIES: chronic LBP  PERSONAL FACTORS: wants to have less pain with walking and to be able to cross her legs  Precautions: none      Manuals        SI MET: L anter inom tors  th normal          SI shotgun  th           Cold laser L greater troch- prn   th hold progress note th        L glut med/ ITB MFR 12' th th th th mm         15' 15' 15' 15' 15'       Neuro Re-Ed             DLS: abd bracing 5" 15 t/o t/o  t/o t/o       bridges 5" 10 10"x10 10"x10 10"x10 10"x10 10"x 10       Clamshells in R S/L 15  10x5" 10x5" 10x5" Y TB 10x5" YTB       Standing bird dog  10 10 10 10 10       DLS: march  10 10 10 dead bug 10 dead bug 10 dead bug       Sidestepping w/ TB    Y YB x2 laps YTB x3 laps YTB x3 laps                    Ther Ex             Bike L1 5' L1 5' L1 5' L2 6' L2 6' L2 6'       HS stretch with strap with slight adduction L +R 10" 5 10"x5 10"x5 10"x5 10"x5 10"x5       Gentle L SKTC 10" 5 10"x5 20"x3  20"x3 20"x3       Gentle LTR 10" 5 to R 10"x5 to R 10"x5 hold         Hip flexor stretch on chair  3x20" 3x20" 3x20" 3x20" 3x20"       Self MFR with tennis ball at wall  instruct                                     Ther Activity             Step ups FW    4"x10 6"x10 6"x10       Step ups lat    4" x10 6"x10 6"x10       Gait Training                                       Modalities             Cold pack- prn

## 2022-09-20 NOTE — TELEPHONE ENCOUNTER
S/w pt, advised of above  Confirmed the pt's procedure in June was GTBI which is different  Pt stated that she is continuing w/ PT and will cb if / when she feels she would like to go ahead with the hip injection  Advised pt to cb sooner if questions / issues arise  Pt verbalized understanding and appreciation

## 2022-09-22 ENCOUNTER — OFFICE VISIT (OUTPATIENT)
Dept: PHYSICAL THERAPY | Facility: CLINIC | Age: 67
End: 2022-09-22
Payer: COMMERCIAL

## 2022-09-22 ENCOUNTER — APPOINTMENT (OUTPATIENT)
Dept: LAB | Facility: CLINIC | Age: 67
End: 2022-09-22
Payer: COMMERCIAL

## 2022-09-22 DIAGNOSIS — R73.9 HYPERGLYCEMIA: ICD-10-CM

## 2022-09-22 DIAGNOSIS — E03.9 ACQUIRED HYPOTHYROIDISM: ICD-10-CM

## 2022-09-22 DIAGNOSIS — M25.552 LEFT HIP PAIN: Primary | ICD-10-CM

## 2022-09-22 DIAGNOSIS — I10 ESSENTIAL HYPERTENSION: ICD-10-CM

## 2022-09-22 DIAGNOSIS — E55.9 VITAMIN D DEFICIENCY: ICD-10-CM

## 2022-09-22 DIAGNOSIS — E78.2 MIXED HYPERLIPIDEMIA: ICD-10-CM

## 2022-09-22 LAB
25(OH)D3 SERPL-MCNC: 34.8 NG/ML (ref 30–100)
ALBUMIN SERPL BCP-MCNC: 3.8 G/DL (ref 3.5–5)
ALP SERPL-CCNC: 69 U/L (ref 46–116)
ALT SERPL W P-5'-P-CCNC: 37 U/L (ref 12–78)
ANION GAP SERPL CALCULATED.3IONS-SCNC: 12 MMOL/L (ref 4–13)
AST SERPL W P-5'-P-CCNC: 26 U/L (ref 5–45)
BILIRUB SERPL-MCNC: 0.39 MG/DL (ref 0.2–1)
BUN SERPL-MCNC: 16 MG/DL (ref 5–25)
CALCIUM SERPL-MCNC: 9.7 MG/DL (ref 8.3–10.1)
CHLORIDE SERPL-SCNC: 104 MMOL/L (ref 96–108)
CHOLEST SERPL-MCNC: 138 MG/DL
CO2 SERPL-SCNC: 22 MMOL/L (ref 21–32)
CREAT SERPL-MCNC: 0.77 MG/DL (ref 0.6–1.3)
EST. AVERAGE GLUCOSE BLD GHB EST-MCNC: 126 MG/DL
GFR SERPL CREATININE-BSD FRML MDRD: 81 ML/MIN/1.73SQ M
GLUCOSE P FAST SERPL-MCNC: 86 MG/DL (ref 65–99)
HBA1C MFR BLD: 6 %
HDLC SERPL-MCNC: 32 MG/DL
LDLC SERPL CALC-MCNC: 81 MG/DL (ref 0–100)
POTASSIUM SERPL-SCNC: 3.3 MMOL/L (ref 3.5–5.3)
PROT SERPL-MCNC: 7.6 G/DL (ref 6.4–8.4)
SODIUM SERPL-SCNC: 138 MMOL/L (ref 135–147)
TRIGL SERPL-MCNC: 123 MG/DL
TSH SERPL DL<=0.05 MIU/L-ACNC: 1.58 UIU/ML (ref 0.45–4.5)

## 2022-09-22 PROCEDURE — 84443 ASSAY THYROID STIM HORMONE: CPT

## 2022-09-22 PROCEDURE — 97140 MANUAL THERAPY 1/> REGIONS: CPT

## 2022-09-22 PROCEDURE — 80061 LIPID PANEL: CPT

## 2022-09-22 PROCEDURE — 83036 HEMOGLOBIN GLYCOSYLATED A1C: CPT

## 2022-09-22 PROCEDURE — 80053 COMPREHEN METABOLIC PANEL: CPT

## 2022-09-22 PROCEDURE — 36415 COLL VENOUS BLD VENIPUNCTURE: CPT

## 2022-09-22 PROCEDURE — 97110 THERAPEUTIC EXERCISES: CPT

## 2022-09-22 PROCEDURE — 97112 NEUROMUSCULAR REEDUCATION: CPT

## 2022-09-22 PROCEDURE — 82306 VITAMIN D 25 HYDROXY: CPT

## 2022-09-22 NOTE — PROGRESS NOTES
Daily Note     Today's date: 2022  Patient name: Grecia Hess  : 1955  MRN: [id number]  Referring provider: Glenn Joiner, PT  Dx:   Encounter Diagnosis     ICD-10-CM    1  Left hip pain  M25 552                   Subjective: Patient reports her left hip is feeling better today  Denies any pain today  Improved walking tolerance without pain  Objective: See treatment diary below      Assessment: Tolerated treatment well  Tenderness and tightness over left gluteus medius with manual technique  Verbal cues need for correct technique with abdominal stabilization  No pain with strengthening exercises  Patient progressing well towards LTG's  Patient demonstrated fatigue post treatment, exhibited good technique with therapeutic exercises and would benefit from continued PT      Plan: Continue per plan of care        Dx: L hip pain  EPOC: 22 (direct access)  CO-MORBIDITIES: chronic LBP  PERSONAL FACTORS: wants to have less pain with walking and to be able to cross her legs  Precautions: none      Manuals       SI MET: L anter inom tors  th normal          SI shotgun  th           Cold laser L greater troch- prn   th hold progress note th        L glut med/ ITB MFR 12' th th th th mm ksg        15' 15' 15' 15' 15' 15'      Neuro Re-Ed             DLS: abd bracing 5" 15 t/o t/o  t/o t/o 5" x10      bridges 5" 10 10"x10 10"x10 10"x10 10"x10 10"x 10 10" x10      Clamshells in R S/L 15  10x5" 10x5" 10x5" Y TB 10x5" YTB 10x5" YTB      Standing bird dog  10 10 10 10 10 x10      DLS: march  10 10 10 dead bug 10 dead bug 10 dead bug x10 dead bug      Sidestepping w/ TB    Y YB x2 laps YTB x3 laps YTB x3 laps YTB  x3 laps                   Ther Ex             Bike L1 5' L1 5' L1 5' L2 6' L2 6' L2 6' L2 6'      HS stretch with strap with slight adduction L +R 10" 5 10"x5 10"x5 10"x5 10"x5 10"x5 10" x5      Gentle L SKTC 10" 5 10"x5 20"x3  20"x3 20"x3 20" x3      Gentle LTR 10" 5 to R 10"x5 to R 10"x5 hold   10" x10      Hip flexor stretch on chair  3x20" 3x20" 3x20" 3x20" 3x20" 3x20"      Self MFR with tennis ball at wall  instruct                                     Ther Activity             Step ups FW    4"x10 6"x10 6"x10 6" x10      Step ups lat    4" x10 6"x10 6"x10 6" x10      Gait Training                                       Modalities             Cold pack- prn

## 2022-09-26 ENCOUNTER — APPOINTMENT (OUTPATIENT)
Dept: PHYSICAL THERAPY | Facility: CLINIC | Age: 67
End: 2022-09-26
Payer: COMMERCIAL

## 2022-09-27 ENCOUNTER — OFFICE VISIT (OUTPATIENT)
Dept: PHYSICAL THERAPY | Facility: CLINIC | Age: 67
End: 2022-09-27
Payer: COMMERCIAL

## 2022-09-27 DIAGNOSIS — M25.552 LEFT HIP PAIN: Primary | ICD-10-CM

## 2022-09-27 PROCEDURE — 97112 NEUROMUSCULAR REEDUCATION: CPT

## 2022-09-27 PROCEDURE — 97110 THERAPEUTIC EXERCISES: CPT

## 2022-09-27 PROCEDURE — 97140 MANUAL THERAPY 1/> REGIONS: CPT

## 2022-09-27 NOTE — PROGRESS NOTES
Daily Note     Today's date: 2022  Patient name: Joylene Seip  : 1955  MRN: [id number]  Referring provider: Akua Fan, PT  Dx:   Encounter Diagnosis     ICD-10-CM    1  Left hip pain  M25 552                   Subjective: Pt reports she was lifting too much yesterday and has LBP on the L QL  Objective: See treatment diary below      Assessment: Tolerated treatment well  Patient exhibited good technique with therapeutic exercises and would benefit from continued PT to work on strengthening the LB LE's to support the LB  Plan: Continue per plan of care  Progress treatment as tolerated         Dx: L hip pain  EPOC: 22 (direct access)  CO-MORBIDITIES: chronic LBP  PERSONAL FACTORS: wants to have less pain with walking and to be able to cross her legs  Precautions: none      Manuals      SI MET: L anter inom tors  th normal          SI shotgun  th           Cold laser L greater troch- prn   th hold progress note th        L glut med/ ITB MFR 12' th th th th mm ksg JK       15' 15' 15' 15' 15' 15' 12'     Neuro Re-Ed             DLS: abd bracing 5" 15 t/o t/o  t/o t/o 5" x10 t/o     bridges 5" 10 10"x10 10"x10 10"x10 10"x10 10"x 10 10" x10 10"x10     Clamshells in R S/L 15  10x5" 10x5" 10x5" Y TB 10x5" YTB 10x5" YTB 10x5" YTB     Standing bird dog  10 10 10 10 10 x10 x15     DLS: march  10 10 10 dead bug 10 dead bug 10 dead bug x10 dead bug x15 dead bug     Sidestepping w/ TB    Y YB x2 laps YTB x3 laps YTB x3 laps YTB  x3 laps YTB  x3 laps                  Ther Ex             Bike L1 5' L1 5' L1 5' L2 6' L2 6' L2 6' L2 6' L2 6'     HS stretch with strap with slight adduction L +R 10" 5 10"x5 10"x5 10"x5 10"x5 10"x5 10" x5 10" x5     Gentle L SKTC 10" 5 10"x5 20"x3  20"x3 20"x3 20" x3 20"x3     Gentle LTR 10" 5 to R 10"x5 to R 10"x5 hold   10" x10 10"x10     Hip flexor stretch on chair  3x20" 3x20" 3x20" 3x20" 3x20" 3x20" 3x20"     Self MFR with tennis ball at wall  instruct                                     Ther Activity             Step ups FW    4"x10 6"x10 6"x10 6" x10 6" x10     Step ups lat    4" x10 6"x10 6"x10 6" x10 6" x10     Gait Training                                       Modalities             Cold pack- prn

## 2022-10-03 ENCOUNTER — OFFICE VISIT (OUTPATIENT)
Dept: PHYSICAL THERAPY | Facility: CLINIC | Age: 67
End: 2022-10-03
Payer: COMMERCIAL

## 2022-10-03 DIAGNOSIS — M25.552 LEFT HIP PAIN: Primary | ICD-10-CM

## 2022-10-03 PROCEDURE — 97140 MANUAL THERAPY 1/> REGIONS: CPT | Performed by: PHYSICAL THERAPIST

## 2022-10-03 PROCEDURE — 97110 THERAPEUTIC EXERCISES: CPT | Performed by: PHYSICAL THERAPIST

## 2022-10-03 PROCEDURE — 97112 NEUROMUSCULAR REEDUCATION: CPT | Performed by: PHYSICAL THERAPIST

## 2022-10-03 NOTE — PROGRESS NOTES
Daily Note     Today's date: 10/3/2022  Patient name: La Morrow  : 1955  MRN: [id number]  Referring provider: Christen Velasquez, PT  Dx:   Encounter Diagnosis     ICD-10-CM    1  Left hip pain  M25 552                   Subjective: Pt reports 2/10 pain levels currently in her L hip  Pt has increased pain and difficulty when trying to lift her L leg onto her R knee to put on her shoe  Objective: See treatment diary below      Assessment: Tolerated treatment well  Patient exhibited good technique with therapeutic exercises  Pt continues to be limited with L hip ER ROM  Therapist worked on hip ER stretching  Instructed pt in figure 4 stretch with light over pressure  Plan: Continue per plan of care  continue for 2 more visits, then RA       Dx: L hip pain  EPOC:10/24/22  CO-MORBIDITIES: chronic LBP  PERSONAL FACTORS: wants to have less pain with walking and to be able to cross her legs  Precautions: none      Manuals 8/25 8/30 9/6 9/8 9/12 9/20 9/22 9/27 10/3    SI MET: L anter inom tors  th normal          Hip ER PROM  th       th    Cold laser L greater troch- prn   th hold progress note th        L glut med/ ITB MFR 12'  th th th mm ksg JK th      13' 15' 15' 15' 15' 15' 12'     Neuro Re-Ed             DLS: abd bracing 5" 15 t/o t/o  t/o t/o 5" x10 t/o t/o    bridges 5" 10 10"x10 10"x10 10"x10 10"x10 10"x 10 10" x10 10"x10 10"x10    Clamshells in R S/L 15  10x5" 10x5" 10x5" Y TB 10x5" YTB 10x5" YTB 10x5" YTB 10x5"    Standing bird dog  10 10 10 10 10 x10 x15 15    DLS: march  10 10 10 dead bug 10 dead bug 10 dead bug x10 dead bug x15 dead bug 15 dead bug    Sidestepping w/ TB    Y YB x2 laps YTB x3 laps YTB x3 laps YTB  x3 laps YTB  x3 laps YTB x3 laps    SLS         10"x3    Ther Ex             Bike L1 5' L1 5' L1 5' L2 6' L2 6' L2 6' L2 6' L2 6' L2 6'    HS stretch with strap with slight adduction L +R 10" 5 10"x5 10"x5 10"x5 10"x5 10"x5 10" x5 10" x5 10"x5    Gentle L SKTC 10" 5 10"x5 20"x3 20"x3 20"x3 20" x3 20"x3     Gentle LTR 10" 5 to R 10"x5 to R 10"x5 hold   10" x10 10"x10     Hip flexor stretch on chair  3x20" 3x20" 3x20" 3x20" 3x20" 3x20" 3x20" 3x20"    Fig 4 stretch  instruct       10"x3    Seated hip ER AROM         10                 Ther Activity             Step ups FW    4"x10 6"x10 6"x10 6" x10 6" x10 6"x10    Step ups lat    4" x10 6"x10 6"x10 6" x10 6" x10 6"x10    Gait Training                                       Modalities             Cold pack- prn

## 2022-10-05 NOTE — PROGRESS NOTES
[facility] MEDICINE DISCHARGE SUMMARY   Patient: Raad Perez  Discharge Physician: Juanita Davis MD   YOB: 1950  Admission Date: 10/4/2022    MRN: [id number]  Discharge date: 10/5/2022   Admitting Physician: Dhruv CM MD Inpatient LOS: 0      Indication for Admission:  Hyperkalemia [E87.5]  SOB (shortness of breath) [R06.02]  ESRD (end stage renal disease) (CMS/HCC) [N18.6]  Hypervolemia, unspecified hypervolemia type [E87.70]    Discharge Diagnoses:   Shortness of breath, resolved  Volume overload with ESRD, resolved  ESRD on hemodialysis  Essential HTN - elevated blood pressures  Hyperkalemia, mild - improved    Hospital Course:   Assigned observation. Nephrology consulted, he had hemodialysis yesterday and is feeling much better today. No longer is SOB. Appetite is good. He is ambulating safely in his room. Blood pressures are still a little elevated at times, but no meds needed. Labs are stable. He will need to follow up with HD session tomorrow as scheduled   Patient stable to discharge today with close follow up    Consults:   nephrology    Discharge Exam:  Visit Vitals  BP (!) 165/75 (BP Location: RUE - Right upper extremity, Patient Position: Semi-Rocha's)   Pulse 86   Temp 97.2 °F (36.2 °C) (Oral)   Resp 18   Ht 5' 5\" (1.651 m)   Wt 63 kg (138 lb 14.2 oz)   SpO2 97%   BMI 23.11 kg/m²     General appearance: alert, cooperative and no distress  Neck: no adenopathy and supple, symmetrical, trachea midline  Lungs: clear to auscultation bilaterally and no wheezing or rales  Heart: regular rate and rhythm  Abdomen: Soft, non-tender; bowel sounds normal; no masses, no hepatosplenomegaly  Extremities: extremities normal, atraumatic, no cyanosis or edema and LUE AV fistula with good thrill/bruit  Pulses: 2+ and symmetric  Skin: Skin color, texture, turgor normal. No rashes or lesions  Neurologic: Alert and oriented x3, normal strength and tone. Normal  Daily Note     Today's date: 2022  Patient name: Mt Starr  : 1955  MRN: [id number]  Referring provider: Karin Anna PT  Dx:   Encounter Diagnosis     ICD-10-CM    1  Left hip pain  M25 552                   Subjective:  Pt reports having the most pain when sleeping on her L side  Hip starts to hurt more  Objective:  See treatment diary below      Assessment:  Pt is a 73y o  year old female coming to outpatient PT through direct access with L LE pain onset about 3 months ago 2* unknown etiology  Pt presents with increased pain and TTP, decreased L hip ROM, decreased L hip strength, (+) SI joint abnormality, and overall decreased functional mobility  Pt would benefit from skilled PT services in order to address these deficits and reach maximum level of function  Impairments: abnormal or restricted ROM, activity intolerance, impaired physical strength, lacks appropriate home exercise program and pain with function  She tolerated all additional exercises well aside from some fatigue with bridges and clamshells  Mod less tightness post stretching and manual tx  She needs much more L hip and core strength to eliminate her L hip pain  Pelvic alignment was good today  Plan:  Continue to add stability and strengthening            Dx: L hip pain  EPOC: 22 (direct access)  CO-MORBIDITIES: chronic LBP  PERSONAL FACTORS: wants to have less pian with walking and to be able to cross her legs  Precautions: none      Manuals             SI MET: L anter inom tors             SI shotgun             Cold laser L greater troch- prn             L glut med/ ITB MFR 12'                         Neuro Re-Ed             DLS: abd bracing 5" 15            bridges 5" 10            Clamshells in R S/L 15            Standing bird dog                                                    Ther Ex             Bike L1 5'            HS stretch with strap with slight adduction L +R 10" 5            Gentle L SKTC 10" 5            Gentle LTR 10" 5 to R            Hip flexor stretch on chair                                                    Ther Activity                                       Gait Training                                       Modalities             Cold pack- prn symmetric reflexes. Normal coordination and gait    Significant Diagnostic Studies:     Lab Results   Component Value Date    SODIUM 139 10/05/2022    POTASSIUM 4.9 10/05/2022    CHLORIDE 101 10/05/2022    CO2 29 10/05/2022    ANIONGAP 14 10/05/2022    GLUCOSE 85 10/05/2022    BUN 27 (H) 10/05/2022    CREATININE 5.38 (H) 10/05/2022    CALCIUM 9.6 10/05/2022    RUSSELL 1.25 2022    ALBUMIN 3.4 (L) 10/04/2022    MG 2.5 (H) 2022    BILIRUBIN 0.5 10/04/2022    ALKPT 77 10/04/2022    AST 14 10/04/2022    GPT 17 10/04/2022    PHOS 5.4 (H) 2022    LIPA 116 2022    INR 1.0 2020     Lab Results   Component Value Date    PTT 29 2020    WBC 14.5 (H) 10/04/2022    HGB 12.2 (L) 10/04/2022    HCT 38.7 (L) 10/04/2022     10/04/2022    RESR 26 (H) 10/04/2022    URIC 7.5 (H) 2020    CRP 1.6 (H) 10/04/2022    TSH 0.546 2019    HGBA1C 4.8 2022     Iron Panel    Lab Results   Component Value Date    IRON 68 2020    TIBC 251 2020    PST 27 2020    FERR 110 2020     Cultures:  COVID negative    CXR:  1. Pulmonary vascular congestion and mild interstitial pulmonary edema.  2. Small right and possible small left pleural effusion.    Discharge Medications:  See AVS     Disposition: Home    Advanced Directives/Goals of Care:  Patient is decisional: Yes  Power Of  designee/healthcare agent - Not Established:  Code Status: Full Resuscitation       DISCHARGE RECOMMENDATIONS       As part of your transition home, we are providing you with this set of discharge instructions, as a guide to help you in that process. In addition to the care provided during your hospital stay, there may be upcoming clinic visits, laboratory appointments, and/or results noted on this After Visit Summary (AVS).     To assist the physicians caring for you during this transition, we would like you remind you of the followin. Please call a Provider for help if you experience  any of the following: Any questions or concerns  2. Activity Instructions: Normal activity as tolerated  3. Dressing/Wound Instructions: Not applicable  4. Lifting & Weight-bearing Instructions: No restrictions  5. Diet: renal diet, low potassium  6. Miscellaneous:   - NA  7. Medication changes:   -added: NA  -removed: NA  -changed: NA  8. Follow up with   - Shannon Solorio MD within 1 week   - Hemodialysis per usual Tue/Thur/Sat schedule    If you have any questions 24-48 hours after discharge, please feel free to contact the hospital unit/department you were discharged from.      Time spent on discharge was <30 minutes. Greater than 50% of this face-to-face visit was spent on counseling and/or coordination of care. Patient verbalized understanding and is in agreement with treatment plan.    Copy of note will be sent to PCP for discharge recommendations.    Juanita Davis MD

## 2022-10-06 ENCOUNTER — APPOINTMENT (OUTPATIENT)
Dept: PHYSICAL THERAPY | Facility: CLINIC | Age: 67
End: 2022-10-06

## 2022-10-07 ENCOUNTER — TELEMEDICINE (OUTPATIENT)
Dept: FAMILY MEDICINE CLINIC | Facility: HOSPITAL | Age: 67
End: 2022-10-07
Payer: COMMERCIAL

## 2022-10-07 VITALS
WEIGHT: 230 LBS | HEIGHT: 67 IN | DIASTOLIC BLOOD PRESSURE: 85 MMHG | BODY MASS INDEX: 36.1 KG/M2 | SYSTOLIC BLOOD PRESSURE: 140 MMHG

## 2022-10-07 DIAGNOSIS — U07.1 COVID-19: Primary | ICD-10-CM

## 2022-10-07 PROCEDURE — 99214 OFFICE O/P EST MOD 30 MIN: CPT | Performed by: NURSE PRACTITIONER

## 2022-10-07 RX ORDER — NIRMATRELVIR AND RITONAVIR 300-100 MG
3 KIT ORAL 2 TIMES DAILY
Qty: 30 TABLET | Refills: 0 | Status: SHIPPED | OUTPATIENT
Start: 2022-10-07 | End: 2022-10-12

## 2022-10-07 NOTE — PROGRESS NOTES
COVID-19 Outpatient Progress Note    Assessment/Plan:    Problem List Items Addressed This Visit    None     Visit Diagnoses     COVID-19    -  Primary    Relevant Medications    nirmatrelvir & ritonavir (Paxlovid, 300/100,) tablet therapy pack         Disposition:     Patient has asymptomatic or mild COVID-19 infection  Based off CDC guidelines, they were recommended to isolate for 5 days  If they are asymptomatic or symptoms are improving with no fevers in the past 24 hours, isolation may be ended followed by 5 days of wearing a mask when around othes to minimize risk of infecting others  If still have a fever or other symptoms have not improved, continue to isolate until they improve  Regardless of when they end isolation, avoid being around people who are more likely to get very sick from COVID-19 until at least day 11  Vaccinated, non-boostered candidate for paxlovid therapy - she is agreeable and rx issued  SEs and precautions reviewed  Continue supportive care w/rest, fluids, diet as tolerated and OTC meds prn  Reviewed worse/persistent sx's to call with  Isolation/masking guidelines reviewed and she voices understanding  Patient meets criteria for PAXLOVID and they have been counseled appropriately according to EUA documentation released by the FDA  After discussion, patient agrees to treatment  Pawel Vaca is an investigational medicine used to treat mild-to-moderate COVID-19 in adults and children (15years of age and older weighing at least 80 pounds (40 kg)) with positive results of direct SARS-CoV-2 viral testing, and who are at high risk for progression to severe COVID-19, including hospitalization or death  PAXLOVID is investigational because it is still being studied  There is limited information about the safety and effectiveness of using PAXLOVID to treat people with mild-to-moderate COVID-19      The FDA has authorized the emergency use of PAXLOVID for the treatment of mild-tomoderate COVID-19 in adults and children (15years of age and older weighing at least 80 pounds (40 kg)) with a positive test for the virus that causes COVID-19, and who are at high risk for progression to severe COVID-19, including hospitalization or death, under an EUA  What should I tell my healthcare provider before I take PAXLOVID? Tell your healthcare provider if you:  - Have any allergies  - Have liver or kidney disease  - Are pregnant or plan to become pregnant  - Are breastfeeding a child  - Have any serious illnesses    Tell your healthcare provider about all the medicines you take, including prescription and over-the-counter medicines, vitamins, and herbal supplements  Some medicines may interact with PAXLOVID and may cause serious side effects  Keep a list of your medicines to show your healthcare provider and pharmacist when you get a new medicine  You can ask your healthcare provider or pharmacist for a list of medicines that interact with PAXLOVID  Do not start taking a new medicine without telling your healthcare provider  Your healthcare provider can tell you if it is safe to take PAXLOVID with other medicines  Tell your healthcare provider if you are taking combined hormonal contraceptive  PAXLOVID may affect how your birth control pills work  Females who are able to become pregnant should use another effective alternative form of contraception or an additional barrier method of contraception  Talk to your healthcare provider if you have any questions about contraceptive methods that might be right for you  How do I take PAXLOVID? PAXLOVID consists of 2 medicines: nirmatrelvir and ritonavir  - Take 2 pink tablets of nirmatrelvir with 1 white tablet of ritonavir by mouth 2 times each day (in the morning and in the evening) for 5 days  For each dose, take all 3 tablets at the same time  - If you have kidney disease, talk to your healthcare provider  You may need a different dose      - Swallow the tablets whole  Do not chew, break, or crush the tablets  - Take PAXLOVID with or without food  - Do not stop taking PAXLOVID without talking to your healthcare provider, even if you feel better  - If you miss a dose of PAXLOVID within 8 hours of the time it is usually taken, take it as soon as you remember  If you miss a dose by more than 8 hours, skip the missed dose and take the next dose at your regular time  Do not take 2 doses of PAXLOVID at the same time  - If you take too much PAXLOVID, call your healthcare provider or go to the nearest hospital emergency room right away  - If you are taking a ritonavir- or cobicistat-containing medicine to treat hepatitis C or Human Immunodeficiency Virus (HIV), you should continue to take your medicine as prescribed by your healthcare provider   - Talk to your healthcare provider if you do not feel better or if you feel worse after 5 days  Who should generally not take PAXLOVID? Do not take PAXLOVID if:  You are allergic to nirmatrelvir, ritonavir, or any of the ingredients in PAXLOVID  You are taking any of the following medicines:  - Alfuzosin  - Pethidine, piroxicam, propoxyphene  - Ranolazine  - Amiodarone, dronedarone, flecainide, propafenone, quinidine  - Colchicine  - Lurasidone, pimozide, clozapine  - Dihydroergotamine, ergotamine, methylergonovine  - Lovastatin, simvastatin  - Sildenafil (Revatio®) for pulmonary arterial hypertension (PAH)  - Triazolam, oral midazolam  - Apalutamide  - Carbamazepine, phenobarbital, phenytoin  - Rifampin  - St  Calis Wort (hypericum perforatum)    What are the important possible side effects of PAXLOVID? Possible side effects of PAXLOVID are:  - Liver Problems   Tell your healthcare provider right away if you have any of these signs and symptoms of liver problems: loss of appetite, yellowing of your skin and the whites of eyes (jaundice), dark-colored urine, pale colored stools and itchy skin, stomach area (abdominal) pain  - Resistance to HIV Medicines  If you have untreated HIV infection, PAXLOVID may lead to some HIV medicines not working as well in the future  - Other possible side effects include: altered sense of taste, diarrhea, high blood pressure, or muscle aches    These are not all the possible side effects of PAXLOVID  Not many people have taken PAXLOVID  Serious and unexpected side effects may happen  [address] is still being studied, so it is possible that all of the risks are not known at this time  What other treatment choices are there? Like [place] may allow for the emergency use of other medicines to treat people with COVID-19  Go to https://Wejo/ for information on the emergency use of other medicines that are authorized by FDA to treat people with COVID-19  Your healthcare provider may talk with you about clinical trials for which you may be eligible  It is your choice to be treated or not to be treated with PAXLOVID  Should you decide not to receive it or for your child not to receive it, it will not change your standard medical care  What if I am pregnant or breastfeeding? There is no experience treating pregnant women or breastfeeding mothers with PAXLOVID  For a mother and unborn baby, the benefit of taking PAXLOVID may be greater than the risk from the treatment  If you are pregnant, discuss your options and specific situation with your healthcare provider  It is recommended that you use effective barrier contraception or do not have sexual activity while taking PAXLOVID  If you are breastfeeding, discuss your options and specific situation with your healthcare provider  How do I report side effects with PAXLOVID? Contact your healthcare provider if you have any side effects that bother you or do not go away      Report side effects to FDA MedWatch at www fda gov/medwatch or call 8-962-ACS0574 or you can report side effects to [facility]  at the contact information provided below  Website Fax number Telephone number   [employer] [phone]     How should I store Vicie Flight? Store PAXLOVID tablets at room temperature between 68°F to 77°F (20°C to 25°C)  Full fact sheet for patients, parents, and caregivers can be found at: Stop Being Watcheddevaughn slade    I have spent 10 minutes directly with the patient  Greater than 50% of this time was spent in counseling/coordination of care regarding: diagnostic results, prognosis, risks and benefits of treatment options, instructions for management, patient and family education, importance of treatment compliance, risk factor reductions and impressions  Encounter provider: CHRISTIN Chopra     Provider located at: [facility] [id number]  [facility] [address]     Recent Visits  No visits were found meeting these conditions  Showing recent visits within past 7 days and meeting all other requirements  Today's Visits  Date Type Provider Dept   10/07/22 Telemedicine CHRISTIN Chopra [facility] [place] [address]   Showing today's visits and meeting all other requirements  Future Appointments  No visits were found meeting these conditions  Showing future appointments within next 150 days and meeting all other requirements     This virtual check-in was done via [employer] and patient was informed that this is a secure, HIPAA-compliant platform  She agrees to proceed  Patient agrees to participate in a virtual check in via telephone or video visit instead of presenting to the office to address urgent/immediate medical needs  Patient is aware this is a billable service  She acknowledged consent and understanding of privacy and security of the video platform  The patient has agreed to participate and understands they can discontinue the visit at any time  After connecting through [facility], the patient was identified by name and date of birth  Lorene Tapia was informed that this was a telemedicine visit and that the exam was being conducted confidentially over secure lines  My office door was closed  No one else was in the room  Lorene Tapia acknowledged consent and understanding of privacy and security of the telemedicine visit  I informed the patient that I have reviewed her record in Epic and presented the opportunity for her to ask any questions regarding the visit today  The patient agreed to participate  Verification of patient location:  Patient is located in the following state in which I hold an active license: [place]    Subjective:   Lorene Tapia is a 73 y o  female who has been screened for COVID-19  Symptom change since last report: unchanged  Patient's symptoms include fatigue, nasal congestion, cough, chest tightness ("a little bit") and myalgias  Patient denies fever, chills, shortness of breath, nausea, vomiting and diarrhea  - Date of symptom onset: 10/7/2022  - Date of positive COVID-19 test: 10/7/2022  Type of test: Home antigen  Patient with typical symptoms of COVID-19 and they attest that they were positive on home rapid antigen testing  Image of positive result is not able to be uploaded into their chart  COVID-19 vaccination status: Fully vaccinated (primary series)    David Singleton has been staying home and has isolated themselves in her home  Vaccinated w/o any boosters  Denies hx of covid   currently has covid also  No results found for: Radha Johnson, [address] [facility], [place], [place], [facility]    Review of Systems   Constitutional: Positive for fatigue  Negative for chills and fever  HENT: Positive for congestion  Respiratory: Positive for cough and chest tightness ("a little bit")   Negative for shortness of breath  Gastrointestinal: Negative for diarrhea, nausea and vomiting  Musculoskeletal: Positive for myalgias  Current Outpatient Medications on File Prior to Visit   Medication Sig    albuterol (Ventolin HFA) 90 mcg/act inhaler Inhale 2 puffs every 6 (six) hours as needed for wheezing    amLODIPine (NORVASC) 5 mg tablet Take 1 tablet (5 mg total) by mouth daily    Arnuity Ellipta 100 MCG/ACT AEPB inhaler INHALE 1 PUFF BY MOUTH DAILY AND RINSE MOUTH AFTER USE    famotidine (PEPCID) 40 MG tablet Take 1 tablet (40 mg total) by mouth daily    fexofenadine (ALLEGRA) 180 MG tablet Take 1 tablet (180 mg total) by mouth daily    ketoconazole (NIZORAL) 2 % cream Apply topically daily for 7 days    levothyroxine 125 mcg tablet TAKE ONE TABLET BY MOUTH EVERY DAY   losartan-hydrochlorothiazide (HYZAAR) 100-25 MG per tablet Take 1 tablet by mouth daily    pantoprazole (PROTONIX) 40 mg tablet Take 1 tablet (40 mg total) by mouth 2 (two) times a day    Semaglutide 7 MG TABS Take 7 mg by mouth in the morning    XIIDRA 5 % op solution     [DISCONTINUED] metFORMIN (GLUCOPHAGE) 500 mg tablet Take 1 tablet (500 mg total) by mouth daily with breakfast       Objective:    /85   Ht 5' 7" (1 702 m)   Wt 104 kg (230 lb)   BMI 36 02 kg/m²      Physical Exam  Vitals reviewed  Constitutional:       General: She is not in acute distress  Appearance: Normal appearance  HENT:      Head: Normocephalic  Pulmonary:      Effort: Pulmonary effort is normal  No respiratory distress  Comments: No cough  Neurological:      General: No focal deficit present  Mental Status: She is alert and oriented to person, place, and time     Psychiatric:         Mood and Affect: Mood normal          Behavior: Behavior normal             CHRISTIN Orta

## 2022-10-10 ENCOUNTER — APPOINTMENT (OUTPATIENT)
Dept: PHYSICAL THERAPY | Facility: CLINIC | Age: 67
End: 2022-10-10

## 2022-10-18 ENCOUNTER — EVALUATION (OUTPATIENT)
Dept: PHYSICAL THERAPY | Facility: CLINIC | Age: 67
End: 2022-10-18
Payer: COMMERCIAL

## 2022-10-18 DIAGNOSIS — M25.552 LEFT HIP PAIN: Primary | ICD-10-CM

## 2022-10-18 PROCEDURE — 97140 MANUAL THERAPY 1/> REGIONS: CPT | Performed by: PHYSICAL THERAPIST

## 2022-10-18 PROCEDURE — 97110 THERAPEUTIC EXERCISES: CPT | Performed by: PHYSICAL THERAPIST

## 2022-10-18 NOTE — PROGRESS NOTES
PT Re-Evaluation     Today's date: 10/18/2022  Patient name: Mable Peterson  : 1955  MRN: [id number]  Referring provider: Alyson Castro PT  Dx:   Encounter Diagnosis     ICD-10-CM    1  Left hip pain  M25 552                   Assessment  Assessment details: Since starting skilled PT, pain levels are decreased, L hip ROM is WFL's, L hip strength is WFL's with good functional progress  Pt had been sick and needed to cancel some visits  Pt wishes to continue PT for 2 more weeks to continue strengthening to prevent pain reoccurrence  Impairments: activity intolerance  Understanding of Dx/Px/POC: good   Prognosis: good    Goals  STG's ( 3-4 weeks)  1  Pt will be independent in HEP- met  2  Pt will have normal SI joint alignment met  LTG's ( 6- 8 weeks)   1  Improve FOTO score by 8-10 points- met  2  Pt will have less pain with walking activities- met  3  Pt will have less pain with sleeping, especially when laying on L side- met  4  Decrease pain to 2-3/10 at worst- met    Plan  Patient would benefit from: skilled physical therapy  Planned modality interventions: low level laser therapy and cryotherapy  Planned therapy interventions: manual therapy, joint mobilization, neuromuscular re-education, home exercise program, functional ROM exercises, flexibility, stretching, therapeutic activities and therapeutic exercise  Frequency: 2x week  Duration in weeks: 2  Plan of Care beginning date: 10/18/2022  Plan of Care expiration date: 2022  Treatment plan discussed with: patient        Subjective Evaluation    History of Present Illness  Mechanism of injury: I E: Pt reports her L hip and L LE pain started about 3 months ago 2* unknown etiology  Pt has a history of chronic LBP, but recently sx have been resolved through nerve ablation tx with pain management  Pt is having difficulty crossing her legs   Pt has increased L hip and leg pain with walking activities and has less pain when going up and down the steps and standing in the kitchen  No pain with sitting  Pt has increased pain when laying on her L side  Pt had a steriod injection in her L hip with relief from extreme pain  Pt has increased pain and difficulty with bending and squatting to  objects off the floor  Pt has some pain transferring sit to stand and getting out of a car  Pt has increased pain in posterior hip after prolonged sitting activities with work  Pt has painful clicking when she rolls in bed     22: Pt reports she is feeling much better since starting skilled PT  Pt is compliant in HEP  Pt reports slightly less pain with walking  Pt has mild and intermittent pain when standing and going up and down the steps  Pt has less pain when sitting at work and when getting in and out of the car  Pt has less pain when riding in the car  10/18/22: Pt has less pain with walking, standing, and going up and down the steps  Pt has no pain riding and getting in and out of the car  Pt has less pain when sleeping on her L side      Work: [employer]; 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  Gait: no abnormalities  Pain  At best pain ratin  At worst pain ratin  Location: L LE  Quality: radiating and tight  Aggravating factors: walking    Social Support  Steps to enter house: yes (3)  Stairs in house: yes (12)   Lives in: multiple-level home  Lives with: spouse    Employment status: working    Diagnostic Tests  No diagnostic tests performed  Treatments  Previous treatment: injection treatment  Patient Goals  Patient goals for therapy: decreased pain and return to sport/leisure activities  Patient goal: to be able to move my legs back and forth and to be able to walk        Objective     Neurological Testing     Sensation     Lumbar   Left   Intact: light touch    Right   Intact: light touch    Reflexes   Left   Patellar (L4): trace (1+)  Achilles (S1): normal (2+)    Right   Patellar (L4): trace (1+)  Achilles (S1): normal (2+)    Active Range of Motion     Lumbar   Flexion: 95 degrees   Extension: 15 degrees   Left lateral flexion: 15 degrees       Right lateral flexion: 15 degrees   Left rotation:  WFL  Right rotation:  WFL  Left Hip   External rotation (90/90): 35 degrees   Internal rotation (90/90): 35 degrees     Right Hip   External rotation (90/90): 20 degrees   Internal rotation (90/90): 35 degrees     Additional Active Range of Motion Details  In standing: symmetrical iliac crest and PSIS levels  (+) TTP L PSIS, L ITB, L greater trochanter  (+) SHIRLEY test- pt able to perform on L  Pain with SKTC  HS flexibility: R: 65*  L: 70* with opposite knee flexed  (+) supine to long sit test: L LE longer in supine to shorter in long sit  (+) hypomobility in lumbar spine    Strength/Myotome Testing     Left Hip   Planes of Motion   Flexion: 4+  Extension: 4+  Abduction: 5  External rotation: 5  Internal rotation: 5    Right Hip   Planes of Motion   Flexion: 4+  Extension: 4  Abduction: 4+  External rotation: 4+  Internal rotation: 4+    Left Knee   Flexion: 5  Extension: 5    Right Knee   Flexion: 5  Extension: 5    Left Ankle/Foot   Dorsiflexion: 5    Right Ankle/Foot   Dorsiflexion: 5          Dx: L hip pain  EPOC:11/1/22  CO-MORBIDITIES: chronic LBP  PERSONAL FACTORS: wants to have less pain with walking and to be able to cross her legs  Precautions: none      Manuals 8/25 8/30 9/6 9/8 9/12 9/20 9/22 9/27 10/3 10/18   SI MET: L anter inom tors  th normal          Hip ER PROM  th th th   Progress note   th hold progress note th th   L glut med/ ITB MFR 12' th th th th mm ksg JK th th     15' 15' 15' 15' 15' 15' 12'  25'   Neuro Re-Ed             [facility]: abd bracing 5" 15 t/o t/o  t/o t/o 5" x10 t/o t/o t/o   bridges 5" 10 10"x10 10"x10 10"x10 10"x10 10"x 10 10" x10 10"x10 10"x10 10x10"   Clamshells in R S/L 15  10x5" 10x5" 10x5" Y TB 10x5" YTB 10x5" YTB 10x5" YTB 10x5" 10x5" YTB   Standing bird dog  10 10 10 10 10 x10 x15 15    DLS: march  10 10 10 dead bug 10 dead bug 10 dead bug x10 dead bug x15 dead bug 15 dead bug 15 dead bug   Sidestepping w/ TB    Y YB x2 laps YTB x3 laps YTB x3 laps YTB  x3 laps YTB  x3 laps YTB x3 laps    SLS         10"x3    Ther Ex             Bike L1 5' L1 5' L1 5' L2 6' L2 6' L2 6' L2 6' L2 6' L2 6' 6'   HS stretch with strap with slight adduction L +R 10" 5 10"x5 10"x5 10"x5 10"x5 10"x5 10" x5 10" x5 10"x5    Gentle L SKTC 10" 5 10"x5 20"x3  20"x3 20"x3 20" x3 20"x3     Gentle LTR 10" 5 to R 10"x5 to R 10"x5 hold   10" x10 10"x10     Hip flexor stretch on chair  3x20" 3x20" 3x20" 3x20" 3x20" 3x20" 3x20" 3x20"    Fig 4 stretch  instruct       10"x3 10"x3   Seated hip ER AROM         10                 Ther Activity             Step ups FW    4"x10 6"x10 6"x10 6" x10 6" x10 6"x10    Step ups lat    4" x10 6"x10 6"x10 6" x10 6" x10 6"x10    Gait Training                                       Modalities             Cold pack- prn

## 2022-10-20 ENCOUNTER — OFFICE VISIT (OUTPATIENT)
Dept: PHYSICAL THERAPY | Facility: CLINIC | Age: 67
End: 2022-10-20
Payer: COMMERCIAL

## 2022-10-20 DIAGNOSIS — M25.552 LEFT HIP PAIN: Primary | ICD-10-CM

## 2022-10-20 PROCEDURE — 97112 NEUROMUSCULAR REEDUCATION: CPT

## 2022-10-20 PROCEDURE — 97110 THERAPEUTIC EXERCISES: CPT

## 2022-10-20 PROCEDURE — 97140 MANUAL THERAPY 1/> REGIONS: CPT

## 2022-10-20 NOTE — PROGRESS NOTES
Daily Note     Today's date: 10/20/2022  Patient name: Usama Mccarthy  : 1955  MRN: [id number]  Referring provider: Sam Rowe, PT  Dx:   Encounter Diagnosis     ICD-10-CM    1  Left hip pain  M25 552                   Subjective: Pt reports she twisted funny on a planted foot and thought she may have increased her pain but states she feels pretty good today  Objective: See treatment diary below      Assessment: Tolerated treatment well  Patient exhibited good technique with therapeutic exercises and would benefit from continued PT for cont'd strengthening and work towards an independent HEP  Plan: Continue per plan of care  Progress treatment as tolerated         Dx: L hip pain  EPOC:22  CO-MORBIDITIES: chronic LBP  PERSONAL FACTORS: wants to have less pain with walking and to be able to cross her legs  Precautions: none      Manuals 10/20     9/20 9/22 9/27 10/3 10/18   SI MET: L anter inom tors             Hip ER PROM JK        th th   Progress note          th   L glut med/ ITB MFR JK     mm ksg JK th th    10'     15' 15' 12'  25'   Neuro Re-Ed             DLS: abd bracing t/o     t/o 5" x10 t/o t/o t/o   bridges 10"x10     10"x 10 10" x10 10"x10 10"x10 10x10"   Clamshells in R S/L 0x5" YTB     10x5" YTB 10x5" YTB 10x5" YTB 10x5" 10x5" YTB   Standing bird dog 15     10 x10 x15 15    DLS: march 15 dead bug     10 dead bug x10 dead bug x15 dead bug 15 dead bug 15 dead bug   Sidestepping w/ TB YTB x3 laps     YTB x3 laps YTB  x3 laps YTB  x3 laps YTB x3 laps    SLS         10"x3    Ther Ex             Bike 6'     L2 6' L2 6' L2 6' L2 6' 6'   HS stretch with strap with slight adduction L +R 10"x5     10"x5 10" x5 10" x5 10"x5    Gentle L SKTC      20"x3 20" x3 20"x3     Gentle LTR       10" x10 10"x10     Hip flexor stretch on chair      3x20" 3x20" 3x20" 3x20"    Fig 4 stretch         10"x3 10"x3   Seated hip ER AROM         10                 Ther Activity             Step ups FW 6" 20 6"x10 6" x10 6" x10 6"x10    Step ups lat 6" 20     6"x10 6" x10 6" x10 6"x10    Gait Training                                       Modalities             Cold pack- prn

## 2022-10-24 ENCOUNTER — OFFICE VISIT (OUTPATIENT)
Dept: PHYSICAL THERAPY | Facility: CLINIC | Age: 67
End: 2022-10-24
Payer: COMMERCIAL

## 2022-10-24 DIAGNOSIS — M25.552 LEFT HIP PAIN: Primary | ICD-10-CM

## 2022-10-24 PROCEDURE — 97110 THERAPEUTIC EXERCISES: CPT

## 2022-10-24 PROCEDURE — 97140 MANUAL THERAPY 1/> REGIONS: CPT

## 2022-10-24 PROCEDURE — 97112 NEUROMUSCULAR REEDUCATION: CPT

## 2022-10-24 NOTE — PROGRESS NOTES
Daily Note     Today's date: 10/24/2022  Patient name: Vitor Aguillon  : 1955  MRN: [id number]  Referring provider: Meka Fay, PAYTON  Dx:   Encounter Diagnosis     ICD-10-CM    1  Left hip pain  M25 552                    Subjective:Pt reports she is feeling good and can manage her sxs independently  Objective: See treatment diary below      Assessment: Tolerated treatment well  Patient has good understanding of pain management and will do well w/ an independent HEP  Plan: Pt DC'd to independent HEP       Dx: L hip pain  EPOC:22  CO-MORBIDITIES: chronic LBP  PERSONAL FACTORS: wants to have less pain with walking and to be able to cross her legs  Precautions: none      Manuals 10/20 10/24    9/20 9/22 9/27 10/3 10/18   SI MET: L anter inom tors             Hip ER PROM JK        th th   Progress note          th   L glut med/ ITB MFR JK JK    mm ksg JK th th    10' 10'    15' 15' 12'  25'   Neuro Re-Ed             DLS: abd bracing t/o     t/o 5" x10 t/o t/o t/o   bridges 10"x10 10"x10    10"x 10 10" x10 10"x10 10"x10 10x10"   Clamshells in R S/L 10x5" YTB 10x5" YTB    10x5" YTB 10x5" YTB 10x5" YTB 10x5" 10x5" YTB   Standing bird dog 15 15    10 x10 x15 15    DLS: march 15 dead bug 15 dead bug    10 dead bug x10 dead bug x15 dead bug 15 dead bug 15 dead bug   Sidestepping w/ TB YTB x3 laps RTB x3 laps    YTB x3 laps YTB  x3 laps YTB  x3 laps YTB x3 laps    SLS  10"x3       10"x3    Ther Ex             Bike 6' 6'    L2 6' L2 6' L2 6' L2 6' 6'   HS stretch with strap with slight adduction L +R 10"x5     10"x5 10" x5 10" x5 10"x5    Gentle L SKTC      20"x3 20" x3 20"x3     Gentle LTR       10" x10 10"x10     Hip flexor stretch on chair      3x20" 3x20" 3x20" 3x20"    Fig 4 stretch         10"x3 10"x3   Seated hip ER AROM         10                 Ther Activity             Step ups FW 6" 20 6" 20    6"x10 6" x10 6" x10 6"x10    Step ups lat 6" 20 6" 20    6"x10 6" x10 6" x10 6"x10    Gait Training Modalities             Cold pack- prn

## 2022-10-25 ENCOUNTER — OFFICE VISIT (OUTPATIENT)
Dept: PODIATRY | Facility: CLINIC | Age: 67
End: 2022-10-25
Payer: COMMERCIAL

## 2022-10-25 ENCOUNTER — APPOINTMENT (OUTPATIENT)
Dept: PHYSICAL THERAPY | Facility: CLINIC | Age: 67
End: 2022-10-25

## 2022-10-25 VITALS
BODY MASS INDEX: 36.1 KG/M2 | DIASTOLIC BLOOD PRESSURE: 80 MMHG | HEIGHT: 67 IN | SYSTOLIC BLOOD PRESSURE: 120 MMHG | WEIGHT: 230 LBS | HEART RATE: 85 BPM

## 2022-10-25 DIAGNOSIS — S90.219A SUBUNGUAL HEMATOMA OF GREAT TOE: ICD-10-CM

## 2022-10-25 DIAGNOSIS — B35.1 TINEA UNGUIUM: Primary | ICD-10-CM

## 2022-10-25 PROCEDURE — 99243 OFF/OP CNSLTJ NEW/EST LOW 30: CPT | Performed by: PODIATRIST

## 2022-10-25 RX ORDER — EFINACONAZOLE 100 MG/ML
1 SOLUTION TOPICAL DAILY
Qty: 8 ML | Refills: 6 | Status: SHIPPED | OUTPATIENT
Start: 2022-10-25

## 2022-10-25 NOTE — LETTER
October 26, 2022     MD Keaton Johnson  [address]  [address] [address] [address]    Patient: Marianne Vaca   YOB: 1955   Date of Visit: 10/25/2022       Dear Dr Pedro Madera: Thank you for referring David Mendoza to me for evaluation  Below are my notes for this consultation  If you have questions, please do not hesitate to call me  I look forward to following your patient along with you  Sincerely,        Chata Haddad DPM        CC: No Recipients  Jose Brody  10/26/2022  7:32 AM  Signed  Assessment/Plan:       Diagnoses and all orders for this visit:    Tinea unguium  -     Efinaconazole (Jublia) 10 % SOLN; Apply 1 application topically in the morning    Subungual hematoma of great toe      Diagnosis and options discussed with patient  Patient agreeable to the plan as stated below    Hematoma under nakul is small, no acute care necessary    Fungal nail infection  Patient has history of fatty liver and can not take oral Lamisil  Recommended topical Jublia  Stressed consistency and patient has this medication often take 6 months at minimum to show efficacy  Subjective:      Patient ID: Marianne Vaca is a 78 y o  female  Patient noticed discoloration of her right great toenail a few months ago  It looks black  The nail looks irregular and is discolored most of the time  It is very brittle and often cracks        The following portions of the patient's history were reviewed and updated as appropriate:   She  has a past medical history of Abdominal pain, Asthma, Basal cell carcinoma, Bicuspid aortic valve, Cervical disc disease, Cervical stenosis of spine, Disease of thyroid gland, Dry eyes, Endometriosis, Hot flashes, Hyperglycemia, Hyperlipidemia, Hypertension, Hypothyroidism, Left ventricular hypertrophy, Mitral valvular disorder, Nephrolithiasis, Ovarian cyst, complex, Ptosis, Renal calculi, Right cervical radiculopathy, Seasonal allergies, Squamous cell skin cancer, and Thyroid disease  She   Patient Active Problem List    Diagnosis Date Noted   • Left hip pain    • Greater trochanteric bursitis of left hip    • Chronic bilateral low back pain without sciatica 2022   • Urticaria 2022   • Primary osteoarthritis of first carpometacarpal joint of right hand 2021   • Gastroesophageal reflux disease with esophagitis without hemorrhage 10/04/2021   • Hiatal hernia 10/04/2021   • Lumbar spondylosis 2021   • Aortic valve disease 2020   • Mild intermittent asthma without complication    • Spasmodic cough 2020   • Sialoadenitis 2020   • Impacted cerumen of right ear 2020   • Former smoker 2019   • Cough with sputum 2019   • Basal cell carcinoma (BCC) of ala nasi 2019   • Squamous cell cancer of skin of hand, right 2018   • Endometrial polyp 2018   • Dry eye syndrome 2017   • Abdominal pain 2017   • Fatty infiltration of liver 2017   • Spondylosis of cervical region without myelopathy or radiculopathy 2017   • Rhinitis 2016   • Lumbosacral pain 2016   • Thoracic neuritis 2016   • Mitral valvular disorder 2015   • Complex ovarian cyst 10/20/2014   • Nephrolithiasis 2014   • Left ventricular hypertrophy 2013   • Non-toxic multinodular goiter 2013   • Hyperglycemia 2013   • Mixed hyperlipidemia 2012   • Essential hypertension 2012   • Acquired hypothyroidism 2012   • Class 2 severe obesity with serious comorbidity and body mass index (BMI) of 36 0 to 36 9 in [facility]) 2012     She  has a past surgical history that includes  section; Cholecystectomy; Carpal tunnel release; Tubal ligation; Plantar fasciectomy; pr colonoscopy flx dx w/collj spec when pfrmd (N/A, 10/9/2017);  Neuroplasty / transposition median nerve at carpal tunnel; Neuroplasty / transposition median nerve at carpal tunnel (2001); Other surgical history; Colonoscopy; pr hysteroscopy,w/endo bx (N/A, 2/26/2018); Excision basal cell carcinoma; Skin biopsy; and Upper gastrointestinal endoscopy  Her family history includes Alzheimer's disease in her mother; Cancer in her family; Diabetes in her mother; Heart disease in her family and mother; Hypertension in her mother; No Known Problems in her daughter, father, maternal aunt, maternal aunt, maternal aunt, maternal aunt, maternal aunt, maternal grandfather, maternal grandmother, paternal grandfather, paternal grandmother, sister, and sister; Other in her family; Thyroid disease in her family  She  reports that she quit smoking about 10 years ago  Her smoking use included cigarettes  She has a 20 00 pack-year smoking history  She has never used smokeless tobacco  She reports current alcohol use of about 8 0 standard drinks of alcohol per week  She reports that she does not use drugs  Current Outpatient Medications   Medication Sig Dispense Refill   • albuterol (Ventolin HFA) 90 mcg/act inhaler Inhale 2 puffs every 6 (six) hours as needed for wheezing 3 Inhaler 3   • amLODIPine (NORVASC) 5 mg tablet Take 1 tablet (5 mg total) by mouth daily 90 tablet 3   • Arnuity Ellipta 100 MCG/ACT AEPB inhaler INHALE 1 PUFF BY MOUTH DAILY AND RINSE MOUTH AFTER USE 30 blister 11   • Efinaconazole (Jublia) 10 % SOLN Apply 1 application topically in the morning 8 mL 6   • fexofenadine (ALLEGRA) 180 MG tablet Take 1 tablet (180 mg total) by mouth daily 90 tablet 1   • levothyroxine 125 mcg tablet TAKE ONE TABLET BY MOUTH EVERY DAY   90 tablet 0   • losartan-hydrochlorothiazide (HYZAAR) 100-25 MG per tablet Take 1 tablet by mouth daily 90 tablet 3   • Semaglutide 7 MG TABS Take 7 mg by mouth in the morning 30 tablet 3   • XIIDRA 5 % op solution      • famotidine (PEPCID) 40 MG tablet Take 1 tablet (40 mg total) by mouth daily 90 tablet 6   • ketoconazole (NIZORAL) 2 % cream Apply topically daily for 7 days 30 g 0   • pantoprazole (PROTONIX) 40 mg tablet Take 1 tablet (40 mg total) by mouth 2 (two) times a day 180 tablet 6     No current facility-administered medications for this visit  Current Outpatient Medications on File Prior to Visit   Medication Sig   • albuterol (Ventolin HFA) 90 mcg/act inhaler Inhale 2 puffs every 6 (six) hours as needed for wheezing   • amLODIPine (NORVASC) 5 mg tablet Take 1 tablet (5 mg total) by mouth daily   • Arnuity Ellipta 100 MCG/ACT AEPB inhaler INHALE 1 PUFF BY MOUTH DAILY AND RINSE MOUTH AFTER USE   • fexofenadine (ALLEGRA) 180 MG tablet Take 1 tablet (180 mg total) by mouth daily   • levothyroxine 125 mcg tablet TAKE ONE TABLET BY MOUTH EVERY DAY  • losartan-hydrochlorothiazide (HYZAAR) 100-25 MG per tablet Take 1 tablet by mouth daily   • Semaglutide 7 MG TABS Take 7 mg by mouth in the morning   • XIIDRA 5 % op solution    • famotidine (PEPCID) 40 MG tablet Take 1 tablet (40 mg total) by mouth daily   • ketoconazole (NIZORAL) 2 % cream Apply topically daily for 7 days   • pantoprazole (PROTONIX) 40 mg tablet Take 1 tablet (40 mg total) by mouth 2 (two) times a day     No current facility-administered medications on file prior to visit  She is allergic to erythromycin, penicillins, aztreonam, carbapenems, cephalosporins, griseofulvin, and influenza virus vaccine       Review of Systems    Constitutional: Negative  HENT: Negative for sinus pressure and sinus pain  Respiratory: Negative for cough and shortness of breath  Cardiovascular: Negative for chest pain and leg swelling  Gastrointestinal: Negative for diarrhea, nausea and vomiting  Musculoskeletal: Negative for arthralgias, gait problem, joint swelling and myalgias  Skin:  Toenail discoloration  Neurological: Negative for weakness, numbness and headaches  Psychiatric/Behavioral: The patient is not nervous/anxious          Objective:      /80   Pulse 85   Ht 5' 7" (1 702 m)   Wt 104 kg (230 lb) BMI 36 02 kg/m²          Physical Exam      Vitals reviewed  Constitutional:       Appearance: Patient is not ill-appearing or diaphoretic  Patient is overweight  HENT:      Nose: No congestion or rhinorrhea  Cardiovascular:        Dorsalis pedis pulses are 2+ on the right side and 2+ on the left side  Posterior tibial pulses are 2+ on the right side and 2+ on the left side  Pulmonary:      Effort: Pulmonary effort is normal  No respiratory distress  Musculoskeletal:      Right lower leg: No edema  Left lower leg: No edema  No calf pain with compression           Right foot:      Normal range of motion to ankle, STJ, midfoot  Deformity: none     Pain: none     Strength (MMT)   Achilles 5/5   PT  5/5   Peroneal 5/5   TA  5/5   EHL/EDL 5/5     Left foot:      Normal range of motion to ankle, STJ, midfoot  Deformity: none     Pain: none     Strength (MMT)   Achilles 5/5   PT  5/5   Peroneal 5/5   TA  5/5   EHL/EDL 5/5  Skin:     Capillary Refill: Capillary refill takes less than 2 seconds  Findings:  Bilateral 1st and 5th toes are thick yellow dystrophic with subungual debris  There is a small black area on the right great toenail which is likely small subungual hematoma  No sign of acute infection or drainage  No active hemorrhage  Neurological:      Mental Status: Alert and oriented to person, place, and time  Gait: Gait normal        Right Protective Sensation: 10 sites tested  10 sites sensed  Left  Protective Sensation: 10 sites tested  10 sites sensed     Psychiatric:         Mood and Affect: Mood normal

## 2022-10-25 NOTE — PROGRESS NOTES
Assessment/Plan:       Diagnoses and all orders for this visit:    Tinea unguium  -     Efinaconazole (Jublia) 10 % SOLN; Apply 1 application topically in the morning    Subungual hematoma of great toe      Diagnosis and options discussed with patient  Patient agreeable to the plan as stated below    Hematoma under nakul is small, no acute care necessary    Fungal nail infection  Patient has history of fatty liver and can not take oral Lamisil  Recommended topical Jublia  Stressed consistency and patient has this medication often take 6 months at minimum to show efficacy  Subjective:      Patient ID: Mookie Kumar is a 78 y o  female  Patient noticed discoloration of her right great toenail a few months ago  It looks black  The nail looks irregular and is discolored most of the time  It is very brittle and often cracks  The following portions of the patient's history were reviewed and updated as appropriate:   She  has a past medical history of Abdominal pain, Asthma, Basal cell carcinoma, Bicuspid aortic valve, Cervical disc disease, Cervical stenosis of spine, Disease of thyroid gland, Dry eyes, Endometriosis, Hot flashes, Hyperglycemia, Hyperlipidemia, Hypertension, Hypothyroidism, Left ventricular hypertrophy, Mitral valvular disorder, Nephrolithiasis, Ovarian cyst, complex, Ptosis, Renal calculi, Right cervical radiculopathy, Seasonal allergies, Squamous cell skin cancer, and Thyroid disease    She   Patient Active Problem List    Diagnosis Date Noted   • Left hip pain    • Greater trochanteric bursitis of left hip    • Chronic bilateral low back pain without sciatica 06/14/2022   • Urticaria 04/11/2022   • Primary osteoarthritis of first carpometacarpal joint of right hand 12/21/2021   • Gastroesophageal reflux disease with esophagitis without hemorrhage 10/04/2021   • Hiatal hernia 10/04/2021   • Lumbar spondylosis 03/30/2021   • Aortic valve disease 09/11/2020   • Mild intermittent asthma without complication    • Spasmodic cough 2020   • Sialoadenitis 2020   • Impacted cerumen of right ear 2020   • Former smoker 2019   • Cough with sputum 2019   • Basal cell carcinoma (BCC) of ala nasi 2019   • Squamous cell cancer of skin of hand, right 2018   • Endometrial polyp 2018   • Dry eye syndrome 2017   • Abdominal pain 2017   • Fatty infiltration of liver 2017   • Spondylosis of cervical region without myelopathy or radiculopathy 2017   • Rhinitis 2016   • Lumbosacral pain 2016   • Thoracic neuritis 2016   • Mitral valvular disorder 2015   • Complex ovarian cyst 10/20/2014   • Nephrolithiasis 2014   • Left ventricular hypertrophy 2013   • Non-toxic multinodular goiter 2013   • Hyperglycemia 2013   • Mixed hyperlipidemia 2012   • Essential hypertension 2012   • Acquired hypothyroidism 2012   • Class 2 severe obesity with serious comorbidity and body mass index (BMI) of 36 0 to 36 9 in [facility]) 2012     She  has a past surgical history that includes  section; Cholecystectomy; Carpal tunnel release; Tubal ligation; Plantar fasciectomy; pr colonoscopy flx dx w/collj spec when pfrmd (N/A, 10/9/2017); Neuroplasty / transposition median nerve at carpal tunnel; Neuroplasty / transposition median nerve at carpal tunnel (); Other surgical history; Colonoscopy; pr hysteroscopy,w/endo bx (N/A, 2018); Excision basal cell carcinoma; Skin biopsy; and Upper gastrointestinal endoscopy    Her family history includes Alzheimer's disease in her mother; Cancer in her family; Diabetes in her mother; Heart disease in her family and mother; Hypertension in her mother; No Known Problems in her daughter, father, maternal aunt, maternal aunt, maternal aunt, maternal aunt, maternal aunt, maternal grandfather, maternal grandmother, paternal grandfather, paternal grandmother, sister, and sister; Other in her family; Thyroid disease in her family  She  reports that she quit smoking about 10 years ago  Her smoking use included cigarettes  She has a 20 00 pack-year smoking history  She has never used smokeless tobacco  She reports current alcohol use of about 8 0 standard drinks of alcohol per week  She reports that she does not use drugs  Current Outpatient Medications   Medication Sig Dispense Refill   • albuterol (Ventolin HFA) 90 mcg/act inhaler Inhale 2 puffs every 6 (six) hours as needed for wheezing 3 Inhaler 3   • amLODIPine (NORVASC) 5 mg tablet Take 1 tablet (5 mg total) by mouth daily 90 tablet 3   • Arnuity Ellipta 100 MCG/ACT AEPB inhaler INHALE 1 PUFF BY MOUTH DAILY AND RINSE MOUTH AFTER USE 30 blister 11   • Efinaconazole (Jublia) 10 % SOLN Apply 1 application topically in the morning 8 mL 6   • fexofenadine (ALLEGRA) 180 MG tablet Take 1 tablet (180 mg total) by mouth daily 90 tablet 1   • levothyroxine 125 mcg tablet TAKE ONE TABLET BY MOUTH EVERY DAY  90 tablet 0   • losartan-hydrochlorothiazide (HYZAAR) 100-25 MG per tablet Take 1 tablet by mouth daily 90 tablet 3   • Semaglutide 7 MG TABS Take 7 mg by mouth in the morning 30 tablet 3   • XIIDRA 5 % op solution      • famotidine (PEPCID) 40 MG tablet Take 1 tablet (40 mg total) by mouth daily 90 tablet 6   • ketoconazole (NIZORAL) 2 % cream Apply topically daily for 7 days 30 g 0   • pantoprazole (PROTONIX) 40 mg tablet Take 1 tablet (40 mg total) by mouth 2 (two) times a day 180 tablet 6     No current facility-administered medications for this visit       Current Outpatient Medications on File Prior to Visit   Medication Sig   • albuterol (Ventolin HFA) 90 mcg/act inhaler Inhale 2 puffs every 6 (six) hours as needed for wheezing   • amLODIPine (NORVASC) 5 mg tablet Take 1 tablet (5 mg total) by mouth daily   • Arnuity Ellipta 100 MCG/ACT AEPB inhaler INHALE 1 PUFF BY MOUTH DAILY AND RINSE MOUTH AFTER USE • fexofenadine (ALLEGRA) 180 MG tablet Take 1 tablet (180 mg total) by mouth daily   • levothyroxine 125 mcg tablet TAKE ONE TABLET BY MOUTH EVERY DAY  • losartan-hydrochlorothiazide (HYZAAR) 100-25 MG per tablet Take 1 tablet by mouth daily   • Semaglutide 7 MG TABS Take 7 mg by mouth in the morning   • XIIDRA 5 % op solution    • famotidine (PEPCID) 40 MG tablet Take 1 tablet (40 mg total) by mouth daily   • ketoconazole (NIZORAL) 2 % cream Apply topically daily for 7 days   • pantoprazole (PROTONIX) 40 mg tablet Take 1 tablet (40 mg total) by mouth 2 (two) times a day     No current facility-administered medications on file prior to visit  She is allergic to erythromycin, penicillins, aztreonam, carbapenems, cephalosporins, griseofulvin, and influenza virus vaccine       Review of Systems    Constitutional: Negative  HENT: Negative for sinus pressure and sinus pain  Respiratory: Negative for cough and shortness of breath  Cardiovascular: Negative for chest pain and leg swelling  Gastrointestinal: Negative for diarrhea, nausea and vomiting  Musculoskeletal: Negative for arthralgias, gait problem, joint swelling and myalgias  Skin:  Toenail discoloration  Neurological: Negative for weakness, numbness and headaches  Psychiatric/Behavioral: The patient is not nervous/anxious  Objective:      /80   Pulse 85   Ht 5' 7" (1 702 m)   Wt 104 kg (230 lb)   BMI 36 02 kg/m²          Physical Exam      Vitals reviewed  Constitutional:       Appearance: Patient is not ill-appearing or diaphoretic  Patient is overweight  HENT:      Nose: No congestion or rhinorrhea  Cardiovascular:        Dorsalis pedis pulses are 2+ on the right side and 2+ on the left side  Posterior tibial pulses are 2+ on the right side and 2+ on the left side  Pulmonary:      Effort: Pulmonary effort is normal  No respiratory distress  Musculoskeletal:      Right lower leg: No edema        Left lower leg: No edema  No calf pain with compression           Right foot:      Normal range of motion to ankle, STJ, midfoot  Deformity: none     Pain: none     Strength (MMT)   Achilles 5/5   PT  5/5   Peroneal 5/5   TA  5/5   EHL/EDL 5/5     Left foot:      Normal range of motion to ankle, STJ, midfoot  Deformity: none     Pain: none     Strength (MMT)   Achilles 5/5   PT  5/5   Peroneal 5/5   TA  5/5   EHL/EDL 5/5  Skin:     Capillary Refill: Capillary refill takes less than 2 seconds  Findings:  Bilateral 1st and 5th toes are thick yellow dystrophic with subungual debris  There is a small black area on the right great toenail which is likely small subungual hematoma  No sign of acute infection or drainage  No active hemorrhage  Neurological:      Mental Status: Alert and oriented to person, place, and time  Gait: Gait normal        Right Protective Sensation: 10 sites tested  10 sites sensed  Left  Protective Sensation: 10 sites tested  10 sites sensed     Psychiatric:         Mood and Affect: Mood normal

## 2022-10-31 ENCOUNTER — APPOINTMENT (OUTPATIENT)
Dept: PHYSICAL THERAPY | Facility: CLINIC | Age: 67
End: 2022-10-31

## 2022-10-31 ENCOUNTER — OFFICE VISIT (OUTPATIENT)
Dept: FAMILY MEDICINE CLINIC | Facility: HOSPITAL | Age: 67
End: 2022-10-31

## 2022-10-31 VITALS
BODY MASS INDEX: 35.41 KG/M2 | SYSTOLIC BLOOD PRESSURE: 139 MMHG | HEART RATE: 65 BPM | HEIGHT: 67 IN | DIASTOLIC BLOOD PRESSURE: 81 MMHG | WEIGHT: 225.6 LBS | OXYGEN SATURATION: 97 % | TEMPERATURE: 97.2 F

## 2022-10-31 DIAGNOSIS — E66.01 CLASS 2 SEVERE OBESITY DUE TO EXCESS CALORIES WITH SERIOUS COMORBIDITY AND BODY MASS INDEX (BMI) OF 36.0 TO 36.9 IN ADULT (HCC): ICD-10-CM

## 2022-10-31 DIAGNOSIS — E78.2 MIXED HYPERLIPIDEMIA: ICD-10-CM

## 2022-10-31 DIAGNOSIS — I10 ESSENTIAL HYPERTENSION: ICD-10-CM

## 2022-10-31 DIAGNOSIS — K76.0 FATTY INFILTRATION OF LIVER: Primary | ICD-10-CM

## 2022-10-31 DIAGNOSIS — R73.9 HYPERGLYCEMIA: ICD-10-CM

## 2022-10-31 DIAGNOSIS — Z23 ENCOUNTER FOR IMMUNIZATION: ICD-10-CM

## 2022-10-31 DIAGNOSIS — E03.9 ACQUIRED HYPOTHYROIDISM: ICD-10-CM

## 2022-10-31 NOTE — PROGRESS NOTES
Name: Sue Navarro      : 1955      MRN: [id number]  Encounter Provider: Hailey Vogel MD  Encounter Date: 10/31/2022   Encounter department: [facility]      1  Fatty infiltration of liver    2  Acquired hypothyroidism    3  Essential hypertension    4  Class 2 severe obesity due to excess calories with serious comorbidity and body mass index (BMI) of 36 0 to 36 9 in adult ([address] )    5  Hyperglycemia    6  Mixed hyperlipidemia    7  Encounter for immunization  -     influenza vaccine, quadrivalent, recombinant, PF, 0 5 mL, for patients 18 yr+ (FLUBLOK)           Subjective      3 month follow up  Had COVID 1st week of October, had Paxlovid and is back to normal   had it worse than she did    Willing to get flu shot    Started on Rybelsus 7mg and is doing well for her    Labs reviewed in detail and copy given  Improved metabolic numbers for lipid and A1c    Review of Systems   Constitutional: Negative for unexpected weight change  Respiratory: Positive for wheezing  Cardiovascular: Negative  Gastrointestinal: Negative  Musculoskeletal: Negative  All other systems reviewed and are negative        Current Outpatient Medications on File Prior to Visit   Medication Sig   • albuterol (Ventolin HFA) 90 mcg/act inhaler Inhale 2 puffs every 6 (six) hours as needed for wheezing   • amLODIPine (NORVASC) 5 mg tablet Take 1 tablet (5 mg total) by mouth daily   • Arnuity Ellipta 100 MCG/ACT AEPB inhaler INHALE 1 PUFF BY MOUTH DAILY AND RINSE MOUTH AFTER USE   • Efinaconazole (Jublia) 10 % SOLN Apply 1 application topically in the morning   • XIIDRA 5 % op solution    • famotidine (PEPCID) 40 MG tablet Take 1 tablet (40 mg total) by mouth daily   • ketoconazole (NIZORAL) 2 % cream Apply topically daily for 7 days   • pantoprazole (PROTONIX) 40 mg tablet Take 1 tablet (40 mg total) by mouth 2 (two) times a day       Objective     /81 (BP Location: Left arm, Patient Position: Sitting, Cuff Size: Large)   Pulse 65   Temp (!) 97 2 °F (36 2 °C) (Tympanic)   Ht 5' 7" (1 702 m)   Wt 102 kg (225 lb 9 6 oz)   SpO2 97%   BMI 35 33 kg/m²     Physical Exam  Vitals and nursing note reviewed  Cardiovascular:      Rate and Rhythm: Normal rate and regular rhythm  Heart sounds: Murmur heard  Pulmonary:      Effort: Pulmonary effort is normal       Breath sounds: Normal breath sounds  Skin:     Findings: No rash  Neurological:      Mental Status: She is alert         Heather Jernigan MD

## 2022-11-16 DIAGNOSIS — J31.0 CHRONIC RHINITIS: ICD-10-CM

## 2022-11-16 DIAGNOSIS — E03.9 ACQUIRED HYPOTHYROIDISM: ICD-10-CM

## 2022-11-16 RX ORDER — LEVOTHYROXINE SODIUM 0.12 MG/1
TABLET ORAL
Qty: 90 TABLET | Refills: 0 | Status: SHIPPED | OUTPATIENT
Start: 2022-11-16

## 2022-11-16 RX ORDER — FEXOFENADINE HCL 180 MG/1
TABLET ORAL
Qty: 100 TABLET | Refills: 0 | Status: SHIPPED | OUTPATIENT
Start: 2022-11-16

## 2022-11-17 ENCOUNTER — OFFICE VISIT (OUTPATIENT)
Dept: CARDIOLOGY CLINIC | Facility: CLINIC | Age: 67
End: 2022-11-17

## 2022-11-17 VITALS
WEIGHT: 226 LBS | HEIGHT: 67 IN | DIASTOLIC BLOOD PRESSURE: 80 MMHG | HEART RATE: 79 BPM | SYSTOLIC BLOOD PRESSURE: 130 MMHG | BODY MASS INDEX: 35.47 KG/M2

## 2022-11-17 DIAGNOSIS — I10 ESSENTIAL HYPERTENSION: Primary | ICD-10-CM

## 2022-11-17 DIAGNOSIS — I35.9 AORTIC VALVE DISEASE: ICD-10-CM

## 2022-11-17 RX ORDER — RIBOFLAVIN (VITAMIN B2) 100 MG
100 TABLET ORAL DAILY
COMMUNITY

## 2022-11-17 RX ORDER — OMEGA-3S/DHA/EPA/FISH OIL/D3 300MG-1000
400 CAPSULE ORAL DAILY
COMMUNITY

## 2022-11-17 NOTE — PROGRESS NOTES
Cardiology Outpatient Follow-Up Note - Shanna Parada 78 y o  female MRN: [id number]      Assessment/Plan:  1  Essential hypertension  Controlled on current therapy  Continue Hyzaar 100-25 mg daily  Continue amlodipine 5 mg daily  Previous attempt increase amlodipine limited by lower extremity edema   - POCT ECG    2  Aortic valve disease  Likely subvalvular flow acceleration  We will monitor again with repeat echocardiogram in approximately 1 year  - POCT ECG    The 10-year ASCVD risk score (Perlita CLEMENT, et al , 2019) is: 9 9%    Values used to calculate the score:      Age: 78 years      Sex: Female      Is Non- : No      Diabetic: No      Tobacco smoker: No      Systolic Blood Pressure: 427 mmHg      Is BP treated: Yes      HDL Cholesterol: 32 mg/dL      Total Cholesterol: 138 mg/dL     We reviewed the ACC ASCVD Risk  together, discussed the patient's risk factors, and reviewed the risk impacts of various therapies or interventions  We discussed initiation of statin for primary prevention of ASCVD  We also discussed the role of CT coronary artery calcium scoring in guiding this decision  The patient is opting for CT coronary calcium score at this time  I would recommend initiation of statin for primary prevention in her case for any score above 0  We will see Shanna Parada back in 12 months for routine follow-up  Subjective:     HPI: Shanna Parada is a 78y o  year old female with HTN and increased transaortic flow velocity of unclear etiology who presents for routine follow-up  She has been noted to have increased transaortic flow velocity since echo on 10/30/2015, at which time her AV Vmax was 2 11m/s  There were no other abnormalities  On 6/25/2018, Vmax was 2 16m/s with LVOT Vmax of 1 75m/s  On 7/14/2020, her AV Vmax was 2 33m/s with LVOT Vmax of 1 9m/s  Unfortunately, 2D image quality is poor throughout the studies and AV leaflets cannot be visualized well  There has been some concern through the years that her valve may be bicuspid, however in my personal review of the 2020 and 2018 echo there is not sufficient image detail to make this determination  If anything, her 06/25/2018 echo has one view where the AV appears trileaflet  At our last visit on 12/06/2021, we added HCTZ to her antihypertensive regimen by changing her losartan to Hyzaar 100-25 mg daily  She is also on amlodipine 5 mg daily, higher doses having not been tolerated due to lower extremity edema  ROS:  Review of Systems:  Review of Systems        Objective:     Vitals:   Vitals:    11/17/22 0943   Weight: 103 kg (226 lb)   Height: 5' 7" (1 702 m)    Body surface area is 2 13 meters squared  Wt Readings from Last 3 Encounters:   11/17/22 103 kg (226 lb)   10/31/22 102 kg (225 lb 9 6 oz)   10/25/22 104 kg (230 lb)       Physical Exam:  General: Irma Munoz is a well appearing female, in no acute distress, sitting comfortably  HEENT: moist mucous membranes, EOMI  Neck:  No JVD, supple, trachea midline  Cardiovascular: unremarkable S1/S2, regular rate and rhythm, 2/6 systolic murmur RUSB  Pulmonary: normal respiratory effort, CTAB  Abdomen: soft and nondistended  Extremities: No lower extremity edema  Warm and well perfused extremities  Neuro: no focal motor deficits, AAOx3 (person, place, time)  Psych: Normal mood and affect, cooperative      Medications (at the START of this encounter):   Outpatient Medications Prior to Visit   Medication Sig Dispense Refill   • albuterol (Ventolin HFA) 90 mcg/act inhaler Inhale 2 puffs every 6 (six) hours as needed for wheezing 3 Inhaler 3   • amLODIPine (NORVASC) 5 mg tablet Take 1 tablet (5 mg total) by mouth daily 90 tablet 3   • Arnuity Ellipta 100 MCG/ACT AEPB inhaler INHALE 1 PUFF BY MOUTH DAILY AND RINSE MOUTH AFTER USE 30 blister 11   • Ascorbic Acid (vitamin C) 100 MG tablet Take 100 mg by mouth daily     • cholecalciferol (VITAMIN D3) 400 units tablet Take 400 Units by mouth daily     • Cyanocobalamin (VITAMIN B 12 PO) Take by mouth     • Efinaconazole (Jublia) 10 % SOLN Apply 1 application topically in the morning 8 mL 6   • famotidine (PEPCID) 40 MG tablet Take 1 tablet (40 mg total) by mouth daily 90 tablet 6   • fexofenadine (ALLEGRA) 180 MG tablet TAKE ONE TABLET BY MOUTH EVERY  tablet 0   • ketoconazole (NIZORAL) 2 % cream Apply topically daily for 7 days 30 g 0   • levothyroxine 125 mcg tablet TAKE ONE TABLET BY MOUTH EVERY DAY  90 tablet 0   • losartan-hydrochlorothiazide (HYZAAR) 100-25 MG per tablet Take 1 tablet by mouth daily 90 tablet 3   • pantoprazole (PROTONIX) 40 mg tablet Take 1 tablet (40 mg total) by mouth 2 (two) times a day 180 tablet 6   • Semaglutide 7 MG TABS Take 7 mg by mouth in the morning 30 tablet 3   • XIIDRA 5 % op solution        No facility-administered medications prior to visit  Labs & Results:  Lipid profile 09/22/2022 , , HDL 32, LDL 81 mg/dL      EKG personally reviewed:  EKG in the office today shows normal sinus rhythm, normal axis, normal intervals, nonpathologic Q-waves in the inferior leads, delayed anterior R-wave progression  Abnormal study  Unchanged from prior  Time Spent:  Total time (face-to-face and non-face-to-face) spent on today's visit was 25 minutes  This includes preparation for the visits (i e  reviewing test results), performance of a medically appropriate history and examination, and orders for medications, tests or other procedures  This time is exclusive of procedures performed and time spent teaching  This note was completed in part utilizing [employer] direct voice recognition software  Grammatical errors, random word insertion, spelling mistakes, occasional wrong word or "sound-alike" substitutions and incomplete sentences may be an occasional consequence of the system secondary to software limitations, ambient noise and hardware issues  At the time of dictation, efforts were made to edit, clarify and /or correct errors  Please read the chart carefully and recognize, using context, where substitutions have occurred  If you have any questions or concerns about the context, text or information contained within the body of this dictation, please contact myself, the provider, for further clarification

## 2022-12-09 DIAGNOSIS — E66.01 CLASS 2 SEVERE OBESITY DUE TO EXCESS CALORIES WITH SERIOUS COMORBIDITY AND BODY MASS INDEX (BMI) OF 36.0 TO 36.9 IN ADULT: ICD-10-CM

## 2022-12-21 ENCOUNTER — TELEPHONE (OUTPATIENT)
Dept: FAMILY MEDICINE CLINIC | Facility: HOSPITAL | Age: 67
End: 2022-12-21

## 2022-12-21 DIAGNOSIS — U07.1 COVID: Primary | ICD-10-CM

## 2022-12-21 RX ORDER — NIRMATRELVIR AND RITONAVIR 300-100 MG
3 KIT ORAL 2 TIMES DAILY
Qty: 30 TABLET | Refills: 0 | Status: SHIPPED | OUTPATIENT
Start: 2022-12-21 | End: 2022-12-26

## 2022-12-21 NOTE — TELEPHONE ENCOUNTER
Patient had covid back in October and was given Paxlovid  She just tested positive again today and is asking if she can have another prescription of Paxlovid  Patient uses [facility] in [place]

## 2023-01-04 ENCOUNTER — TELEPHONE (OUTPATIENT)
Dept: PULMONOLOGY | Facility: CLINIC | Age: 68
End: 2023-01-04

## 2023-01-04 NOTE — TELEPHONE ENCOUNTER
Pt has called in stating she previously had Covid a few weeks back and is experiencing head congestion and a pretty bad cough  Pt asking if there is anything she can be prescribed  Pt is leaving on vacation next week   Please advise

## 2023-01-04 NOTE — TELEPHONE ENCOUNTER
Osmany Jabier would like a return call regarding     What is the reason for the call/chief complaint? Cough and congestion post covid, is going away Friday and is afraid of this coughing attacks she is getting non stop     What additional symptoms are present or absent? SOB, no   Are they on O2? No Pulse O2 restingna When walkingna   chest pain/tightness, no  wheezing, yes  Cough, yes  mucous production,yes  What color is it unsure has not looked at color but tastes horrible  fevers/chills, no  weight gain, no  Swelling no  Pain no, does it hurt when breathing or all the time? na    When did they start/how long have they been going on? 12/30/22    Constant or intermittent; if intermittent describe yes? What makes it better or worse? inhalers both daily and rescue     Have you been exposed to anyone that is sick? No    Have you been tested for COVID recently? Yes had covid 12/18/22 but is now negative    Check current pulmonary medications inhalers   frequency of use daily     Have they had any other treatment or testing for this problem elsewhere? no    Recent steroids? No    Recent Antibiotics? No     Last office visit? 3/21/22    Patient pharmacy?  [facility]    30 or 90 day supply 30

## 2023-01-05 ENCOUNTER — OFFICE VISIT (OUTPATIENT)
Dept: PULMONOLOGY | Facility: HOSPITAL | Age: 68
End: 2023-01-05

## 2023-01-05 VITALS
SYSTOLIC BLOOD PRESSURE: 134 MMHG | WEIGHT: 223 LBS | BODY MASS INDEX: 35 KG/M2 | DIASTOLIC BLOOD PRESSURE: 74 MMHG | HEIGHT: 67 IN | OXYGEN SATURATION: 96 % | HEART RATE: 62 BPM

## 2023-01-05 DIAGNOSIS — F17.211 NICOTINE DEPENDENCE, CIGARETTES, IN REMISSION: ICD-10-CM

## 2023-01-05 DIAGNOSIS — J45.21 MILD INTERMITTENT ASTHMA WITH ACUTE EXACERBATION: ICD-10-CM

## 2023-01-05 DIAGNOSIS — J45.21 MILD INTERMITTENT ASTHMA WITH ACUTE EXACERBATION: Primary | ICD-10-CM

## 2023-01-05 DIAGNOSIS — U07.1 COVID-19: ICD-10-CM

## 2023-01-05 RX ORDER — PROMETHAZINE HYDROCHLORIDE AND CODEINE PHOSPHATE 6.25; 1 MG/5ML; MG/5ML
5 SYRUP ORAL EVERY 4 HOURS PRN
Qty: 118 ML | Refills: 0 | Status: SHIPPED | OUTPATIENT
Start: 2023-01-05 | End: 2023-02-23

## 2023-01-05 RX ORDER — METHYLPREDNISOLONE 4 MG/1
TABLET ORAL
Qty: 21 EACH | Refills: 0 | Status: SHIPPED | OUTPATIENT
Start: 2023-01-05 | End: 2023-02-23

## 2023-01-05 RX ORDER — BENZONATATE 100 MG/1
100 CAPSULE ORAL 3 TIMES DAILY PRN
Qty: 40 CAPSULE | Refills: 0 | Status: SHIPPED | OUTPATIENT
Start: 2023-01-05 | End: 2023-05-17 | Stop reason: SDUPTHER

## 2023-01-05 RX ORDER — DOXYCYCLINE 100 MG/1
100 TABLET ORAL 2 TIMES DAILY
Qty: 14 TABLET | Refills: 0 | Status: SHIPPED | OUTPATIENT
Start: 2023-01-05 | End: 2023-01-12

## 2023-01-05 RX ORDER — METHYLPREDNISOLONE 4 MG/1
TABLET ORAL
Qty: 21 EACH | Refills: 0 | Status: SHIPPED | OUTPATIENT
Start: 2023-01-05 | End: 2023-01-05 | Stop reason: SDUPTHER

## 2023-01-05 RX ORDER — BENZONATATE 100 MG/1
100 CAPSULE ORAL 3 TIMES DAILY PRN
Qty: 40 CAPSULE | Refills: 0 | Status: SHIPPED | OUTPATIENT
Start: 2023-01-05 | End: 2023-01-05 | Stop reason: SDUPTHER

## 2023-01-05 RX ORDER — DOXYCYCLINE 100 MG/1
100 TABLET ORAL 2 TIMES DAILY
Qty: 14 TABLET | Refills: 0 | Status: SHIPPED | OUTPATIENT
Start: 2023-01-05 | End: 2023-01-05 | Stop reason: SDUPTHER

## 2023-01-05 RX ORDER — PROMETHAZINE HYDROCHLORIDE AND CODEINE PHOSPHATE 6.25; 1 MG/5ML; MG/5ML
5 SYRUP ORAL EVERY 4 HOURS PRN
Qty: 118 ML | Refills: 0 | Status: SHIPPED | OUTPATIENT
Start: 2023-01-05 | End: 2023-01-05 | Stop reason: SDUPTHER

## 2023-01-05 NOTE — PROGRESS NOTES
Assessment & Plan:      1  Mild intermittent asthma with acute exacerbation  benzonatate (TESSALON PERLES) 100 mg capsule    methylPREDNISolone 4 MG tablet therapy pack    doxycycline (ADOXA) 100 MG tablet    promethazine-codeine (PHENERGAN WITH CODEINE) 6 25-10 mg/5 mL syrup      2  COVID-19  promethazine-codeine (PHENERGAN WITH CODEINE) 6 25-10 mg/5 mL syrup      3  Nicotine dependence, cigarettes, in remission          · Symptoms are related to post COVID bronchitis, previously feels too wired when she gets prednisone  We will send Medrol Dosepak to the pharmacy  · If there is no improvement in symptoms over the next 24 hours, she will start doxycycline as she is at risk for bacterial infection following COVID given hx asthma  · She may also use Tessalon Perles and cough syrup as needed    Advised potential side effects of promethazine codeine cough syrup and not to drive while taking that medication  · Continue Arnuity 1 puff daily, albuterol as needed  · CT lung screening previously ordered and is overdue    Call if symptoms worsen or do not improve     Answers for HPI/ROS submitted by the patient on 1/4/2023  Do you experience frequent throat clearing?: Yes  Do you have a wet cough?: Yes  Chronicity: recurrent  When did you first notice your symptoms?: in the past 7 days  How often do your symptoms occur?: constantly  Since you first noticed this problem, how has it changed?: gradually worsening  Do you have shortness of breath that occurs with effort or exertion?: Yes  Do you have ear congestion?: No  Do you have heartburn?: No  Do you have fatigue?: Yes  Do you have nasal congestion?: Yes  Do you have shortness of breath when lying flat?: Yes  Do you have shortness of breath when you wake up?: No  Do you have sweats?: No  Have you experienced weight loss?: No  Which of the following makes your symptoms worse?: emotional stress, exposure to fumes, lying down  Which of the following makes your symptoms better?: change in position, cold air, OTC cough suppressant, steroid inhaler    Subjective:     Patient ID: Grecia Hess is a 78 y o  female  Chief Complaint:    Jose Daniel Salazar is a very pleasant 36-year-old female presenting for sick visit  She had COVID-19 infection last month which was relatively mild  She was treated with Paxil with  She did not receive any steroids or antibiotics  She complains of at least 1 week of frequent bothersome cough which is sometimes dry and other times productive  She is unsure what color sputum she has as she does not look  She is not having any fevers or chills  No shortness of breath  She does hear wheezing sometimes  She is taking Mucinex which helps a little bit  Associated symptoms include congestion and coughing  Pertinent negatives include no chest pain, chills, fever, headaches, myalgias or sore throat  The following portions of the patient's history were reviewed in this encounter and updated as appropriate: Past medical, social, surgical, family, allergies    Review of Systems   Constitutional: Negative for chills and fever  HENT: Positive for congestion  Negative for sore throat  Respiratory: Positive for cough and wheezing  Negative for shortness of breath  Cardiovascular: Negative for chest pain  Musculoskeletal: Negative for myalgias  Neurological: Negative for headaches  All other systems reviewed and are negative  Objective:    Physical Exam  Vitals reviewed  Constitutional:       General: She is not in acute distress  Appearance: She is not toxic-appearing  HENT:      Head: Normocephalic and atraumatic  Eyes:      General: No scleral icterus  Cardiovascular:      Rate and Rhythm: Normal rate and regular rhythm  Pulmonary:      Effort: Pulmonary effort is normal       Comments: Decreased air movement with occasional wheezing  Musculoskeletal:         General: No deformity  Skin:     General: Skin is warm and dry  Neurological:      General: No focal deficit present  Mental Status: She is alert  Mental status is at baseline  Psychiatric:         Mood and Affect: Mood normal          Behavior: Behavior normal          Lab Review:   No visits with results within 2 Month(s) from this visit     Latest known visit with results is:   Appointment on 09/22/2022   Component Date Value   • Sodium 09/22/2022 138    • Potassium 09/22/2022 3 3 (L)    • Chloride 09/22/2022 104    • CO2 09/22/2022 22    • ANION GAP 09/22/2022 12    • BUN 09/22/2022 16    • Creatinine 09/22/2022 0 77    • Glucose, Fasting 09/22/2022 86    • Calcium 09/22/2022 9 7    • AST 09/22/2022 26    • ALT 09/22/2022 37    • Alkaline Phosphatase 09/22/2022 69    • Total Protein 09/22/2022 7 6    • Albumin 09/22/2022 3 8    • Total Bilirubin 09/22/2022 0 39    • eGFR 09/22/2022 81    • Hemoglobin A1C 09/22/2022 6 0 (H)    • EAG 09/22/2022 126    • Cholesterol 09/22/2022 138    • Triglycerides 09/22/2022 123    • HDL, Direct 09/22/2022 32 (L)    • LDL Calculated 09/22/2022 81    • TSH 3RD GENERATON 09/22/2022 1 580    • Vit D, 25-Hydroxy 09/22/2022 34 8

## 2023-01-10 ENCOUNTER — ANNUAL EXAM (OUTPATIENT)
Dept: GYNECOLOGY | Facility: CLINIC | Age: 68
End: 2023-01-10

## 2023-01-10 VITALS
DIASTOLIC BLOOD PRESSURE: 80 MMHG | BODY MASS INDEX: 34.81 KG/M2 | WEIGHT: 221.8 LBS | HEIGHT: 67 IN | SYSTOLIC BLOOD PRESSURE: 120 MMHG

## 2023-01-10 DIAGNOSIS — N83.299 COMPLEX OVARIAN CYST: Primary | ICD-10-CM

## 2023-01-10 DIAGNOSIS — Z12.31 ENCOUNTER FOR SCREENING MAMMOGRAM FOR BREAST CANCER: ICD-10-CM

## 2023-01-10 DIAGNOSIS — Z01.419 ENCOUNTER FOR ANNUAL ROUTINE GYNECOLOGICAL EXAMINATION: ICD-10-CM

## 2023-01-10 NOTE — PROGRESS NOTES
Assessment/Plan:    pap is up to date-reviewed guidelines    mammogram reviewed with her including breast density  RX given for July     Discussed self breast exams    colon cancer screening-colonoscopy done in January 2022, recall in 3 years    Nodule on ovary -this has been stable, US ordered    discussed preventive care, regular exercise and a healthy diet      No problem-specific Assessment & Plan notes found for this encounter  Diagnoses and all orders for this visit:    Complex ovarian cyst  -     US pelvis complete w transvaginal; Future    Encounter for annual routine gynecological examination    Encounter for screening mammogram for breast cancer  -     Mammo screening bilateral w 3d & cad; Future          Subjective:      Patient ID: Cecil Heller is a 78 y o  female  Patient here for yearly  She has a stable tiny nodule on her left ovary, probably a calcified dermoid or endometrioma  This has been present for many years and we have been following it with ultrasound  Remainder of the ultrasound was normal   She is due for repeat    Normal 3D mammogram in July with fatty tissue and average risk    Normal DEXA in July  Normal Pap, negative HPV in September 2021      The following portions of the patient's history were reviewed and updated as appropriate: allergies, current medications, past family history, past medical history, past social history, past surgical history and problem list     Review of Systems   Constitutional: Negative  Gastrointestinal: Negative  Genitourinary: Negative  Objective: There were no vitals taken for this visit  Physical Exam  Vitals reviewed  Constitutional:       Appearance: She is well-developed  Neck:      Thyroid: No thyromegaly  Trachea: No tracheal deviation  Cardiovascular:      Rate and Rhythm: Normal rate and regular rhythm  Pulmonary:      Effort: Pulmonary effort is normal       Breath sounds: Normal breath sounds  Chest:   Breasts:     Breasts are symmetrical       Right: No inverted nipple, mass, nipple discharge, skin change or tenderness  Left: No inverted nipple, mass, nipple discharge, skin change or tenderness  Abdominal:      General: There is no distension  Palpations: Abdomen is soft  There is no mass  Tenderness: There is no abdominal tenderness  Genitourinary:     Labia:         Right: No rash, tenderness, lesion or injury  Left: No rash, tenderness, lesion or injury  Vagina: Normal       Cervix: No cervical motion tenderness, discharge or friability  Adnexa:         Right: No mass, tenderness or fullness  Left: No mass, tenderness or fullness          Rectum: Normal

## 2023-01-30 ENCOUNTER — OFFICE VISIT (OUTPATIENT)
Dept: FAMILY MEDICINE CLINIC | Facility: HOSPITAL | Age: 68
End: 2023-01-30

## 2023-01-30 ENCOUNTER — HOSPITAL ENCOUNTER (OUTPATIENT)
Dept: CT IMAGING | Facility: HOSPITAL | Age: 68
Discharge: HOME | End: 2023-01-30

## 2023-01-30 VITALS
BODY MASS INDEX: 35.47 KG/M2 | TEMPERATURE: 97.3 F | SYSTOLIC BLOOD PRESSURE: 124 MMHG | HEART RATE: 72 BPM | DIASTOLIC BLOOD PRESSURE: 83 MMHG | WEIGHT: 226 LBS | HEIGHT: 67 IN | OXYGEN SATURATION: 99 %

## 2023-01-30 DIAGNOSIS — E78.2 MIXED HYPERLIPIDEMIA: ICD-10-CM

## 2023-01-30 DIAGNOSIS — I35.9 AORTIC VALVE DISEASE: ICD-10-CM

## 2023-01-30 DIAGNOSIS — I10 ESSENTIAL HYPERTENSION: ICD-10-CM

## 2023-01-30 DIAGNOSIS — K76.0 FATTY INFILTRATION OF LIVER: ICD-10-CM

## 2023-01-30 DIAGNOSIS — J45.20 MILD INTERMITTENT ASTHMA WITHOUT COMPLICATION: ICD-10-CM

## 2023-01-30 DIAGNOSIS — E03.9 ACQUIRED HYPOTHYROIDISM: ICD-10-CM

## 2023-01-30 DIAGNOSIS — E66.01 CLASS 2 SEVERE OBESITY DUE TO EXCESS CALORIES WITH SERIOUS COMORBIDITY AND BODY MASS INDEX (BMI) OF 35.0 TO 35.9 IN ADULT (HCC): ICD-10-CM

## 2023-01-30 DIAGNOSIS — R73.9 HYPERGLYCEMIA: ICD-10-CM

## 2023-01-30 DIAGNOSIS — I10 ESSENTIAL HYPERTENSION: Primary | ICD-10-CM

## 2023-01-30 PROBLEM — H61.21 IMPACTED CERUMEN OF RIGHT EAR: Status: RESOLVED | Noted: 2020-02-21 | Resolved: 2023-01-30

## 2023-01-30 NOTE — PROGRESS NOTES
Name: Paty Sanders      : 1955      MRN: [id number]  Encounter Provider: Kenan Leahy MD  Encounter Date: 2023   Encounter department[facility]  Essential hypertension  Comments:  Very good control  Orders:  -     CBC and differential; Future  -     Comprehensive metabolic panel; Future    2  Acquired hypothyroidism  Comments:  TSH euthyroid  Orders:  -     TSH, 3rd generation with Free T4 reflex; Future    3  Mild intermittent asthma without complication  Comments:  Cough variant recurrence    4  Fatty infiltration of liver    5  Hyperglycemia  -     HEMOGLOBIN A1C W/ EAG ESTIMATION; Future    6  Mixed hyperlipidemia  -     Lipid Panel with Direct LDL reflex; Future    7  Class 2 severe obesity due to excess calories with serious comorbidity and body mass index (BMI) of 35 0 to 35 9 in adult [facility])           Subjective     6 month follow up  No recent illness or injury    Had COVID before , did well with Paxlovid    On recent cruise and had return of her cough  Again this past week with lots of cough    Has coronary calcium score today    Review of Systems   Constitutional: Negative for fatigue and unexpected weight change  Eyes: Negative for visual disturbance  Respiratory: Positive for cough  Cardiovascular: Negative  Gastrointestinal: Negative  Musculoskeletal: Negative  Hematological: Negative  Psychiatric/Behavioral: Negative  All other systems reviewed and are negative        Past Medical History:   Diagnosis Date   • Abdominal pain    • Asthma    • Basal cell carcinoma    • Bicuspid aortic valve     LAST ASSESSED 2012   • Cervical disc disease     last assessed 2016   • Cervical stenosis of spine     LAST ASSESSED    • Disease of thyroid gland     hypothyroid   • Dry eyes    • Endometriosis    • Hot flashes    • Hyperglycemia    • Hyperlipidemia    • Hypertension    • Hypothyroidism    • Left ventricular hypertrophy    • Mitral valvular disorder    • Nephrolithiasis    • Ovarian cyst, complex    • Ptosis     LAST ASSESSED [id number]   • Renal calculi    • Right cervical radiculopathy     LAST ASSESSED [id number]   • Seasonal allergies    • Squamous cell skin cancer    • Thyroid disease      Past Surgical History:   Procedure Laterality Date   • BASAL CELL CARCINOMA EXCISION     • CARPAL TUNNEL RELEASE     •  SECTION     • CHOLECYSTECTOMY     • COLONOSCOPY     • NEUROPLASTY / TRANSPOSITION MEDIAN NERVE AT CARPAL TUNNEL     • NEUROPLASTY / TRANSPOSITION MEDIAN NERVE AT CARPAL TUNNEL      [facility]   • OTHER SURGICAL HISTORY      surgically removed skin patch for skin cancer   • PLANTAR FASCIECTOMY     • MS COLONOSCOPY FLX DX W/COLLJ SPEC WHEN PFRMD N/A 10/9/2017    Procedure: COLONOSCOPY;  Surgeon: Maegan Xiong MD;  Location: [facility] GI LAB;   Service: Gastroenterology   • MS HYSTEROSCOPY BX ENDOMETRIUM&/POLYPC W/WO D&C N/A 2018    Procedure: DILATATION AND CURETTAGE (D&C) WITH HYSTEROSCOPY;  Surgeon: Jaynee Duane, DO;  Location: [facility];  Service: Gynecology   • SKIN BIOPSY     • TUBAL LIGATION     • UPPER GASTROINTESTINAL ENDOSCOPY       Family History   Problem Relation Age of Onset   • Diabetes Mother    • Alzheimer's disease Mother    • Heart disease Mother    • Hypertension Mother    • No Known Problems Father    • No Known Problems Sister    • No Known Problems Sister    • No Known Problems Daughter    • No Known Problems Maternal Grandmother    • No Known Problems Maternal Grandfather    • No Known Problems Paternal Grandmother    • No Known Problems Paternal Grandfather    • No Known Problems Maternal Aunt    • No Known Problems Maternal Aunt    • No Known Problems Maternal Aunt    • No Known Problems Maternal Aunt    • No Known Problems Maternal Aunt    • Other Family         back disorder   • Cancer Family    • Heart disease Family    • Thyroid disease Family      Social History     Socioeconomic History   • Marital status: /Civil Union     Spouse name: None   • Number of children: 2   • Years of education: None   • Highest education level: None   Occupational History   • Occupation: part time employment   Tobacco Use   • Smoking status: Former     Packs/day: 1 00     Years: 20      Pack years: 20      Types: Cigarettes     Quit date:      Years since quittin 0   • Smokeless tobacco: Never   Vaping Use   • Vaping Use: Never used   Substance and Sexual Activity   • Alcohol use:  Yes     Alcohol/week: 8 0 standard drinks     Types: 4 Glasses of wine, 4 Standard drinks or equivalent per week     Comment: occasional   • Drug use: No   • Sexual activity: Yes     Partners: Male     Birth control/protection: Female Sterilization     Comment: tubal ligation    Other Topics Concern   • None   Social History Narrative    Caffeine use     Highschool or GED    Lives with      [employer]ism affiliation [employer]    Always uses seatbelt     Social Determinants of Health     Financial Resource Strain: Not on file   Food Insecurity: Not on file   Transportation Needs: Not on file   Physical Activity: Not on file   Stress: Not on file   Social Connections: Not on file   Intimate Partner Violence: Not on file   Housing Stability: Not on file     Current Outpatient Medications on File Prior to Visit   Medication Sig   • albuterol (Ventolin HFA) 90 mcg/act inhaler Inhale 2 puffs every 6 (six) hours as needed for wheezing   • amLODIPine (NORVASC) 5 mg tablet Take 1 tablet (5 mg total) by mouth daily   • Arnuity Ellipta 100 MCG/ACT AEPB inhaler INHALE 1 PUFF BY MOUTH DAILY AND RINSE MOUTH AFTER USE   • Ascorbic Acid (vitamin C) 100 MG tablet Take 100 mg by mouth daily   • benzonatate (TESSALON PERLES) 100 mg capsule Take 1 capsule (100 mg total) by mouth 3 (three) times a day as needed for cough   • cholecalciferol (VITAMIN D3) 400 units tablet Take 400 Units by mouth daily   • Cyanocobalamin (VITAMIN B 12 PO) Take by mouth   • Efinaconazole (Jublia) 10 % SOLN Apply 1 application topically in the morning   • fexofenadine (ALLEGRA) 180 MG tablet TAKE ONE TABLET BY MOUTH EVERY DAY   • levothyroxine 125 mcg tablet TAKE ONE TABLET BY MOUTH EVERY DAY     • losartan-hydrochlorothiazide (HYZAAR) 100-25 MG per tablet Take 1 tablet by mouth daily   • Semaglutide (Rybelsus) 7 MG TABS Take 1 tablet by mouth every morning   • XIIDRA 5 % op solution    • famotidine (PEPCID) 40 MG tablet Take 1 tablet (40 mg total) by mouth daily   • ketoconazole (NIZORAL) 2 % cream Apply topically daily for 7 days   • methylPREDNISolone 4 MG tablet therapy pack Use as directed on package (Patient not taking: Reported on 1/30/2023)   • pantoprazole (PROTONIX) 40 mg tablet Take 1 tablet (40 mg total) by mouth 2 (two) times a day   • promethazine-codeine (PHENERGAN WITH CODEINE) 6 25-10 mg/5 mL syrup Take 5 mL by mouth every 4 (four) hours as needed for cough (Patient not taking: Reported on 1/30/2023)     Allergies   Allergen Reactions   • Erythromycin GI Intolerance     Reaction Date: 11Jul2011;    • Penicillins Other (See Comments)     unknown   • Aztreonam Rash     Action Taken: Allergy added by [employer] to replace Penicillins [employer];    • Carbapenems Rash     Action Taken: Allergy added by [employer] to replace Penicillins [employer];    • Cephalosporins Rash     Action Taken: Allergy added by [employer] to replace Penicillins [employer];    • Griseofulvin Rash     Action Taken: Allergy added by [employer];    • Influenza Virus Vaccine Hives, Rash and GI Intolerance     Immunization History   Administered Date(s) Administered   • COVID-19 MODERNA VACC 0 5 ML IM 12/30/2020, 01/26/2021   • INFLUENZA 10/10/2018, 10/01/2020, 10/31/2022   • Influenza, recombinant, quadrivalent,injectable, preservative free 10/08/2019, 10/04/2021, 10/31/2022   • Influenza, seasonal, injectable 10/01/2014, 10/01/2015   • Pneumococcal Conjugate Vaccine 20-valent (Pcv20), Polysace 07/25/2022   • Pneumococcal Polysaccharide PPV23 10/08/2019   • Tdap 01/01/2010, 08/24/2020   • Zoster Vaccine Recombinant 05/06/2019, 07/08/2019       Objective     /83 (BP Location: Left arm, Patient Position: Sitting, Cuff Size: Large)   Pulse 72   Temp (!) 97 3 °F (36 3 °C) (Tympanic)   Ht 5' 7" (1 702 m)   Wt 103 kg (226 lb)   SpO2 99%   BMI 35 40 kg/m²     Physical Exam  Vitals and nursing note reviewed  Constitutional:       Appearance: Normal appearance  Cardiovascular:      Rate and Rhythm: Regular rhythm  Heart sounds: Murmur heard  Pulmonary:      Breath sounds: Normal breath sounds  Musculoskeletal:      Right lower leg: No edema  Left lower leg: No edema  Skin:     Findings: No rash  Neurological:      General: No focal deficit present  Mental Status: She is alert and oriented to person, place, and time     Psychiatric:         Mood and Affect: Mood normal        Michelle Parker MD

## 2023-01-31 DIAGNOSIS — E03.9 ACQUIRED HYPOTHYROIDISM: ICD-10-CM

## 2023-01-31 RX ORDER — LEVOTHYROXINE SODIUM 0.12 MG/1
TABLET ORAL
Qty: 90 TABLET | Refills: 0 | Status: SHIPPED | OUTPATIENT
Start: 2023-01-31

## 2023-02-03 ENCOUNTER — TELEPHONE (OUTPATIENT)
Dept: CARDIOLOGY CLINIC | Facility: CLINIC | Age: 68
End: 2023-02-03

## 2023-02-03 NOTE — TELEPHONE ENCOUNTER
Pt called- would like to know what the results of calcium score mean  Please advise  Thank you    Dr Caitlin Lee   Will route to covering

## 2023-02-08 DIAGNOSIS — I70.90 ATHEROSCLEROSIS: Primary | ICD-10-CM

## 2023-02-08 RX ORDER — ROSUVASTATIN CALCIUM 10 MG/1
10 TABLET, COATED ORAL DAILY
Qty: 90 TABLET | Refills: 3 | Status: SHIPPED | OUTPATIENT
Start: 2023-02-08

## 2023-02-08 NOTE — TELEPHONE ENCOUNTER
MD Vaibhav Ley MD 1 hour ago (9:30 AM)     The score means she has some evidence of atherosclerosis, and based on her risk factors we calculated at last visit, she should start a statin medication for primary prevention of atherosclerotic disease such as MI or stroke  We can start Crestor 10 mg daily         Crestor 10mg daily  [facility]

## 2023-02-08 NOTE — TELEPHONE ENCOUNTER
Relayed msg as given  Pt verbalized understanding    MD Humberto Plasencia MD 2 hours ago (9:30 AM)     The score means she has some evidence of atherosclerosis, and based on her risk factors we calculated at last visit, she should start a statin medication for primary prevention of atherosclerotic disease such as MI or stroke  We can start Crestor 10 mg daily

## 2023-02-16 DIAGNOSIS — I10 ESSENTIAL HYPERTENSION: ICD-10-CM

## 2023-02-16 DIAGNOSIS — I35.9 AORTIC VALVE DISEASE: ICD-10-CM

## 2023-02-16 RX ORDER — LOSARTAN POTASSIUM AND HYDROCHLOROTHIAZIDE 25; 100 MG/1; MG/1
1 TABLET ORAL DAILY
Qty: 90 TABLET | Refills: 3 | Status: SHIPPED | OUTPATIENT
Start: 2023-02-16

## 2023-02-20 ENCOUNTER — TELEPHONE (OUTPATIENT)
Dept: CARDIOLOGY CLINIC | Facility: CLINIC | Age: 68
End: 2023-02-20

## 2023-02-20 NOTE — TELEPHONE ENCOUNTER
Chart review no exposure was identified.    As such, no formal SBAR is required at this time.    Email with Epic SBAR information to manager, IP, & EH RN.   Pt Rx was already sent to [address] [place] [address]    E-Prescribing Status: Receipt confirmed by pharmacy (2/16/2023  2:55 PM EST)

## 2023-02-21 ENCOUNTER — ULTRASOUND (OUTPATIENT)
Dept: GYNECOLOGY | Facility: CLINIC | Age: 68
End: 2023-02-21

## 2023-02-21 DIAGNOSIS — N83.299 COMPLEX OVARIAN CYST: ICD-10-CM

## 2023-02-21 DIAGNOSIS — N83.8 OVARIAN MASS, LEFT: Primary | ICD-10-CM

## 2023-02-23 ENCOUNTER — OFFICE VISIT (OUTPATIENT)
Dept: CARDIOLOGY CLINIC | Facility: CLINIC | Age: 68
End: 2023-02-23

## 2023-02-23 VITALS
SYSTOLIC BLOOD PRESSURE: 132 MMHG | HEIGHT: 67 IN | DIASTOLIC BLOOD PRESSURE: 78 MMHG | HEART RATE: 75 BPM | WEIGHT: 227.2 LBS | BODY MASS INDEX: 35.66 KG/M2

## 2023-02-23 DIAGNOSIS — R93.1 ELEVATED CORONARY ARTERY CALCIUM SCORE: ICD-10-CM

## 2023-02-23 DIAGNOSIS — I10 ESSENTIAL HYPERTENSION: Primary | ICD-10-CM

## 2023-02-23 NOTE — PROGRESS NOTES
Cardiology Outpatient Follow-Up Note - Mohan Harding 78 y o  female MRN: [id number]      Assessment/Plan:    1  Essential hypertension  Controlled on current regimen  Continue Hyzaar 100/25 mg daily, amlodipine 5 mg daily  2  Elevated coronary artery calcium score    We reviewed her ASCVD risk, as below  We reviewed her coronary artery calcium score, which is elevated at 255  This is 89th percentile for a 71-year-old white woman  I recommended statin for primary prevention of ASCVD  She is agreeable we will start rosuvastatin 10 mg daily  The 10-year ASCVD risk score (Perlita CLEMENT, et al , 2019) is: 9%    Values used to calculate the score:      Age: 78 years      Sex: Female      Is Non- : No      Diabetic: No      Tobacco smoker: No      Systolic Blood Pressure: 090 mmHg      Is BP treated: Yes      HDL Cholesterol: 32 mg/dL      Total Cholesterol: 138 mg/dL       We will see Mohan Harding back in 12 months for routine follow-up  Subjective:     HPI: Mohan Harding is a 78y o  year old female with HTN and increased transaortic flow velocity of unclear etiology who presents for routine follow-up  She has been noted to have increased transaortic flow velocity since echo on 10/30/2015, at which time her AV Vmax was 2 11m/s  There were no other abnormalities  On 6/25/2018, Vmax was 2 16m/s with LVOT Vmax of 1 75m/s  On 7/14/2020, her AV Vmax was 2 33m/s with LVOT Vmax of 1 9m/s  Unfortunately, 2D image quality is poor throughout the studies and AV leaflets cannot be visualized well  There has been some concern through the years that her valve may be bicuspid, however in my personal review of the 2020 and 2018 echo there is not sufficient image detail to make this determination  If anything, her 06/25/2018 echo has one view where the AV appears trileaflet  Due to her slightly elevated 10-year ASCVD risk, we performed coronary artery calcium scoring on 1/30/2023    Her LAD score was 218, circumflex 37, total calcium score 255  No chest pain, dyspnea, chest pressure  ROS:  Review of Systems:           Objective:     Vitals:   Vitals:    02/23/23 0802   BP: 132/78   BP Location: Left arm   Patient Position: Sitting   Cuff Size: Large   Pulse: 75   Weight: 103 kg (227 lb 3 2 oz)   Height: 5' 7" (1 702 m)    Body surface area is 2 13 meters squared  Wt Readings from Last 3 Encounters:   02/23/23 103 kg (227 lb 3 2 oz)   01/30/23 103 kg (226 lb)   01/10/23 101 kg (221 lb 12 8 oz)       Physical Exam:    General: Alexey Joya is a well appearing female, in no acute distress, sitting comfortably  HEENT: moist mucous membranes, EOMI  Neck:  No JVD, supple, trachea midline  Cardiovascular: unremarkable S1/S2, regular rate and rhythm, 2/6 SM  Pulmonary: normal respiratory effort, CTAB  Abdomen: soft and nondistended  Extremities: No lower extremity edema  Warm and well perfused extremities  Neuro: no focal motor deficits, AAOx3 (person, place, time)  Psych: Normal mood and affect, cooperative        Medications (at the START of this encounter):   Outpatient Medications Prior to Visit   Medication Sig Dispense Refill   • albuterol (Ventolin HFA) 90 mcg/act inhaler Inhale 2 puffs every 6 (six) hours as needed for wheezing 3 Inhaler 3   • amLODIPine (NORVASC) 5 mg tablet Take 1 tablet (5 mg total) by mouth daily 90 tablet 3   • Arnuity Ellipta 100 MCG/ACT AEPB inhaler INHALE 1 PUFF BY MOUTH DAILY AND RINSE MOUTH AFTER USE 30 blister 11   • Ascorbic Acid (vitamin C) 100 MG tablet Take 100 mg by mouth daily     • benzonatate (TESSALON PERLES) 100 mg capsule Take 1 capsule (100 mg total) by mouth 3 (three) times a day as needed for cough 40 capsule 0   • cholecalciferol (VITAMIN D3) 400 units tablet Take 400 Units by mouth daily     • Cyanocobalamin (VITAMIN B 12 PO) Take by mouth     • Efinaconazole (Jublia) 10 % SOLN Apply 1 application topically in the morning 8 mL 6   • famotidine (PEPCID) 40 MG tablet Take 1 tablet (40 mg total) by mouth daily 90 tablet 6   • fexofenadine (ALLEGRA) 180 MG tablet TAKE ONE TABLET BY MOUTH EVERY  tablet 0   • ketoconazole (NIZORAL) 2 % cream Apply topically daily for 7 days 30 g 0   • levothyroxine 125 mcg tablet TAKE ONE TABLET BY MOUTH EVERY DAY  90 tablet 0   • losartan-hydrochlorothiazide (HYZAAR) 100-25 MG per tablet Take 1 tablet by mouth daily 90 tablet 3   • pantoprazole (PROTONIX) 40 mg tablet Take 1 tablet (40 mg total) by mouth 2 (two) times a day 180 tablet 6   • rosuvastatin (CRESTOR) 10 MG tablet Take 1 tablet (10 mg total) by mouth daily 90 tablet 3   • Semaglutide (Rybelsus) 7 MG TABS Take 1 tablet by mouth every morning 90 tablet 1   • XIIDRA 5 % op solution      • methylPREDNISolone 4 MG tablet therapy pack Use as directed on package (Patient not taking: Reported on 1/30/2023) 21 each 0   • promethazine-codeine (PHENERGAN WITH CODEINE) 6 25-10 mg/5 mL syrup Take 5 mL by mouth every 4 (four) hours as needed for cough (Patient not taking: Reported on 1/30/2023) 118 mL 0     No facility-administered medications prior to visit  Labs & Results:  Lipid profile 09/22/2022 , , HDL 32, LDL 81 mg/dL      EKG personally reviewed:  EKG in the office today shows normal sinus rhythm, normal axis, normal intervals, nonpathologic Q-waves in the inferior leads, delayed anterior R-wave progression  Abnormal study  Unchanged from prior  Time Spent:  Total time (face-to-face and non-face-to-face) spent on today's visit was 25 minutes  This includes preparation for the visits (i e  reviewing test results), performance of a medically appropriate history and examination, and orders for medications, tests or other procedures  This time is exclusive of procedures performed and time spent teaching  This note was completed in part utilizing [employer] fluency direct voice recognition software   Grammatical errors, random word insertion, spelling mistakes, occasional wrong word or "sound-alike" substitutions and incomplete sentences may be an occasional consequence of the system secondary to software limitations, ambient noise and hardware issues  At the time of dictation, efforts were made to edit, clarify and /or correct errors  Please read the chart carefully and recognize, using context, where substitutions have occurred  If you have any questions or concerns about the context, text or information contained within the body of this dictation, please contact myself, the provider, for further clarification

## 2023-03-08 NOTE — PROGRESS NOTES
Answers for HPI/ROS submitted by the patient on 3/8/2023  Do you experience frequent throat clearing?: Yes  Chronicity: recurrent  When did you first notice your symptoms?: more than 1 month ago  How often do your symptoms occur?: daily  Since you first noticed this problem, how has it changed?: unchanged  Do you have shortness of breath that occurs with effort or exertion?: No  Do you have ear congestion?: No  Do you have heartburn?: No  Do you have fatigue?: No  Do you have nasal congestion?: No  Do you have shortness of breath when lying flat?: No  Do you have shortness of breath when you wake up?: No  Do you have sweats?: No  Have you experienced weight loss?: No  Which of the following makes your symptoms worse?: change in weather, emotional stress, exposure to fumes, pollen  Which of the following makes your symptoms better?: cold air, steroid inhaler    Assessment:    1  Mild intermittent asthma without complication  Fluticasone Furoate-Vilanterol (Breo Ellipta) 100-25 mcg/actuation inhaler    albuterol (Ventolin HFA) 90 mcg/act inhaler      2  Nicotine dependence, cigarettes, in remission  CT lung screening program            Plan:   · Patient doing better since her last visit for post COVID bronchitis, but still more short of breath and coughing more than she usually did  · Currently maintained on Arnuity  We will step up therapy to Breo 1 puff daily  If this is unaffordable, will prescribe Wixela  · She may continue to use albuterol as needed along with Allegra and Mucinex  · No evidence of acute exacerbation requiring steroids at this time  · Would likely benefit from minimizing exposure to wood stove  · If still struggling with symptoms at follow-up, would likely start Singulair at that time  · She is agreeable to CT lung screening and will get that scheduled    Subjective:     Patient ID: Abel Resendez is a 78 y o  female      Chief Complaint:  Hetal Vazquez is a very pleasant 60-year-old female presenting for follow-up  She was last seen about 2 months ago for sick visit after prolonged shortness of breath and coughing following COVID-19 infection which was treated with Paxlovid  She is doing better than she was at her last appointment but still having more dyspnea and cough than her usual   She feels better when exposed to cold air  She feels that her wood stove at home seems to make things worse  She is not using albuterol very often  She is compliant with her Arnuity inhaler  Associated symptoms include coughing  Pertinent negatives include no chest pain, fever, headaches, myalgias or sore throat  The following portions of the patient's history were reviewed in this encounter and updated as appropriate:   Review of Systems   Constitutional: Negative for fever  HENT: Negative for sore throat  Respiratory: Positive for cough  Cardiovascular: Negative for chest pain  Musculoskeletal: Negative for myalgias  Neurological: Negative for headaches  All other systems reviewed and are negative  Objective:    Physical Exam  Vitals reviewed  Constitutional:       General: She is not in acute distress  Appearance: She is not toxic-appearing  HENT:      Head: Normocephalic and atraumatic  Eyes:      General: No scleral icterus  Cardiovascular:      Rate and Rhythm: Normal rate and regular rhythm  Pulmonary:      Effort: Pulmonary effort is normal       Breath sounds: Normal breath sounds  Musculoskeletal:         General: No signs of injury  Skin:     General: Skin is warm and dry  Neurological:      General: No focal deficit present  Mental Status: She is alert  Mental status is at baseline  Psychiatric:         Mood and Affect: Mood normal          Behavior: Behavior normal          Lab Review:   No visits with results within 2 Month(s) from this visit     Latest known visit with results is:   Appointment on 09/22/2022   Component Date Value   • Sodium 09/22/2022 138 • Potassium 09/22/2022 3 3 (L)    • Chloride 09/22/2022 104    • CO2 09/22/2022 22    • ANION GAP 09/22/2022 12    • BUN 09/22/2022 16    • Creatinine 09/22/2022 0 77    • Glucose, Fasting 09/22/2022 86    • Calcium 09/22/2022 9 7    • AST 09/22/2022 26    • ALT 09/22/2022 37    • Alkaline Phosphatase 09/22/2022 69    • Total Protein 09/22/2022 7 6    • Albumin 09/22/2022 3 8    • Total Bilirubin 09/22/2022 0 39    • eGFR 09/22/2022 81    • Hemoglobin A1C 09/22/2022 6 0 (H)    • EAG 09/22/2022 126    • Cholesterol 09/22/2022 138    • Triglycerides 09/22/2022 123    • HDL, Direct 09/22/2022 32 (L)    • LDL Calculated 09/22/2022 81    • TSH 3RD GENERATON 09/22/2022 1 580    • Vit D, 25-Hydroxy 09/22/2022 34 8

## 2023-03-09 ENCOUNTER — OFFICE VISIT (OUTPATIENT)
Dept: PULMONOLOGY | Facility: HOSPITAL | Age: 68
End: 2023-03-09

## 2023-03-09 VITALS
HEIGHT: 67 IN | DIASTOLIC BLOOD PRESSURE: 88 MMHG | OXYGEN SATURATION: 97 % | SYSTOLIC BLOOD PRESSURE: 138 MMHG | BODY MASS INDEX: 35.63 KG/M2 | WEIGHT: 227 LBS | HEART RATE: 87 BPM | RESPIRATION RATE: 18 BRPM | TEMPERATURE: 98.6 F

## 2023-03-09 DIAGNOSIS — F17.211 NICOTINE DEPENDENCE, CIGARETTES, IN REMISSION: ICD-10-CM

## 2023-03-09 DIAGNOSIS — J45.20 MILD INTERMITTENT ASTHMA WITHOUT COMPLICATION: Primary | ICD-10-CM

## 2023-03-09 RX ORDER — ALBUTEROL SULFATE 90 UG/1
2 AEROSOL, METERED RESPIRATORY (INHALATION) EVERY 6 HOURS PRN
Qty: 18 G | Refills: 3 | Status: SHIPPED | OUTPATIENT
Start: 2023-03-09

## 2023-03-09 RX ORDER — CLINDAMYCIN HYDROCHLORIDE 300 MG/1
CAPSULE ORAL
COMMUNITY
Start: 2023-02-24

## 2023-03-09 RX ORDER — FLUTICASONE FUROATE AND VILANTEROL 100; 25 UG/1; UG/1
1 POWDER RESPIRATORY (INHALATION) DAILY
Qty: 60 BLISTER | Refills: 11 | Status: SHIPPED | OUTPATIENT
Start: 2023-03-09 | End: 2023-04-08

## 2023-04-11 ENCOUNTER — APPOINTMENT (OUTPATIENT)
Dept: LAB | Facility: CLINIC | Age: 68
End: 2023-04-11

## 2023-04-11 DIAGNOSIS — Z00.8 ENCOUNTER FOR OTHER GENERAL EXAMINATION: ICD-10-CM

## 2023-04-11 DIAGNOSIS — Z00.8 HEALTH EXAMINATION IN POPULATION SURVEYS: ICD-10-CM

## 2023-04-24 ENCOUNTER — OFFICE VISIT (OUTPATIENT)
Dept: FAMILY MEDICINE CLINIC | Facility: HOSPITAL | Age: 68
End: 2023-04-24

## 2023-04-24 VITALS
OXYGEN SATURATION: 98 % | SYSTOLIC BLOOD PRESSURE: 128 MMHG | HEART RATE: 75 BPM | WEIGHT: 225.8 LBS | HEIGHT: 67 IN | TEMPERATURE: 98.1 F | BODY MASS INDEX: 35.44 KG/M2 | DIASTOLIC BLOOD PRESSURE: 76 MMHG

## 2023-04-24 DIAGNOSIS — I10 ESSENTIAL HYPERTENSION: ICD-10-CM

## 2023-04-24 DIAGNOSIS — I35.9 AORTIC VALVE DISEASE: ICD-10-CM

## 2023-04-24 DIAGNOSIS — J45.20 MILD INTERMITTENT ASTHMA WITHOUT COMPLICATION: Primary | ICD-10-CM

## 2023-04-24 DIAGNOSIS — E03.9 ACQUIRED HYPOTHYROIDISM: ICD-10-CM

## 2023-04-24 DIAGNOSIS — K76.0 FATTY INFILTRATION OF LIVER: ICD-10-CM

## 2023-04-24 NOTE — PROGRESS NOTES
Name: Negrito Altamirano      : 1955      MRN: [id number]  Encounter Provider: Debi Roldan MD  Encounter Date: 2023   Encounter [facility]  Mild intermittent asthma without complication  Assessment & Plan:  No recent flares      2  Acquired hypothyroidism  Assessment & Plan:  TSH euthyroid      3  Essential hypertension  Assessment & Plan:  Excellent BP control      4  Aortic valve disease    5  Fatty infiltration of liver  Assessment & Plan:  Continue weight loss efforts with Rybelsus      BMI Counseling: Body mass index is 35 37 kg/m²  The BMI is above normal  Nutrition recommendations include decreasing portion sizes, limiting drinks that contain sugar and moderation in carbohydrate intake  Exercise recommendations include vigorous physical activity 75 minutes/week  No pharmacotherapy was ordered  Rationale for BMI follow-up plan is due to patient being overweight or obese  Depression Screening and Follow-up Plan: Patient was screened for depression during today's encounter  They screened negative with a PHQ-2 score of 0  Subjective     3 month follow up      No recent illness or injury    Had to stop the Crestor due to myalgias  Switched to Pravastatin by Dr Radha Lund    Started Weight Watchers, is on Rybelsus 7mg with benefit  Energy is good    Labs reviewed in detail and copy given  All metabolic measures very good    Review of Systems    Past Medical History:   Diagnosis Date   • Abdominal pain    • Allergic    • Arthritis    • Asthma    • Basal cell carcinoma    • Bicuspid aortic valve     LAST ASSESSED [id number]   • Cervical disc disease     last assessed [id number]   • Cervical stenosis of spine     LAST ASSESSED [id number]   • Disease of thyroid gland     hypothyroid   • Dry eyes    • Endometriosis    • GERD (gastroesophageal reflux disease)    • Heart murmur    • Hot flashes    • Hyperglycemia    • Hyperlipidemia    • Hypertension    • Hypothyroidism    • Kidney stone    • Left ventricular hypertrophy    • Mitral valvular disorder    • Nephrolithiasis    • Ovarian cyst, complex    • Pneumonia     800 00   • Ptosis     LAST ASSESSED [id number]   • Renal calculi    • Right cervical radiculopathy     LAST ASSESSED [id number]   • Seasonal allergies    • Squamous cell skin cancer    • Thyroid disease    • Visual impairment      Past Surgical History:   Procedure Laterality Date   • BASAL CELL CARCINOMA EXCISION     • CARPAL TUNNEL RELEASE     •  SECTION     • CHOLECYSTECTOMY     • COLONOSCOPY     • NEUROPLASTY / TRANSPOSITION MEDIAN NERVE AT CARPAL TUNNEL     • NEUROPLASTY / TRANSPOSITION MEDIAN NERVE AT CARPAL TUNNEL      [facility]   • OTHER SURGICAL HISTORY      surgically removed skin patch for skin cancer   • PLANTAR FASCIECTOMY     • MD COLONOSCOPY FLX DX W/COLLJ SPEC WHEN PFRMD N/A 10/9/2017    Procedure: COLONOSCOPY;  Surgeon: Tashi Brooks MD;  Location: [facility] GI LAB;   Service: Gastroenterology   • MD HYSTEROSCOPY BX ENDOMETRIUM&/POLYPC W/WO D&C N/A 2018    Procedure: DILATATION AND CURETTAGE (D&C) WITH HYSTEROSCOPY;  Surgeon: Real Wright DO;  Location: [facility] OR;  Service: Gynecology   • SKIN BIOPSY     • TUBAL LIGATION     • UPPER GASTROINTESTINAL ENDOSCOPY       Family History   Problem Relation Age of Onset   • Diabetes Mother    • Alzheimer's disease Mother    • Heart disease Mother    • Hypertension Mother    • Dementia Mother    • No Known Problems Father    • No Known Problems Sister    • No Known Problems Sister    • No Known Problems Daughter    • No Known Problems Maternal Grandmother    • No Known Problems Maternal Grandfather    • No Known Problems Paternal Grandmother    • No Known Problems Paternal Grandfather    • No Known Problems Maternal Aunt    • No Known Problems Maternal Aunt    • No Known Problems Maternal Aunt    • No Known Problems Maternal Aunt    • No Known Problems Maternal Aunt    • Other Family         back disorder   • Cancer Family    • Heart disease Family    • Thyroid disease Family      Social History     Socioeconomic History   • Marital status: /Civil Union     Spouse name: None   • Number of children: 2   • Years of education: None   • Highest education level: None   Occupational History   • Occupation: part time employment   Tobacco Use   • Smoking status: Former     Packs/day: 1 00     Years: 20 00     Pack years: 20 00     Types: Cigarettes     Quit date: 2012     Years since quittin 3   • Smokeless tobacco: Never   Vaping Use   • Vaping Use: Never used   Substance and Sexual Activity   • Alcohol use:  Yes     Alcohol/week: 8 0 standard drinks     Types: 4 Glasses of wine, 4 Standard drinks or equivalent per week     Comment: per month   • Drug use: No   • Sexual activity: Yes     Partners: Male     Birth control/protection: Female Sterilization     Comment: tubal ligation    Other Topics Concern   • None   Social History Narrative    Caffeine use     Highschool or GED    Lives with      [employer] affiliation [employer]    Always uses seatbelt     Social Determinants of Health     Financial Resource Strain: Not on file   Food Insecurity: Not on file   Transportation Needs: Not on file   Physical Activity: Not on file   Stress: Not on file   Social Connections: Not on file   Intimate Partner Violence: Not on file   Housing Stability: Not on file     Current Outpatient Medications on File Prior to Visit   Medication Sig   • albuterol (Ventolin HFA) 90 mcg/act inhaler Inhale 2 puffs every 6 (six) hours as needed for wheezing or shortness of breath   • amLODIPine (NORVASC) 5 mg tablet Take 1 tablet (5 mg total) by mouth daily   • Ascorbic Acid (vitamin C) 100 MG tablet Take 100 mg by mouth daily   • cholecalciferol (VITAMIN D3) 400 units tablet Take 400 Units by mouth daily Pt taking 1000 units   • Cyanocobalamin (VITAMIN B 12 PO) Take by mouth   • Efinaconazole (Jublia) 10 % SOLN Apply 1 application topically in the morning   • fexofenadine (ALLEGRA) 180 MG tablet TAKE ONE TABLET BY MOUTH EVERY DAY   • levothyroxine 125 mcg tablet TAKE ONE TABLET BY MOUTH EVERY DAY  • losartan-hydrochlorothiazide (HYZAAR) 100-25 MG per tablet Take 1 tablet by mouth daily   • pravastatin (PRAVACHOL) 20 mg tablet Take 1 tablet (20 mg total) by mouth daily   • Semaglutide (Rybelsus) 7 MG TABS Take 1 tablet by mouth every morning   • XIIDRA 5 % op solution    • benzonatate (TESSALON PERLES) 100 mg capsule Take 1 capsule (100 mg total) by mouth 3 (three) times a day as needed for cough   • clindamycin (CLEOCIN) 300 MG capsule    • famotidine (PEPCID) 40 MG tablet Take 1 tablet (40 mg total) by mouth daily   • Fluticasone Furoate-Vilanterol (Breo Ellipta) 100-25 mcg/actuation inhaler Inhale 1 puff daily Rinse mouth after use     • ketoconazole (NIZORAL) 2 % cream Apply topically daily for 7 days   • pantoprazole (PROTONIX) 40 mg tablet Take 1 tablet (40 mg total) by mouth 2 (two) times a day     Allergies   Allergen Reactions   • Erythromycin GI Intolerance     Reaction Date: 11Jul2011;    • Penicillins Other (See Comments)     unknown   • Aztreonam Rash     Action Taken: Allergy added by [employer] to replace Penicillins [employer];    • Carbapenems Rash     Action Taken: Allergy added by [employer] to replace Penicillins [employer];    • Cephalosporins Rash     Action Taken: Allergy added by [employer] to replace Penicillins [employer];    • Griseofulvin Rash     Action Taken: Allergy added by [employer] to replace Penicillins [facility];    • Influenza Virus Vaccine Hives, Rash and GI Intolerance     Immunization History   Administered Date(s) Administered   • COVID-19 MODERNA VACC 0 5 ML IM 12/30/2020, 01/26/2021   • INFLUENZA 10/10/2018, 10/01/2020, 10/31/2022   • Influenza, recombinant, quadrivalent,injectable, preservative free 10/08/2019, "10/04/2021, 10/31/2022   • Influenza, seasonal, injectable 10/01/2014, 10/01/2015   • Pneumococcal Conjugate Vaccine 20-valent (Pcv20), Polysace 07/25/2022, 07/25/2022   • Pneumococcal Polysaccharide PPV23 10/08/2019   • Tdap 01/01/2010, 08/24/2020   • Zoster Vaccine Recombinant 05/06/2019, 07/08/2019       Objective     /76   Pulse 75   Temp 98 1 °F (36 7 °C)   Ht 5' 7\" (1 702 m)   Wt 102 kg (225 lb 12 8 oz)   SpO2 98%   BMI 35 37 kg/m²     Physical Exam  Vitals and nursing note reviewed  Constitutional:       Appearance: Normal appearance  Neck:      Vascular: No carotid bruit  Cardiovascular:      Rate and Rhythm: Normal rate and regular rhythm  Heart sounds: Murmur heard  Pulmonary:      Effort: Pulmonary effort is normal       Breath sounds: Normal breath sounds  Musculoskeletal:      Right lower leg: No edema  Left lower leg: No edema  Neurological:      Mental Status: She is alert     Psychiatric:         Mood and Affect: Mood normal        Hector Goodwin MD  "

## 2023-05-09 ENCOUNTER — OFFICE VISIT (OUTPATIENT)
Dept: PULMONOLOGY | Facility: HOSPITAL | Age: 68
End: 2023-05-09

## 2023-05-09 VITALS
SYSTOLIC BLOOD PRESSURE: 128 MMHG | WEIGHT: 220.6 LBS | TEMPERATURE: 98.3 F | HEART RATE: 80 BPM | OXYGEN SATURATION: 95 % | DIASTOLIC BLOOD PRESSURE: 84 MMHG | HEIGHT: 67 IN | RESPIRATION RATE: 16 BRPM | BODY MASS INDEX: 34.62 KG/M2

## 2023-05-09 DIAGNOSIS — J45.20 MILD INTERMITTENT ASTHMA WITHOUT COMPLICATION: Primary | ICD-10-CM

## 2023-05-09 NOTE — ASSESSMENT & PLAN NOTE
Maryanne Culver is doing better on the Breo 100  She denies any significant shortness of breath but still has occasional cough  Will maintain on Breo which we discussed risks and benefits  I reviewed her most recent CAT scan from 2019 and told her to schedule her lung cancer screening CT which will hopefully be her last 1  She is up-to-date on her vaccines  We will see her back in 4 months

## 2023-05-09 NOTE — PROGRESS NOTES
Assessment/Plan:    Mild intermittent asthma without complication  Shahriar German is doing better on the Breo 100  She denies any significant shortness of breath but still has occasional cough  Will maintain on Breo which we discussed risks and benefits  I reviewed her most recent CAT scan from 2019 and told her to schedule her lung cancer screening CT which will hopefully be her last 1  She is up-to-date on her vaccines  We will see her back in 4 months  Diagnoses and all orders for this visit:    Mild intermittent asthma without complication          Subjective:      Patient ID: Shelton Craven is a 78 y o  female  Shahriar German is doing better on her Breo 1 puff daily  She is no longer short of breath but does still have occasional chest congestion and cough  Otherwise her activities of daily living are not limited  She denies chest pain or leg swelling  primary symptoms  Associated symptoms include coughing and myalgias  Pertinent negatives include no chest pain, fever or headaches  The following portions of the patient's history were reviewed and updated as appropriate: allergies, current medications, past family history, past medical history, past social history, past surgical history and problem list     Review of Systems   Constitutional: Negative  Negative for appetite change, fever and unexpected weight change  HENT: Positive for postnasal drip  Negative for ear pain  Eyes: Negative  Respiratory: Positive for cough  Negative for shortness of breath and wheezing  Cardiovascular: Negative  Negative for chest pain and leg swelling  Gastrointestinal: Negative  Endocrine: Negative  Genitourinary: Negative  Musculoskeletal: Positive for myalgias  Allergic/Immunologic: Negative  Neurological: Negative  Negative for headaches  Hematological: Negative            Objective:      /84 (BP Location: Left arm, Patient Position: Sitting, Cuff Size: Adult)   Pulse 80   Temp 98 3 °F "(36 8 °C) (Tympanic)   Resp 16   Ht 5' 7\" (1 702 m)   Wt 100 kg (220 lb 9 6 oz)   SpO2 95%   BMI 34 55 kg/m²          Physical Exam  Constitutional:       Appearance: She is well-developed  HENT:      Head: Normocephalic  Eyes:      Pupils: Pupils are equal, round, and reactive to light  Cardiovascular:      Rate and Rhythm: Normal rate  Heart sounds: No murmur heard  Pulmonary:      Effort: Pulmonary effort is normal  No respiratory distress  Breath sounds: Normal breath sounds  No wheezing or rales  Abdominal:      Palpations: Abdomen is soft  Musculoskeletal:         General: Normal range of motion  Cervical back: Normal range of motion and neck supple  Skin:     General: Skin is warm and dry  Neurological:      Mental Status: She is alert and oriented to person, place, and time           Answers for HPI/ROS submitted by the patient on 5/8/2023  Chronicity: recurrent  When did you first notice your symptoms?: more than 1 year ago  How often do your symptoms occur?: intermittently  Since you first noticed this problem, how has it changed?: gradually improving  Do you have shortness of breath that occurs with effort or exertion?: No  Do you have ear congestion?: No  Do you have heartburn?: No  Do you have fatigue?: Yes  Do you have nasal congestion?: No  Do you have shortness of breath when lying flat?: No  Do you have shortness of breath when you wake up?: No  Which of the following makes your symptoms worse?: animal exposure, pollen  Which of the following makes your symptoms better?: cold air      "

## 2023-05-16 DIAGNOSIS — E03.9 ACQUIRED HYPOTHYROIDISM: ICD-10-CM

## 2023-05-16 RX ORDER — LEVOTHYROXINE SODIUM 0.12 MG/1
TABLET ORAL
Qty: 90 TABLET | Refills: 0 | Status: SHIPPED | OUTPATIENT
Start: 2023-05-16

## 2023-05-17 ENCOUNTER — TELEPHONE (OUTPATIENT)
Dept: PULMONOLOGY | Facility: CLINIC | Age: 68
End: 2023-05-17

## 2023-05-17 DIAGNOSIS — J45.21 MILD INTERMITTENT ASTHMA WITH ACUTE EXACERBATION: ICD-10-CM

## 2023-05-17 RX ORDER — BENZONATATE 100 MG/1
100 CAPSULE ORAL 3 TIMES DAILY PRN
Qty: 40 CAPSULE | Refills: 0 | Status: SHIPPED | OUTPATIENT
Start: 2023-05-17

## 2023-05-17 RX ORDER — PREDNISONE 20 MG/1
40 TABLET ORAL DAILY
Qty: 10 TABLET | Refills: 0 | Status: SHIPPED | OUTPATIENT
Start: 2023-05-17 | End: 2023-05-22

## 2023-05-17 NOTE — TELEPHONE ENCOUNTER
I called and spoke with Scarlet Rutledge  She denies fevers, chills, or chest tightness  Does have some wheezing  She has been taking her medications as prescribed  She will start taking Mucinex DM (for HBP) in the morning and regular Mucinex in the evening  We will try a 5 day course of prednisone and benzonatate, as this has helped her in the past  She will let us know if her symptoms do not improve

## 2023-05-17 NOTE — TELEPHONE ENCOUNTER
Patient is calling, she was seen on 5/9/23 by Dr Choudhury, she has been taking Mucinex, Allegra and her Breo Inhaler  She is dealing with the past week a cold or allergies, she has chest and head congestion, nasal drip going down that back of her throat, coughing, bringing up clear phlegm  She isn't sure what the doctor advises  She is off of work tomorrow if she needed to come in as well   Please advise

## 2023-05-17 NOTE — TELEPHONE ENCOUNTER
Ruddy Elie would like a return call regarding     What is the reason for the call/chief complaint? COUGH    What additional symptoms are present or absent? SOB, yes AT TIMES   Are they on O2? No Pulse O2 restingN/A When walkingN/A   chest pain/tightness, no  wheezing, yes  Cough, yes  mucous production,yes  What color is it PT DOES NOT LUCK AT   fevers/chills, no  weight gain, no  Swelling no  Pain no, does it hurt when breathing or all the time? N/A    When did they start/how long have they been going on? 8DAYS    Constant or intermittent; if intermittent describe no? What makes it better or worse? N/A    Have you been exposed to anyone that is sick? No    Have you been tested for COVID recently? no    Check current pulmonary medications: BREO  frequency of use DAILY    Have they had any other treatment or testing for this problem elsewhere? N/A    Recent steroids? No    Recent Antibiotics? No     Last office visit? 5/9/2023    Patient pharmacy?    [id number] [address] Phone:  [phone]   Fax:  [phone]           30 or 90 day supply

## 2023-06-09 ENCOUNTER — CLINICAL SUPPORT (OUTPATIENT)
Dept: CARDIOLOGY CLINIC | Facility: CLINIC | Age: 68
End: 2023-06-09
Payer: COMMERCIAL

## 2023-06-09 VITALS
HEIGHT: 67 IN | HEART RATE: 59 BPM | DIASTOLIC BLOOD PRESSURE: 72 MMHG | SYSTOLIC BLOOD PRESSURE: 126 MMHG | BODY MASS INDEX: 33.59 KG/M2 | WEIGHT: 214 LBS

## 2023-06-09 DIAGNOSIS — R42 DIZZINESS: Primary | ICD-10-CM

## 2023-06-09 PROCEDURE — RECHECK: Performed by: INTERNAL MEDICINE

## 2023-06-09 PROCEDURE — 93000 ELECTROCARDIOGRAM COMPLETE: CPT

## 2023-06-09 NOTE — PROGRESS NOTES
Pt presents today for an EKG under the supervision of Dr Annie Limon  Pt states she had an episode of dizziness upon standing today while at work  Pt denies CO, SOB, palpitations, or syncope  Meds and allergies reviewed  Vital obtained:  /72 P 59    EKG reviewed buy Dr Annie Limon in office, NSR  No new recommendations at this time

## 2023-06-20 DIAGNOSIS — E66.01 CLASS 2 SEVERE OBESITY DUE TO EXCESS CALORIES WITH SERIOUS COMORBIDITY AND BODY MASS INDEX (BMI) OF 36.0 TO 36.9 IN ADULT (HCC): ICD-10-CM

## 2023-06-20 RX ORDER — ORAL SEMAGLUTIDE 7 MG/1
TABLET ORAL
Qty: 90 TABLET | Refills: 0 | Status: SHIPPED | OUTPATIENT
Start: 2023-06-20

## 2023-07-10 ENCOUNTER — TELEPHONE (OUTPATIENT)
Dept: FAMILY MEDICINE CLINIC | Facility: HOSPITAL | Age: 68
End: 2023-07-10

## 2023-07-17 DIAGNOSIS — R05.8 SPASMODIC COUGH: ICD-10-CM

## 2023-07-17 DIAGNOSIS — J45.21 MILD INTERMITTENT ASTHMA WITH ACUTE EXACERBATION: Primary | ICD-10-CM

## 2023-07-17 RX ORDER — PANTOPRAZOLE SODIUM 40 MG/1
40 TABLET, DELAYED RELEASE ORAL 2 TIMES DAILY
Qty: 180 TABLET | Refills: 0 | OUTPATIENT
Start: 2023-07-17 | End: 2023-10-15

## 2023-07-17 RX ORDER — PANTOPRAZOLE SODIUM 40 MG/1
TABLET, DELAYED RELEASE ORAL
Qty: 180 TABLET | Refills: 0 | Status: SHIPPED | OUTPATIENT
Start: 2023-07-17

## 2023-07-17 RX ORDER — FLUTICASONE FUROATE 100 UG/1
POWDER RESPIRATORY (INHALATION)
Qty: 90 BLISTER | Refills: 3 | Status: SHIPPED | OUTPATIENT
Start: 2023-07-17

## 2023-07-17 NOTE — TELEPHONE ENCOUNTER
Patient called requesting to go back on the Arnuity inhaler because she did not see a difference while using the [facility] inhaler. Patient also states that the Arnuity inhaler is more affordable. Please advise.

## 2023-07-24 ENCOUNTER — APPOINTMENT (OUTPATIENT)
Dept: RADIOLOGY | Facility: CLINIC | Age: 68
End: 2023-07-24
Payer: COMMERCIAL

## 2023-07-24 ENCOUNTER — OFFICE VISIT (OUTPATIENT)
Dept: FAMILY MEDICINE CLINIC | Facility: HOSPITAL | Age: 68
End: 2023-07-24
Payer: COMMERCIAL

## 2023-07-24 VITALS
WEIGHT: 207.4 LBS | HEART RATE: 72 BPM | TEMPERATURE: 97.2 F | SYSTOLIC BLOOD PRESSURE: 117 MMHG | DIASTOLIC BLOOD PRESSURE: 78 MMHG | OXYGEN SATURATION: 97 % | HEIGHT: 67 IN | BODY MASS INDEX: 32.55 KG/M2

## 2023-07-24 DIAGNOSIS — J45.20 MILD INTERMITTENT ASTHMA WITHOUT COMPLICATION: ICD-10-CM

## 2023-07-24 DIAGNOSIS — I35.9 AORTIC VALVE DISEASE: ICD-10-CM

## 2023-07-24 DIAGNOSIS — M79.672 LEFT FOOT PAIN: ICD-10-CM

## 2023-07-24 DIAGNOSIS — I10 ESSENTIAL HYPERTENSION: ICD-10-CM

## 2023-07-24 DIAGNOSIS — E03.9 ACQUIRED HYPOTHYROIDISM: Primary | ICD-10-CM

## 2023-07-24 DIAGNOSIS — R73.9 HYPERGLYCEMIA: ICD-10-CM

## 2023-07-24 DIAGNOSIS — E78.2 MIXED HYPERLIPIDEMIA: ICD-10-CM

## 2023-07-24 PROCEDURE — 73630 X-RAY EXAM OF FOOT: CPT

## 2023-07-24 PROCEDURE — 99214 OFFICE O/P EST MOD 30 MIN: CPT | Performed by: FAMILY MEDICINE

## 2023-07-24 NOTE — PROGRESS NOTES
Name: Mechelle Chung      : 1955      MRN: [id number]  Encounter Provider: Jamie Spencer MD  Encounter Date: 2023   Encounter department: [address]     1. Acquired hypothyroidism  -     TSH, 3rd generation with Free T4 reflex; Future; Expected date: 10/23/2023    2. Mild intermittent asthma without complication    3. Essential hypertension  -     CBC and Platelet; Future; Expected date: 10/23/2023    4. Aortic valve disease    5. Mixed hyperlipidemia  -     Lipid Panel with Direct LDL reflex; Future; Expected date: 10/23/2023    6. Hyperglycemia  -     Comprehensive metabolic panel; Future; Expected date: 10/23/2023  -     HEMOGLOBIN A1C W/ EAG ESTIMATION; Future; Expected date: 10/23/2023    7. Left foot pain  -     XR foot 3+ vw left; Future; Expected date: 10/23/2023           Subjective     3 month follow up. No recent illness or injury    Having pain on top of L foot, no injury    Weight loss with Rybelsus and Weight Watchers    Jumpiness in the legs, feels like has to move them  Advised try Magnesium in the evening    Review of Systems   Constitutional: Positive for unexpected weight change. Respiratory: Positive for shortness of breath and wheezing. Cardiovascular: Negative. Gastrointestinal: Negative. Musculoskeletal: Negative. Psychiatric/Behavioral: Negative. All other systems reviewed and are negative.       Past Medical History:   Diagnosis Date   • Abdominal pain    • Allergic    • Arthritis    • Asthma    • Basal cell carcinoma    • Bicuspid aortic valve     LAST ASSESSED 2012   • Cervical disc disease     last assessed 2016   • Cervical stenosis of spine     LAST ASSESSED    • Disease of thyroid gland     hypothyroid   • Dry eyes    • Endometriosis    • GERD (gastroesophageal reflux disease)    • Heart murmur    • Hot flashes    • Hyperglycemia    • Hyperlipidemia    • Hypertension    • Hypothyroidism    • Kidney stone    • Left ventricular hypertrophy    • Mitral valvular disorder    • Nephrolithiasis    • Ovarian cyst, complex    • Pneumonia     800.00   • Ptosis     LAST ASSESSED [id number]   • Renal calculi    • Right cervical radiculopathy     LAST ASSESSED [id number]   • Seasonal allergies    • Squamous cell skin cancer    • Thyroid disease    • Visual impairment      Past Surgical History:   Procedure Laterality Date   • BASAL CELL CARCINOMA EXCISION     • CARPAL TUNNEL RELEASE     •  SECTION     • CHOLECYSTECTOMY     • COLONOSCOPY     • NEUROPLASTY / TRANSPOSITION MEDIAN NERVE AT CARPAL TUNNEL     • NEUROPLASTY / TRANSPOSITION MEDIAN NERVE AT CARPAL TUNNEL      [facility]   • OTHER SURGICAL HISTORY      surgically removed skin patch for skin cancer   • PLANTAR FASCIECTOMY     • WA COLONOSCOPY FLX DX W/COLLJ SPEC WHEN PFRMD N/A 10/9/2017    Procedure: COLONOSCOPY;  Surgeon: John Mullins MD;  Location: [facility] GI LAB;   Service: Gastroenterology   • WA HYSTEROSCOPY BX ENDOMETRIUM&/POLYPC W/WO D&C N/A 2018    Procedure: DILATATION AND CURETTAGE (D&C) WITH HYSTEROSCOPY;  Surgeon: Petrona Looney DO;  Location: [facility] OR;  Service: Gynecology   • SKIN BIOPSY     • TUBAL LIGATION     • UPPER GASTROINTESTINAL ENDOSCOPY       Family History   Problem Relation Age of Onset   • Diabetes Mother    • Alzheimer's disease Mother    • Heart disease Mother    • Hypertension Mother    • Dementia Mother    • No Known Problems Father    • No Known Problems Sister    • No Known Problems Sister    • No Known Problems Daughter    • No Known Problems Maternal Grandmother    • No Known Problems Maternal Grandfather    • No Known Problems Paternal Grandmother    • No Known Problems Paternal Grandfather    • No Known Problems Maternal Aunt    • No Known Problems Maternal Aunt    • No Known Problems Maternal Aunt    • No Known Problems Maternal Aunt    • No Known Problems Maternal Aunt    • Other Family         back disorder   • Cancer Family    • Heart disease Family    • Thyroid disease Family      Social History     Socioeconomic History   • Marital status: /Civil Union     Spouse name: None   • Number of children: 2   • Years of education: None   • Highest education level: None   Occupational History   • Occupation: part time employment   Tobacco Use   • Smoking status: Former     Packs/day: 1.00     Years: 40.00     Total pack years: 40.00     Types: Cigarettes     Start date: 0     Quit date: 2012     Years since quittin.5   • Smokeless tobacco: Never   Vaping Use   • Vaping Use: Never used   Substance and Sexual Activity   • Alcohol use:  Yes     Alcohol/week: 8.0 standard drinks of alcohol     Types: 4 Glasses of wine, 4 Standard drinks or equivalent per week     Comment: per month   • Drug use: No   • Sexual activity: Yes     Partners: Male     Birth control/protection: Female Sterilization     Comment: tubal ligation    Other Topics Concern   • None   Social History Narrative    Caffeine use     Highschool or GED    Lives with      Taoism affiliation Latter day    Always uses seatbelt     Social Determinants of Health     Financial Resource Strain: Not on file   Food Insecurity: Not on file   Transportation Needs: Not on file   Physical Activity: Not on file   Stress: Not on file   Social Connections: Not on file   Intimate Partner Violence: Not on file   Housing Stability: Not on file     Current Outpatient Medications on File Prior to Visit   Medication Sig   • albuterol (Ventolin HFA) 90 mcg/act inhaler Inhale 2 puffs every 6 (six) hours as needed for wheezing or shortness of breath   • amLODIPine (NORVASC) 5 mg tablet TAKE ONE TABLET BY MOUTH EVERY DAY   • Arnuity Ellipta 100 MCG/ACT AEPB inhaler INHALE 1 PUFF BY MOUTH DAILY AND RINSE MOUTH AFTER USE   • Ascorbic Acid (vitamin C) 100 MG tablet Take 100 mg by mouth daily   • benzonatate (TESSALON PERLES) 100 mg capsule Take 1 capsule (100 mg total) by mouth 3 (three) times a day as needed for cough   • cholecalciferol (VITAMIN D3) 400 units tablet Take 400 Units by mouth daily Pt taking 1000 units   • Cyanocobalamin (VITAMIN B 12 PO) Take by mouth   • Efinaconazole (Jublia) 10 % SOLN Apply 1 application topically in the morning   • fexofenadine (ALLEGRA) 180 MG tablet TAKE ONE TABLET BY MOUTH EVERY DAY   • levothyroxine 125 mcg tablet TAKE ONE TABLET BY MOUTH EVERY DAY.    • losartan-hydrochlorothiazide (HYZAAR) 100-25 MG per tablet Take 1 tablet by mouth daily   • pantoprazole (PROTONIX) 40 mg tablet TAKE ONE TABLET BY MOUTH 2 TIMES A DAY   • pravastatin (PRAVACHOL) 20 mg tablet Take 1 tablet (20 mg total) by mouth daily   • Rybelsus 7 MG tablet TAKE ONE TABLET BY MOUTH EVERY MORNING   • XIIDRA 5 % op solution    • ketoconazole (NIZORAL) 2 % cream Apply topically daily for 7 days     Allergies   Allergen Reactions   • Erythromycin GI Intolerance     Reaction Date: 11Jul2011;    • Penicillins Other (See Comments)     unknown   • Aztreonam Rash     Action Taken: Allergy added by [employer] to replace Penicillins [employer];    • Carbapenems Rash     Action Taken: Allergy added by [employer] to replace Penicillins [employer];    • Cephalosporins Rash     Action Taken: Allergy added by [employer] to replace Penicillins [employer];    • Griseofulvin Rash     Action Taken: Allergy added by [employer] to replace Penicillins [facility];    • Influenza Virus Vaccine Hives, Rash and GI Intolerance     Immunization History   Administered Date(s) Administered   • COVID-19 MODERNA VACC 0.5 ML IM 12/30/2020, 01/26/2021   • INFLUENZA 10/10/2018, 10/01/2020, 10/31/2022   • Influenza, recombinant, quadrivalent,injectable, preservative free 10/08/2019, 10/04/2021, 10/31/2022   • Influenza, seasonal, injectable 10/01/2014, 10/01/2015   • Pneumococcal Conjugate Vaccine 20-valent (Pcv20), Polysace 07/25/2022, 07/25/2022   • Pneumococcal Polysaccharide PPV23 10/08/2019   • Tdap 01/01/2010, 08/24/2020   • Zoster Vaccine Recombinant 05/06/2019, 07/08/2019       Objective     /78 (BP Location: Left arm, Patient Position: Sitting, Cuff Size: Large)   Pulse 72   Temp (!) 97.2 °F (36.2 °C) (Tympanic)   Ht 5' 7" (1.702 m)   Wt 94.1 kg (207 lb 6.4 oz)   SpO2 97%   BMI 32.48 kg/m²     Physical Exam  Vitals and nursing note reviewed. Constitutional:       Appearance: Normal appearance. Neck:      Vascular: No carotid bruit. Cardiovascular:      Rate and Rhythm: Regular rhythm. Heart sounds: Murmur heard. Pulmonary:      Effort: Pulmonary effort is normal.      Breath sounds: Normal breath sounds. Musculoskeletal:      Right lower leg: No edema. Left lower leg: No edema. Neurological:      General: No focal deficit present. Mental Status: She is oriented to person, place, and time.    Psychiatric:         Mood and Affect: Mood normal.       Doyle Harris MD

## 2023-07-31 ENCOUNTER — TELEPHONE (OUTPATIENT)
Dept: FAMILY MEDICINE CLINIC | Facility: HOSPITAL | Age: 68
End: 2023-07-31

## 2023-08-14 ENCOUNTER — OFFICE VISIT (OUTPATIENT)
Dept: PODIATRY | Facility: CLINIC | Age: 68
End: 2023-08-14
Payer: COMMERCIAL

## 2023-08-14 VITALS
BODY MASS INDEX: 32.33 KG/M2 | HEIGHT: 67 IN | SYSTOLIC BLOOD PRESSURE: 124 MMHG | HEART RATE: 73 BPM | WEIGHT: 206 LBS | DIASTOLIC BLOOD PRESSURE: 74 MMHG

## 2023-08-14 DIAGNOSIS — M25.572 PAIN IN JOINT OF LEFT FOOT: ICD-10-CM

## 2023-08-14 DIAGNOSIS — M19.072 PRIMARY OSTEOARTHRITIS OF LEFT FOOT: Primary | ICD-10-CM

## 2023-08-14 PROCEDURE — 20600 DRAIN/INJ JOINT/BURSA W/O US: CPT | Performed by: PODIATRIST

## 2023-08-14 PROCEDURE — 99214 OFFICE O/P EST MOD 30 MIN: CPT | Performed by: PODIATRIST

## 2023-08-14 RX ORDER — TRIAMCINOLONE ACETONIDE 40 MG/ML
20 INJECTION, SUSPENSION INTRA-ARTICULAR; INTRAMUSCULAR
Status: SHIPPED | OUTPATIENT
Start: 2023-08-14

## 2023-08-14 RX ORDER — LIDOCAINE HYDROCHLORIDE 10 MG/ML
2 INJECTION, SOLUTION INFILTRATION; PERINEURAL
Status: SHIPPED | OUTPATIENT
Start: 2023-08-14

## 2023-08-14 RX ADMIN — LIDOCAINE HYDROCHLORIDE 2 ML: 10 INJECTION, SOLUTION INFILTRATION; PERINEURAL at 15:00

## 2023-08-14 RX ADMIN — TRIAMCINOLONE ACETONIDE 20 MG: 40 INJECTION, SUSPENSION INTRA-ARTICULAR; INTRAMUSCULAR at 15:00

## 2023-08-14 NOTE — PROGRESS NOTES
PATIENT:  Miguel Ángel Ponce  1955       ASSESSMENT:     1. Primary osteoarthritis of left foot  Diclofenac Sodium (VOLTAREN) 1 %    Small joint arthrocentesis: L intertarsal      2. Pain in joint of left foot                  PLAN:  1. Reviewed medical records. Reviewed the note from PCP. Patient was counseled and educated on the condition and the diagnosis. 2. X-ray was personally reviewed. The radiological findings were discussed with the patient. 3. The exam and symptoms are consistent with TMTJ arthrosis. The diagnosis, treatment options and prognosis were discussed with the patient. 4. Treatment options were discussed and the patient wished to proceed with an injection. See procedure. 5. Instructed supportive care, home exercise, icing, and proper footwear/ arch support. Discussed possible further images depending on the progress. 6. Patient will return in 4 weeks for re-evaluation. Imaging: I have personally reviewed pertinent films in PACS  Labs, pathology, and Other Studies: I have personally reviewed pertinent reports. Small joint arthrocentesis: L intertarsal  Universal Protocol:  Consent: Verbal consent obtained. Risks and benefits: risks, benefits and alternatives were discussed  Consent given by: patient  Time out: Immediately prior to procedure a "time out" was called to verify the correct patient, procedure, equipment, support staff and site/side marked as required.   Timeout called at: 8/14/2023 3:30 PM.  Site marked: the operative site was marked  Patient identity confirmed: verbally with patient    Supporting Documentation  Indications: pain   Procedure Details  Location: foot - L intertarsal (3rd / 4th TMTJ)  Needle size: 25 G  Ultrasound guidance: no  Approach: dorsal  Medications administered: 20 mg triamcinolone acetonide 40 mg/mL; 2 mL lidocaine 1 %    Patient tolerance: patient tolerated the procedure well with no immediate complications            Subjective:       HPI  The patient presents with chief complaint of left foot pain for a few weeks. She has aches and throbbing in left dorsal foot. She was seen by her PCP and sent for X-ray. The patient does not recall any injury. The patient tried OTC meds, and different shoes without relieving the pain. No associated numbness or paresthesia. No significant weakness or dysfunction. The following portions of the patient's history were reviewed and updated as appropriate: allergies, current medications, past family history, past medical history, past social history, past surgical history and problem list.  All pertinent labs and images were reviewed.       Past Medical History  Past Medical History:   Diagnosis Date   • Abdominal pain    • Allergic    • Arthritis    • Asthma    • Basal cell carcinoma    • Bicuspid aortic valve     LAST ASSESSED [id number]   • Cervical disc disease     last assessed [id number]   • Cervical stenosis of spine     LAST ASSESSED [id number]   • Disease of thyroid gland     hypothyroid   • Dry eyes    • Endometriosis    • GERD (gastroesophageal reflux disease)    • Heart murmur    • Hot flashes    • Hyperglycemia    • Hyperlipidemia    • Hypertension    • Hypothyroidism    • Kidney stone    • Left ventricular hypertrophy    • Mitral valvular disorder    • Nephrolithiasis    • Ovarian cyst, complex    • Pneumonia     800.00   • Ptosis     LAST ASSESSED [id number]   • Renal calculi    • Right cervical radiculopathy     LAST ASSESSED [id number]   • Seasonal allergies    • Squamous cell skin cancer    • Thyroid disease    • Visual impairment        Past Surgical History  Past Surgical History:   Procedure Laterality Date   • BASAL CELL CARCINOMA EXCISION     • CARPAL TUNNEL RELEASE     •  SECTION     • CHOLECYSTECTOMY     • COLONOSCOPY     • NEUROPLASTY / TRANSPOSITION MEDIAN NERVE AT CARPAL TUNNEL     • NEUROPLASTY / TRANSPOSITION MEDIAN NERVE AT CARPAL TUNNEL  2001    [facility]   • OTHER SURGICAL HISTORY      surgically removed skin patch for skin cancer   • PLANTAR FASCIECTOMY     • AL COLONOSCOPY FLX DX W/COLLJ SPEC WHEN PFRMD N/A 10/9/2017    Procedure: COLONOSCOPY;  Surgeon: Billy Sahu MD;  Location: [facility] GI LAB; Service: Gastroenterology   • AL HYSTEROSCOPY BX ENDOMETRIUM&/POLYPC W/WO D&C N/A 2/26/2018    Procedure: DILATATION AND CURETTAGE (D&C) WITH HYSTEROSCOPY;  Surgeon: Hermes Bradshaw DO;  Location: [facility] OR;  Service: Gynecology   • SKIN BIOPSY     • TUBAL LIGATION     • UPPER GASTROINTESTINAL ENDOSCOPY          Allergies:  Erythromycin, Penicillins, Aztreonam, Carbapenems, Cephalosporins, Griseofulvin, and Influenza virus vaccine    Medications:  Current Outpatient Medications   Medication Sig Dispense Refill   • Diclofenac Sodium (VOLTAREN) 1 % Apply 2 g topically 4 (four) times a day 150 g 2   • albuterol (Ventolin HFA) 90 mcg/act inhaler Inhale 2 puffs every 6 (six) hours as needed for wheezing or shortness of breath 18 g 3   • amLODIPine (NORVASC) 5 mg tablet TAKE ONE TABLET BY MOUTH EVERY DAY 90 tablet 3   • Arnuity Ellipta 100 MCG/ACT AEPB inhaler INHALE 1 PUFF BY MOUTH DAILY AND RINSE MOUTH AFTER USE 90 blister 3   • Ascorbic Acid (vitamin C) 100 MG tablet Take 100 mg by mouth daily     • benzonatate (TESSALON PERLES) 100 mg capsule Take 1 capsule (100 mg total) by mouth 3 (three) times a day as needed for cough 40 capsule 0   • cholecalciferol (VITAMIN D3) 400 units tablet Take 400 Units by mouth daily Pt taking 1000 units     • Cyanocobalamin (VITAMIN B 12 PO) Take by mouth     • Efinaconazole (Jublia) 10 % SOLN Apply 1 application topically in the morning 8 mL 6   • fexofenadine (ALLEGRA) 180 MG tablet TAKE ONE TABLET BY MOUTH EVERY DAY 90 tablet 3   • ketoconazole (NIZORAL) 2 % cream Apply topically daily for 7 days 30 g 0   • levothyroxine 125 mcg tablet TAKE ONE TABLET BY MOUTH EVERY DAY.  90 tablet 0   • losartan-hydrochlorothiazide (HYZAAR) 100-25 MG per tablet Take 1 tablet by mouth daily 90 tablet 3   • pantoprazole (PROTONIX) 40 mg tablet TAKE ONE TABLET BY MOUTH 2 TIMES A  tablet 0   • pravastatin (PRAVACHOL) 20 mg tablet Take 1 tablet (20 mg total) by mouth daily 90 tablet 1   • Rybelsus 7 MG tablet TAKE ONE TABLET BY MOUTH EVERY MORNING 90 tablet 0   • XIIDRA 5 % op solution        No current facility-administered medications for this visit. Social History:  Social History     Socioeconomic History   • Marital status: /Civil Union     Spouse name: None   • Number of children: 2   • Years of education: None   • Highest education level: None   Occupational History   • Occupation: part time employment   Tobacco Use   • Smoking status: Former     Packs/day: 1.00     Years: 40.00     Total pack years: 40.00     Types: Cigarettes     Start date: 0     Quit date: 2012     Years since quittin.6   • Smokeless tobacco: Never   Vaping Use   • Vaping Use: Never used   Substance and Sexual Activity   • Alcohol use: Yes     Alcohol/week: 8.0 standard drinks of alcohol     Types: 4 Glasses of wine, 4 Standard drinks or equivalent per week     Comment: per month   • Drug use: No   • Sexual activity: Yes     Partners: Male     Birth control/protection: Female Sterilization     Comment: tubal ligation    Other Topics Concern   • None   Social History Narrative    Caffeine use     Highschool or GED    Lives with      Adventist affiliation Sikh    Always uses seatbelt     Social Determinants of Health     Financial Resource Strain: Not on file   Food Insecurity: Not on file   Transportation Needs: Not on file   Physical Activity: Not on file   Stress: Not on file   Social Connections: Not on file   Intimate Partner Violence: Not on file   Housing Stability: Not on file          Review of Systems   Constitutional: Negative for chills and fever.    Respiratory: Negative for cough and shortness of breath. Cardiovascular: Negative for chest pain. Gastrointestinal: Negative for nausea and vomiting. Musculoskeletal: Positive for arthralgias. Skin: Negative for wound. Neurological: Negative for weakness and numbness. Hematological: Negative. Objective:      /74   Pulse 73   Ht 5' 7" (1.702 m)   Wt 93.4 kg (206 lb)   BMI 32.26 kg/m²          Physical Exam  Vitals reviewed. Constitutional:       General: She is not in acute distress. Appearance: She is not toxic-appearing or diaphoretic. HENT:      Head: Normocephalic and atraumatic. Eyes:      Extraocular Movements: Extraocular movements intact. Cardiovascular:      Rate and Rhythm: Normal rate and regular rhythm. Pulses: Normal pulses. Dorsalis pedis pulses are 2+ on the right side and 2+ on the left side. Posterior tibial pulses are 2+ on the right side and 2+ on the left side. Pulmonary:      Effort: Pulmonary effort is normal. No respiratory distress. Musculoskeletal:         General: Tenderness and deformity present. No signs of injury. Cervical back: Normal range of motion and neck supple. Right lower leg: No edema. Left lower leg: No edema. Right foot: No foot drop. Left foot: No foot drop. Comments: Pain presents left 3rd/ 4th TMTJ with mild fullness. Skin:     General: Skin is warm. Capillary Refill: Capillary refill takes less than 2 seconds. Coloration: Skin is not cyanotic or mottled. Findings: No abscess, ecchymosis or wound. Nails: There is no clubbing. Neurological:      General: No focal deficit present. Mental Status: She is alert and oriented to person, place, and time. Cranial Nerves: No cranial nerve deficit. Sensory: No sensory deficit. Motor: No weakness.       Coordination: Coordination normal.   Psychiatric:         Mood and Affect: Mood normal.         Behavior: Behavior normal. Thought Content:  Thought content normal.         Judgment: Judgment normal.

## 2023-08-22 DIAGNOSIS — E03.9 ACQUIRED HYPOTHYROIDISM: ICD-10-CM

## 2023-08-22 RX ORDER — LEVOTHYROXINE SODIUM 0.12 MG/1
TABLET ORAL
Qty: 90 TABLET | Refills: 0 | Status: SHIPPED | OUTPATIENT
Start: 2023-08-22

## 2023-09-04 ENCOUNTER — HOSPITAL ENCOUNTER (EMERGENCY)
Facility: HOSPITAL | Age: 68
Discharge: HOME | End: 2023-09-04
Attending: EMERGENCY MEDICINE | Admitting: EMERGENCY MEDICINE
Payer: COMMERCIAL

## 2023-09-04 ENCOUNTER — APPOINTMENT (EMERGENCY)
Dept: RADIOLOGY | Facility: HOSPITAL | Age: 68
End: 2023-09-04
Payer: COMMERCIAL

## 2023-09-04 VITALS
RESPIRATION RATE: 16 BRPM | HEART RATE: 66 BPM | HEIGHT: 67 IN | SYSTOLIC BLOOD PRESSURE: 163 MMHG | BODY MASS INDEX: 31.39 KG/M2 | TEMPERATURE: 98.1 F | DIASTOLIC BLOOD PRESSURE: 81 MMHG | OXYGEN SATURATION: 97 % | WEIGHT: 200 LBS

## 2023-09-04 DIAGNOSIS — S43.409A SHOULDER SPRAIN: Primary | ICD-10-CM

## 2023-09-04 PROCEDURE — 99283 EMERGENCY DEPT VISIT LOW MDM: CPT

## 2023-09-04 PROCEDURE — 73030 X-RAY EXAM OF SHOULDER: CPT

## 2023-09-04 PROCEDURE — 99284 EMERGENCY DEPT VISIT MOD MDM: CPT | Performed by: EMERGENCY MEDICINE

## 2023-09-04 PROCEDURE — 73060 X-RAY EXAM OF HUMERUS: CPT

## 2023-09-04 NOTE — ED PROVIDER NOTES
History  Chief Complaint   Patient presents with   • Shoulder Injury     Leaned over while sitting in a chair, causing the chair to fall over, having the pt fall onto right shoulder last night     78 yof presents for evaluation of right shoulder pain. Patient reports leaning out of her chair to pick something up and fell onto her right shoulder. Patient reports taking naproxen with improvement of symptoms today. No further injuries. Denies head strike/LOC. Prior to Admission Medications   Prescriptions Last Dose Informant Patient Reported? Taking? Arnuity Ellipta 100 MCG/ACT AEPB inhaler   No No   Sig: INHALE 1 PUFF BY MOUTH DAILY AND RINSE MOUTH AFTER USE   Ascorbic Acid (vitamin C) 100 MG tablet  Self Yes No   Sig: Take 100 mg by mouth daily   Cyanocobalamin (VITAMIN B 12 PO)  Self Yes No   Sig: Take by mouth   Diclofenac Sodium (VOLTAREN) 1 %   No No   Sig: Apply 2 g topically 4 (four) times a day   Efinaconazole (Jublia) 10 % SOLN  Self No No   Sig: Apply 1 application topically in the morning   Rybelsus 7 MG tablet   No No   Sig: TAKE ONE TABLET BY MOUTH EVERY MORNING   XIIDRA 5 % op solution  Self Yes No   albuterol (Ventolin HFA) 90 mcg/act inhaler  Self No No   Sig: Inhale 2 puffs every 6 (six) hours as needed for wheezing or shortness of breath   amLODIPine (NORVASC) 5 mg tablet  Self No No   Sig: TAKE ONE TABLET BY MOUTH EVERY DAY   benzonatate (TESSALON PERLES) 100 mg capsule  Self No No   Sig: Take 1 capsule (100 mg total) by mouth 3 (three) times a day as needed for cough   cholecalciferol (VITAMIN D3) 400 units tablet  Self Yes No   Sig: Take 400 Units by mouth daily Pt taking 1000 units   fexofenadine (ALLEGRA) 180 MG tablet  Self No No   Sig: TAKE ONE TABLET BY MOUTH EVERY DAY   ketoconazole (NIZORAL) 2 % cream  Self No No   Sig: Apply topically daily for 7 days   levothyroxine 125 mcg tablet   No No   Sig: TAKE ONE TABLET BY MOUTH EVERY DAY.    losartan-hydrochlorothiazide (HYZAAR) 100-25 MG per tablet  Self No No   Sig: Take 1 tablet by mouth daily   pantoprazole (PROTONIX) 40 mg tablet   No No   Sig: TAKE ONE TABLET BY MOUTH 2 TIMES A DAY   pravastatin (PRAVACHOL) 20 mg tablet  Self No No   Sig: Take 1 tablet (20 mg total) by mouth daily      Facility-Administered Medications Last Administration Doses Remaining   lidocaine (XYLOCAINE) 1 % injection 2 mL 2023  3:00 PM    triamcinolone acetonide (KENALOG-40) 40 mg/mL injection 20 mg 2023  3:00 PM           Past Medical History:   Diagnosis Date   • Abdominal pain    • Allergic    • Arthritis    • Asthma    • Basal cell carcinoma    • Bicuspid aortic valve     LAST ASSESSED [id number]   • Cervical disc disease     last assessed [id number]   • Cervical stenosis of spine     LAST ASSESSED [id number]   • Disease of thyroid gland     hypothyroid   • Dry eyes    • Endometriosis    • GERD (gastroesophageal reflux disease)    • Heart murmur    • Hot flashes    • Hyperglycemia    • Hyperlipidemia    • Hypertension    • Hypothyroidism    • Kidney stone    • Left ventricular hypertrophy    • Mitral valvular disorder    • Nephrolithiasis    • Ovarian cyst, complex    • Pneumonia     800.00   • Ptosis     LAST ASSESSED [id number]   • Renal calculi    • Right cervical radiculopathy     LAST ASSESSED [id number]   • Seasonal allergies    • Squamous cell skin cancer    • Thyroid disease    • Visual impairment        Past Surgical History:   Procedure Laterality Date   • BASAL CELL CARCINOMA EXCISION     • CARPAL TUNNEL RELEASE     •  SECTION     • CHOLECYSTECTOMY     • COLONOSCOPY     • NEUROPLASTY / TRANSPOSITION MEDIAN NERVE AT CARPAL TUNNEL     • NEUROPLASTY / TRANSPOSITION MEDIAN NERVE AT CARPAL TUNNEL  [facility]   • OTHER SURGICAL HISTORY      surgically removed skin patch for skin cancer   • PLANTAR FASCIECTOMY     • GA COLONOSCOPY FLX DX W/COLLJ SPEC WHEN PFRMD N/A 10/9/2017    Procedure: COLONOSCOPY;  Surgeon: Livia Rodriguez MD; Location: [facility] GI LAB; Service: Gastroenterology   • MO HYSTEROSCOPY BX ENDOMETRIUM&/POLYPC W/WO D&C N/A 2018    Procedure: DILATATION AND CURETTAGE (D&C) WITH HYSTEROSCOPY;  Surgeon: Andrey Miner DO;  Location: [facility] OR;  Service: Gynecology   • SKIN BIOPSY     • TUBAL LIGATION     • UPPER GASTROINTESTINAL ENDOSCOPY         Family History   Problem Relation Age of Onset   • Diabetes Mother    • Alzheimer's disease Mother    • Heart disease Mother    • Hypertension Mother    • Dementia Mother    • No Known Problems Father    • No Known Problems Sister    • No Known Problems Sister    • No Known Problems Daughter    • No Known Problems Maternal Grandmother    • No Known Problems Maternal Grandfather    • No Known Problems Paternal Grandmother    • No Known Problems Paternal Grandfather    • No Known Problems Maternal Aunt    • No Known Problems Maternal Aunt    • No Known Problems Maternal Aunt    • No Known Problems Maternal Aunt    • No Known Problems Maternal Aunt    • Other Family         back disorder   • Cancer Family    • Heart disease Family    • Thyroid disease Family      I have reviewed and agree with the history as documented. E-Cigarette/Vaping   • E-Cigarette Use Never User      E-Cigarette/Vaping Substances   • Nicotine No    • THC No    • CBD No    • Flavoring No    • Other No    • Unknown No      Social History     Tobacco Use   • Smoking status: Former     Packs/day: 1.00     Years: 40.00     Total pack years: 40.00     Types: Cigarettes     Start date: 0     Quit date: 2012     Years since quittin.6   • Smokeless tobacco: Never   Vaping Use   • Vaping Use: Never used   Substance Use Topics   • Alcohol use: Yes     Alcohol/week: 8.0 standard drinks of alcohol     Types: 4 Glasses of wine, 4 Standard drinks or equivalent per week     Comment: per month   • Drug use: No       Review of Systems   Constitutional: Negative for fever. Musculoskeletal: Positive for arthralgias. Physical Exam  Physical Exam  Vitals and nursing note reviewed. Constitutional:       Appearance: She is well-developed. HENT:      Head: Normocephalic and atraumatic. Right Ear: External ear normal.      Left Ear: External ear normal.      Nose: Nose normal.   Eyes:      General: No scleral icterus. Cardiovascular:      Rate and Rhythm: Normal rate. Pulmonary:      Effort: Pulmonary effort is normal. No respiratory distress. Abdominal:      General: There is no distension. Musculoskeletal:         General: Tenderness present. No deformity. Cervical back: Normal range of motion. Comments: R shoulder limited active ROM but ok with PROM. TTP right trapezius. Normal  strength and ROM at elbow. No CTL spine tenderness. Skin:     Findings: No rash. Neurological:      General: No focal deficit present. Mental Status: She is alert and oriented to person, place, and time. Psychiatric:         Mood and Affect: Mood normal.         Vital Signs  ED Triage Vitals   Temperature Pulse Respirations Blood Pressure SpO2   09/04/23 1000 09/04/23 0955 09/04/23 0955 09/04/23 0955 09/04/23 0955   98.1 °F (36.7 °C) 66 16 163/81 97 %      Temp Source Heart Rate Source Patient Position - Orthostatic VS BP Location FiO2 (%)   09/04/23 1000 -- 09/04/23 0955 09/04/23 0955 --   Oral  Sitting Left arm       Pain Score       09/04/23 0955       7           Vitals:    09/04/23 0955   BP: 163/81   Pulse: 66   Patient Position - Orthostatic VS: Sitting         Visual Acuity      ED Medications  Medications - No data to display    Diagnostic Studies  Results Reviewed     None                 XR shoulder 2+ views RIGHT    (Results Pending)   XR humerus RIGHT    (Results Pending)              Procedures  Procedures         ED Course                                             Medical Decision Making  78 yof with right shoulder injury. Obtain XR. Supportive care. No acute fx. FU ortho prn.      Shoulder sprain: acute illness or injury  Amount and/or Complexity of Data Reviewed  Radiology: ordered. Disposition  Final diagnoses:   Shoulder sprain     Time reflects when diagnosis was documented in both MDM as applicable and the Disposition within this note     Time User Action Codes Description Comment    9/4/2023 10:20 AM Carlean Northern Add [Z81.323A] Shoulder sprain       ED Disposition     ED Disposition   Discharge    Condition   Stable    Date/Time   Mon Sep 4, 2023 10:20 AM    Comment   Jackson Ashley discharge to home/self care.                Follow-up Information     Follow up With Specialties Details Why Contact Info Additional [address] [facility] Orthopedic Surgery   [place] [id number]  [facility], [address] [place] [address] [facility], [place], [address]     [address] [facility] Emergency Department Emergency Medicine  If symptoms worsen [address] [id number]  [phone] [address] [facility] Emergency Department, [address] [facility], [facility], [address]          Discharge Medication List as of 9/4/2023 10:20 AM      CONTINUE these medications which have NOT CHANGED    Details   albuterol (Ventolin HFA) 90 mcg/act inhaler Inhale 2 puffs every 6 (six) hours as needed for wheezing or shortness of breath, Starting Thu 3/9/2023, Normal      amLODIPine (NORVASC) 5 mg tablet TAKE ONE TABLET BY MOUTH EVERY DAY, Normal      Arnuity Ellipta 100 MCG/ACT AEPB inhaler INHALE 1 PUFF BY MOUTH DAILY AND RINSE MOUTH AFTER USE, Normal      Ascorbic Acid (vitamin C) 100 MG tablet Take 100 mg by mouth daily, Historical Med      benzonatate (TESSALON PERLES) 100 mg capsule Take 1 capsule (100 mg total) by mouth 3 (three) times a day as needed for cough, Starting Wed 5/17/2023, Normal cholecalciferol (VITAMIN D3) 400 units tablet Take 400 Units by mouth daily Pt taking 1000 units, Historical Med      Cyanocobalamin (VITAMIN B 12 PO) Take by mouth, Historical Med      Diclofenac Sodium (VOLTAREN) 1 % Apply 2 g topically 4 (four) times a day, Starting Mon 8/14/2023, Normal      Efinaconazole (Jublia) 10 % SOLN Apply 1 application topically in the morning, Starting Tue 10/25/2022, Normal      fexofenadine (ALLEGRA) 180 MG tablet TAKE ONE TABLET BY MOUTH EVERY DAY, Normal      ketoconazole (NIZORAL) 2 % cream Apply topically daily for 7 days, Starting Tue 8/23/2022, Until Fri 6/9/2023, Normal      levothyroxine 125 mcg tablet TAKE ONE TABLET BY MOUTH EVERY DAY., Normal      losartan-hydrochlorothiazide (HYZAAR) 100-25 MG per tablet Take 1 tablet by mouth daily, Starting Thu 2/16/2023, Normal      pantoprazole (PROTONIX) 40 mg tablet TAKE ONE TABLET BY MOUTH 2 TIMES A DAY, Normal      pravastatin (PRAVACHOL) 20 mg tablet Take 1 tablet (20 mg total) by mouth daily, Starting Wed 4/19/2023, Normal      Rybelsus 7 MG tablet TAKE ONE TABLET BY MOUTH EVERY MORNING, Normal      XIIDRA 5 % op solution Starting Tue 12/26/2017, Historical Med             No discharge procedures on file.     PDMP Review       Value Time User    PDMP Reviewed  Yes 1/5/2023  2:32 PM Anupam Dsouza PA-C          ED Provider  Electronically Signed by           Pavithra Kumar DO  09/04/23 [id number]

## 2023-09-18 ENCOUNTER — LAB REQUISITION (OUTPATIENT)
Dept: LAB | Facility: HOSPITAL | Age: 68
End: 2023-09-18
Payer: COMMERCIAL

## 2023-09-18 ENCOUNTER — OFFICE VISIT (OUTPATIENT)
Dept: GASTROENTEROLOGY | Facility: CLINIC | Age: 68
End: 2023-09-18
Payer: COMMERCIAL

## 2023-09-18 VITALS
WEIGHT: 202 LBS | DIASTOLIC BLOOD PRESSURE: 68 MMHG | BODY MASS INDEX: 31.71 KG/M2 | TEMPERATURE: 98.3 F | SYSTOLIC BLOOD PRESSURE: 120 MMHG | HEIGHT: 67 IN

## 2023-09-18 DIAGNOSIS — D48.5 NEOPLASM OF UNCERTAIN BEHAVIOR OF SKIN: ICD-10-CM

## 2023-09-18 DIAGNOSIS — Z86.010 PERSONAL HISTORY OF COLONIC POLYPS: ICD-10-CM

## 2023-09-18 DIAGNOSIS — K21.00 GASTROESOPHAGEAL REFLUX DISEASE WITH ESOPHAGITIS WITHOUT HEMORRHAGE: Primary | ICD-10-CM

## 2023-09-18 DIAGNOSIS — K76.0 FATTY INFILTRATION OF LIVER: ICD-10-CM

## 2023-09-18 PROCEDURE — 99214 OFFICE O/P EST MOD 30 MIN: CPT | Performed by: PHYSICIAN ASSISTANT

## 2023-09-18 PROCEDURE — 88305 TISSUE EXAM BY PATHOLOGIST: CPT | Performed by: PATHOLOGY

## 2023-09-18 NOTE — PROGRESS NOTES
[facility] - Outpatient Follow-up Note  Franca Herron 78 y.o. female MRN: [id number]  Encounter: [id number]          ASSESSMENT AND PLAN:      1. Gastroesophageal reflux disease with esophagitis without hemorrhage  Stable on PPI daily. EGD from Jan 2022 showed no esophagitis. She would be interested in tapering the dose of Protonix, so for her next refill, I would recommend trying 20 mg daily. Continue GERD diet and lifestyle precautions. 2. Fatty infiltration of liver  Risk factors include hyperglycemia, hypertension, hyperlipidemia, obesity. No significant alcohol use. Recent LFTs from April 2023 were normal. Her NAFLD fibrosis score is -0.244 which is indeterminate for fibrosis. I recommend scheduling elastography to assess for fibrosis. If no significant fibrosis, would monitor LFTs every 6-12 months. However if there is advanced fibrosis, would recommend evaluation by Hepatology. I congratulated her on weight loss and encouraged continuing Rybelsus and [employer]. - US elastography; Future    3. Personal history of colonic polyps  Due for repeat colonoscopy in Jan 2025    Follow-up in 1 year  ______________________________________________________________________    SUBJECTIVE:  61-year-old female with history of GERD, fatty liver, and colon polyps presenting for follow-up / medication refill. She is doing well overall. She is taking pantoprazole 40 mg once daily. Her reflux is controlled. She denies trouble swallowing. She does get abdominal pain after eating eggs, so she avoids this. Her bowel movements are regular although she occasionally gets mild constipation depending what she is eating. She is taking fiber gummies, but I recommended Metamucil. She lost over 30 lbs through Rybelsus and [employer]. Answers for HPI/ROS submitted by the patient on 2/61/0866  Relieved by: certain positions        REVIEW OF SYSTEMS IS OTHERWISE NEGATIVE.       Historical Information   Past Medical History:   Diagnosis Date   • Abdominal pain    • Allergic    • Arthritis    • Asthma    • Basal cell carcinoma    • Bicuspid aortic valve     LAST ASSESSED [id number]   • Cervical disc disease     last assessed [id number]   • Cervical stenosis of spine     LAST ASSESSED [id number]   • Disease of thyroid gland     hypothyroid   • Dry eyes    • Endometriosis    • GERD (gastroesophageal reflux disease)    • Heart murmur    • Hot flashes    • Hyperglycemia    • Hyperlipidemia    • Hypertension    • Hypothyroidism    • Kidney stone    • Left ventricular hypertrophy    • Mitral valvular disorder    • Nephrolithiasis    • Ovarian cyst, complex    • Pneumonia     800.00   • Ptosis     LAST ASSESSED [id number]   • Renal calculi    • Right cervical radiculopathy     LAST ASSESSED [id number]   • Seasonal allergies    • Squamous cell skin cancer    • Thyroid disease    • Visual impairment      Past Surgical History:   Procedure Laterality Date   • BASAL CELL CARCINOMA EXCISION     • CARPAL TUNNEL RELEASE     •  SECTION     • CHOLECYSTECTOMY     • COLONOSCOPY     • NEUROPLASTY / TRANSPOSITION MEDIAN NERVE AT CARPAL TUNNEL     • NEUROPLASTY / TRANSPOSITION MEDIAN NERVE AT CARPAL TUNNEL  2001   • OTHER SURGICAL HISTORY      surgically removed skin patch for skin cancer   • PLANTAR FASCIECTOMY     • GA COLONOSCOPY FLX DX W/COLLJ SPEC WHEN PFRMD N/A 10/9/2017    Procedure: COLONOSCOPY;  Surgeon: Livia Rodriguez MD;  Location: [facility] GI LAB;   Service: Gastroenterology   • GA HYSTEROSCOPY BX ENDOMETRIUM&/POLYPC W/WO D&C N/A 2018    Procedure: DILATATION AND CURETTAGE (D&C) WITH HYSTEROSCOPY;  Surgeon: Mitra Sanz DO;  Location: [facility] OR;  Service: Gynecology   • SKIN BIOPSY     • TUBAL LIGATION     • UPPER GASTROINTESTINAL ENDOSCOPY       Social History   Social History     Substance and Sexual Activity   Alcohol Use Yes   • Alcohol/week: 8.0 standard drinks of alcohol   • Types: 4 Glasses of wine, 4 Standard drinks or equivalent per week    Comment: per month     Social History     Substance and Sexual Activity   Drug Use No     Social History     Tobacco Use   Smoking Status Former   • Packs/day: 1.00   • Years: 40.00   • Total pack years: 40.00   • Types: Cigarettes   • Start date: 0   • Quit date: 2012   • Years since quittin.7   Smokeless Tobacco Never     Family History   Problem Relation Age of Onset   • Diabetes Mother    • Alzheimer's disease Mother    • Heart disease Mother    • Hypertension Mother    • Dementia Mother    • No Known Problems Father    • No Known Problems Sister    • No Known Problems Sister    • No Known Problems Daughter    • No Known Problems Maternal Grandmother    • No Known Problems Maternal Grandfather    • No Known Problems Paternal Grandmother    • No Known Problems Paternal Grandfather    • No Known Problems Maternal Aunt    • No Known Problems Maternal Aunt    • No Known Problems Maternal Aunt    • No Known Problems Maternal Aunt    • No Known Problems Maternal Aunt    • Other Family         back disorder   • Cancer Family    • Heart disease Family    • Thyroid disease Family        Meds/Allergies       Current Outpatient Medications:   •  albuterol (Ventolin HFA) 90 mcg/act inhaler  •  amLODIPine (NORVASC) 5 mg tablet  •  Arnuity Ellipta 100 MCG/ACT AEPB inhaler  •  Ascorbic Acid (vitamin C) 100 MG tablet  •  benzonatate (TESSALON PERLES) 100 mg capsule  •  cholecalciferol (VITAMIN D3) 400 units tablet  •  Cyanocobalamin (VITAMIN B 12 PO)  •  Diclofenac Sodium (VOLTAREN) 1 %  •  Efinaconazole (Jublia) 10 % SOLN  •  fexofenadine (ALLEGRA) 180 MG tablet  •  levothyroxine 125 mcg tablet  •  losartan-hydrochlorothiazide (HYZAAR) 100-25 MG per tablet  •  pantoprazole (PROTONIX) 40 mg tablet  •  pravastatin (PRAVACHOL) 20 mg tablet  •  Rybelsus 7 MG tablet  •  XIIDRA 5 % op solution  •  ketoconazole (NIZORAL) 2 % cream    Current Facility-Administered Medications:   •  lidocaine (XYLOCAINE) 1 % injection 2 mL, 2 mL, Injection, , 2 mL at 08/14/23 1500  •  triamcinolone acetonide (KENALOG-40) 40 mg/mL injection 20 mg, 20 mg, Intra-articular, , 20 mg at 08/14/23 1500    Allergies   Allergen Reactions   • Erythromycin GI Intolerance     Reaction Date: 11Jul2011;    • Penicillins Other (See Comments)     unknown   • Aztreonam Rash     Action Taken: Allergy added by [employer] to replace Penicillins [employer];    • Carbapenems Rash     Action Taken: Allergy added by [employer] to replace Penicillins [employer];    • Cephalosporins Rash     Action Taken: Allergy added by [employer] to replace Penicillins [employer];    • Griseofulvin Rash     Action Taken: Allergy added by [employer] to replace Penicillins [employer];    • Influenza Virus Vaccine Hives, Rash and GI Intolerance           Objective     Blood pressure 120/68, temperature 98.3 °F (36.8 °C), temperature source Tympanic, height 5' 7" (1.702 m), weight 91.6 kg (202 lb). Body mass index is 31.64 kg/m². PHYSICAL EXAM:      General Appearance:   Alert, cooperative, no distress   HEENT:   Normocephalic, atraumatic, anicteric.     Neck:  Supple, symmetrical, trachea midline   Lungs:   Clear to auscultation bilaterally; no rales, rhonchi or wheezing; respirations unlabored    Heart[de-identified]   Regular rate and rhythm; no murmur, rub, or gallop. Abdomen:   Soft, non-tender, non-distended; normal bowel sounds; no masses, no organomegaly    Genitalia:   Deferred    Rectal:   Deferred    Extremities:  No cyanosis, clubbing or edema    Pulses:  2+ and symmetric    Skin:  No jaundice, rashes, or lesions    Lymph nodes:  No palpable cervical lymphadenopathy        Lab Results:   No visits with results within 1 Day(s) from this visit.    Latest known visit with results is:   Appointment on 04/11/2023   Component Date Value   • WBC 04/11/2023 9.32    • RBC 04/11/2023 4.87    • Hemoglobin 04/11/2023 14.4    • Hematocrit 04/11/2023 45.6    • MCV 04/11/2023 94    • MCH 04/11/2023 29.6    • MCHC 04/11/2023 31.6    • RDW 04/11/2023 13.9    • MPV 04/11/2023 10.6    • Platelets 71/11/1682 268    • nRBC 04/11/2023 0    • Neutrophils Relative 04/11/2023 61    • Immat GRANS % 04/11/2023 0    • Lymphocytes Relative 04/11/2023 24    • Monocytes Relative 04/11/2023 9    • Eosinophils Relative 04/11/2023 5    • Basophils Relative 04/11/2023 1    • Neutrophils Absolute 04/11/2023 5.62    • Immature Grans Absolute 04/11/2023 0.04    • Lymphocytes Absolute 04/11/2023 2.25    • Monocytes Absolute 04/11/2023 0.85    • Eosinophils Absolute 04/11/2023 0.49    • Basophils Absolute 04/11/2023 0.07    • Sodium 04/11/2023 136    • Potassium 04/11/2023 3.5    • Chloride 04/11/2023 102    • CO2 04/11/2023 31    • ANION GAP 04/11/2023 3 (L)    • BUN 04/11/2023 17    • Creatinine 04/11/2023 0.80    • Glucose, Fasting 04/11/2023 102 (H)    • Calcium 04/11/2023 9.6    • AST 04/11/2023 23    • ALT 04/11/2023 27    • Alkaline Phosphatase 04/11/2023 70    • Total Protein 04/11/2023 7.4    • Albumin 04/11/2023 3.8    • Total Bilirubin 04/11/2023 0.32    • eGFR 04/11/2023 77    • Hemoglobin A1C 04/11/2023 5.9 (H)    • EAG 04/11/2023 123    • Cholesterol 04/11/2023 93    • Triglycerides 04/11/2023 143    • HDL, Direct 04/11/2023 37 (L)    • LDL Calculated 04/11/2023 27    • TSH 3RD GENERATON 04/11/2023 1.520          Radiology Results:   XR humerus RIGHT    Result Date: 9/5/2023  Narrative: RIGHT HUMERUS INDICATION:   fall. COMPARISON:  None VIEWS:  XR HUMERUS RIGHT FINDINGS: There is no acute fracture or dislocation. No significant degenerative changes. No lytic or blastic osseous lesion. Soft tissues are unremarkable. Impression: No acute osseous abnormality. Workstation performed: [id number]     XR shoulder 2+ views RIGHT    Result Date: 9/5/2023  Narrative: RIGHT SHOULDER INDICATION:   fall.  COMPARISON:  None VIEWS:  XR SHOULDER 2+ VW RIGHT FINDINGS: There is no acute fracture or dislocation. Mild osteoarthritis of the glenohumeral and acromioclavicular joints. Subacromial spur noted. Soft tissues are unremarkable. Impression: No acute osseous abnormality.  Workstation performed: [id number]

## 2023-09-19 ENCOUNTER — OFFICE VISIT (OUTPATIENT)
Dept: OBGYN CLINIC | Facility: CLINIC | Age: 68
End: 2023-09-19
Payer: COMMERCIAL

## 2023-09-19 VITALS
HEART RATE: 69 BPM | WEIGHT: 205 LBS | BODY MASS INDEX: 32.18 KG/M2 | SYSTOLIC BLOOD PRESSURE: 136 MMHG | HEIGHT: 67 IN | DIASTOLIC BLOOD PRESSURE: 77 MMHG
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
## 2023-09-19 DIAGNOSIS — S46.001A ROTATOR CUFF INJURY, RIGHT, INITIAL ENCOUNTER: ICD-10-CM

## 2023-09-19 DIAGNOSIS — M25.511 ACUTE PAIN OF RIGHT SHOULDER: Primary | ICD-10-CM

## 2023-09-19 DIAGNOSIS — R29.898 SHOULDER WEAKNESS: ICD-10-CM

## 2023-09-19 PROCEDURE — 99213 OFFICE O/P EST LOW 20 MIN: CPT | Performed by: PHYSICIAN ASSISTANT

## 2023-09-19 NOTE — PROGRESS NOTES
Orthopaedic Surgery - Office Note  Vicente Lechuga (36 y.o. female)   : 1955   MRN: [id number]  Encounter Date: 2023    Chief Complaint   Patient presents with   • Right Shoulder - Pain         Assessment/Plan  Diagnoses and all orders for this visit:    Acute pain of right shoulder  -     MRI shoulder right wo contrast; Future  -     Ambulatory Referral to Physical Therapy; Future  -     Ambulatory Referral to Orthopedic Surgery; Future    Fall from chair, subsequent encounter-9/3/2023  -     MRI shoulder right wo contrast; Future  -     Ambulatory Referral to Physical Therapy; Future  -     Ambulatory Referral to Orthopedic Surgery; Future    Shoulder weakness  -     MRI shoulder right wo contrast; Future  -     Ambulatory Referral to Physical Therapy; Future  -     Ambulatory Referral to Orthopedic Surgery; Future    Rotator cuff injury, right, initial encounter  -     MRI shoulder right wo contrast; Future  -     Ambulatory Referral to Physical Therapy; Future  -     Ambulatory Referral to Orthopedic Surgery; Future    The diagnosis as well as treatment options were reviewed with the patient in the office today. I have concern for an acute full-thickness rotator cuff tear after trauma. I would recommend an MRI to evaluate for rotator cuff tear. In the interim she will be started in initial physical therapy to try and improve her range of motion. She will ice the shoulder 20 minutes on 1 hour off 3 times a day. She may use an oral anti-inflammatory such as Aleve 1 tablet twice daily with food stopping and calling if any stomach upset occurs. I did review with the patient the differential would include cervical neck pathology as nothing in the shoulder is likely causing radiating symptoms past the elbow into the hand.   She will monitor the symptoms and consider follow-up with pain management if they persist.     Return for Recheck with orthopedic surgeon to discuss MRI of the right shoulder. History of Present Illness  This is a new patient who injured her right shoulder on 9/3/2023 while sitting in a chair she leaned forward falling out of the chair landing on the right shoulder. She had pain the following day and went to the emergency room on 9/4/2023 and had x-rays taken of the right shoulder which were negative for fracture. She is right-hand dominant. Since the fall she has been unable to lift the arm actively over her head. Reaching behind her back is also been difficult. She does not report any paresthesias but she does report some radiating pain past the elbow into her fingertips. She relates this to feeling similar to her 2016 radiculopathy. She has tried resting oral anti-inflammatories and ice all without relief. Patient has a history of cervical neck pathology with right upper extremity radiculopathy. Review of Systems  Pertinent items are noted in HPI. All other systems were reviewed and are negative. Physical Exam  /77   Pulse 69   Ht 5' 7" (1.702 m)   Wt 93 kg (205 lb)   BMI 32.11 kg/m²   Cons: Appears well. No apparent distress. Psych: Alert. Oriented x3. Mood and affect normal.  On examination right shoulder has no skin breakdown lesions or signs of infection. There is no ecchymosis or deformity seen. She is nontender to palpation. Active forward flexion is to 80 degrees. Passive forward flexion is to 140 degrees with end motion pain and guarding. Internal rotation is to the buttock. External rotation is to 60 degrees. Empty can testing has weakness to 4- out of 5. Internal rotation and external rotation strength is 4 out of 5. Sensations intact to light touch in all dermatomes in the right upper extremity. She has a positive impingement sign.   She has no AC joint tenderness           Studies Reviewed  Study Result    Narrative & Impression   RIGHT SHOULDER     INDICATION:   fall.     COMPARISON:  None     VIEWS:  XR SHOULDER 2+ VW RIGHT        FINDINGS:     There is no acute fracture or dislocation.     Mild osteoarthritis of the glenohumeral and acromioclavicular joints.     Subacromial spur noted.     Soft tissues are unremarkable.     IMPRESSION:     No acute osseous abnormality.     Workstation performed: [id number]     Study Result    Narrative & Impression   RIGHT HUMERUS     INDICATION:   fall.     COMPARISON:  None     VIEWS:  XR HUMERUS RIGHT        FINDINGS:     There is no acute fracture or dislocation.     No significant degenerative changes.     No lytic or blastic osseous lesion.     Soft tissues are unremarkable.     IMPRESSION:     No acute osseous abnormality.     Workstation performed: [id number]   X-ray images as well as reports were reviewed by myself in the office today and I agree with radiologist interpretation. Emergency department notes from 9/4/2023 were reviewed by myself in the office today. Procedures  No procedures today. Medical, Surgical, Family, and Social History  The patient's medical history, family history, and social history, were reviewed and updated as appropriate.     Past Medical History:   Diagnosis Date   • Abdominal pain    • Allergic    • Arthritis    • Asthma    • Basal cell carcinoma    • Bicuspid aortic valve     LAST ASSESSED [id number]   • Cervical disc disease     last assessed [id number]   • Cervical stenosis of spine     LAST ASSESSED [id number]   • Disease of thyroid gland     hypothyroid   • Dry eyes    • Endometriosis    • GERD (gastroesophageal reflux disease)    • Heart murmur    • Hot flashes    • Hyperglycemia    • Hyperlipidemia    • Hypertension    • Hypothyroidism    • Kidney stone    • Left ventricular hypertrophy    • Mitral valvular disorder    • Nephrolithiasis    • Ovarian cyst, complex    • Pneumonia 6/19    800.00   • Ptosis     LAST ASSESSED [id number]   • Renal calculi    • Right cervical radiculopathy     LAST ASSESSED [id number]   • Seasonal allergies    • Squamous cell skin cancer    • Thyroid disease    • Visual impairment        Past Surgical History:   Procedure Laterality Date   • BASAL CELL CARCINOMA EXCISION     • CARPAL TUNNEL RELEASE     •  SECTION     • CHOLECYSTECTOMY     • COLONOSCOPY     • NEUROPLASTY / TRANSPOSITION MEDIAN NERVE AT CARPAL TUNNEL     • NEUROPLASTY / TRANSPOSITION MEDIAN NERVE AT CARPAL TUNNEL  2001[facility]   • OTHER SURGICAL HISTORY      surgically removed skin patch for skin cancer   • PLANTAR FASCIECTOMY     • ID COLONOSCOPY FLX DX W/COLLJ SPEC WHEN PFRMD N/A 10/9/2017    Procedure: COLONOSCOPY;  Surgeon: Roe Tyler MD;  Location: [facility];   Service: Gastroenterology   • ID HYSTEROSCOPY BX ENDOMETRIUM&/POLYPC W/WO D&C N/A 2018    Procedure: DILATATION AND CURETTAGE (D&C) WITH HYSTEROSCOPY;  Surgeon: Theo Kirkpatrick DO;  Location: [facility] OR;  Service: Gynecology   • SKIN BIOPSY     • TUBAL LIGATION     • UPPER GASTROINTESTINAL ENDOSCOPY         Family History   Problem Relation Age of Onset   • Diabetes Mother    • Alzheimer's disease Mother    • Heart disease Mother    • Hypertension Mother    • Dementia Mother    • No Known Problems Father    • No Known Problems Sister    • No Known Problems Sister    • No Known Problems Daughter    • No Known Problems Maternal Grandmother    • No Known Problems Maternal Grandfather    • No Known Problems Paternal Grandmother    • No Known Problems Paternal Grandfather    • No Known Problems Maternal Aunt    • No Known Problems Maternal Aunt    • No Known Problems Maternal Aunt    • No Known Problems Maternal Aunt    • No Known Problems Maternal Aunt    • Other Family         back disorder   • Cancer Family    • Heart disease Family    • Thyroid disease Family        Social History     Occupational History   • Occupation: part time employment   Tobacco Use   • Smoking status: Former     Packs/day: 1.00     Years: 40.00     Total pack years: 40.00     Types: Cigarettes     Start date: 0     Quit date: 2012     Years since quittin.7   • Smokeless tobacco: Never   Vaping Use   • Vaping Use: Never used   Substance and Sexual Activity   • Alcohol use:  Yes     Alcohol/week: 8.0 standard drinks of alcohol     Types: 4 Glasses of wine, 4 Standard drinks or equivalent per week     Comment: per month   • Drug use: No   • Sexual activity: Yes     Partners: Male     Birth control/protection: Female Sterilization     Comment: tubal ligation        Allergies   Allergen Reactions   • Erythromycin GI Intolerance     Reaction Date: 2011;    • Penicillins Other (See Comments)     unknown   • Aztreonam Rash     Action Taken: Allergy added by [employer] to replace Penicillins [employer];    • Carbapenems Rash     Action Taken: Allergy added by [employer] to replace Penicillins [employer];    • Cephalosporins Rash     Action Taken: Allergy added by [employer] to replace Penicillins [employer];    • Griseofulvin Rash     Action Taken: Allergy added by [employer] to replace Penicillins [employer];    • Influenza Virus Vaccine Hives, Rash and GI Intolerance         Current Outpatient Medications:   •  albuterol (Ventolin HFA) 90 mcg/act inhaler, Inhale 2 puffs every 6 (six) hours as needed for wheezing or shortness of breath, Disp: 18 g, Rfl: 3  •  amLODIPine (NORVASC) 5 mg tablet, TAKE ONE TABLET BY MOUTH EVERY DAY, Disp: 90 tablet, Rfl: 3  •  Arnuity Ellipta 100 MCG/ACT AEPB inhaler, INHALE 1 PUFF BY MOUTH DAILY AND RINSE MOUTH AFTER USE, Disp: 90 blister, Rfl: 3  •  Ascorbic Acid (vitamin C) 100 MG tablet, Take 100 mg by mouth daily, Disp: , Rfl:   •  benzonatate (TESSALON PERLES) 100 mg capsule, Take 1 capsule (100 mg total) by mouth 3 (three) times a day as needed for cough, Disp: 40 capsule, Rfl: 0  •  cholecalciferol (VITAMIN D3) 400 units tablet, Take 400 Units by mouth daily Pt taking 1000 units, Disp: , Rfl:   •  Cyanocobalamin (VITAMIN B 12 PO), Take by mouth, Disp: , Rfl:   •  Diclofenac Sodium (VOLTAREN) 1 %, Apply 2 g topically 4 (four) times a day, Disp: 150 g, Rfl: 2  •  Efinaconazole (Jublia) 10 % SOLN, Apply 1 application topically in the morning, Disp: 8 mL, Rfl: 6  •  fexofenadine (ALLEGRA) 180 MG tablet, TAKE ONE TABLET BY MOUTH EVERY DAY, Disp: 90 tablet, Rfl: 3  •  levothyroxine 125 mcg tablet, TAKE ONE TABLET BY MOUTH EVERY DAY., Disp: 90 tablet, Rfl: 0  •  losartan-hydrochlorothiazide (HYZAAR) 100-25 MG per tablet, Take 1 tablet by mouth daily, Disp: 90 tablet, Rfl: 3  •  pantoprazole (PROTONIX) 40 mg tablet, TAKE ONE TABLET BY MOUTH 2 TIMES A DAY, Disp: 180 tablet, Rfl: 0  •  pravastatin (PRAVACHOL) 20 mg tablet, Take 1 tablet (20 mg total) by mouth daily, Disp: 90 tablet, Rfl: 1  •  Rybelsus 7 MG tablet, TAKE ONE TABLET BY MOUTH EVERY MORNING, Disp: 90 tablet, Rfl: 0  •  XIIDRA 5 % op solution, , Disp: , Rfl:   •  ketoconazole (NIZORAL) 2 % cream, Apply topically daily for 7 days, Disp: 30 g, Rfl: 0    Current Facility-Administered Medications:   •  lidocaine (XYLOCAINE) 1 % injection 2 mL, 2 mL, Injection, , Binu Dowell DPM, 2 mL at 08/14/23 1500  •  triamcinolone acetonide (KENALOG-40) 40 mg/mL injection 20 mg, 20 mg, Intra-articular, , Binu Dowell DPM, 20 mg at 08/14/23 Pr-155 Stevoe Mark Anthony Seo PA-C

## 2023-09-19 NOTE — PATIENT INSTRUCTIONS
Rotator Cuff Tendinitis   AMBULATORY CARE:   Rotator cuff tendinitis  is inflammation of the tendons in your shoulder joint. A tendon is a cord of tough tissue that connects your muscles to your bones. The rotator cuff is made up of a group of muscles and tendons that hold the shoulder joint in place. Common signs and symptoms:   Pain and swelling in your shoulder, especially when you lift your arm over your head    Pain that is worse after you sleep on the affected shoulder    Pain can become worse and you may have pain even when you are resting    Shoulder and arm weakness    Call your doctor or orthopedist if:   You have sudden shortness of breath or chest pain. Any part of your arm is numb, tingly, cold, blue, or pale. You have pain and swelling in your shoulder even after you take pain medicine. Your skin is itchy, swollen, or has a rash. Your symptoms are not getting better or are getting worse. You have questions or concerns about your condition or care. Treatment  may include any of the following:  Medicines  such as steroids or NSAIDs may be used to reduce swelling. This can help relieve pain. Steroids may be injected into the rotator cuff area. NSAIDs are available without a doctor's order. Ask your healthcare provider which medicine is right for you. Ask how much to take and when to take it. Take as directed. NSAIDs can cause stomach bleeding or kidney problems if not taken correctly. Surgery  may be needed if the pain and tightening in your shoulder do not go away. This may also be done if pain worsens or is so severe that it affects your daily activities. During surgery, your healthcare provider may remove bone spurs and inflamed tissue around the shoulder. Physical therapy  can help you improve movement and strength, and decrease pain. A physical therapist will teach you safe exercises.  The exercises may help you move your shoulder normally again and strengthen your rotator cuff. You may learn changes to make to your daily activities that will help decrease stress on your tendons. Care for your rotator cuff tendinitis at home:   Rest as directed. Limit activity on your affected shoulder to decrease stress on the tendon. This may help prevent further damage, decrease pain, and promote healing. Apply ice on your shoulder area. Ice helps decrease swelling and pain. Ice may also help prevent tissue damage. Use an ice pack, or put crushed ice in a plastic bag. Cover it with a towel and place it on your shoulder for 15 to 20 minutes every hour or as directed. Keep your shoulder in the correct position so it will heal faster. This may be done by increasing the height of armrests while you work, drive, and sit. Try not to sleep on the side of your injured shoulder. If you are a woman, wear a sports bra so that the straps are closer to your neck. This may help decrease stress in the affected shoulder. Follow up with your doctor or orthopedist as directed:  Write down your questions so you remember to ask them during your visits. © Copyright [facility] 2022 Information is for End User's use only and may not be sold, redistributed or otherwise used for commercial purposes. The above information is an  only. It is not intended as medical advice for individual conditions or treatments. Talk to your doctor, nurse or pharmacist before following any medical regimen to see if it is safe and effective for you.

## 2023-09-20 ENCOUNTER — TELEPHONE (OUTPATIENT)
Dept: FAMILY MEDICINE CLINIC | Facility: HOSPITAL | Age: 68
End: 2023-09-20

## 2023-09-20 PROCEDURE — 88305 TISSUE EXAM BY PATHOLOGIST: CPT | Performed by: PATHOLOGY

## 2023-09-20 NOTE — TELEPHONE ENCOUNTER
Patient usually gets the egg-free flu shot which [facility] will not likely get until very late into flu season. Patient asking what you recommend? She will call local pharmacies to see if any of them have the egg-free in stock.     pcb

## 2023-09-25 ENCOUNTER — OFFICE VISIT (OUTPATIENT)
Age: 68
End: 2023-09-25
Payer: COMMERCIAL

## 2023-09-25 VITALS
OXYGEN SATURATION: 95 % | DIASTOLIC BLOOD PRESSURE: 78 MMHG | SYSTOLIC BLOOD PRESSURE: 112 MMHG | BODY MASS INDEX: 31.55 KG/M2 | RESPIRATION RATE: 18 BRPM | TEMPERATURE: 97.8 F | HEIGHT: 67 IN | WEIGHT: 201 LBS | HEART RATE: 78 BPM

## 2023-09-25 DIAGNOSIS — J45.20 MILD INTERMITTENT ASTHMA WITHOUT COMPLICATION: Primary | ICD-10-CM

## 2023-09-25 DIAGNOSIS — F17.211 NICOTINE DEPENDENCE, CIGARETTES, IN REMISSION: ICD-10-CM

## 2023-09-25 PROCEDURE — 99214 OFFICE O/P EST MOD 30 MIN: CPT | Performed by: PHYSICIAN ASSISTANT

## 2023-09-25 NOTE — PROGRESS NOTES
Assessment:    1. Mild intermittent asthma without complication        2. Nicotine dependence, cigarettes, in remission              Plan:   · Patient doing very well. Symptoms well controlled on Arnuity. Previously on Breo. · She may continue to use albuterol as needed along with Allegra and Mucinex  · Recommended she get her CT lung screening  · Planning on getting her influenza vaccine      Subjective:     Patient ID: Renee Mcdermott is a 78 y.o. female. Chief Complaint:  Akhil Duke is a very pleasant 55-year-old female presenting for follow-up. This past winter she had trouble with her asthma after a COVID-19 infection. She is doing very well now. This of breath. Has some postnasal drip but it does not really bother her. Cannot remember the last time she used albuterol    Works for cardiology      Pertinent negatives include no chest pain, fever, headaches, myalgias or sore throat. The following portions of the patient's history were reviewed in this encounter and updated as appropriate:   Review of Systems   Constitutional:  Negative for appetite change and fever. HENT:  Positive for postnasal drip. Negative for ear pain, rhinorrhea, sneezing, sore throat and trouble swallowing. Cardiovascular:  Negative for chest pain. Musculoskeletal:  Negative for myalgias. Neurological:  Negative for headaches. All other systems reviewed and are negative. Objective:    Physical Exam  Vitals reviewed. Constitutional:       General: She is not in acute distress. Appearance: She is not toxic-appearing. HENT:      Head: Normocephalic and atraumatic. Eyes:      General: No scleral icterus. Cardiovascular:      Rate and Rhythm: Normal rate and regular rhythm. Pulmonary:      Effort: Pulmonary effort is normal.      Comments: Very faint wheezing quickly cleared  Musculoskeletal:         General: No signs of injury. Skin:     General: Skin is warm and dry.    Neurological:      General: No focal deficit present. Mental Status: She is alert. Mental status is at baseline. Psychiatric:         Mood and Affect: Mood normal.         Behavior: Behavior normal.         Lab Review:   Lab Requisition on 09/18/2023   Component Date Value   • Case Report 09/18/2023                      Value:Surgical Pathology Report                         Case: [id number]                                   Authorizing Provider:  Manny Gomez MD           Collected:           09/18/2023 0810              Ordering Location:     [facility]      Received:            09/18/2023 [address] [place] [address]                                                                                  Laboratory                                                                   Pathologist:           Devendra Cyr MD                                                         Specimen:    Skin, Other, Medial Distal Left Thigh                                                     • Final Diagnosis 09/18/2023                      Value: This result contains rich text formatting which cannot be displayed here. • Additional Information 09/18/2023                      Value: This result contains rich text formatting which cannot be displayed here. • Gross Description 09/18/2023                      Value: This result contains rich text formatting which cannot be displayed here.    • Clinical Information 09/18/2023                      Value:BCC/SCCIS entire lesion sample  DX D45.8     Answers submitted by the patient for this visit:  Pulmonology Questionnaire (Submitted on 9/23/2023)  Chief Complaint: Primary symptoms  Do you have shortness of breath that occurs with effort or exertion?: No  Do you have ear congestion?: No  Do you have heartburn?: No  Do you have fatigue?: No  Do you have nasal congestion?: No  Do you have shortness of breath when lying flat?: No  Do you have shortness of breath when you wake up?: No  Do you have sweats?: No  Have you experienced weight loss?: Yes  Which of the following makes your symptoms worse?: change in weather, exposure to fumes, pollen  Which of the following makes your symptoms better?: cold air, oral steroids, OTC cough suppressant, prescription cough suppressant, steroid inhaler

## 2023-09-26 ENCOUNTER — EVALUATION (OUTPATIENT)
Dept: PHYSICAL THERAPY | Facility: CLINIC | Age: 68
End: 2023-09-26
Payer: COMMERCIAL

## 2023-09-26 DIAGNOSIS — M25.511 ACUTE PAIN OF RIGHT SHOULDER: Primary | ICD-10-CM

## 2023-09-26 DIAGNOSIS — R29.898 SHOULDER WEAKNESS: ICD-10-CM
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
## 2023-09-26 DIAGNOSIS — E66.01 CLASS 2 SEVERE OBESITY DUE TO EXCESS CALORIES WITH SERIOUS COMORBIDITY AND BODY MASS INDEX (BMI) OF 36.0 TO 36.9 IN ADULT: ICD-10-CM

## 2023-09-26 DIAGNOSIS — S46.001D INJURY OF TENDON OF RIGHT ROTATOR CUFF, SUBSEQUENT ENCOUNTER: ICD-10-CM

## 2023-09-26 PROCEDURE — 97110 THERAPEUTIC EXERCISES: CPT | Performed by: PHYSICAL THERAPIST

## 2023-09-26 PROCEDURE — 97162 PT EVAL MOD COMPLEX 30 MIN: CPT | Performed by: PHYSICAL THERAPIST

## 2023-09-26 NOTE — PROGRESS NOTES
PT Evaluation     Today's date: 2023  Patient name: Trevor Malagon  : 1955  MRN: [id number]  Referring provider: Malena Frankel, PA*  Dx:   Encounter Diagnosis     ICD-10-CM    1. Acute pain of right shoulder  M25.511 Ambulatory Referral to Physical Therapy      2. Fall from chair, subsequent encounter-9/3/2023  W07. XXXD Ambulatory Referral to Physical Therapy      3. Shoulder weakness  R29.898 Ambulatory Referral to Physical Therapy      4. Injury of tendon of right rotator cuff, subsequent encounter  S46.001D Ambulatory Referral to Physical Therapy                     Assessment  Assessment details: Pt is a 78y.o. year old female coming to outpatient PT with a diagnosis of acute R shoulder pain s/p fall from chair 9/3/23. Pt presents with increased pain and TTP, decreased ROM, decreased strength, and overall decreased functional mobility. Pt would benefit from skilled PT services in order to address these deficits and reach maximum level of function. Thank you kindly for the referral!    Pt was issued a written HEP to be performed on a daily basis. Impairments: abnormal or restricted ROM, activity intolerance, impaired physical strength, lacks appropriate home exercise program, pain with function and poor posture   Understanding of Dx/Px/POC: good   Prognosis: good    Goals  STG's ( 3-4 weeks)  1. Pt will be independent in HEP  2. Pt will be able to reach overhead with greater ease  LTG's ( 6- 8 weeks)  1. Improve FOTO score by 4-6 points  2. Pt will have decreased pain to 3-4/10 at worst  3. Pt will be able to reach behind her back to put on a coat with less pain  4. Pt will have less pain at the end of the day with work activities  5.  Pt will return to household lifting and cleaning activities    Plan  Patient would benefit from: skilled physical therapy  Planned modality interventions: cryotherapy  Planned therapy interventions: manual therapy, joint mobilization, neuromuscular re-education, stretching, strengthening, therapeutic activities, therapeutic exercise, functional ROM exercises, flexibility and home exercise program  Frequency: 2x week  Duration in weeks: 6  Plan of Care beginning date: 2023  Plan of Care expiration date: 2023  Treatment plan discussed with: PTA and patient        Subjective Evaluation    History of Present Illness  Date of onset: 9/3/2023  Mechanism of injury: trauma  Mechanism of injury: Pt injured her R shoulder on 9/3/23 when sitting on a white plastic chair. Pt leaned forward to  a plate from the ground, and the entire chair tipped forward and she fell onto the ground injuring her shoulder. Pt is improving with reaching overhead. Pt has increased difficulty reaching out to the side. Pt has pain that radiates down into her R hand. Pt is able to sleep/ lay on her R side for brief time periods. Pt is improving with reaching her hair and performing self care activities. Pt is avoiding lifting activities with a laundry basket or heavy pots. Pt has pain with witting and typing at the end of the day. Pt has increased pain when reaching behind her back to put on a coat.     Work; [employer]  Hobbies; camping, gardening  Gait: no abnormalities  Patient Goals  Patient goals for therapy: decreased pain and independence with ADLs/IADLs  Patient goal: to be able to reach overhead with greater ease  Pain  At best pain ratin  At worst pain ratin  Location: R shoulder  Quality: radiating, dull ache and knife-like  Relieving factors: medications (Alieve)  Aggravating factors: overhead activity    Social Support  Lives with: spouse    Employment status: working  Hand dominance: right      Diagnostic Tests  X-ray: normal  Treatments  No previous or current treatments        Objective     Neurological Testing     Sensation     Shoulder   Left Shoulder   Intact: light touch    Right Shoulder   Intact: light touch    Reflexes   Left   Biceps (C5/C6): trace (1+)  Brachioradialis (C6): trace (1+)  Triceps (C7): trace (1+)    Right   Biceps (C5/C6): trace (1+)  Brachioradialis (C6): trace (1+)  Triceps (C7): trace (1+)    Active Range of Motion   Left Shoulder   Flexion: 155 degrees   Abduction: 160 degrees     Right Shoulder   Flexion: 115 degrees with pain  Abduction: 92 degrees with pain  External rotation 45°: 50 degrees   Internal rotation 45°: 70 degrees     Additional Active Range of Motion Details  (+) TTP R subacromium, mid deltoid, posterior shoulder  Cervical ROM; WFL's with mild limitation at end ROM  Posture; mild thoracic khyphosis  (+) supraspinatus test  (+) impingement sign  (-) drop arm test    Passive Range of Motion     Right Shoulder   Flexion: 110 degrees   Abduction: 125 degrees   External rotation 45°: 45 degrees   Internal rotation 45°: 70 degrees     Strength/Myotome Testing     Left Shoulder   Normal muscle strength    Right Shoulder     Planes of Motion   Flexion: 4 (pain)   Right shoulder abduction strength: NT.   External rotation at 0°: 4+   Internal rotation at 0°: 4+             Dx: R shoulder pain  EPOC:  11/7/23  CO-MORBIDITIES:   PERSONAL FACTORS:   Precautions: none      Manuals 9/26            R Shoulder PROM/ MFR             GHJ mobs             K tape V down                          Neuro Re-Ed             TB LPD             TB row             TB IR             Bilat TB ER             Prone squeeze and hold             Prone row             Prone W                          Ther Ex             Pulleys; scap             Pulleys; flexion             S/l sld ER AROM             Pendulums: CW,CCW             Supine cane ER aAROM             Supine flexion HH AAROM             HEP instruction 8'                                                                Ther Activity                                       Gait Training                                       Modalities             Cp post tx

## 2023-10-02 DIAGNOSIS — I70.90 ATHEROSCLEROSIS: ICD-10-CM

## 2023-10-02 DIAGNOSIS — R93.1 ELEVATED CORONARY ARTERY CALCIUM SCORE: ICD-10-CM

## 2023-10-02 RX ORDER — PRAVASTATIN SODIUM 20 MG
20 TABLET ORAL DAILY
Qty: 90 TABLET | Refills: 0 | Status: SHIPPED | OUTPATIENT
Start: 2023-10-02 | End: 2023-10-04 | Stop reason: SDUPTHER

## 2023-10-03 ENCOUNTER — APPOINTMENT (OUTPATIENT)
Dept: LAB | Facility: CLINIC | Age: 68
End: 2023-10-03
Payer: COMMERCIAL

## 2023-10-03 ENCOUNTER — OFFICE VISIT (OUTPATIENT)
Dept: PHYSICAL THERAPY | Facility: CLINIC | Age: 68
End: 2023-10-03
Payer: COMMERCIAL
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
## 2023-10-03 DIAGNOSIS — E03.9 ACQUIRED HYPOTHYROIDISM: ICD-10-CM

## 2023-10-03 DIAGNOSIS — M25.511 ACUTE PAIN OF RIGHT SHOULDER: Primary | ICD-10-CM

## 2023-10-03 DIAGNOSIS — R29.898 SHOULDER WEAKNESS: ICD-10-CM

## 2023-10-03 DIAGNOSIS — I10 ESSENTIAL HYPERTENSION: ICD-10-CM

## 2023-10-03 DIAGNOSIS — R73.9 HYPERGLYCEMIA: ICD-10-CM

## 2023-10-03 DIAGNOSIS — S46.001D INJURY OF TENDON OF RIGHT ROTATOR CUFF, SUBSEQUENT ENCOUNTER: ICD-10-CM

## 2023-10-03 DIAGNOSIS — E78.2 MIXED HYPERLIPIDEMIA: ICD-10-CM

## 2023-10-03 LAB
ALBUMIN SERPL BCP-MCNC: 4.4 G/DL (ref 3.5–5)
ALP SERPL-CCNC: 71 U/L (ref 34–104)
ALT SERPL W P-5'-P-CCNC: 14 U/L (ref 7–52)
ANION GAP SERPL CALCULATED.3IONS-SCNC: 7 MMOL/L
AST SERPL W P-5'-P-CCNC: 18 U/L (ref 13–39)
BILIRUB SERPL-MCNC: 0.58 MG/DL (ref 0.2–1)
BUN SERPL-MCNC: 18 MG/DL (ref 5–25)
CALCIUM SERPL-MCNC: 9.4 MG/DL (ref 8.4–10.2)
CHLORIDE SERPL-SCNC: 103 MMOL/L (ref 96–108)
CHOLEST SERPL-MCNC: 100 MG/DL
CO2 SERPL-SCNC: 29 MMOL/L (ref 21–32)
CREAT SERPL-MCNC: 0.73 MG/DL (ref 0.6–1.3)
ERYTHROCYTE [DISTWIDTH] IN BLOOD BY AUTOMATED COUNT: 14 % (ref 11.6–15.1)
EST. AVERAGE GLUCOSE BLD GHB EST-MCNC: 128 MG/DL
GFR SERPL CREATININE-BSD FRML MDRD: 86 ML/MIN/1.73SQ M
GLUCOSE P FAST SERPL-MCNC: 90 MG/DL (ref 65–99)
HBA1C MFR BLD: 6.1 %
HCT VFR BLD AUTO: 43.3 % (ref 34.8–46.1)
HDLC SERPL-MCNC: 36 MG/DL
HGB BLD-MCNC: 14.3 G/DL (ref 11.5–15.4)
LDLC SERPL CALC-MCNC: 41 MG/DL (ref 0–100)
MCH RBC QN AUTO: 29.7 PG (ref 26.8–34.3)
MCHC RBC AUTO-ENTMCNC: 33 G/DL (ref 31.4–37.4)
MCV RBC AUTO: 90 FL (ref 82–98)
PLATELET # BLD AUTO: 223 THOUSANDS/UL (ref 149–390)
PMV BLD AUTO: 10.8 FL (ref 8.9–12.7)
POTASSIUM SERPL-SCNC: 3.4 MMOL/L (ref 3.5–5.3)
PROT SERPL-MCNC: 7 G/DL (ref 6.4–8.4)
RBC # BLD AUTO: 4.82 MILLION/UL (ref 3.81–5.12)
SODIUM SERPL-SCNC: 139 MMOL/L (ref 135–147)
TRIGL SERPL-MCNC: 113 MG/DL
TSH SERPL DL<=0.05 MIU/L-ACNC: 0.68 UIU/ML (ref 0.45–4.5)
WBC # BLD AUTO: 8.66 THOUSAND/UL (ref 4.31–10.16)

## 2023-10-03 PROCEDURE — 84443 ASSAY THYROID STIM HORMONE: CPT

## 2023-10-03 PROCEDURE — 97140 MANUAL THERAPY 1/> REGIONS: CPT

## 2023-10-03 PROCEDURE — 97110 THERAPEUTIC EXERCISES: CPT

## 2023-10-03 PROCEDURE — 83036 HEMOGLOBIN GLYCOSYLATED A1C: CPT

## 2023-10-03 PROCEDURE — 85027 COMPLETE CBC AUTOMATED: CPT

## 2023-10-03 PROCEDURE — 36415 COLL VENOUS BLD VENIPUNCTURE: CPT

## 2023-10-03 PROCEDURE — 80061 LIPID PANEL: CPT

## 2023-10-03 PROCEDURE — 97112 NEUROMUSCULAR REEDUCATION: CPT

## 2023-10-03 PROCEDURE — 80053 COMPREHEN METABOLIC PANEL: CPT

## 2023-10-03 NOTE — PROGRESS NOTES
Daily Note     Today's date: 10/3/2023  Patient name: Andrea Singer  : 1955  MRN: [id number]  Referring provider: NADINE Tarango*  Dx:   Encounter Diagnosis     ICD-10-CM    1. Acute pain of right shoulder  M25.511       2. Fall from chair, subsequent encounter-9/3/2023  W07. XXXD       3. Shoulder weakness  R29.898       4. Injury of tendon of right rotator cuff, subsequent encounter  S46.001D                      Subjective: Pt reports her shld is still very sore mid delt. Objective: See treatment diary below      Assessment: Tolerated treatment well for first instruction of TE's per flow sheet. Patient would benefit from continued PT to work on AROM to func limits. Pt slightly guarded initially. Pt w/ mm tightness in the UT, infra, bicep, and terres. Pt responded well to manuals reporting decreased pain post.      Plan: Continue per plan of care. Progress treatment as tolerated.        Dx: R shoulder pain  EPOC:  23  CO-MORBIDITIES:   PERSONAL FACTORS:   Precautions: none      Manuals 9/26 10/3           R Shoulder PROM/ MFR  JK           GHJ mobs             K tape V down  JK             15'           Neuro Re-Ed             TB LPD             TB row             TB IR             Bilat TB ER  OTB 10           Prone squeeze and hold             Prone row  10           Prone W  10                        Ther Ex             Pulleys; scap  3'           Pulleys; flexion  3'           S/l sld ER AROM  10           Pendulums: CW,CCW  2# cw,ccw           Supine cane ER aAROM  10x5"           Supine flexion HH AAROM  10x5"           HEP instruction 8'                                                                Ther Activity                                       Gait Training                                       Modalities             Cp post tx

## 2023-10-04 DIAGNOSIS — I70.90 ATHEROSCLEROSIS: ICD-10-CM

## 2023-10-04 DIAGNOSIS — R93.1 ELEVATED CORONARY ARTERY CALCIUM SCORE: ICD-10-CM

## 2023-10-04 RX ORDER — PRAVASTATIN SODIUM 20 MG
20 TABLET ORAL DAILY
Qty: 90 TABLET | Refills: 1 | Status: SHIPPED | OUTPATIENT
Start: 2023-10-04

## 2023-10-05 ENCOUNTER — OFFICE VISIT (OUTPATIENT)
Dept: PHYSICAL THERAPY | Facility: CLINIC | Age: 68
End: 2023-10-05
Payer: COMMERCIAL
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
## 2023-10-05 DIAGNOSIS — R29.898 SHOULDER WEAKNESS: ICD-10-CM

## 2023-10-05 DIAGNOSIS — S46.001D INJURY OF TENDON OF RIGHT ROTATOR CUFF, SUBSEQUENT ENCOUNTER: ICD-10-CM

## 2023-10-05 DIAGNOSIS — M25.511 ACUTE PAIN OF RIGHT SHOULDER: Primary | ICD-10-CM

## 2023-10-05 PROCEDURE — 97140 MANUAL THERAPY 1/> REGIONS: CPT

## 2023-10-05 PROCEDURE — 97110 THERAPEUTIC EXERCISES: CPT

## 2023-10-05 PROCEDURE — 97112 NEUROMUSCULAR REEDUCATION: CPT

## 2023-10-05 NOTE — PROGRESS NOTES
Daily Note     Today's date: 10/5/2023  Patient name: Pepito Gregg  : 1955  MRN: [id number]  Referring provider: NADINE Marinelli*  Dx:   Encounter Diagnosis     ICD-10-CM    1. Acute pain of right shoulder  M25.511       2. Fall from chair, subsequent encounter-9/3/2023  W07. XXXD       3. Shoulder weakness  R29.898       4. Injury of tendon of right rotator cuff, subsequent encounter  S46.001D                      Subjective: Pt reports her shoulder is improving. She reports it is sore but that is due to her using it and doing her exercises. Pt reports the KT taping helped her a lot. Objective: See treatment diary below      Assessment: Tolerated treatment well. Pt would benefit from continued focus on ROM and strength. Pt performed all exercises well, required occasional verbal cueing for correct form. Pt had moderate difficulty with ER exercises. Continue to progress pt to tolerance. Taping performed by [facility]. Patient demonstrated fatigue post treatment, exhibited good technique with therapeutic exercises and would benefit from continued PT      Plan: Continue per plan of care.       Dx: R shoulder pain  EPOC:  23  CO-MORBIDITIES:   PERSONAL FACTORS:   Precautions: none      Manuals 9/26 10/3 10/5          R Shoulder PROM/ MFR  JK KM          GHJ mobs             K tape V down  JK TH            15'           Neuro Re-Ed             TB LPD             TB row             TB IR             Bilat TB ER  OTB 10 OTB 2x10          Prone squeeze and hold             Prone row  10 2x10          Prone W  10 2x10                       Ther Ex             Pulleys; scap  3' 3'          Pulleys; flexion  3' 3'          S/l sld ER AROM  10 10x          Pendulums: CW,CCW  2# cw,ccw 2# 2x10 cw, ccw          Supine cane ER aAROM  10x5" 10x5"          Supine flexion HH AAROM  10x5" 10x5"          HEP instruction 8'                                                                Ther Activity Gait Training                                       Modalities             Cp post tx

## 2023-10-09 ENCOUNTER — TELEPHONE (OUTPATIENT)
Dept: FAMILY MEDICINE CLINIC | Facility: HOSPITAL | Age: 68
End: 2023-10-09

## 2023-10-09 DIAGNOSIS — L50.9 URTICARIA: Primary | ICD-10-CM

## 2023-10-09 RX ORDER — PREDNISONE 10 MG/1
TABLET ORAL
Qty: 16 TABLET | Refills: 0 | Status: SHIPPED | OUTPATIENT
Start: 2023-10-09 | End: 2023-10-24 | Stop reason: ALTCHOICE

## 2023-10-09 NOTE — TELEPHONE ENCOUNTER
Patient had her hair dyed on 10/5/23. She is having a reaction to the hair dye and now has hives on her head and ears. She wants to know if something can be called in for her? She said last time Dr Yoana Holder gave her Prednisone. But she would like something else if possible. If not she will use the prednisone.      She would like to use [facility] in [place] please     Please call patient to advise

## 2023-10-10 ENCOUNTER — OFFICE VISIT (OUTPATIENT)
Dept: PHYSICAL THERAPY | Facility: CLINIC | Age: 68
End: 2023-10-10
Payer: COMMERCIAL
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
## 2023-10-10 DIAGNOSIS — R29.898 SHOULDER WEAKNESS: ICD-10-CM

## 2023-10-10 DIAGNOSIS — S46.001D INJURY OF TENDON OF RIGHT ROTATOR CUFF, SUBSEQUENT ENCOUNTER: ICD-10-CM

## 2023-10-10 DIAGNOSIS — M25.511 ACUTE PAIN OF RIGHT SHOULDER: Primary | ICD-10-CM

## 2023-10-10 PROCEDURE — 97140 MANUAL THERAPY 1/> REGIONS: CPT | Performed by: PHYSICAL THERAPIST

## 2023-10-10 PROCEDURE — 97110 THERAPEUTIC EXERCISES: CPT | Performed by: PHYSICAL THERAPIST

## 2023-10-10 PROCEDURE — 97112 NEUROMUSCULAR REEDUCATION: CPT | Performed by: PHYSICAL THERAPIST

## 2023-10-10 NOTE — PROGRESS NOTES
Daily Note     Today's date: 10/10/2023  Patient name: Re Mccloud  : 1955  MRN: [id number]  Referring provider: NADINE Caldera*  Dx:   Encounter Diagnosis     ICD-10-CM    1. Acute pain of right shoulder  M25.511       2. Fall from chair, subsequent encounter-9/3/2023  W07. XXXD       3. Shoulder weakness  R29.898       4. Injury of tendon of right rotator cuff, subsequent encounter  S46.001D                      Subjective: Pt reports her R shoulder is feeling better. Pt feels the tape is helping her shoulder. Objective: See treatment diary below      Assessment: Tolerated treatment well. Patient exhibited good technique with therapeutic exercises. Pt tolerated increased tband ex well. Plan: Continue per plan of care. Focus on decreasing pain and improving strength.      Dx: R shoulder pain  EPOC:  23  CO-MORBIDITIES:   PERSONAL FACTORS:   Precautions: none      Manuals 9/26 10/3 10 10/10         R Shoulder PROM/ MFR  JK KM th         GHJ mobs    th         K tape V down  JK [facility]           15'  15'         Neuro Re-Ed             TB LPD    OTB x15         TB row    OTB x15         TB IR    OTB x15         Bilat TB ER  OTB 10 OTB 2x10 OTB x15         Prone squeeze and hold    10x5"         Prone row  10 2x10 10         Prone W  10 2x10 10         Prone T    10                      Ther Ex             Pulleys; scap  3' 3' 3'         Pulleys; flexion  3' 3' 3'         S/l sld ER AROM  10 10x 10 1#         Pendulums: CW,CCW  2# cw,ccw 2# 2x10 cw, ccw 2# x20 ea         Supine cane ER aAROM  10x5" 10x5"          Supine flexion HH AAROM  10x5" 10x5" 10x5"         HEP instruction 8'            Supine scap punch    10         Supine shld centering    10 1#         S/l shld abd AROM    10                      Ther Activity                                       Gait Training                                       Modalities             Cp post tx

## 2023-10-12 ENCOUNTER — OFFICE VISIT (OUTPATIENT)
Dept: PHYSICAL THERAPY | Facility: CLINIC | Age: 68
End: 2023-10-12
Payer: COMMERCIAL

## 2023-10-12 DIAGNOSIS — M25.511 ACUTE PAIN OF RIGHT SHOULDER: Primary | ICD-10-CM

## 2023-10-12 PROCEDURE — 97112 NEUROMUSCULAR REEDUCATION: CPT

## 2023-10-12 PROCEDURE — 97110 THERAPEUTIC EXERCISES: CPT

## 2023-10-12 PROCEDURE — 97140 MANUAL THERAPY 1/> REGIONS: CPT

## 2023-10-12 NOTE — PROGRESS NOTES
Daily Note     Today's date: 10/12/2023  Patient name: Kiran Ryan  : 1955  MRN: [id number]  Referring provider: NADINE Flores*  Dx:   Encounter Diagnosis     ICD-10-CM    1. Acute pain of right shoulder  M25.511                      Subjective: Pt reports her shld is feeling a little better. Reports the ktape seems to help. Objective: See treatment diary below      Assessment: Tolerated treatment well. Patient exhibited good technique with therapeutic exercises and would benefit from continued PT  for cont'd shld strengthening. Pt making good progress w/ pain relief and AROM. Some pain reported at end range PROM. Plan: Continue per plan of care. Progress treatment as tolerated.        Dx: R shoulder pain  EPOC:  23  CO-MORBIDITIES:   PERSONAL FACTORS:   Precautions: none      Manuals 9/26 10/3 10/5 10/10 10/12        R Shoulder PROM/ MFR  JK KM th JK        GHJ mobs    th         K tape V down  JK TH th JK          15'  15' 15'        Neuro Re-Ed             TB LPD    OTB x15 OTB x20        TB row    OTB x15 OTB x20        TB IR    OTB x15 OTB x20        Bilat TB ER  OTB 10 OTB 2x10 OTB x15 OTB x20        Prone squeeze and hold    10x5" 10x5"        Prone row  10 2x10 10 10        Prone W  10 2x10 10 10        Prone T    10 10                     Ther Ex             Pulleys; scap  3' 3' 3' 3'        Pulleys; flexion  3' 3' 3' 3'        S/l sld ER AROM  10 10x 10 1# 15 1#        Pendulums: CW,CCW  2# cw,ccw 2# 2x10 cw, ccw 2# x20 ea 2#x20 ea        Supine cane ER aAROM  10x5" 10x5"          Supine flexion HH AAROM  10x5" 10x5" 10x5" 10x5" 2#        HEP instruction 8'            Supine scap punch    10 2# 15        Supine shld centering    10 1# 2# 15        S/l shld abd AROM    10 2# 15                     Ther Activity                                       Gait Training                                       Modalities             Cp post tx

## 2023-10-16 ENCOUNTER — OFFICE VISIT (OUTPATIENT)
Dept: PHYSICAL THERAPY | Facility: CLINIC | Age: 68
End: 2023-10-16
Payer: COMMERCIAL

## 2023-10-16 ENCOUNTER — HOSPITAL ENCOUNTER (OUTPATIENT)
Dept: NON INVASIVE DIAGNOSTICS | Facility: HOSPITAL | Age: 68
Discharge: HOME | End: 2023-10-16
Attending: INTERNAL MEDICINE

## 2023-10-16 DIAGNOSIS — I35.9 AORTIC VALVE DISEASE: ICD-10-CM

## 2023-10-16 DIAGNOSIS — M25.511 ACUTE PAIN OF RIGHT SHOULDER: Primary | ICD-10-CM
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
## 2023-10-16 PROCEDURE — 97112 NEUROMUSCULAR REEDUCATION: CPT

## 2023-10-16 PROCEDURE — 97140 MANUAL THERAPY 1/> REGIONS: CPT

## 2023-10-16 PROCEDURE — 97110 THERAPEUTIC EXERCISES: CPT

## 2023-10-16 NOTE — PROGRESS NOTES
Daily Note     Today's date: 10/16/2023  Patient name: Jinny Chandra  : 1955  MRN: [id number]  Referring provider: NADINE Prajapati*  Dx:   Encounter Diagnosis     ICD-10-CM    1. Acute pain of right shoulder  M25.511       2. Fall from chair, subsequent encounter-9/3/2023  W07. XXXD                      Subjective: Pt reports she has had a bad day so far. Pt states she still has pain in her UT and ant shld. Objective: See treatment diary below      Assessment: Tolerated treatment well. Patient made aware of her ant and shld hiking and told to start trying to be more aware of her posture as well. Pt responded well to manuals. Pt would benefit from cont'd PT to work on postural strengthening. Pt w/ palpable tightness in the UT. Plan: Continue per plan of care. Progress treatment as tolerated.        Dx: R shoulder pain  EPOC:  23  CO-MORBIDITIES:   PERSONAL FACTORS:   Precautions: none      Manuals 9/26 10/3 10/5 10/10 10/12 10/16       R Shoulder PROM/ MFR  JK KM th JK JK       GHJ mobs    th         K tape V down  JK TH th JK JK         15'  15' 15' 15',       Neuro Re-Ed             TB LPD    OTB x15 OTB x20 OTB x20       TB row    OTB x15 OTB x20 OTB x20       TB IR    OTB x15 OTB x20 OTB x20       Bilat TB ER  OTB 10 OTB 2x10 OTB x15 OTB x20 OTB x20       Prone squeeze and hold    10x5" 10x5" 10x5"       Prone row  10 2x10 10 10 10       Prone W  10 2x10 10 10 10       Prone T    10 10 10                    Ther Ex             Pulleys; scap  3' 3' 3' 3' 3'       Pulleys; flexion  3' 3' 3' 3' 3'       S/l sld ER AROM  10 10x 10 1# 15 1#        Pendulums: CW,CCW  2# cw,ccw 2# 2x10 cw, ccw 2# x20 ea 2#x20 ea np       Supine cane ER aAROM  10x5" 10x5"          Supine flexion HH AAROM  10x5" 10x5" 10x5" 10x5" 2# 10x5" 2#       HEP instruction 8'            Supine scap punch    10 2# 15 2# 20       Supine shld centering    10 1# 2# 15 2# 20       S/l shld abd AROM    10 2# 15 2# 20 Ther Activity                                       Gait Training                                       Modalities             Cp post tx

## 2023-10-17 ENCOUNTER — HOSPITAL ENCOUNTER (OUTPATIENT)
Dept: NON INVASIVE DIAGNOSTICS | Facility: HOSPITAL | Age: 68
Discharge: HOME | End: 2023-10-17
Attending: INTERNAL MEDICINE
Payer: COMMERCIAL

## 2023-10-17 VITALS
BODY MASS INDEX: 31.55 KG/M2 | WEIGHT: 201 LBS | SYSTOLIC BLOOD PRESSURE: 112 MMHG | DIASTOLIC BLOOD PRESSURE: 78 MMHG | HEART RATE: 64 BPM | HEIGHT: 67 IN

## 2023-10-17 PROCEDURE — 93356 MYOCRD STRAIN IMG SPCKL TRCK: CPT

## 2023-10-17 PROCEDURE — 93306 TTE W/DOPPLER COMPLETE: CPT

## 2023-10-18 LAB
AORTIC ROOT: 3 CM
AORTIC VALVE MEAN VELOCITY: 16.4 M/S
APICAL FOUR CHAMBER EJECTION FRACTION: 79 %
ASCENDING AORTA: 3.1 CM
AV AREA BY CONTINUOUS VTI: 2.7 CM2
AV AREA PEAK VELOCITY: 2.4 CM2
AV LVOT MEAN GRADIENT: 9 MMHG
AV LVOT PEAK GRADIENT: 14 MMHG
AV MEAN GRADIENT: 13 MMHG
AV PEAK GRADIENT: 24 MMHG
AV VALVE AREA: 2.68 CM2
AV VELOCITY RATIO: 0.76
DOP CALC AO PEAK VEL: 2.46 M/S
DOP CALC AO VTI: 50.43 CM
DOP CALC LVOT AREA: 3.14 CM2
DOP CALC LVOT CARDIAC INDEX: 4.29 L/MIN/M2
DOP CALC LVOT CARDIAC OUTPUT: 8.72 L/MIN
DOP CALC LVOT DIAMETER: 2 CM
DOP CALC LVOT PEAK VEL VTI: 43.07 CM
DOP CALC LVOT PEAK VEL: 1.88 M/S
DOP CALC LVOT STROKE INDEX: 66.5 ML/M2
DOP CALC LVOT STROKE VOLUME: 135.24
DOP CALC MV VTI: 37.35 CM
E WAVE DECELERATION TIME: 280 MS
E/A RATIO: 0.71
FRACTIONAL SHORTENING: 44 (ref 28–44)
GLOBAL LONGITUIDAL STRAIN: -19 %
INTERVENTRICULAR SEPTUM IN DIASTOLE (PARASTERNAL SHORT AXIS VIEW): 1 CM
INTERVENTRICULAR SEPTUM: 1 CM (ref 0.6–1.1)
LAAS-AP2: 17.4 CM2
LAAS-AP4: 16.2 CM2
LEFT ATRIUM SIZE: 3.9 CM
LEFT ATRIUM VOLUME (MOD BIPLANE): 44 ML
LEFT ATRIUM VOLUME INDEX (MOD BIPLANE): 21.7 ML/M2
LEFT INTERNAL DIMENSION IN SYSTOLE: 2.5 CM (ref 2.1–4)
LEFT VENTRICLE DIASTOLIC VOLUME (MOD BIPLANE): 94 ML
LEFT VENTRICLE SYSTOLIC VOLUME (MOD BIPLANE): 23 ML
LEFT VENTRICULAR INTERNAL DIMENSION IN DIASTOLE: 4.5 CM (ref 3.5–6)
LEFT VENTRICULAR POSTERIOR WALL IN END DIASTOLE: 0.9 CM
LEFT VENTRICULAR STROKE VOLUME: 70 ML
LV EF: 75 %
LVSV (TEICH): 70 ML
MV E'TISSUE VEL-LAT: 6 CM/S
MV E'TISSUE VEL-SEP: 5 CM/S
MV MEAN GRADIENT: 3 MMHG
MV PEAK A VEL: 1.32 M/S
MV PEAK E VEL: 94 CM/S
MV PEAK GRADIENT: 9 MMHG
MV STENOSIS PRESSURE HALF TIME: 81 MS
MV VALVE AREA BY CONTINUITY EQUATION: 3.62 CM2
MV VALVE AREA P 1/2 METHOD: 2.72
RA PRESSURE ESTIMATED: 3 MMHG
RIGHT ATRIAL 2D VOLUME: 35 ML
RIGHT ATRIUM AREA SYSTOLE A4C: 14.5 CM2
RIGHT VENTRICLE ID DIMENSION: 3.7 CM
RV PSP: 30 MMHG
SL CV ECHO LV DYNAMIC OBSTRUCTION PEAK GRADIENT (REST): 23 MMHG
SL CV LEFT ATRIUM LENGTH A2C: 5.1 CM
SL CV LV EF: 65
SL CV PED ECHO LEFT VENTRICLE DIASTOLIC VOLUME (MOD BIPLANE) 2D: 91 ML
SL CV PED ECHO LEFT VENTRICLE SYSTOLIC VOLUME (MOD BIPLANE) 2D: 21 ML
TR MAX PG: 27 MMHG
TR PEAK VELOCITY: 2.6 M/S
TRICUSPID ANNULAR PLANE SYSTOLIC EXCURSION: 2.3 CM
TRICUSPID VALVE PEAK REGURGITATION VELOCITY: 2.58 M/S

## 2023-10-19 ENCOUNTER — OFFICE VISIT (OUTPATIENT)
Dept: PHYSICAL THERAPY | Facility: CLINIC | Age: 68
End: 2023-10-19
Payer: COMMERCIAL

## 2023-10-19 DIAGNOSIS — R29.898 SHOULDER WEAKNESS: ICD-10-CM

## 2023-10-19 DIAGNOSIS — S46.001D INJURY OF TENDON OF RIGHT ROTATOR CUFF, SUBSEQUENT ENCOUNTER: ICD-10-CM

## 2023-10-19 DIAGNOSIS — M25.511 ACUTE PAIN OF RIGHT SHOULDER: Primary | ICD-10-CM
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
## 2023-10-19 PROCEDURE — 97112 NEUROMUSCULAR REEDUCATION: CPT | Performed by: PHYSICAL THERAPIST

## 2023-10-19 PROCEDURE — 97110 THERAPEUTIC EXERCISES: CPT | Performed by: PHYSICAL THERAPIST

## 2023-10-19 PROCEDURE — 97140 MANUAL THERAPY 1/> REGIONS: CPT | Performed by: PHYSICAL THERAPIST

## 2023-10-19 NOTE — PROGRESS NOTES
Daily Note     Today's date: 10/19/2023  Patient name: Pepito Gregg  : 1955  MRN: [id number]  Referring provider: NADINE Marinelli*  Dx:   Encounter Diagnosis     ICD-10-CM    1. Acute pain of right shoulder  M25.511       2. Fall from chair, subsequent encounter-9/3/2023  W07. XXXD       3. Shoulder weakness  R29.898       4. Injury of tendon of right rotator cuff, subsequent encounter  S46.001D                      Subjective: Pt reports increased pain after laying on her side for about 1 hour for an echocardiogram. Pain rated 4/10 currently. Pt had 7-8/10 radiating arm pain at night on 10/17/23      Objective: See treatment diary below      Assessment: Tolerated treatment well. Patient exhibited good technique with therapeutic exercises. Modified there ex today 2* higher pain levels. Issued orange tb and updated HEP. Plan: Progress note during next visit.       Dx: R shoulder pain  EPOC:  23  CO-MORBIDITIES:   PERSONAL FACTORS:   Precautions: none      Manuals 9/26 10/3 10/5 10/10 10/12 10/16 10/19      R Shoulder PROM/ MFR  JK KM th JK JK th      GHJ mobs    th         K tape V down  [facility]        15'  13' 15' 15', 15'      Neuro Re-Ed             TB LPD    OTB x15 OTB x20 OTB x20 OTB x20      TB row    OTB x15 OTB x20 OTB x20 OTB x20      TB IR    OTB x15 OTB x20 OTB x20 OTB x20      Bilat TB ER  OTB 10 OTB 2x10 OTB x15 OTB x20 OTB x20 OTB x20      Prone squeeze and hold    10x5" 10x5" 10x5" 10x5"      Prone row  10 2x10 10 10 10 10      Prone W  10 2x10 10 10 10 10      Prone T    10 10 10 10                   Ther Ex             Pulleys; scap  3' 3' 3' 3' 3' 3'      Pulleys; flexion  3' 3' 3' 3' 3' 3'      S/l sld ER AROM  10 10x 10 1# 15 1#  15 1#      Pendulums: CW,CCW  2# cw,ccw 2# 2x10 cw, ccw 2# x20 ea 2#x20 ea np       Supine cane ER aAROM  10x5" 10x5"          Supine flexion HH AAROM  10x5" 10x5" 10x5" 10x5" 2# 10x5" 2# 10x5" 2#      HEP instruction 8' Supine scap punch    10 2# 15 2# 20 2# x20      Supine shld centering    10 1# 2# 15 2# 20 2# x20      S/l shld abd AROM    10 2# 15 2# 20 15                   Ther Activity                                       Gait Training                                       Modalities             Cp post tx

## 2023-10-20 DIAGNOSIS — J45.21 MILD INTERMITTENT ASTHMA WITH ACUTE EXACERBATION: ICD-10-CM

## 2023-10-20 RX ORDER — FLUTICASONE FUROATE 100 UG/1
1 POWDER RESPIRATORY (INHALATION) DAILY
Qty: 90 BLISTER | Refills: 8 | Status: SHIPPED | OUTPATIENT
Start: 2023-10-20

## 2023-10-21 DIAGNOSIS — I10 ESSENTIAL HYPERTENSION: ICD-10-CM

## 2023-10-22 DIAGNOSIS — J31.0 CHRONIC RHINITIS: ICD-10-CM

## 2023-10-23 RX ORDER — FEXOFENADINE HCL 180 MG/1
180 TABLET ORAL DAILY
Qty: 90 TABLET | Refills: 0 | Status: SHIPPED | OUTPATIENT
Start: 2023-10-23

## 2023-10-23 RX ORDER — AMLODIPINE BESYLATE 5 MG/1
5 TABLET ORAL DAILY
Qty: 90 TABLET | Refills: 0 | Status: SHIPPED | OUTPATIENT
Start: 2023-10-23

## 2023-10-24 ENCOUNTER — OFFICE VISIT (OUTPATIENT)
Dept: FAMILY MEDICINE CLINIC | Facility: HOSPITAL | Age: 68
End: 2023-10-24
Payer: COMMERCIAL

## 2023-10-24 ENCOUNTER — EVALUATION (OUTPATIENT)
Dept: PHYSICAL THERAPY | Facility: CLINIC | Age: 68
End: 2023-10-24
Payer: COMMERCIAL

## 2023-10-24 VITALS
DIASTOLIC BLOOD PRESSURE: 71 MMHG | TEMPERATURE: 96.9 F | WEIGHT: 199.8 LBS | HEART RATE: 61 BPM | BODY MASS INDEX: 31.36 KG/M2 | OXYGEN SATURATION: 100 % | SYSTOLIC BLOOD PRESSURE: 127 MMHG | HEIGHT: 67 IN

## 2023-10-24 DIAGNOSIS — J45.20 MILD INTERMITTENT ASTHMA WITHOUT COMPLICATION: ICD-10-CM
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
## 2023-10-24 DIAGNOSIS — I10 ESSENTIAL HYPERTENSION: Primary | ICD-10-CM

## 2023-10-24 DIAGNOSIS — E04.2 NON-TOXIC MULTINODULAR GOITER: ICD-10-CM

## 2023-10-24 DIAGNOSIS — M25.511 ACUTE PAIN OF RIGHT SHOULDER: Primary | ICD-10-CM

## 2023-10-24 DIAGNOSIS — Z23 ENCOUNTER FOR IMMUNIZATION: ICD-10-CM

## 2023-10-24 DIAGNOSIS — E03.9 ACQUIRED HYPOTHYROIDISM: ICD-10-CM

## 2023-10-24 PROCEDURE — 90471 IMMUNIZATION ADMIN: CPT

## 2023-10-24 PROCEDURE — 90662 IIV NO PRSV INCREASED AG IM: CPT

## 2023-10-24 PROCEDURE — 97140 MANUAL THERAPY 1/> REGIONS: CPT | Performed by: PHYSICAL THERAPIST

## 2023-10-24 PROCEDURE — 99214 OFFICE O/P EST MOD 30 MIN: CPT | Performed by: FAMILY MEDICINE

## 2023-10-24 PROCEDURE — 97110 THERAPEUTIC EXERCISES: CPT | Performed by: PHYSICAL THERAPIST

## 2023-10-24 PROCEDURE — 97112 NEUROMUSCULAR REEDUCATION: CPT | Performed by: PHYSICAL THERAPIST

## 2023-10-24 NOTE — PROGRESS NOTES
PT Re-Evaluation     Today's date: 10/24/2023  Patient name: Claudio Boo  : 1955  MRN: [id number]  Referring provider: NADINE Acevedo*  Dx:   Encounter Diagnosis     ICD-10-CM    1. Acute pain of right shoulder  M25.511       2. Fall from chair, subsequent encounter-9/3/2023  W07. XXXD                      Assessment  Assessment details: Since starting skilled PT, pain levels are decreased, R shoulder ROM and strength have improved with continued weakness with shoulder flexion, abduction and ER. Pt will have an MRI on 23 and a consult with Dr. Armando Ruano. Will hold on skilled PT until after ortho consult and MRI results are known. Impairments: abnormal or restricted ROM, activity intolerance, impaired physical strength, pain with function and poor posture   Understanding of Dx/Px/POC: good   Prognosis: good    Goals  STG's ( 3-4 weeks)  1. Pt will be independent in HEP- met  2. Pt will be able to reach overhead with greater ease- met  LTG's ( 6- 8 weeks)  1. Improve FOTO score by 4-6 points- met  2. Pt will have decreased pain to 3-4/10 at worst- not met met  3. Pt will be able to reach behind her back to put on a coat with less pain- met  4. Pt will have less pain at the end of the day with work activities- partial met  5. Pt will return to household lifting and cleaning activities- not met    Plan  Patient would benefit from: skilled physical therapy  Planned modality interventions: cryotherapy  Planned therapy interventions: manual therapy, joint mobilization, neuromuscular re-education, stretching, strengthening, therapeutic activities, therapeutic exercise, functional ROM exercises, flexibility and home exercise program  Frequency: hold on skilled PT.   Duration in weeks: 6  Plan of Care beginning date: 2023  Plan of Care expiration date: 2023  Treatment plan discussed with: PTA and patient        Subjective Evaluation    History of Present Illness  Date of onset: 9/3/2023  Mechanism of injury: trauma  Mechanism of injury: I.E: Pt injured her R shoulder on 9/3/23 when sitting on a white plastic chair. Pt leaned forward to  a plate from the ground, and the entire chair tipped forward and she fell onto the ground injuring her shoulder. Pt is improving with reaching overhead. Pt has increased difficulty reaching out to the side. Pt has pain that radiates down into her R hand. Pt is able to sleep/ lay on her R side for brief time periods. Pt is improving with reaching her hair and performing self care activities. Pt is avoiding lifting activities with a laundry basket or heavy pots. Pt has pain with writing and typing at the end of the day. Pt has increased pain when reaching behind her back to put on a coat. 10/24/23: Pt is compliant in HEP. Pt is reaching overhead with greater ease and reaching behind her back with less pain. Pt is able to have her arms over her head to dry her hair, but has some pain and fatigue. Pt was able to wash windows yesterday without aggravating her symptoms. Pt is avoiding lifting activities. Pt has mild pain when writing and typing at the end of the day.     Work; [employer]  Hobbies; camping, gardening  Gait: no abnormalities  Patient Goals  Patient goals for therapy: decreased pain and independence with ADLs/IADLs  Patient goal: to be able to reach overhead with greater ease  Pain  At best pain ratin  At worst pain ratin  Location: R shoulder  Quality: radiating, dull ache and knife-like  Relieving factors: medications (Alieve)  Aggravating factors: overhead activity    Social Support  Lives with: spouse    Employment status: working  Hand dominance: right      Diagnostic Tests  X-ray: normal  Treatments  No previous or current treatments        Objective     Neurological Testing     Sensation     Shoulder   Left Shoulder   Intact: light touch    Right Shoulder   Intact: Light touch    Reflexes   Left   Biceps (C5/C6): trace (1+)  Brachioradialis (C6): trace (1+)  Triceps (C7): trace (1+)    Right   Biceps (C5/C6): trace (1+)  Brachioradialis (C6): trace (1+)  Triceps (C7): trace (1+)    Active Range of Motion   Left Shoulder   Flexion: 155 degrees   Abduction: 160 degrees     Right Shoulder   Flexion: 140 degrees with pain  Abduction: 148 degrees with pain  External rotation 45°: WFL  Internal rotation 45°: WFL    Additional Active Range of Motion Details  (+) TTP R subacromium, mid deltoid, posterior shoulder  Cervical ROM; WFL's with mild limitation at end ROM  Posture; mild thoracic khyphosis  (+) supraspinatus test  (+) impingement sign  (-) drop arm test    Passive Range of Motion     Right Shoulder   Flexion: 170 degrees   Abduction: WFL  External rotation 45°: 90 degrees   Internal rotation 45°: [facility]    Strength/Myotome Testing     Left Shoulder   Normal muscle strength    Right Shoulder     Planes of Motion   Flexion: 4+ (pain)   Abduction: 4+ (pain)   External rotation at 0°: 4+   Internal rotation at 0°: 5         Dx: R shoulder pain  EPOC:  11/7/23  CO-MORBIDITIES:   PERSONAL FACTORS:   Precautions: none      Manuals 9/26 10/3 10/5 10/10 10/12 10/16 10/19 10/23     R Shoulder PROM/ MFR  JK KM th JK JK th th     Progress note        th     GHJ mobs    th    th     K tape V down  JK TH th JK JK th th       13'  13' 15' 15', 15' 20'     Neuro Re-Ed             TB LPD    OTB x15 OTB x20 OTB x20 OTB x20 GTB x15     TB row    OTB x15 OTB x20 OTB x20 OTB x20 GTB x15     TB IR    OTB x15 OTB x20 OTB x20 OTB x20 GTB x15     Bilat TB ER  OTB 10 OTB 2x10 OTB x15 OTB x20 OTB x20 OTB x20 GTB x10     Prone squeeze and hold    10x5" 10x5" 10x5" 10x5" 10x5"     Prone row  10 2x10 10 10 10 10 15     Prone W  10 2x10 10 10 10 10 15     Prone T    10 10 10 10 15                  Ther Ex             Pulleys; scap  3' 3' 3' 3' 3' 3' 3'     Pulleys; flexion  3' 3' 3' 3' 3' 3' 3'     S/l sld ER AROM  10 10x 10 1# 15 1#  15 1# 15 1#     Pendulums: CW,CCW  2# cw,ccw 2# 2x10 cw, ccw 2# x20 ea 2#x20 ea np  2# 20 ea     Supine cane ER aAROM  10x5" 10x5"          Supine flexion HH AAROM  10x5" 10x5" 10x5" 10x5" 2# 10x5" 2# 10x5" 2# 10x5" 2#     HEP instruction 8'            Supine scap punch    10 2# 15 2# 20 2# x20      Supine shld centering    10 1# 2# 15 2# 20 2# x20      S/l shld abd AROM    10 2# 15 2# 20 15                   Ther Activity                                       Gait Training                                       Modalities             Cp post tx

## 2023-10-24 NOTE — PROGRESS NOTES
Name: Claudio Boo      : 1955      MRN: [id number]  Encounter Provider: Wilmer Beckford MD  Encounter Date: 10/24/2023   Encounter department: [address]     1. Essential hypertension  Assessment & Plan:  Excellent BP control      2. Mild intermittent asthma without complication  Assessment & Plan:  Stable pulmonary status without recent flare      3. Acquired hypothyroidism  Assessment & Plan:  Clinically euthyroid on current dose      4. Non-toxic multinodular goiter    5. Encounter for immunization  -     influenza vaccine, high-dose, PF 0.7 mL (FLUZONE HIGH-DOSE)           Subjective     3 month follow up visit. No recent injury or illness    Working on R shoulder PT and has MRI with follow up with Dr Armando Ruano    Working on [employer] and is now under 200    Review of Systems   Constitutional:  Positive for unexpected weight change. Respiratory: Negative. Cardiovascular: Negative. Gastrointestinal: Negative. Genitourinary: Negative. Musculoskeletal:  Positive for arthralgias and joint swelling. Psychiatric/Behavioral: Negative. All other systems reviewed and are negative.       Past Medical History:   Diagnosis Date    Abdominal pain     Allergic     Arthritis     Asthma     Basal cell carcinoma     Bicuspid aortic valve     LAST ASSESSED [id number]    Cervical disc disease     last assessed [id number]    Cervical stenosis of spine     LAST ASSESSED [id number]    Disease of thyroid gland     hypothyroid    Dry eyes     Endometriosis     GERD (gastroesophageal reflux disease)     Heart murmur     Hot flashes     Hyperglycemia     Hyperlipidemia     Hypertension     Hypothyroidism     Kidney stone     Left ventricular hypertrophy     Mitral valvular disorder     Nephrolithiasis     Ovarian cyst, complex     Pneumonia     800.00    Ptosis     LAST ASSESSED [id number]    Renal calculi     Right cervical radiculopathy     LAST ASSESSED [id number]    Seasonal allergies     Squamous cell skin cancer     Thyroid disease     Visual impairment      Past Surgical History:   Procedure Laterality Date    BASAL CELL CARCINOMA EXCISION      CARPAL TUNNEL RELEASE       SECTION      CHOLECYSTECTOMY      COLONOSCOPY      NEUROPLASTY / TRANSPOSITION MEDIAN NERVE AT CARPAL TUNNEL      NEUROPLASTY / TRANSPOSITION MEDIAN NERVE AT CARPAL TUNNEL  [facility]    OTHER SURGICAL HISTORY      surgically removed skin patch for skin cancer    PLANTAR FASCIECTOMY      UT COLONOSCOPY FLX DX W/COLLJ SPEC WHEN PFRMD N/A 10/9/2017    Procedure: COLONOSCOPY;  Surgeon: Jolane Ahumada, MD;  Location: [facility] GI LAB;   Service: Gastroenterology    UT HYSTEROSCOPY BX ENDOMETRIUM&/POLYPC W/WO D&C N/A 2018    Procedure: DILATATION AND CURETTAGE (D&C) WITH HYSTEROSCOPY;  Surgeon: Cindi Romero DO;  Location: [facility] OR;  Service: Gynecology    SKIN BIOPSY      TUBAL LIGATION      UPPER GASTROINTESTINAL ENDOSCOPY       Family History   Problem Relation Age of Onset    Diabetes Mother     Alzheimer's disease Mother     Heart disease Mother     Hypertension Mother     Dementia Mother     No Known Problems Father     No Known Problems Sister     No Known Problems Sister     No Known Problems Daughter     No Known Problems Maternal Grandmother     No Known Problems Maternal Grandfather     No Known Problems Paternal Grandmother     No Known Problems Paternal Grandfather     No Known Problems Maternal Aunt     No Known Problems Maternal Aunt     No Known Problems Maternal Aunt     No Known Problems Maternal Aunt     No Known Problems Maternal Aunt     Other Family         back disorder    Cancer Family     Heart disease Family     Thyroid disease Family      Social History     Socioeconomic History    Marital status: /Civil Union     Spouse name: None    Number of children: 2    Years of education: None    Highest education level: None   Occupational History Occupation: part time employment   Tobacco Use    Smoking status: Former     Packs/day: 1.00     Years: 40.00     Total pack years: 40.00     Types: Cigarettes     Start date: 0     Quit date: 2012     Years since quittin.8    Smokeless tobacco: Never   Vaping Use    Vaping Use: Never used   Substance and Sexual Activity    Alcohol use:  Yes     Alcohol/week: 8.0 standard drinks of alcohol     Types: 4 Glasses of wine, 4 Standard drinks or equivalent per week     Comment: per month    Drug use: No    Sexual activity: Yes     Partners: Male     Birth control/protection: Female Sterilization     Comment: tubal ligation    Other Topics Concern    None   Social History Narrative    Caffeine use     Highschool or GED    Lives with      Faith affiliation Mandaeism    Always uses seatbelt     Social Determinants of Health     Financial Resource Strain: Not on file   Food Insecurity: Not on file   Transportation Needs: Not on file   Physical Activity: Not on file   Stress: Not on file   Social Connections: Not on file   Intimate Partner Violence: Not on file   Housing Stability: Not on file     Current Outpatient Medications on File Prior to Visit   Medication Sig    albuterol (Ventolin HFA) 90 mcg/act inhaler Inhale 2 puffs every 6 (six) hours as needed for wheezing or shortness of breath    amLODIPine (NORVASC) 5 mg tablet Take 1 tablet (5 mg total) by mouth daily    Ascorbic Acid (vitamin C) 100 MG tablet Take 100 mg by mouth daily    cholecalciferol (VITAMIN D3) 400 units tablet Take 400 Units by mouth daily Pt taking 1000 units    Cyanocobalamin (VITAMIN B 12 PO) Take by mouth    Diclofenac Sodium (VOLTAREN) 1 % Apply 2 g topically 4 (four) times a day    Efinaconazole (Jublia) 10 % SOLN Apply 1 application topically in the morning    fexofenadine (ALLEGRA) 180 MG tablet Take 1 tablet (180 mg total) by mouth daily    fluticasone (Arnuity Ellipta) 100 MCG/ACT AEPB inhaler Inhale 1 puff daily Rinse mouth after use. levothyroxine 125 mcg tablet TAKE ONE TABLET BY MOUTH EVERY DAY. losartan-hydrochlorothiazide (HYZAAR) 100-25 MG per tablet Take 1 tablet by mouth daily    pantoprazole (PROTONIX) 40 mg tablet TAKE ONE TABLET BY MOUTH 2 TIMES A DAY (Patient taking differently: Take 40 mg by mouth daily)    pravastatin (PRAVACHOL) 20 mg tablet Take 1 tablet (20 mg total) by mouth daily    semaglutide (Rybelsus) 7 MG tablet Take 1 tablet (7 mg total) by mouth daily    XIIDRA 5 % op solution     ketoconazole (NIZORAL) 2 % cream Apply topically daily for 7 days     Allergies   Allergen Reactions    Erythromycin GI Intolerance     Reaction Date: 11Jul2011;     Penicillins Other (See Comments)     unknown    Aztreonam Rash     Action Taken: Allergy added by [employer] to replace Penicillins [employer]; Carbapenems Rash     Action Taken: Allergy added by [employer] to replace Penicillins [employer];     Cephalosporins Rash     Action Taken: Allergy added by [employer] to replace Penicillins [employer];     Griseofulvin Rash     Action Taken: Allergy added by [employer] to replace Penicillins [employer];      Influenza Virus Vaccine Hives, Rash and GI Intolerance     Immunization History   Administered Date(s) Administered    COVID-19 MODERNA VACC 0.5 ML IM 12/30/2020, 01/26/2021    INFLUENZA 10/10/2018, 10/01/2020, 10/31/2022    Influenza, high dose seasonal 0.7 mL 10/24/2023    Influenza, recombinant, quadrivalent,injectable, preservative free 10/08/2019, 10/04/2021, 10/31/2022    Influenza, seasonal, injectable 10/01/2014, 10/01/2015    Pneumococcal Conjugate Vaccine 20-valent (Pcv20), Polysace 07/25/2022, 07/25/2022    Pneumococcal Polysaccharide PPV23 10/08/2019    Tdap 01/01/2010, 08/24/2020    Zoster Vaccine Recombinant 05/06/2019, 07/08/2019       Objective     /71 (BP Location: Left arm, Patient Position: Sitting, Cuff Size: Large)   Pulse 61   Temp (!) 96.9 °F (36.1 °C) (Tympanic) Ht 5' 7" (1.702 m)   Wt 90.6 kg (199 lb 12.8 oz)   SpO2 100%   BMI 31.29 kg/m²     Physical Exam  Vitals reviewed. Constitutional:       Appearance: Normal appearance. Cardiovascular:      Rate and Rhythm: Regular rhythm. Pulses: Normal pulses. Heart sounds: Murmur heard. Pulmonary:      Effort: Pulmonary effort is normal.      Breath sounds: Normal breath sounds. Musculoskeletal:      Right shoulder: Bony tenderness present. Decreased range of motion. Right lower leg: No edema. Left lower leg: No edema. Neurological:      Mental Status: She is alert and oriented to person, place, and time.    Psychiatric:         Mood and Affect: Mood normal.       Webb Koyanagi, MD

## 2023-11-04 ENCOUNTER — HOSPITAL ENCOUNTER (OUTPATIENT)
Dept: MRI IMAGING | Facility: HOSPITAL | Age: 68
Discharge: HOME | End: 2023-11-04
Payer: COMMERCIAL

## 2023-11-04 DIAGNOSIS — R29.898 SHOULDER WEAKNESS: ICD-10-CM

## 2023-11-04 DIAGNOSIS — M25.511 ACUTE PAIN OF RIGHT SHOULDER: ICD-10-CM

## 2023-11-04 DIAGNOSIS — S46.001A ROTATOR CUFF INJURY, RIGHT, INITIAL ENCOUNTER: ICD-10-CM
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
## 2023-11-04 PROCEDURE — G1004 CDSM NDSC: HCPCS

## 2023-11-04 PROCEDURE — 73221 MRI JOINT UPR EXTREM W/O DYE: CPT

## 2023-11-09 ENCOUNTER — PREP FOR PROCEDURE (OUTPATIENT)
Dept: OBGYN CLINIC | Facility: CLINIC | Age: 68
End: 2023-11-09

## 2023-11-09 ENCOUNTER — OFFICE VISIT (OUTPATIENT)
Dept: OBGYN CLINIC | Facility: CLINIC | Age: 68
End: 2023-11-09
Payer: COMMERCIAL

## 2023-11-09 VITALS
RESPIRATION RATE: 16 BRPM | WEIGHT: 199.12 LBS | HEART RATE: 74 BPM | SYSTOLIC BLOOD PRESSURE: 140 MMHG | HEIGHT: 67 IN | DIASTOLIC BLOOD PRESSURE: 70 MMHG | BODY MASS INDEX: 31.25 KG/M2

## 2023-11-09 DIAGNOSIS — S46.011A TRAUMATIC TEAR OF RIGHT ROTATOR CUFF, UNSPECIFIED TEAR EXTENT, INITIAL ENCOUNTER: Primary | ICD-10-CM

## 2023-11-09 PROCEDURE — 99214 OFFICE O/P EST MOD 30 MIN: CPT | Performed by: STUDENT IN AN ORGANIZED HEALTH CARE EDUCATION/TRAINING PROGRAM

## 2023-11-09 RX ORDER — CHLORHEXIDINE GLUCONATE ORAL RINSE 1.2 MG/ML
15 SOLUTION DENTAL ONCE
OUTPATIENT
Start: 2023-11-09 | End: 2023-11-09

## 2023-11-09 NOTE — PROGRESS NOTES
Ortho Sports Medicine Shoulder New Patient Visit     Assesment:   80 y.o. female right shoulder massive rotator cuff tear    Plan:    The patient's diagnosis and treatment were discussed at length today. We discussed no treatment, non-operative treatment, and operative treatment. Lesli Anand presents for massive rotator cuff tear with full-thickness full width supraspinatus infraspinatus tears. This began after a traumatic incident back in September. She is made some progress in terms of her range of motion however continues to struggle with pain and weakness despite formal supervised physical therapy. We discussed the natural history and pathophysiology of rotator cuff tears, due to young age and high activity level and recommend arthroscopic repair to prevent long-term dysfunction and subsequent arthropathy. Discussed anticipated preoperative perioperative and postoperative recovery timeline. Also discussed the risks of possible retear and/or delayed healing lack of healing due to tear size, age, history of former smoker for 20 years. She understands these risks and would like to proceed with rotator cuff repair. We also discussed the possibility of an interpositional balloon if her repair is either a tenuous or partial in nature. Conservative treatment:    PT for ROM and strengthening to shoulder, rotator cuff, scapular stabilizers. Home exercise program for shoulder, including ROM and strenthening. Instructions given to patient of what exercises to perform. Let pain guide return to activities. Imaging: All imaging from today was reviewed by myself and explained to the patient. Injection:    No Injection planned at this time. Surgery: All of the risks and benefits of operative treatment were explained to the patient, as well as the risks and benefits of any alternative treatment options, including nonoperative care.  This risks of surgery include, but are not limited to, infection, bleeding, blood clot, damage to nerves/arteries, need for further surgyer, cardiovascular risk, anesthesia risk, and continued pain. The patient understood this and elects to proceed forward with surgical intervention. Follow up:    No follow-ups on file. Chief Complaint   Patient presents with    Right Shoulder - Pain       History of Present Illness: The patient is a 80 y.o., right hand dominant female who presents for right shoulder pain. She reports in early September, she leaned over in a chair to pick something up and chair fell over causing her to land on her shoulder. She felt immediate pain after the fall. She was unable to move her arm after the fall. She previously saw Kristina Vital PA-C who referred her to me. She has been going to physical therapy to work on her range of motion, which she states has helped her significantly. Her pain is over the anterior and lateral aspect of her shoulder. She has pain at night, which is causing her to wake up. She has been taking tylenol and ibuprofen for the pain. She denies any numbness and tingling.          Shoulder Surgical History:  None    Past Medical, Social and Family History:  Past Medical History:   Diagnosis Date    Abdominal pain     Allergic     Arthritis     Asthma     Basal cell carcinoma     Bicuspid aortic valve     LAST ASSESSED [id number]    Cervical disc disease     last assessed [id number]    Cervical stenosis of spine     LAST ASSESSED [id number]    Disease of thyroid gland     hypothyroid    Dry eyes     Endometriosis     GERD (gastroesophageal reflux disease)     Heart murmur     Hot flashes     Hyperglycemia     Hyperlipidemia     Hypertension     Hypothyroidism     Kidney stone     Left ventricular hypertrophy     Mitral valvular disorder     Nephrolithiasis     Ovarian cyst, complex     Pneumonia 6/19    800.00    Ptosis     LAST ASSESSED [id number]    Renal calculi     Right cervical radiculopathy     LAST ASSESSED [id number] Seasonal allergies     Squamous cell skin cancer     Thyroid disease     Visual impairment      Past Surgical History:   Procedure Laterality Date    BASAL CELL CARCINOMA EXCISION      CARPAL TUNNEL RELEASE       SECTION      CHOLECYSTECTOMY      COLONOSCOPY      NEUROPLASTY / TRANSPOSITION MEDIAN NERVE AT CARPAL TUNNEL      NEUROPLASTY / TRANSPOSITION MEDIAN NERVE AT CARPAL TUNNEL      [facility]    OTHER SURGICAL HISTORY      surgically removed skin patch for skin cancer    PLANTAR FASCIECTOMY      AK COLONOSCOPY FLX DX W/COLLJ SPEC WHEN PFRMD N/A 10/9/2017    Procedure: COLONOSCOPY;  Surgeon: Fazal Martinez MD;  Location: [facility]; Service: Gastroenterology    AK HYSTEROSCOPY BX ENDOMETRIUM&/POLYPC W/WO D&C N/A 2018    Procedure: DILATATION AND CURETTAGE (D&C) WITH HYSTEROSCOPY;  Surgeon: Belkys Aggarwal DO;  Location: [facility];  Service: Gynecology    SKIN BIOPSY      TUBAL LIGATION      UPPER GASTROINTESTINAL ENDOSCOPY       Allergies   Allergen Reactions    Erythromycin GI Intolerance     Reaction Date: 2011;     Penicillins Other (See Comments)     unknown    Aztreonam Rash     Action Taken: Allergy added by [employer] to replace Penicillins [employer]; Carbapenems Rash     Action Taken: Allergy added by [employer] to replace Penicillins [employer];     Cephalosporins Rash     Action Taken: Allergy added by [employer] to replace Penicillins [employer];     Griseofulvin Rash     Action Taken: Allergy added by [employer] to replace Penicillins [employer];      Influenza Virus Vaccine Hives, Rash and GI Intolerance     Current Outpatient Medications on File Prior to Visit   Medication Sig Dispense Refill    albuterol (Ventolin HFA) 90 mcg/act inhaler Inhale 2 puffs every 6 (six) hours as needed for wheezing or shortness of breath 18 g 3    amLODIPine (NORVASC) 5 mg tablet Take 1 tablet (5 mg total) by mouth daily 90 tablet 0    Ascorbic Acid (vitamin C) 100 MG tablet Take 100 mg by mouth daily      cholecalciferol (VITAMIN D3) 400 units tablet Take 400 Units by mouth daily Pt taking 1000 units      Cyanocobalamin (VITAMIN B 12 PO) Take by mouth      Diclofenac Sodium (VOLTAREN) 1 % Apply 2 g topically 4 (four) times a day 150 g 2    Efinaconazole (Jublia) 10 % SOLN Apply 1 application topically in the morning 8 mL 6    fexofenadine (ALLEGRA) 180 MG tablet Take 1 tablet (180 mg total) by mouth daily 90 tablet 0    fluticasone (Arnuity Ellipta) 100 MCG/ACT AEPB inhaler Inhale 1 puff daily Rinse mouth after use. 90 blister 8    levothyroxine 125 mcg tablet TAKE ONE TABLET BY MOUTH EVERY DAY.  90 tablet 0    losartan-hydrochlorothiazide (HYZAAR) 100-25 MG per tablet Take 1 tablet by mouth daily 90 tablet 3    pantoprazole (PROTONIX) 40 mg tablet TAKE ONE TABLET BY MOUTH 2 TIMES A DAY (Patient taking differently: Take 40 mg by mouth daily) 180 tablet 0    pravastatin (PRAVACHOL) 20 mg tablet Take 1 tablet (20 mg total) by mouth daily 90 tablet 1    semaglutide (Rybelsus) 7 MG tablet Take 1 tablet (7 mg total) by mouth daily 90 tablet 0    XIIDRA 5 % op solution       ketoconazole (NIZORAL) 2 % cream Apply topically daily for 7 days 30 g 0     Current Facility-Administered Medications on File Prior to Visit   Medication Dose Route Frequency Provider Last Rate Last Admin    lidocaine (XYLOCAINE) 1 % injection 2 mL  2 mL Injection  Cleotha Shelter, DPM   2 mL at 08/14/23 1500    triamcinolone acetonide (KENALOG-40) 40 mg/mL injection 20 mg  20 mg Intra-articular  Cleotha Shelter, DPM   20 mg at 08/14/23 1500     Social History     Socioeconomic History    Marital status: /Civil Union     Spouse name: Not on file    Number of children: 2    Years of education: Not on file    Highest education level: Not on file   Occupational History    Occupation: part time employment   Tobacco Use    Smoking status: Former     Packs/day: 1.00     Years: 40.00     Total pack years: 40.00     Types: Cigarettes Start date: 0     Quit date: 2012     Years since quittin.8    Smokeless tobacco: Never   Vaping Use    Vaping Use: Never used   Substance and Sexual Activity    Alcohol use: Yes     Alcohol/week: 8.0 standard drinks of alcohol     Types: 4 Glasses of wine, 4 Standard drinks or equivalent per week     Comment: per month    Drug use: No    Sexual activity: Yes     Partners: Male     Birth control/protection: Female Sterilization     Comment: tubal ligation    Other Topics Concern    Not on file   Social History Narrative    Caffeine use     Highschool or GED    Lives with      Ca[employer]ism affiliation Pentec[employer]al    Always uses seatbelt     Social Determinants of Health     Financial Resource Strain: Not on file   Food Insecurity: Not on file   Transportation Needs: Not on file   Physical Activity: Not on file   Stress: Not on file   Social Connections: Not on file   Intimate Partner Violence: Not on file   Housing Stability: Not on file         I have reviewed the past medical, surgical, social and family history, medications and allergies as documented in the EMR. Review of systems: ROS is negative other than that noted in the HPI. Constitutional: Negative for fatigue and fever. HENT: Negative for sore throat. Respiratory: Negative for shortness of breath. Cardiovascular: Negative for chest pain. Gastrointestinal: Negative for abdominal pain. Endocrine: Negative for cold intolerance and heat intolerance. Genitourinary: Negative for flank pain. Musculoskeletal: Negative for back pain. Skin: Negative for rash. Allergic/Immunologic: Negative for immunocompromised state. Neurological: Negative for dizziness. Psychiatric/Behavioral: Negative for agitation. Physical Exam:    Blood pressure 140/70, pulse 74, resp. rate 16, height 5' 7" (1.702 m), weight 90.3 kg (199 lb 1.9 oz).     General/Constitutional: NAD, well developed, well nourished  HENT: Normocephalic, atraumatic  CV: Intact distal pulses, regular rate  Resp: No respiratory distress or labored breathing  Lymphatic: No lymphadenopathy palpated  Neuro: Alert and Oriented x 3, no focal deficits  Psych: Normal mood, normal affect, normal judgement, normal behavior  Skin: Warm, dry, no rashes, no erythema      Shoulder focused exam:     Visual inspection of the shoulder demonstrates normal contour without atrophy  No evidence of scapular dyskinesia or atrophy. No scapular winging  Active and passive range of motion demonstrates forward flexion to 130, abduction to 90, external rotation is 30 with the arm the side, internal rotation to lumbar   Rotator cuff strength is 4/5 to resisted forward flexion, 4/5 resisted abduction, 4/5 resisted external rotation, 4/5 resisted internal rotation  No tenderness to palpation at the distal clavicle, AC joint, acromion, or scapular spine  No pain with cross-body adduction  (+) Neer's test, (+) modified Estrada impingement test  Negative external rotation lag sign  Negative belly press, negative lift-off  (-) speed's and Yergason's test  (-) tenderness to palpation at the bicipital groove  (-) [place]'s test        UE NV Exam: +2 Radial pulses bilaterally  Sensation intact to light touch C5-T1 bilaterally, Radial/median/ulnar nerve motor intact      Bilateral elbow, wrist, and and forearm ROM full, painless with passive ROM, no ttp or crepitance throughout extremities below shoulder joint    Cervical ROM is full without pain, numbness or tingling      Shoulder Imaging    MRI of the right shoulder were reviewed, which demonstrate full-thickness supraspinatus and infraspinatus tear with retraction to the humeral apex. Minimal/grade 2 fatty atrophy. Type III acromion with subacromial bursitis I have reviewed the radiology report and agree with their impression.       Scribe Attestation      I,:  Diego Goodwin am acting as a scribe while in the presence of the attending physician.: I,:  Leann Ta DO personally performed the services described in this documentation    as scribed in my presence.:

## 2023-11-11 ENCOUNTER — HOSPITAL ENCOUNTER (OUTPATIENT)
Dept: MAMMOGRAPHY | Facility: CLINIC | Age: 68
Discharge: HOME | End: 2023-11-11
Payer: COMMERCIAL

## 2023-11-11 VITALS — HEIGHT: 67 IN | WEIGHT: 198.41 LBS | BODY MASS INDEX: 31.14 KG/M2

## 2023-11-11 DIAGNOSIS — Z12.31 ENCOUNTER FOR SCREENING MAMMOGRAM FOR BREAST CANCER: ICD-10-CM

## 2023-11-11 PROCEDURE — 77063 BREAST TOMOSYNTHESIS BI: CPT

## 2023-11-11 PROCEDURE — 77067 SCR MAMMO BI INCL CAD: CPT

## 2023-11-14 DIAGNOSIS — E03.9 ACQUIRED HYPOTHYROIDISM: ICD-10-CM

## 2023-11-14 RX ORDER — LEVOTHYROXINE SODIUM 0.12 MG/1
TABLET ORAL
Qty: 90 TABLET | Refills: 0 | Status: SHIPPED | OUTPATIENT
Start: 2023-11-14

## 2023-11-20 PROCEDURE — 88342 IMHCHEM/IMCYTCHM 1ST ANTB: CPT | Performed by: PATHOLOGY

## 2023-11-20 PROCEDURE — 88305 TISSUE EXAM BY PATHOLOGIST: CPT | Performed by: PATHOLOGY

## 2023-11-20 PROCEDURE — 88341 IMHCHEM/IMCYTCHM EA ADD ANTB: CPT | Performed by: PATHOLOGY

## 2023-11-29 ENCOUNTER — LAB REQUISITION (OUTPATIENT)
Dept: LAB | Facility: HOSPITAL | Age: 68
End: 2023-11-29
Payer: COMMERCIAL

## 2023-11-29 DIAGNOSIS — D04.72 CARCINOMA IN SITU OF SKIN OF LEFT LOWER LIMB, INCLUDING HIP: ICD-10-CM

## 2023-12-03 PROCEDURE — 88305 TISSUE EXAM BY PATHOLOGIST: CPT | Performed by: PATHOLOGY

## 2023-12-03 PROCEDURE — 88342 IMHCHEM/IMCYTCHM 1ST ANTB: CPT | Performed by: PATHOLOGY

## 2023-12-03 PROCEDURE — 88341 IMHCHEM/IMCYTCHM EA ADD ANTB: CPT | Performed by: PATHOLOGY

## 2023-12-08 DIAGNOSIS — E66.01 CLASS 2 SEVERE OBESITY DUE TO EXCESS CALORIES WITH SERIOUS COMORBIDITY AND BODY MASS INDEX (BMI) OF 36.0 TO 36.9 IN ADULT: ICD-10-CM

## 2023-12-08 RX ORDER — ORAL SEMAGLUTIDE 7 MG/1
TABLET ORAL
Qty: 90 TABLET | Refills: 0 | Status: SHIPPED | OUTPATIENT
Start: 2023-12-08

## 2023-12-20 ENCOUNTER — ANESTHESIA EVENT (OUTPATIENT)
Dept: PERIOP | Facility: HOSPITAL | Age: 68
End: 2023-12-20
Payer: COMMERCIAL

## 2023-12-21 RX ORDER — GUAIFENESIN 600 MG/1
1200 TABLET, EXTENDED RELEASE ORAL EVERY 12 HOURS SCHEDULED
COMMUNITY

## 2023-12-21 NOTE — PRE-PROCEDURE INSTRUCTIONS
Pre-Surgery Instructions:   Medication Instructions    albuterol (Ventolin HFA) 90 mcg/act inhaler Uses PRN- OK to take day of surgery    amLODIPine (NORVASC) 5 mg tablet Take day of surgery.    Ascorbic Acid (vitamin C) 100 MG tablet Stop taking 7 days prior to surgery.    cholecalciferol (VITAMIN D3) 400 units tablet Stop taking 7 days prior to surgery.    Cyanocobalamin (VITAMIN B 12 PO) Stop taking 7 days prior to surgery.    Diclofenac Sodium (VOLTAREN) 1 % Stop taking 3 days prior to surgery.    Efinaconazole (Jublia) 10 % SOLN Hold day of surgery.    fexofenadine (ALLEGRA) 180 MG tablet Take day of surgery.    fluticasone (Arnuity Ellipta) 100 MCG/ACT AEPB inhaler Take day of surgery.    guaiFENesin (MUCINEX) 600 mg 12 hr tablet Hold day of surgery.    ketoconazole (NIZORAL) 2 % cream Hold day of surgery.    levothyroxine 125 mcg tablet Take day of surgery.    losartan-hydrochlorothiazide (HYZAAR) 100-25 MG per tablet Hold day of surgery.    pantoprazole (PROTONIX) 40 mg tablet Take day of surgery.    pravastatin (PRAVACHOL) 20 mg tablet Take day of surgery.    Rybelsus 7 MG tablet Hold day of surgery.    XIIDRA 5 % op solution Take day of surgery.     Pt verbalizes understanding of the following:    Please reference your “My Surgical Experience Booklet” for additional information to prepare for your upcoming surgery.      - DO NOT EAT OR DRINK ANYTHING after midnight on the evening before your procedure including coffee, tea, gum or hard candy.    - ONLY SIPS OF WATER with your medications are allowed on the morning of your procedure.  - Avoid OTC non-directed Vit/ Suppl/ Herbals 7 days prior to surgery to ensure no drug interactions with perioperative surgical/ anesthetic meds  - Avoid NSAIDs 3 days prior. Tylenol is ok to take as needed.   - Avoid ASA containing products 5 days prior, unless otherwise instructed by your provider   - Continue statin medication up through and including the day of surgery  -  Bring a list of meds you take at home with your last dose noted  - Bring INHALER you take for breathing problems     - Avoid alcohol  24hrs before your surgery.        - Follow the pre surgery showering instructions as listed in the “My Surgical Experience Booklet” or otherwise provided by your surgeon's office.  - Bathing instructions, has chg, neck down, no genitals  - No lotions, powders, sprays, deodorant, perfume, jewelry, body piercings, false lashes or make-up  - Do not use a blade to shave the surgical area 1 week before surgery. It is ok to use clean electric clippers up to 24 hours before surgery. Do not shave any body parts with a razor within 24hrs.  - Do not use dry shampoo, hair spray, hair gel, or any type of hair products.   - Remove nail polish, including gel polish, and any artificial, gel, or acrylic nails if possible.    - For outpatient surgery, arrange for someone to drive you home after the procedure & stay with you until the next morning. Visitor guidelines discussed.   - Bring insurance cards & photo id    - Please remove your contact lens. Bring a case for your glasses (or contacts).  - Bring a container for your dentures  - Leave all valuables such as credit cards, money & jewelry at home  - Please bring any specially ordered equipment (sling, braces) if indicated.  - Wear causal clothing that is easy to take on and off. Consider your type of surgery.    - Notify surgeon if you develop any new illnesses, exposure, develop a rash/ open wounds or have additional questions prior to your surgery.    - Did the surgeon's office give you any other special instructions? No  - Did surgeon require any clearances? No    You will receive a call one business day prior to surgery with an arrival time and hospital directions. If your surgery is scheduled on a Monday, the hospital will be calling you on the Friday prior to your surgery.

## 2023-12-26 DIAGNOSIS — E66.01 CLASS 2 SEVERE OBESITY DUE TO EXCESS CALORIES WITH SERIOUS COMORBIDITY AND BODY MASS INDEX (BMI) OF 36.0 TO 36.9 IN ADULT: ICD-10-CM

## 2023-12-26 RX ORDER — ORAL SEMAGLUTIDE 7 MG/1
TABLET ORAL
Qty: 90 TABLET | Refills: 0 | Status: SHIPPED | OUTPATIENT
Start: 2023-12-26 | End: 2023-12-26 | Stop reason: SDUPTHER

## 2024-01-02 DIAGNOSIS — R05.8 SPASMODIC COUGH: ICD-10-CM

## 2024-01-03 PROCEDURE — NC001 PR NO CHARGE: Performed by: STUDENT IN AN ORGANIZED HEALTH CARE EDUCATION/TRAINING PROGRAM

## 2024-01-04 ENCOUNTER — HOSPITAL ENCOUNTER (OUTPATIENT)
Facility: HOSPITAL | Age: 69
Setting detail: OUTPATIENT SURGERY
Discharge: HOME | End: 2024-01-04
Attending: STUDENT IN AN ORGANIZED HEALTH CARE EDUCATION/TRAINING PROGRAM | Admitting: STUDENT IN AN ORGANIZED HEALTH CARE EDUCATION/TRAINING PROGRAM
Payer: COMMERCIAL

## 2024-01-04 ENCOUNTER — ANESTHESIA (OUTPATIENT)
Dept: PERIOP | Facility: HOSPITAL | Age: 69
End: 2024-01-04
Payer: COMMERCIAL

## 2024-01-04 VITALS
TEMPERATURE: 98.6 F | WEIGHT: 198.6 LBS | RESPIRATION RATE: 12 BRPM | HEART RATE: 88 BPM | BODY MASS INDEX: 31.17 KG/M2 | OXYGEN SATURATION: 90 % | SYSTOLIC BLOOD PRESSURE: 136 MMHG | DIASTOLIC BLOOD PRESSURE: 64 MMHG | HEIGHT: 67 IN

## 2024-01-04 DIAGNOSIS — S46.011A TRAUMATIC TEAR OF RIGHT ROTATOR CUFF, UNSPECIFIED TEAR EXTENT, INITIAL ENCOUNTER: Primary | ICD-10-CM

## 2024-01-04 PROCEDURE — 29826 SHO ARTHRS SRG DECOMPRESSION: CPT | Performed by: STUDENT IN AN ORGANIZED HEALTH CARE EDUCATION/TRAINING PROGRAM

## 2024-01-04 PROCEDURE — 29826 SHO ARTHRS SRG DECOMPRESSION: CPT

## 2024-01-04 PROCEDURE — C1713 ANCHOR/SCREW BN/BN,TIS/BN: HCPCS | Performed by: STUDENT IN AN ORGANIZED HEALTH CARE EDUCATION/TRAINING PROGRAM

## 2024-01-04 PROCEDURE — 29823 SHO ARTHRS SRG XTNSV DBRDMT: CPT | Performed by: STUDENT IN AN ORGANIZED HEALTH CARE EDUCATION/TRAINING PROGRAM

## 2024-01-04 PROCEDURE — C9290 INJ, BUPIVACAINE LIPOSOME: HCPCS | Performed by: ANESTHESIOLOGY

## 2024-01-04 PROCEDURE — 29827 SHO ARTHRS SRG RT8TR CUF RPR: CPT | Performed by: STUDENT IN AN ORGANIZED HEALTH CARE EDUCATION/TRAINING PROGRAM

## 2024-01-04 PROCEDURE — 29822 SHO ARTHRS SRG LMTD DBRDMT: CPT

## 2024-01-04 PROCEDURE — 29823 SHO ARTHRS SRG XTNSV DBRDMT: CPT

## 2024-01-04 PROCEDURE — 29822 SHO ARTHRS SRG LMTD DBRDMT: CPT | Performed by: STUDENT IN AN ORGANIZED HEALTH CARE EDUCATION/TRAINING PROGRAM

## 2024-01-04 PROCEDURE — 29827 SHO ARTHRS SRG RT8TR CUF RPR: CPT

## 2024-01-04 DEVICE — 5.5MM BC KNOTLESS SWIVELOCK
Type: IMPLANTABLE DEVICE | Site: SHOULDER | Status: FUNCTIONAL
Brand: ARTHREX®

## 2024-01-04 DEVICE — BIO-COMP SWVLK C, CLD 4.75X19.1MM
Type: IMPLANTABLE DEVICE | Site: SHOULDER | Status: FUNCTIONAL
Brand: ARTHREX®

## 2024-01-04 DEVICE — SUTURE ANCHOR, BIO-CORKSCREW FT
Type: IMPLANTABLE DEVICE | Site: SHOULDER | Status: FUNCTIONAL
Brand: ARTHREX®

## 2024-01-04 DEVICE — SP FIBERTAK RC, DBLOAD TAPE BL/W, BLK/W
Type: IMPLANTABLE DEVICE | Site: SHOULDER | Status: FUNCTIONAL
Brand: ARTHREX®

## 2024-01-04 RX ORDER — ASPIRIN 81 MG/1
81 TABLET, CHEWABLE ORAL 2 TIMES DAILY
Qty: 60 TABLET | Refills: 0 | Status: SHIPPED | OUTPATIENT
Start: 2024-01-04 | End: 2024-02-03

## 2024-01-04 RX ORDER — OXYCODONE HYDROCHLORIDE 5 MG/1
10 TABLET ORAL EVERY 4 HOURS PRN
Status: DISCONTINUED | OUTPATIENT
Start: 2024-01-04 | End: 2024-01-04 | Stop reason: HOSPADM

## 2024-01-04 RX ORDER — HYDROMORPHONE HCL/PF 1 MG/ML
0.5 SYRINGE (ML) INJECTION
Status: DISCONTINUED | OUTPATIENT
Start: 2024-01-04 | End: 2024-01-04 | Stop reason: HOSPADM

## 2024-01-04 RX ORDER — ACETAMINOPHEN 325 MG/1
975 TABLET ORAL EVERY 8 HOURS
Status: DISCONTINUED | OUTPATIENT
Start: 2024-01-04 | End: 2024-01-04 | Stop reason: HOSPADM

## 2024-01-04 RX ORDER — SODIUM CHLORIDE, SODIUM LACTATE, POTASSIUM CHLORIDE, CALCIUM CHLORIDE 600; 310; 30; 20 MG/100ML; MG/100ML; MG/100ML; MG/100ML
INJECTION, SOLUTION INTRAVENOUS CONTINUOUS PRN
Status: DISCONTINUED | OUTPATIENT
Start: 2024-01-04 | End: 2024-01-04

## 2024-01-04 RX ORDER — DEXAMETHASONE SODIUM PHOSPHATE 10 MG/ML
INJECTION, SOLUTION INTRAMUSCULAR; INTRAVENOUS AS NEEDED
Status: DISCONTINUED | OUTPATIENT
Start: 2024-01-04 | End: 2024-01-04

## 2024-01-04 RX ORDER — LIDOCAINE HYDROCHLORIDE 10 MG/ML
INJECTION, SOLUTION EPIDURAL; INFILTRATION; INTRACAUDAL; PERINEURAL AS NEEDED
Status: DISCONTINUED | OUTPATIENT
Start: 2024-01-04 | End: 2024-01-04

## 2024-01-04 RX ORDER — OXYCODONE HYDROCHLORIDE 5 MG/1
5 TABLET ORAL EVERY 4 HOURS PRN
Qty: 20 TABLET | Refills: 0 | Status: SHIPPED | OUTPATIENT
Start: 2024-01-04

## 2024-01-04 RX ORDER — ROCURONIUM BROMIDE 10 MG/ML
INJECTION, SOLUTION INTRAVENOUS AS NEEDED
Status: DISCONTINUED | OUTPATIENT
Start: 2024-01-04 | End: 2024-01-04

## 2024-01-04 RX ORDER — HYDROMORPHONE HCL/PF 1 MG/ML
SYRINGE (ML) INJECTION AS NEEDED
Status: DISCONTINUED | OUTPATIENT
Start: 2024-01-04 | End: 2024-01-04

## 2024-01-04 RX ORDER — BUPIVACAINE HYDROCHLORIDE 5 MG/ML
INJECTION, SOLUTION EPIDURAL; INTRACAUDAL AS NEEDED
Status: DISCONTINUED | OUTPATIENT
Start: 2024-01-04 | End: 2024-01-04

## 2024-01-04 RX ORDER — ONDANSETRON 2 MG/ML
INJECTION INTRAMUSCULAR; INTRAVENOUS AS NEEDED
Status: DISCONTINUED | OUTPATIENT
Start: 2024-01-04 | End: 2024-01-04

## 2024-01-04 RX ORDER — CEFAZOLIN SODIUM 2 G/50ML
2000 SOLUTION INTRAVENOUS ONCE
Status: DISCONTINUED | OUTPATIENT
Start: 2024-01-04 | End: 2024-01-04 | Stop reason: HOSPADM

## 2024-01-04 RX ORDER — FENTANYL CITRATE/PF 50 MCG/ML
25 SYRINGE (ML) INJECTION
Status: DISCONTINUED | OUTPATIENT
Start: 2024-01-04 | End: 2024-01-04 | Stop reason: HOSPADM

## 2024-01-04 RX ORDER — OXYCODONE HYDROCHLORIDE 5 MG/1
5 TABLET ORAL EVERY 4 HOURS PRN
Status: DISCONTINUED | OUTPATIENT
Start: 2024-01-04 | End: 2024-01-04 | Stop reason: HOSPADM

## 2024-01-04 RX ORDER — ONDANSETRON 2 MG/ML
4 INJECTION INTRAMUSCULAR; INTRAVENOUS ONCE AS NEEDED
Status: DISCONTINUED | OUTPATIENT
Start: 2024-01-04 | End: 2024-01-04 | Stop reason: HOSPADM

## 2024-01-04 RX ORDER — ONDANSETRON 2 MG/ML
4 INJECTION INTRAMUSCULAR; INTRAVENOUS EVERY 6 HOURS PRN
Status: DISCONTINUED | OUTPATIENT
Start: 2024-01-04 | End: 2024-01-04 | Stop reason: HOSPADM

## 2024-01-04 RX ORDER — PROPOFOL 10 MG/ML
INJECTION, EMULSION INTRAVENOUS AS NEEDED
Status: DISCONTINUED | OUTPATIENT
Start: 2024-01-04 | End: 2024-01-04

## 2024-01-04 RX ORDER — FENTANYL CITRATE 50 UG/ML
INJECTION, SOLUTION INTRAMUSCULAR; INTRAVENOUS AS NEEDED
Status: DISCONTINUED | OUTPATIENT
Start: 2024-01-04 | End: 2024-01-04

## 2024-01-04 RX ORDER — TRANEXAMIC ACID 10 MG/ML
INJECTION, SOLUTION INTRAVENOUS AS NEEDED
Status: DISCONTINUED | OUTPATIENT
Start: 2024-01-04 | End: 2024-01-04

## 2024-01-04 RX ORDER — NYSTATIN 100000 [USP'U]/G
POWDER TOPICAL 3 TIMES DAILY
Qty: 30 G | Refills: 0 | Status: SHIPPED | OUTPATIENT
Start: 2024-01-04 | End: 2024-01-18

## 2024-01-04 RX ORDER — CEFAZOLIN SODIUM 2 G/50ML
SOLUTION INTRAVENOUS AS NEEDED
Status: DISCONTINUED | OUTPATIENT
Start: 2024-01-04 | End: 2024-01-04

## 2024-01-04 RX ORDER — FENTANYL CITRATE/PF 50 MCG/ML
25 SYRINGE (ML) INJECTION
Status: DISCONTINUED | OUTPATIENT
Start: 2024-01-04 | End: 2024-01-04

## 2024-01-04 RX ORDER — CHLORHEXIDINE GLUCONATE ORAL RINSE 1.2 MG/ML
15 SOLUTION DENTAL ONCE
Status: COMPLETED | OUTPATIENT
Start: 2024-01-04 | End: 2024-01-04

## 2024-01-04 RX ORDER — PANTOPRAZOLE SODIUM 40 MG/1
TABLET, DELAYED RELEASE ORAL
Qty: 180 TABLET | Refills: 0 | Status: SHIPPED | OUTPATIENT
Start: 2024-01-04

## 2024-01-04 RX ORDER — MIDAZOLAM HYDROCHLORIDE 2 MG/2ML
INJECTION, SOLUTION INTRAMUSCULAR; INTRAVENOUS AS NEEDED
Status: DISCONTINUED | OUTPATIENT
Start: 2024-01-04 | End: 2024-01-04

## 2024-01-04 RX ADMIN — CEFAZOLIN SODIUM 2000 MG: 2 SOLUTION INTRAVENOUS at 14:36

## 2024-01-04 RX ADMIN — CHLORHEXIDINE GLUCONATE 0.12% ORAL RINSE 15 ML: 1.2 LIQUID ORAL at 12:14

## 2024-01-04 RX ADMIN — HYDROMORPHONE HYDROCHLORIDE 0.5 MG: 1 INJECTION, SOLUTION INTRAMUSCULAR; INTRAVENOUS; SUBCUTANEOUS at 15:40

## 2024-01-04 RX ADMIN — SODIUM CHLORIDE, SODIUM LACTATE, POTASSIUM CHLORIDE, AND CALCIUM CHLORIDE: .6; .31; .03; .02 INJECTION, SOLUTION INTRAVENOUS at 15:45

## 2024-01-04 RX ADMIN — ROCURONIUM BROMIDE 50 MG: 10 INJECTION, SOLUTION INTRAVENOUS at 14:31

## 2024-01-04 RX ADMIN — SODIUM CHLORIDE, SODIUM LACTATE, POTASSIUM CHLORIDE, AND CALCIUM CHLORIDE: .6; .31; .03; .02 INJECTION, SOLUTION INTRAVENOUS at 14:23

## 2024-01-04 RX ADMIN — FENTANYL CITRATE 50 MCG: 50 INJECTION, SOLUTION INTRAMUSCULAR; INTRAVENOUS at 14:35

## 2024-01-04 RX ADMIN — ONDANSETRON 4 MG: 2 INJECTION INTRAMUSCULAR; INTRAVENOUS at 16:32

## 2024-01-04 RX ADMIN — TRANEXAMIC ACID 1000 MG: 10 INJECTION, SOLUTION INTRAVENOUS at 15:36

## 2024-01-04 RX ADMIN — LIDOCAINE HYDROCHLORIDE 0.5 ML: 10 INJECTION, SOLUTION EPIDURAL; INFILTRATION; INTRACAUDAL; PERINEURAL at 12:53

## 2024-01-04 RX ADMIN — DEXAMETHASONE SODIUM PHOSPHATE 10 MG: 10 INJECTION, SOLUTION INTRAMUSCULAR; INTRAVENOUS at 14:48

## 2024-01-04 RX ADMIN — FENTANYL CITRATE 50 MCG: 50 INJECTION, SOLUTION INTRAMUSCULAR; INTRAVENOUS at 12:50

## 2024-01-04 RX ADMIN — PROPOFOL 150 MG: 10 INJECTION, EMULSION INTRAVENOUS at 14:30

## 2024-01-04 RX ADMIN — BUPIVACAINE 20 ML: 13.3 INJECTION, SUSPENSION, LIPOSOMAL INFILTRATION at 12:55

## 2024-01-04 RX ADMIN — HYDROMORPHONE HYDROCHLORIDE 0.5 MG: 1 INJECTION, SOLUTION INTRAMUSCULAR; INTRAVENOUS; SUBCUTANEOUS at 14:57

## 2024-01-04 RX ADMIN — MIDAZOLAM 2 MG: 1 INJECTION INTRAMUSCULAR; INTRAVENOUS at 12:50

## 2024-01-04 RX ADMIN — SUGAMMADEX 200 MG: 100 INJECTION, SOLUTION INTRAVENOUS at 16:50

## 2024-01-04 RX ADMIN — LIDOCAINE HYDROCHLORIDE 50 ML: 10 INJECTION, SOLUTION EPIDURAL; INFILTRATION; INTRACAUDAL; PERINEURAL at 14:30

## 2024-01-04 RX ADMIN — BUPIVACAINE HYDROCHLORIDE 7 ML: 5 INJECTION, SOLUTION EPIDURAL; INTRACAUDAL; PERINEURAL at 12:55

## 2024-01-04 NOTE — ANESTHESIA PREPROCEDURE EVALUATION
Procedure:  REPAIR ROTATOR CUFF  ARTHROSCOPIC (Right: Shoulder)  ARTHROSCOPIC SUBACROMIAL DECOMPRESSION (Right: Shoulder)    Relevant Problems   CARDIO   (+) Essential hypertension   (+) Mixed hyperlipidemia      ENDO   (+) Acquired hypothyroidism      GI/HEPATIC   (+) Fatty infiltration of liver   (+) Gastroesophageal reflux disease with esophagitis without hemorrhage   (+) Hiatal hernia      /RENAL   (+) Nephrolithiasis      MUSCULOSKELETAL   (+) Chronic bilateral low back pain without sciatica   (+) Hiatal hernia   (+) Lumbar spondylosis   (+) Lumbosacral pain   (+) Spondylosis of cervical region without myelopathy or radiculopathy      NEURO/PSYCH   (+) Chronic bilateral low back pain without sciatica      PULMONARY   (+) Mild intermittent asthma without complication      Asthma - well controlled. Used rescue inhaler 1-2x a year      ECHO (10/2023)    Left Ventricle: Left ventricular cavity size is normal. Wall thickness is normal. There is mild asymmetric hypertrophy of the basal septal wall. The left ventricular ejection fraction is 65-70%. Systolic function is vigorous. Global longitudinal strain is normal at -19%. Wall motion is normal. Diastolic function is mildly abnormal, consistent with grade I (abnormal) relaxation. There is  outflow tract dynamic obstruction at rest with a peak gradient of 23.0 mmHg.    Right Ventricle: Right ventricular cavity size is normal. Systolic function is normal.    Tricuspid Valve: There is mild regurgitation.    Physical Exam    Airway    Mallampati score: II  TM Distance: >3 FB  Neck ROM: full     Dental   Comment: None loose     Cardiovascular      Pulmonary      Other Findings  post-pubertal.       Latest Reference Range & Units 10/03/23 07:23   Sodium 135 - 147 mmol/L 139   Potassium 3.5 - 5.3 mmol/L 3.4 (L)   Chloride 96 - 108 mmol/L 103   CO2 21 - 32 mmol/L 29   Anion Gap mmol/L 7   BUN 5 - 25 mg/dL 18   Creatinine 0.60 - 1.30 mg/dL 0.73   GLUCOSE FASTING 65 - 99  mg/dL 90   Calcium 8.4 - 10.2 mg/dL 9.4   AST 13 - 39 U/L 18   ALT 7 - 52 U/L 14   Alkaline Phosphatase 34 - 104 U/L 71   Total Protein 6.4 - 8.4 g/dL 7.0   Albumin 3.5 - 5.0 g/dL 4.4   TOTAL BILIRUBIN 0.20 - 1.00 mg/dL 0.58   eGFR ml/min/1.73sq m 86   (L): Data is abnormally low       Latest Reference Range & Units 10/03/23 07:23   WBC 4.31 - 10.16 Thousand/uL 8.66   Red Blood Cell Count 3.81 - 5.12 Million/uL 4.82   Hemoglobin 11.5 - 15.4 g/dL 14.3   HCT 34.8 - 46.1 % 43.3   MCV 82 - 98 fL 90   MCH 26.8 - 34.3 pg 29.7   MCHC 31.4 - 37.4 g/dL 33.0   RDW 11.6 - 15.1 % 14.0   Platelet Count 149 - 390 Thousands/uL 223         Anesthesia Plan  ASA Score- 2     Anesthesia Type- general with ASA Monitors.         Additional Monitors:     Airway Plan: ETT.    Comment: NPO after MN (sip of water with meds)    Preop interscalene block with exparel.       Plan Factors-Exercise tolerance (METS): >4 METS.    Chart reviewed. EKG reviewed.  Existing labs reviewed. Patient summary reviewed.                  Induction- intravenous.    Postoperative Plan- Plan for postoperative opioid use. Planned trial extubation    Informed Consent- Anesthetic plan and risks discussed with patient.  I personally reviewed this patient with the CRNA. Discussed and agreed on the Anesthesia Plan with the CRNA..

## 2024-01-04 NOTE — OP NOTE
OPERATIVE REPORT  PATIENT NAME: Ale Chavez    :  1955  MRN: [id number]  Pt Location: [facility] OR ROOM 02    SURGERY DATE: 2024    Surgeons and Role:     * Richard Brown, DO - Primary     * Lexi Terrazas PA-C - Assisting     * aMrco Brownlee Jr. MS, ATC - Assisting    Preop Diagnosis:  Traumatic tear of right rotator cuff, unspecified tear extent, initial encounter [S46.011A]    Post-Op Diagnosis Codes:     * Traumatic tear of right rotator cuff, unspecified tear extent, initial encounter [S46.011A]    Procedure(s):  Right - REPAIR ROTATOR CUFF  ARTHROSCOPIC  Right - ARTHROSCOPIC DEBRIDEMENT, EXTENSIVE (ACROMION, ANTERIOR LABRUM, SUPERIOR LABRUM, SUBSCAPULARIS)    Specimen(s):  * No specimens in log *    Estimated Blood Loss:   Minimal    Drains:  * No LDAs found *    Anesthesia Type:   General w/ Regional    Operative Indications:  Traumatic tear of right rotator cuff, unspecified tear extent, initial encounter [S46.011A]  The patient is a very active 68 year-old  who comes in to me with significant problems associated with right shoulder. After failure of conservative nonoperative care, eventually came and saw me, on physical examination with x-ray and radiographic findings, found to have a massive rotator cuff tear.  The risks and benefits of surgical intervention were explained including, but not exclusive to, infection, DVT, loss of range of motion, stiffness, continuance of pain, failure, fracture, dislocation, delayed union, malunion, nonunion, wound complications, and neurovascular compromise.\    Operative Findings:  Full thickness full width supraspinatus and infraspinatus tear   Type 3 acromion, subacromial bursitis  Poor bone quality with subchondral cysts    Complications:   None    Procedure and Technique:  Patient was seen examined in the preoperative holding area.  Patient's identity was confirmed with the wrist band and hospital chart. The operative extremity was marked in the laterality  was confirmed and signed by both the patient and myself matching the consent.    Patient was brought to the operating room where there placed supine in a beach chair position. Anesthesia was induced.  All bony prominences were padded.  A wedge was placed below the legs.  Patient was positioned upright in a beach chair position using the foam head positioner in a neutral cervical alignment.  The well arm was placed on a padded arm pacheco.     The operative arm was evaluated under anesthesia and demonstrated full passive range of motion    Shoulder was sterilely prepped and draped.  A formal time-out was performed.  Standard posterior portal was used and examination of the intraarticular portion of joint revealed patient had  grade 3 changes to the humeral head and grade 3 changes to the glenoid.  Anterior incision made using a spinal needle via outside in technique. We debrided the superior labral anterior, posterior back to a stable rim. We did a synovectomy within the joint. The biceps tendon was examined and found to be intact.  Evaluation of the undersurface of the rotator cuff demonstrated massive tear. Subscap was intact with mild upper border fraying which was debrided. Shoulder was copiously irrigated and drained.     We went to the subacromial space, made a standard lateral incision. We did an extensive bursectomy , removed the soft tissue from the undersurface of the acromion.   There was a type 3 acromion. Then viewing laterally, working posteriorly, using the posterior aspect of the acromion as a cutting block, we turned the type 3 acromion into a type 1 acromion.  There was minimal erythema in the AC joint area.    Evaluation of the rotator cuff showed a full-thickness, full width defect of the supraspinatus and infraspinatus on the tuberosity. We got a good bleeding base on that. We then removed the soft tissue in that area.     We placed an Arthrex 2.6 mm self punching fiber tack RC anchor in the  posterior aspect of the infraspinatus insertion.  This anchor pulled out and appeared to be into a subchondral cyst.  We then inserted a 4.5 mm Arthrex corkscrew anchor into this defect, which also pulled out.  Due to concern of the defect enlarging and poor bone quality in this region, we decided to moved to another location for anchor insertion.  We placed an Arthrex 2.6 mm self punching fiber tack RC anchor along the anteromedial aspect of the supraspinatus insertion.  This was then passed using a scorpion device in horizontal mattress fashion using 2 double loaded suture tapes through the anterior supraspinatus with a scorpion device.  This was retrieved out the anterior portal for later double row repair.  We then placed an additional Arthrex 2.6 mm fiber tack RC anchor along the midportion of the medial row, with a small bone bridge between the anterior and posterior anchor sites.  This was inserted by hand and demonstrated appropriate fixation without pullout.  This was passed using the scorpion device in horizontal mattress fashion using both suture tapes an swedged  fiber tape suture.  We then added additional horizontal mattress sutures anteriorly and posterior using scorpion device freehand using sutures from the removed anchors.    The remaining sutures were then pulled out the anterior portal.    Next our attention was placed on the lateral aspect of the tuberosity.  The lateral aspect of the tuberosity was debrided of soft tissue using the radiofrequency electrocautery. Through the lateral cannula the punch was placed and tapped into position.  Next the tap was placed into the same hole.  Each 1 of the remaining sutures were threaded through the anchor.  The anchor was then placed through the cannulae and into the previously tapped hole.  Once anchor was in the hole the sutures were tightened.  With the help of a mallet the anchor was tapped down to the bone.  Again the sutures were checked for their  tightness.  The tightness of the sutures was felt to be adequate and the rotator cuff was reapproximated over the tuberosity.  Next the anchor was turned into place.  A 4.5 mm anchor had poor fixation due to overall poor bone quality.  We decided to upsize the swivel lock anchor in order to maximize her chances that maintain fixation.  The eyelet and swivel lock were both removed successfully and the sutures were retrieved out the anterolateral portal.  A 5.5 mm peek swivel lock anchor was inserted and the sutures were again and tensioned appropriately.  This demonstrated restoration of the anterior leaflet of the supraspinatus over the footprint.  Swivel lock was inserted and had improved fixation strength.  There is a slight anterior dogear so the core stitch was retrieved and passed through the anterior dogear using a scorpion device and tensioned down in knotless fashion.        We then repeated this process for the posterolateral row anchor.  The lateral aspect of the posterior tuberosity was debrided of soft tissue using radiofrequency device.  Through the lateral cannula of the punch was inserted tapped into position.  After inserting the all we observed again poor bone quality in this region.  We attempted to pick a new spot for the awl however bone quality was overall poor throughout the tuberosity.  We decided to insert a Arthrex 5.5 mm swivel lock anchor in similar fashion to the anterior anchor.  The sutures were placed through the eyelet and the sutures were tensioned appropriately under direct visualization.  The anchor was then inserted until it had appropriate fixation.  There is no significant posterior dogear so the core suture was removed.      The arm was taken through range of motion and the rotator cuff was probed and determined to be stable after repair.    The patient tolerated this very well. The shoulder was copiously irrigated and drained. The wounds were closed with 3-0 Monocryl  interrupted sutures. A sterile dressing was placed on the shoulder. The patient had good capillary refill. The patient was awoken in the operating room and brought to recovery room in stable condition in an abduction splint.  There transported to the PACU in stable condition.     I was present for the entire procedure., A qualified resident physician was not available., and A physician assistant was required during the procedure for retraction, tissue handling, dissection and suturing.    Patient Disposition:  PACU     SIGNATURE: Richard Brown DO  DATE: January 4, 2024  TIME: 5:01 PM

## 2024-01-04 NOTE — ANESTHESIA POSTPROCEDURE EVALUATION
Post-Op Assessment Note    CV Status:  Stable  Pain Score: 0    Pain management: adequate       Mental Status:  Alert and awake   Hydration Status:  Euvolemic   PONV Controlled:  Controlled   Airway Patency:  Patent     Post Op Vitals Reviewed: Yes      Staff: CRNA               BP   145/63   Temp   98.2   Pulse  90   Resp   16   SpO2   98

## 2024-01-04 NOTE — DISCHARGE INSTR - AVS FIRST PAGE
Dr. Brown Rotator Cuff Repair     What to Expect/Activity     It is normal to have some discomfort in your shoulder for several days.     For the next 48 to 72 hours use?ice around the shoulder to help reduce the pain and swelling for 20-30 minutes every 1-2 hours while you are awake.     Place a pillow underneath your elbow when sitting to help reduce pressure on the shoulder.     Sleeping in an upright position for the first two days will help reduce swelling,?as well.?     You are non-weight bearing to your operative arm. Wear your sling at all times.     No pendulum exercises or active movement of shoulder joint.     We encourage finger, wrist, and elbow range of motion.?     Do not drive until instructed by Dr. Brown.     Dressing/Wound Care/Bathing     You may remove your dressing 3 days after surgery. Leave any steri-strips in place.     You may shower 3 days after surgery.?After showers, pat incisions and cover with band-aids. Change band-aids after showering.     No baths, swimming or submerging until discussed at your follow-up appointment.?     ?Pain Management/Medications     You may resume your usual medications.     You have been provided a prescription for narcotic pain medication. This is to be used only as needed for breakthrough pain that is not controlled with over-the-counter pain medication.     Please take the following medications:     Take Aspirin 81 mg twice daily for 30 days (blood clot prevention)     Tylenol/acetaminophen - according to bottle instructions; alternate with Ibuprofen (Advil, Motrin) or?other NSAID- according to bottle instructions.     While taking your narcotic medication, an over-the-counter stool softener may be helpful to prevent constipation. Please consider taking Gely-Colace twice daily while taking this medication.     Nerve Block: If you received a nerve block today your surgical limb may feel numb with loss of muscle control for up to 18-24 hours  after surgery. We recommend taking a dose of pain medication prior to bed tonight, to cover any pain that may occur if your nerve block begins to wear off while you are sleeping.     ?Follow up/Call if:     The findings of your surgery will be explained to you and your family immediately after surgery. However, in the post-operative period, during recovery from anesthesia you may not fully remember or fully understand what was said. This will be explained once again when you return for your post-op appointment and suture removal.     If you were provided with photos of your procedure, please bring them to your follow up appointment for explanation.     Please contact Dr. Brown’s office if you experience the following:     Excessive bleeding (bleeding through your dressing)     Fever greater than 101 degrees F     Persistent nausea or vomiting     Decreased sensation or discoloration of the operative limb     Pain or swelling that is getting worse and not better with medication

## 2024-01-04 NOTE — ANESTHESIA PROCEDURE NOTES
Peripheral Block    Patient location during procedure: holding area  Start time: 1/4/2024 12:55 PM  Reason for block: at surgeon's request and post-op pain management  Staffing  Performed by: Olga Minor MD  Authorized by: Olga Minor MD    Preanesthetic Checklist  Completed: patient identified, IV checked, site marked, risks and benefits discussed, surgical consent, monitors and equipment checked, pre-op evaluation and timeout performed  Peripheral Block  Patient position: supine  Prep: ChloraPrep  Patient monitoring: continuous pulse oximetry and heart rate  Block type: Interscalene  Laterality: right  Injection technique: single-shot  Procedures: ultrasound guided, Ultrasound guidance required for the procedure to increase accuracy and safety of medication placement and decrease risk of complications.  Ultrasound permanent image saved  Needle  Needle type: Stimuplex   Needle gauge: 22 G  Needle length: 2 in  Needle localization: ultrasound guidance  Needle insertion depth: 1.5 cm  Assessment  Injection assessment: negative aspiration, injected with ease, frequent aspiration, needle tip visualized at all times, no symptoms of intraneural/intravenous injection, no paresthesia on injection, negative for heart rate change and incremental injection  Paresthesia pain: none  Post-procedure:  site cleaned  patient tolerated the procedure well with no immediate complications

## 2024-01-04 NOTE — INTERVAL H&P NOTE
H&P reviewed. After examining the patient I find no changes in the patients condition since the H&P had been written.    Vitals:    01/04/24 1210   BP: 135/63   Pulse: 64   Resp: 20   Temp: 98.1 °F (36.7 °C)   SpO2: 95%

## 2024-01-04 NOTE — H&P
Ortho Sports Medicine Shoulder New Patient Visit     Assesment:   68 y.o. female right shoulder massive rotator cuff tear     Plan:     The patient's diagnosis and treatment were discussed at length today. We discussed no treatment, non-operative treatment, and operative treatment.     Kira presents for massive rotator cuff tear with full-thickness full width supraspinatus infraspinatus tears.  This began after a traumatic incident back in September.  She is made some progress in terms of her range of motion however continues to struggle with pain and weakness despite formal supervised physical therapy.  We discussed the natural history and pathophysiology of rotator cuff tears, due to young age and high activity level and recommend arthroscopic repair to prevent long-term dysfunction and subsequent arthropathy.  Discussed anticipated preoperative perioperative and postoperative recovery timeline.  Also discussed the risks of possible retear and/or delayed healing lack of healing due to tear size, age, history of former smoker for 20 years.  She understands these risks and would like to proceed with rotator cuff repair.  We also discussed the possibility of an interpositional balloon if her repair is either a tenuous or partial in nature.     Conservative treatment:     PT for ROM and strengthening to shoulder, rotator cuff, scapular stabilizers.  Home exercise program for shoulder, including ROM and strenthening.  Instructions given to patient of what exercises to perform.  Let pain guide return to activities.        Imaging:     All imaging from today was reviewed by myself and explained to the patient.         Injection:     No Injection planned at this time.       Surgery:     All of the risks and benefits of operative treatment were explained to the patient, as well as the risks and benefits of any alternative treatment options, including nonoperative care. This risks of surgery include, but are not limited to,  infection, bleeding, blood clot, damage to nerves/arteries, need for further surgyer, cardiovascular risk, anesthesia risk, and continued pain.  The patient understood this and elects to proceed forward with surgical intervention.        Follow up:     No follow-ups on file.               Chief Complaint   Patient presents with    Right Shoulder - Pain         History of Present Illness:     The patient is a 68 y.o., right hand dominant female who presents for right shoulder pain. She reports in early September, she leaned over in a chair to pick something up and chair fell over causing her to land on her shoulder. She felt immediate pain after the fall. She was unable to move her arm after the fall. She previously saw Carmine Arora PA-C who referred her to me. She has been going to physical therapy to work on her range of motion, which she states has helped her significantly. Her pain is over the anterior and lateral aspect of her shoulder. She has pain at night, which is causing her to wake up. She has been taking tylenol and ibuprofen for the pain. She denies any numbness and tingling.           Shoulder Surgical History:  None     Past Medical, Social and Family History:  Medical History        Past Medical History:   Diagnosis Date    Abdominal pain      Allergic      Arthritis      Asthma      Basal cell carcinoma      Bicuspid aortic valve       LAST ASSESSED [id number]    Cervical disc disease       last assessed [id number]    Cervical stenosis of spine       LAST ASSESSED [id number]    Disease of thyroid gland       hypothyroid    Dry eyes      Endometriosis      GERD (gastroesophageal reflux disease)      Heart murmur      Hot flashes      Hyperglycemia      Hyperlipidemia      Hypertension      Hypothyroidism      Kidney stone      Left ventricular hypertrophy      Mitral valvular disorder      Nephrolithiasis      Ovarian cyst, complex      Pneumonia 6/19     800.00    Ptosis       LAST ASSESSED [id number]     Renal calculi      Right cervical radiculopathy       LAST ASSESSED 2016    Seasonal allergies      Squamous cell skin cancer      Thyroid disease      Visual impairment           Surgical History         Past Surgical History:   Procedure Laterality Date    BASAL CELL CARCINOMA EXCISION        CARPAL TUNNEL RELEASE         SECTION        CHOLECYSTECTOMY        COLONOSCOPY        NEUROPLASTY / TRANSPOSITION MEDIAN NERVE AT CARPAL TUNNEL        NEUROPLASTY / TRANSPOSITION MEDIAN NERVE AT CARPAL TUNNEL        [facility]    OTHER SURGICAL HISTORY         surgically removed skin patch for skin cancer    PLANTAR FASCIECTOMY        OH COLONOSCOPY FLX DX W/COLLJ SPEC WHEN PFRMD N/A 10/9/2017     Procedure: COLONOSCOPY;  Surgeon: Franklyn Adam MD;  Location: [facility];  Service: Gastroenterology    OH HYSTEROSCOPY BX ENDOMETRIUM&/POLYPC W/WO D&C N/A 2018     Procedure: DILATATION AND CURETTAGE (D&C) WITH HYSTEROSCOPY;  Surgeon: Ila Smith DO;  Location: [facility];  Service: Gynecology    SKIN BIOPSY        TUBAL LIGATION        UPPER GASTROINTESTINAL ENDOSCOPY                   Allergies   Allergen Reactions    Erythromycin GI Intolerance       Reaction Date: 2011;     Penicillins Other (See Comments)       unknown    Aztreonam Rash       Action Taken: Allergy added by [employer] to replace Penicillins [employer];     Carbapenems Rash       Action Taken: Allergy added by [employer] to replace Penicillins [employer];     Cephalosporins Rash       Action Taken: Allergy added by [employer] to replace Penicillins Cross Reactors;     Griseofulvin Rash       Action Taken: Allergy added by [employer] to replace Penicillins Cross Reactors;     Influenza Virus Vaccine Hives, Rash and GI Intolerance             Current Outpatient Medications on File Prior to Visit   Medication Sig Dispense Refill    albuterol (Ventolin HFA) 90 mcg/act inhaler Inhale 2 puffs every 6 (six) hours as needed for  wheezing or shortness of breath 18 g 3    amLODIPine (NORVASC) 5 mg tablet Take 1 tablet (5 mg total) by mouth daily 90 tablet 0    Ascorbic Acid (vitamin C) 100 MG tablet Take 100 mg by mouth daily        cholecalciferol (VITAMIN D3) 400 units tablet Take 400 Units by mouth daily Pt taking 1000 units        Cyanocobalamin (VITAMIN B 12 PO) Take by mouth        Diclofenac Sodium (VOLTAREN) 1 % Apply 2 g topically 4 (four) times a day 150 g 2    Efinaconazole (Jublia) 10 % SOLN Apply 1 application topically in the morning 8 mL 6    fexofenadine (ALLEGRA) 180 MG tablet Take 1 tablet (180 mg total) by mouth daily 90 tablet 0    fluticasone (Arnuity Ellipta) 100 MCG/ACT AEPB inhaler Inhale 1 puff daily Rinse mouth after use. 90 blister 8    levothyroxine 125 mcg tablet TAKE ONE TABLET BY MOUTH EVERY DAY. 90 tablet 0    losartan-hydrochlorothiazide (HYZAAR) 100-25 MG per tablet Take 1 tablet by mouth daily 90 tablet 3    pantoprazole (PROTONIX) 40 mg tablet TAKE ONE TABLET BY MOUTH 2 TIMES A DAY (Patient taking differently: Take 40 mg by mouth daily) 180 tablet 0    pravastatin (PRAVACHOL) 20 mg tablet Take 1 tablet (20 mg total) by mouth daily 90 tablet 1    semaglutide (Rybelsus) 7 MG tablet Take 1 tablet (7 mg total) by mouth daily 90 tablet 0    XIIDRA 5 % op solution          ketoconazole (NIZORAL) 2 % cream Apply topically daily for 7 days 30 g 0                Current Facility-Administered Medications on File Prior to Visit   Medication Dose Route Frequency Provider Last Rate Last Admin    lidocaine (XYLOCAINE) 1 % injection 2 mL  2 mL Injection   Ramón Galdamez DPM   2 mL at 08/14/23 1500    triamcinolone acetonide (KENALOG-40) 40 mg/mL injection 20 mg  20 mg Intra-articular   Ramón Galdamez DPM   20 mg at 08/14/23 1500      Social History               Socioeconomic History    Marital status: /Civil Union       Spouse name: Not on file    Number of children: 2    Years of education: Not on file    Highest education  level: Not on file   Occupational History    Occupation: part time employment   Tobacco Use    Smoking status: Former       Packs/day: 1.00       Years: 40.00       Total pack years: 40.00       Types: Cigarettes       Start date:        Quit date: 2012       Years since quittin.8    Smokeless tobacco: Never   Vaping Use    Vaping Use: Never used   Substance and Sexual Activity    Alcohol use: Yes       Alcohol/week: 8.0 standard drinks of alcohol       Types: 4 Glasses of wine, 4 Standard drinks or equivalent per week       Comment: per month    Drug use: No    Sexual activity: Yes       Partners: Male       Birth control/protection: Female Sterilization       Comment: tubal ligation    Other Topics Concern    Not on file   Social History Narrative     Caffeine use      Highschool or GED     Lives with       Restorationism affiliation Scientologist     Always uses seatbelt      Social Determinants of Health      Financial Resource Strain: Not on file   Food Insecurity: Not on file   Transportation Needs: Not on file   Physical Activity: Not on file   Stress: Not on file   Social Connections: Not on file   Intimate Partner Violence: Not on file   Housing Stability: Not on file               I have reviewed the past medical, surgical, social and family history, medications and allergies as documented in the EMR.    Review of systems: ROS is negative other than that noted in the HPI.  Constitutional: Negative for fatigue and fever.   HENT: Negative for sore throat.    Respiratory: Negative for shortness of breath.    Cardiovascular: Negative for chest pain.   Gastrointestinal: Negative for abdominal pain.   Endocrine: Negative for cold intolerance and heat intolerance.   Genitourinary: Negative for flank pain.   Musculoskeletal: Negative for back pain.   Skin: Negative for rash.   Allergic/Immunologic: Negative for immunocompromised state.   Neurological: Negative for dizziness.   Psychiatric/Behavioral:  "Negative for agitation.       Physical Exam:     Blood pressure 140/70, pulse 74, resp. rate 16, height 5' 7\" (1.702 m), weight 90.3 kg (199 lb 1.9 oz).     General/Constitutional: NAD, well developed, well nourished  HENT: Normocephalic, atraumatic  CV: Intact distal pulses, regular rate  Resp: No respiratory distress or labored breathing  Lymphatic: No lymphadenopathy palpated  Neuro: Alert and Oriented x 3, no focal deficits  Psych: Normal mood, normal affect, normal judgement, normal behavior  Skin: Warm, dry, no rashes, no erythema        Shoulder focused exam:     Visual inspection of the shoulder demonstrates normal contour without atrophy  No evidence of scapular dyskinesia or atrophy.  No scapular winging  Active and passive range of motion demonstrates forward flexion to 130, abduction to 90, external rotation is 30 with the arm the side, internal rotation to lumbar   Rotator cuff strength is 4/5 to resisted forward flexion, 4/5 resisted abduction, 4/5 resisted external rotation, 4/5 resisted internal rotation  No tenderness to palpation at the distal clavicle, AC joint, acromion, or scapular spine  No pain with cross-body adduction  (+) Neer's test, (+) modified Estrada impingement test  Negative external rotation lag sign  Negative belly press, negative lift-off  (-) speed's and Yergason's test  (-) tenderness to palpation at the bicipital groove  (-) Healy's test          UE NV Exam: +2 Radial pulses bilaterally  Sensation intact to light touch C5-T1 bilaterally, Radial/median/ulnar nerve motor intact       Bilateral elbow, wrist, and and forearm ROM full, painless with passive ROM, no ttp or crepitance throughout extremities below shoulder joint     Cervical ROM is full without pain, numbness or tingling        Shoulder Imaging     MRI of the right shoulder were reviewed, which demonstrate full-thickness supraspinatus and infraspinatus tear with retraction to the humeral apex.  Minimal/grade 2 fatty " atrophy.  Type III acromion with subacromial bursitis I have reviewed the radiology report and agree with their impression.        Scribe Attestation       I,:  Junaid Arredondo am acting as a scribe while in the presence of the attending physician.:       I,:  Richard Brown, DO personally performed the services described in this documentation    as scribed in my presence.:

## 2024-01-08 ENCOUNTER — TELEPHONE (OUTPATIENT)
Age: 69
End: 2024-01-08

## 2024-01-08 DIAGNOSIS — R11.0 NAUSEA: ICD-10-CM

## 2024-01-08 DIAGNOSIS — S46.011A TRAUMATIC TEAR OF RIGHT ROTATOR CUFF, UNSPECIFIED TEAR EXTENT, INITIAL ENCOUNTER: Primary | ICD-10-CM

## 2024-01-08 RX ORDER — ONDANSETRON 4 MG/1
4 TABLET, FILM COATED ORAL EVERY 8 HOURS PRN
Qty: 15 TABLET | Refills: 0 | Status: SHIPPED | OUTPATIENT
Start: 2024-01-08 | End: 2024-01-09

## 2024-01-08 NOTE — TELEPHONE ENCOUNTER
Before I call pt with your previous message, did you want to order pt anything for nausea?  Thank you.

## 2024-01-08 NOTE — TELEPHONE ENCOUNTER
Caller: Patient    Doctor: Dr. Brown    Reason for call: Patient calling stating that she had surgery with Dr. Brown on 1/4/24.  She is going to shower today and she is taking off the bandage.  Patient is wondering what she should be redressing her incisions with after shower.  She also notes that she continues to have issues with anesthesia including dizziness, lightheadedness, and Nausea.   Patient asking to speak to clinical team.     Call back#: [phone]

## 2024-01-08 NOTE — TELEPHONE ENCOUNTER
Called and relayed Dr Brown's msg to pt. And Quintin's msg.  She needs Zofran sent to [facility] in [place] and not [facility].  Can you please cancel and resend?  Thank you.

## 2024-01-08 NOTE — TELEPHONE ENCOUNTER
Called and spoke w/pt and relayed Dr Brown's msg.  She states that she is drinking water mostly and tea and has eaten a few crackers due to nausea. Advised to try ginerale, chicken broth, pedilyte in addition to the water and advance diet as tolerated.  She is requesting something for nausea.  Please advise.    She does not feel like she can even shower due to lightheadedness.  Reviewed showering instructions w/pt let soap water run over, then clean water, pat dry, no creams lotions oil to incision.  Reviewed AVS re: activity, no pendulum exercises, If arm by side, do not move out to side or above head.  Wear sling at all times.  Does not need to wear sling to shower.  Keep arm at side.  Do you want her to take arm out of sling to bend elbow?  Please advise

## 2024-01-09 DIAGNOSIS — S46.011A TRAUMATIC TEAR OF RIGHT ROTATOR CUFF, UNSPECIFIED TEAR EXTENT, INITIAL ENCOUNTER: ICD-10-CM

## 2024-01-09 DIAGNOSIS — R11.0 NAUSEA: ICD-10-CM

## 2024-01-09 RX ORDER — ONDANSETRON 4 MG/1
4 TABLET, FILM COATED ORAL EVERY 8 HOURS PRN
Qty: 15 TABLET | Refills: 0 | Status: SHIPPED | OUTPATIENT
Start: 2024-01-09

## 2024-01-09 NOTE — TELEPHONE ENCOUNTER
Called and spoke w/pt and relayed Dr Brown's msg to pt.  She states she is only a little nauseous today.  Had not experienced this with other surgeries.  She does feels better.  Showered today.  Is having a little pain in wrist.  Reviewed instructions re: ROM to elbow, wrist and fingers several times a day out of sling. No further questions/concerns.

## 2024-01-16 ENCOUNTER — OFFICE VISIT (OUTPATIENT)
Dept: OBGYN CLINIC | Facility: CLINIC | Age: 69
End: 2024-01-16

## 2024-01-16 VITALS
RESPIRATION RATE: 16 BRPM | HEART RATE: 80 BPM | WEIGHT: 198.9 LBS | BODY MASS INDEX: 31.22 KG/M2 | DIASTOLIC BLOOD PRESSURE: 73 MMHG | HEIGHT: 67 IN | SYSTOLIC BLOOD PRESSURE: 141 MMHG

## 2024-01-16 DIAGNOSIS — S46.011D TRAUMATIC TEAR OF RIGHT ROTATOR CUFF, UNSPECIFIED TEAR EXTENT, SUBSEQUENT ENCOUNTER: Primary | ICD-10-CM

## 2024-01-16 PROCEDURE — 99024 POSTOP FOLLOW-UP VISIT: CPT | Performed by: STUDENT IN AN ORGANIZED HEALTH CARE EDUCATION/TRAINING PROGRAM

## 2024-01-16 NOTE — PROGRESS NOTES
Shoulder Post Operative Visit     Assesment:     68 y.o. female 12 days Surgical Arthroscopy of the right shoulder with rotator cuff repair and subacromial decompression, DOS: 1/4/2024    Plan:    Kira presents today 12 days s/p right shoulder arthroscopy with rotator cuff repair and subacromial decompression, DOS: 1/4/2024. She states she is doing well overall at this time. She should continue with her sling over the next 4 weeks. She can start physical therapy in 1 week according to our standard protocol. I explained they will start with gentle passive range of motion. I recommend she take calcium and vitamin D to help with bone density in the setting of significant intra-operative bone cysts/poor bone quality. I will see her back in 1 month for re-recheck and sling removal.     Post-Operative treatment:    Ice to shoulder 1-2 times daily, for 20 minutes at a time.  PT for ROM and strengthening to shoulder, rotator cuff, scapular stabilizers.  Home exercise program for shoulder, including ROM and strenthening.  Instructions given to patient of what exercises to perform.  Let pain guide return to activities.    Imaging:    No imaging was available for review today.    Sling:  at all times other than showering    DVT Prophylaxis:  Ambulation    Follow up:   1 month    Patient was advised that if they have any fevers, chills, chest pain, shortness of breath, redness or drainage from the incision, please let our office know immediately.        Chief Complaint   Patient presents with    Right Shoulder - Post-op     Sx: 01/04/24         History of Present Illness:    The patient is a 68 y.o. female who is being evaluated post operatively 12 days s/p right shoulder arthroscopy with rotator cuff repair and subacromial decompression, DOS: 1/4/2024. Since the prior visit, She reports significant improvement. She states she is doing well overall at this time. She notes having some mild pain from the elbow down into her hand.  "She describes the pain as burning. She has been compliant with the use of her sling. She is taking her 81 mg of Aspirin prophylaxis for DVT. She has not started physical therapy, but will be scheduling her first appointment for this week or next week. She denies any new trauma or injury. She denies any numbness and tingling.    The patient denies any fevers, chills, calf pain, chest pain/shortness of breath, redness or drainage from the incision.         I have reviewed the past medical, surgical, social and family history, medications and allergies as documented in the EMR.    Review of systems: ROS is negative other than that noted in the HPI.  Constitutional: Negative for fatigue and fever.       Physical Exam:    Blood pressure 141/73, pulse 80, resp. rate 16, height 5' 7\" (1.702 m), weight 90.2 kg (198 lb 14.4 oz).    General/Constitutional: NAD, well developed, well nourished  HENT: Normocephalic, atraumatic  CV: Intact distal pulses, regular rate  Resp: No respiratory distress or labored breathing  Lymphatic: No lymphadenopathy palpated  Neuro: Alert and Oriented x 3, no focal deficits  Psych: Normal mood, normal affect, normal judgement, normal behavior  Skin: Warm, dry, no rashes, no erythema    Shoulder focused exam:        Visual inspection of the shoulder demonstrates normal contour without atrophy  No evidence of scapular dyskinesia or atrophy.  No scapular winging  Gentle passive range of motion performed   Strength not tested  No tenderness to palpation at the distal clavicle, AC joint, acromion, or scapular spine  No pain with cross-body adduction  No special tests performed    Incisions show no erythema, no drainage      UE NV Exam: +2 Radial pulses bilaterally  Sensation intact to light touch C5-T1 bilaterally, Radial/median/ulnar nerve motor intact      Scribe Attestation      I,:  Junaid Moralesrick am acting as a scribe while in the presence of the attending physician.:       I,:  Richard Rubio " DO Kevin personally performed the services described in this documentation    as scribed in my presence.:

## 2024-01-17 ENCOUNTER — EVALUATION (OUTPATIENT)
Dept: PHYSICAL THERAPY | Facility: CLINIC | Age: 69
End: 2024-01-17
Payer: COMMERCIAL

## 2024-01-17 DIAGNOSIS — Z47.89 ENCOUNTER FOR OTHER ORTHOPEDIC AFTERCARE: Primary | ICD-10-CM

## 2024-01-17 DIAGNOSIS — S46.011D TRAUMATIC TEAR OF RIGHT ROTATOR CUFF, UNSPECIFIED TEAR EXTENT, SUBSEQUENT ENCOUNTER: ICD-10-CM

## 2024-01-17 DIAGNOSIS — I10 ESSENTIAL HYPERTENSION: ICD-10-CM

## 2024-01-17 PROCEDURE — 97162 PT EVAL MOD COMPLEX 30 MIN: CPT | Performed by: PHYSICAL THERAPIST

## 2024-01-17 RX ORDER — AMLODIPINE BESYLATE 5 MG/1
5 TABLET ORAL DAILY
Qty: 90 TABLET | Refills: 0 | Status: SHIPPED | OUTPATIENT
Start: 2024-01-17

## 2024-01-17 NOTE — PROGRESS NOTES
PT Evaluation     Today's date: 2024  Patient name: Ale Chavez  : 1955  MRN: [id number]  Referring provider: Richard Brown DO  Dx:   Encounter Diagnosis     ICD-10-CM    1. Encounter for other orthopedic aftercare  Z47.89       2. Traumatic tear of right rotator cuff, unspecified tear extent, subsequent encounter  S46.011D Ambulatory Referral to Physical Therapy                     Assessment  Assessment details: Pt is a 68 y.o. year old female coming to outpatient PT with s/p R shoulder RTC repair on 24. Pt presents with increased pain and TTP, decreased ROM, decreased strength, and overall decreased functional mobility. Pt would benefit from skilled PT services in order to address these deficits and reach maximum level of function. Thank you kindly for the referral!      Impairments: abnormal or restricted ROM, activity intolerance, impaired physical strength, lacks appropriate home exercise program and pain with function  Understanding of Dx/Px/POC: good   Prognosis: good    Goals  STG's ( 3-4 weeks)  1. Pt will be independent in HEP  2. Pt will have improved R shoulder PROM by 15*-20*  LTG's ( 6- 8 weeks)  1. Improve FOTO score by 10-15 points  2. Pt will have decreased pain to 2/10 at worst  3. Pt will have improved R shoulder AROM to WFL's  4. Pt will have improved R shoulder strength by 1/2 grade  5. Pt will be independent in dressing and self care activities    Plan  Patient would benefit from: skilled physical therapy  Planned modality interventions: cryotherapy and TENS  Planned therapy interventions: manual therapy, neuromuscular re-education, strengthening, stretching, therapeutic activities, therapeutic exercise, functional ROM exercises, flexibility and home exercise program  Frequency: 2x week  Duration in weeks: 12  Plan of Care beginning date: 2024  Plan of Care expiration date: 4/10/2024  Treatment plan discussed with: patient and PTA        Subjective  Evaluation    History of Present Illness  Mechanism of injury: Pt underwent R shoulder RTC repair on 23. Pt arrives to skilled PT with his arm in a sling in an abduction pillow. Pt is limited with all functional activities involving her R UE. Pt is having difficulty sleeping at night 2* trying to get comfortable with her sling.     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: ; computer work  Hobbies; camping, out door activities  Gait; no abnormalities  Patient Goals  Patient goals for therapy: decreased pain  Patient goal: to be able to use my arm again; reaching and lifting normally  Pain  At best pain ratin  At worst pain ratin  Location: R should  Quality: sharp and radiating    Social Support  Lives with: spouse    Employment status: working  Hand dominance: right    Treatments  Previous treatment: physical therapy        Objective     Neurological Testing     Sensation     Shoulder   Left Shoulder   Intact: light touch    Right Shoulder   Intact: Light touch    Reflexes   Left   Biceps (C5/C6): normal (2+)  Brachioradialis (C6): normal (2+)  Triceps (C7): normal (2+)    Right   Biceps (C5/C6): trace (1+)  Brachioradialis (C6): trace (1+)    Active Range of Motion   Left Shoulder   Normal active range of motion    Additional Active Range of Motion Details  R shoulder AROM: NT    Passive Range of Motion     Right Shoulder   Flexion: 75 degrees   Abduction: 35 degrees   External rotation 0°: 15 degrees   Internal rotation 0°: 62 degrees     Right Elbow   Flexion: 145 degrees   Extension: 0 degrees     Strength/Myotome Testing     Left Shoulder   Normal muscle strength    Additional Strength Details  R shoulder strength: NT           Dx: s/p R shoulder RTC repair (24)  EPOC:  CO-MORBIDITIES:  PERSONAL FACTORS:   Precautions: see MD protocol; flexion 0-90* week 1-3      Manuals             R shoulder PROM/ MFR             R elbow PROM                                       Neuro Re-Ed                                                                                                         Ther Ex             Pendulums; CW, CCW 10 ea                                                                                                       Ther Activity                                       Gait Training                                       Modalities

## 2024-01-22 ENCOUNTER — OFFICE VISIT (OUTPATIENT)
Dept: PHYSICAL THERAPY | Facility: CLINIC | Age: 69
End: 2024-01-22
Payer: COMMERCIAL

## 2024-01-22 DIAGNOSIS — S46.011D TRAUMATIC TEAR OF RIGHT ROTATOR CUFF, UNSPECIFIED TEAR EXTENT, SUBSEQUENT ENCOUNTER: Primary | ICD-10-CM

## 2024-01-22 DIAGNOSIS — Z47.89 ENCOUNTER FOR OTHER ORTHOPEDIC AFTERCARE: ICD-10-CM

## 2024-01-22 PROCEDURE — 97140 MANUAL THERAPY 1/> REGIONS: CPT | Performed by: PHYSICAL THERAPIST

## 2024-01-22 NOTE — PROGRESS NOTES
"Daily Note     Today's date: 2024  Patient name: Ale Chavez  : 1955  MRN: [id number]  Referring provider: Richard Brown DO  Dx:   Encounter Diagnosis     ICD-10-CM    1. Traumatic tear of right rotator cuff, unspecified tear extent, subsequent encounter  S46.011D       2. Encounter for other orthopedic aftercare  Z47.89                      Subjective:  Pt reports her shoulder is feeling \"really good\". Pt has been doing her pendulums at home on a regular basis.      Objective: See treatment diary below      Assessment: Tolerated treatment well. Patient  performs pendulums with good technique. Pt achieved 90* flexion and scaption PROM and 25* ER PROM with manual stretching.      Plan: Continue per plan of care.      Dx: s/p R shoulder RTC repair (24)  EPOC: 4/10/24  CO-MORBIDITIES:  PERSONAL FACTORS:   Precautions: see MD protocol; flexion 0-90* week 1-3      Manuals            R shoulder PROM/ MFR  th           R elbow PROM  '           Neuro Re-Ed                                                                                                        Ther Ex             Pendulums; CW, CCW 10 ea 20 ea                                                                                                      Ther Activity                                       Gait Training                                       Modalities                                            "

## 2024-01-23 ENCOUNTER — OFFICE VISIT (OUTPATIENT)
Dept: FAMILY MEDICINE CLINIC | Facility: HOSPITAL | Age: 69
End: 2024-01-23
Payer: COMMERCIAL

## 2024-01-23 VITALS
SYSTOLIC BLOOD PRESSURE: 108 MMHG | OXYGEN SATURATION: 97 % | BODY MASS INDEX: 31.45 KG/M2 | HEART RATE: 72 BPM | WEIGHT: 200.4 LBS | TEMPERATURE: 96.7 F | DIASTOLIC BLOOD PRESSURE: 70 MMHG | HEIGHT: 67 IN

## 2024-01-23 DIAGNOSIS — J45.20 MILD INTERMITTENT ASTHMA WITHOUT COMPLICATION: ICD-10-CM

## 2024-01-23 DIAGNOSIS — R73.9 HYPERGLYCEMIA: ICD-10-CM

## 2024-01-23 DIAGNOSIS — I10 ESSENTIAL HYPERTENSION: Primary | ICD-10-CM

## 2024-01-23 DIAGNOSIS — Z98.890 S/P RIGHT ROTATOR CUFF REPAIR: ICD-10-CM

## 2024-01-23 DIAGNOSIS — E03.9 ACQUIRED HYPOTHYROIDISM: ICD-10-CM

## 2024-01-23 PROCEDURE — 99214 OFFICE O/P EST MOD 30 MIN: CPT | Performed by: FAMILY MEDICINE

## 2024-01-23 NOTE — PROGRESS NOTES
Name: Ale Chavez      : 1955      MRN: [id number]  Encounter Provider: Angelo Pacheco MD  Encounter Date: 2024   Encounter department: [facility] PRIMARY CARE [address]     Assessment & Plan     1. Essential hypertension  -     CBC and differential; Future    2. Hyperglycemia  -     Comprehensive metabolic panel; Future  -     Hemoglobin A1C; Future    3. BMI 31.0-31.9,adult    4. Mild intermittent asthma without complication    5. Acquired hypothyroidism  -     TSH, 3rd generation with Free T4 reflex; Future    6. S/P right rotator cuff repair        Depression Screening and Follow-up Plan: Patient was screened for depression during today's encounter. They screened negative with a PHQ-2 score of 0.        Subjective     3 month followup    S/P R shoulder rotator repair  No pain at this point  Started with PT and doing well with it    Discussed adding calcium and vit D      Review of Systems   Constitutional:  Negative for unexpected weight change.   Respiratory: Negative.     Cardiovascular: Negative.    Genitourinary: Negative.    Musculoskeletal: Negative.    Neurological: Negative.    Psychiatric/Behavioral: Negative.     All other systems reviewed and are negative.      Past Medical History:   Diagnosis Date    Abdominal pain     Allergic     Arthritis     Asthma     Basal cell carcinoma     Bicuspid aortic valve     LAST ASSESSED [id number]    Cancer (HCC)     skin    Cervical disc disease     last assessed [id number]    Cervical stenosis of spine     LAST ASSESSED 2016    Colon polyp     COVID     Disease of thyroid gland     hypothyroid    Dry eyes     Endometriosis     GERD (gastroesophageal reflux disease)     Heart murmur     Hot flashes     Hyperglycemia     Hyperlipidemia     Hypertension     Hypothyroidism     Kidney stone     Left ventricular hypertrophy     Mitral valvular disorder     Nephrolithiasis     Ovarian cyst, complex     Pneumonia     800.00     Ptosis     LAST ASSESSED [id number]    Renal calculi     Right cervical radiculopathy     LAST ASSESSED 2016    Seasonal allergies     Squamous cell skin cancer     Thyroid disease     Visual impairment     Wears contact lenses     Wears glasses     Wears partial dentures      Past Surgical History:   Procedure Laterality Date    BASAL CELL CARCINOMA EXCISION      CARPAL TUNNEL RELEASE Bilateral      SECTION      CHOLECYSTECTOMY      COLONOSCOPY      NEUROPLASTY / TRANSPOSITION MEDIAN NERVE AT CARPAL TUNNEL      NEUROPLASTY / TRANSPOSITION MEDIAN NERVE AT CARPAL TUNNEL      [facility]    OTHER SURGICAL HISTORY      surgically removed skin patch for skin cancer    PLANTAR FASCIECTOMY Left     KY COLONOSCOPY FLX DX W/COLLJ SPEC WHEN PFRMD N/A 10/09/2017    Procedure: COLONOSCOPY;  Surgeon: Franklyn Adam MD;  Location: [facility] GI LAB;  Service: Gastroenterology    KY HYSTEROSCOPY BX ENDOMETRIUM&/POLYPC W/WO D&C N/A 2018    Procedure: DILATATION AND CURETTAGE (D&C) WITH HYSTEROSCOPY;  Surgeon: Ila Smith DO;  Location: [facility] MAIN OR;  Service: Gynecology    KY SURGICAL ARTHROSCOPY JOSE W/CORACOACRM LIGM RLS Right 2024    Procedure: ARTHROSCOPIC SUBACROMIAL DECOMPRESSION;  Surgeon: Richard Brown DO;  Location: [facility] MAIN OR;  Service: Orthopedics    KY SURGICAL ARTHROSCOPY SHOULDER W/ROTATOR CUFF RPR Right 2024    Procedure: REPAIR ROTATOR CUFF  ARTHROSCOPIC;  Surgeon: Richard Brown DO;  Location: [facility] MAIN OR;  Service: Orthopedics    SKIN BIOPSY      TUBAL LIGATION      UPPER GASTROINTESTINAL ENDOSCOPY       Family History   Problem Relation Age of Onset    Diabetes Mother     Alzheimer's disease Mother     Heart disease Mother     Hypertension Mother     Dementia Mother     No Known Problems Father     No Known Problems Sister     No Known Problems Sister     No Known Problems Daughter     No Known Problems Maternal Grandmother     No Known Problems Maternal Grandfather     No Known  Problems Paternal Grandmother     No Known Problems Paternal Grandfather     No Known Problems Maternal Aunt     No Known Problems Maternal Aunt     No Known Problems Maternal Aunt     No Known Problems Maternal Aunt     No Known Problems Maternal Aunt     Other Family         back disorder    Cancer Family     Heart disease Family     Thyroid disease Family      Social History     Socioeconomic History    Marital status: /Civil Union     Spouse name: None    Number of children: 2    Years of education: None    Highest education level: None   Occupational History    Occupation: part time employment   Tobacco Use    Smoking status: Former     Current packs/day: 0.00     Average packs/day: 1 pack/day for 40.0 years (40.0 ttl pk-yrs)     Types: Cigarettes     Start date:      Quit date: 2012     Years since quittin.0    Smokeless tobacco: Never   Vaping Use    Vaping status: Never Used   Substance and Sexual Activity    Alcohol use: Yes     Alcohol/week: 8.0 standard drinks of alcohol     Types: 4 Glasses of wine, 4 Standard drinks or equivalent per week     Comment: per month    Drug use: No    Sexual activity: Yes     Partners: Male     Birth control/protection: Female Sterilization     Comment: tubal ligation    Other Topics Concern    None   Social History Narrative    Caffeine use     Highschool or GED    Lives with      Anglican affiliation Religion    Always uses seatbelt     Social Determinants of Health     Financial Resource Strain: Not on file   Food Insecurity: Not on file   Transportation Needs: Not on file   Physical Activity: Not on file   Stress: Not on file   Social Connections: Not on file   Intimate Partner Violence: Not on file   Housing Stability: Not on file     Current Outpatient Medications on File Prior to Visit   Medication Sig    albuterol (Ventolin HFA) 90 mcg/act inhaler Inhale 2 puffs every 6 (six) hours as needed for wheezing or shortness of breath     amLODIPine (NORVASC) 5 mg tablet TAKE ONE TABLET BY MOUTH EVERY DAY    Ascorbic Acid (vitamin C) 100 MG tablet Take 100 mg by mouth daily    aspirin 81 mg chewable tablet Chew 1 tablet (81 mg total) 2 (two) times a day    cholecalciferol (VITAMIN D3) 400 units tablet Take 400 Units by mouth daily Pt taking 1000 units    Cyanocobalamin (VITAMIN B 12 PO) Take by mouth    Efinaconazole (Jublia) 10 % SOLN Apply 1 application topically in the morning (Patient taking differently: Apply 1 application. topically if needed)    fexofenadine (ALLEGRA) 180 MG tablet Take 1 tablet (180 mg total) by mouth daily    fluticasone (Arnuity Ellipta) 100 MCG/ACT AEPB inhaler Inhale 1 puff daily Rinse mouth after use.    guaiFENesin (MUCINEX) 600 mg 12 hr tablet Take 1,200 mg by mouth every 12 (twelve) hours    levothyroxine 125 mcg tablet TAKE ONE TABLET BY MOUTH EVERY DAY.    losartan-hydrochlorothiazide (HYZAAR) 100-25 MG per tablet Take 1 tablet by mouth daily    pantoprazole (PROTONIX) 40 mg tablet TAKE ONE TABLET BY MOUTH 2 TIMES A DAY    pravastatin (PRAVACHOL) 20 mg tablet Take 1 tablet (20 mg total) by mouth daily    semaglutide (Rybelsus) 7 MG tablet Take 1 tablet (7 mg total) by mouth daily before breakfast    XIIDRA 5 % op solution Administer 1 drop to both eyes in the morning    ketoconazole (NIZORAL) 2 % cream Apply topically daily for 7 days (Patient taking differently: Apply 1 Application topically if needed)    nystatin (MYCOSTATIN) powder Apply topically 3 (three) times a day for 14 days    ondansetron (ZOFRAN) 4 mg tablet Take 1 tablet (4 mg total) by mouth every 8 (eight) hours as needed for nausea or vomiting for up to 15 doses    oxyCODONE (Roxicodone) 5 immediate release tablet Take 1 tablet (5 mg total) by mouth every 4 (four) hours as needed for moderate pain for up to 20 doses Max Daily Amount: 30 mg (Patient not taking: Reported on 1/23/2024)     Allergies   Allergen Reactions    Erythromycin GI Intolerance      "Reaction Date: 11Jul2011;     Penicillins Rash     unknown    Aztreonam Rash     Action Taken: Allergy added by [employer] to replace Penicillins [employer];     Carbapenems Rash     Action Taken: Allergy added by [employer] to replace Penicillins Cross Reactors;     Cephalosporins Rash     Action Taken: Allergy added by [employer] to replace Penicillins Cross Reactors;     Griseofulvin Rash     Action Taken: Allergy added by [employer] to replace Penicillins Cross Reactors;     Influenza Virus Vaccine Hives, Rash and GI Intolerance     Immunization History   Administered Date(s) Administered    COVID-19 MODERNA VACC 0.5 ML IM 12/30/2020, 01/26/2021    INFLUENZA 10/10/2018, 10/01/2020, 10/31/2022    Influenza, high dose seasonal 0.7 mL 10/24/2023    Influenza, recombinant, quadrivalent,injectable, preservative free 10/08/2019, 10/04/2021, 10/31/2022    Influenza, seasonal, injectable 10/01/2014, 10/01/2015    Pneumococcal Conjugate Vaccine 20-valent (Pcv20), Polysace 07/25/2022, 07/25/2022    Pneumococcal Polysaccharide PPV23 10/08/2019    Tdap 01/01/2010, 08/24/2020    Zoster Vaccine Recombinant 05/06/2019, 07/08/2019       Objective     /70 (BP Location: Left arm, Patient Position: Sitting, Cuff Size: Large)   Pulse 72   Temp (!) 96.7 °F (35.9 °C) (Tympanic)   Ht 5' 7\" (1.702 m)   Wt 90.9 kg (200 lb 6.4 oz)   SpO2 97%   BMI 31.39 kg/m²     Physical Exam  Vitals and nursing note reviewed.   Neck:      Vascular: No carotid bruit.   Cardiovascular:      Rate and Rhythm: Normal rate and regular rhythm.      Pulses: Normal pulses.      Heart sounds: Normal heart sounds.   Pulmonary:      Effort: Pulmonary effort is normal.      Breath sounds: Normal breath sounds.   Musculoskeletal:      Right lower leg: No edema.      Left lower leg: No edema.   Neurological:      General: No focal deficit present.      Mental Status: She is alert and oriented to person, place, and time.   Psychiatric:         Mood and " Affect: Mood normal.       Angelo Pacheco MD

## 2024-01-24 ENCOUNTER — OFFICE VISIT (OUTPATIENT)
Dept: PHYSICAL THERAPY | Facility: CLINIC | Age: 69
End: 2024-01-24
Payer: COMMERCIAL

## 2024-01-24 DIAGNOSIS — Z47.89 ENCOUNTER FOR OTHER ORTHOPEDIC AFTERCARE: ICD-10-CM

## 2024-01-24 DIAGNOSIS — S46.011D TRAUMATIC TEAR OF RIGHT ROTATOR CUFF, UNSPECIFIED TEAR EXTENT, SUBSEQUENT ENCOUNTER: Primary | ICD-10-CM

## 2024-01-24 PROCEDURE — 97140 MANUAL THERAPY 1/> REGIONS: CPT

## 2024-01-24 NOTE — TELEPHONE ENCOUNTER
I put in the script for the left hip x-ray  We will call once we have those results and I put in a new script for the continued PT  Thank you  Information call w/updates and questions -     Randa Shankar called stating that she is a good friend of the patient.  She asked to speak with Natasha Ozuna NP.  She also asked if the patient was seeing Dr. Del Rio.  Randa said that the patient is at [facility] and gave her the NP's contact information.  She said that they will be releasing the patient soon and she will need a f/u appt with the NP on or before 2/1/24.    PHI Release Form -     I told Randa that she is not listed on the patient's PHI Release Form.  [facility] cannot give her any personal health info about the patient.  I let her know that the rehab center will contact us about any f/u appointments.  Randa acknowledged.    Randa Raad's callback is [phone]

## 2024-01-24 NOTE — PROGRESS NOTES
Daily Note     Today's date: 2024  Patient name: Ale Chavez  : 1955  MRN: [id number]  Referring provider: Richard Brown DO  Dx:   Encounter Diagnosis     ICD-10-CM    1. Traumatic tear of right rotator cuff, unspecified tear extent, subsequent encounter  S46.011D       2. Encounter for other orthopedic aftercare  Z47.89                      Subjective:  Pt reports her shoulder is coming along well, sleeping is doing better.       Objective: See treatment diary below  FOTO given (_____) and Re-eval scheduled ()      Assessment: Pt presented to outpatient PT with s/p R shoulder RTC repair on 24. Pt presents with increased pain and TTP, decreased ROM, decreased strength, and overall decreased functional mobility. Pt would benefit from skilled PT services in order to address these deficits and reach maximum level of function.       Focused on manuals during PT per protocol, no complain of pain at end range, solely ms discomfort.     Plan: Continue per plan of care.      Dx: s/p R shoulder RTC repair (24)  EPOC: 4/10/24  CO-MORBIDITIES:  PERSONAL FACTORS:   Precautions: see MD protocol; flexion 0-90* week 1-3      Manuals           R shoulder PROM/ MFR  th [facility] 15'          R elbow PROM  th [facility] 10'                       Total Time:  25' 25'                       Neuro Re-Ed                                                                                                        Ther Ex             Pendulums; CW, CCW 10 ea 20 ea 20 ea                                                                                                     Ther Activity                                       Gait Training                                       Modalities

## 2024-01-29 ENCOUNTER — OFFICE VISIT (OUTPATIENT)
Dept: PHYSICAL THERAPY | Facility: CLINIC | Age: 69
End: 2024-01-29
Payer: COMMERCIAL

## 2024-01-29 DIAGNOSIS — Z47.89 ENCOUNTER FOR OTHER ORTHOPEDIC AFTERCARE: ICD-10-CM

## 2024-01-29 DIAGNOSIS — S46.011D TRAUMATIC TEAR OF RIGHT ROTATOR CUFF, UNSPECIFIED TEAR EXTENT, SUBSEQUENT ENCOUNTER: Primary | ICD-10-CM

## 2024-01-29 PROCEDURE — 97140 MANUAL THERAPY 1/> REGIONS: CPT | Performed by: PHYSICAL THERAPIST

## 2024-01-29 NOTE — PROGRESS NOTES
"Daily Note     Today's date: 2024  Patient name: Ale Chavez  : 1955  MRN: [id number]  Referring provider: Richard Brown DO  Dx:   Encounter Diagnosis     ICD-10-CM    1. Traumatic tear of right rotator cuff, unspecified tear extent, subsequent encounter  S46.011D       2. Encounter for other orthopedic aftercare  Z47.89                      Subjective: Pt reports she is feeling increased R upper trap and R upper back pain 2* sling pulling on her neck/      Objective: See treatment diary below      Assessment: Tolerated treatment well. Patient exhibited good technique with therapeutic exercises. Pt was instructed in cervical stretching. Pt was told that she can rest her arm in a pillow or place a pillow between her arm and her body without the neck strap around her neck to decrease neck pain.      Plan: Continue per plan of care. Continue per MD protocol     Dx: s/p R shoulder RTC repair (24)  EPOC: 4/10/24  CO-MORBIDITIES:  PERSONAL FACTORS:   Precautions: see MD protocol; flexion 0-90* week 1-3      Manuals          R shoulder PROM/ MFR  th [facility] 15' 10'         R elbow PROM  th Wdc 10' np         R upper trap/rhomboid MFR; cervical distraction    15'         Total Time:  ' 25'                       Neuro Re-Ed                                                                                                        Ther Ex             Pendulums; CW, CCW 10 ea 20 ea 20 ea 20 ea         R upper trap stretch    3x20\"         R levator scap stretch    3x20\"                                                                          Ther Activity                                       Gait Training                                       Modalities                                              "

## 2024-02-01 ENCOUNTER — OFFICE VISIT (OUTPATIENT)
Dept: PHYSICAL THERAPY | Facility: CLINIC | Age: 69
End: 2024-02-01
Payer: COMMERCIAL

## 2024-02-01 DIAGNOSIS — Z47.89 ENCOUNTER FOR OTHER ORTHOPEDIC AFTERCARE: ICD-10-CM

## 2024-02-01 DIAGNOSIS — S46.011D TRAUMATIC TEAR OF RIGHT ROTATOR CUFF, UNSPECIFIED TEAR EXTENT, SUBSEQUENT ENCOUNTER: Primary | ICD-10-CM

## 2024-02-01 PROCEDURE — 97140 MANUAL THERAPY 1/> REGIONS: CPT

## 2024-02-01 NOTE — PROGRESS NOTES
"Daily Note     Today's date: 2024  Patient name: Ale Chavez  : 1955  MRN: [id number]  Referring provider: Richard Brown DO  Dx:   Encounter Diagnosis     ICD-10-CM    1. Traumatic tear of right rotator cuff, unspecified tear extent, subsequent encounter  S46.011D       2. Encounter for other orthopedic aftercare  Z47.89                      Subjective: Pt reports she is doing pretty well.  MIn c/o's pain reported.  Pt states sleeping is her biggest issue.      Objective: See treatment diary below      Assessment: Tolerated treatment well. Patient would benefit from continued PT to con't maintaining ROM while following protocol.  Pt w/ some tightness noted in the UT.      Plan: Continue per plan of care.  Progress treament per protocol.      Dx: s/p R shoulder RTC repair (24)  EPOC: 4/10/24  CO-MORBIDITIES:  PERSONAL FACTORS:   Precautions: see MD protocol; flexion 0-90* week 1-3      Manuals         R shoulder PROM/ MFR  th Wdc 15' 10' 10'        R elbow PROM  th Wdc 10' np         R upper trap/rhomboid MFR; cervical distraction    15' 15'        Total Time:  25' 25'  25'                     Neuro Re-Ed                                                                                                        Ther Ex             Pendulums; CW, CCW 10 ea 20 ea 20 ea 20 ea 20        R upper trap stretch    3x20\"         R levator scap stretch    3x20\"                                                                          Ther Activity                                       Gait Training                                       Modalities                                                "

## 2024-02-05 ENCOUNTER — OFFICE VISIT (OUTPATIENT)
Dept: PHYSICAL THERAPY | Facility: CLINIC | Age: 69
End: 2024-02-05
Payer: COMMERCIAL

## 2024-02-05 DIAGNOSIS — S46.011D TRAUMATIC TEAR OF RIGHT ROTATOR CUFF, UNSPECIFIED TEAR EXTENT, SUBSEQUENT ENCOUNTER: Primary | ICD-10-CM

## 2024-02-05 DIAGNOSIS — Z47.89 ENCOUNTER FOR OTHER ORTHOPEDIC AFTERCARE: ICD-10-CM

## 2024-02-05 PROCEDURE — 97140 MANUAL THERAPY 1/> REGIONS: CPT

## 2024-02-05 NOTE — PROGRESS NOTES
"Daily Note     Today's date: 2024  Patient name: Ale Chavez  : 1955  MRN: [id number]  Referring provider: Richard Brown DO  Dx:   Encounter Diagnosis     ICD-10-CM    1. Traumatic tear of right rotator cuff, unspecified tear extent, subsequent encounter  S46.011D       2. Encounter for other orthopedic aftercare  Z47.89                      Subjective: Pt reports she is doing pretty well.       Objective: See treatment diary below  FOTO given () and Re-eval scheduled ()     Assessment:  Pt presented to outpatient PT with s/p R shoulder RTC repair on 24. Pt presents with increased pain and TTP, decreased ROM, decreased strength, and overall decreased functional mobility. Pt would benefit from skilled PT services in order to address these deficits and reach maximum level of function.       Focused on manuals during PT per protocol, no complain of pain at end range, solely ms discomfort.         Plan: Continue per plan of care.  Progress treament per protocol.      Dx: s/p R shoulder RTC repair (24)  EPOC: 4/10/24  CO-MORBIDITIES:  PERSONAL FACTORS:   Precautions: see MD protocol; flexion 0-90* week 1-3      Manuals        R shoulder PROM/ MFR  th Wdc 15' 10' 10' Wdc 10'       R elbow PROM  th Wdc 10' np  Wdc 5'       R upper trap/rhomboid MFR; cervical distraction    15' 15' Wdc 10'       Total Time:  25' 25'  25' 25'                    Neuro Re-Ed                                                                                                        Ther Ex             Pendulums; CW, CCW 10 ea 20 ea 20 ea 20 ea 20 20 ea       R upper trap stretch    3x20\"  3x20\"       R levator scap stretch    3x20\"  3x20\"                                                                        Ther Activity                                       Gait Training                                       Modalities                                                "

## 2024-02-07 ENCOUNTER — OFFICE VISIT (OUTPATIENT)
Dept: PHYSICAL THERAPY | Facility: CLINIC | Age: 69
End: 2024-02-07
Payer: COMMERCIAL

## 2024-02-07 DIAGNOSIS — S46.011D TRAUMATIC TEAR OF RIGHT ROTATOR CUFF, UNSPECIFIED TEAR EXTENT, SUBSEQUENT ENCOUNTER: Primary | ICD-10-CM

## 2024-02-07 DIAGNOSIS — Z47.89 ENCOUNTER FOR OTHER ORTHOPEDIC AFTERCARE: ICD-10-CM

## 2024-02-07 PROCEDURE — 97140 MANUAL THERAPY 1/> REGIONS: CPT

## 2024-02-07 NOTE — PROGRESS NOTES
"Daily Note     Today's date: 2024  Patient name: Ale Chavez  : 1955  MRN: [id number]  Referring provider: Richard Brown DO  Dx:   Encounter Diagnosis     ICD-10-CM    1. Traumatic tear of right rotator cuff, unspecified tear extent, subsequent encounter  S46.011D       2. Encounter for other orthopedic aftercare  Z47.89                      Subjective: Pt reports she is doing pretty well.       Objective: See treatment diary below  FOTO given () and Re-eval scheduled ()     Assessment:  Pt presented to outpatient PT with s/p R shoulder RTC repair on 24. Pt presents with increased pain and TTP, decreased ROM, decreased strength, and overall decreased functional mobility. Pt would benefit from skilled PT services in order to address these deficits and reach maximum level of function.       Focused on manuals during PT per protocol, no complain of pain at end range, solely ms discomfort. Introduced new TE with great tolerance.         Plan: Continue per plan of care.  Progress treament per protocol.      Dx: s/p R shoulder RTC repair (24)  EPOC: 4/10/24  CO-MORBIDITIES:  PERSONAL FACTORS:   Precautions: see MD protocol; flexion 0-90* week 1-3      Manuals       R shoulder PROM/ MFR Wdc 15' 10' 10' Wdc 10' Wdc 10'      R elbow PROM Wdc 10' np  Wdc 5' Wdc 5'      R upper trap/rhomboid MFR; cervical distraction  15' 15' Wdc 10' Wdc 10'      Total Time: 25'  25' 25' 25'                 Neuro Re-Ed                                                                                        Ther Ex           Pendulums; CW, CCW 20 ea 20 ea 20 20 ea 20 ea      R upper trap stretch  3x20\"  3x20\" hep      R levator scap stretch  3x20\"  3x20\" hep      Bicep curls     20x      Hammer curls     20x      Wrist CW/CCW     20x                            Ther Activity                                 Gait Training                                 Modalities                               " Patient informed of test results

## 2024-02-12 ENCOUNTER — ANNUAL EXAM (OUTPATIENT)
Dept: OBGYN CLINIC | Facility: CLINIC | Age: 69
End: 2024-02-12
Payer: COMMERCIAL

## 2024-02-12 VITALS
DIASTOLIC BLOOD PRESSURE: 70 MMHG | SYSTOLIC BLOOD PRESSURE: 148 MMHG | BODY MASS INDEX: 31.99 KG/M2 | HEIGHT: 67 IN | WEIGHT: 203.8 LBS

## 2024-02-12 DIAGNOSIS — Z01.419 ENCOUNTER FOR GYNECOLOGICAL EXAMINATION WITHOUT ABNORMAL FINDING: Primary | ICD-10-CM

## 2024-02-12 DIAGNOSIS — Z12.31 ENCOUNTER FOR SCREENING MAMMOGRAM FOR MALIGNANT NEOPLASM OF BREAST: ICD-10-CM

## 2024-02-12 DIAGNOSIS — Z13.820 SCREENING FOR OSTEOPOROSIS: ICD-10-CM

## 2024-02-12 DIAGNOSIS — Z78.0 POSTMENOPAUSAL: ICD-10-CM

## 2024-02-12 DIAGNOSIS — N76.3 SUBACUTE VULVITIS: ICD-10-CM

## 2024-02-12 DIAGNOSIS — B37.2 CUTANEOUS CANDIDIASIS: ICD-10-CM

## 2024-02-12 DIAGNOSIS — E66.9 OBESITY (BMI 30-39.9): ICD-10-CM

## 2024-02-12 DIAGNOSIS — N83.299 COMPLEX OVARIAN CYST: ICD-10-CM

## 2024-02-12 DIAGNOSIS — E03.9 ACQUIRED HYPOTHYROIDISM: ICD-10-CM

## 2024-02-12 DIAGNOSIS — E28.39 ESTROGEN DEFICIENCY: ICD-10-CM

## 2024-02-12 PROCEDURE — S0612 ANNUAL GYNECOLOGICAL EXAMINA: HCPCS | Performed by: OBSTETRICS & GYNECOLOGY

## 2024-02-12 RX ORDER — KETOCONAZOLE 20 MG/G
CREAM TOPICAL DAILY
Qty: 60 G | Refills: 1 | Status: SHIPPED | OUTPATIENT
Start: 2024-02-12

## 2024-02-12 RX ORDER — ASPIRIN 81 MG/1
162 TABLET, CHEWABLE ORAL DAILY
COMMUNITY

## 2024-02-12 RX ORDER — KETOCONAZOLE 20 MG/G
CREAM TOPICAL DAILY
COMMUNITY

## 2024-02-12 NOTE — PROGRESS NOTES
[facility] - [address], [address], [place] [address]    ASSESSMENT/PLAN: Ale Chavez is a 68 y.o.  who presents for annual gynecologic exam.    Encounter for routine gynecologic examination  - Routine well woman exam completed today.  - Cervical Cancer Screening: Current ASCCP Guidelines reviewed. Last Pap: 2021  Next Pap Due:after age  66  no hx of abn   pt aware of guidelines   -dexa scan  due in    order given  bone health reviewed   - Breast Cancer Screening: Last Mammogram 2023,   - Colorectal cancer screening was not ordered. Due 2025     - The following were reviewed in today's visit: breast self exam, mammography screening ordered, menopause, adequate intake of calcium and vitamin D, exercise, healthy diet, DEXA ordered, and colonoscopy discussed     Additional problems addressed during this visit:  1. Encounter for gynecological examination without abnormal finding    2. Acquired hypothyroidism    3. Postmenopausal  -     DXA bone density spine hip and pelvis; Future    4. Estrogen deficiency  -     DXA bone density spine hip and pelvis; Future    5. Encounter for screening mammogram for malignant neoplasm of breast  -     Mammo screening bilateral w 3d & cad; Future    6. Obesity (BMI 30-39.9)    7. Complex ovarian cyst  Comments:  Stable per notes  from Dr Smith.  Will repeat  US  and evaluate  Orders:  -     US pelvis complete w transvaginal; Future; Expected date: 2024    8. Screening for osteoporosis  -     DXA bone density spine hip and pelvis; Future    9. Subacute vulvitis  -     ketoconazole (NIZORAL) 2 % cream; Apply topically daily    10. Cutaneous candidiasis  Comments:  under arm and inguinal  folds  Orders:  -     ketoconazole (NIZORAL) 2 % cream; Apply topically daily        CC:  Annual Gynecologic Examination    HPI: Ale Chavez is a 68 y.o.  who presents for annual gynecologic examination.  69 yo here for  wellness  exam.   No bleeding , bloating or satiety .   Colonoscopy due  in   hx of   left complex  cyst will get   tv us for stability.  +  right  shoulder   torn      The following portions of the patient's history were reviewed and updated as appropriate: She  has a past medical history of Abdominal pain, Allergic, Arthritis, Asthma, Basal cell carcinoma, Bicuspid aortic valve, Cancer (HCC), Cervical disc disease, Cervical stenosis of spine, Colon polyp, COVID (), Disease of thyroid gland, Dry eyes, Endometriosis, GERD (gastroesophageal reflux disease), Heart murmur, History of screening mammography (2023), Hot flashes, Hyperglycemia, Hyperlipidemia, Hypertension, Hypothyroidism, Kidney stone, Left ventricular hypertrophy, Mitral valvular disorder, Nephrolithiasis, Ovarian cyst, complex, Pneumonia (), Ptosis, Renal calculi, Right cervical radiculopathy, Rotator cuff disorder, right, Seasonal allergies, Squamous cell skin cancer, Thyroid disease, Visual impairment, Wears contact lenses, Wears glasses, and Wears partial dentures.  She  has a past surgical history that includes  section; Cholecystectomy; Carpal tunnel release (Bilateral); Tubal ligation; Plantar fasciectomy (Left); pr colonoscopy flx dx w/collj spec when pfrmd (N/A, 10/09/2017); Neuroplasty / transposition median nerve at carpal tunnel; Neuroplasty / transposition median nerve at carpal tunnel (); Other surgical history; Colonoscopy; pr hysteroscopy bx endometrium&/polypc w/wo d&c (N/A, 2018); Excision basal cell carcinoma; Skin biopsy; Upper gastrointestinal endoscopy; pr surgical arthroscopy shoulder w/rotator cuff rpr (Right, 2024); pr surgical arthroscopy pineda w/coracoacrm ligm rls (Right, 2024); and Rotator cuff repair (Right, 2024).  Her family history includes Alzheimer's disease in her mother; Cancer in her family; Dementia in her mother; Diabetes in her mother; Heart disease in her family and mother;  Hypertension in her mother; No Known Problems in her daughter, father, maternal aunt, maternal aunt, maternal aunt, maternal aunt, maternal aunt, maternal grandfather, maternal grandmother, paternal grandfather, paternal grandmother, sister, and sister; Other in her family; Thyroid disease in her family.  She  reports that she quit smoking about 12 years ago. Her smoking use included cigarettes. She started smoking about 52 years ago. She has a 40.0 pack-year smoking history. She has never used smokeless tobacco. She reports current alcohol use of about 8.0 standard drinks of alcohol per week. She reports that she does not use drugs.  Current Outpatient Medications   Medication Sig Dispense Refill   • albuterol (Ventolin HFA) 90 mcg/act inhaler Inhale 2 puffs every 6 (six) hours as needed for wheezing or shortness of breath 18 g 3   • amLODIPine (NORVASC) 5 mg tablet TAKE ONE TABLET BY MOUTH EVERY DAY 90 tablet 0   • Ascorbic Acid (vitamin C) 100 MG tablet Take 100 mg by mouth daily     • aspirin 81 mg chewable tablet Chew 162 mg daily     • cholecalciferol (VITAMIN D3) 400 units tablet Take 400 Units by mouth daily Pt taking 1000 units     • Cyanocobalamin (VITAMIN B 12 PO) Take by mouth     • Efinaconazole (Jublia) 10 % SOLN Apply 1 application topically in the morning (Patient taking differently: Apply 1 application. topically if needed) 8 mL 6   • fexofenadine (ALLEGRA) 180 MG tablet Take 1 tablet (180 mg total) by mouth daily 90 tablet 0   • fluticasone (Arnuity Ellipta) 100 MCG/ACT AEPB inhaler Inhale 1 puff daily Rinse mouth after use. 90 blister 8   • guaiFENesin (MUCINEX) 600 mg 12 hr tablet Take 1,200 mg by mouth every 12 (twelve) hours     • ketoconazole (NIZORAL) 2 % cream Apply topically daily     • ketoconazole (NIZORAL) 2 % cream Apply topically daily 60 g 1   • levothyroxine 125 mcg tablet TAKE ONE TABLET BY MOUTH EVERY DAY. 90 tablet 0   • losartan-hydrochlorothiazide (HYZAAR) 100-25 MG per tablet  "Take 1 tablet by mouth daily 90 tablet 3   • pantoprazole (PROTONIX) 40 mg tablet TAKE ONE TABLET BY MOUTH 2 TIMES A  tablet 0   • pravastatin (PRAVACHOL) 20 mg tablet Take 1 tablet (20 mg total) by mouth daily 90 tablet 1   • semaglutide (Rybelsus) 7 MG tablet Take 1 tablet (7 mg total) by mouth daily before breakfast 90 tablet 0   • XIIDRA 5 % op solution Administer 1 drop to both eyes in the morning     • aspirin 81 mg chewable tablet Chew 1 tablet (81 mg total) 2 (two) times a day 60 tablet 0   • ketoconazole (NIZORAL) 2 % cream Apply topically daily for 7 days (Patient taking differently: Apply 1 Application topically if needed) 30 g 0   • nystatin (MYCOSTATIN) powder Apply topically 3 (three) times a day for 14 days 30 g 0   • ondansetron (ZOFRAN) 4 mg tablet Take 1 tablet (4 mg total) by mouth every 8 (eight) hours as needed for nausea or vomiting for up to 15 doses 15 tablet 0   • oxyCODONE (Roxicodone) 5 immediate release tablet Take 1 tablet (5 mg total) by mouth every 4 (four) hours as needed for moderate pain for up to 20 doses Max Daily Amount: 30 mg (Patient not taking: Reported on 1/23/2024) 20 tablet 0     Current Facility-Administered Medications   Medication Dose Route Frequency Provider Last Rate Last Admin   • lidocaine (XYLOCAINE) 1 % injection 2 mL  2 mL Injection  Ramón Galdamez DPM   2 mL at 08/14/23 1500   • triamcinolone acetonide (KENALOG-40) 40 mg/mL injection 20 mg  20 mg Intra-articular  Ramón Galdamez DPM   20 mg at 08/14/23 1500     She is allergic to erythromycin, penicillins, aztreonam, carbapenems, cephalosporins, griseofulvin, and influenza virus vaccine..    Review of Systems:  All systems normal except as noted in HPI          Objective:  /70   Ht 5' 7\" (1.702 m)   Wt 92.4 kg (203 lb 12.8 oz)   Breastfeeding No   BMI 31.92 kg/m²    Physical Exam  Vitals and nursing note reviewed.   Constitutional:       Appearance: Normal appearance.   HENT:      Head: Normocephalic. "   Cardiovascular:      Rate and Rhythm: Normal rate and regular rhythm.   Pulmonary:      Effort: Pulmonary effort is normal.      Breath sounds: Normal breath sounds.   Chest:      Chest wall: No mass, lacerations, swelling, tenderness or edema.   Breasts:     Alpesh Score is 4.      Breasts are symmetrical.      Right: Normal. No swelling, bleeding, inverted nipple, mass, nipple discharge, skin change or tenderness.      Left: No swelling, bleeding, inverted nipple, mass, nipple discharge, skin change or tenderness.   Abdominal:      General: Abdomen is flat. Bowel sounds are normal.      Palpations: Abdomen is soft.   Genitourinary:     General: Normal vulva.      Exam position: Lithotomy position.      Pubic Area: No rash.       Alpesh stage (genital): 4.      Labia:         Right: No rash, tenderness or lesion.         Left: No rash, tenderness or lesion.       Urethra: No urethral pain, urethral swelling or urethral lesion.      Vagina: Normal.      Cervix: No cervical motion tenderness or discharge.      Uterus: Normal.       Adnexa: Right adnexa normal and left adnexa normal.      Rectum: Normal.   Musculoskeletal:         General: Normal range of motion.      Cervical back: Neck supple.   Lymphadenopathy:      Upper Body:      Right upper body: No supraclavicular, axillary or pectoral adenopathy.      Left upper body: No supraclavicular, axillary or pectoral adenopathy.      Lower Body: No right inguinal adenopathy. No left inguinal adenopathy.   Skin:     General: Skin is warm and dry.   Neurological:      General: No focal deficit present.      Mental Status: She is alert and oriented to person, place, and time.   Psychiatric:         Mood and Affect: Mood normal.         Behavior: Behavior normal.         Thought Content: Thought content normal.         Judgment: Judgment normal.

## 2024-02-12 NOTE — PATIENT INSTRUCTIONS
Calcium 1200-1500mg + 800-1000 IU Vit D daily unless otherwise directed. Avoid falls. Exercise 150 minutes per week minimum including weight bearing exercises. DEXA scan- ordered        . No further paps after age 65 as long as there has been adequate normal paps completed, no history of FLORESITA 2  or a more severe pap diagnosis in the last 20 years.   Annual mammogram and monthly breast self exam recommended .   Colonoscopy-    due n i2025      .  Kegels 20 times twice daily. Silicone based lubricant with sex. Vaginal moisturizers twice weekly as needed.

## 2024-02-13 ENCOUNTER — EVALUATION (OUTPATIENT)
Dept: PHYSICAL THERAPY | Facility: CLINIC | Age: 69
End: 2024-02-13
Payer: COMMERCIAL

## 2024-02-13 DIAGNOSIS — Z47.89 ENCOUNTER FOR OTHER ORTHOPEDIC AFTERCARE: ICD-10-CM

## 2024-02-13 DIAGNOSIS — S46.011D TRAUMATIC TEAR OF RIGHT ROTATOR CUFF, UNSPECIFIED TEAR EXTENT, SUBSEQUENT ENCOUNTER: Primary | ICD-10-CM

## 2024-02-13 PROCEDURE — 97140 MANUAL THERAPY 1/> REGIONS: CPT | Performed by: PHYSICAL THERAPIST

## 2024-02-13 NOTE — PROGRESS NOTES
PT Re-Evaluation     Today's date: 2024  Patient name: Ale Chavez  : 1955  MRN: [id number]  Referring provider: Richard Brown DO  Dx:   Encounter Diagnosis     ICD-10-CM    1. Traumatic tear of right rotator cuff, unspecified tear extent, subsequent encounter  S46.011D       2. Encounter for other orthopedic aftercare  Z47.89                      Assessment  Assessment details: Since starting skilled PT, pain levels are decreasing, R shoulder is progressing well according to MD protocol. Pt is on track to progress to phase 2 at 6 weeks on Thursday. Continue to progress  according to MD protocol.      Impairments: abnormal or restricted ROM, activity intolerance, impaired physical strength and pain with function  Understanding of Dx/Px/POC: good   Prognosis: good    Goals  STG's ( 3-4 weeks)  1. Pt will be independent in HEP-met  2. Pt will have improved R shoulder PROM by 15*-20*-partial met  LTG's ( 6- 8 weeks)  1. Improve FOTO score by 10-15 points- nt  2. Pt will have decreased pain to 2/10 at worst- met  3. Pt will have improved R shoulder AROM to WFL's- not met  4. Pt will have improved R shoulder strength by 1/2 grade- not met  5. Pt will be independent in dressing and self care activities- not met    Plan  Patient would benefit from: skilled physical therapy  Planned modality interventions: cryotherapy and TENS  Planned therapy interventions: manual therapy, neuromuscular re-education, strengthening, stretching, therapeutic activities, therapeutic exercise, functional ROM exercises, flexibility and home exercise program  Frequency: 2x week  Duration in weeks: 10  Plan of Care beginning date: 2024  Plan of Care expiration date: 2024  Treatment plan discussed with: patient and PTA        Subjective Evaluation    History of Present Illness  Mechanism of injury: I.E: Pt underwent R shoulder RTC repair on 23. Pt arrives to skilled PT with his arm in a sling in an abduction  "pillow. Pt is limited with all functional activities involving her R UE. Pt is having difficulty sleeping at night 2* trying to get comfortable with her sling.     24: 24, pt bumped her R shoulder into the door jam when trying to help her dog that was vomiting. Pt had very high pain levels and \" saw stars\". Pt is noticing bruising in the area of impact.  Pt is wearing her sling at all times and continues to be limited with all functional activities.    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: ; computer work  Hobbies; camping, out door activities  Gait; no abnormalities  Patient Goals  Patient goals for therapy: decreased pain  Patient goal: to be able to use my arm again; reaching and lifting normally- not met  Pain  At best pain ratin  At worst pain ratin  Location: R shoulder  Quality: sharp and radiating    Social Support  Lives with: spouse    Employment status: working  Hand dominance: right    Treatments  Previous treatment: physical therapy        Objective     Neurological Testing     Sensation     Shoulder   Left Shoulder   Intact: light touch    Right Shoulder   Intact: Light touch    Reflexes   Left   Biceps (C5/C6): normal (2+)  Brachioradialis (C6): normal (2+)  Triceps (C7): normal (2+)    Right   Biceps (C5/C6): trace (1+)  Brachioradialis (C6): trace (1+)    Active Range of Motion   Left Shoulder   Normal active range of motion    Additional Active Range of Motion Details  R shoulder AROM: NT    Passive Range of Motion     Right Shoulder   Flexion: 138 degrees   Abduction: 150 degrees   External rotation 0°: 40 degrees   Internal rotation 0°: 60 degrees     Right Elbow   Flexion: 145 degrees   Extension: 0 degrees     Strength/Myotome Testing     Left Shoulder   Normal muscle strength    Additional Strength Details  R shoulder strength: NT           Dx: s/p R shoulder RTC repair (24)  EPOC:  CO-MORBIDITIES:  PERSONAL FACTORS:   Precautions: see MD protocol; flexion 0-90* week " "1-3      Manuals 2/13            R shoulder PROM/ MFR 24'            Progress note 8'                          32            Neuro Re-Ed                                                                                                        Ther Ex             Pendulums; CW, CCW 20 ea            Table slides; flexion 10            Supine shld flexion HH 10x5\"                                                                             Ther Activity                                       Gait Training                                       Modalities                                            "

## 2024-02-14 ENCOUNTER — OFFICE VISIT (OUTPATIENT)
Dept: OBGYN CLINIC | Facility: CLINIC | Age: 69
End: 2024-02-14

## 2024-02-14 VITALS
HEART RATE: 71 BPM | WEIGHT: 203.12 LBS | HEIGHT: 67 IN | SYSTOLIC BLOOD PRESSURE: 124 MMHG | DIASTOLIC BLOOD PRESSURE: 75 MMHG | BODY MASS INDEX: 31.88 KG/M2 | RESPIRATION RATE: 16 BRPM

## 2024-02-14 DIAGNOSIS — S46.011D TRAUMATIC TEAR OF RIGHT ROTATOR CUFF, UNSPECIFIED TEAR EXTENT, SUBSEQUENT ENCOUNTER: Primary | ICD-10-CM

## 2024-02-14 PROCEDURE — 99024 POSTOP FOLLOW-UP VISIT: CPT | Performed by: STUDENT IN AN ORGANIZED HEALTH CARE EDUCATION/TRAINING PROGRAM

## 2024-02-14 NOTE — PROGRESS NOTES
Shoulder Post Operative Visit     Assesment:     68 y.o. female 6 weeks surgical Arthroscopy of the right shoulder with rotator cuff repair and subacromial decompression, DOS: 1/4/2024    Plan:  The patient's diagnosis and treatment were discussed at length today. We discussed no treatment, non-operative treatment, and operative treatment.    Kira presents today for follow-up evaluation 6 weeks status post right shoulder arthroscopy with rotator cuff repair and subacromial decompression performed on 1/4/2024.  She is doing extremely well and is able to begin to gradually discontinue her sling over the course of the next several days.  I discussed with her at physical therapy she can progress to her neck step in her protocol.  I did provide her with a note stating that she should have no lifting with the use of her right arm and these restrictions are to be in place until her next visit, which at that time new restriction will be provided.  She can continue use of ice and over-the-counter medication as needed for pain relief.  She is to follow-up in approximately 6 weeks for reevaluation.    Post-Operative treatment:    Ice to shoulder 1-2 times daily, for 20 minutes at a time.  Continue outpatient physical therapy according to rotator cuff repair protocol.    Imaging:    All imaging from today was reviewed by myself and explained to the patient.     Sling: Will begin to wean from the sling with the guidance of their physical therapist    DVT Prophylaxis:  Ambulation    Follow up:   6 weeks    Patient was advised that if they have any fevers, chills, chest pain, shortness of breath, redness or drainage from the incision, please let our office know immediately.        Chief Complaint   Patient presents with    Right Shoulder - Post-op     Sx: 01/04/24       History of Present Illness:    The patient is a 68 y.o. female who is being evaluated post operatively 6 weeks  status post right shoulder arthroscopy with rotator  "cuff repair and subacromial decompression performed on 1/4/2024.  She states that she is overall doing well at this time and her pain continues to improve day-to-day.  She states that she has been compliant with use of her sling and mentions that she was trying to help her dog that was vomiting and bumped her shoulder, she stated that it did give her sharp severe jolt of pain diffusely throughout her arm, however she was wearing her sling.  She has been compliant with outpatient physical therapy and does feel like she is progressing appropriately.  She denies any new injury or trauma to her shoulder.  She denies any numbness or tingling.       I have reviewed the past medical, surgical, social and family history, medications and allergies as documented in the EMR.    Review of systems: ROS is negative other than that noted in the HPI.  Constitutional: Negative for fatigue and fever.       Physical Exam:    Blood pressure 124/75, pulse 71, resp. rate 16, height 5' 7\" (1.702 m), weight 92.1 kg (203 lb 1.9 oz), not currently breastfeeding.    General/Constitutional: NAD, well developed, well nourished  HENT: Normocephalic, atraumatic  CV: Intact distal pulses, regular rate  Resp: No respiratory distress or labored breathing  Lymphatic: No lymphadenopathy palpated  Neuro: Alert and Oriented x 3, no focal deficits  Psych: Normal mood, normal affect, normal judgement, normal behavior  Skin: Warm, dry, no rashes, no erythema    Shoulder focused exam:        Visual inspection of the shoulder demonstrates normal contour without atrophy  No evidence of scapular dyskinesia or atrophy.  No scapular winging  Incisions appear nearly healed.  Compartments soft and compressible.  Capillary refill is brisk.  Motor and sensory function intact.  Gentle passive range of motion tolerated to forward elevation of 150 degrees, gentle passive range of motion to 90 degrees abduction, external rotation to 40 degrees, internal rotation not " tested.  Strength not tested.  No special test performed.      UE NV Exam: +2 Radial pulses bilaterally  Sensation intact to light touch C5-T1 bilaterally, Radial/median/ulnar nerve motor intact      Scribe Attestation      I,:  Marco Brownlee am acting as a scribe while in the presence of the attending physician.:       I,:  Richard Brown, DO personally performed the services described in this documentation    as scribed in my presence.:

## 2024-02-14 NOTE — LETTER
February 14, 2024     Patient: Ale Chavez  YOB: 1955  Date of Visit: 2/14/2024      To Whom it May Concern:    Ale Chavez is under my professional care. Ale was seen in my office on 2/14/2024. Ale may return to work on 02/21/2024 with the following restrictions: No lifting with the right arm . Updated formal restrictions will be provided at her next appointment.    If you have any questions or concerns, please don't hesitate to call.         Sincerely,          Richard Brown, DO        CC: No Recipients

## 2024-02-15 ENCOUNTER — OFFICE VISIT (OUTPATIENT)
Dept: PHYSICAL THERAPY | Facility: CLINIC | Age: 69
End: 2024-02-15
Payer: COMMERCIAL

## 2024-02-15 DIAGNOSIS — Z47.89 ENCOUNTER FOR OTHER ORTHOPEDIC AFTERCARE: ICD-10-CM

## 2024-02-15 DIAGNOSIS — S46.011D TRAUMATIC TEAR OF RIGHT ROTATOR CUFF, UNSPECIFIED TEAR EXTENT, SUBSEQUENT ENCOUNTER: Primary | ICD-10-CM

## 2024-02-15 PROCEDURE — 97140 MANUAL THERAPY 1/> REGIONS: CPT

## 2024-02-15 PROCEDURE — 97110 THERAPEUTIC EXERCISES: CPT

## 2024-02-15 NOTE — PROGRESS NOTES
"Daily Note     Today's date: 2/15/2024  Patient name: Ale Chavez  : 1955  MRN: [id number]  Referring provider: Richard Brown DO  Dx:   Encounter Diagnosis     ICD-10-CM    1. Traumatic tear of right rotator cuff, unspecified tear extent, subsequent encounter  S46.011D       2. Encounter for other orthopedic aftercare  Z47.89                      Subjective: Pt states feeling great without her sling on.       Objective: See treatment diary below      Assessment: Pt arrived with a sling don on. Progressed below TE with good tolerance and technique, no complain of pain or discomfort. Pt ROM has improved significantly as well.      Plan: Continue per plan of care.      Dx: s/p R shoulder RTC repair (24)  EPOC:  CO-MORBIDITIES:  PERSONAL FACTORS:   Precautions: see MD protocol; flexion 0-90* week 1-3      Manuals 2/13 2/15           R shoulder PROM/ MFR 24' 20           Progress note 8'                         Total Time: 32 20'                        Neuro Re-Ed                                                                                                        Ther Ex             Pendulums; CW, CCW 20 ea 20 ea           Table slides; flexion 10 20x           Supine shld flexion HH 10x5\" 5\" 20                                                                            Ther Activity                                       Gait Training                                       Modalities                                            "

## 2024-02-16 DIAGNOSIS — I10 ESSENTIAL HYPERTENSION: ICD-10-CM

## 2024-02-16 DIAGNOSIS — E03.9 ACQUIRED HYPOTHYROIDISM: ICD-10-CM

## 2024-02-16 DIAGNOSIS — I35.9 AORTIC VALVE DISEASE: ICD-10-CM

## 2024-02-16 RX ORDER — LOSARTAN POTASSIUM AND HYDROCHLOROTHIAZIDE 25; 100 MG/1; MG/1
1 TABLET ORAL DAILY
Qty: 90 TABLET | Refills: 0 | Status: SHIPPED | OUTPATIENT
Start: 2024-02-16

## 2024-02-16 RX ORDER — LEVOTHYROXINE SODIUM 0.12 MG/1
TABLET ORAL
Qty: 90 TABLET | Refills: 0 | Status: SHIPPED | OUTPATIENT
Start: 2024-02-16

## 2024-02-19 ENCOUNTER — OFFICE VISIT (OUTPATIENT)
Dept: PHYSICAL THERAPY | Facility: CLINIC | Age: 69
End: 2024-02-19
Payer: COMMERCIAL

## 2024-02-19 DIAGNOSIS — S46.011D TRAUMATIC TEAR OF RIGHT ROTATOR CUFF, UNSPECIFIED TEAR EXTENT, SUBSEQUENT ENCOUNTER: Primary | ICD-10-CM

## 2024-02-19 DIAGNOSIS — Z47.89 ENCOUNTER FOR OTHER ORTHOPEDIC AFTERCARE: ICD-10-CM

## 2024-02-19 PROCEDURE — 97110 THERAPEUTIC EXERCISES: CPT

## 2024-02-19 PROCEDURE — 97140 MANUAL THERAPY 1/> REGIONS: CPT

## 2024-02-19 NOTE — PROGRESS NOTES
"Daily Note     Today's date: 2024  Patient name: Ale Chavez  : 1955  MRN: [id number]  Referring provider: Richard Brown DO  Dx:   Encounter Diagnosis     ICD-10-CM    1. Traumatic tear of right rotator cuff, unspecified tear extent, subsequent encounter  S46.011D       2. Encounter for other orthopedic aftercare  Z47.89                      Subjective: Pt states feeling great without her sling on.       Objective: See treatment diary below      Assessment: Pt arrived with a sling don on. Progressed below TE with good tolerance and technique, no complain of pain or discomfort. Pt ROM is 5degrees away from WNL.      Plan: Continue per plan of care.      Dx: s/p R shoulder RTC repair (24)  EPOC:  CO-MORBIDITIES:  PERSONAL FACTORS:   Precautions: see MD protocol; flexion 0-90* week 1-3      Manuals 2/13 2/15 2/19          R shoulder PROM/ MFR 24' 20 Wdc 15          Progress note 8'                         Total Time: 32 20' 15'                       Neuro Re-Ed                                                                                                        Ther Ex             Pendulums; CW, CCW 20 ea 20 ea 20 ea          Table slides; flexion 10 20x 20x           Supine shld flexion HH 10x5\" 5\" 20 5\" 20          Supine punches AAROM   20          Supine ER   20          Hammer curls   20          Bicep curls   20          Forearm pro/sup   20                                    Ther Activity                                       Gait Training                                       Modalities                                            "

## 2024-02-21 ENCOUNTER — OFFICE VISIT (OUTPATIENT)
Dept: PHYSICAL THERAPY | Facility: CLINIC | Age: 69
End: 2024-02-21
Payer: COMMERCIAL

## 2024-02-21 DIAGNOSIS — Z47.89 ENCOUNTER FOR OTHER ORTHOPEDIC AFTERCARE: ICD-10-CM

## 2024-02-21 DIAGNOSIS — S46.011D TRAUMATIC TEAR OF RIGHT ROTATOR CUFF, UNSPECIFIED TEAR EXTENT, SUBSEQUENT ENCOUNTER: Primary | ICD-10-CM

## 2024-02-21 PROBLEM — Z01.419 ENCOUNTER FOR GYNECOLOGICAL EXAMINATION WITHOUT ABNORMAL FINDING: Status: RESOLVED | Noted: 2024-02-12 | Resolved: 2024-02-21

## 2024-02-21 PROBLEM — R05.8 COUGH WITH SPUTUM: Status: RESOLVED | Noted: 2019-09-20 | Resolved: 2024-02-21

## 2024-02-21 PROBLEM — R05.8 SPASMODIC COUGH: Status: RESOLVED | Noted: 2020-03-09 | Resolved: 2024-02-21

## 2024-02-21 PROBLEM — Z13.820 SCREENING FOR OSTEOPOROSIS: Status: RESOLVED | Noted: 2024-02-12 | Resolved: 2024-02-21

## 2024-02-21 PROCEDURE — 97140 MANUAL THERAPY 1/> REGIONS: CPT | Performed by: PHYSICAL THERAPIST

## 2024-02-21 PROCEDURE — 97110 THERAPEUTIC EXERCISES: CPT | Performed by: PHYSICAL THERAPIST

## 2024-02-21 NOTE — PROGRESS NOTES
"Daily Note     Today's date: 2024  Patient name: Ale Chavez  : 1955  MRN: [id number]  Referring provider: Richard Brown DO  Dx:   Encounter Diagnosis     ICD-10-CM    1. Traumatic tear of right rotator cuff, unspecified tear extent, subsequent encounter  S46.011D       2. Encounter for other orthopedic aftercare  Z47.89                      Subjective:  Pt reports her arm was a little sore today 2* returning to work for the first time and sitting at a desk that was a little high for her shoulders.      Objective: See treatment diary below      Assessment: Tolerated treatment well. Patient exhibited good technique with therapeutic exercises. Pt progressed according to MD protocol. Pt had some soreness at end PROM.      Plan: Continue per plan of care. Focus on improving AAROM.     Dx: s/p R shoulder RTC repair (24)  EPOC:  CO-MORBIDITIES:  PERSONAL FACTORS:   Precautions: see MD protocol;       Manuals 2/13 2/15 2/19 2/21         R shoulder PROM/ MFR 24' 20 Wdc 15 9'         R upper trap MFR    4'         Progress note 8'            R shld GHJ mobs grade II    2'         Total Time: 32 20' 15' 15'                      Neuro Re-Ed                                                                                                        Ther Ex             Pendulums; CW, CCW 20 ea 20 ea 20 ea          Pulleys: flex & scap for ROM 10 20x 20x  3' ea         Supine shld flexion HH 10x5\" 5\" 20 5\" 20 10 1#         Supine cane ER AAROM    10 5\"         Standing shld ext over table    NV         Standing row over table    NV         Supine punches AAROM   20 20         S/l ER AROM   20 10         Hammer curls   20          Bicep curls   20          Supine shld centering   20 10         Shoulder isometrics; flex, scap, IR. ER, abd    5x5\" ea hold                     Ther Activity                                       Gait Training                                       Modalities                             "

## 2024-02-26 ENCOUNTER — OFFICE VISIT (OUTPATIENT)
Dept: PHYSICAL THERAPY | Facility: CLINIC | Age: 69
End: 2024-02-26
Payer: COMMERCIAL

## 2024-02-26 DIAGNOSIS — S46.011D TRAUMATIC TEAR OF RIGHT ROTATOR CUFF, UNSPECIFIED TEAR EXTENT, SUBSEQUENT ENCOUNTER: Primary | ICD-10-CM

## 2024-02-26 DIAGNOSIS — Z47.89 ENCOUNTER FOR OTHER ORTHOPEDIC AFTERCARE: ICD-10-CM

## 2024-02-26 PROCEDURE — 97112 NEUROMUSCULAR REEDUCATION: CPT

## 2024-02-26 PROCEDURE — 97110 THERAPEUTIC EXERCISES: CPT

## 2024-02-26 PROCEDURE — 97140 MANUAL THERAPY 1/> REGIONS: CPT

## 2024-02-26 NOTE — PROGRESS NOTES
"Daily Note     Today's date: 2024  Patient name: Ale Chavez  : 1955  MRN: [id number]  Referring provider: Richard Brown DO  Dx:   Encounter Diagnosis     ICD-10-CM    1. Traumatic tear of right rotator cuff, unspecified tear extent, subsequent encounter  S46.011D       2. Encounter for other orthopedic aftercare  Z47.89                      Subjective:  Pt reports her arm was fine after lv.       Objective: See treatment diary below      Assessment: Pt tolerated increased reps and new TE very well, pt felt comfortable with increased reps/new TE, no increased pain or discomfort.      Plan: Continue per plan of care. Focus on improving AAROM.     Dx: s/p R shoulder RTC repair (24)  EPOC:  CO-MORBIDITIES:                               PERSONAL FACTORS:   Precautions: see MD protocol;       Manuals 2/13 2/15 2/19 2/21 2/26        R shoulder PROM/ MFR 24' 20 Wdc 15 9' Wdc 10'        R upper trap MFR    4'         Progress note 8'            R shld GHJ mobs grade II    2'         Total Time: 32 20' 15' 15' 10'                     Neuro Re-Ed                                                                                                        Ther Ex             Pendulums; CW, CCW 20 ea 20 ea 20 ea  20x        Pulleys: flex & scap for ROM 10 20x 20x  3' ea 3' ea        Supine shld flexion HH 10x5\" 5\" 20 5\" 20 10 1# 1# 20        Supine cane ER AAROM    10 5\"         Standing ER             Supine punches     20        Standing shld ext over table    NV 20        Standing row over table    NV 20        Supine punches AAROM   20 20 20        S/l ER AROM   20 10   20        Hammer curls   20  20        Bicep curls   20  20        Supine shld centering   20 10 20        Shoulder isometrics; flex, scap, IR. ER, abd    5x5\" ea hold                     Ther Activity                                       Gait Training                                       Modalities                                     " From: Anay Hay  To: Dana Bonds MD  Sent: 2/26/2020 9:56 AM CST  Subject: Medication Question    Hello    I just returned from Dr. Mirza appointment, he is thinking maybe the lithium and lamictal could be causing my shaking. He was not able to get mri results yet. Only the picture, he said there was something questionable on the picture. But he wants to see the report. If there is something I will have smoother mri in 6 months. I go back in 1 year to see him. What are your thoughts on the medication?  Please advise    Thank you  Courtney Hay

## 2024-02-27 ENCOUNTER — OFFICE VISIT (OUTPATIENT)
Dept: CARDIOLOGY CLINIC | Facility: CLINIC | Age: 69
End: 2024-02-27
Payer: COMMERCIAL

## 2024-02-27 VITALS
WEIGHT: 204 LBS | BODY MASS INDEX: 32.02 KG/M2 | HEIGHT: 67 IN | DIASTOLIC BLOOD PRESSURE: 82 MMHG | HEART RATE: 69 BPM | SYSTOLIC BLOOD PRESSURE: 138 MMHG

## 2024-02-27 DIAGNOSIS — I70.90 ATHEROSCLEROSIS: ICD-10-CM

## 2024-02-27 DIAGNOSIS — R93.1 ELEVATED CORONARY ARTERY CALCIUM SCORE: ICD-10-CM

## 2024-02-27 DIAGNOSIS — Q24.8 LEFT VENTRICULAR OUTFLOW TRACT OBSTRUCTION: ICD-10-CM

## 2024-02-27 DIAGNOSIS — I10 ESSENTIAL HYPERTENSION: Primary | ICD-10-CM

## 2024-02-27 PROCEDURE — 99214 OFFICE O/P EST MOD 30 MIN: CPT | Performed by: INTERNAL MEDICINE

## 2024-02-27 RX ORDER — LOSARTAN POTASSIUM AND HYDROCHLOROTHIAZIDE 25; 100 MG/1; MG/1
1 TABLET ORAL DAILY
Qty: 90 TABLET | Refills: 3 | Status: SHIPPED | OUTPATIENT
Start: 2024-02-27

## 2024-02-27 RX ORDER — PRAVASTATIN SODIUM 20 MG
20 TABLET ORAL DAILY
Qty: 90 TABLET | Refills: 3 | Status: SHIPPED | OUTPATIENT
Start: 2024-02-27

## 2024-02-27 RX ORDER — AMLODIPINE BESYLATE 5 MG/1
5 TABLET ORAL DAILY
Qty: 90 TABLET | Refills: 3 | Status: SHIPPED | OUTPATIENT
Start: 2024-02-27

## 2024-02-27 NOTE — PROGRESS NOTES
Cardiology Outpatient Follow-Up Note - Ale Chavez 68 y.o. female MRN: [id number]      Assessment/Plan:    1. Essential hypertension  Near goal on current therapy. Some BP values with SBP as low as 100-110. We will maintain current therapy. Continue Hyzaar 100-25 mg daily and amlodipine 5 mg daily.   - losartan-hydrochlorothiazide (HYZAAR) 100-25 MG per tablet; Take 1 tablet by mouth daily  Dispense: 90 tablet; Refill: 3  - amLODIPine (NORVASC) 5 mg tablet; Take 1 tablet (5 mg total) by mouth daily  Dispense: 90 tablet; Refill: 3    2. Elevated coronary artery calcium score  Continue pravastatin for primary prevention of ASVD.   - pravastatin (PRAVACHOL) 20 mg tablet; Take 1 tablet (20 mg total) by mouth daily  Dispense: 90 tablet; Refill: 3    3. Atherosclerosis    4. Left ventricular outflow tract obstruction  We will periodically monitor with echocardiograms. Her AV morphology is unclear due to 2D image quality however the obstruction appears to be at the level of the LVOT. It has not progressed much over the years. We will check an echo again in 2 years from prior.           We will see Ale Chavez back in 12 months for routine follow-up.    Subjective:     HPI: Ale Chavez is a 68 y.o. year old female with HTN, pre-DM, elevated CAC and increased transaortic flow velocity of unclear etiology who presents for routine follow-up.     She has been noted to have increased transaortic flow velocity since echo on 10/30/2015, at which time her AV Vmax was 2.11m/s. There were no other abnormalities. On 6/25/2018, Vmax was 2.16m/s with LVOT Vmax of 1.75m/s. On 7/14/2020, her AV Vmax was 2.33m/s with LVOT Vmax of 1.9m/s. Unfortunately, 2D image quality is poor throughout the studies and AV leaflets cannot be visualized well. There has been some concern through the years that her valve may be bicuspid, however in my personal review of the 2020 and 2018 echo there is not sufficient image detail to make this determination.  "If anything, her 06/25/2018 echo has one view where the AV appears trileaflet. Her echo in Oct 2023 had a Vmax of 2.46 m/s and obstruction appeared to be at level of LVOT with a peak gradient of 23 mmHg.     Due to her slightly elevated 10-year ASCVD risk, we performed coronary artery calcium scoring on 1/30/2023.  Her LAD score was 218, circumflex 37, total calcium score 255. She did not tolerate rosuvastatin due to leg cramps. She is on pravastatin 20 mg daily now.     No complaints today.       ROS:  Review of Systems:     Echo 10/17/23    Interpretation Summary       Left Ventricle: Left ventricular cavity size is normal. Wall thickness is normal. There is mild asymmetric hypertrophy of the basal septal wall. The left ventricular ejection fraction is 65-70%. Systolic function is vigorous. Global longitudinal strain is normal at -19%. Wall motion is normal. Diastolic function is mildly abnormal, consistent with grade I (abnormal) relaxation. There is  outflow tract dynamic obstruction at rest with a peak gradient of 23.0 mmHg.    Right Ventricle: Right ventricular cavity size is normal. Systolic function is normal.    Tricuspid Valve: There is mild regurgitation.     Strain was performed to quantify interventricular dyssynchrony and evaluate components of myocardial function due to LVH, chest pain. Results from the utilization of Strain Analysis are listed in the report below.            Objective:     Vitals:   Vitals:    02/27/24 0801   BP: 138/82   BP Location: Left arm   Patient Position: Sitting   Cuff Size: Standard   Pulse: 69   Weight: 92.5 kg (204 lb)   Height: 5' 7\" (1.702 m)    Body surface area is 2.04 meters squared.  Wt Readings from Last 3 Encounters:   02/27/24 92.5 kg (204 lb)   02/14/24 92.1 kg (203 lb 1.9 oz)   02/12/24 92.4 kg (203 lb 12.8 oz)       Physical Exam:    General: Ale Chavez is a well appearing female, in no acute distress, sitting comfortably  HEENT: moist mucous membranes, " EOMI  Neck:  No JVD, supple, trachea midline  Cardiovascular: unremarkable S1/S2, regular rate and rhythm, 3/6 systolic murmur  Pulmonary: normal respiratory effort, CTAB  Abdomen: soft and nondistended  Extremities: No lower extremity edema. Warm and well perfused extremities.  Neuro: no focal motor deficits, AAOx3 (person, place, time)  Psych: Normal mood and affect, cooperative        Medications (at the START of this encounter):  Outpatient Medications Prior to Visit   Medication Sig Dispense Refill    albuterol (Ventolin HFA) 90 mcg/act inhaler Inhale 2 puffs every 6 (six) hours as needed for wheezing or shortness of breath 18 g 3    amLODIPine (NORVASC) 5 mg tablet TAKE ONE TABLET BY MOUTH EVERY DAY 90 tablet 0    Ascorbic Acid (vitamin C) 100 MG tablet Take 100 mg by mouth daily      cholecalciferol (VITAMIN D3) 400 units tablet Take 400 Units by mouth daily Pt taking 1000 units      Cyanocobalamin (VITAMIN B 12 PO) Take by mouth      Efinaconazole (Jublia) 10 % SOLN Apply 1 application topically in the morning (Patient taking differently: Apply 1 application. topically if needed) 8 mL 6    fexofenadine (ALLEGRA) 180 MG tablet Take 1 tablet (180 mg total) by mouth daily 90 tablet 0    fluticasone (Arnuity Ellipta) 100 MCG/ACT AEPB inhaler Inhale 1 puff daily Rinse mouth after use. 90 blister 8    guaiFENesin (MUCINEX) 600 mg 12 hr tablet Take 1,200 mg by mouth every 12 (twelve) hours      ketoconazole (NIZORAL) 2 % cream Apply topically daily      ketoconazole (NIZORAL) 2 % cream Apply topically daily 60 g 1    levothyroxine 125 mcg tablet TAKE ONE TABLET BY MOUTH EVERY DAY. 90 tablet 0    losartan-hydrochlorothiazide (HYZAAR) 100-25 MG per tablet TAKE ONE TABLET BY MOUTH EVERY DAY 90 tablet 0    nystatin (MYCOSTATIN) powder Apply topically 3 (three) times a day for 14 days 30 g 0    pantoprazole (PROTONIX) 40 mg tablet TAKE ONE TABLET BY MOUTH 2 TIMES A  tablet 0    pravastatin (PRAVACHOL) 20 mg  tablet Take 1 tablet (20 mg total) by mouth daily 90 tablet 1    semaglutide (Rybelsus) 7 MG tablet Take 1 tablet (7 mg total) by mouth daily before breakfast 90 tablet 0    XIIDRA 5 % op solution Administer 1 drop to both eyes in the morning      aspirin 81 mg chewable tablet Chew 162 mg daily (Patient not taking: Reported on 2/27/2024)      ketoconazole (NIZORAL) 2 % cream Apply topically daily for 7 days (Patient taking differently: Apply 1 Application topically if needed) 30 g 0    oxyCODONE (Roxicodone) 5 immediate release tablet Take 1 tablet (5 mg total) by mouth every 4 (four) hours as needed for moderate pain for up to 20 doses Max Daily Amount: 30 mg (Patient not taking: Reported on 2/27/2024) 20 tablet 0    aspirin 81 mg chewable tablet Chew 1 tablet (81 mg total) 2 (two) times a day (Patient not taking: Reported on 2/27/2024) 60 tablet 0    ondansetron (ZOFRAN) 4 mg tablet Take 1 tablet (4 mg total) by mouth every 8 (eight) hours as needed for nausea or vomiting for up to 15 doses 15 tablet 0     Facility-Administered Medications Prior to Visit   Medication Dose Route Frequency Provider Last Rate Last Admin    lidocaine (XYLOCAINE) 1 % injection 2 mL  2 mL Injection  Ramón Galdamez DPM   2 mL at 08/14/23 1500    triamcinolone acetonide (KENALOG-40) 40 mg/mL injection 20 mg  20 mg Intra-articular  Ramón Galdamez DPM   20 mg at 08/14/23 1500                 Labs & Results:  Lipid profile 09/22/2022 , , HDL 32, LDL 81 mg/dL  Lipid panel  10/3/23 - , , HDL 36, LDL 41 mg/dL     CMP 10/3/23 - K 3.4, Cr 0.73  HbA1c 10/3/23 - 6.1%    EKG personally reviewed:  EKG in the office today shows normal sinus rhythm, normal axis, normal intervals, nonpathologic Q-waves in the inferior leads, delayed anterior R-wave progression.  Abnormal study.  Unchanged from prior.        Time Spent:  Total time (face-to-face and non-face-to-face) spent on today's visit was 24 minutes. This includes preparation for the  "visits (i.e. reviewing test results), performance of a medically appropriate history and examination, and orders for medications, tests or other procedures. This time is exclusive of procedures performed and time spent teaching.      This note was completed in part utilizing [employer] direct voice recognition software. Grammatical errors, random word insertion, spelling mistakes, occasional wrong word or \"sound-alike\" substitutions and incomplete sentences may be an occasional consequence of the system secondary to software limitations, ambient noise and hardware issues. At the time of dictation, efforts were made to edit, clarify and /or correct errors.  Please read the chart carefully and recognize, using context, where substitutions have occurred.  If you have any questions or concerns about the context, text or information contained within the body of this dictation, please contact myself, the provider, for further clarification.    "

## 2024-02-28 ENCOUNTER — OFFICE VISIT (OUTPATIENT)
Dept: PHYSICAL THERAPY | Facility: CLINIC | Age: 69
End: 2024-02-28
Payer: COMMERCIAL

## 2024-02-28 DIAGNOSIS — S46.011D TRAUMATIC TEAR OF RIGHT ROTATOR CUFF, UNSPECIFIED TEAR EXTENT, SUBSEQUENT ENCOUNTER: Primary | ICD-10-CM

## 2024-02-28 PROCEDURE — 97112 NEUROMUSCULAR REEDUCATION: CPT

## 2024-02-28 PROCEDURE — 97140 MANUAL THERAPY 1/> REGIONS: CPT

## 2024-02-28 PROCEDURE — 97110 THERAPEUTIC EXERCISES: CPT

## 2024-02-28 NOTE — PROGRESS NOTES
"Daily Note     Today's date: 2024  Patient name: Ale Chvaez  : 1955  MRN: [id number]  Referring provider: Richard Brown DO  Dx:   Encounter Diagnosis     ICD-10-CM    1. Traumatic tear of right rotator cuff, unspecified tear extent, subsequent encounter  S46.011D                      Subjective:  Pt reports her arm is sore.      Objective: See treatment diary below      Assessment: Pt performed below TE with great tolerance and technique, no complain of pain solely ms soreness/fatigue..      Plan: Continue per plan of care. Focus on improving AAROM.     Dx: s/p R shoulder RTC repair (24)  EPOC:  CO-MORBIDITIES:                               PERSONAL FACTORS:   Precautions: see MD protocol;       Manuals 2/13 2/15 2/19 2/21 2/26 2/28       R shoulder PROM/ MFR 24' 20 Wdc 15 9' Wdc 10' Wdc 10'       R upper trap MFR    4'         Progress note 8'            R shld GHJ mobs grade II    2'         Total Time: 32 20' 15' 15' 10' 10'                    Neuro Re-Ed                                                                                                        Ther Ex             Pendulums; CW, CCW 20 ea 20 ea 20 ea  20x 20x       Pulleys: flex & scap for ROM 10 20x 20x  3' ea 3' ea 3' ea       Supine shld flexion HH 10x5\" 5\" 20 5\" 20 10 1# 1# 20 1# 20       Supine cane ER AAROM    10 5\"  5\" 15       Standing shld ext over table    NV 20 20       Standing row over table    NV 20 20       Supine punches AAROM   20 20 20 20 AROM       S/l ER AROM   20 10   20 20       Hammer curls   20  20 20       Bicep curls   20  20 20       Supine shld centering   20 10 20 20       Standing flexion AAROM +Spc      5\" 20       Standing ER AAROM +spc      5\" 20       Shoulder isometrics; flex, scap, IR. ER, abd    5x5\" ea hold                     Ther Activity                                       Gait Training                                       Modalities                                              "

## 2024-03-04 ENCOUNTER — OFFICE VISIT (OUTPATIENT)
Dept: PHYSICAL THERAPY | Facility: CLINIC | Age: 69
End: 2024-03-04
Payer: COMMERCIAL

## 2024-03-04 DIAGNOSIS — Z47.89 ENCOUNTER FOR OTHER ORTHOPEDIC AFTERCARE: ICD-10-CM

## 2024-03-04 DIAGNOSIS — S46.011D TRAUMATIC TEAR OF RIGHT ROTATOR CUFF, UNSPECIFIED TEAR EXTENT, SUBSEQUENT ENCOUNTER: Primary | ICD-10-CM

## 2024-03-04 PROCEDURE — 97112 NEUROMUSCULAR REEDUCATION: CPT

## 2024-03-04 PROCEDURE — 97110 THERAPEUTIC EXERCISES: CPT

## 2024-03-04 PROCEDURE — 97140 MANUAL THERAPY 1/> REGIONS: CPT

## 2024-03-04 NOTE — PROGRESS NOTES
"Daily Note     Today's date: 3/4/2024  Patient name: Ale Chavez  : 1955  MRN: [id number]  Referring provider: Richard Brown DO  Dx:   Encounter Diagnosis     ICD-10-CM    1. Traumatic tear of right rotator cuff, unspecified tear extent, subsequent encounter  S46.011D       2. Encounter for other orthopedic aftercare  Z47.89                      Subjective:  Pt reports her arm is sore.      Objective: See treatment diary below  FOTO given (___) and Re-eval Schedule (___)      Assessment: Pt performed below TE and progressed below TE with great tolerance and technique, no complain of pain solely ms soreness/fatigue..      Plan: Continue per plan of care. Focus on improving AAROM.     Dx: s/p R shoulder RTC repair (24)  EPOC:  CO-MORBIDITIES:                               PERSONAL FACTORS:   Precautions: see MD protocol;       Manuals 2/21 2/26 2/28 3/4      R shoulder PROM/ MFR 9' Wdc 10' Wdc 10' Wdc 10'      R upper trap MFR 4'         Progress note          R shld GHJ mobs grade II 2'         Total Time: 15' 10' 10' 10'                Neuro Re-Ed                                                                                Ther Ex          Pendulums; CW, CCW  20x 20x 1# 20      Pulleys: flex & scap for ROM 3' ea 3' ea 3' ea 3'/3'      Supine shld flexion HH 10 1# 1# 20 1# 20 1# 20      Supine cane ER AAROM 10 5\"  5\" 15 5\" 20      Standing shld ext over table NV 20 20 20      Standing row over table NV 20 20 20      Supine punches AAROM 20 20 20 AROM 20 AROM      S/l ER AROM 10   20 20 20      Hammer curls  20 20 1# 20      Bicep curls  20 20 1# 20      Supine shld centering 10 20 20 20      Standing flexion AAROM +Spc   5\" 20 5\" 20      Standing ER AAROM +spc   5\" 20 5\" 20      Shoulder isometrics; flex, scap, IR. ER, abd 5x5\" ea hold  5\" 10 ea                Ther Activity          Wall slides flex/scap + pillowcase    2x5 ea                Gait Training                              Modalities   "

## 2024-03-06 ENCOUNTER — APPOINTMENT (OUTPATIENT)
Dept: PHYSICAL THERAPY | Facility: CLINIC | Age: 69
End: 2024-03-06
Payer: COMMERCIAL

## 2024-03-11 ENCOUNTER — OFFICE VISIT (OUTPATIENT)
Dept: PHYSICAL THERAPY | Facility: CLINIC | Age: 69
End: 2024-03-11
Payer: COMMERCIAL

## 2024-03-11 DIAGNOSIS — S46.011D TRAUMATIC TEAR OF RIGHT ROTATOR CUFF, UNSPECIFIED TEAR EXTENT, SUBSEQUENT ENCOUNTER: Primary | ICD-10-CM

## 2024-03-11 DIAGNOSIS — Z47.89 ENCOUNTER FOR OTHER ORTHOPEDIC AFTERCARE: ICD-10-CM

## 2024-03-11 PROCEDURE — 97140 MANUAL THERAPY 1/> REGIONS: CPT | Performed by: PHYSICAL THERAPIST

## 2024-03-11 PROCEDURE — 97110 THERAPEUTIC EXERCISES: CPT | Performed by: PHYSICAL THERAPIST

## 2024-03-11 NOTE — PROGRESS NOTES
"Daily Note     Today's date: 3/11/2024  Patient name: Ale Chavez  : 1955  MRN: [id number]  Referring provider: Richard Brown DO  Dx:   Encounter Diagnosis     ICD-10-CM    1. Traumatic tear of right rotator cuff, unspecified tear extent, subsequent encounter  S46.011D       2. Encounter for other orthopedic aftercare  Z47.89                      Subjective: Pt reports her shoulder is feeling good. Pt is completing all her functional activities at home      Objective: See treatment diary below      Assessment: Tolerated treatment well. Patient exhibited good technique with therapeutic exercises. Pt has full and painfree R shoulder PROM      Plan: Continue per plan of care. Focus on progressing according to MD protocol.     Dx: s/p R shoulder RTC repair (24)  EPOC: 24  CO-MORBIDITIES:                               PERSONAL FACTORS:   Precautions: see MD protocol;       Manuals 2/21 2/26 2/28 3/4 3/22     R shoulder PROM/ MFR 9' Wdc 10' Wdc 10' Wdc 10' 8'     R upper trap MFR 4'    3'     Progress note          R shld GHJ mobs grade II 2'         Total Time: 15' 10' 10' 10' 12'               Neuro Re-Ed                                                                                Ther Ex          Pendulums; CW, CCW  20x 20x 1# 20 2# x20     Pulleys: flex & scap for ROM 3' ea 3' ea 3' ea 3'/3' 1'/1'     Supine shld flexion HH 10 1# 1# 20 1# 20 1# 20 2# x10     Supine cane ER AAROM 10 5\"  5\" 15 5\" 20 10 x5\"     Standing shld ext over table NV 20 20 20 20     Standing row over table NV 20 20 20 20     Supine punches AAROM 20 20 20 AROM 20 AROM 20     S/l ER AROM 10   20 20 20 20     Hammer curls  20 20 1# 20 2# x10     Bicep curls  20 20 1# 20 2# x10     Supine shld centering 10 20 20 20 20     Standing flexion AAROM +Spc   5\" 20 5\" 20 10x5\" 2#     Standing ER AAROM +spc   5\" 20 5\" 20 10x5\"     Shoulder isometrics; flex, scap, IR. ER, abd 5x5\" ea hold  5\" 10 ea 5x5\" ea               Ther Activity "          Wall slides flex/scap + pillowcase    2x5 ea X20 ea               Gait Training                              Modalities

## 2024-03-13 ENCOUNTER — APPOINTMENT (OUTPATIENT)
Dept: PHYSICAL THERAPY | Facility: CLINIC | Age: 69
End: 2024-03-13
Payer: COMMERCIAL

## 2024-03-18 ENCOUNTER — OFFICE VISIT (OUTPATIENT)
Age: 69
End: 2024-03-18
Payer: COMMERCIAL

## 2024-03-18 ENCOUNTER — OFFICE VISIT (OUTPATIENT)
Dept: PHYSICAL THERAPY | Facility: CLINIC | Age: 69
End: 2024-03-18
Payer: COMMERCIAL

## 2024-03-18 ENCOUNTER — TELEPHONE (OUTPATIENT)
Dept: FAMILY MEDICINE CLINIC | Facility: HOSPITAL | Age: 69
End: 2024-03-18

## 2024-03-18 ENCOUNTER — APPOINTMENT (OUTPATIENT)
Dept: LAB | Facility: CLINIC | Age: 69
End: 2024-03-18
Payer: COMMERCIAL

## 2024-03-18 VITALS
DIASTOLIC BLOOD PRESSURE: 68 MMHG | WEIGHT: 200 LBS | SYSTOLIC BLOOD PRESSURE: 116 MMHG | OXYGEN SATURATION: 99 % | BODY MASS INDEX: 31.39 KG/M2 | HEART RATE: 73 BPM | HEIGHT: 67 IN

## 2024-03-18 DIAGNOSIS — Z47.89 ENCOUNTER FOR OTHER ORTHOPEDIC AFTERCARE: ICD-10-CM

## 2024-03-18 DIAGNOSIS — J30.1 SEASONAL ALLERGIC RHINITIS DUE TO POLLEN: ICD-10-CM

## 2024-03-18 DIAGNOSIS — J45.20 MILD INTERMITTENT ASTHMA WITHOUT COMPLICATION: Primary | ICD-10-CM

## 2024-03-18 DIAGNOSIS — E66.01 CLASS 2 SEVERE OBESITY DUE TO EXCESS CALORIES WITH SERIOUS COMORBIDITY AND BODY MASS INDEX (BMI) OF 36.0 TO 36.9 IN ADULT (HCC): ICD-10-CM

## 2024-03-18 DIAGNOSIS — J45.30 MILD PERSISTENT ASTHMA WITHOUT COMPLICATION: ICD-10-CM

## 2024-03-18 DIAGNOSIS — Z87.891 FORMER SMOKER: ICD-10-CM

## 2024-03-18 DIAGNOSIS — S46.011D TRAUMATIC TEAR OF RIGHT ROTATOR CUFF, UNSPECIFIED TEAR EXTENT, SUBSEQUENT ENCOUNTER: Primary | ICD-10-CM

## 2024-03-18 PROCEDURE — 97140 MANUAL THERAPY 1/> REGIONS: CPT | Performed by: PHYSICAL THERAPIST

## 2024-03-18 PROCEDURE — 97110 THERAPEUTIC EXERCISES: CPT | Performed by: PHYSICAL THERAPIST

## 2024-03-18 PROCEDURE — 99213 OFFICE O/P EST LOW 20 MIN: CPT | Performed by: NURSE PRACTITIONER

## 2024-03-18 PROCEDURE — 36415 COLL VENOUS BLD VENIPUNCTURE: CPT

## 2024-03-18 PROCEDURE — 86003 ALLG SPEC IGE CRUDE XTRC EA: CPT

## 2024-03-18 PROCEDURE — 82785 ASSAY OF IGE: CPT

## 2024-03-18 RX ORDER — PROMETHAZINE HYDROCHLORIDE AND CODEINE PHOSPHATE 6.25; 1 MG/5ML; MG/5ML
5 SYRUP ORAL EVERY 4 HOURS PRN
Qty: 118 ML | Refills: 0 | Status: SHIPPED | OUTPATIENT
Start: 2024-03-18

## 2024-03-18 RX ORDER — ORAL SEMAGLUTIDE 7 MG/1
7 TABLET ORAL DAILY
Qty: 90 TABLET | Refills: 0 | Status: SHIPPED | OUTPATIENT
Start: 2024-03-18

## 2024-03-18 RX ORDER — ALBUTEROL SULFATE 90 UG/1
2 AEROSOL, METERED RESPIRATORY (INHALATION) EVERY 6 HOURS PRN
Qty: 18 G | Refills: 3 | Status: SHIPPED | OUTPATIENT
Start: 2024-03-18

## 2024-03-18 RX ORDER — PREDNISONE 10 MG/1
TABLET ORAL DAILY
Qty: 20 TABLET | Refills: 0 | Status: SHIPPED | OUTPATIENT
Start: 2024-03-18 | End: 2024-03-25

## 2024-03-18 NOTE — PROGRESS NOTES
Pulmonary Follow-Up Note   Ale Chavez 68 y.o. female MRN: [id number]  3/18/2024      Assessment/Plan:    Diagnoses and all orders for this visit:    Mild intermittent asthma without complication  Lungs are clear to exam today however she does endorse PND and recent illness. She reports March annually is associated with these types of infections  Start prednisone taper  Codeine cough syrup 5mL at bedtime as needed for cough (PDMP checked)  Patient to call if not improving      Former smoker  Quit 12 years ago; remains committed to abstinence  Most recent chest CT 2019  40 PYH - she is eligible for lung cancer screening until 15 years post-cessation    Vaccines: up to date    Return in about 6 months (around 9/18/2024).    All of Kira's questions were answered prior to leaving the office today.  She is aware to call our office with any further questions or concerns.    History of Present Illness   Reason for Visit: sick visit  Chief Complaint:     HPI: Ale Chavez is a 68 y.o. female who presents to the office today for urgent visit.    Last week had a head cold and did miss 2 days of work - but was back to work on Friday. Now has persistent cough with thick mucus that is difficult to move. She has not looked at it. Today felt like the mucus made her choke and nearly throw up. She is adding Mucinex 2-3x per day (one 4h pill). Not sure it's helpful. Denies sinus pressure but she does have PND. No further fever or chills since last Thursday. This time of year does seem to trigger symptoms for her.    She is taking Arnuity daily  Albuterol HFA - 2-3x per day lately      Review of Systems   Constitutional:  Negative for chills, fatigue and fever.   HENT:  Negative for congestion and postnasal drip.    Respiratory:  Positive for cough and choking. Negative for chest tightness, shortness of breath and wheezing.    Cardiovascular:  Negative for chest pain, palpitations and leg swelling.   Gastrointestinal:  Negative for  abdominal pain.   All other systems reviewed and are negative.      Historical Information   Past Medical History:   Diagnosis Date    Abdominal pain     Allergic     Arthritis     Asthma     Basal cell carcinoma     Bicuspid aortic valve     LAST ASSESSED 2012    Cancer (HCC)     skin    Cervical disc disease     last assessed [id number]    Cervical stenosis of spine     LAST ASSESSED 2016    Colon polyp     COVID     Disease of thyroid gland     hypothyroid    Dry eyes     Endometriosis     GERD (gastroesophageal reflux disease)     Heart murmur     History of screening mammography 2023    Bi-Rads 1.    Hot flashes     Hyperglycemia     Hyperlipidemia     Hypertension     Hypothyroidism     Kidney stone     Left ventricular hypertrophy     Mitral valvular disorder     Nephrolithiasis     Ovarian cyst, complex     Pneumonia     800.00    Ptosis     LAST ASSESSED [id number]    Renal calculi     Right cervical radiculopathy     LAST ASSESSED 2016    Rotator cuff disorder, right     Seasonal allergies     Squamous cell skin cancer     Thyroid disease     Visual impairment     Wears contact lenses     Wears glasses     Wears partial dentures      Past Surgical History:   Procedure Laterality Date    BASAL CELL CARCINOMA EXCISION      CARPAL TUNNEL RELEASE Bilateral      SECTION      CHOLECYSTECTOMY      COLONOSCOPY      NEUROPLASTY / TRANSPOSITION MEDIAN NERVE AT CARPAL TUNNEL      NEUROPLASTY / TRANSPOSITION MEDIAN NERVE AT CARPAL TUNNEL      [facility]    OTHER SURGICAL HISTORY      surgically removed skin patch for skin cancer    PLANTAR FASCIECTOMY Left     KS COLONOSCOPY FLX DX W/COLLJ SPEC WHEN PFRMD N/A 10/09/2017    Procedure: COLONOSCOPY;  Surgeon: Franklyn Adam MD;  Location: [facility] GI [facility];  Service: Gastroenterology    KS HYSTEROSCOPY BX ENDOMETRIUM&/POLYPC W/WO D&C N/A 2018    Procedure: DILATATION AND CURETTAGE (D&C) WITH HYSTEROSCOPY;  Surgeon: Ila Smith DO;   Location: [facility];  Service: Gynecology    NM SURGICAL ARTHROSCOPY JOSE W/CORACOACRM LIGM RLS Right 2024    Procedure: ARTHROSCOPIC SUBACROMIAL DECOMPRESSION;  Surgeon: Richard Brown DO;  Location: [facility];  Service: Orthopedics    NM SURGICAL ARTHROSCOPY SHOULDER W/ROTATOR CUFF RPR Right 2024    Procedure: REPAIR ROTATOR CUFF  ARTHROSCOPIC;  Surgeon: Richard Brown DO;  Location: [facility];  Service: Orthopedics    ROTATOR CUFF REPAIR Right 2024    SKIN BIOPSY      TUBAL LIGATION      UPPER GASTROINTESTINAL ENDOSCOPY       Family History   Problem Relation Age of Onset    Diabetes Mother     Alzheimer's disease Mother     Heart disease Mother     Hypertension Mother     Dementia Mother     No Known Problems Father     No Known Problems Sister     No Known Problems Sister     No Known Problems Maternal Grandmother     No Known Problems Maternal Grandfather     No Known Problems Paternal Grandmother     No Known Problems Paternal Grandfather     No Known Problems Daughter     No Known Problems Maternal Aunt     No Known Problems Maternal Aunt     No Known Problems Maternal Aunt     No Known Problems Maternal Aunt     No Known Problems Maternal Aunt     Other Family         back disorder    Cancer Family     Heart disease Family     Thyroid disease Family     Breast cancer Neg Hx     Ovarian cancer Neg Hx     Colon cancer Neg Hx      Social History   Social History     Substance and Sexual Activity   Alcohol Use Yes    Alcohol/week: 8.0 standard drinks of alcohol    Types: 4 Glasses of wine, 4 Standard drinks or equivalent per week    Comment: per month     Social History     Substance and Sexual Activity   Drug Use No     Social History     Tobacco Use   Smoking Status Former    Current packs/day: 0.00    Average packs/day: 1 pack/day for 40.0 years (40.0 ttl pk-yrs)    Types: Cigarettes    Start date:     Quit date: 2012    Years since quittin.2   Smokeless Tobacco Never      E-Cigarette/Vaping    E-Cigarette Use Never User      E-Cigarette/Vaping Substances    Nicotine No     THC No     CBD No     Flavoring No     Other No     Unknown No        Meds/Allergies     Current Outpatient Medications:     albuterol (Ventolin HFA) 90 mcg/act inhaler, Inhale 2 puffs every 6 (six) hours as needed for wheezing or shortness of breath, Disp: 18 g, Rfl: 3    amLODIPine (NORVASC) 5 mg tablet, Take 1 tablet (5 mg total) by mouth daily, Disp: 90 tablet, Rfl: 3    Ascorbic Acid (vitamin C) 100 MG tablet, Take 100 mg by mouth daily, Disp: , Rfl:     cholecalciferol (VITAMIN D3) 400 units tablet, Take 400 Units by mouth daily Pt taking 1000 units, Disp: , Rfl:     Cyanocobalamin (VITAMIN B 12 PO), Take by mouth, Disp: , Rfl:     Efinaconazole (Jublia) 10 % SOLN, Apply 1 application topically in the morning (Patient taking differently: Apply 1 application. topically if needed), Disp: 8 mL, Rfl: 6    fexofenadine (ALLEGRA) 180 MG tablet, Take 1 tablet (180 mg total) by mouth daily, Disp: 90 tablet, Rfl: 0    fluticasone (Arnuity Ellipta) 100 MCG/ACT AEPB inhaler, Inhale 1 puff daily Rinse mouth after use., Disp: 90 blister, Rfl: 8    guaiFENesin (MUCINEX) 600 mg 12 hr tablet, Take 1,200 mg by mouth every 12 (twelve) hours, Disp: , Rfl:     ketoconazole (NIZORAL) 2 % cream, Apply topically daily for 7 days (Patient taking differently: Apply 1 Application topically if needed), Disp: 30 g, Rfl: 0    ketoconazole (NIZORAL) 2 % cream, Apply topically daily, Disp: , Rfl:     ketoconazole (NIZORAL) 2 % cream, Apply topically daily, Disp: 60 g, Rfl: 1    levothyroxine 125 mcg tablet, TAKE ONE TABLET BY MOUTH EVERY DAY., Disp: 90 tablet, Rfl: 0    losartan-hydrochlorothiazide (HYZAAR) 100-25 MG per tablet, Take 1 tablet by mouth daily, Disp: 90 tablet, Rfl: 3    nystatin (MYCOSTATIN) powder, Apply topically 3 (three) times a day for 14 days, Disp: 30 g, Rfl: 0    pantoprazole (PROTONIX) 40 mg tablet, TAKE ONE  "TABLET BY MOUTH 2 TIMES A DAY, Disp: 180 tablet, Rfl: 0    pravastatin (PRAVACHOL) 20 mg tablet, Take 1 tablet (20 mg total) by mouth daily, Disp: 90 tablet, Rfl: 3    predniSONE 10 mg tablet, Take 4 tablets (40 mg total) by mouth daily for 2 days, THEN 3 tablets (30 mg total) daily for 2 days, THEN 2 tablets (20 mg total) daily for 2 days, THEN 1 tablet (10 mg total) daily for 2 days., Disp: 20 tablet, Rfl: 0    promethazine-codeine (PHENERGAN WITH CODEINE) 6.25-10 mg/5 mL syrup, Take 5 mL by mouth every 4 (four) hours as needed for cough, Disp: 118 mL, Rfl: 0    Rybelsus 7 MG tablet, Take 1 tablet by mouth daily, Disp: 90 tablet, Rfl: 0    XIIDRA 5 % op solution, Administer 1 drop to both eyes in the morning, Disp: , Rfl:     Current Facility-Administered Medications:     lidocaine (XYLOCAINE) 1 % injection 2 mL, 2 mL, Injection, , Ramón Galdamez DPM, 2 mL at 08/14/23 1500    triamcinolone acetonide (KENALOG-40) 40 mg/mL injection 20 mg, 20 mg, Intra-articular, , Ramón Galdamez DPM, 20 mg at 08/14/23 1500  Allergies   Allergen Reactions    Erythromycin GI Intolerance     Reaction Date: 11Jul2011;     Penicillins Rash     unknown    Aztreonam Rash     Action Taken: Allergy added by [employer] to replace Penicillins [employer];     Carbapenems Rash     Action Taken: Allergy added by [employer] to replace Penicillins Cross Reactors;     Cephalosporins Rash     Action Taken: Allergy added by [employer] to replace Penicillins Cross Reactors;     Griseofulvin Rash     Action Taken: Allergy added by [employer] to replace Penicillins Cross Reactors;     Influenza Virus Vaccine Hives, Rash and GI Intolerance       Vitals: Blood pressure 116/68, pulse 73, height 5' 7\" (1.702 m), weight 90.7 kg (200 lb), SpO2 99%, not currently breastfeeding. Body mass index is 31.32 kg/m². Oxygen Therapy  SpO2: 99 %  Oxygen Therapy: None (Room air)      Physical Exam  Vitals reviewed.   Constitutional:       Appearance: Normal appearance.   HENT: " "     Head: Normocephalic.      Nose: Nose normal.      Mouth/Throat:      Mouth: Mucous membranes are moist.      Pharynx: Oropharynx is clear.   Eyes:      Conjunctiva/sclera: Conjunctivae normal.      Pupils: Pupils are equal, round, and reactive to light.   Cardiovascular:      Rate and Rhythm: Normal rate and regular rhythm.      Pulses: Normal pulses.      Heart sounds: Normal heart sounds.   Pulmonary:      Effort: Pulmonary effort is normal.      Breath sounds: Normal breath sounds.   Abdominal:      General: Abdomen is flat. Bowel sounds are normal.      Palpations: Abdomen is soft.   Musculoskeletal:         General: Normal range of motion.   Skin:     General: Skin is warm and dry.      Capillary Refill: Capillary refill takes less than 2 seconds.   Neurological:      General: No focal deficit present.      Mental Status: She is alert and oriented to person, place, and time. Mental status is at baseline.   Psychiatric:         Mood and Affect: Mood normal.         Behavior: Behavior normal.         Thought Content: Thought content normal.         Judgment: Judgment normal.         Labs: no new labs     Imaging and other studies: No new pulmonary imaging since last visit      CHRISTIN Babin  [facility]        Portions of the record may have been created with voice recognition software.  Occasional wrong word or \"sound a like\" substitutions may have occurred due to the inherent limitations of voice recognition software.  Read the chart carefully and recognize, using context, where substitutions have occurred or contact the dictating provider.  "

## 2024-03-18 NOTE — TELEPHONE ENCOUNTER
Pt states [facility] does not have coupons for Rybelsus due to a recent cyber attack and monthly cost will be $500-$600. Is there an alternative to this? Pt only has 7 pills left. Please advise

## 2024-03-18 NOTE — PROGRESS NOTES
"Daily Note     Today's date: 3/18/2024  Patient name: Ale Chavez  : 1955  MRN: [id number]  Referring provider: Richard Brown DO  Dx:   Encounter Diagnosis     ICD-10-CM    1. Traumatic tear of right rotator cuff, unspecified tear extent, subsequent encounter  S46.011D       2. Encounter for other orthopedic aftercare  Z47.89                      Subjective:  Pt reports her shoulder feels stiff today after missing therapy last visit.      Objective: See treatment diary below      Assessment: Tolerated treatment well. Patient exhibited good technique with therapeutic exercises. Pt was challenged by scaption wall slides. Pt felt relief after manual therapy. Manual therapy performed on a wedge with head elevated 2* pt coughing prior to therapy.      Plan: Continue per plan of care. Progress according to MD protocol.     Dx: s/p R shoulder RTC repair (24)  EPOC: 24  CO-MORBIDITIES:                               PERSONAL FACTORS:   Precautions: see MD protocol;       Manuals 2/21 2/26 2/28 3/4 3/11 3/18    R shoulder PROM/ MFR 9' Wdc 10' Wdc 10' Wdc 10' 8' 12'    R upper trap MFR 4'    3' 3'    Progress note          R shld GHJ mobs grade II 2'         Total Time: 15' 10' 10' 10' 12' 15'              Neuro Re-Ed                                                                                Ther Ex          Pendulums; CW, CCW  20x 20x 1# 20 2# x20     Pulleys: flex & scap for ROM 3' ea 3' ea 3' ea 3'/3' 1'/1' 2'/2'    Supine shld flexion HH 10 1# 1# 20 1# 20 1# 20 2# x10 2# x20    Supine cane ER AAROM 10 5\"  5\" 15 5\" 20 10 x5\" 20 x5\"    Standing shld ext over table NV 20 20 20 20 20    Standing row over table NV 20 20 20 20 20    Supine punches AAROM 20 20 20 AROM 20 AROM 20     S/l ER AROM 10   20 20 20 20     Hammer curls  20 20 1# 20 2# x10 2# x20    Bicep curls  20 20 1# 20 2# x10 2# x20    Supine shld centering 10 20 20 20 20     Standing flexion AAROM +Spc   5\" 20 5\" 20 10x5\" 2#     Standing " "ER AAROM +spc   5\" 20 5\" 20 10x5\"     Shoulder isometrics; flex, scap, IR. ER, abd 5x5\" ea hold  5\" 10 ea 5x5\" ea 5x5\" ea              Ther Activity          Wall slides flex/scap + pillowcase    2x5 ea X20 ea x20              Gait Training                              Modalities                                         "

## 2024-03-20 ENCOUNTER — OFFICE VISIT (OUTPATIENT)
Dept: PHYSICAL THERAPY | Facility: CLINIC | Age: 69
End: 2024-03-20
Payer: COMMERCIAL

## 2024-03-20 DIAGNOSIS — S46.011D TRAUMATIC TEAR OF RIGHT ROTATOR CUFF, UNSPECIFIED TEAR EXTENT, SUBSEQUENT ENCOUNTER: Primary | ICD-10-CM

## 2024-03-20 DIAGNOSIS — Z47.89 ENCOUNTER FOR OTHER ORTHOPEDIC AFTERCARE: ICD-10-CM

## 2024-03-20 LAB
A ALTERNATA IGE QN: <0.1 KUA/I
A FUMIGATUS IGE QN: <0.1 KUA/I
BERMUDA GRASS IGE QN: 0.19 KUA/I
BOXELDER IGE QN: <0.1 KUA/I
C HERBARUM IGE QN: <0.1 KUA/I
CAT DANDER IGE QN: <0.1 KUA/I
CMN PIGWEED IGE QN: <0.1 KUA/I
COMMON RAGWEED IGE QN: 0.55 KUA/I
COTTONWOOD IGE QN: <0.1 KUA/I
D FARINAE IGE QN: 0.49 KUA/I
D PTERONYSS IGE QN: 0.57 KUA/I
DOG DANDER IGE QN: <0.1 KUA/I
LONDON PLANE IGE QN: <0.1 KUA/I
MOUSE URINE PROT IGE QN: <0.1 KUA/I
MT JUNIPER IGE QN: <0.1 KUA/I
MUGWORT IGE QN: 0.12 KUA/I
P NOTATUM IGE QN: <0.1 KUA/I
ROACH IGE QN: 0.42 KUA/I
SHEEP SORREL IGE QN: <0.1 KUA/I
SILVER BIRCH IGE QN: <0.1 KUA/I
TIMOTHY IGE QN: <0.1 KUA/I
TOTAL IGE SMQN RAST: 250 KU/L (ref 0–113)
WALNUT IGE QN: <0.1 KUA/I
WHITE ASH IGE QN: <0.1 KUA/I
WHITE ELM IGE QN: <0.1 KUA/I
WHITE MULBERRY IGE QN: <0.1 KUA/I
WHITE OAK IGE QN: <0.1 KUA/I

## 2024-03-20 PROCEDURE — 97140 MANUAL THERAPY 1/> REGIONS: CPT

## 2024-03-20 PROCEDURE — 97112 NEUROMUSCULAR REEDUCATION: CPT

## 2024-03-20 PROCEDURE — 97110 THERAPEUTIC EXERCISES: CPT

## 2024-03-20 NOTE — PROGRESS NOTES
"Daily Note     Today's date: 3/20/2024  Patient name: Ale Chavez  : 1955  MRN: [id number]  Referring provider: Richard Brown DO  Dx:   Encounter Diagnosis     ICD-10-CM    1. Traumatic tear of right rotator cuff, unspecified tear extent, subsequent encounter  S46.011D       2. Encounter for other orthopedic aftercare  Z47.89                      Subjective:  Pt reports her shoulder is feeling good.       Objective: See treatment diary below      Assessment: Tolerated treatment well. Patient exhibited good technique with therapeutic exercises. Progressed pt TE flowsheet per surgeon protocol.       Plan: Continue per plan of care. Progress according to MD protocol.     Dx: s/p R shoulder RTC repair (24)  EPOC: 24  CO-MORBIDITIES:                               PERSONAL FACTORS:   Precautions: see MD protocol;       Manuals 3/18 3/20   R shoulder PROM/ MFR 12' 10   R upper trap MFR 3'    Progress note     R shld GHJ mobs grade II     Total Time: 15' 10'        Neuro Re-Ed                                        Ther Ex     Pulleys: flex & scap for ROM 2'/2' 2'/2'   Standing flex/scap  0# 2x10   Standing shoulder press ups  0# 2x10   Mod Ext 20 0# 2x10   Mod Rows 20 0# 2x10   Bicep Curls 2# 20 2# 20   Hammer Curls 2# 20 2# 20   Supine Cane ER AAROM 5\" 20 5\" 20   Supine Cane Flexion AAROM 5\" 20 5\" 20        Supine punches  20 20   Supine shld centering 20 20   S/l ER AROM 20 20   Shoulder isometrics; flex, scap, IR. ER, abd 5x5\" ea hep        Ther Activity     Wall slides flex/scap + pillowcase x20 x20        Gait Training               Modalities                          "

## 2024-03-21 ENCOUNTER — TELEPHONE (OUTPATIENT)
Age: 69
End: 2024-03-21

## 2024-03-21 ENCOUNTER — TELEPHONE (OUTPATIENT)
Dept: PULMONOLOGY | Facility: CLINIC | Age: 69
End: 2024-03-21

## 2024-03-21 DIAGNOSIS — J45.20 MILD INTERMITTENT ASTHMA WITHOUT COMPLICATION: Primary | ICD-10-CM

## 2024-03-21 DIAGNOSIS — J45.20 MILD INTERMITTENT ASTHMA WITHOUT COMPLICATION: ICD-10-CM

## 2024-03-21 RX ORDER — ALBUTEROL SULFATE 2.5 MG/3ML
2.5 SOLUTION RESPIRATORY (INHALATION) EVERY 6 HOURS PRN
Qty: 125 ML | Refills: 1 | Status: SHIPPED | OUTPATIENT
Start: 2024-03-21

## 2024-03-21 RX ORDER — ALBUTEROL SULFATE 2.5 MG/3ML
2.5 SOLUTION RESPIRATORY (INHALATION) EVERY 6 HOURS PRN
Qty: 125 ML | Refills: 1 | Status: SHIPPED | OUTPATIENT
Start: 2024-03-21 | End: 2024-03-21 | Stop reason: SDUPTHER

## 2024-03-21 NOTE — TELEPHONE ENCOUNTER
Pt calls in and states tat she seen Heidi last week and was prescribed codeine. Sh estates she still is coughing bad at night the medicine helps with falling asleep but not really with the coughing at night. She is wondering if there is anything else that can be sent to the pharmacy

## 2024-03-21 NOTE — PROGRESS NOTES
Still symptomatic of cough. The following was discussed:  Nebulizer with albuterol up to every 4 hours if needed (prior to bed)  Continue cough medicine  Continue inhaler daily  Consider adding humidifier at bedside

## 2024-03-22 ENCOUNTER — DOCUMENTATION (OUTPATIENT)
Age: 69
End: 2024-03-22

## 2024-03-22 LAB
DME PARACHUTE DELIVERY DATE ACTUAL: NORMAL
DME PARACHUTE DELIVERY DATE REQUESTED: NORMAL
DME PARACHUTE DELIVERY NOTE: NORMAL
DME PARACHUTE ITEM DESCRIPTION: NORMAL
DME PARACHUTE ORDER STATUS: NORMAL
DME PARACHUTE SUPPLIER NAME: NORMAL
DME PARACHUTE SUPPLIER PHONE: NORMAL

## 2024-03-22 NOTE — TELEPHONE ENCOUNTER
Called spoke with patient Heidi addressed all her concerns yesterday however she did state that she received a message from [employer] will send to our medical assistant to have them process patient did eventually receive her albuterol nebulizer she stated it was sent to the wrong pharmacy I did apologize us know if we can do anything in the future

## 2024-03-25 ENCOUNTER — TELEPHONE (OUTPATIENT)
Dept: FAMILY MEDICINE CLINIC | Facility: HOSPITAL | Age: 69
End: 2024-03-25

## 2024-03-25 ENCOUNTER — OFFICE VISIT (OUTPATIENT)
Dept: PHYSICAL THERAPY | Facility: CLINIC | Age: 69
End: 2024-03-25
Payer: COMMERCIAL

## 2024-03-25 DIAGNOSIS — Z47.89 ENCOUNTER FOR OTHER ORTHOPEDIC AFTERCARE: ICD-10-CM

## 2024-03-25 DIAGNOSIS — S46.011D TRAUMATIC TEAR OF RIGHT ROTATOR CUFF, UNSPECIFIED TEAR EXTENT, SUBSEQUENT ENCOUNTER: Primary | ICD-10-CM

## 2024-03-25 PROCEDURE — 97112 NEUROMUSCULAR REEDUCATION: CPT

## 2024-03-25 PROCEDURE — 97140 MANUAL THERAPY 1/> REGIONS: CPT

## 2024-03-25 PROCEDURE — 97110 THERAPEUTIC EXERCISES: CPT

## 2024-03-25 NOTE — TELEPHONE ENCOUNTER
Patient going to call the pharmacy to see what they have in stock. See her options with insurance. She will give us a call back and let us know what she would like to do.

## 2024-03-25 NOTE — TELEPHONE ENCOUNTER
Since she just has 2 days left should she just stop taking them or renew and keep taking them?  She was not taking due to diabetes she is pre diabetic.  Please confirm.  Please call.

## 2024-03-25 NOTE — TELEPHONE ENCOUNTER
Hi, this is Ale Chavez, Date of birth [birth year]. I had called last Monday regarding Rybelsus  that I take and I had a question if Doctor Derrick could suggest another medication. Since Rybelsus is no longer available right now, I don't know what I should do. I have two pills left so if someone could give me a call back and let me know. Just don't know what to do, if I should just stop taking everything or what. The number is [phone]. Thank you. Adriana.

## 2024-03-25 NOTE — PROGRESS NOTES
Daily Note     Today's date: 3/25/2024  Patient name: Ale Chavez  : 1955  MRN: [id number]  Referring provider: Richard Brown DO  Dx:   Encounter Diagnosis     ICD-10-CM    1. Traumatic tear of right rotator cuff, unspecified tear extent, subsequent encounter  S46.011D       2. Encounter for other orthopedic aftercare  Z47.89                      Subjective:  Pt reports her shoulder is feeling good overall.      Objective: See treatment diary below  FOTO given () and Re-eval Schedule ()      Assessment: Tolerated treatment well. Patient exhibited good technique with therapeutic exercises. Progressed pt TE flowsheet per surgeon protocol.       Plan: Continue per plan of care. Progress according to MD protocol.     Dx: s/p R shoulder RTC repair (24)  EPOC: 24  CO-MORBIDITIES:                               PERSONAL FACTORS:   Precautions: see MD protocol;       Manuals 3/18 3/20 3/25   R shoulder PROM/ MFR 12' 10 10   R upper trap MFR 3'     Progress note      R shld GHJ mobs grade II      Total Time: 15' 10' 10         Neuro Re-Ed                                                Ther Ex      Pulleys: flex & scap for ROM 2'/2' 2'/2' 2'/2'   Standing flex/scap  0# 2x10 0# 20   Standing shoulder press ups  0# 2x10 0# 20   Mod Ext 20 0# 2x10 0# 20   Mod Rows 20 0# 2x10 0# 20   Bicep Curls 2# 20 2# 20 2# 20   Hammer Curls 2# 20 2# 20 2# 20   S/L ER 20 20 20   S/L Flexion   20   S/L abd   20   Supine punches  20 20 20   Supine shld centering 20 20 20   S/l ER AROM 20 20 20               Ther Activity      Wall slides flex/scap + pillowcase x20 x20 x20   [employer] placing on shelf flex/scap   5x ea   Ball against wall cw/ccw   X20 ea         Gait Training                  Modalities

## 2024-03-27 ENCOUNTER — OFFICE VISIT (OUTPATIENT)
Dept: PHYSICAL THERAPY | Facility: CLINIC | Age: 69
End: 2024-03-27
Payer: COMMERCIAL

## 2024-03-27 DIAGNOSIS — Z47.89 ENCOUNTER FOR OTHER ORTHOPEDIC AFTERCARE: ICD-10-CM

## 2024-03-27 DIAGNOSIS — S46.011D TRAUMATIC TEAR OF RIGHT ROTATOR CUFF, UNSPECIFIED TEAR EXTENT, SUBSEQUENT ENCOUNTER: Primary | ICD-10-CM

## 2024-03-27 PROCEDURE — 97112 NEUROMUSCULAR REEDUCATION: CPT

## 2024-03-27 PROCEDURE — 97110 THERAPEUTIC EXERCISES: CPT

## 2024-03-27 PROCEDURE — 97140 MANUAL THERAPY 1/> REGIONS: CPT

## 2024-03-27 NOTE — PROGRESS NOTES
Daily Note     Today's date: 3/27/2024  Patient name: Ale Chavez  : 1955  MRN: [id number]  Referring provider: Richard Brown DO  Dx:   Encounter Diagnosis     ICD-10-CM    1. Traumatic tear of right rotator cuff, unspecified tear extent, subsequent encounter  S46.011D       2. Encounter for other orthopedic aftercare  Z47.89                      Subjective:  Pt reports her shoulder is feeling good overall.      Objective: See treatment diary below  FOTO given () and Re-eval Schedule ()      Right Shoulder Date:   Flexion: 138 degrees   Abduction: 150 degrees   External rotation 0°: 40 degrees   Internal rotation 0°: 60 degrees        Right Shoulder Date: 3/27  Flexion: 160 degrees   Abduction: 150 degrees   External rotation 0°: 75 degrees   Internal rotation 0°: 70 degrees      Strength Test Grade Date 3/27  ER 4+  IR 5  Flex: 4  Abd: 4      Assessment: Tolerated treatment well. Patient exhibited good technique with therapeutic exercises. Progressed pt TE flowsheet per surgeon protocol.       Plan: Continue per plan of care. Progress according to MD protocol.     Dx: s/p R shoulder RTC repair (24)  EPOC: 24  CO-MORBIDITIES:                               PERSONAL FACTORS:   Precautions: see MD protocol;       Manuals 3/18 3/20 3/25 3/27   R shoulder PROM/ MFR 12' 10 10 10   R upper trap MFR 3'      Progress note       R shld GHJ mobs grade II       Total Time: 15' 10' 10 10          Neuro Re-Ed                                                        Ther Ex       Pulleys: flex & scap for ROM 2'/2' 2'/2' 2'/2' 2'/2'   Standing flex/scap  0# 2x10 0# 20 0#2 0   Standing shoulder press ups  0# 2x10 0# 20 0# 20   Mod Ext 20 0# 2x10 0# 20 0# 20   Mod Rows 20 0# 2x10 0# 20 0# 20   Bicep Curls 2# 20 2# 20 2# 20 2# 20   Hammer Curls 2# 20 2# 20 2# 20 2# 20   S/L ER 20 20 20 20   S/L Flexion   20 20   S/L abd   20 20   Supine punches  20 20 20 20   Supine shld centering 20 20 20 20   S/l ER  AROM 20 20 20 20                 Ther Activity       Wall slides flex/scap + pillowcase x20 x20 x20 x20   5[employer] placing on shelf flex/scap   5x ea 5x ea   Ball against wall cw/ccw   X20 ea x20          Gait Training                     Modalities

## 2024-03-28 ENCOUNTER — OFFICE VISIT (OUTPATIENT)
Dept: OBGYN CLINIC | Facility: CLINIC | Age: 69
End: 2024-03-28

## 2024-03-28 VITALS
SYSTOLIC BLOOD PRESSURE: 122 MMHG | BODY MASS INDEX: 31.39 KG/M2 | DIASTOLIC BLOOD PRESSURE: 67 MMHG | HEIGHT: 67 IN | HEART RATE: 66 BPM | WEIGHT: 200 LBS | RESPIRATION RATE: 16 BRPM

## 2024-03-28 DIAGNOSIS — S46.011D TRAUMATIC TEAR OF RIGHT ROTATOR CUFF, UNSPECIFIED TEAR EXTENT, SUBSEQUENT ENCOUNTER: Primary | ICD-10-CM

## 2024-03-28 PROCEDURE — 99024 POSTOP FOLLOW-UP VISIT: CPT

## 2024-04-01 ENCOUNTER — OFFICE VISIT (OUTPATIENT)
Dept: PHYSICAL THERAPY | Facility: CLINIC | Age: 69
End: 2024-04-01
Payer: COMMERCIAL

## 2024-04-01 DIAGNOSIS — Z47.89 ENCOUNTER FOR OTHER ORTHOPEDIC AFTERCARE: ICD-10-CM

## 2024-04-01 DIAGNOSIS — J45.20 MILD INTERMITTENT ASTHMA WITHOUT COMPLICATION: ICD-10-CM

## 2024-04-01 DIAGNOSIS — S46.011D TRAUMATIC TEAR OF RIGHT ROTATOR CUFF, UNSPECIFIED TEAR EXTENT, SUBSEQUENT ENCOUNTER: Primary | ICD-10-CM

## 2024-04-01 PROCEDURE — 97110 THERAPEUTIC EXERCISES: CPT | Performed by: PHYSICAL THERAPIST

## 2024-04-01 PROCEDURE — 97140 MANUAL THERAPY 1/> REGIONS: CPT | Performed by: PHYSICAL THERAPIST

## 2024-04-01 RX ORDER — PROMETHAZINE HYDROCHLORIDE AND CODEINE PHOSPHATE 6.25; 1 MG/5ML; MG/5ML
5 SYRUP ORAL EVERY 4 HOURS PRN
Qty: 118 ML | Refills: 0 | Status: SHIPPED | OUTPATIENT
Start: 2024-04-01 | End: 2024-04-01 | Stop reason: SDUPTHER

## 2024-04-01 NOTE — PROGRESS NOTES
Daily Note     Today's date: 2024  Patient name: Ale Chavez  : 1955  MRN: [id number]  Referring provider: Richard Brown DO  Dx:   Encounter Diagnosis     ICD-10-CM    1. Traumatic tear of right rotator cuff, unspecified tear extent, subsequent encounter  S46.011D       2. Encounter for other orthopedic aftercare  Z47.89                      Subjective:  Pt reports her shoulder is sore after throwing a nerf ball with her grandson yesterday.      Objective: See treatment diary below      Assessment: Tolerated treatment well. Patient exhibited good technique with therapeutic exercises. Pt progressed to strengthening phase as instructed by physician. Issued orange tb for home use      Plan: Continue per plan of care.      Dx: s/p R shoulder RTC repair (24)  EPOC: 24  CO-MORBIDITIES:                               PERSONAL FACTORS:   Precautions: see MD protocol;       Manuals 3/18 3/20 3/25 3/27 4/1    R shoulder PROM/ MFR 12' 10 10 10 10'    R upper trap MFR 3'        Progress note         R shld GHJ mobs grade II         Total Time: 15' 10' 10 10 10             Neuro Re-Ed         TB LPD     OTB x10    TB row     OTB x10    TB IR     OTB x10    B TB ER     OTB x10                               Ther Ex         BW UBE for postural support     2'    Pulleys: flex & scap for ROM 2'/2' 2'/2' 2'/2' 2'/2'     Standing flex/scap  0# 2x10 0# 20 0#2 0 1# x10 ea    Standing shoulder press ups  0# 2x10 0# 20 0# 20     Mod Ext 20 0# 2x10 0# 20 0# 20     Mod Rows 20 0# 2x10 0# 20 0# 20     Bicep Curls 2# 20 2# 20 2# 20 2# 20     Hammer Curls 2# 20 2# 20 2# 20 2# 20     S/L ER 20 20 20 20 1# x15    S/L Flexion   20 20 20    S/L abd   20 20 20    Supine punches  20 20 20 20 1# x20    Supine shld centering 20 20 20 20 1# x20    S/l ER AROM 20 20 20 20 duplicate                      Ther Activity         Wall slides flex/scap + pillowcase x20 x20 x20 x20 x20    5[employer] placing on shelf flex/scap   5x ea 5x ea  X5 flex    Ball against wall cw/ccw   X20 ea x20 X20 flex             Gait Training                           Modalities         Cp post tx     10'

## 2024-04-03 ENCOUNTER — OFFICE VISIT (OUTPATIENT)
Dept: PHYSICAL THERAPY | Facility: CLINIC | Age: 69
End: 2024-04-03
Payer: COMMERCIAL

## 2024-04-03 DIAGNOSIS — Z47.89 ENCOUNTER FOR OTHER ORTHOPEDIC AFTERCARE: ICD-10-CM

## 2024-04-03 DIAGNOSIS — S46.011D TRAUMATIC TEAR OF RIGHT ROTATOR CUFF, UNSPECIFIED TEAR EXTENT, SUBSEQUENT ENCOUNTER: Primary | ICD-10-CM

## 2024-04-03 PROCEDURE — 97140 MANUAL THERAPY 1/> REGIONS: CPT

## 2024-04-03 PROCEDURE — 97110 THERAPEUTIC EXERCISES: CPT

## 2024-04-03 NOTE — PROGRESS NOTES
Daily Note     Today's date: 4/3/2024  Patient name: Ale Chavez  : 1955  MRN: [id number]  Referring provider: Richard Brown DO  Dx:   Encounter Diagnosis     ICD-10-CM    1. Traumatic tear of right rotator cuff, unspecified tear extent, subsequent encounter  S46.011D       2. Encounter for other orthopedic aftercare  Z47.89                      Subjective:  Pt reports her shoulder feels good.      Objective: See treatment diary below      Assessment: Tolerated treatment well. Patient exhibited good technique with therapeutic exercises. Pt was progressed with great tolerance and technique, however, ms fatigue was present.      Plan: Continue per plan of care.      Dx: s/p R shoulder RTC repair (24)  EPOC: 24  CO-MORBIDITIES:                               PERSONAL FACTORS:   Precautions: see MD protocol;       Manuals 3/25 3/27 4/1 4/3   R shoulder PROM/ MFR 10 10 10' 10'   R upper trap MFR       Progress note       R shld GHJ mobs grade II       Total Time: 10 10 10 10'          Neuro Re-Ed       TB LPD   OTB x10 OTB 20   TB row   OTB x10 OTB 20   TB IR   OTB x10 OTB 20   B TB ER   OTB x10 OTB 20                        Ther Ex       BW UBE for postural support   2' L1 3'/3'   Pulleys: flex & scap for ROM 2'/2' 2'/2'  D/c   Standing flex/scap 0# 20 0#2 0 1# x10 ea 1# 20   Standing shoulder press ups 0# 20 0# 20     Mod Ext 0# 20 0# 20  1# 20   Mod Rows 0# 20 0# 20  1# 20   Bicep Curls 2# 20 2# 20     Hammer Curls 2# 20 2# 20     S/L ER 20 20 1# x15    S/L Flexion 20 20 20    S/L abd 20 20 20    Supine punches  20 20 1# x20 1# 20   Supine shld centering 20 20 1# x20 1# 20   Supine Flex    1# 20   S/l ER AROM 20 20 duplicate                  Ther Activity       Wall slides flex/scap + pillowcase x20 x20 x20 X20  2#    5[employer]one placing on shelf flex/scap 5x ea 5x ea X5 flex X5 ea  2#    Ball against wall cw/ccw X20 ea x20 X20 flex            Gait Training                     Modalities       Cp post  tx   10' def

## 2024-04-08 ENCOUNTER — APPOINTMENT (OUTPATIENT)
Dept: PHYSICAL THERAPY | Facility: CLINIC | Age: 69
End: 2024-04-08
Payer: COMMERCIAL

## 2024-04-10 ENCOUNTER — APPOINTMENT (OUTPATIENT)
Dept: PHYSICAL THERAPY | Facility: CLINIC | Age: 69
End: 2024-04-10
Payer: COMMERCIAL

## 2024-04-15 ENCOUNTER — EVALUATION (OUTPATIENT)
Dept: PHYSICAL THERAPY | Facility: CLINIC | Age: 69
End: 2024-04-15
Payer: COMMERCIAL

## 2024-04-15 DIAGNOSIS — Z47.89 ENCOUNTER FOR OTHER ORTHOPEDIC AFTERCARE: ICD-10-CM

## 2024-04-15 DIAGNOSIS — S46.011D TRAUMATIC TEAR OF RIGHT ROTATOR CUFF, UNSPECIFIED TEAR EXTENT, SUBSEQUENT ENCOUNTER: Primary | ICD-10-CM

## 2024-04-15 PROCEDURE — 97112 NEUROMUSCULAR REEDUCATION: CPT | Performed by: PHYSICAL THERAPIST

## 2024-04-15 PROCEDURE — 97110 THERAPEUTIC EXERCISES: CPT | Performed by: PHYSICAL THERAPIST

## 2024-04-15 PROCEDURE — 97140 MANUAL THERAPY 1/> REGIONS: CPT | Performed by: PHYSICAL THERAPIST

## 2024-04-15 NOTE — PROGRESS NOTES
PT Re-Evaluation     Today's date: 4/15/2024  Patient name: Ale Chavez  : 1955  MRN: [id number]  Referring provider: Richard Brown DO  Dx:   Encounter Diagnosis     ICD-10-CM    1. Traumatic tear of right rotator cuff, unspecified tear extent, subsequent encounter  S46.011D                      Assessment  Assessment details: Since starting skilled PT, pain levels are decreasing, R shoulder ROM and strength are improving nicely with good functional progress. Recommend pt continue skilled PT focusing on overhead activities and strengthening.      Impairments: abnormal or restricted ROM, activity intolerance, impaired physical strength and pain with function  Understanding of Dx/Px/POC: good   Prognosis: good    Goals  STG's ( 3-4 weeks)  1. Pt will be independent in HEP-met  2. Pt will have improved R shoulder PROM by 15*-20*- met  LTG's ( 6- 8 weeks)  1. Improve FOTO score by 10-15 points- met  2. Pt will have decreased pain to 2/10 at worst- met  3. Pt will have improved R shoulder AROM to WFL's- partial met  4. Pt will have improved R shoulder strength by 1/2 grade-  met  5. Pt will be independent in dressing and self care activities- met    Plan  Patient would benefit from: skilled physical therapy  Planned modality interventions: cryotherapy and TENS  Planned therapy interventions: manual therapy, neuromuscular re-education, strengthening, stretching, therapeutic activities, therapeutic exercise, functional ROM exercises, flexibility and home exercise program  Frequency: 2x week  Duration in weeks: 7  Plan of Care beginning date: 4/15/2024  Plan of Care expiration date: 6/3/2024  Treatment plan discussed with: patient and PTA      Subjective Evaluation    History of Present Illness  Mechanism of injury: I.E: Pt underwent R shoulder RTC repair on 23. Pt arrives to skilled PT with his arm in a sling in an abduction pillow. Pt is limited with all functional activities involving her R UE. Pt is  "having difficulty sleeping at night 2* trying to get comfortable with her sling.     24: 24, pt bumped her R shoulder into the door jam when trying to help her dog that was vomiting. Pt had very high pain levels and \" saw stars\". Pt is noticing bruising in the area of impact.  Pt is wearing her sling at all times and continues to be limited with all functional activities.    4/15/24: Pt has returned to work full duty. Pt is compliant in HEP. Pt is independent with household activities, dressing and self care activities. Pt is avoiding lifting heavy items.    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: ; computer work  Hobbies; camping, out door activities  Gait; no abnormalities  Patient Goals  Patient goals for therapy: decreased pain  Patient goal: to be able to use my arm again; reaching and lifting normally- not met  Pain  At best pain ratin  At worst pain ratin  Location: R shoulder  Quality: sharp and radiating    Social Support  Lives with: spouse    Employment status: working  Hand dominance: right    Treatments  Previous treatment: physical therapy      Objective     Neurological Testing     Sensation     Shoulder   Left Shoulder   Intact: light touch    Right Shoulder   Intact: Light touch    Reflexes   Left   Biceps (C5/C6): normal (2+)  Brachioradialis (C6): normal (2+)  Triceps (C7): normal (2+)    Right   Biceps (C5/C6): trace (1+)  Brachioradialis (C6): trace (1+)    Active Range of Motion   Left Shoulder   Normal active range of motion    Right Shoulder   Flexion: 130 degrees   Abduction: 130 degrees   External rotation 45°: 90 degrees   Internal rotation 45°: 90 degrees     Additional Active Range of Motion Details  I.E; R shoulder AROM: NT    Passive Range of Motion     Right Shoulder   Flexion: 165 degrees   Abduction: 170 degrees   External rotation 0°: 90 degrees   Internal rotation 0°: 90 degrees     Right Elbow   Flexion: 145 degrees   Extension: 0 degrees     Strength/Myotome " "Testing     Left Shoulder   Normal muscle strength    Additional Strength Details  R shoulder strength: NT        Dx: s/p R shoulder RTC repair (1/4/24)  EPOC: 6/3/24  CO-MORBIDITIES:                               PERSONAL FACTORS:   Precautions: see MD protocol;       Manuals 3/25 3/27 4/1 4/3 4/15     R shoulder PROM/ MFR 10 10 10' 10' 5'     R upper trap MFR          Progress note     5'     R shld GHJ mobs grade II          Total Time: 10 10 10 10' 10'               Neuro Re-Ed          TB LPD   OTB x10 OTB 20 OTB x20     TB row   OTB x10 OTB 20 OTB x20     TB IR   OTB x10 OTB 20 OTB x20     B TB ER   OTB x10 OTB 20 OTB x20     Prone squeeze and hold     10x5\"     Prone row     1#x10     Prone W     1# x10     Prone T     1# x10               Ther Ex          BW UBE for postural support   2' L1 3'/3' 3'     Pulleys: flex & scap for ROM 2'/2' 2'/2'  D/c      Standing flex/scap 0# 20 0#2 0 1# x10 ea 1# 20 1# x15     Standing shoulder press ups 0# 20 0# 20        Mod Ext 0# 20 0# 20  1# 20      Mod Rows 0# 20 0# 20  1# 20 Neuro re ed     Bicep Curls 2# 20 2# 20   2# x20     Hammer Curls 2# 20 2# 20   2# x20     S/L ER 20 20 1# x15  2# x15     S/L Flexion 20 20 20  1# x10     S/L abd 20 20 20  1# x10     Supine punches  20 20 1# x20 1# 20 2# x20     Supine shld centering 20 20 1# x20 1# 20 2# x20     Supine Flex    1# 20      S/l ER AROM 20 20 duplicate       [place] flex bar     10\"x3               Ther Activity          Wall slides flex/scap + pillowcase x20 x20 x20 X20  2#  10 ea     5[employer]one placing on shelf flex/scap 5x ea 5x ea X5 flex X5 ea  2#  5 conesX3 2# ea      Ball against wall cw/ccw X20 ea x20 X20 flex   X10 ea     Walking with arm wave overhead     1 small wt 1# NV    Gait Training                              Modalities          Cp post tx   10' def def                            "

## 2024-04-17 ENCOUNTER — OFFICE VISIT (OUTPATIENT)
Dept: PHYSICAL THERAPY | Facility: CLINIC | Age: 69
End: 2024-04-17
Payer: COMMERCIAL

## 2024-04-17 DIAGNOSIS — S46.011D TRAUMATIC TEAR OF RIGHT ROTATOR CUFF, UNSPECIFIED TEAR EXTENT, SUBSEQUENT ENCOUNTER: Primary | ICD-10-CM

## 2024-04-17 DIAGNOSIS — Z47.89 ENCOUNTER FOR OTHER ORTHOPEDIC AFTERCARE: ICD-10-CM

## 2024-04-17 PROCEDURE — 97110 THERAPEUTIC EXERCISES: CPT

## 2024-04-17 PROCEDURE — 97112 NEUROMUSCULAR REEDUCATION: CPT

## 2024-04-17 NOTE — PROGRESS NOTES
Daily Note     Today's date: 2024  Patient name: Ale Chavez  : 1955  MRN: [id number]  Referring provider: Richard Brown DO  Dx:   Encounter Diagnosis     ICD-10-CM    1. Traumatic tear of right rotator cuff, unspecified tear extent, subsequent encounter  S46.011D       2. Encounter for other orthopedic aftercare  Z47.89           Start Time: 1530  Stop Time: 1600  Total time in clinic (min): 30 minutes    Subjective: Patient reports improvements overall with R shoulder motion, strength, and stability, and is happy with her progress thus far. Patient otherwise notes no new complaints or major changes since last session. Patient requested today's session to be shortened (30 min) due to having somewhere to be at 4:15pm.      Objective: See treatment diary below      Assessment: Tolerated treatment well. Patient did not experience any onset of R shoulder pain or discomfort throughout today's session. Since patient was unable to stay for the duration of treatment, focused on resisted exercises and manual therapy today to promote improvements in strength and mobility. Patient demonstrated good motion in all planes during PROM today and did not have any episodes of muscle guarding throughout duration of movement. Resume all exercises included in POC next session. Continue to progress patient as able. Patient would benefit from continued PT      Plan: Continue per plan of care.      Dx: s/p R shoulder RTC repair (24)  EPOC: 24  CO-MORBIDITIES:                               PERSONAL FACTORS:   Precautions: see MD protocol;       Manuals 3/25 3/27 4/1 4/3 4/17   R shoulder PROM/ MFR 10 10 10' 10' 10'   R upper trap MFR        Progress note        R shld GHJ mobs grade II        Total Time: 10 10 10 10' 10           Neuro Re-Ed        TB LPD   OTB x10 OTB 20 OTB x20   TB row   OTB x10 OTB 20 OTB x20   TB IR   OTB x10 OTB 20 OTB 20x   B TB ER   OTB x10 OTB 20 OTB 20x                           Ther  Ex        BW UBE for postural support   2' L1 3'/3' L1 3'/3'   Pulleys: flex & scap for ROM 2'/2' 2'/2'  D/c    Standing flex/scap 0# 20 0#2 0 1# x10 ea 1# 20 1# 20x ea   Standing shoulder press ups 0# 20 0# 20      Mod Ext 0# 20 0# 20  1# 20 nv   Mod Rows 0# 20 0# 20  1# 20 nv   Bicep Curls 2# 20 2# 20      Hammer Curls 2# 20 2# 20      S/L ER 20 20 1# x15     S/L Flexion 20 20 20     S/L abd 20 20 20     Supine punches  20 20 1# x20 1# 20 1# 20x   Supine shld centering 20 20 1# x20 1# 20 nv   Supine Flex    1# 20 nv   S/l ER AROM 20 20 duplicate                     Ther Activity        Wall slides flex/scap + pillowcase x20 x20 x20 X20  2#  X20 ea   5[employer]one placing on shelf flex/scap 5x ea 5x ea X5 flex X5 ea  2#  nv   Ball against wall cw/ccw X20 ea x20 X20 flex              Gait Training                        Modalities        Cp post tx   10' def def

## 2024-04-22 ENCOUNTER — OFFICE VISIT (OUTPATIENT)
Dept: PHYSICAL THERAPY | Facility: CLINIC | Age: 69
End: 2024-04-22
Payer: COMMERCIAL

## 2024-04-22 DIAGNOSIS — S46.011D TRAUMATIC TEAR OF RIGHT ROTATOR CUFF, UNSPECIFIED TEAR EXTENT, SUBSEQUENT ENCOUNTER: Primary | ICD-10-CM

## 2024-04-22 DIAGNOSIS — Z47.89 ENCOUNTER FOR OTHER ORTHOPEDIC AFTERCARE: ICD-10-CM

## 2024-04-22 PROCEDURE — 97110 THERAPEUTIC EXERCISES: CPT

## 2024-04-22 PROCEDURE — 97140 MANUAL THERAPY 1/> REGIONS: CPT

## 2024-04-22 NOTE — PROGRESS NOTES
Daily Note     Today's date: 2024  Patient name: Ale Chavez  : 1955  MRN: [id number]  Referring provider: Richard Brown DO  Dx:   Encounter Diagnosis     ICD-10-CM    1. Traumatic tear of right rotator cuff, unspecified tear extent, subsequent encounter  S46.011D       2. Encounter for other orthopedic aftercare  Z47.89                      Subjective: Pt states feeling good.      Objective: See treatment diary below  FOTO given () and Re-eval Schedule ()      Assessment:  Pt has exceeded FOTO score by 2pts. Patient would benefit from continued PT      Plan: Continue per plan of care.      Dx: s/p R shoulder RTC repair (24)  EPOC: 6/3/2024  CO-MORBIDITIES:                               PERSONAL FACTORS:   Precautions: see MD protocol;     FOTO Score  Score Predication: 57  Date:  Score: 27  Date:    Score: 59  Date:    Score:    Manuals 4/1 4/3 4/17 4/22   R shoulder PROM/ MFR 10' 10' 10' 10'   R upper trap MFR       Progress note       R shld GHJ mobs grade II       Total Time: 10 10' 10 10'          Neuro Re-Ed       TB LPD OTB x10 OTB 20 OTB x20 GTB 20   TB row OTB x10 OTB 20 OTB x20 GTB 20   TB IR OTB x10 OTB 20 OTB 20x GTB 20   B TB ER OTB x10 OTB 20 OTB 20x GTB 20                        Ther Ex       BW UBE for postural support 2' L1 3'/3' L1 3'/3' L1 3'/3'   Standing flex/scap 1# x10 ea 1# 20 1# 20x ea 2# 10x   Standing shoulder press ups    0# 20   Mod Ext  1# 20 nv 2# 20   Mod Rows  1# 20 nv 2# 20   Bicep Curls    2# 20   Hammer Curls    2# 20   S/L ER 1# x15   2# 20   S/L Flexion 20   2# 20   S/L abd 20   20   Supine punches  1# x20 1# 20 1# 20x 2# 20   Supine shld centering 1# x20 1# 20 nv np   Supine Flex  1# 20 nv 2# 20   S/l ER AROM duplicate                    Ther Activity       Wall slides flex/scap + pillowcase x20 X20  2#  X20 ea 2# 20   5[employer]one placing on shelf flex/scap X5 flex X5 ea  2#  nv 2# x5 ea   Ball against wall cw/ccw flex/scap X20 flex    2# 20 ea           Gait Training                     Modalities       Cp post tx 10' def def def

## 2024-04-23 ENCOUNTER — OFFICE VISIT (OUTPATIENT)
Dept: FAMILY MEDICINE CLINIC | Facility: HOSPITAL | Age: 69
End: 2024-04-23
Payer: COMMERCIAL

## 2024-04-23 VITALS
DIASTOLIC BLOOD PRESSURE: 78 MMHG | HEIGHT: 67 IN | WEIGHT: 205.4 LBS | HEART RATE: 73 BPM | BODY MASS INDEX: 32.24 KG/M2 | OXYGEN SATURATION: 96 % | TEMPERATURE: 97.3 F | SYSTOLIC BLOOD PRESSURE: 140 MMHG

## 2024-04-23 DIAGNOSIS — E03.9 ACQUIRED HYPOTHYROIDISM: ICD-10-CM

## 2024-04-23 DIAGNOSIS — J45.20 MILD INTERMITTENT ASTHMA WITHOUT COMPLICATION: ICD-10-CM

## 2024-04-23 DIAGNOSIS — B35.1 TINEA UNGUIUM: ICD-10-CM

## 2024-04-23 DIAGNOSIS — K76.0 FATTY INFILTRATION OF LIVER: ICD-10-CM

## 2024-04-23 DIAGNOSIS — I05.9 MITRAL VALVULAR DISORDER: ICD-10-CM

## 2024-04-23 DIAGNOSIS — E78.2 MIXED HYPERLIPIDEMIA: ICD-10-CM

## 2024-04-23 DIAGNOSIS — R73.9 HYPERGLYCEMIA: ICD-10-CM

## 2024-04-23 DIAGNOSIS — I10 ESSENTIAL HYPERTENSION: Primary | ICD-10-CM

## 2024-04-23 PROCEDURE — 99214 OFFICE O/P EST MOD 30 MIN: CPT | Performed by: FAMILY MEDICINE

## 2024-04-23 RX ORDER — LORATADINE 10 MG/1
10 TABLET, ORALLY DISINTEGRATING ORAL DAILY
COMMUNITY

## 2024-04-23 NOTE — PROGRESS NOTES
Name: Ale Chavez      : 1955      MRN: [id number]  Encounter Provider: Angelo aPcheco MD  Encounter Date: 2024   Encounter department: [facility] [address]     Assessment & Plan     1. Essential hypertension  Assessment & Plan:  Good BP control      2. Mitral valvular disorder  Assessment & Plan:  Asymptomatic, not progressive      3. Mild intermittent asthma without complication    4. Fatty infiltration of liver    5. Acquired hypothyroidism    6. Hyperglycemia  Assessment & Plan:  Diet control of A1c      7. Mixed hyperlipidemia    8. Tinea unguium    9. BMI 32.0-32.9,adult        Depression Screening and Follow-up Plan: Clincally patient does not have depression. No treatment is required.         Subjective     3 month follow up    Was frustrated with inability to get Rybelsus, finally able to get it    Progressive improvement in PT for R shoulder repair    Allergies are active, seen by Pulmonary  Needed a Prednisone taper and got a nebulizer  Switched to Claritin    Will be switching to Delio Thrush for the following 3 month visit  Review of Systems   Constitutional:  Positive for unexpected weight change.   HENT:  Positive for congestion.    Respiratory: Negative.     Gastrointestinal: Negative.    Genitourinary: Negative.    Musculoskeletal: Negative.    Allergic/Immunologic: Positive for environmental allergies.   Hematological: Negative.    Psychiatric/Behavioral: Negative.         Past Medical History:   Diagnosis Date    Abdominal pain     Allergic     Arthritis     Asthma     Basal cell carcinoma     Bicuspid aortic valve     LAST ASSESSED 2012    Cancer (HCC)     skin    Cervical disc disease     last assessed 2016    Cervical stenosis of spine     LAST ASSESSED 2016    Colon polyp     COVID     Disease of thyroid gland     hypothyroid    Dry eyes     Endometriosis     GERD (gastroesophageal reflux disease)     Heart murmur     History of  screening mammography 2023    Bi-Rads 1.    Hot flashes     Hyperglycemia     Hyperlipidemia     Hypertension     Hypothyroidism     Kidney stone     Left ventricular hypertrophy     Mitral valvular disorder     Nephrolithiasis     Ovarian cyst, complex     Pneumonia     800.00    Ptosis     LAST ASSESSED [id number]    Renal calculi     Right cervical radiculopathy     LAST ASSESSED 2016    Rotator cuff disorder, right     Seasonal allergies     Squamous cell skin cancer     Thyroid disease     Visual impairment     Wears contact lenses     Wears glasses     Wears partial dentures      Past Surgical History:   Procedure Laterality Date    BASAL CELL CARCINOMA EXCISION      CARPAL TUNNEL RELEASE Bilateral      SECTION      CHOLECYSTECTOMY      COLONOSCOPY      NEUROPLASTY / TRANSPOSITION MEDIAN NERVE AT CARPAL TUNNEL      NEUROPLASTY / TRANSPOSITION MEDIAN NERVE AT CARPAL TUNNEL      [facility]    OTHER SURGICAL HISTORY      surgically removed skin patch for skin cancer    PLANTAR FASCIECTOMY Left     HI COLONOSCOPY FLX DX W/COLLJ SPEC WHEN PFRMD N/A 10/09/2017    Procedure: COLONOSCOPY;  Surgeon: Franklyn Adam MD;  Location: [facility] GI LAB;  Service: Gastroenterology    HI HYSTEROSCOPY BX ENDOMETRIUM&/POLYPC W/WO D&C N/A 2018    Procedure: DILATATION AND CURETTAGE (D&C) WITH HYSTEROSCOPY;  Surgeon: Ila Smith DO;  Location: [facility] MAIN OR;  Service: Gynecology    HI SURGICAL ARTHROSCOPY JOSE W/CORACOACRM LIGM RLS Right 2024    Procedure: ARTHROSCOPIC SUBACROMIAL DECOMPRESSION;  Surgeon: Richard Brown DO;  Location: [facility];  Service: Orthopedics    HI SURGICAL ARTHROSCOPY SHOULDER W/ROTATOR CUFF RPR Right 2024    Procedure: REPAIR ROTATOR CUFF  ARTHROSCOPIC;  Surgeon: Richard Brown DO;  Location: [facility];  Service: Orthopedics    ROTATOR CUFF REPAIR Right 2024    SHOULDER SURGERY Right 2024    SKIN BIOPSY      TUBAL LIGATION      UPPER  GASTROINTESTINAL ENDOSCOPY       Family History   Problem Relation Age of Onset    Diabetes Mother     Alzheimer's disease Mother     Heart disease Mother     Hypertension Mother     Dementia Mother     No Known Problems Father     No Known Problems Sister     No Known Problems Sister     No Known Problems Maternal Grandmother     No Known Problems Maternal Grandfather     No Known Problems Paternal Grandmother     No Known Problems Paternal Grandfather     No Known Problems Daughter     No Known Problems Maternal Aunt     No Known Problems Maternal Aunt     No Known Problems Maternal Aunt     No Known Problems Maternal Aunt     No Known Problems Maternal Aunt     Other Family         back disorder    Cancer Family     Heart disease Family     Thyroid disease Family     Breast cancer Neg Hx     Ovarian cancer Neg Hx     Colon cancer Neg Hx      Social History     Socioeconomic History    Marital status: /Civil Union     Spouse name: None    Number of children: 2    Years of education: None    Highest education level: None   Occupational History    Occupation: part time employment   Tobacco Use    Smoking status: Former     Current packs/day: 0.00     Average packs/day: 1 pack/day for 40.0 years (40.0 ttl pk-yrs)     Types: Cigarettes     Start date:      Quit date: 2012     Years since quittin.3    Smokeless tobacco: Never   Vaping Use    Vaping status: Never Used   Substance and Sexual Activity    Alcohol use: Yes     Alcohol/week: 8.0 standard drinks of alcohol     Types: 4 Glasses of wine, 4 Standard drinks or equivalent per week     Comment: per month    Drug use: No    Sexual activity: Yes     Partners: Male     Birth control/protection: Female Sterilization     Comment: tubal ligation    Other Topics Concern    None   Social History Narrative    Caffeine use     Highschool or GED    Lives with      Jehovah's witness affiliation Spiritism    Always uses seatbelt     Social Determinants of  Health     Financial Resource Strain: Not on file   Food Insecurity: Not on file   Transportation Needs: Not on file   Physical Activity: Not on file   Stress: Not on file   Social Connections: Not on file   Intimate Partner Violence: Not on file   Housing Stability: Not on file     Current Outpatient Medications on File Prior to Visit   Medication Sig    albuterol (2.5 mg/3 mL) 0.083 % nebulizer solution Take 3 mL (2.5 mg total) by nebulization every 6 (six) hours as needed for wheezing or shortness of breath    albuterol (Ventolin HFA) 90 mcg/act inhaler Inhale 2 puffs every 6 (six) hours as needed for wheezing or shortness of breath    amLODIPine (NORVASC) 5 mg tablet Take 1 tablet (5 mg total) by mouth daily    Ascorbic Acid (vitamin C) 100 MG tablet Take 100 mg by mouth daily    cholecalciferol (VITAMIN D3) 400 units tablet Take 400 Units by mouth daily Pt taking 1000 units    Cyanocobalamin (VITAMIN B 12 PO) Take by mouth    fluticasone (Arnuity Ellipta) 100 MCG/ACT AEPB inhaler Inhale 1 puff daily Rinse mouth after use.    guaiFENesin (MUCINEX) 600 mg 12 hr tablet Take 1,200 mg by mouth every 12 (twelve) hours    ketoconazole (NIZORAL) 2 % cream Apply topically daily    levothyroxine 125 mcg tablet TAKE ONE TABLET BY MOUTH EVERY DAY.    loratadine (CLARITIN REDITABS) 10 MG dissolvable tablet Take 10 mg by mouth daily    losartan-hydrochlorothiazide (HYZAAR) 100-25 MG per tablet Take 1 tablet by mouth daily    pantoprazole (PROTONIX) 40 mg tablet TAKE ONE TABLET BY MOUTH 2 TIMES A DAY    pravastatin (PRAVACHOL) 20 mg tablet Take 1 tablet (20 mg total) by mouth daily    Rybelsus 7 MG tablet Take 1 tablet by mouth daily    XIIDRA 5 % op solution Administer 1 drop to both eyes in the morning    Efinaconazole (Jublia) 10 % SOLN Apply 1 application topically in the morning (Patient taking differently: Apply 1 application. topically if needed)    nystatin (MYCOSTATIN) powder Apply topically 3 (three) times a day for  "14 days     Allergies   Allergen Reactions    Erythromycin GI Intolerance     Reaction Date: 11Jul2011;     Penicillins Rash     unknown    Aztreonam Rash     Action Taken: Allergy added by [employer] to replace Penicillins Cross Reactors;     Carbapenems Rash     Action Taken: Allergy added by [employer] to replace Penicillins Cross Reactors;     Cephalosporins Rash     Action Taken: Allergy added by [employer] to replace Penicillins Cross Reactors;     Griseofulvin Rash     Action Taken: Allergy added by [employer] to replace Penicillins Cross Reactors;     Influenza Virus Vaccine Hives, Rash and GI Intolerance     Immunization History   Administered Date(s) Administered    COVID-19 MODERNA VACC 0.5 ML IM 12/30/2020, 01/26/2021    INFLUENZA 10/10/2018, 10/01/2020, 10/31/2022    Influenza, high dose seasonal 0.7 mL 10/24/2023    Influenza, recombinant, quadrivalent,injectable, preservative free 10/08/2019, 10/04/2021, 10/31/2022    Influenza, seasonal, injectable 10/01/2014, 10/01/2015    Pneumococcal Conjugate Vaccine 20-valent (Pcv20), Polysace 07/25/2022, 07/25/2022    Pneumococcal Polysaccharide PPV23 10/08/2019    Tdap 01/01/2010, 08/24/2020    Zoster Vaccine Recombinant 05/06/2019, 07/08/2019       Objective     /78 (BP Location: Left arm, Patient Position: Sitting, Cuff Size: Large)   Pulse 73   Temp (!) 97.3 °F (36.3 °C) (Tympanic)   Ht 5' 7\" (1.702 m)   Wt 93.2 kg (205 lb 6.4 oz)   SpO2 96%   BMI 32.17 kg/m²     Physical Exam  Vitals and nursing note reviewed.   Constitutional:       Appearance: Normal appearance.   Neck:      Vascular: No carotid bruit.   Cardiovascular:      Rate and Rhythm: Normal rate and regular rhythm.      Heart sounds: Murmur heard.   Pulmonary:      Effort: Pulmonary effort is normal.      Breath sounds: Normal breath sounds.   Musculoskeletal:      Right lower leg: No edema.      Left lower leg: No edema.   Neurological:      Mental Status: She is alert. "   Psychiatric:         Mood and Affect: Mood normal.       Angelo Pacheco MD

## 2024-04-24 ENCOUNTER — APPOINTMENT (OUTPATIENT)
Dept: PHYSICAL THERAPY | Facility: CLINIC | Age: 69
End: 2024-04-24
Payer: COMMERCIAL

## 2024-04-29 ENCOUNTER — APPOINTMENT (OUTPATIENT)
Dept: PHYSICAL THERAPY | Facility: CLINIC | Age: 69
End: 2024-04-29
Payer: COMMERCIAL

## 2024-04-30 ENCOUNTER — OFFICE VISIT (OUTPATIENT)
Dept: PHYSICAL THERAPY | Facility: CLINIC | Age: 69
End: 2024-04-30
Payer: COMMERCIAL

## 2024-04-30 DIAGNOSIS — S46.011D TRAUMATIC TEAR OF RIGHT ROTATOR CUFF, UNSPECIFIED TEAR EXTENT, SUBSEQUENT ENCOUNTER: Primary | ICD-10-CM

## 2024-04-30 DIAGNOSIS — Z47.89 ENCOUNTER FOR OTHER ORTHOPEDIC AFTERCARE: ICD-10-CM

## 2024-04-30 PROCEDURE — 97112 NEUROMUSCULAR REEDUCATION: CPT

## 2024-04-30 PROCEDURE — 97140 MANUAL THERAPY 1/> REGIONS: CPT

## 2024-04-30 PROCEDURE — 97110 THERAPEUTIC EXERCISES: CPT

## 2024-04-30 NOTE — PROGRESS NOTES
Daily Note     Today's date: 2024  Patient name: Ale Chavez  : 1955  MRN: [id number]  Referring provider: Richard Brown DO  Dx:   Encounter Diagnosis     ICD-10-CM    1. Traumatic tear of right rotator cuff, unspecified tear extent, subsequent encounter  S46.011D       2. Encounter for other orthopedic aftercare  Z47.89                      Subjective: Pt states feeling good, pt states she is positive on being done hopefully at her last visits.       Objective: See treatment diary below  FOTO given () and Re-eval Schedule ()      Assessment:  Pt has exceeded FOTO score by 2pts. Patient would benefit from continued PT Possible d/c .      Plan: Continue per plan of care.      Dx: s/p R shoulder RTC repair (24)  EPOC: 6/3/2024  CO-MORBIDITIES:                               PERSONAL FACTORS:   Precautions: see MD protocol;     FOTO Score  Score Predication: 57  Date:  Score: 27  Date:    Score: 59  Date:    Score:    Manuals 4/1 4/3 4/17 4/22 4/30   R shoulder PROM/ MFR 10' 10' 10' 10' Wdc 10'   R upper trap MFR        Progress note        R shld GHJ mobs grade II        Total Time: 10 10' 10 10' 10'           Neuro Re-Ed        TB LPD OTB x10 OTB 20 OTB x20 GTB 20 BTB 20   TB row OTB x10 OTB 20 OTB x20 GTB 20 BTB 20   TB IR OTB x10 OTB 20 OTB 20x GTB 20 BTB 20   B TB ER OTB x10 OTB 20 OTB 20x GTB 20 BTB 20                           Ther Ex        BW UBE for postural support 2' L1 3'/3' L1 3'/3' L1 3'/3' L1 3'/3'   Standing flex/scap 1# x10 ea 1# 20 1# 20x ea 2# 10x 2# 10x   Standing shoulder press ups    0# 20    Mod Ext  1# 20 nv 2# 20 2# 20   Mod Rows  1# 20 nv 2# 20 2# 20   Bicep Curls    2# 20 2# 20   Hammer Curls    2# 20 2# 20   S/L ER 1# x15   2# 20 2# 20   S/L Flexion 20   2# 20 2# 20   S/L abd 20   20 2# 20   Supine punches  1# x20 1# 20 1# 20x 2# 20 2# 20   Supine shld centering 1# x20 1# 20 nv np    Supine Flex  1# 20 nv 2# 20 2# 20   S/l ER AROM duplicate                        Ther Activity        Wall slides flex/scap + pillowcase x20 X20  2#  X20 ea 2# 20 2.5# 20   5[employer] placing on shelf flex/scap X5 flex X5 ea  2#  nv 2# x5 ea 2.5# 5x   Ball against wall cw/ccw flex/scap X20 flex    2# 20 ea 2.5# 20           Gait Training                        Modalities        Cp post tx 10' def def def

## 2024-05-01 ENCOUNTER — APPOINTMENT (OUTPATIENT)
Dept: PHYSICAL THERAPY | Facility: CLINIC | Age: 69
End: 2024-05-01
Payer: COMMERCIAL

## 2024-05-06 ENCOUNTER — OFFICE VISIT (OUTPATIENT)
Dept: PHYSICAL THERAPY | Facility: CLINIC | Age: 69
End: 2024-05-06
Payer: COMMERCIAL

## 2024-05-06 DIAGNOSIS — S46.011D TRAUMATIC TEAR OF RIGHT ROTATOR CUFF, UNSPECIFIED TEAR EXTENT, SUBSEQUENT ENCOUNTER: Primary | ICD-10-CM

## 2024-05-06 DIAGNOSIS — Z47.89 ENCOUNTER FOR OTHER ORTHOPEDIC AFTERCARE: ICD-10-CM

## 2024-05-06 PROCEDURE — 97140 MANUAL THERAPY 1/> REGIONS: CPT

## 2024-05-06 PROCEDURE — 97112 NEUROMUSCULAR REEDUCATION: CPT

## 2024-05-06 PROCEDURE — 97110 THERAPEUTIC EXERCISES: CPT

## 2024-05-06 NOTE — PROGRESS NOTES
Daily Note     Today's date: 2024  Patient name: Ale Chavez  : 1955  MRN: [id number]  Referring provider: Richard Brown DO  Dx:   Encounter Diagnosis     ICD-10-CM    1. Traumatic tear of right rotator cuff, unspecified tear extent, subsequent encounter  S46.011D       2. Encounter for other orthopedic aftercare  Z47.89                      Subjective: Pt states feeling good, pt states she is positive on being done hopefully at her last visits.       Objective: See treatment diary below  FOTO given () and Re-eval Schedule ()      Assessment:  Pt has exceeded FOTO score by 2pts. Patient would benefit from continued PT Possible d/c . Progressed below TE with good tolerance and technique, pt strength      Plan: Continue per plan of care.      Dx: s/p R shoulder RTC repair (24)  EPOC: 6/3/2024  CO-MORBIDITIES:                               PERSONAL FACTORS:   Precautions: see MD protocol;     FOTO Score  Score Predication: 57  Date:  Score: 27  Date:    Score: 59  Date:    Score:    Manuals    R shoulder PROM/ MFR 10' 10' Wdc 10' Wdc 10'   R upper trap MFR       Progress note       R shld GHJ mobs grade II       Total Time: 10 10' 10'           Neuro Re-Ed       TB LPD OTB x20 GTB 20 BTB 20 BTB 20   TB row OTB x20 GTB 20 BTB 20 BTB 20   TB IR OTB 20x GTB 20 BTB 20 BTB 20   B TB ER OTB 20x GTB 20 BTB 20 BTB 20                        Ther Ex       BW UBE for postural support L1 3'/3' L1 3'/3' L1 3'/3' L1 3'/3'   Standing flex/scap 1# 20x ea 2# 10x 2# 10x 2# 10x   Standing shoulder press ups  0# 20  2# 20   Mod Ext nv 2# 20 2# 20 2# 20   Mod Rows nv 2# 20 2# 20 2# 20   Bicep Curls  2# 20 2# 20 2# 20   Hammer Curls  2# 20 2# 20 2# 20   S/L ER  2# 20 2# 20 2# 20   S/L Flexion  2# 20 2# 20 2# 20   S/L abd  20 2# 20 2# 20   Supine punches  1# 20x 2# 20 2# 20 2# 20   Supine Flex nv 2# 20 2# 20 2# 20                        Ther Activity       Wall slides flex/scap +  pillowcase X20 ea 2# 20 2.5# 20 2.5# 20   5[employer] placing on shelf flex/scap nv 2# x5 ea 2.5# 5x 2.5# 5x   Ball against wall cw/ccw flex/scap  2# 20 ea 2.5# 20 2.5# 20   Walk in clinic, wt [place]    1# 3laps          Gait Training                     Modalities       Cp post tx def def

## 2024-05-13 ENCOUNTER — OFFICE VISIT (OUTPATIENT)
Dept: PHYSICAL THERAPY | Facility: CLINIC | Age: 69
End: 2024-05-13
Payer: COMMERCIAL

## 2024-05-13 DIAGNOSIS — S46.011D TRAUMATIC TEAR OF RIGHT ROTATOR CUFF, UNSPECIFIED TEAR EXTENT, SUBSEQUENT ENCOUNTER: Primary | ICD-10-CM

## 2024-05-13 DIAGNOSIS — Z47.89 ENCOUNTER FOR OTHER ORTHOPEDIC AFTERCARE: ICD-10-CM

## 2024-05-13 PROCEDURE — 97110 THERAPEUTIC EXERCISES: CPT

## 2024-05-13 PROCEDURE — 97112 NEUROMUSCULAR REEDUCATION: CPT

## 2024-05-13 PROCEDURE — 97140 MANUAL THERAPY 1/> REGIONS: CPT

## 2024-05-13 NOTE — PROGRESS NOTES
Daily Note     Today's date: 2024  Patient name: Ale Chavez  : 1955  MRN: [id number]  Referring provider: Richard Brown DO  Dx:   Encounter Diagnosis     ICD-10-CM    1. Traumatic tear of right rotator cuff, unspecified tear extent, subsequent encounter  S46.011D       2. Encounter for other orthopedic aftercare  Z47.89                      Subjective: Pt states feeling good, pt states she is positive on being done hopefully at her last visits.       Objective: See treatment diary below  FOTO given () and Re-eval Schedule ()      Assessment:  Pt has exceeded FOTO score by 2pts. Patient would benefit from continued PT Possible d/c . Progressed below TE with good tolerance and technique, pt strength has significantly improved. Pt is at WNL of PROM.       Plan: Continue per plan of care.      Dx: s/p R shoulder RTC repair (24)  EPOC: 6/3/2024  CO-MORBIDITIES:                               PERSONAL FACTORS:   Precautions: see MD protocol;     FOTO Score  Score Predication: 57  Date:  Score: 27  Date:    Score: 59  Date:    Score:    Manuals    R shoulder PROM/ MFR 10' 10' Wdc 10' Wdc 10' Wdc 8'   R upper trap MFR        Progress note        R shld GHJ mobs grade II        Total Time: 10 10' 10'  8'           Neuro Re-Ed        TB LPD OTB x20 GTB 20 BTB 20 BTB 20 Plum20   TB row OTB x20 GTB 20 BTB 20 BTB 20 Plum 20   TB IR OTB 20x GTB 20 BTB 20 BTB 20 [place] 20   B TB ER OTB 20x GTB 20 BTB 20 BTB 20 Plum 20                           Ther Ex        BW UBE for postural support L1 3'/3' L1 3'/3' L1 3'/3' L1 3'/3' L1 3'/3'   Standing flex/scap 1# 20x ea 2# 10x 2# 10x 2# 10x 2# 10   Standing shoulder press ups  0# 20  2# 20 2# 20   Mod Ext nv 2# 20 2# 20 2# 20 3# 20   Mod Rows nv 2# 20 2# 20 2# 20 3# 20   Bicep Curls  2# 20 2# 20 2# 20 3# 20   Hammer Curls  2# 20 2# 20 2# 20 3# 20   S/L ER  2# 20 2# 20 2# 20 2# 20   S/L Flexion  2# 20 2# 20 2# 20 2# 20   S/L abd   20 2# 20 2# 20 2# 20   Supine punches  1# 20x 2# 20 2# 20 2# 20 3# 20   Supine Flex nv 2# 20 2# 20 2# 20 2# 20                           Ther Activity        Wall slides flex/scap + pillowcase X20 ea 2# 20 2.5# 20 2.5# 20 3# 20   5[employer] placing on shelf flex/scap nv 2# x5 ea 2.5# 5x 2.5# 5x 3# 5x   Ball against wall cw/ccw flex/scap  2# 20 ea 2.5# 20 2.5# 20 3# 20   Walk in clinic, wt [place]    1# 3laps 1# 3laps           Gait Training                        Modalities        Cp post tx def def

## 2024-05-16 DIAGNOSIS — E03.9 ACQUIRED HYPOTHYROIDISM: ICD-10-CM

## 2024-05-17 RX ORDER — LEVOTHYROXINE SODIUM 0.12 MG/1
TABLET ORAL
Qty: 90 TABLET | Refills: 1 | Status: SHIPPED | OUTPATIENT
Start: 2024-05-17

## 2024-05-20 ENCOUNTER — APPOINTMENT (OUTPATIENT)
Dept: PHYSICAL THERAPY | Facility: CLINIC | Age: 69
End: 2024-05-20
Payer: COMMERCIAL

## 2024-05-23 ENCOUNTER — APPOINTMENT (OUTPATIENT)
Dept: PHYSICAL THERAPY | Facility: CLINIC | Age: 69
End: 2024-05-23
Payer: COMMERCIAL

## 2024-05-29 ENCOUNTER — EVALUATION (OUTPATIENT)
Dept: PHYSICAL THERAPY | Facility: CLINIC | Age: 69
End: 2024-05-29
Payer: COMMERCIAL

## 2024-05-29 DIAGNOSIS — S46.011D TRAUMATIC TEAR OF RIGHT ROTATOR CUFF, UNSPECIFIED TEAR EXTENT, SUBSEQUENT ENCOUNTER: Primary | ICD-10-CM

## 2024-05-29 DIAGNOSIS — Z47.89 ENCOUNTER FOR OTHER ORTHOPEDIC AFTERCARE: ICD-10-CM

## 2024-05-29 PROCEDURE — 97140 MANUAL THERAPY 1/> REGIONS: CPT | Performed by: PHYSICAL THERAPIST

## 2024-05-29 PROCEDURE — 97110 THERAPEUTIC EXERCISES: CPT | Performed by: PHYSICAL THERAPIST

## 2024-05-29 PROCEDURE — 97112 NEUROMUSCULAR REEDUCATION: CPT | Performed by: PHYSICAL THERAPIST

## 2024-05-29 NOTE — PROGRESS NOTES
"PT Re-Evaluation     Today's date: 2024  Patient name: Ale Chavez  : 1955  MRN: [id number]  Referring provider: Richard Brown DO  Dx:   Encounter Diagnosis     ICD-10-CM    1. Traumatic tear of right rotator cuff, unspecified tear extent, subsequent encounter  S46.011D       2. Encounter for other orthopedic aftercare  Z47.89                      Assessment  Impairments: impaired physical strength    Assessment details: Since starting skilled PT, pain levels are decreased, R shoulder ROM is WFL's, strength is WFL's with excellent functional progress. Recommend pt be discharged to an independent HEP.      Understanding of Dx/Px/POC: good     Prognosis: good    Goals  STG's ( 3-4 weeks)  1. Pt will be independent in HEP-met  2. Pt will have improved R shoulder PROM by 15*-20*- met  LTG's ( 6- 8 weeks)  1. Improve FOTO score by 10-15 points- met  2. Pt will have decreased pain to 2/10 at worst- met  3. Pt will have improved R shoulder AROM to WFL's- met  4. Pt will have improved R shoulder strength by 1/2 grade-  met  5. Pt will be independent in dressing and self care activities- met    Plan    Frequency: D/c to HEP.  Duration in weeks: 7  Plan of Care beginning date: 4/15/2024  Plan of Care expiration date: 6/3/2024  Treatment plan discussed with: patient and PTA      Subjective Evaluation    History of Present Illness  Mechanism of injury: I.E: Pt underwent R shoulder RTC repair on 23. Pt arrives to skilled PT with his arm in a sling in an abduction pillow. Pt is limited with all functional activities involving her R UE. Pt is having difficulty sleeping at night 2* trying to get comfortable with her sling.     24: 24, pt bumped her R shoulder into the door jam when trying to help her dog that was vomiting. Pt had very high pain levels and \" saw stars\". Pt is noticing bruising in the area of impact.  Pt is wearing her sling at all times and continues to be limited with all functional " activities.    4/15/24: Pt has returned to work full duty. Pt is compliant in HEP. Pt is independent with household activities, dressing and self care activities. Pt is avoiding lifting heavy items.    24: Pt is compliant in HEP. Pt is indedendent in household cleaning and cooking activiites. Pt is reaching overhead with greater ease. Pt is performing lifting activities from floor to waist with laundry basket and grocery bag. 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: ; computer work  Hobbies; camping, out door activities  Gait; no abnormalities  Patient Goals  Patient goals for therapy: decreased pain  Patient goal: to be able to use my arm again; reaching and lifting normally-  met  Pain  At best pain ratin  At worst pain ratin  Location: R shoulder  Quality: sharp and radiating    Social Support  Lives with: spouse    Employment status: working  Hand dominance: right    Treatments  Previous treatment: physical therapy      Objective     Neurological Testing     Sensation     Shoulder   Left Shoulder   Intact: light touch    Right Shoulder   Intact: Light touch    Reflexes   Left   Biceps (C5/C6): normal (2+)  Brachioradialis (C6): normal (2+)  Triceps (C7): normal (2+)    Right   Biceps (C5/C6): trace (1+)  Brachioradialis (C6): trace (1+)    Active Range of Motion   Left Shoulder   Normal active range of motion    Right Shoulder   Flexion: 145 degrees   Abduction: 150 degrees   External rotation 45°: 90 degrees   Internal rotation 45°: 90 degrees     Additional Active Range of Motion Details  I.E; R shoulder AROM: NT    Passive Range of Motion     Right Shoulder   Flexion: 170 degrees   Abduction: 170 degrees   External rotation 0°: 90 degrees   Internal rotation 0°: 90 degrees     Right Elbow   Flexion: 145 degrees   Extension: 0 degrees     Strength/Myotome Testing     Left Shoulder   Normal muscle strength    Right Shoulder     Planes of Motion   Flexion: 4+    Abduction: 4+   External rotation at 0°: 5   Internal rotation at 0°: 5     Additional Strength Details  R shoulder strength: NT        Dx: s/p R shoulder RTC repair (1/4/24)  EPOC: 6/3/2024  CO-MORBIDITIES:                               PERSONAL FACTORS:   Precautions: see MD protocol;     FOTO Score  Score Predication: 57  Date: 1/17 Score: 27  Date: 4/1   Score: 59  Date:    Score:    Manuals 4/17 4/22 4/30 5/6 5/13 5/29   R shoulder PROM/ MFR 10' 10' Wdc 10' Wdc 10' Wdc 8' 7'   R upper trap MFR         Progress note      8'   R shld GHJ mobs grade II         Total Time: 10 10' 10'  8' 15'            Neuro Re-Ed         TB LPD OTB x20 GTB 20 BTB 20 BTB 20 Plum20 BTB x20   TB row OTB x20 GTB 20 BTB 20 BTB 20 Plum 20 BTB x20   TB IR OTB 20x GTB 20 BTB 20 BTB 20 Donald 20 BTB 20   B TB ER OTB 20x GTB 20 BTB 20 BTB 20 Plum 20 Plum x20                              Ther Ex         BW UBE for postural support L1 3'/3' L1 3'/3' L1 3'/3' L1 3'/3' L1 3'/3' 3'/'3   Standing flex/scap 1# 20x ea 2# 10x 2# 10x 2# 10x 2# 10 10   Standing shoulder press ups  0# 20  2# 20 2# 20    Mod Ext nv 2# 20 2# 20 2# 20 3# 20    Mod Rows nv 2# 20 2# 20 2# 20 3# 20 3# x20   Bicep Curls  2# 20 2# 20 2# 20 3# 20    Hammer Curls  2# 20 2# 20 2# 20 3# 20    S/L ER  2# 20 2# 20 2# 20 2# 20 3# x10   S/L Flexion  2# 20 2# 20 2# 20 2# 20 2# x20   S/L abd  20 2# 20 2# 20 2# 20 2# x20   Supine punches  1# 20x 2# 20 2# 20 2# 20 3# 20 3# x20   Supine Flex nv 2# 20 2# 20 2# 20 2# 20                               Ther Activity         Wall slides flex/scap + pillowcase X20 ea 2# 20 2.5# 20 2.5# 20 3# 20 x10   5[employer] placing on shelf flex/scap nv 2# x5 ea 2.5# 5x 2.5# 5x 3# 5x    Ball against wall cw/ccw flex/scap  2# 20 ea 2.5# 20 2.5# 20 3# 20    Walk in clinic, wt [place]    1# 3laps 1# 3laps 2# 1 lap            Gait Training                           Modalities         Cp post tx def def

## 2024-05-30 ENCOUNTER — OFFICE VISIT (OUTPATIENT)
Dept: OBGYN CLINIC | Facility: CLINIC | Age: 69
End: 2024-05-30
Payer: COMMERCIAL

## 2024-05-30 VITALS
SYSTOLIC BLOOD PRESSURE: 99 MMHG | DIASTOLIC BLOOD PRESSURE: 66 MMHG | HEIGHT: 67 IN | WEIGHT: 205.6 LBS | BODY MASS INDEX: 32.27 KG/M2 | HEART RATE: 98 BPM | RESPIRATION RATE: 18 BRPM

## 2024-05-30 DIAGNOSIS — S46.011D TRAUMATIC TEAR OF RIGHT ROTATOR CUFF, UNSPECIFIED TEAR EXTENT, SUBSEQUENT ENCOUNTER: Primary | ICD-10-CM

## 2024-05-30 PROCEDURE — 99213 OFFICE O/P EST LOW 20 MIN: CPT | Performed by: STUDENT IN AN ORGANIZED HEALTH CARE EDUCATION/TRAINING PROGRAM

## 2024-05-30 NOTE — PROGRESS NOTES
Ortho Sports Medicine Shoulder Follow Up Visit     Assesment:   68 y.o. female right shoulder nearly 5 months s/p right shoulder arthroscopy with rotator cuff repair and subacromial decompression, DOS: 1/4/2024    Plan:    Kira presents today nearly 5 months s/p right shoulder arthroscopy with rotator cuff repair and subacromial decompression, DOS: 1/4/2024. She states she is doing well overall at this time. I feel she has made continual improvements. She can start to do lifting activity but limiting any lifting to no greater than 10-15 lbs for the next month. She can continue to do overhead activity being mindful of the lifting weight restriction. She can discontinue physical therapy at this time and do her at home exercises provided by physical therapy. She can follow up with me on an as needed basis.    Conservative treatment:    Home exercise program for shoulder, including ROM and strenthening.  Instructions given to patient of what exercises to perform.  Let pain guide return to activities.      Imaging:    No imaging was available for review today.      Injection:    No Injection planned at this time.      Surgery:     No surgery is recommended at this point, continue with conservative treatment plan as noted.      Follow up:    Return if symptoms worsen or fail to improve.      Chief Complaint   Patient presents with    Right Shoulder - Post-op         History of Present Illness:    The patient is returns for follow up of nearly 5 months s/p right shoulder arthroscopy with rotator cuff repair and subacromial decompression, DOS: 1/4/2024. Since the prior visit, She reports significant improvement. She states she is doing well overall at this time. She reports getting back to normal daily activities without pain. She recently finished with physical therapy. She denies any new trauma or injury. She denies any numbness and tingling. She is overall very happy with her progress.         Past Medical, Social and  Family History:  Past Medical History:   Diagnosis Date    Abdominal pain     Allergic     Arthritis     Asthma     Basal cell carcinoma     Bicuspid aortic valve     LAST ASSESSED 2012    Cancer (HCC)     skin    Cervical disc disease     last assessed [id number]    Cervical stenosis of spine     LAST ASSESSED 2016    Colon polyp     COVID     Disease of thyroid gland     hypothyroid    Dry eyes     Endometriosis     GERD (gastroesophageal reflux disease)     Heart murmur     History of screening mammography 2023    Bi-Rads 1.    Hot flashes     Hyperglycemia     Hyperlipidemia     Hypertension     Hypothyroidism     Kidney stone     Left ventricular hypertrophy     Mitral valvular disorder     Nephrolithiasis     Ovarian cyst, complex     Pneumonia     800.00    Ptosis     LAST ASSESSED [id number]    Renal calculi     Right cervical radiculopathy     LAST ASSESSED 2016    Rotator cuff disorder, right     Seasonal allergies     Squamous cell skin cancer     Thyroid disease     Visual impairment     Wears contact lenses     Wears glasses     Wears partial dentures      Past Surgical History:   Procedure Laterality Date    BASAL CELL CARCINOMA EXCISION      CARPAL TUNNEL RELEASE Bilateral      SECTION      CHOLECYSTECTOMY      COLONOSCOPY      NEUROPLASTY / TRANSPOSITION MEDIAN NERVE AT CARPAL TUNNEL      NEUROPLASTY / TRANSPOSITION MEDIAN NERVE AT CARPAL TUNNEL  2001 [facility]    OTHER SURGICAL HISTORY      surgically removed skin patch for skin cancer    PLANTAR FASCIECTOMY Left     ID COLONOSCOPY FLX DX W/COLLJ SPEC WHEN PFRMD N/A 10/09/2017    Procedure: COLONOSCOPY;  Surgeon: Franklyn Adam MD;  Location: [facility] GI LAB;  Service: Gastroenterology    ID HYSTEROSCOPY BX ENDOMETRIUM&/POLYPC W/WO D&C N/A 2018    Procedure: DILATATION AND CURETTAGE (D&C) WITH HYSTEROSCOPY;  Surgeon: Ila Smith DO;  Location: [facility] OR;  Service: Gynecology    ID SURGICAL ARTHROSCOPY JOSE  W/CORACOACRM LIGM RLS Right 01/04/2024    Procedure: ARTHROSCOPIC SUBACROMIAL DECOMPRESSION;  Surgeon: Richard Brown DO;  Location: [facility] MAIN OR;  Service: Orthopedics    MS SURGICAL ARTHROSCOPY SHOULDER W/ROTATOR CUFF RPR Right 01/04/2024    Procedure: REPAIR ROTATOR CUFF  ARTHROSCOPIC;  Surgeon: Richard Brown DO;  Location: [facility] OR;  Service: Orthopedics    ROTATOR CUFF REPAIR Right 01/04/2024    SHOULDER SURGERY Right 01/04/2024    SKIN BIOPSY      TUBAL LIGATION      UPPER GASTROINTESTINAL ENDOSCOPY       Allergies   Allergen Reactions    Erythromycin GI Intolerance     Reaction Date: 11Jul2011;     Penicillins Rash     unknown    Aztreonam Rash     Action Taken: Allergy added by [employer] to replace Penicillins Cross Reactors;     Carbapenems Rash     Action Taken: Allergy added by [employer] to replace Penicillins Cross Reactors;     Cephalosporins Rash     Action Taken: Allergy added by [employer] to replace Penicillins Cross Reactors;     Griseofulvin Rash     Action Taken: Allergy added by [employer] to replace Penicillins Cross Reactors;     Influenza Virus Vaccine Hives, Rash and GI Intolerance     Current Outpatient Medications on File Prior to Visit   Medication Sig Dispense Refill    albuterol (2.5 mg/3 mL) 0.083 % nebulizer solution Take 3 mL (2.5 mg total) by nebulization every 6 (six) hours as needed for wheezing or shortness of breath 125 mL 1    albuterol (Ventolin HFA) 90 mcg/act inhaler Inhale 2 puffs every 6 (six) hours as needed for wheezing or shortness of breath 18 g 3    amLODIPine (NORVASC) 5 mg tablet Take 1 tablet (5 mg total) by mouth daily 90 tablet 3    Ascorbic Acid (vitamin C) 100 MG tablet Take 100 mg by mouth daily      cholecalciferol (VITAMIN D3) 400 units tablet Take 400 Units by mouth daily Pt taking 1000 units      Cyanocobalamin (VITAMIN B 12 PO) Take by mouth      Efinaconazole (Jublia) 10 % SOLN Apply 1 application topically in the morning (Patient taking  differently: Apply 1 application. topically if needed) 8 mL 6    fluticasone (Arnuity Ellipta) 100 MCG/ACT AEPB inhaler Inhale 1 puff daily Rinse mouth after use. 90 blister 8    guaiFENesin (MUCINEX) 600 mg 12 hr tablet Take 1,200 mg by mouth every 12 (twelve) hours      ketoconazole (NIZORAL) 2 % cream Apply topically daily      levothyroxine 125 mcg tablet TAKE ONE TABLET BY MOUTH EVERY DAY. 90 tablet 1    loratadine (CLARITIN REDITABS) 10 MG dissolvable tablet Take 10 mg by mouth daily      losartan-hydrochlorothiazide (HYZAAR) 100-25 MG per tablet Take 1 tablet by mouth daily 90 tablet 3    pantoprazole (PROTONIX) 40 mg tablet TAKE ONE TABLET BY MOUTH 2 TIMES A  tablet 0    pravastatin (PRAVACHOL) 20 mg tablet Take 1 tablet (20 mg total) by mouth daily 90 tablet 3    Rybelsus 7 MG tablet Take 1 tablet by mouth daily 90 tablet 0    XIIDRA 5 % op solution Administer 1 drop to both eyes in the morning      nystatin (MYCOSTATIN) powder Apply topically 3 (three) times a day for 14 days 30 g 0     Current Facility-Administered Medications on File Prior to Visit   Medication Dose Route Frequency Provider Last Rate Last Admin    lidocaine (XYLOCAINE) 1 % injection 2 mL  2 mL Injection  Ramón Galdamez DPM   2 mL at 23 1500    triamcinolone acetonide (KENALOG-40) 40 mg/mL injection 20 mg  20 mg Intra-articular  Ramón Galdamez DPM   20 mg at 23 1500     Social History     Socioeconomic History    Marital status: /Civil Union     Spouse name: Not on file    Number of children: 2    Years of education: Not on file    Highest education level: Not on file   Occupational History    Occupation: part time employment   Tobacco Use    Smoking status: Former     Current packs/day: 0.00     Average packs/day: 1 pack/day for 40.0 years (40.0 ttl pk-yrs)     Types: Cigarettes     Start date:      Quit date: 2012     Years since quittin.4    Smokeless tobacco: Never   Vaping Use    Vaping status: Never Used  "  Substance and Sexual Activity    Alcohol use: Yes     Alcohol/week: 8.0 standard drinks of alcohol     Types: 4 Glasses of wine, 4 Standard drinks or equivalent per week     Comment: per month    Drug use: No    Sexual activity: Yes     Partners: Male     Birth control/protection: Female Sterilization     Comment: tubal ligation    Other Topics Concern    Not on file   Social History Narrative    Caffeine use     Highschool or GED    Lives with      Worship affiliation [employer]    Always uses seatbelt     Social Determinants of Health     Financial Resource Strain: Not on file   Food Insecurity: Not on file   Transportation Needs: Not on file   Physical Activity: Not on file   Stress: Not on file   Social Connections: Not on file   Intimate Partner Violence: Not on file   Housing Stability: Not on file       I have reviewed the past medical, surgical, social and family history, medications and allergies as documented in the EMR.    Review of systems: ROS is negative other than that noted in the HPI.  Constitutional: Negative for fatigue and fever.      Physical Exam:    Blood pressure 99/66, pulse 98, resp. rate 18, height 5' 7\" (1.702 m), weight 93.3 kg (205 lb 9.6 oz), not currently breastfeeding.    General/Constitutional: NAD, well developed, well nourished  HENT: Normocephalic, atraumatic  CV: Intact distal pulses, regular rate  Resp: No respiratory distress or labored breathing  Lymphatic: No lymphadenopathy palpated  Neuro: Alert and Oriented x 3, no focal deficits  Psych: Normal mood, normal affect, normal judgement, normal behavior  Skin: Warm, dry, no rashes, no erythema      Shoulder focused exam:        Visual inspection of the shoulder demonstrates normal contour without atrophy  No evidence of scapular dyskinesia or atrophy.  No scapular winging  Active and passive range of motion demonstrates forward flexion to 170, abduction to 100, external rotation is 50 with the arm the side, internal " rotation to lower thoracic   Strength not tested  No tenderness to palpation at the distal clavicle, AC joint, acromion, or scapular spine  No pain with cross-body adduction  (-) Neer's test, (-) modified Estrada impingement test  Negative external rotation lag sign  Negative belly press, negative lift-off  (-) speed's and Yergason's test  (-) tenderness to palpation at the bicipital groove  (-) Massac's test    UE NV Exam: +2 Radial pulses bilaterally  Sensation intact to light touch C5-T1 bilaterally, Radial/median/ulnar nerve motor intact    Cervical ROM is full without pain, numbness or tingling      Shoulder Imaging    No imaging was performed today      Scribe Attestation      I,:  Junaid Arredondo am acting as a scribe while in the presence of the attending physician.:       I,:  Richard Brown, DO personally performed the services described in this documentation    as scribed in my presence.:

## 2024-06-22 ENCOUNTER — APPOINTMENT (OUTPATIENT)
Dept: LAB | Facility: CLINIC | Age: 69
End: 2024-06-22
Payer: COMMERCIAL

## 2024-06-22 DIAGNOSIS — Z00.8 HEALTH EXAMINATION IN POPULATION SURVEY: ICD-10-CM

## 2024-06-22 DIAGNOSIS — I10 ESSENTIAL HYPERTENSION: ICD-10-CM

## 2024-06-22 DIAGNOSIS — E03.9 ACQUIRED HYPOTHYROIDISM: ICD-10-CM

## 2024-06-22 DIAGNOSIS — R73.9 HYPERGLYCEMIA: ICD-10-CM

## 2024-06-22 LAB
ALBUMIN SERPL BCG-MCNC: 4.3 G/DL (ref 3.5–5)
ALP SERPL-CCNC: 68 U/L (ref 34–104)
ALT SERPL W P-5'-P-CCNC: 12 U/L (ref 7–52)
ANION GAP SERPL CALCULATED.3IONS-SCNC: 14 MMOL/L (ref 4–13)
AST SERPL W P-5'-P-CCNC: 20 U/L (ref 13–39)
BASOPHILS # BLD AUTO: 0.06 THOUSANDS/ÂΜL (ref 0–0.1)
BASOPHILS NFR BLD AUTO: 1 % (ref 0–1)
BILIRUB SERPL-MCNC: 0.42 MG/DL (ref 0.2–1)
BUN SERPL-MCNC: 19 MG/DL (ref 5–25)
CALCIUM SERPL-MCNC: 9.5 MG/DL (ref 8.4–10.2)
CHLORIDE SERPL-SCNC: 101 MMOL/L (ref 96–108)
CHOLEST SERPL-MCNC: 103 MG/DL
CO2 SERPL-SCNC: 24 MMOL/L (ref 21–32)
CREAT SERPL-MCNC: 0.6 MG/DL (ref 0.6–1.3)
EOSINOPHIL # BLD AUTO: 0.43 THOUSAND/ÂΜL (ref 0–0.61)
EOSINOPHIL NFR BLD AUTO: 4 % (ref 0–6)
ERYTHROCYTE [DISTWIDTH] IN BLOOD BY AUTOMATED COUNT: 13.4 % (ref 11.6–15.1)
EST. AVERAGE GLUCOSE BLD GHB EST-MCNC: 126 MG/DL
GFR SERPL CREATININE-BSD FRML MDRD: 94 ML/MIN/1.73SQ M
GLUCOSE P FAST SERPL-MCNC: 98 MG/DL (ref 65–99)
HBA1C MFR BLD: 6 %
HCT VFR BLD AUTO: 44.8 % (ref 34.8–46.1)
HDLC SERPL-MCNC: 38 MG/DL
HGB BLD-MCNC: 15 G/DL (ref 11.5–15.4)
IMM GRANULOCYTES # BLD AUTO: 0.04 THOUSAND/UL (ref 0–0.2)
IMM GRANULOCYTES NFR BLD AUTO: 0 % (ref 0–2)
LDLC SERPL CALC-MCNC: 40 MG/DL (ref 0–100)
LYMPHOCYTES # BLD AUTO: 2.11 THOUSANDS/ÂΜL (ref 0.6–4.47)
LYMPHOCYTES NFR BLD AUTO: 21 % (ref 14–44)
MCH RBC QN AUTO: 30.4 PG (ref 26.8–34.3)
MCHC RBC AUTO-ENTMCNC: 33.5 G/DL (ref 31.4–37.4)
MCV RBC AUTO: 91 FL (ref 82–98)
MONOCYTES # BLD AUTO: 0.98 THOUSAND/ÂΜL (ref 0.17–1.22)
MONOCYTES NFR BLD AUTO: 10 % (ref 4–12)
NEUTROPHILS # BLD AUTO: 6.56 THOUSANDS/ÂΜL (ref 1.85–7.62)
NEUTS SEG NFR BLD AUTO: 64 % (ref 43–75)
NONHDLC SERPL-MCNC: 65 MG/DL
NRBC BLD AUTO-RTO: 0 /100 WBCS
PLATELET # BLD AUTO: 240 THOUSANDS/UL (ref 149–390)
PMV BLD AUTO: 10.5 FL (ref 8.9–12.7)
POTASSIUM SERPL-SCNC: 3.4 MMOL/L (ref 3.5–5.3)
PROT SERPL-MCNC: 7.1 G/DL (ref 6.4–8.4)
RBC # BLD AUTO: 4.94 MILLION/UL (ref 3.81–5.12)
SODIUM SERPL-SCNC: 139 MMOL/L (ref 135–147)
TRIGL SERPL-MCNC: 123 MG/DL
TSH SERPL DL<=0.05 MIU/L-ACNC: 0.72 UIU/ML (ref 0.45–4.5)
WBC # BLD AUTO: 10.18 THOUSAND/UL (ref 4.31–10.16)

## 2024-06-22 PROCEDURE — 84443 ASSAY THYROID STIM HORMONE: CPT

## 2024-06-22 PROCEDURE — 83036 HEMOGLOBIN GLYCOSYLATED A1C: CPT

## 2024-06-22 PROCEDURE — 80053 COMPREHEN METABOLIC PANEL: CPT

## 2024-06-22 PROCEDURE — 36415 COLL VENOUS BLD VENIPUNCTURE: CPT

## 2024-06-22 PROCEDURE — 80061 LIPID PANEL: CPT

## 2024-06-22 PROCEDURE — 85025 COMPLETE CBC W/AUTO DIFF WBC: CPT

## 2024-07-08 DIAGNOSIS — R05.8 SPASMODIC COUGH: ICD-10-CM

## 2024-07-09 RX ORDER — PANTOPRAZOLE SODIUM 40 MG/1
TABLET, DELAYED RELEASE ORAL
Qty: 200 TABLET | Refills: 1 | Status: SHIPPED | OUTPATIENT
Start: 2024-07-09

## 2024-07-15 ENCOUNTER — CONSULT (OUTPATIENT)
Age: 69
End: 2024-07-15
Payer: COMMERCIAL

## 2024-07-15 VITALS
BODY MASS INDEX: 32.18 KG/M2 | HEIGHT: 67 IN | DIASTOLIC BLOOD PRESSURE: 75 MMHG | SYSTOLIC BLOOD PRESSURE: 113 MMHG | WEIGHT: 205 LBS | HEART RATE: 69 BPM

## 2024-07-15 DIAGNOSIS — M99.02 SEGMENTAL DYSFUNCTION OF THORACIC REGION: ICD-10-CM

## 2024-07-15 DIAGNOSIS — M99.04 SEGMENTAL DYSFUNCTION OF SACRAL REGION: ICD-10-CM

## 2024-07-15 DIAGNOSIS — S39.012A LUMBOSACRAL STRAIN, INITIAL ENCOUNTER: Primary | ICD-10-CM

## 2024-07-15 DIAGNOSIS — M99.03 SEGMENTAL DYSFUNCTION OF LUMBAR REGION: ICD-10-CM

## 2024-07-15 PROCEDURE — 97110 THERAPEUTIC EXERCISES: CPT | Performed by: CHIROPRACTOR

## 2024-07-15 PROCEDURE — 99203 OFFICE O/P NEW LOW 30 MIN: CPT | Performed by: CHIROPRACTOR

## 2024-07-15 PROCEDURE — 98941 CHIROPRACT MANJ 3-4 REGIONS: CPT | Performed by: CHIROPRACTOR

## 2024-07-15 NOTE — PROGRESS NOTES
Initial date of service: 7/15/24    Diagnoses and all orders for this visit:    Lumbosacral strain, initial encounter    Segmental dysfunction of sacral region    Segmental dysfunction of lumbar region    Segmental dysfunction of thoracic region    No red flags, radiculopathy or neurologic deficit appreciated clinically. Pt's symptoms and exam findings consistent with mechanical lbp secondary to repetitive st/sp injury, exacerbated by postural/ergonomic stressors. Pt responded well to flexion biased stretches and manual mobilization of the affected spinal and myofascial tissues with increased ROM; trial of conservative tx recommended consisting of stretching, graded mobilization/manipulation of the affected spinal and myofascial jt dysfunction, postural/ergonomic education and take home stretches/exercises.   If symptoms fail to improve with short trial of conservative care, appropriate imaging and referral will be coordinated.  Spent greater than 30 min c pt discussing hx, pe, ddx, tx options and reviewing notes/imaging    TREATMENT: 41287, 93681  Fear avoidance behavior discussion; encouraged and reassured pt that natural course of condition is to improve over time with adherence to tx plan and home care strategies. Home care recommendations: avoid bed rest, walk (but avoid trails and uneven surfaces), gradual return to activity to tolerance (avoid anything that peripheralizes symptoms), call if symptoms peripheralize, worsen, or neurologic deficit progresses. Ther-ex: IASTM; discussed post procedure soreness and/or ecchymosis for up to 36 hrs, applied to affected mm hypertonicities; supine hamstring stretch, supine gluteal stretch, side laying QL stretch, single knee to chest stretch, hip flexor pin-and-stretch, alternating prone hip extension, glute bridge, transitional mvmt education, abdominal bracing; greater than 15 min spent performing above mentioned ther-ex to improve ROM/flexibility. Thoracic  mobilization/manipulation: prone P-A mob, supine A-P manip; Lumbar mobilization/manipulation: diversified side laying graded HVLA, flexion-traction; SIJ Manipulation/Mobilization: R/L SIJ HVLA - long axis distraction, roberson drop table maneuver to affected SIJ    HPI:  Ale Chavez is a 68 y.o. female  Chief Complaint   Patient presents with    Back Pain     Low back and tailbone bilateral intermittent, going on for a bout an month, feels like its from sitting, sore, no pain currently, at its worst about an 8 ,      Pt presents for eval and for alternating SIJ/glute region onset Summer 2024    Back Pain  This is a new problem. The current episode started more than 1 month ago. The pain is present in the gluteal, lumbar spine, sacro-iliac and thoracic spine. The quality of the pain is described as aching. The pain does not radiate. The pain is Worse during the day. The symptoms are aggravated by sitting (transitional mvmts). Pertinent negatives include no bladder incontinence or bowel incontinence.     Past Medical History:   Diagnosis Date    Abdominal pain     Allergic     Arthritis     Asthma     Basal cell carcinoma     Bicuspid aortic valve     LAST ASSESSED [id number]    Cancer (HCC)     skin    Cervical disc disease     last assessed [id number]    Cervical stenosis of spine     LAST ASSESSED [id number]    Colon polyp     COVID 2022    Disease of thyroid gland     hypothyroid    Dry eyes     Endometriosis     GERD (gastroesophageal reflux disease)     Heart murmur     History of screening mammography 11/11/2023    Bi-Rads 1.    Hot flashes     Hyperglycemia     Hyperlipidemia     Hypertension     Hypothyroidism     Kidney stone     Left ventricular hypertrophy     Mitral valvular disorder     Nephrolithiasis     Ovarian cyst, complex     Pneumonia 6/19    800.00    Ptosis     LAST ASSESSED [id number]    Renal calculi     Right cervical radiculopathy     LAST ASSESSED [id number]    Rotator cuff disorder, right      Seasonal allergies     Squamous cell skin cancer     Thyroid disease     Visual impairment     Wears contact lenses     Wears glasses     Wears partial dentures       Past Surgical History:   Procedure Laterality Date    BASAL CELL CARCINOMA EXCISION      CARPAL TUNNEL RELEASE Bilateral      SECTION      CHOLECYSTECTOMY      COLONOSCOPY      NEUROPLASTY / TRANSPOSITION MEDIAN NERVE AT CARPAL TUNNEL      NEUROPLASTY / TRANSPOSITION MEDIAN NERVE AT CARPAL TUNNEL  2001     OTHER SURGICAL HISTORY      surgically removed skin patch for skin cancer    PLANTAR FASCIECTOMY Left     NV COLONOSCOPY FLX DX W/COLLJ SPEC WHEN PFRMD N/A 10/09/2017    Procedure: COLONOSCOPY;  Surgeon: Franklyn Adam MD;  Location: [facility] GI LAB;  Service: Gastroenterology    NV HYSTEROSCOPY BX ENDOMETRIUM&/POLYPC W/WO D&C N/A 2018    Procedure: DILATATION AND CURETTAGE (D&C) WITH HYSTEROSCOPY;  Surgeon: Ila Smith DO;  Location: [facility];  Service: Gynecology    NV SURGICAL ARTHROSCOPY JOSE W/CORACOACRM LIGM RLS Right 2024    Procedure: ARTHROSCOPIC SUBACROMIAL DECOMPRESSION;  Surgeon: Richard Brown DO;  Location: [facility];  Service: Orthopedics    NV SURGICAL ARTHROSCOPY SHOULDER W/ROTATOR CUFF RPR Right 2024    Procedure: REPAIR ROTATOR CUFF  ARTHROSCOPIC;  Surgeon: Richard Brown DO;  Location: [facility];  Service: Orthopedics    ROTATOR CUFF REPAIR Right 2024    SHOULDER SURGERY Right 2024    SKIN BIOPSY      TUBAL LIGATION      UPPER GASTROINTESTINAL ENDOSCOPY       The following portions of the patient's history were reviewed and updated as appropriate: allergies, past family history, past medical history, past social history, past surgical history, and problem list.  Review of Systems   Gastrointestinal:  Negative for bowel incontinence.   Genitourinary:  Negative for bladder incontinence.   Musculoskeletal:  Positive for back pain.     Physical Exam  Eyes:      Extraocular  Movements: Extraocular movements intact.   Cardiovascular:      Pulses: Normal pulses.   Abdominal:      General: There is no distension.      Tenderness: There is no abdominal tenderness.   Musculoskeletal:      Thoracic back: Spasms and tenderness present. Decreased range of motion.      Lumbar back: Spasms and tenderness present. Decreased range of motion. Negative right straight leg raise test and negative left straight leg raise test.        Back:       Comments: Pnful and limited in Ext     Skin:     General: Skin is warm and dry.   Neurological:      Mental Status: She is alert and oriented to person, place, and time.      Cranial Nerves: Cranial nerves 2-12 are intact.      Sensory: Sensation is intact.      Motor: Motor function is intact.      Coordination: Coordination is intact.      Gait: Gait is intact.      Deep Tendon Reflexes: Babinski sign absent on the right side. Babinski sign absent on the left side.      Reflex Scores:       Patellar reflexes are 2+ on the right side and 2+ on the left side.       Achilles reflexes are 2+ on the right side and 2+ on the left side.  Psychiatric:         Mood and Affect: Mood normal.         Behavior: Behavior normal.       SOFT TISSUE ASSESSMENT Hypertonicity and tenderness palpated L T10-S1 erector spinae, B glute med/min, L QL, Bhamstring JOINT RESTRICTIONS: T10-S1 and L SIJ ORTHO: SI jt point tenderness: +; Shi unremarkable for centralization/peripheralization; lala's, iliac compression, thigh thrust elicit lbp in L SIJ; prone femoral nerve stretch neg for upper lumbar neural tension, elicits L SIJ stiffness; sitting root elicits no lbp on R/L; slump test elicits no neural tension R/L    Return in about 1 week (around 7/22/2024) for Next scheduled follow up.

## 2024-07-17 ENCOUNTER — TELEPHONE (OUTPATIENT)
Age: 69
End: 2024-07-17

## 2024-07-17 DIAGNOSIS — R05.8 SPASMODIC COUGH: ICD-10-CM

## 2024-07-17 DIAGNOSIS — J45.21 MILD INTERMITTENT ASTHMA WITH ACUTE EXACERBATION: ICD-10-CM

## 2024-07-17 NOTE — TELEPHONE ENCOUNTER
Patient is calling because they need a refill on their anniuty [facility] pharmacy mailorder please

## 2024-07-18 RX ORDER — FLUTICASONE FUROATE 100 UG/1
1 POWDER RESPIRATORY (INHALATION) DAILY
Qty: 90 BLISTER | Refills: 3 | Status: SHIPPED | OUTPATIENT
Start: 2024-07-18

## 2024-07-18 RX ORDER — PANTOPRAZOLE SODIUM 40 MG/1
40 TABLET, DELAYED RELEASE ORAL 2 TIMES DAILY
Qty: 200 TABLET | Refills: 1 | OUTPATIENT
Start: 2024-07-18

## 2024-07-24 NOTE — PROGRESS NOTES
Patient ID: Ale Chavez is a 68 y.o. female Date of Birth 1955       Chief Complaint   Patient presents with    Foot Pain     Left foot, arthritis             Diagnosis:  1. Primary osteoarthritis of left foot  -     bupivacaine (MARCAINE) 0.5 % injection 0.5 mL  -     dexamethasone (DECADRON) injection 4 mg  -     triamcinolone acetonide (KENALOG-40) 40 mg/mL injection 40 mg       1. Reviewed medical records.  Reviewed previous podiatry note from 8/14/2023.  Patient was counseled and educated on the condition and the diagnosis.    2. X-ray was personally reviewed.  The radiological findings were discussed with the patient.    3. The exam and symptoms are consistent with TMTJ arthrosis.  The diagnosis, treatment options and prognosis were discussed with the patient.    4. Treatment options were discussed and the patient wished to proceed with an injection.  See procedure.  5. Instructed supportive care, home exercise, icing, and proper footwear/ arch support.   Discussed possible further images depending on the progress.    6. Patient will return as needed       Foot/lower extremity injection    Performed by: Talita Fortune DPM  Authorized by: Talita Fortune DPM    Procedure:     Other Assisting Provider: No      Verbal consent obtained?: Yes      Risks and benefits: Risks, benefits and alternatives were discussed      Consent given by:  Patient    Time out: Immediately prior to the procedure a time out was called      Patient states understanding of procedure being performed: Yes      Patient identity confirmed:  Verbally with patient    Supporting Documentation:     Indications:  Joint swelling    Procedure Details:    Prep: patient was prepped and draped in usual sterile fashion                Ethyl Chloride was applied      Needle size: 25 G G    Ultrasound Guidance: no      Approach:  Dorsal    Laterality:  Left    Location comment:  3rd/4th TMTJ    Comments:      1:1: mix of 0.5 cc each of 0.5% plain  "Marcaine, Decadron 4 mg/mL and Kenalog 40       Subjective:   Kira presents with chief complaint of left foot pain for a few weeks.  She has aches and throbbing in left dorsal foot.  Historically she has been treated by Dr. Galdamez, most recently on 8/14/2023 with injection to third/fourth TMTJ and advised to apply diclofenac sodium gel 4 times a day.  She was seen by her PCP and sent for X-ray.   The patient does not recall any injury.  The patient tried OTC meds, and different shoes without relieving the pain.   No associated numbness or paresthesia.  No significant weakness or dysfunction.   She states since the injection last August she has been doing really well until about 4 to 5 weeks ago when the pain returned.  She is concerned as she is going on a 3-week landed cruise to [place] and will be doing a lot of walking.          The following portions of the patient's history were reviewed and updated as appropriate: allergies, current medications, past family history, past medical history, past social history, past surgical history, and problem list.        Objective:  /71 (BP Location: Left arm, Patient Position: Sitting, Cuff Size: Large)   Ht 5' 7\" (1.702 m) Comment: verbal  Wt 93.4 kg (206 lb)   BMI 32.26 kg/m²     Review of Systems   Constitutional:  Negative for chills and fever.   HENT:  Negative for ear pain and sore throat.    Eyes:  Negative for pain and visual disturbance.   Respiratory:  Negative for cough and shortness of breath.    Cardiovascular:  Negative for chest pain and palpitations.   Gastrointestinal:  Negative for abdominal pain and vomiting.   Genitourinary:  Negative for dysuria and hematuria.   Musculoskeletal:  Negative for arthralgias and back pain.        Pain in foot   Skin:  Negative for color change and rash.   Neurological:  Negative for seizures and syncope.   All other systems reviewed and are negative.      Physical Exam  Constitutional:       Appearance: Normal " "appearance. She is well-developed.   HENT:      Head: Normocephalic and atraumatic.      Nose: Nose normal.      Mouth/Throat:      Mouth: Mucous membranes are moist.      Pharynx: Oropharynx is clear.   Eyes:      Conjunctiva/sclera: Conjunctivae normal.      Pupils: Pupils are equal, round, and reactive to light.   Cardiovascular:      Pulses:           Dorsalis pedis pulses are 2+ on the right side and 2+ on the left side.        Posterior tibial pulses are 2+ on the right side and 2+ on the left side.   Pulmonary:      Effort: Pulmonary effort is normal.   Musculoskeletal:         General: Normal range of motion.      Cervical back: Normal range of motion.      Right lower leg: No edema.      Left lower leg: No edema.      Right foot: Deformity present.      Left foot: Deformity present.   Feet:      Right foot:      Protective Sensation: 10 sites tested.  10 sites sensed.      Skin integrity: Skin integrity normal.      Toenail Condition: Right toenails are normal.      Left foot:      Protective Sensation: 10 sites tested.  10 sites sensed.      Skin integrity: Skin integrity normal.      Toenail Condition: Left toenails are normal.      Comments: Pain presents left 3rd/ 4th TMTJ with mild edema noted, no crepitus, range of motion and MMT is 5 out of 5 bilateral.  Skin:     General: Skin is warm and dry.      Capillary Refill: Capillary refill takes less than 2 seconds.   Neurological:      General: No focal deficit present.      Mental Status: She is alert and oriented to person, place, and time. Mental status is at baseline.   Psychiatric:         Mood and Affect: Mood normal.         Behavior: Behavior normal.         Thought Content: Thought content normal.         Judgment: Judgment normal.              No pertinent results found.      Talita Fortune DPM, DPALAYNA, FACFAS    Portions of the record may have been created with voice recognition software. Occasional wrong word or \"sound a like\" substitutions may " have occurred due to the inherent limitations of voice recognition software. Read the chart carefully and recognize, using context, where substitutions have occurred.

## 2024-07-25 ENCOUNTER — OFFICE VISIT (OUTPATIENT)
Dept: PODIATRY | Facility: CLINIC | Age: 69
End: 2024-07-25
Payer: COMMERCIAL

## 2024-07-25 VITALS
SYSTOLIC BLOOD PRESSURE: 119 MMHG | DIASTOLIC BLOOD PRESSURE: 71 MMHG | WEIGHT: 206 LBS | HEIGHT: 67 IN | BODY MASS INDEX: 32.33 KG/M2

## 2024-07-25 DIAGNOSIS — M19.072 PRIMARY OSTEOARTHRITIS OF LEFT FOOT: Primary | ICD-10-CM

## 2024-07-25 PROCEDURE — 20550 NJX 1 TENDON SHEATH/LIGAMENT: CPT | Performed by: PODIATRIST

## 2024-07-25 PROCEDURE — 99213 OFFICE O/P EST LOW 20 MIN: CPT | Performed by: PODIATRIST

## 2024-07-25 RX ORDER — DEXAMETHASONE SODIUM PHOSPHATE 4 MG/ML
4 INJECTION, SOLUTION INTRA-ARTICULAR; INTRALESIONAL; INTRAMUSCULAR; INTRAVENOUS; SOFT TISSUE ONCE
Status: COMPLETED | OUTPATIENT
Start: 2024-07-25 | End: 2024-07-25

## 2024-07-25 RX ORDER — BUPIVACAINE HYDROCHLORIDE 5 MG/ML
0.5 INJECTION, SOLUTION PERINEURAL ONCE
Status: COMPLETED | OUTPATIENT
Start: 2024-07-25 | End: 2024-07-25

## 2024-07-25 RX ORDER — TRIAMCINOLONE ACETONIDE 40 MG/ML
40 INJECTION, SUSPENSION INTRA-ARTICULAR; INTRAMUSCULAR ONCE
Status: COMPLETED | OUTPATIENT
Start: 2024-07-25 | End: 2024-07-25

## 2024-07-25 RX ADMIN — DEXAMETHASONE SODIUM PHOSPHATE 4 MG: 4 INJECTION, SOLUTION INTRA-ARTICULAR; INTRALESIONAL; INTRAMUSCULAR; INTRAVENOUS; SOFT TISSUE at 11:48

## 2024-07-25 RX ADMIN — BUPIVACAINE HYDROCHLORIDE 0.5 ML: 5 INJECTION, SOLUTION PERINEURAL at 11:52

## 2024-07-25 RX ADMIN — TRIAMCINOLONE ACETONIDE 40 MG: 40 INJECTION, SUSPENSION INTRA-ARTICULAR; INTRAMUSCULAR at 11:50

## 2024-08-05 ENCOUNTER — PROCEDURE VISIT (OUTPATIENT)
Age: 69
End: 2024-08-05
Payer: COMMERCIAL

## 2024-08-05 VITALS
BODY MASS INDEX: 32.33 KG/M2 | HEIGHT: 67 IN | HEART RATE: 82 BPM | WEIGHT: 206 LBS | SYSTOLIC BLOOD PRESSURE: 126 MMHG | DIASTOLIC BLOOD PRESSURE: 72 MMHG

## 2024-08-05 DIAGNOSIS — S39.012A LUMBOSACRAL STRAIN, INITIAL ENCOUNTER: Primary | ICD-10-CM

## 2024-08-05 DIAGNOSIS — M99.04 SEGMENTAL DYSFUNCTION OF SACRAL REGION: ICD-10-CM

## 2024-08-05 DIAGNOSIS — M99.02 SEGMENTAL DYSFUNCTION OF THORACIC REGION: ICD-10-CM

## 2024-08-05 DIAGNOSIS — M99.03 SEGMENTAL DYSFUNCTION OF LUMBAR REGION: ICD-10-CM

## 2024-08-05 PROCEDURE — 97110 THERAPEUTIC EXERCISES: CPT | Performed by: CHIROPRACTOR

## 2024-08-05 PROCEDURE — 98941 CHIROPRACT MANJ 3-4 REGIONS: CPT | Performed by: CHIROPRACTOR

## 2024-08-05 NOTE — PROGRESS NOTES
Initial date of service: 7/15/24    Diagnoses and all orders for this visit:    Lumbosacral strain, initial encounter    Segmental dysfunction of sacral region    Segmental dysfunction of lumbar region    Segmental dysfunction of thoracic region    Pt much improved with reduced pain and increased ROM    TREATMENT: 94878, [id number]  Ther-ex: IASTM; discussed post procedure soreness and/or ecchymosis for up to 36 hrs, applied to affected mm hypertonicities; supine hamstring stretch, supine gluteal stretch, side laying QL stretch, single knee to chest stretch, hip flexor pin-and-stretch, alternating prone hip extension, glute bridge, transitional mvmt education, abdominal bracing; greater than 15 min spent performing above mentioned ther-ex to improve ROM/flexibility. Thoracic mobilization/manipulation: prone P-A mob, supine A-P manip; Lumbar mobilization/manipulation: diversified side laying graded HVLA, flexion-traction; SIJ Manipulation/Mobilization: R/L SIJ HVLA - long axis distraction, roberson drop table maneuver to affected SIJ    HPI:  Ale Chavez is a 68 y.o. female  Chief Complaint   Patient presents with    Back Pain     Low back feeling good no pain      Pt presents for tx of alternating SIJ/glute region onset Summer 2024    Back Pain  This is a new problem. The current episode started more than 1 month ago. The pain is present in the gluteal, lumbar spine, sacro-iliac and thoracic spine. The quality of the pain is described as aching. The pain does not radiate. The pain is Worse during the day. The symptoms are aggravated by sitting (transitional mvmts). Pertinent negatives include no bladder incontinence or bowel incontinence.     Past Medical History:   Diagnosis Date    Abdominal pain     Allergic     Arthritis     Asthma     Basal cell carcinoma     Bicuspid aortic valve     LAST ASSESSED [id number]    Cancer (HCC)     skin    Cervical disc disease     last assessed [id number]    Cervical stenosis of spine      LAST ASSESSED 2016    Colon polyp     COVID     Disease of thyroid gland     hypothyroid    Dry eyes     Endometriosis     GERD (gastroesophageal reflux disease)     Heart murmur     History of screening mammography 2023    Bi-Rads 1.    Hot flashes     Hyperglycemia     Hyperlipidemia     Hypertension     Hypothyroidism     Kidney stone     Left ventricular hypertrophy     Mitral valvular disorder     Nephrolithiasis     Ovarian cyst, complex     Pneumonia     800.00    Ptosis     LAST ASSESSED [id number]    Renal calculi     Right cervical radiculopathy     LAST ASSESSED 2016    Rotator cuff disorder, right     Seasonal allergies     Squamous cell skin cancer     Thyroid disease     Visual impairment     Wears contact lenses     Wears glasses     Wears partial dentures       Past Surgical History:   Procedure Laterality Date    BASAL CELL CARCINOMA EXCISION      CARPAL TUNNEL RELEASE Bilateral      SECTION      CHOLECYSTECTOMY      COLONOSCOPY      NEUROPLASTY / TRANSPOSITION MEDIAN NERVE AT CARPAL TUNNEL      NEUROPLASTY / TRANSPOSITION MEDIAN NERVE AT CARPAL TUNNEL      [facility]    OTHER SURGICAL HISTORY      surgically removed skin patch for skin cancer    PLANTAR FASCIECTOMY Left     DE COLONOSCOPY FLX DX W/COLLJ SPEC WHEN PFRMD N/A 10/09/2017    Procedure: COLONOSCOPY;  Surgeon: Franklyn Adam MD;  Location: [facility] GI LAB;  Service: Gastroenterology    DE HYSTEROSCOPY BX ENDOMETRIUM&/POLYPC W/WO D&C N/A 2018    Procedure: DILATATION AND CURETTAGE (D&C) WITH HYSTEROSCOPY;  Surgeon: Ila Smith DO;  Location: [facility];  Service: Gynecology    DE SURGICAL ARTHROSCOPY JOSE W/CORACOACRM LIGM RLS Right 2024    Procedure: ARTHROSCOPIC SUBACROMIAL DECOMPRESSION;  Surgeon: Richard Brown DO;  Location: [facility];  Service: Orthopedics    DE SURGICAL ARTHROSCOPY SHOULDER W/ROTATOR CUFF RPR Right 2024    Procedure: REPAIR ROTATOR CUFF  ARTHROSCOPIC;   Surgeon: Richard Brown DO;  Location:  MAIN OR;  Service: Orthopedics    ROTATOR CUFF REPAIR Right 01/04/2024    SHOULDER SURGERY Right 01/04/2024    SKIN BIOPSY      TUBAL LIGATION      UPPER GASTROINTESTINAL ENDOSCOPY       The following portions of the patient's history were reviewed and updated as appropriate: allergies, past family history, past medical history, past social history, past surgical history, and problem list.  Review of Systems   Gastrointestinal:  Negative for bowel incontinence.   Genitourinary:  Negative for bladder incontinence.   Musculoskeletal:  Positive for back pain.     Physical Exam  Musculoskeletal:      Thoracic back: Spasms and tenderness present. Decreased range of motion.      Lumbar back: Spasms and tenderness present. Decreased range of motion. Negative right straight leg raise test and negative left straight leg raise test.        Back:       Comments: Pnful and limited in Ext     Skin:     General: Skin is warm and dry.   Neurological:      Mental Status: She is alert and oriented to person, place, and time.      Gait: Gait is intact.   Psychiatric:         Mood and Affect: Mood normal.         Behavior: Behavior normal.       SOFT TISSUE ASSESSMENT Hypertonicity and tenderness palpated L T10-S1 erector spinae, B glute med/min, L QL, Bhamstring JOINT RESTRICTIONS: T10-S1 and L SIJ     Return in about 1 week (around 8/12/2024) for Next scheduled follow up.

## 2024-08-12 ENCOUNTER — PROCEDURE VISIT (OUTPATIENT)
Age: 69
End: 2024-08-12
Payer: COMMERCIAL

## 2024-08-12 VITALS
WEIGHT: 206 LBS | BODY MASS INDEX: 32.33 KG/M2 | HEIGHT: 67 IN | HEART RATE: 80 BPM | DIASTOLIC BLOOD PRESSURE: 73 MMHG | SYSTOLIC BLOOD PRESSURE: 138 MMHG

## 2024-08-12 DIAGNOSIS — M99.04 SEGMENTAL DYSFUNCTION OF SACRAL REGION: ICD-10-CM

## 2024-08-12 DIAGNOSIS — M99.02 SEGMENTAL DYSFUNCTION OF THORACIC REGION: ICD-10-CM

## 2024-08-12 DIAGNOSIS — M99.03 SEGMENTAL DYSFUNCTION OF LUMBAR REGION: ICD-10-CM

## 2024-08-12 DIAGNOSIS — S39.012A LUMBOSACRAL STRAIN, INITIAL ENCOUNTER: Primary | ICD-10-CM

## 2024-08-12 PROCEDURE — 98941 CHIROPRACT MANJ 3-4 REGIONS: CPT | Performed by: CHIROPRACTOR

## 2024-08-12 PROCEDURE — 97110 THERAPEUTIC EXERCISES: CPT | Performed by: CHIROPRACTOR

## 2024-08-12 NOTE — PROGRESS NOTES
Initial date of service: 7/15/24    Diagnoses and all orders for this visit:    Lumbosacral strain, initial encounter    Segmental dysfunction of sacral region    Segmental dysfunction of lumbar region    Segmental dysfunction of thoracic region    Pt much improved with reduced pain and increased ROM    TREATMENT: 89392, 46831  Ther-ex: IASTM; discussed post procedure soreness and/or ecchymosis for up to 36 hrs, applied to affected mm hypertonicities; supine hamstring stretch, supine gluteal stretch, side laying QL stretch, single knee to chest stretch, hip flexor pin-and-stretch, alternating prone hip extension, glute bridge, transitional mvmt education, abdominal bracing; greater than 15 min spent performing above mentioned ther-ex to improve ROM/flexibility. Thoracic mobilization/manipulation: prone P-A mob, supine A-P manip; Lumbar mobilization/manipulation: diversified side laying graded HVLA, flexion-traction; SIJ Manipulation/Mobilization: R/L SIJ HVLA - long axis distraction, roberson drop table maneuver to affected SIJ    HPI:  Ale Chavez is a 68 y.o. female  Chief Complaint   Patient presents with    Back Pain     Low back feeling better no pain has some tailbone pain when sitting for long periods      Pt presents for tx of alternating SIJ/glute region onset Summer 2024  8/12: pt reports feeling and moving much better; some sacral soreness/stiffness after sitting for haircut over weekend    Back Pain  This is a new problem. The current episode started more than 1 month ago. The pain is present in the gluteal, lumbar spine, sacro-iliac and thoracic spine. The quality of the pain is described as aching. The pain does not radiate. The pain is Worse during the day. The symptoms are aggravated by sitting (transitional mvmts). Pertinent negatives include no bladder incontinence or bowel incontinence.     Past Medical History:   Diagnosis Date    Abdominal pain     Allergic     Arthritis     Asthma     Basal  cell carcinoma     Bicuspid aortic valve     LAST ASSESSED [id number]    Cancer (HCC)     skin    Cervical disc disease     last assessed [id number]    Cervical stenosis of spine     LAST ASSESSED 2016    Colon polyp     COVID     Disease of thyroid gland     hypothyroid    Dry eyes     Endometriosis     GERD (gastroesophageal reflux disease)     Heart murmur     History of screening mammography 2023    Bi-Rads 1.    Hot flashes     Hyperglycemia     Hyperlipidemia     Hypertension     Hypothyroidism     Kidney stone     Left ventricular hypertrophy     Mitral valvular disorder     Nephrolithiasis     Ovarian cyst, complex     Pneumonia     800.00    Ptosis     LAST ASSESSED [id number]    Renal calculi     Right cervical radiculopathy     LAST ASSESSED 2016    Rotator cuff disorder, right     Seasonal allergies     Squamous cell skin cancer     Thyroid disease     Visual impairment     Wears contact lenses     Wears glasses     Wears partial dentures       Past Surgical History:   Procedure Laterality Date    BASAL CELL CARCINOMA EXCISION      CARPAL TUNNEL RELEASE Bilateral      SECTION      CHOLECYSTECTOMY      COLONOSCOPY      NEUROPLASTY / TRANSPOSITION MEDIAN NERVE AT CARPAL TUNNEL      NEUROPLASTY / TRANSPOSITION MEDIAN NERVE AT CARPAL TUNNEL  2001    OTHER SURGICAL HISTORY      surgically removed skin patch for skin cancer    PLANTAR FASCIECTOMY Left     TX COLONOSCOPY FLX DX W/COLLJ SPEC WHEN PFRMD N/A 10/09/2017    Procedure: COLONOSCOPY;  Surgeon: Franklyn Adam MD;  Location: [facility] GI LAB;  Service: Gastroenterology    TX HYSTEROSCOPY BX ENDOMETRIUM&/POLYPC W/WO D&C N/A 2018    Procedure: DILATATION AND CURETTAGE (D&C) WITH HYSTEROSCOPY;  Surgeon: Ila Smith DO;  Location: [facility] OR;  Service: Gynecology    TX SURGICAL ARTHROSCOPY JOSE W/CORACOACRM LIGM RLS Right 2024    Procedure: ARTHROSCOPIC SUBACROMIAL DECOMPRESSION;  Surgeon: Richard Brown  ;  Location: [facility] MAIN OR;  Service: Orthopedics    DC SURGICAL ARTHROSCOPY SHOULDER W/ROTATOR CUFF RPR Right 01/04/2024    Procedure: REPAIR ROTATOR CUFF  ARTHROSCOPIC;  Surgeon: Richard Brown DO;  Location: [facility] OR;  Service: Orthopedics    ROTATOR CUFF REPAIR Right 01/04/2024    SHOULDER SURGERY Right 01/04/2024    SKIN BIOPSY      TUBAL LIGATION      UPPER GASTROINTESTINAL ENDOSCOPY       The following portions of the patient's history were reviewed and updated as appropriate: allergies, past family history, past medical history, past social history, past surgical history, and problem list.  Review of Systems   Gastrointestinal:  Negative for bowel incontinence.   Genitourinary:  Negative for bladder incontinence.   Musculoskeletal:  Positive for back pain.     Physical Exam  Musculoskeletal:      Thoracic back: Spasms and tenderness present. Decreased range of motion.      Lumbar back: Spasms and tenderness present. Decreased range of motion. Negative right straight leg raise test and negative left straight leg raise test.        Back:       Comments: Pnful and limited in Ext     Skin:     General: Skin is warm and dry.   Neurological:      Mental Status: She is alert and oriented to person, place, and time.      Gait: Gait is intact.   Psychiatric:         Mood and Affect: Mood normal.         Behavior: Behavior normal.     SOFT TISSUE ASSESSMENT Hypertonicity and tenderness palpated L T10-S1 erector spinae, B glute med/min, L QL, Bhamstring JOINT RESTRICTIONS: T10-S1 and L SIJ     Return in about 3 weeks (around 9/2/2024) for Next scheduled follow up.

## 2024-09-09 ENCOUNTER — PROCEDURE VISIT (OUTPATIENT)
Age: 69
End: 2024-09-09
Payer: COMMERCIAL

## 2024-09-09 ENCOUNTER — OFFICE VISIT (OUTPATIENT)
Dept: FAMILY MEDICINE CLINIC | Facility: HOSPITAL | Age: 69
End: 2024-09-09
Payer: COMMERCIAL

## 2024-09-09 VITALS
BODY MASS INDEX: 32.8 KG/M2 | WEIGHT: 209 LBS | SYSTOLIC BLOOD PRESSURE: 118 MMHG | HEART RATE: 90 BPM | DIASTOLIC BLOOD PRESSURE: 71 MMHG | HEIGHT: 67 IN

## 2024-09-09 VITALS
BODY MASS INDEX: 32.93 KG/M2 | HEIGHT: 67 IN | SYSTOLIC BLOOD PRESSURE: 111 MMHG | OXYGEN SATURATION: 97 % | DIASTOLIC BLOOD PRESSURE: 78 MMHG | TEMPERATURE: 74 F | WEIGHT: 209.8 LBS

## 2024-09-09 DIAGNOSIS — J45.20 MILD INTERMITTENT ASTHMA WITHOUT COMPLICATION: ICD-10-CM

## 2024-09-09 DIAGNOSIS — K76.0 FATTY INFILTRATION OF LIVER: ICD-10-CM

## 2024-09-09 DIAGNOSIS — E03.9 ACQUIRED HYPOTHYROIDISM: ICD-10-CM

## 2024-09-09 DIAGNOSIS — E66.01 CLASS 2 SEVERE OBESITY DUE TO EXCESS CALORIES WITH SERIOUS COMORBIDITY AND BODY MASS INDEX (BMI) OF 36.0 TO 36.9 IN ADULT (HCC): ICD-10-CM

## 2024-09-09 DIAGNOSIS — M99.04 SEGMENTAL DYSFUNCTION OF SACRAL REGION: ICD-10-CM

## 2024-09-09 DIAGNOSIS — M99.02 SEGMENTAL DYSFUNCTION OF THORACIC REGION: ICD-10-CM

## 2024-09-09 DIAGNOSIS — M99.03 SEGMENTAL DYSFUNCTION OF LUMBAR REGION: ICD-10-CM

## 2024-09-09 DIAGNOSIS — I83.813 VARICOSE VEINS OF BOTH LOWER EXTREMITIES WITH PAIN: Primary | ICD-10-CM

## 2024-09-09 DIAGNOSIS — R73.9 HYPERGLYCEMIA: ICD-10-CM

## 2024-09-09 DIAGNOSIS — S39.012A LUMBOSACRAL STRAIN, INITIAL ENCOUNTER: Primary | ICD-10-CM

## 2024-09-09 DIAGNOSIS — I10 ESSENTIAL HYPERTENSION: ICD-10-CM

## 2024-09-09 DIAGNOSIS — E78.2 MIXED HYPERLIPIDEMIA: ICD-10-CM

## 2024-09-09 PROCEDURE — 99214 OFFICE O/P EST MOD 30 MIN: CPT | Performed by: FAMILY MEDICINE

## 2024-09-09 PROCEDURE — 98941 CHIROPRACT MANJ 3-4 REGIONS: CPT | Performed by: CHIROPRACTOR

## 2024-09-09 PROCEDURE — 97110 THERAPEUTIC EXERCISES: CPT | Performed by: CHIROPRACTOR

## 2024-09-09 NOTE — PROGRESS NOTES
Initial date of service: 7/15/24    Diagnoses and all orders for this visit:    Lumbosacral strain, initial encounter    Segmental dysfunction of sacral region    Segmental dysfunction of lumbar region    Segmental dysfunction of thoracic region    Pt suffered mild exacerbation but responded to tx with reduced pain and increased ROM    TREATMENT: 10580, 69286  Ther-ex: IASTM; discussed post procedure soreness and/or ecchymosis for up to 36 hrs, applied to affected mm hypertonicities; supine hamstring stretch, supine gluteal stretch, side laying QL stretch, single knee to chest stretch, hip flexor pin-and-stretch, alternating prone hip extension, glute bridge, transitional mvmt education, abdominal bracing; greater than 15 min spent performing above mentioned ther-ex to improve ROM/flexibility. Thoracic mobilization/manipulation: prone P-A mob, supine A-P manip; Lumbar mobilization/manipulation: diversified side laying graded HVLA, flexion-traction; SIJ Manipulation/Mobilization: R/L SIJ HVLA - long axis distraction, roberson drop table maneuver to affected SIJ    HPI:  Ale Chavez is a 68 y.o. female  Chief Complaint   Patient presents with    Back Pain     Low back about a 3 bilateral feeling better      Pt presents for tx of alternating SIJ/glute region onset Summer 2024  9/9: pt reports feeling and moving much better; stiff/sore after vacation but overall better    Back Pain  This is a new problem. The current episode started more than 1 month ago. The pain is present in the gluteal, lumbar spine, sacro-iliac and thoracic spine. The quality of the pain is described as aching. The pain does not radiate. The pain is Worse during the day. The symptoms are aggravated by sitting (transitional mvmts). Pertinent negatives include no bladder incontinence or bowel incontinence.     Past Medical History:   Diagnosis Date    Abdominal pain     Allergic     Arthritis     Asthma     Basal cell carcinoma     Bicuspid aortic  valve     LAST ASSESSED [id number]    Cancer (HCC)     skin    Cervical disc disease     last assessed [id number]    Cervical stenosis of spine     LAST ASSESSED 2016    Colon polyp     COVID     Disease of thyroid gland     hypothyroid    Dry eyes     Endometriosis     GERD (gastroesophageal reflux disease)     Heart murmur     History of screening mammography 2023    Bi-Rads 1.    Hot flashes     Hyperglycemia     Hyperlipidemia     Hypertension     Hypothyroidism     Kidney stone     Left ventricular hypertrophy     Mitral valvular disorder     Nephrolithiasis     Ovarian cyst, complex     Pneumonia     800.00    Ptosis     LAST ASSESSED [id number]    Renal calculi     Right cervical radiculopathy     LAST ASSESSED 2016    Rotator cuff disorder, right     Seasonal allergies     Squamous cell skin cancer     Thyroid disease     Visual impairment     Wears contact lenses     Wears glasses     Wears partial dentures       Past Surgical History:   Procedure Laterality Date    BASAL CELL CARCINOMA EXCISION      CARPAL TUNNEL RELEASE Bilateral      SECTION      CHOLECYSTECTOMY      COLONOSCOPY      NEUROPLASTY / TRANSPOSITION MEDIAN NERVE AT CARPAL TUNNEL      NEUROPLASTY / TRANSPOSITION MEDIAN NERVE AT CARPAL TUNNEL  2001 [facility]    OTHER SURGICAL HISTORY      surgically removed skin patch for skin cancer    PLANTAR FASCIECTOMY Left     CO COLONOSCOPY FLX DX W/COLLJ SPEC WHEN PFRMD N/A 10/09/2017    Procedure: COLONOSCOPY;  Surgeon: Franklyn Adam MD;  Location: [facility] GI LAB;  Service: Gastroenterology    CO HYSTEROSCOPY BX ENDOMETRIUM&/POLYPC W/WO D&C N/A 2018    Procedure: DILATATION AND CURETTAGE (D&C) WITH HYSTEROSCOPY;  Surgeon: Ila Smith DO;  Location: [facility] OR;  Service: Gynecology    CO SURGICAL ARTHROSCOPY JOSE W/CORACOACRM LIGM RLS Right 2024    Procedure: ARTHROSCOPIC SUBACROMIAL DECOMPRESSION;  Surgeon: Richard Brown DO;  Location: [facility] OR;   Service: Orthopedics    MN SURGICAL ARTHROSCOPY SHOULDER W/ROTATOR CUFF RPR Right 01/04/2024    Procedure: REPAIR ROTATOR CUFF  ARTHROSCOPIC;  Surgeon: Richard Brown DO;  Location: [facility] OR;  Service: Orthopedics    ROTATOR CUFF REPAIR Right 01/04/2024    SHOULDER SURGERY Right 01/04/2024    SKIN BIOPSY      TUBAL LIGATION      UPPER GASTROINTESTINAL ENDOSCOPY       The following portions of the patient's history were reviewed and updated as appropriate: allergies, past family history, past medical history, past social history, past surgical history, and problem list.  Review of Systems   Gastrointestinal:  Negative for bowel incontinence.   Genitourinary:  Negative for bladder incontinence.   Musculoskeletal:  Positive for back pain.     Physical Exam  Musculoskeletal:      Thoracic back: Spasms and tenderness present. Decreased range of motion.      Lumbar back: Spasms and tenderness present. Decreased range of motion. Negative right straight leg raise test and negative left straight leg raise test.        Back:       Comments: Pnful and limited in Ext     Skin:     General: Skin is warm and dry.   Neurological:      Mental Status: She is alert and oriented to person, place, and time.      Gait: Gait is intact.   Psychiatric:         Mood and Affect: Mood normal.         Behavior: Behavior normal.       SOFT TISSUE ASSESSMENT Hypertonicity and tenderness palpated L T10-S1 erector spinae, B glute med/min, L QL, Bhamstring JOINT RESTRICTIONS: T10-S1 and L SIJ     Return in about 1 week (around 9/16/2024) for Next scheduled follow up.

## 2024-09-09 NOTE — PROGRESS NOTES
Ambulatory Visit  Name: Ale Chavez      : 1955      MRN: [id number]  Encounter Provider: Angelo Pacheco MD  Encounter Date: 2024   Encounter department: [facility]IMARY C[facility] [address]     Assessment & Plan   1. Varicose veins of both lower extremities with pain  -     VAS Lower extremity venous insufficiency duplex, bilateral w/ measurements; Future; Expected date: 2024  2. Essential hypertension  Assessment & Plan:  Excellent BP control  3. Mild intermittent asthma without complication  Assessment & Plan:  Stable without flares  4. Fatty infiltration of liver  5. Acquired hypothyroidism  Assessment & Plan:  TSH euthyroid  6. Mixed hyperlipidemia  7. Hyperglycemia  Assessment & Plan:  Diet controlled A1c  8. Class 2 severe obesity due to excess calories with serious comorbidity and body mass index (BMI) of 36.0 to 36.9 in adult (HCC)  -     semaglutide (Rybelsus) 14 MG tablet; Take 1 tablet (14 mg total) by mouth daily      Depression Screening and Follow-up Plan: Patient was screened for depression during today's encounter. They screened negative with a PHQ-2 score of 0.        History of Present Illness     3 month follow up    Recent [place] trip  Some cold/cough going on    Will go to every 6 months follow up with us    Having varicose issues and legs getting jumpy      Review of Systems   Constitutional: Negative.    HENT: Negative.     Respiratory:  Positive for cough.    Musculoskeletal: Negative.    Hematological: Negative.    All other systems reviewed and are negative.    Past Medical History:   Diagnosis Date    Abdominal pain     Allergic     Arthritis     Asthma     Basal cell carcinoma     Bicuspid aortic valve     LAST ASSESSED 2012    Cancer (HCC)     skin    Cervical disc disease     last assessed 2016    Cervical stenosis of spine     LAST ASSESSED 2016    Colon polyp     COVID     Disease of thyroid gland     hypothyroid    Dry eyes      Endometriosis     GERD (gastroesophageal reflux disease)     Heart murmur     History of screening mammography 2023    Bi-Rads 1.    Hot flashes     Hyperglycemia     Hyperlipidemia     Hypertension     Hypothyroidism     Kidney stone     Left ventricular hypertrophy     Mitral valvular disorder     Nephrolithiasis     Ovarian cyst, complex     Pneumonia     800.00    Ptosis     LAST ASSESSED [id number]    Renal calculi     Right cervical radiculopathy     LAST ASSESSED 2016    Rotator cuff disorder, right     Seasonal allergies     Squamous cell skin cancer     Thyroid disease     Visual impairment     Wears contact lenses     Wears glasses     Wears partial dentures      Past Surgical History:   Procedure Laterality Date    BASAL CELL CARCINOMA EXCISION      CARPAL TUNNEL RELEASE Bilateral      SECTION      CHOLECYSTECTOMY      COLONOSCOPY      NEUROPLASTY / TRANSPOSITION MEDIAN NERVE AT CARPAL TUNNEL      NEUROPLASTY / TRANSPOSITION MEDIAN NERVE AT CARPAL TUNNEL      [facility]    OTHER SURGICAL HISTORY      surgically removed skin patch for skin cancer    PLANTAR FASCIECTOMY Left     IN COLONOSCOPY FLX DX W/COLLJ SPEC WHEN PFRMD N/A 10/09/2017    Procedure: COLONOSCOPY;  Surgeon: Franklyn Adam MD;  Location: [facility] GI LAB;  Service: Gastroenterology    IN HYSTEROSCOPY BX ENDOMETRIUM&/POLYPC W/WO D&C N/A 2018    Procedure: DILATATION AND CURETTAGE (D&C) WITH HYSTEROSCOPY;  Surgeon: Ila Smith DO;  Location: [facility] OR;  Service: Gynecology    IN SURGICAL ARTHROSCOPY JOSE W/CORACOACRM LIGM RLS Right 2024    Procedure: ARTHROSCOPIC SUBACROMIAL DECOMPRESSION;  Surgeon: Richard Brown DO;  Location: [facility] OR;  Service: Orthopedics    IN SURGICAL ARTHROSCOPY SHOULDER W/ROTATOR CUFF RPR Right 2024    Procedure: REPAIR ROTATOR CUFF  ARTHROSCOPIC;  Surgeon: Richard Brown DO;  Location: [facility] OR;  Service: Orthopedics    ROTATOR CUFF REPAIR Right 2024     SHOULDER SURGERY Right 2024    SKIN BIOPSY      TUBAL LIGATION      UPPER GASTROINTESTINAL ENDOSCOPY       Family History   Problem Relation Age of Onset    Diabetes Mother     Alzheimer's disease Mother     Heart disease Mother     Hypertension Mother     Dementia Mother     No Known Problems Father     No Known Problems Sister     No Known Problems Sister     No Known Problems Maternal Grandmother     No Known Problems Maternal Grandfather     No Known Problems Paternal Grandmother     No Known Problems Paternal Grandfather     No Known Problems Daughter     No Known Problems Maternal Aunt     No Known Problems Maternal Aunt     No Known Problems Maternal Aunt     No Known Problems Maternal Aunt     No Known Problems Maternal Aunt     Other Family         back disorder    Cancer Family     Heart disease Family     Thyroid disease Family     Breast cancer Neg Hx     Ovarian cancer Neg Hx     Colon cancer Neg Hx      Social History     Tobacco Use    Smoking status: Former     Current packs/day: 0.00     Average packs/day: 1 pack/day for 40.0 years (40.0 ttl pk-yrs)     Types: Cigarettes     Start date:      Quit date: 2012     Years since quittin.7    Smokeless tobacco: Never   Vaping Use    Vaping status: Never Used   Substance and Sexual Activity    Alcohol use: Yes     Alcohol/week: 8.0 standard drinks of alcohol     Types: 4 Glasses of wine, 4 Standard drinks or equivalent per week     Comment: per month    Drug use: No    Sexual activity: Yes     Partners: Male     Birth control/protection: Female Sterilization     Comment: tubal ligation      Current Outpatient Medications on File Prior to Visit   Medication Sig    albuterol (2.5 mg/3 mL) 0.083 % nebulizer solution Take 3 mL (2.5 mg total) by nebulization every 6 (six) hours as needed for wheezing or shortness of breath    albuterol (Ventolin HFA) 90 mcg/act inhaler Inhale 2 puffs every 6 (six) hours as needed for wheezing or shortness of  breath    amLODIPine (NORVASC) 5 mg tablet Take 1 tablet (5 mg total) by mouth daily    Ascorbic Acid (vitamin C) 100 MG tablet Take 100 mg by mouth daily    cholecalciferol (VITAMIN D3) 400 units tablet Take 400 Units by mouth daily Pt taking 1000 units    Cyanocobalamin (VITAMIN B 12 PO) Take by mouth    Efinaconazole (Jublia) 10 % SOLN Apply 1 application topically in the morning (Patient taking differently: Apply 1 application. topically if needed)    fluticasone (Arnuity Ellipta) 100 MCG/ACT AEPB inhaler Inhale 1 puff daily Rinse mouth after use.    guaiFENesin (MUCINEX) 600 mg 12 hr tablet Take 1,200 mg by mouth every 12 (twelve) hours    ketoconazole (NIZORAL) 2 % cream Apply topically daily    levothyroxine 125 mcg tablet TAKE ONE TABLET BY MOUTH EVERY DAY.    loratadine (CLARITIN REDITABS) 10 MG dissolvable tablet Take 10 mg by mouth daily    losartan-hydrochlorothiazide (HYZAAR) 100-25 MG per tablet Take 1 tablet by mouth daily    pantoprazole (PROTONIX) 40 mg tablet TAKE ONE TABLET BY MOUTH 2 TIMES A DAY    pravastatin (PRAVACHOL) 20 mg tablet Take 1 tablet (20 mg total) by mouth daily    XIIDRA 5 % op solution Administer 1 drop to both eyes in the morning    nystatin (MYCOSTATIN) powder Apply topically 3 (three) times a day for 14 days     Allergies   Allergen Reactions    Erythromycin GI Intolerance     Reaction Date: 11Jul2011;     Penicillins Rash     unknown    Aztreonam Rash     Action Taken: Allergy added by [employer] to replace Penicillins Cross [employer]ors;     Carbapenems Rash     Action Taken: Allergy added by [employer] to replace Penicillins Cross Reactors;     Cephalosporins Rash     Action Taken: Allergy added by [employer] to replace Penicillins Cross Reactors;     Griseofulvin Rash     Action Taken: Allergy added by [employer] to replace Penicillins Cross Reactors;     Influenza Virus Vaccine Hives, Rash and GI Intolerance     Immunization History   Administered Date(s) Administered     "COVID-19 MODERNA VACC 0.5 ML IM 12/30/2020, 01/26/2021    INFLUENZA 10/10/2018, 10/01/2020, 10/31/2022    Influenza, high dose seasonal 0.7 mL 10/24/2023    Influenza, recombinant, quadrivalent,injectable, preservative free 10/08/2019, 10/04/2021, 10/31/2022    Influenza, seasonal, injectable 10/01/2014, 10/01/2015    Pneumococcal Conjugate Vaccine 20-valent (Pcv20), Polysace 07/25/2022, 07/25/2022    Pneumococcal Polysaccharide PPV23 10/08/2019    Tdap 01/01/2010, 08/24/2020    Zoster Vaccine Recombinant 05/06/2019, 07/08/2019     Objective     /78 (BP Location: Left arm, Patient Position: Sitting, Cuff Size: Large)   Temp (!) 74 °F (23.3 °C)   Ht 5' 7\" (1.702 m)   Wt 95.2 kg (209 lb 12.8 oz)   SpO2 97%   BMI 32.86 kg/m²     Physical Exam  Vitals and nursing note reviewed.   Cardiovascular:      Rate and Rhythm: Regular rhythm.      Heart sounds: Murmur heard.   Pulmonary:      Breath sounds: Normal breath sounds.   Musculoskeletal:      Right lower leg: No edema.      Left lower leg: No edema.   Skin:     Findings: No rash.   Neurological:      Mental Status: She is alert and oriented to person, place, and time.         "

## 2024-09-16 ENCOUNTER — OFFICE VISIT (OUTPATIENT)
Age: 69
End: 2024-09-16
Payer: COMMERCIAL

## 2024-09-16 VITALS
OXYGEN SATURATION: 98 % | SYSTOLIC BLOOD PRESSURE: 120 MMHG | HEART RATE: 67 BPM | BODY MASS INDEX: 32.92 KG/M2 | WEIGHT: 210.2 LBS | TEMPERATURE: 98.3 F | DIASTOLIC BLOOD PRESSURE: 68 MMHG

## 2024-09-16 DIAGNOSIS — J31.0 CHRONIC RHINITIS: ICD-10-CM

## 2024-09-16 DIAGNOSIS — Z87.891 FORMER SMOKER: ICD-10-CM

## 2024-09-16 DIAGNOSIS — F17.211 NICOTINE DEPENDENCE, CIGARETTES, IN REMISSION: ICD-10-CM

## 2024-09-16 DIAGNOSIS — J45.20 MILD INTERMITTENT ASTHMA WITHOUT COMPLICATION: Primary | ICD-10-CM

## 2024-09-16 DIAGNOSIS — E66.9 OBESITY (BMI 30-39.9): ICD-10-CM

## 2024-09-16 PROCEDURE — 99214 OFFICE O/P EST MOD 30 MIN: CPT | Performed by: NURSE PRACTITIONER

## 2024-09-16 NOTE — PROGRESS NOTES
Pulmonary Follow-Up Note   Ale Chavez 68 y.o. female MRN: [id number]  9/16/2024      Assessment/Plan:    Problem List Items Addressed This Visit       Rhinitis     Denies active symptoms  Continue loratadine as needed         Former smoker     Nonsmoker for 12+ years with 40+ pack year history  Will obtain LDCT    The following Shared Decision-Making points were covered:  Benefits of screening were discussed, including the rates of reduction in death from lung cancer and other causes.  Harms of screening were reviewed, including false positive tests, radiation exposure levels, risks of invasive procedures, risks of complications of screening, and risk of overdiagnosis.  I counseled on the importance of adherence to annual lung cancer LDCT screening, impact of co-morbidities, and ability or willingness to undergo diagnosis and treatment.  I counseled on the importance of maintaining abstinence as a former smoker or was counseled on the importance of smoking cessation if a current smoker     Review of Eligibility Criteria: He meets all of the criteria for Lung Cancer Screening.   Age 68  Smoking history 40 years  Currently with no signs or symptoms of lung cancer     After discussion, the patient decided to elect lung cancer screening.             Mild intermittent asthma without complication - Primary     Currently well controlled and symptoms are calm currently.  Continue daily Arnuity - rinse and spit after using  Continue albuterol HFA 2 puffs up to 4x per day  Can take Mucinex as needed           Obesity (BMI 30-39.9)     BMI 32.92  Would likely benefit from lifestyle modifications including moderate exercise and healthy eating pattern          Other Visit Diagnoses       Nicotine dependence, cigarettes, in remission        Relevant Orders    CT lung screening program          Vaccines: up to date    Return in about 6 months (around 3/16/2025).    All of Kira's questions were answered prior to leaving the office  today.  She is aware to call our office with any further questions or concerns.    History of Present Illness   Reason for Visit: Follow up  Chief Complaint: asthma  HPI: Ale Chavez is a 68 y.o. female who presents to the office today for routine follow up; at last visit she had mild exacerbation and was treated with prednisone and cough syrup. She recently returned from vacation and did have a head cold on the final days of her trip that triggered some chest symptoms. Had good results with Mucinex plus the rescue inhaler (although was tremulous on albuterol). Did not require oral prednisone or antibiotic.    No current cough, wheeze, tight chest or SOB. She is sleeping well.    Review of Systems   Constitutional:  Negative for appetite change and fever.   HENT:  Positive for postnasal drip and sneezing. Negative for ear pain and rhinorrhea.    Cardiovascular:  Negative for chest pain.   Musculoskeletal:  Negative for myalgias.   Neurological:  Negative for headaches.     Answers submitted by the patient for this visit:  Pulmonology Questionnaire (Submitted on 9/16/2024)  Chief Complaint: Primary symptoms  Do you have shortness of breath that occurs with effort or exertion?: No  Do you have ear congestion?: No  Do you have heartburn?: No  Do you have fatigue?: No  Do you have nasal congestion?: No  Do you have shortness of breath when lying flat?: No  Do you have shortness of breath when you wake up?: No      Historical Information   Past Medical History:   Diagnosis Date    Abdominal pain     Allergic     Arthritis     Asthma     Basal cell carcinoma     Bicuspid aortic valve     LAST ASSESSED [id number]    Cancer (HCC)     skin    Cervical disc disease     last assessed [id number]    Cervical stenosis of spine     LAST ASSESSED [id number]    Colon polyp     COVID 2022    Disease of thyroid gland     hypothyroid    Dry eyes     Endometriosis     GERD (gastroesophageal reflux disease)     Heart murmur     History of  screening mammography 2023    [facility] 1.    Hot flashes     Hyperglycemia     Hyperlipidemia     Hypertension     Hypothyroidism     Kidney stone     Left ventricular hypertrophy     Mitral valvular disorder     Nephrolithiasis     Ovarian cyst, complex     Pneumonia     800.00    Ptosis     LAST ASSESSED [id number]    Renal calculi     Right cervical radiculopathy     LAST ASSESSED 2016    Rotator cuff disorder, right     Seasonal allergies     Squamous cell skin cancer     Thyroid disease     Visual impairment     Wears contact lenses     Wears glasses     Wears partial dentures      Past Surgical History:   Procedure Laterality Date    BASAL CELL CARCINOMA EXCISION      CARPAL TUNNEL RELEASE Bilateral      SECTION      CHOLECYSTECTOMY      COLONOSCOPY      NEUROPLASTY / TRANSPOSITION MEDIAN NERVE AT CARPAL TUNNEL      NEUROPLASTY / TRANSPOSITION MEDIAN NERVE AT CARPAL TUNNEL      [facility]    OTHER SURGICAL HISTORY      surgically removed skin patch for skin cancer    PLANTAR FASCIECTOMY Left     UT COLONOSCOPY FLX DX W/COLLJ SPEC WHEN PFRMD N/A 10/09/2017    Procedure: COLONOSCOPY;  Surgeon: Franklyn Adam MD;  Location: [facility];  Service: Gastroenterology    UT HYSTEROSCOPY BX ENDOMETRIUM&/POLYPC W/WO D&C N/A 2018    Procedure: DILATATION AND CURETTAGE (D&C) WITH HYSTEROSCOPY;  Surgeon: Ila Smith DO;  Location: [facility];  Service: Gynecology    UT SURGICAL ARTHROSCOPY JOSE W/CORACOACRM LIGM RLS Right 2024    Procedure: ARTHROSCOPIC SUBACROMIAL DECOMPRESSION;  Surgeon: Richard Brown DO;  Location: [facility];  Service: Orthopedics    UT SURGICAL ARTHROSCOPY SHOULDER W/ROTATOR CUFF RPR Right 2024    Procedure: REPAIR ROTATOR CUFF  ARTHROSCOPIC;  Surgeon: Richard Brown DO;  Location: [facility];  Service: Orthopedics    ROTATOR CUFF REPAIR Right 2024    SHOULDER SURGERY Right 2024    SKIN BIOPSY      TUBAL LIGATION      UPPER  GASTROINTESTINAL ENDOSCOPY       Family History   Problem Relation Age of Onset    Diabetes Mother     Alzheimer's disease Mother     Heart disease Mother     Hypertension Mother     Dementia Mother     No Known Problems Father     No Known Problems Sister     No Known Problems Sister     No Known Problems Maternal Grandmother     No Known Problems Maternal Grandfather     No Known Problems Paternal Grandmother     No Known Problems Paternal Grandfather     No Known Problems Daughter     No Known Problems Maternal Aunt     No Known Problems Maternal Aunt     No Known Problems Maternal Aunt     No Known Problems Maternal Aunt     No Known Problems Maternal Aunt     Other Family         back disorder    Cancer Family     Heart disease Family     Thyroid disease Family     Breast cancer Neg Hx     Ovarian cancer Neg Hx     Colon cancer Neg Hx      Social History   Social History     Substance and Sexual Activity   Alcohol Use Yes    Alcohol/week: 8.0 standard drinks of alcohol    Types: 4 Glasses of wine, 4 Standard drinks or equivalent per week    Comment: per month     Social History     Substance and Sexual Activity   Drug Use No     Social History     Tobacco Use   Smoking Status Former    Current packs/day: 0.00    Average packs/day: 1 pack/day for 40.0 years (40.0 ttl pk-yrs)    Types: Cigarettes    Start date:     Quit date: 2012    Years since quittin.7   Smokeless Tobacco Never     E-Cigarette/Vaping    E-Cigarette Use Never User      E-Cigarette/Vaping Substances    Nicotine No     THC No     CBD No     Flavoring No     Other No     Unknown No        Meds/Allergies     Current Outpatient Medications:     albuterol (2.5 mg/3 mL) 0.083 % nebulizer solution, Take 3 mL (2.5 mg total) by nebulization every 6 (six) hours as needed for wheezing or shortness of breath, Disp: 125 mL, Rfl: 1    albuterol (Ventolin HFA) 90 mcg/act inhaler, Inhale 2 puffs every 6 (six) hours as needed for wheezing or  shortness of breath, Disp: 18 g, Rfl: 3    amLODIPine (NORVASC) 5 mg tablet, Take 1 tablet (5 mg total) by mouth daily, Disp: 90 tablet, Rfl: 3    Ascorbic Acid (vitamin C) 100 MG tablet, Take 100 mg by mouth daily, Disp: , Rfl:     cholecalciferol (VITAMIN D3) 400 units tablet, Take 400 Units by mouth daily Pt taking 1000 units, Disp: , Rfl:     Cyanocobalamin (VITAMIN B 12 PO), Take by mouth, Disp: , Rfl:     Efinaconazole (Jublia) 10 % SOLN, Apply 1 application topically in the morning (Patient taking differently: Apply 1 application. topically if needed), Disp: 8 mL, Rfl: 6    fluticasone (Arnuity Ellipta) 100 MCG/ACT AEPB inhaler, Inhale 1 puff daily Rinse mouth after use., Disp: 90 blister, Rfl: 3    guaiFENesin (MUCINEX) 600 mg 12 hr tablet, Take 1,200 mg by mouth every 12 (twelve) hours, Disp: , Rfl:     ketoconazole (NIZORAL) 2 % cream, Apply topically daily, Disp: , Rfl:     levothyroxine 125 mcg tablet, TAKE ONE TABLET BY MOUTH EVERY DAY., Disp: 90 tablet, Rfl: 1    loratadine (CLARITIN REDITABS) 10 MG dissolvable tablet, Take 10 mg by mouth daily, Disp: , Rfl:     losartan-hydrochlorothiazide (HYZAAR) 100-25 MG per tablet, Take 1 tablet by mouth daily, Disp: 90 tablet, Rfl: 3    pantoprazole (PROTONIX) 40 mg tablet, TAKE ONE TABLET BY MOUTH 2 TIMES A DAY, Disp: 200 tablet, Rfl: 1    pravastatin (PRAVACHOL) 20 mg tablet, Take 1 tablet (20 mg total) by mouth daily, Disp: 90 tablet, Rfl: 3    semaglutide (Rybelsus) 14 MG tablet, Take 1 tablet (14 mg total) by mouth daily, Disp: 90 tablet, Rfl: 1    XIIDRA 5 % op solution, Administer 1 drop to both eyes in the morning, Disp: , Rfl:     nystatin (MYCOSTATIN) powder, Apply topically 3 (three) times a day for 14 days, Disp: 30 g, Rfl: 0    Current Facility-Administered Medications:     lidocaine (XYLOCAINE) 1 % injection 2 mL, 2 mL, Injection, , Melgar Rudolph, DPM, 2 mL at 08/14/23 1500    triamcinolone acetonide (KENALOG-40) 40 mg/mL injection 20 mg, 20 mg,  Intra-articular, , Ramón Galdamez DPM, 20 mg at 08/14/23 1500  Allergies   Allergen Reactions    Erythromycin GI Intolerance     Reaction Date: 11Jul2011;     Penicillins Rash     unknown    Aztreonam Rash     Action Taken: Allergy added by [employer] to replace Penicillins Cross [employer]ors;     Carbapenems Rash     Action Taken: Allergy added by [employer] to replace Penicillins Cross Reactors;     Cephalosporins Rash     Action Taken: Allergy added by [employer] to replace Penicillins Cross Reactors;     Griseofulvin Rash     Action Taken: Allergy added by [employer] to replace Penicillins Cross Reactors;     Influenza Virus Vaccine Hives, Rash and GI Intolerance       Vitals: Blood pressure 120/68, pulse 67, temperature 98.3 °F (36.8 °C), temperature source Tympanic, weight 95.3 kg (210 lb 3.2 oz), SpO2 98%, not currently breastfeeding. Body mass index is 32.92 kg/m². Oxygen Therapy  SpO2: 98 %  Oxygen Therapy: None (Room air)      Physical Exam  Vitals reviewed.   Constitutional:       Appearance: Normal appearance.   HENT:      Head: Normocephalic.      Nose: Nose normal.      Mouth/Throat:      Mouth: Mucous membranes are moist.      Pharynx: Oropharynx is clear.   Cardiovascular:      Rate and Rhythm: Normal rate and regular rhythm.      Pulses: Normal pulses.      Heart sounds: Normal heart sounds.   Pulmonary:      Effort: Pulmonary effort is normal.      Breath sounds: Normal breath sounds.   Abdominal:      General: Abdomen is flat.      Palpations: Abdomen is soft.   Musculoskeletal:         General: Normal range of motion.   Skin:     General: Skin is warm.      Capillary Refill: Capillary refill takes less than 2 seconds.   Neurological:      General: No focal deficit present.      Mental Status: She is alert and oriented to person, place, and time.   Psychiatric:         Mood and Affect: Mood normal.         Behavior: Behavior normal.         Labs:   Lab Results   Component Value Date    WBC 10.18 (H)  "06/22/2024    HGB 15.0 06/22/2024    HCT 44.8 06/22/2024    MCV 91 06/22/2024     06/22/2024    EOSPCT 4 06/22/2024    EOSABS 0.43 06/22/2024    SEGSPCT 81 (H) 06/28/2019    MONOPCT 10 06/22/2024    MONOABS 0.31 06/28/2019     Lab Results   Component Value Date    GLUCOSE 104 08/14/2015    CALCIUM 9.5 06/22/2024     08/14/2015    K 3.4 (L) 06/22/2024    CO2 24 06/22/2024     06/22/2024    BUN 19 06/22/2024    CREATININE 0.60 06/22/2024     Lab Results   Component Value Date     (H) 03/18/2024     Lab Results   Component Value Date    ALT 12 06/22/2024    AST 20 06/22/2024    ALKPHOS 68 06/22/2024    BILITOT 0.42 08/14/2015         CHRISTIN Babin  [facility]        Portions of the record may have been created with voice recognition software.  Occasional wrong word or \"sound a like\" substitutions may have occurred due to the inherent limitations of voice recognition software.  Read the chart carefully and recognize, using context, where substitutions have occurred or contact the dictating provider.  "

## 2024-09-16 NOTE — ASSESSMENT & PLAN NOTE
Nonsmoker for 12+ years with 40+ pack year history  Will obtain LDCT    The following Shared Decision-Making points were covered:  Benefits of screening were discussed, including the rates of reduction in death from lung cancer and other causes.  Harms of screening were reviewed, including false positive tests, radiation exposure levels, risks of invasive procedures, risks of complications of screening, and risk of overdiagnosis.  I counseled on the importance of adherence to annual lung cancer LDCT screening, impact of co-morbidities, and ability or willingness to undergo diagnosis and treatment.  I counseled on the importance of maintaining abstinence as a former smoker or was counseled on the importance of smoking cessation if a current smoker     Review of Eligibility Criteria: He meets all of the criteria for Lung Cancer Screening.   Age 68  Smoking history 40 years  Currently with no signs or symptoms of lung cancer     After discussion, the patient decided to elect lung cancer screening.

## 2024-09-16 NOTE — ASSESSMENT & PLAN NOTE
BMI 32.92  Would likely benefit from lifestyle modifications including moderate exercise and healthy eating pattern

## 2024-09-16 NOTE — ASSESSMENT & PLAN NOTE
Currently well controlled and symptoms are calm currently.  Continue daily Arnuity - rinse and spit after using  Continue albuterol HFA 2 puffs up to 4x per day  Can take Mucinex as needed

## 2024-09-17 ENCOUNTER — HOSPITAL ENCOUNTER (OUTPATIENT)
Dept: NON INVASIVE DIAGNOSTICS | Age: 69
Discharge: HOME | End: 2024-09-17
Payer: COMMERCIAL

## 2024-09-17 DIAGNOSIS — I83.813 VARICOSE VEINS OF BOTH LOWER EXTREMITIES WITH PAIN: ICD-10-CM

## 2024-09-17 PROCEDURE — 93970 EXTREMITY STUDY: CPT | Performed by: SURGERY

## 2024-09-17 PROCEDURE — 93970 EXTREMITY STUDY: CPT

## 2024-09-19 DIAGNOSIS — I83.813 VARICOSE VEINS OF BILATERAL LOWER EXTREMITIES WITH PAIN: Primary | ICD-10-CM

## 2024-09-20 ENCOUNTER — OFFICE VISIT (OUTPATIENT)
Dept: VASCULAR SURGERY | Facility: CLINIC | Age: 69
End: 2024-09-20
Payer: COMMERCIAL

## 2024-09-20 VITALS
SYSTOLIC BLOOD PRESSURE: 140 MMHG | HEART RATE: 61 BPM | OXYGEN SATURATION: 97 % | BODY MASS INDEX: 32.96 KG/M2 | WEIGHT: 210 LBS | DIASTOLIC BLOOD PRESSURE: 84 MMHG | HEIGHT: 67 IN

## 2024-09-20 DIAGNOSIS — I83.813 VARICOSE VEINS OF BILATERAL LOWER EXTREMITIES WITH PAIN: Primary | ICD-10-CM

## 2024-09-20 PROCEDURE — 99243 OFF/OP CNSLTJ NEW/EST LOW 30: CPT | Performed by: SURGERY

## 2024-09-20 NOTE — LETTER
September 20, 2024     Angelo Pacheco MD  [facility] [id number]    Patient: Ale Chavez   YOB: 1955   Date of Visit: 9/20/2024       Dear Dr. Pacheco:    Thank you for referring Ale Chavez to me for evaluation. Below are the relevant portions of my assessment and plan of care.     Varicose veins of bilateral lower extremities with pain  Symptomatic bilateral lower extremity varicosities and spider veins. Complains of heaviness and tiredness with cramping, worse at night. She elevates her legs at night. She does not wear compression stockings.     LEVDR- b/l GSV superficial venous insufficiency, no evidence of deep venous insufficiency     -We discussed the pathophysiology of venous disease, available treatment options and indications for treatment.  -Recommend a trial of conservative measures. This includes the daily use of gradient compression socks, periodic leg elevation and regular exercise  -Prescription for 20-30mmHg below knee gradient compression socks provided.   -Follow-up in 3 months to reassess symptoms and discuss possible intervention if persistent symptoms.    If you have questions, please do not hesitate to call me. I look forward to following Ale along with you.         Sincerely,        Sanaz Brandt MD        CC: No Recipients

## 2024-09-20 NOTE — PATIENT INSTRUCTIONS
Varicose veins of bilateral lower extremities with pain  Symptomatic bilateral lower extremity varicosities and spider veins. Complains of heaviness and tiredness with cramping, worse at night. She elevates her legs at night. She does not wear compression stockings.     LEVDR- b/l GSV superficial venous insufficiency, no evidence of deep venous insufficiency     -We discussed the pathophysiology of venous disease, available treatment options and indications for treatment.  -Recommend a trial of conservative measures. This includes the daily use of gradient compression socks, periodic leg elevation and regular exercise  -Prescription for 20-30mmHg below knee gradient compression socks provided.   -Follow-up in 3 months to reassess symptoms and discuss possible intervention if persistent symptoms.

## 2024-09-20 NOTE — ASSESSMENT & PLAN NOTE
Symptomatic bilateral lower extremity varicosities and spider veins. Complains of heaviness and tiredness with cramping, worse at night. She elevates her legs at night. She does not wear compression stockings.    LEVDR- b/l GSV superficial venous insufficiency, no evidence of deep venous insufficiency    -We discussed the pathophysiology of venous disease, available treatment options and indications for treatment.  -Recommend a trial of conservative measures. This includes the daily use of gradient compression socks, periodic leg elevation and regular exercise  -Prescription for 20-30mmHg below knee gradient compression socks provided.   -Follow-up in 3 months to reassess symptoms and discuss possible intervention if persistent symptoms.      Orders:    Ambulatory Referral to Vascular Surgery    Compression Stocking

## 2024-09-20 NOTE — PROGRESS NOTES
Ambulatory Visit  Name: Ale Chavez      : 1955      MRN: [id number]  Encounter Provider: Sanaz Brandt MD  Encounter Date: 2024   Encounter department: [facility] [place]    Assessment & Plan  Varicose veins of bilateral lower extremities with pain  Symptomatic bilateral lower extremity varicosities and spider veins. Complains of heaviness and tiredness with cramping, worse at night. She elevates her legs at night. She does not wear compression stockings.    LEVDR- b/l GSV superficial venous insufficiency, no evidence of deep venous insufficiency    -We discussed the pathophysiology of venous disease, available treatment options and indications for treatment.  -Recommend a trial of conservative measures. This includes the daily use of gradient compression socks, periodic leg elevation and regular exercise  -Prescription for 20-30mmHg below knee gradient compression socks provided.   -Follow-up in 3 months to reassess symptoms and discuss possible intervention if persistent symptoms.      Orders:    Ambulatory Referral to Vascular Surgery    Compression Stocking      History of Present Illness     Patient is new to our office. She c/o bulging/ painful veins in both of her legs that cause her legs to cramp and feel tired and heavy. Patient denies wearing compression stockings. Patient elevates her legs every evening. Patient is taking Pravastatin. Patient is a former smoker.         Ale Chavez is a 68 y.o. female former smoker with obesity, HTN, HLD, asthma, GERD, symptomatic bilateral lower extremity varicosities who presents as a new referral. She has no history of DVT/PE. She reports heaviness and aching, tiredness in the legs bilaterally. She does not wear compression stockings but elevates her legs at night.     History obtained from : patient    I have reviewed and made appropriate changes to the review of systems input by the medical assistant.    Vitals:    24 1518  "  BP: 140/84   BP Location: Left arm   Patient Position: Sitting   Cuff Size: Standard   Pulse: 61   SpO2: 97%   Weight: 95.3 kg (210 lb)   Height: 5' 7\" (1.702 m)       Patient Active Problem List   Diagnosis    Complex ovarian cyst    Dry eye syndrome    Fatty infiltration of liver    Hyperglycemia    Abdominal pain    Mixed hyperlipidemia    Essential hypertension    Acquired hypothyroidism    Left ventricular hypertrophy    Lumbosacral pain    Mitral valvular disorder    Nephrolithiasis    Non-toxic multinodular goiter    BMI 32.0-32.9,adult    Rhinitis    Spondylosis of cervical region without myelopathy or radiculopathy    Thoracic neuritis    Endometrial polyp    Squamous cell cancer of skin of hand, right    Basal cell carcinoma (BCC) of [place]    Former smoker    Sialoadenitis    Mild intermittent asthma without complication    Left ventricular outflow tract obstruction    Lumbar spondylosis    Gastroesophageal reflux disease with esophagitis without hemorrhage    Hiatal hernia    Primary osteoarthritis of first carpometacarpal joint of right hand    Urticaria    Chronic bilateral low back pain without sciatica    Left hip pain    Greater trochanteric bursitis of left hip    S/P right rotator cuff repair    Obesity (BMI 30-39.9)    Encounter for screening mammogram for malignant neoplasm of breast    Estrogen deficiency    Postmenopausal    Subacute vulvitis    Cutaneous candidiasis    Varicose veins of bilateral lower extremities with pain       Past Surgical History:   Procedure Laterality Date    BASAL CELL CARCINOMA EXCISION      CARPAL TUNNEL RELEASE Bilateral      SECTION      CHOLECYSTECTOMY      COLONOSCOPY      NEUROPLASTY / TRANSPOSITION MEDIAN NERVE AT CARPAL TUNNEL      NEUROPLASTY / TRANSPOSITION MEDIAN NERVE AT CARPAL TUNNEL      [facility]    OTHER SURGICAL HISTORY      surgically removed skin patch for skin cancer    PLANTAR FASCIECTOMY Left     [place] COLONOSCOPY FLX DX W/COLLJ SPEC " WHEN PFRMD N/A 10/09/2017    Procedure: COLONOSCOPY;  Surgeon: Franklyn Adam MD;  Location: [facility] GI LAB;  Service: Gastroenterology    [place] HYSTEROSCOPY BX ENDOMETRIUM&/POLYPC W/WO D&C N/A 02/26/2018    Procedure: DILATATION AND CURETTAGE (D&C) WITH HYSTEROSCOPY;  Surgeon: Ila Smith DO;  Location: [facility] OR;  Service: Gynecology    [place] SURGICAL ARTHROSCOPY JOSE W/CORACOACRM LIGM RLS Right 01/04/2024    Procedure: ARTHROSCOPIC SUBACROMIAL DECOMPRESSION;  Surgeon: Richard Brown DO;  Location: [facility];  Service: Orthopedics    [place] SURGICAL ARTHROSCOPY SHOULDER W/ROTATOR CUFF RPR Right 01/04/2024    Procedure: REPAIR ROTATOR CUFF  ARTHROSCOPIC;  Surgeon: Richard Brown DO;  Location: [facility];  Service: Orthopedics    ROTATOR CUFF REPAIR Right 01/04/2024    SHOULDER SURGERY Right 01/04/2024    SKIN BIOPSY      TUBAL LIGATION      UPPER GASTROINTESTINAL ENDOSCOPY         Family History   Problem Relation Age of Onset    Diabetes Mother     Alzheimer's disease Mother     Heart disease Mother     Hypertension Mother     Dementia Mother     No Known Problems Father     No Known Problems Sister     No Known Problems Sister     No Known Problems Maternal Grandmother     No Known Problems Maternal Grandfather     No Known Problems Paternal Grandmother     No Known Problems Paternal Grandfather     No Known Problems Daughter     No Known Problems Maternal Aunt     No Known Problems Maternal Aunt     No Known Problems Maternal Aunt     No Known Problems Maternal Aunt     No Known Problems Maternal Aunt     Other Family         back disorder    Cancer Family     Heart disease Family     Thyroid disease Family     Breast cancer Neg Hx     Ovarian cancer Neg Hx     Colon cancer Neg Hx        Social History     Socioeconomic History    Marital status: /Civil Union     Spouse name: Not on file    Number of children: 2    Years of education: Not on file    Highest education level: Not on file   Occupational  History    Occupation: part time employment   Tobacco Use    Smoking status: Former     Current packs/day: 0.00     Average packs/day: 1 pack/day for 40.0 years (40.0 ttl pk-yrs)     Types: Cigarettes     Start date:      Quit date: 2012     Years since quittin.7    Smokeless tobacco: Never   Vaping Use    Vaping status: Never Used   Substance and Sexual Activity    Alcohol use: Yes     Alcohol/week: 8.0 standard drinks of alcohol     Types: 4 Glasses of wine, 4 Standard drinks or equivalent per week     Comment: per month    Drug use: No    Sexual activity: Yes     Partners: Male     Birth control/protection: Female Sterilization     Comment: tubal ligation    Other Topics Concern    Not on file   Social History Narrative    Caffeine use     Highschool or GED    Lives with      Confucianist affiliation Rastafari    Always uses seatbelt     Social Determinants of Health     Financial Resource Strain: Not on file   Food Insecurity: Not on file   Transportation Needs: Not on file   Physical Activity: Not on file   Stress: Not on file   Social Connections: Not on file   Intimate Partner Violence: Not on file   Housing Stability: Not on file       Allergies   Allergen Reactions    Erythromycin GI Intolerance     Reaction Date: 2011;     Penicillins Rash     unknown    Aztreonam Rash     Action Taken: Allergy added by [employer] to replace Penicillins Cross [employer]ors;     Carbapenems Rash     Action Taken: Allergy added by [employer] to replace Penicillins Cross Reactors;     Cephalosporins Rash     Action Taken: Allergy added by [employer] to replace Penicillins Cross Reactors;     Griseofulvin Rash     Action Taken: Allergy added by [employer] to replace Penicillins Cross Reactors;     Influenza Virus Vaccine Hives, Rash and GI Intolerance         Current Outpatient Medications:     albuterol (2.5 mg/3 mL) 0.083 % nebulizer solution, Take 3 mL (2.5 mg total) by nebulization every 6 (six) hours as  needed for wheezing or shortness of breath, Disp: 125 mL, Rfl: 1    albuterol (Ventolin HFA) 90 mcg/act inhaler, Inhale 2 puffs every 6 (six) hours as needed for wheezing or shortness of breath, Disp: 18 g, Rfl: 3    amLODIPine (NORVASC) 5 mg tablet, Take 1 tablet (5 mg total) by mouth daily, Disp: 90 tablet, Rfl: 3    Ascorbic Acid (vitamin C) 100 MG tablet, Take 100 mg by mouth daily, Disp: , Rfl:     cholecalciferol (VITAMIN D3) 400 units tablet, Take 400 Units by mouth daily Pt taking 1000 units, Disp: , Rfl:     Cyanocobalamin (VITAMIN B 12 PO), Take by mouth, Disp: , Rfl:     Efinaconazole (Jublia) 10 % SOLN, Apply 1 application topically in the morning (Patient taking differently: Apply 1 application. topically if needed), Disp: 8 mL, Rfl: 6    fluticasone (Arnuity Ellipta) 100 MCG/ACT AEPB inhaler, Inhale 1 puff daily Rinse mouth after use., Disp: 90 blister, Rfl: 3    guaiFENesin (MUCINEX) 600 mg 12 hr tablet, Take 1,200 mg by mouth every 12 (twelve) hours, Disp: , Rfl:     ketoconazole (NIZORAL) 2 % cream, Apply topically daily, Disp: , Rfl:     levothyroxine 125 mcg tablet, TAKE ONE TABLET BY MOUTH EVERY DAY., Disp: 90 tablet, Rfl: 1    loratadine (CLARITIN REDITABS) 10 MG dissolvable tablet, Take 10 mg by mouth daily, Disp: , Rfl:     losartan-hydrochlorothiazide (HYZAAR) 100-25 MG per tablet, Take 1 tablet by mouth daily, Disp: 90 tablet, Rfl: 3    pantoprazole (PROTONIX) 40 mg tablet, TAKE ONE TABLET BY MOUTH 2 TIMES A DAY, Disp: 200 tablet, Rfl: 1    pravastatin (PRAVACHOL) 20 mg tablet, Take 1 tablet (20 mg total) by mouth daily, Disp: 90 tablet, Rfl: 3    semaglutide (Rybelsus) 14 MG tablet, Take 1 tablet (14 mg total) by mouth daily, Disp: 90 tablet, Rfl: 1    XIIDRA 5 % op solution, Administer 1 drop to both eyes in the morning, Disp: , Rfl:     nystatin (MYCOSTATIN) powder, Apply topically 3 (three) times a day for 14 days, Disp: 30 g, Rfl: 0    Current Facility-Administered Medications:      "lidocaine (XYLOCAINE) 1 % injection 2 mL, 2 mL, Injection, , Ramón Galdamez DPM, 2 mL at 08/14/23 1500    triamcinolone acetonide (KENALOG-40) 40 mg/mL injection 20 mg, 20 mg, Intra-articular, , Ramón Galdamez DPM, 20 mg at 08/14/23 1500    Review of Systems   Constitutional: Negative.    HENT: Negative.     Eyes: Negative.    Respiratory: Negative.     Cardiovascular:         Painful veins   Gastrointestinal: Negative.    Endocrine: Negative.    Genitourinary: Negative.    Musculoskeletal: Negative.    Skin: Negative.    Allergic/Immunologic: Negative.    Neurological: Negative.    Hematological: Negative.    Psychiatric/Behavioral: Negative.       I have personally reviewed the ROS entered by MA and agree as documented.      Objective     /84 (BP Location: Left arm, Patient Position: Sitting, Cuff Size: Standard)   Pulse 61   Ht 5' 7\" (1.702 m)   Wt 95.3 kg (210 lb)   SpO2 97%   BMI 32.89 kg/m²     Physical Exam  Constitutional:       Appearance: Normal appearance. She is obese.   HENT:      Head: Normocephalic and atraumatic.   Cardiovascular:      Rate and Rhythm: Normal rate.      Pulses: Normal pulses.   Pulmonary:      Effort: Pulmonary effort is normal.   Abdominal:      Palpations: Abdomen is soft.   Musculoskeletal:         General: Normal range of motion.      Cervical back: Normal range of motion and neck supple.   Skin:     General: Skin is warm and dry.      Capillary Refill: Capillary refill takes less than 2 seconds.      Comments: Few bilateral truncal varicosities in the calves, scattered spider veins thighs and legs bilaterally   Neurological:      General: No focal deficit present.      Mental Status: She is alert and oriented to person, place, and time.   Psychiatric:         Mood and Affect: Mood normal.         Behavior: Behavior normal.         Thought Content: Thought content normal.         Judgment: Judgment normal.       Administrative Statements   I have spent a total time of 30 minutes " in caring for this patient on the day of the visit/encounter including Diagnostic results, Prognosis, Risks and benefits of tx options, Instructions for management, Patient and family education, Importance of tx compliance, Risk factor reductions, Impressions, Counseling / Coordination of care, Documenting in the medical record, Reviewing / ordering tests, medicine, procedures  , and Obtaining or reviewing history  .

## 2024-09-23 ENCOUNTER — PROCEDURE VISIT (OUTPATIENT)
Age: 69
End: 2024-09-23
Payer: COMMERCIAL

## 2024-09-23 VITALS
WEIGHT: 210 LBS | SYSTOLIC BLOOD PRESSURE: 117 MMHG | HEART RATE: 78 BPM | HEIGHT: 67 IN | DIASTOLIC BLOOD PRESSURE: 71 MMHG | BODY MASS INDEX: 32.96 KG/M2

## 2024-09-23 DIAGNOSIS — S39.012A LUMBOSACRAL STRAIN, INITIAL ENCOUNTER: Primary | ICD-10-CM

## 2024-09-23 DIAGNOSIS — M99.04 SEGMENTAL DYSFUNCTION OF SACRAL REGION: ICD-10-CM

## 2024-09-23 DIAGNOSIS — M99.02 SEGMENTAL DYSFUNCTION OF THORACIC REGION: ICD-10-CM

## 2024-09-23 DIAGNOSIS — M99.03 SEGMENTAL DYSFUNCTION OF LUMBAR REGION: ICD-10-CM

## 2024-09-23 PROCEDURE — 98941 CHIROPRACT MANJ 3-4 REGIONS: CPT | Performed by: CHIROPRACTOR

## 2024-09-23 PROCEDURE — 97110 THERAPEUTIC EXERCISES: CPT | Performed by: CHIROPRACTOR

## 2024-09-23 NOTE — PROGRESS NOTES
Initial date of service: 7/15/24    Diagnoses and all orders for this visit:    Lumbosacral strain, initial encounter    Segmental dysfunction of sacral region    Segmental dysfunction of lumbar region    Segmental dysfunction of thoracic region    Pt suffered mild exacerbation but responded to tx with reduced pain and increased ROM    TREATMENT: 91401, 56525  Ther-ex: IASTM; discussed post procedure soreness and/or ecchymosis for up to 36 hrs, applied to affected mm hypertonicities; supine hamstring stretch, supine gluteal stretch, side laying QL stretch, single knee to chest stretch, hip flexor pin-and-stretch, alternating prone hip extension, glute bridge, transitional mvmt education, abdominal bracing; greater than 15 min spent performing above mentioned ther-ex to improve ROM/flexibility. Thoracic mobilization/manipulation: prone P-A mob, supine A-P manip; Lumbar mobilization/manipulation: diversified side laying graded HVLA, flexion-traction; SIJ Manipulation/Mobilization: R/L SIJ HVLA - long axis distraction, roberson drop table maneuver to affected SIJ    HPI:  Ale Chavez is a 68 y.o. female  Chief Complaint   Patient presents with    Back Pain     Low back no pain but had some last night which was about a 6 on the left side      Pt presents for tx of alternating SIJ/glute region onset Summer 2024  9/23: pt reports feeling and moving much better; overall better but still experiencing occasional bouts of lbp; laying on L side difficult; walking more hills lately    Back Pain  This is a new problem. The current episode started more than 1 month ago. The problem occurs intermittently. The pain is present in the gluteal, lumbar spine, sacro-iliac and thoracic spine. The quality of the pain is described as aching. The pain does not radiate. The pain is Worse during the day. The symptoms are aggravated by sitting (transitional mvmts). Pertinent negatives include no bladder incontinence or bowel incontinence.      Past Medical History:   Diagnosis Date    Abdominal pain     Allergic     Arthritis     Asthma     Basal cell carcinoma     Bicuspid aortic valve     LAST ASSESSED [id number]    Cancer (HCC)     skin    Cervical disc disease     last assessed [id number]    Cervical stenosis of spine     LAST ASSESSED 2016    Colon polyp     COVID     Disease of thyroid gland     hypothyroid    Dry eyes     Endometriosis     GERD (gastroesophageal reflux disease)     Heart murmur     History of screening mammography 2023    Bi-Rads 1.    Hot flashes     Hyperglycemia     Hyperlipidemia     Hypertension     Hypothyroidism     Kidney stone     Left ventricular hypertrophy     Mitral valvular disorder     Nephrolithiasis     Ovarian cyst, complex     Pneumonia     800.00    Ptosis     LAST ASSESSED [id number]    Renal calculi     Right cervical radiculopathy     LAST ASSESSED 2016    Rotator cuff disorder, right     Seasonal allergies     Squamous cell skin cancer     Thyroid disease     Visual impairment     Wears contact lenses     Wears glasses     Wears partial dentures       Past Surgical History:   Procedure Laterality Date    BASAL CELL CARCINOMA EXCISION      CARPAL TUNNEL RELEASE Bilateral      SECTION      CHOLECYSTECTOMY      COLONOSCOPY      NEUROPLASTY / TRANSPOSITION MEDIAN NERVE AT CARPAL TUNNEL      NEUROPLASTY / TRANSPOSITION MEDIAN NERVE AT CARPAL TUNNEL      [facility]    OTHER SURGICAL HISTORY      surgically removed skin patch for skin cancer    PLANTAR FASCIECTOMY Left     WA COLONOSCOPY FLX DX W/COLLJ SPEC WHEN PFRMD N/A 10/09/2017    Procedure: COLONOSCOPY;  Surgeon: Franklyn Adam MD;  Location: [facility] GI LAB;  Service: Gastroenterology    WA HYSTEROSCOPY BX ENDOMETRIUM&/POLYPC W/WO D&C N/A 2018    Procedure: DILATATION AND CURETTAGE (D&C) WITH HYSTEROSCOPY;  Surgeon: Ila Smith DO;  Location: [facility] OR;  Service: Gynecology    WA SURGICAL ARTHROSCOPY JOSE W/CORACOACRM  LIGM RLS Right 01/04/2024    Procedure: ARTHROSCOPIC SUBACROMIAL DECOMPRESSION;  Surgeon: Richard Brown DO;  Location: [facility] OR;  Service: Orthopedics    [place] SURGICAL ARTHROSCOPY SHOULDER W/ROTATOR CUFF RPR Right 01/04/2024    Procedure: REPAIR ROTATOR CUFF  ARTHROSCOPIC;  Surgeon: Richard Brown DO;  Location: [facility];  Service: Orthopedics    ROTATOR CUFF REPAIR Right 01/04/2024    SHOULDER SURGERY Right 01/04/2024    SKIN BIOPSY      TUBAL LIGATION      UPPER GASTROINTESTINAL ENDOSCOPY       The following portions of the patient's history were reviewed and updated as appropriate: allergies, past family history, past medical history, past social history, past surgical history, and problem list.  Review of Systems   Gastrointestinal:  Negative for bowel incontinence.   Genitourinary:  Negative for bladder incontinence.   Musculoskeletal:  Positive for back pain.     Physical Exam  Musculoskeletal:      Thoracic back: Spasms and tenderness present. Decreased range of motion.      Lumbar back: Spasms and tenderness present. Decreased range of motion. Negative right straight leg raise test and negative left straight leg raise test.        Back:       Comments: Pnful and limited in Ext     Skin:     General: Skin is warm and dry.   Neurological:      Mental Status: She is alert and oriented to person, place, and time.      Gait: Gait is intact.   Psychiatric:         Mood and Affect: Mood normal.         Behavior: Behavior normal.       SOFT TISSUE ASSESSMENT Hypertonicity and tenderness palpated L T10-S1 erector spinae, B glute med/min, L QL, Bhamstring JOINT RESTRICTIONS: T10-S1 and L SIJ     Return in about 2 weeks (around 10/7/2024) for Next scheduled follow up.

## 2024-09-24 ENCOUNTER — OFFICE VISIT (OUTPATIENT)
Dept: GASTROENTEROLOGY | Facility: CLINIC | Age: 69
End: 2024-09-24
Payer: COMMERCIAL

## 2024-09-24 VITALS
SYSTOLIC BLOOD PRESSURE: 128 MMHG | HEIGHT: 67 IN | BODY MASS INDEX: 32.8 KG/M2 | HEART RATE: 76 BPM | DIASTOLIC BLOOD PRESSURE: 66 MMHG | WEIGHT: 209 LBS

## 2024-09-24 DIAGNOSIS — Z86.0100 PERSONAL HISTORY OF COLONIC POLYPS: ICD-10-CM

## 2024-09-24 DIAGNOSIS — K76.0 FATTY INFILTRATION OF LIVER: ICD-10-CM

## 2024-09-24 DIAGNOSIS — Z86.010 PERSONAL HISTORY OF COLONIC POLYPS: ICD-10-CM

## 2024-09-24 DIAGNOSIS — K21.9 GASTROESOPHAGEAL REFLUX DISEASE WITHOUT ESOPHAGITIS: Primary | ICD-10-CM

## 2024-09-24 PROCEDURE — 99213 OFFICE O/P EST LOW 20 MIN: CPT | Performed by: PHYSICIAN ASSISTANT

## 2024-09-24 RX ORDER — PANTOPRAZOLE SODIUM 40 MG/1
40 TABLET, DELAYED RELEASE ORAL DAILY
Start: 2024-09-24

## 2024-09-24 NOTE — PROGRESS NOTES
[facility] - Outpatient Follow-up Note  Ale Chavez 68 y.o. female MRN: [id number]  Encounter: [id number]          ASSESSMENT AND PLAN:      1. Gastroesophageal reflux disease with esophagitis without hemorrhage  Stable on PPI daily. EGD from Jan 2022 showed no esophagitis. She prefers to continue current dose of pantoprazole. Continue pantoprazole 40 mg once daily. Recommend GERD diet and lifestyle precautions.     2. Fatty infiltration of liver  Risk factors include hyperglycemia, hypertension, hyperlipidemia, obesity. No significant alcohol use. Recent liver tests from June 2024 were normal. Her NAFLD fibrosis score is 0.634 which is indeterminate for fibrosis. I recommend scheduling elastography to assess for fibrosis. If no significant fibrosis, would monitor LFTs every 6-12 months. However if there is advanced fibrosis, would consider Rezdiffra, a new FDA approved medication which treats advanced fibrosis. I encouraged weight loss and continuing Rybelsus.     - US elastography; Future    3. Personal history of colonic polyps  Due for repeat colonoscopy in Jan 2025. We will schedule colonoscopy today. Gave instructions for MiraLAX/Dulcolax prep.    Follow-up in 1 year  ______________________________________________________________________    SUBJECTIVE:  68-year-old female with history of GERD, fatty liver, and colon polyps presenting for follow-up / medication refill.     She is doing well overall. She is taking pantoprazole 40 mg once daily. Her reflux is controlled. She denies trouble swallowing. She denies abdominal pain. She denies changes in bowel habits. She denies rectal bleeding.      REVIEW OF SYSTEMS IS OTHERWISE NEGATIVE.      Historical Information   Past Medical History:   Diagnosis Date    Abdominal pain     Allergic     Arthritis     Asthma     Basal cell carcinoma     Bicuspid aortic valve     LAST ASSESSED [id number]    Cancer (HCC)     skin    Cervical disc disease      last assessed [id number]    Cervical stenosis of spine     LAST ASSESSED 2016    Colon polyp     COVID     Disease of thyroid gland     hypothyroid    Dry eyes     Endometriosis     GERD (gastroesophageal reflux disease)     Heart murmur     History of screening mammography 2023    Bi-Rads 1.    Hot flashes     Hyperglycemia     Hyperlipidemia     Hypertension     Hypothyroidism     Kidney stone     Left ventricular hypertrophy     Mitral valvular disorder     Nephrolithiasis     Ovarian cyst, complex     Pneumonia     800.00    Ptosis     LAST ASSESSED [id number]    Renal calculi     Right cervical radiculopathy     LAST ASSESSED 2016    Rotator cuff disorder, right     Seasonal allergies     Squamous cell skin cancer     Thyroid disease     Visual impairment     Wears contact lenses     Wears glasses     Wears partial dentures      Past Surgical History:   Procedure Laterality Date    BASAL CELL CARCINOMA EXCISION      CARPAL TUNNEL RELEASE Bilateral      SECTION      CHOLECYSTECTOMY      COLONOSCOPY      NEUROPLASTY / TRANSPOSITION MEDIAN NERVE AT CARPAL TUNNEL      NEUROPLASTY / TRANSPOSITION MEDIAN NERVE AT CARPAL TUNNEL  2001    OTHER SURGICAL HISTORY      surgically removed skin patch for skin cancer    PLANTAR FASCIECTOMY Left     MS COLONOSCOPY FLX DX W/COLLJ SPEC WHEN PFRMD N/A 10/09/2017    Procedure: COLONOSCOPY;  Surgeon: Franklyn Adam MD;  Location: [facility] GI LAB;  Service: Gastroenterology    MS HYSTEROSCOPY BX ENDOMETRIUM&/POLYPC W/WO D&C N/A 2018    Procedure: DILATATION AND CURETTAGE (D&C) WITH HYSTEROSCOPY;  Surgeon: Ila Smith DO;  Location: [facility] MAIN OR;  Service: Gynecology    MS SURGICAL ARTHROSCOPY JOSE W/CORACOACRM LIGM RLS Right 2024    Procedure: ARTHROSCOPIC SUBACROMIAL DECOMPRESSION;  Surgeon: Richard Brown DO;  Location: [facility] MAIN OR;  Service: Orthopedics    MS SURGICAL ARTHROSCOPY SHOULDER W/ROTATOR CUFF RPR Right 2024     Procedure: REPAIR ROTATOR CUFF  ARTHROSCOPIC;  Surgeon: Richard Brown DO;  Location:  MAIN OR;  Service: Orthopedics    ROTATOR CUFF REPAIR Right 2024    SHOULDER SURGERY Right 2024    SKIN BIOPSY      TUBAL LIGATION      UPPER GASTROINTESTINAL ENDOSCOPY       Social History   Social History     Substance and Sexual Activity   Alcohol Use Yes    Alcohol/week: 8.0 standard drinks of alcohol    Types: 4 Glasses of wine, 4 Standard drinks or equivalent per week    Comment: per month     Social History     Substance and Sexual Activity   Drug Use No     Social History     Tobacco Use   Smoking Status Former    Current packs/day: 0.00    Average packs/day: 1 pack/day for 40.0 years (40.0 ttl pk-yrs)    Types: Cigarettes    Start date:     Quit date: 2012    Years since quittin.7   Smokeless Tobacco Never     Family History   Problem Relation Age of Onset    Diabetes Mother     Alzheimer's disease Mother     Heart disease Mother     Hypertension Mother     Dementia Mother     No Known Problems Father     No Known Problems Sister     No Known Problems Sister     No Known Problems Maternal Grandmother     No Known Problems Maternal Grandfather     No Known Problems Paternal Grandmother     No Known Problems Paternal Grandfather     No Known Problems Daughter     No Known Problems Maternal Aunt     No Known Problems Maternal Aunt     No Known Problems Maternal Aunt     No Known Problems Maternal Aunt     No Known Problems Maternal Aunt     Other Family         back disorder    Cancer Family     Heart disease Family     Thyroid disease Family     Breast cancer Neg Hx     Ovarian cancer Neg Hx     Colon cancer Neg Hx        Meds/Allergies       Current Outpatient Medications:     albuterol (2.5 mg/3 mL) 0.083 % nebulizer solution    albuterol (Ventolin HFA) 90 mcg/act inhaler    amLODIPine (NORVASC) 5 mg tablet    Ascorbic Acid (vitamin C) 100 MG tablet    cholecalciferol (VITAMIN D3) 400  units tablet    Cyanocobalamin (VITAMIN B 12 PO)    Efinaconazole (Jublia) 10 % SOLN    fluticasone (Arnuity Ellipta) 100 MCG/ACT AEPB inhaler    guaiFENesin (MUCINEX) 600 mg 12 hr tablet    ketoconazole (NIZORAL) 2 % cream    levothyroxine 125 mcg tablet    loratadine (CLARITIN REDITABS) 10 MG dissolvable tablet    losartan-hydrochlorothiazide (HYZAAR) 100-25 MG per tablet    pantoprazole (PROTONIX) 40 mg tablet    pravastatin (PRAVACHOL) 20 mg tablet    semaglutide (Rybelsus) 14 MG tablet    XIIDRA 5 % op solution    nystatin (MYCOSTATIN) powder    Current Facility-Administered Medications:     lidocaine (XYLOCAINE) 1 % injection 2 mL, 2 mL, Injection, , 2 mL at 08/14/23 1500    triamcinolone acetonide (KENALOG-40) 40 mg/mL injection 20 mg, 20 mg, Intra-articular, , 20 mg at 08/14/23 1500    Allergies   Allergen Reactions    Erythromycin GI Intolerance     Reaction Date: 11Jul2011;     Penicillins Rash     unknown    Aztreonam Rash     Action Taken: Allergy added by [employer] to replace Penicillins [employer];     Carbapenems Rash     Action Taken: Allergy added by [employer] to replace Penicillins Cross [employer]ors;     Cephalosporins Rash     Action Taken: Allergy added by [employer] to replace Penicillins Cross Reactors;     Griseofulvin Rash     Action Taken: Allergy added by [employer] to replace Penicillins Cross Reactors;     Influenza Virus Vaccine Hives, Rash and GI Intolerance           Objective     not currently breastfeeding. There is no height or weight on file to calculate BMI.      PHYSICAL EXAM:      General Appearance:   Alert, cooperative, no distress   HEENT:   Normocephalic, atraumatic, anicteric.     Neck:  Supple, symmetrical, trachea midline   Lungs:   Clear to auscultation bilaterally; no rales, rhonchi or wheezing; respirations unlabored    Heart::   Regular rate and rhythm; no murmur, rub, or gallop.   Abdomen:   Soft, non-tender, non-distended; normal bowel sounds; no masses, no  organomegaly    Genitalia:   Deferred    Rectal:   Deferred    Extremities:  No cyanosis, clubbing or edema    Pulses:  2+ and symmetric    Skin:  No jaundice, rashes, or lesions    Lymph nodes:  No palpable cervical lymphadenopathy        Lab Results:   No visits with results within 1 Day(s) from this visit.   Latest known visit with results is:   Appointment on 06/22/2024   Component Date Value    Cholesterol 06/22/2024 103     Triglycerides 06/22/2024 123     HDL, Direct 06/22/2024 38 (L)     LDL Calculated 06/22/2024 40     Non-HDL-Chol (CHOL-HDL) 06/22/2024 65          Radiology Results:   XR humerus RIGHT    Result Date: 9/5/2023  Narrative: RIGHT HUMERUS INDICATION:   fall. COMPARISON:  None VIEWS:  XR HUMERUS RIGHT FINDINGS: There is no acute fracture or dislocation. No significant degenerative changes. No lytic or blastic osseous lesion. Soft tissues are unremarkable.     Impression: No acute osseous abnormality. Workstation performed: [id number]     XR shoulder 2+ views RIGHT    Result Date: 9/5/2023  Narrative: RIGHT SHOULDER INDICATION:   fall. COMPARISON:  None VIEWS:  XR SHOULDER 2+ VW RIGHT FINDINGS: There is no acute fracture or dislocation. Mild osteoarthritis of the glenohumeral and acromioclavicular joints. Subacromial spur noted. Soft tissues are unremarkable.     Impression: No acute osseous abnormality. Workstation performed: [id number]

## 2024-09-24 NOTE — PATIENT INSTRUCTIONS
Scheduled date of colonoscopy (as of today): 01/28/2025  Physician performing colonoscopy: Dr. Ramesh  Location of colonoscopy: [facility]  Bowel prep reviewed with patient: Miralax   Instructions reviewed with patient by: Leila MCKEE   Clearances:  N/A

## 2024-10-07 ENCOUNTER — PROCEDURE VISIT (OUTPATIENT)
Age: 69
End: 2024-10-07
Payer: COMMERCIAL

## 2024-10-07 VITALS
HEIGHT: 67 IN | HEART RATE: 71 BPM | WEIGHT: 209 LBS | BODY MASS INDEX: 32.8 KG/M2 | SYSTOLIC BLOOD PRESSURE: 127 MMHG | DIASTOLIC BLOOD PRESSURE: 72 MMHG

## 2024-10-07 DIAGNOSIS — M99.02 SEGMENTAL DYSFUNCTION OF THORACIC REGION: ICD-10-CM

## 2024-10-07 DIAGNOSIS — M99.03 SEGMENTAL DYSFUNCTION OF LUMBAR REGION: ICD-10-CM

## 2024-10-07 DIAGNOSIS — S39.012A LUMBOSACRAL STRAIN, INITIAL ENCOUNTER: Primary | ICD-10-CM

## 2024-10-07 DIAGNOSIS — M99.04 SEGMENTAL DYSFUNCTION OF SACRAL REGION: ICD-10-CM

## 2024-10-07 PROCEDURE — 97110 THERAPEUTIC EXERCISES: CPT | Performed by: CHIROPRACTOR

## 2024-10-07 PROCEDURE — 98941 CHIROPRACT MANJ 3-4 REGIONS: CPT | Performed by: CHIROPRACTOR

## 2024-10-07 NOTE — PROGRESS NOTES
Initial date of service: 7/15/24    Diagnoses and all orders for this visit:    Lumbosacral strain, initial encounter    Segmental dysfunction of sacral region    Segmental dysfunction of lumbar region    Segmental dysfunction of thoracic region    Pt suffered mild exacerbation but responded to tx with reduced pain and increased ROM. Reducing tx frequency    TREATMENT: 11180, 40200  Ther-ex: IASTM; discussed post procedure soreness and/or ecchymosis for up to 36 hrs, applied to affected mm hypertonicities; supine hamstring stretch, supine gluteal stretch, side laying QL stretch, single knee to chest stretch, hip flexor pin-and-stretch, alternating prone hip extension, glute bridge, transitional mvmt education, abdominal bracing; greater than 15 min spent performing above mentioned ther-ex to improve ROM/flexibility. Thoracic mobilization/manipulation: prone P-A mob, supine A-P manip; Lumbar mobilization/manipulation: diversified side laying graded HVLA, flexion-traction; SIJ Manipulation/Mobilization: R/L SIJ HVLA - long axis distraction, roberson drop table maneuver to affected SIJ    HPI:  Ale Chavez is a 68 y.o. female  Chief Complaint   Patient presents with    Back Pain     Lower lumbar is feeling good. Pain score 0     Pt presents for tx of alternating SIJ/glute region onset Summer 2024  10/7: pt reports feeling and moving much better    Back Pain  This is a new problem. The current episode started more than 1 month ago. The problem occurs intermittently. The pain is present in the gluteal, lumbar spine, sacro-iliac and thoracic spine. The quality of the pain is described as aching. The pain does not radiate. The pain is Worse during the day. The symptoms are aggravated by sitting (transitional mvmts). Pertinent negatives include no bladder incontinence or bowel incontinence.     Past Medical History:   Diagnosis Date    Abdominal pain     Allergic     Arthritis     Asthma     Basal cell carcinoma      Bicuspid aortic valve     LAST ASSESSED [id number]    Cancer (HCC)     skin    Cervical disc disease     last assessed [id number]    Cervical stenosis of spine     LAST ASSESSED 2016    Colon polyp     COVID     Disease of thyroid gland     hypothyroid    Dry eyes     Endometriosis     GERD (gastroesophageal reflux disease)     Heart murmur     History of screening mammography 2023    Bi-Rads 1.    Hot flashes     Hyperglycemia     Hyperlipidemia     Hypertension     Hypothyroidism     Kidney stone     Left ventricular hypertrophy     Mitral valvular disorder     Nephrolithiasis     Ovarian cyst, complex     Pneumonia     800.00    Ptosis     LAST ASSESSED [id number]    Renal calculi     Right cervical radiculopathy     LAST ASSESSED 2016    Rotator cuff disorder, right     Seasonal allergies     Squamous cell skin cancer     Thyroid disease     Visual impairment     Wears contact lenses     Wears glasses     Wears partial dentures       Past Surgical History:   Procedure Laterality Date    BASAL CELL CARCINOMA EXCISION      CARPAL TUNNEL RELEASE Bilateral      SECTION      CHOLECYSTECTOMY      COLONOSCOPY      NEUROPLASTY / TRANSPOSITION MEDIAN NERVE AT CARPAL TUNNEL      NEUROPLASTY / TRANSPOSITION MEDIAN NERVE AT CARPAL TUNNEL  2001 [facility]    OTHER SURGICAL HISTORY      surgically removed skin patch for skin cancer    PLANTAR FASCIECTOMY Left     IA COLONOSCOPY FLX DX W/COLLJ SPEC WHEN PFRMD N/A 10/09/2017    Procedure: COLONOSCOPY;  Surgeon: Franklyn Adam MD;  Location: [facility] GI LAB;  Service: Gastroenterology    IA HYSTEROSCOPY BX ENDOMETRIUM&/POLYPC W/WO D&C N/A 2018    Procedure: DILATATION AND CURETTAGE (D&C) WITH HYSTEROSCOPY;  Surgeon: Ila Smith DO;  Location:  MAIN OR;  Service: Gynecology    IA SURGICAL ARTHROSCOPY JOSE W/CORACOACRM LIGM RLS Right 2024    Procedure: ARTHROSCOPIC SUBACROMIAL DECOMPRESSION;  Surgeon: Richard Brown DO;  Location: [facility]  MAIN OR;  Service: Orthopedics    [place] SURGICAL ARTHROSCOPY SHOULDER W/ROTATOR CUFF RPR Right 01/04/2024    Procedure: REPAIR ROTATOR CUFF  ARTHROSCOPIC;  Surgeon: Richard Brown DO;  Location:  MAIN OR;  Service: Orthopedics    ROTATOR CUFF REPAIR Right 01/04/2024    SHOULDER SURGERY Right 01/04/2024    SKIN BIOPSY      TUBAL LIGATION      UPPER GASTROINTESTINAL ENDOSCOPY       The following portions of the patient's history were reviewed and updated as appropriate: allergies, past family history, past medical history, past social history, past surgical history, and problem list.  Review of Systems   Gastrointestinal:  Negative for bowel incontinence.   Genitourinary:  Negative for bladder incontinence.   Musculoskeletal:  Positive for back pain.     Physical Exam  Musculoskeletal:      Thoracic back: Spasms and tenderness present. Decreased range of motion.      Lumbar back: Spasms and tenderness present. Decreased range of motion. Negative right straight leg raise test and negative left straight leg raise test.        Back:       Comments: Pnful and limited in Ext     Skin:     General: Skin is warm and dry.   Neurological:      Mental Status: She is alert and oriented to person, place, and time.      Gait: Gait is intact.   Psychiatric:         Mood and Affect: Mood normal.         Behavior: Behavior normal.       SOFT TISSUE ASSESSMENT Hypertonicity and tenderness palpated L T10-S1 erector spinae, B glute med/min, L QL, Bhamstring JOINT RESTRICTIONS: T10-S1 and L SIJ     Return in about 3 weeks (around 10/28/2024) for Next scheduled follow up.

## 2024-10-21 ENCOUNTER — IMMUNIZATIONS (OUTPATIENT)
Dept: FAMILY MEDICINE CLINIC | Facility: HOSPITAL | Age: 69
End: 2024-10-21
Payer: COMMERCIAL

## 2024-10-21 DIAGNOSIS — Z23 ENCOUNTER FOR IMMUNIZATION: Primary | ICD-10-CM

## 2024-10-21 PROCEDURE — 90471 IMMUNIZATION ADMIN: CPT

## 2024-10-21 PROCEDURE — 90662 IIV NO PRSV INCREASED AG IM: CPT

## 2024-10-23 DIAGNOSIS — E03.9 ACQUIRED HYPOTHYROIDISM: ICD-10-CM

## 2024-10-23 NOTE — TELEPHONE ENCOUNTER
Reason for call:   [x] Refill   [] Prior Auth  [] Other:     Office:   [x] [facility]   [] Specialty/Provider -     Medication: levothyroxine 125 mcg tablet     Dose/Frequency: TAKE ONE TABLET BY MOUTH EVERY DAY.     Quantity:  90 tablet     Pharmacy: [facility] - [place], [place] - [address] [facility] [phone]    Does the patient have enough for 3 days?   [x] Yes   [] No - Send as HP to POD

## 2024-10-24 NOTE — TELEPHONE ENCOUNTER
Requested medication(s) are due for refill today: Yes  Patient has already received a courtesy refill: No  Other reason request has been forwarded to provider: dr cash pt, establishing care with you.

## 2024-10-25 RX ORDER — LEVOTHYROXINE SODIUM 125 UG/1
125 TABLET ORAL DAILY
Qty: 90 TABLET | Refills: 0 | Status: SHIPPED | OUTPATIENT
Start: 2024-10-25

## 2024-10-28 ENCOUNTER — PROCEDURE VISIT (OUTPATIENT)
Age: 69
End: 2024-10-28
Payer: COMMERCIAL

## 2024-10-28 VITALS
BODY MASS INDEX: 32.8 KG/M2 | SYSTOLIC BLOOD PRESSURE: 129 MMHG | HEIGHT: 67 IN | HEART RATE: 72 BPM | WEIGHT: 209 LBS | DIASTOLIC BLOOD PRESSURE: 73 MMHG

## 2024-10-28 DIAGNOSIS — M99.02 SEGMENTAL DYSFUNCTION OF THORACIC REGION: ICD-10-CM

## 2024-10-28 DIAGNOSIS — S39.012A LUMBOSACRAL STRAIN, INITIAL ENCOUNTER: Primary | ICD-10-CM

## 2024-10-28 DIAGNOSIS — M99.03 SEGMENTAL DYSFUNCTION OF LUMBAR REGION: ICD-10-CM

## 2024-10-28 DIAGNOSIS — M99.04 SEGMENTAL DYSFUNCTION OF SACRAL REGION: ICD-10-CM

## 2024-10-28 PROCEDURE — 98941 CHIROPRACT MANJ 3-4 REGIONS: CPT | Performed by: CHIROPRACTOR

## 2024-10-28 PROCEDURE — 97110 THERAPEUTIC EXERCISES: CPT | Performed by: CHIROPRACTOR

## 2024-10-28 NOTE — PROGRESS NOTES
Initial date of service: 7/15/24    Diagnoses and all orders for this visit:    Lumbosacral strain, initial encounter    Segmental dysfunction of sacral region    Segmental dysfunction of lumbar region    Segmental dysfunction of thoracic region    Pt much improved with reduced pain and increased ROM. Pt was encouraged to maintain HEP and f/up prn    TREATMENT: 67935, 64207  Ther-ex: IASTM; discussed post procedure soreness and/or ecchymosis for up to 36 hrs, applied to affected mm hypertonicities; supine hamstring stretch, supine gluteal stretch, side laying QL stretch, single knee to chest stretch, hip flexor pin-and-stretch, alternating prone hip extension, glute bridge, transitional mvmt education, abdominal bracing; greater than 15 min spent performing above mentioned ther-ex to improve ROM/flexibility. Thoracic mobilization/manipulation: prone P-A mob, supine A-P manip; Lumbar mobilization/manipulation: diversified side laying graded HVLA, flexion-traction; SIJ Manipulation/Mobilization: R/L SIJ HVLA - long axis distraction, roberson drop table maneuver to affected SIJ    HPI:  Ale Chavez is a 69 y.o. female  Chief Complaint   Patient presents with    Back Pain     Lower lumbar is feeling good. Pain score 0     Pt presents for tx of alternating SIJ/glute region onset Summer 2024  10/28: pt reports feeling and moving much better; mild stiffness/soreness    Back Pain  This is a new problem. The current episode started more than 1 month ago. The problem occurs intermittently. The pain is present in the gluteal, lumbar spine, sacro-iliac and thoracic spine. The quality of the pain is described as aching. The pain does not radiate. The pain is Worse during the day. The symptoms are aggravated by sitting (transitional mvmts). Pertinent negatives include no bladder incontinence or bowel incontinence.     Past Medical History:   Diagnosis Date    Abdominal pain     Allergic     Arthritis     Asthma     Basal cell  carcinoma     Bicuspid aortic valve     LAST ASSESSED [id number]    Cancer (HCC)     skin    Cervical disc disease     last assessed [id number]    Cervical stenosis of spine     LAST ASSESSED 2016    Colon polyp     COVID     Disease of thyroid gland     hypothyroid    Dry eyes     Endometriosis     GERD (gastroesophageal reflux disease)     Heart murmur     History of screening mammography 2023    Bi-Rads 1.    Hot flashes     Hyperglycemia     Hyperlipidemia     Hypertension     Hypothyroidism     Kidney stone     Left ventricular hypertrophy     Mitral valvular disorder     Nephrolithiasis     Ovarian cyst, complex     Pneumonia     800.00    Ptosis     LAST ASSESSED [id number]    Renal calculi     Right cervical radiculopathy     LAST ASSESSED 2016    Rotator cuff disorder, right     Seasonal allergies     Squamous cell skin cancer     Thyroid disease     Visual impairment     Wears contact lenses     Wears glasses     Wears partial dentures       Past Surgical History:   Procedure Laterality Date    BASAL CELL CARCINOMA EXCISION      CARPAL TUNNEL RELEASE Bilateral      SECTION      CHOLECYSTECTOMY      COLONOSCOPY      NEUROPLASTY / TRANSPOSITION MEDIAN NERVE AT CARPAL TUNNEL      NEUROPLASTY / TRANSPOSITION MEDIAN NERVE AT CARPAL TUNNEL  2001     OTHER SURGICAL HISTORY      surgically removed skin patch for skin cancer    PLANTAR FASCIECTOMY Left     PA COLONOSCOPY FLX DX W/COLLJ SPEC WHEN PFRMD N/A 10/09/2017    Procedure: COLONOSCOPY;  Surgeon: Franklyn Adam MD;  Location: [facility] GI LAB;  Service: Gastroenterology    PA HYSTEROSCOPY BX ENDOMETRIUM&/POLYPC W/WO D&C N/A 2018    Procedure: DILATATION AND CURETTAGE (D&C) WITH HYSTEROSCOPY;  Surgeon: Ila Smith DO;  Location: [facility] OR;  Service: Gynecology    PA SURGICAL ARTHROSCOPY JOSE W/CORACOACRM LIGM RLS Right 2024    Procedure: ARTHROSCOPIC SUBACROMIAL DECOMPRESSION;  Surgeon: Richard Brown DO;   Location: [facility] MAIN OR;  Service: Orthopedics    [place] SURGICAL ARTHROSCOPY SHOULDER W/ROTATOR CUFF RPR Right 01/04/2024    Procedure: REPAIR ROTATOR CUFF  ARTHROSCOPIC;  Surgeon: Richard Brown DO;  Location: [facility] MAIN OR;  Service: Orthopedics    ROTATOR CUFF REPAIR Right 01/04/2024    SHOULDER SURGERY Right 01/04/2024    SKIN BIOPSY      TUBAL LIGATION      UPPER GASTROINTESTINAL ENDOSCOPY       The following portions of the patient's history were reviewed and updated as appropriate: allergies, past family history, past medical history, past social history, past surgical history, and problem list.  Review of Systems   Gastrointestinal:  Negative for bowel incontinence.   Genitourinary:  Negative for bladder incontinence.   Musculoskeletal:  Positive for back pain.     Physical Exam  Musculoskeletal:      Thoracic back: Spasms and tenderness present. Decreased range of motion.      Lumbar back: Spasms and tenderness present. Decreased range of motion. Negative right straight leg raise test and negative left straight leg raise test.        Back:       Comments: Pnful and limited in Ext     Skin:     General: Skin is warm and dry.   Neurological:      Mental Status: She is alert and oriented to person, place, and time.      Gait: Gait is intact.   Psychiatric:         Mood and Affect: Mood normal.         Behavior: Behavior normal.       SOFT TISSUE ASSESSMENT Hypertonicity and tenderness palpated L T10-S1 erector spinae, B glute med/min, L QL, Bhamstring JOINT RESTRICTIONS: T10-S1 and L SIJ     Return in about 3 weeks (around 11/18/2024) for Next scheduled follow up.

## 2024-10-29 ENCOUNTER — TELEPHONE (OUTPATIENT)
Age: 69
End: 2024-10-29

## 2024-10-29 NOTE — TELEPHONE ENCOUNTER
Caller: Kira     Doctor and/or Office: Dr Dhaliwal     CB#: [phone]    Escalation: Care Patient is going on  Vacation next week  and needs a shot she wanted to know if there is anyway she can get in  sooner she will see either Dr and she is off today thank you

## 2024-11-20 ENCOUNTER — OFFICE VISIT (OUTPATIENT)
Dept: PODIATRY | Facility: CLINIC | Age: 69
End: 2024-11-20
Payer: COMMERCIAL

## 2024-11-20 VITALS
DIASTOLIC BLOOD PRESSURE: 80 MMHG | HEART RATE: 78 BPM | WEIGHT: 213 LBS | BODY MASS INDEX: 33.43 KG/M2 | SYSTOLIC BLOOD PRESSURE: 135 MMHG | HEIGHT: 67 IN

## 2024-11-20 DIAGNOSIS — M19.072 PRIMARY OSTEOARTHRITIS OF LEFT FOOT: Primary | ICD-10-CM

## 2024-11-20 DIAGNOSIS — M25.572 PAIN IN JOINT OF LEFT FOOT: ICD-10-CM

## 2024-11-20 PROCEDURE — 20600 DRAIN/INJ JOINT/BURSA W/O US: CPT | Performed by: PODIATRIST

## 2024-11-20 PROCEDURE — 99213 OFFICE O/P EST LOW 20 MIN: CPT | Performed by: PODIATRIST

## 2024-11-20 RX ORDER — LIDOCAINE HYDROCHLORIDE 10 MG/ML
2 INJECTION, SOLUTION INFILTRATION; PERINEURAL
Status: SHIPPED | OUTPATIENT
Start: 2024-11-20

## 2024-11-20 RX ORDER — TRIAMCINOLONE ACETONIDE 40 MG/ML
20 INJECTION, SUSPENSION INTRA-ARTICULAR; INTRAMUSCULAR
Status: SHIPPED | OUTPATIENT
Start: 2024-11-20

## 2024-11-20 RX ADMIN — TRIAMCINOLONE ACETONIDE 20 MG: 40 INJECTION, SUSPENSION INTRA-ARTICULAR; INTRAMUSCULAR at 15:30

## 2024-11-20 RX ADMIN — LIDOCAINE HYDROCHLORIDE 2 ML: 10 INJECTION, SOLUTION INFILTRATION; PERINEURAL at 15:30

## 2024-11-20 NOTE — PROGRESS NOTES
"               PATIENT:  Ale Chavez  1955       ASSESSMENT:     1. Primary osteoarthritis of left foot  Small joint arthrocentesis: L intertarsal      2. Pain in joint of left foot                    PLAN:  1. Reviewed medical records.  The last podiatry note reviewed.  Patient was counseled and educated on the condition and the diagnosis.    2. Previous X-ray was personally reviewed.  The radiological findings were discussed with the patient.    3. 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  The diagnosis, treatment options and prognosis were discussed with the patient.    4. Treatment options were discussed and the patient wished to proceed with an injection.  See procedure.  5. Instructed supportive care, home exercise, icing, and proper footwear/ arch support.   Discussed possible further images depending on the progress.    6. Patient will return in 8 weeks for re-evaluation.       Imaging: I have personally reviewed pertinent films in PACS  Labs, pathology, and Other Studies: I have personally reviewed pertinent reports.      Small joint arthrocentesis: L intertarsal  Universal Protocol:  Consent: Verbal consent obtained.  Risks and benefits: risks, benefits and alternatives were discussed  Consent given by: patient  Time out: Immediately prior to procedure a \"time out\" was called to verify the correct patient, procedure, equipment, support staff and site/side marked as required.  Timeout called at: 8/14/2023 3:30 PM.  Site marked: the operative site was marked  Patient identity confirmed: verbally with patient  Supporting Documentation  Indications: pain   Procedure Details  Location: foot - L intertarsal (3rd / 4th TMTJ)  Needle size: 25 G  Ultrasound guidance: no  Approach: dorsal  Medications administered: 20 mg triamcinolone acetonide 40 mg/mL; 2 mL lidocaine 1 %    Patient tolerance: patient tolerated the procedure well with no immediate complications            Subjective:       HPI  The patient presents " with chief complaint of left foot pain.  She had recurring foot pain and seen by Dr. Fortune in July.  She received an injection, but it did not seem to help her.  Denied injury.  No associated numbness or paresthesia.  No significant weakness or dysfunction.         The following portions of the patient's history were reviewed and updated as appropriate: allergies, current medications, past family history, past medical history, past social history, past surgical history and problem list.  All pertinent labs and images were reviewed.      Past Medical History  Past Medical History:   Diagnosis Date    Abdominal pain     Allergic     Arthritis     Asthma     Basal cell carcinoma     Bicuspid aortic valve     LAST ASSESSED [id number]    Cancer (HCC)     skin    Cervical disc disease     last assessed [id number]    Cervical stenosis of spine     LAST ASSESSED 2016    Colon polyp     COVID     Disease of thyroid gland     hypothyroid    Dry eyes     Endometriosis     GERD (gastroesophageal reflux disease)     Heart murmur     History of screening mammography 2023    Bi-Rads 1.    Hot flashes     Hyperglycemia     Hyperlipidemia     Hypertension     Hypothyroidism     Kidney stone     Left ventricular hypertrophy     Mitral valvular disorder     Nephrolithiasis     Ovarian cyst, complex     Pneumonia     800.00    Ptosis     LAST ASSESSED [id number]    Renal calculi     Right cervical radiculopathy     LAST ASSESSED [id number]    Rotator cuff disorder, right     Seasonal allergies     Squamous cell skin cancer     Thyroid disease     Visual impairment     Wears contact lenses     Wears glasses     Wears partial dentures        Past Surgical History  Past Surgical History:   Procedure Laterality Date    BASAL CELL CARCINOMA EXCISION      CARPAL TUNNEL RELEASE Bilateral      SECTION      CHOLECYSTECTOMY      COLONOSCOPY      NEUROPLASTY / TRANSPOSITION MEDIAN NERVE AT CARPAL TUNNEL      NEUROPLASTY /  TRANSPOSITION MEDIAN NERVE AT CARPAL TUNNEL  2001    [facility]    OTHER SURGICAL HISTORY      surgically removed skin patch for skin cancer    PLANTAR FASCIECTOMY Left     NV COLONOSCOPY FLX DX W/COLLJ SPEC WHEN PFRMD N/A 10/09/2017    Procedure: COLONOSCOPY;  Surgeon: Franklyn Adam MD;  Location: [facility] GI LAB;  Service: Gastroenterology    NV HYSTEROSCOPY BX ENDOMETRIUM&/POLYPC W/WO D&C N/A 02/26/2018    Procedure: DILATATION AND CURETTAGE (D&C) WITH HYSTEROSCOPY;  Surgeon: Ila Smith DO;  Location: [facility] MAIN [facility];  Service: Gynecology    NV SURGICAL ARTHROSCOPY JOSE W/CORACOACRM LIGM RLS Right 01/04/2024    Procedure: ARTHROSCOPIC SUBACROMIAL DECOMPRESSION;  Surgeon: Richard Brown DO;  Location: [facility] MA[facility];  Service: Orthopedics    NV SURGICAL ARTHROSCOPY SHOULDER W/ROTATOR CUFF RPR Right 01/04/2024    Procedure: REPAIR ROTATOR CUFF  ARTHROSCOPIC;  Surgeon: Richard Brown DO;  Location: [facility];  Service: Orthopedics    ROTATOR CUFF REPAIR Right 01/04/2024    SHOULDER SURGERY Right 01/04/2024    SKIN BIOPSY      TUBAL LIGATION      UPPER GASTROINTESTINAL ENDOSCOPY          Allergies:  Erythromycin, Penicillins, Aztreonam, Carbapenems, Cephalosporins, Griseofulvin, and Influenza virus vaccine    Medications:  Current Outpatient Medications   Medication Sig Dispense Refill    albuterol (2.5 mg/3 mL) 0.083 % nebulizer solution Take 3 mL (2.5 mg total) by nebulization every 6 (six) hours as needed for wheezing or shortness of breath 125 mL 1    albuterol (Ventolin HFA) 90 mcg/act inhaler Inhale 2 puffs every 6 (six) hours as needed for wheezing or shortness of breath 18 g 3    amLODIPine (NORVASC) 5 mg tablet Take 1 tablet (5 mg total) by mouth daily 90 tablet 3    Ascorbic Acid (vitamin C) 100 MG tablet Take 100 mg by mouth daily      cholecalciferol (VITAMIN D3) 400 units tablet Take 400 Units by mouth daily Pt taking 1000 units      Cyanocobalamin (VITAMIN B 12 PO) Take by mouth      Efinaconazole  (Jublia) 10 % SOLN Apply 1 application topically in the morning 8 mL 6    fluticasone (Arnuity Ellipta) 100 MCG/ACT AEPB inhaler Inhale 1 puff daily Rinse mouth after use. 90 blister 3    guaiFENesin (MUCINEX) 600 mg 12 hr tablet Take 1,200 mg by mouth every 12 (twelve) hours      ketoconazole (NIZORAL) 2 % cream Apply topically daily      levothyroxine 125 mcg tablet Take 1 tablet (125 mcg total) by mouth daily 90 tablet 0    loratadine (CLARITIN REDITABS) 10 MG dissolvable tablet Take 10 mg by mouth daily      losartan-hydrochlorothiazide (HYZAAR) 100-25 MG per tablet Take 1 tablet by mouth daily 90 tablet 3    pantoprazole (PROTONIX) 40 mg tablet Take 1 tablet (40 mg total) by mouth daily      pravastatin (PRAVACHOL) 20 mg tablet Take 1 tablet (20 mg total) by mouth daily 90 tablet 3    semaglutide (Rybelsus) 14 MG tablet Take 1 tablet (14 mg total) by mouth daily 90 tablet 1    XIIDRA 5 % op solution Administer 1 drop to both eyes in the morning      nystatin (MYCOSTATIN) powder Apply topically 3 (three) times a day for 14 days 30 g 0     Current Facility-Administered Medications   Medication Dose Route Frequency Provider Last Rate Last Admin    lidocaine (XYLOCAINE) 1 % injection 2 mL  2 mL Injection  Ramón Galdamez DPM   2 mL at 23 1500    triamcinolone acetonide (KENALOG-40) 40 mg/mL injection 20 mg  20 mg Intra-articular  Ramón Galdamez DPM   20 mg at 23 1500       Social History:  Social History     Socioeconomic History    Marital status: /Civil Union     Spouse name: None    Number of children: 2    Years of education: None    Highest education level: None   Occupational History    Occupation: part time employment   Tobacco Use    Smoking status: Former     Current packs/day: 0.00     Average packs/day: 1 pack/day for 40.0 years (40.0 ttl pk-yrs)     Types: Cigarettes     Start date:      Quit date: 2012     Years since quittin.8    Smokeless tobacco: Never   Vaping Use    Vaping  "status: Never Used   Substance and Sexual Activity    Alcohol use: Yes     Alcohol/week: 8.0 standard drinks of alcohol     Types: 4 Glasses of wine, 4 Standard drinks or equivalent per week     Comment: per month    Drug use: No    Sexual activity: Yes     Partners: Male     Birth control/protection: Female Sterilization     Comment: tubal ligation    Other Topics Concern    None   Social History Narrative    Caffeine use     Highschool or GED    Lives with      S[employer]ian[employer] affiliation Church    Always uses seatbelt     Social Drivers of Health     Financial Resource Strain: Not on file   Food Insecurity: Not on file   Transportation Needs: Not on file   Physical Activity: Not on file   Stress: Not on file   Social Connections: Not on file   Intimate Partner Violence: Not on file   Housing Stability: Not on file          Review of Systems   Constitutional:  Negative for chills and fever.   Respiratory:  Negative for cough and shortness of breath.    Cardiovascular:  Negative for chest pain.   Gastrointestinal:  Negative for nausea and vomiting.   Musculoskeletal:  Positive for arthralgias.   Skin:  Negative for wound.   Neurological:  Negative for weakness and numbness.   Hematological: Negative.          Objective:      /80 (Patient Position: Sitting, Cuff Size: Standard)   Pulse 78   Ht 5' 7\" (1.702 m)   Wt 96.6 kg (213 lb)   BMI 33.36 kg/m²          Physical Exam  Vitals reviewed.   Constitutional:       General: She is not in acute distress.     Appearance: She is not toxic-appearing or diaphoretic.   Cardiovascular:      Rate and Rhythm: Normal rate and regular rhythm.      Pulses: Normal pulses.           Dorsalis pedis pulses are 2+ on the right side and 2+ on the left side.        Posterior tibial pulses are 2+ on the right side and 2+ on the left side.   Pulmonary:      Effort: Pulmonary effort is normal. No respiratory distress.   Musculoskeletal:         General: Tenderness and " deformity present. No signs of injury.      Right lower leg: No edema.      Left lower leg: No edema.      Right foot: No foot drop.      Left foot: No foot drop.      Comments: Focal pain around left sinus tarsi with mild fullness.  Minimal pain in left 3rd/ 4th TMTJ.   Skin:     General: Skin is warm.      Capillary Refill: Capillary refill takes less than 2 seconds.      Coloration: Skin is not cyanotic or mottled.      Findings: No abscess, ecchymosis or wound.      Nails: There is no clubbing.   Neurological:      General: No focal deficit present.      Mental Status: She is alert and oriented to person, place, and time.      Cranial Nerves: No cranial nerve deficit.      Sensory: No sensory deficit.      Motor: No weakness.      Coordination: Coordination normal.   Psychiatric:         Mood and Affect: Mood normal.         Behavior: Behavior normal.         Thought Content: Thought content normal.         Judgment: Judgment normal.

## 2024-11-22 ENCOUNTER — TELEPHONE (OUTPATIENT)
Dept: VASCULAR SURGERY | Facility: CLINIC | Age: 69
End: 2024-11-22

## 2024-11-22 NOTE — TELEPHONE ENCOUNTER
Called pt and lmom to reschedule their appt on 11/25, pt has been scheduled too soon for their f/u OV. Please reschedule to next available with ROBY

## 2024-11-22 NOTE — TELEPHONE ENCOUNTER
Patient coming in for bleeding vein,Lives in [place] and [facility] is the closest to her, patient needs an appt. please contact her with date and time, patient is off mondays and tuesdays .

## 2024-11-26 ENCOUNTER — TELEPHONE (OUTPATIENT)
Age: 69
End: 2024-11-26

## 2024-12-03 ENCOUNTER — HOSPITAL ENCOUNTER (OUTPATIENT)
Dept: MAMMOGRAPHY | Facility: CLINIC | Age: 69
Discharge: HOME | End: 2024-12-03
Payer: COMMERCIAL

## 2024-12-03 VITALS — BODY MASS INDEX: 33.43 KG/M2 | WEIGHT: 213 LBS | HEIGHT: 67 IN

## 2024-12-03 DIAGNOSIS — Z12.31 ENCOUNTER FOR SCREENING MAMMOGRAM FOR MALIGNANT NEOPLASM OF BREAST: ICD-10-CM

## 2024-12-03 PROCEDURE — 77067 SCR MAMMO BI INCL CAD: CPT

## 2024-12-03 PROCEDURE — 77063 BREAST TOMOSYNTHESIS BI: CPT

## 2024-12-05 ENCOUNTER — TELEPHONE (OUTPATIENT)
Age: 69
End: 2024-12-05

## 2024-12-05 ENCOUNTER — RESULTS FOLLOW-UP (OUTPATIENT)
Dept: OBGYN CLINIC | Facility: CLINIC | Age: 69
End: 2024-12-05

## 2024-12-09 ENCOUNTER — OFFICE VISIT (OUTPATIENT)
Age: 69
End: 2024-12-09
Payer: COMMERCIAL

## 2024-12-09 VITALS
BODY MASS INDEX: 33.43 KG/M2 | SYSTOLIC BLOOD PRESSURE: 124 MMHG | HEART RATE: 86 BPM | HEIGHT: 67 IN | WEIGHT: 213 LBS | DIASTOLIC BLOOD PRESSURE: 80 MMHG

## 2024-12-09 DIAGNOSIS — M99.03 SEGMENTAL DYSFUNCTION OF LUMBAR REGION: ICD-10-CM

## 2024-12-09 DIAGNOSIS — M62.838 MUSCLE SPASM: ICD-10-CM

## 2024-12-09 DIAGNOSIS — M99.04 SEGMENTAL DYSFUNCTION OF SACRAL REGION: ICD-10-CM

## 2024-12-09 DIAGNOSIS — S39.012A LUMBOSACRAL STRAIN, INITIAL ENCOUNTER: Primary | ICD-10-CM

## 2024-12-09 DIAGNOSIS — M99.02 SEGMENTAL DYSFUNCTION OF THORACIC REGION: ICD-10-CM

## 2024-12-09 PROCEDURE — 98941 CHIROPRACT MANJ 3-4 REGIONS: CPT | Performed by: CHIROPRACTOR

## 2024-12-09 PROCEDURE — 97110 THERAPEUTIC EXERCISES: CPT | Performed by: CHIROPRACTOR

## 2024-12-09 RX ORDER — CYCLOSPORINE 0.5 MG/ML
EMULSION OPHTHALMIC
COMMUNITY
Start: 2024-11-27
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
## 2024-12-16 NOTE — PROGRESS NOTES
Name: Ale Chavez      : 1955      MRN: [id number]  Encounter Provider: CHRISTIN Brooks  Encounter Date: 2024   Encounter department: [facility]  :  Assessment & Plan  Bleeding from varicose veins of lower extremity, bilateral  69-year-old with HTN, HLD, obesity, bilateral superficial venous insufficiency and BLE dilated varicose and spider veins.    - Presents with c/o bleeding vein episodes x 3 (2 left lateral calf and 1 R lateral thigh)  She was able to apply pressure and control bleeding all times. Did not seek medical attention / go to ED.   - Patient has underlying symptomatic venous insuffiencey of which she is compliant with compression use, however continues to experience symptoms on a daily basis.   - Patient previously seen in vascular office with discussion of trial of conservative management and return for consideration of venous intervention.   - Patient continues to endorse heavy aching pain bilaterally  - No edema  - BLE scattered dilated spider veins, R calf dilated VV  - Palpable DP pulses b/l    Plan:  -Continue conservative medical management with daily use of medical grade compression stockings 20-30 mmHg.  On a.m., off in p.m.  Frequent ambulation   Leg elevation at rest   Moisturizer and diligent skin care to maintain skin integrity and prevent skin breakdown  - Will consider medical sclerotherapy injections and submit for authorization.   - Will discuss with Dr Brandt timing of injections  and follow up in office to discuss intervention vs continued conservative measures.                History of Present Illness     Patient presents today to discuss treatment of bleeding vein. Patient reports h/o BLE bleeding vein- twice on L leg and once on R leg. Wearing compression daily.     HPI  Ale Chavez is a 69 y.o. female who presents with bleeding vein episodes x 3. See above assessment and plan.     Patient previously seen to review LEV with complaints of  symptomatic varicose veins. She has superficial VI bilaterally.    At that time patient had not been wearing compression.  Since that time she has trialed compression without relief of symptoms.    She reports 3 episodes of spontaneous bleeding from dilated veins.  All times she is able to control bleeding and did not go to the ED.  All episodes during or after taking a shower.    Discussed pathophysiology of venous insufficiency and varicose veins.  Given that she has underlying venous insufficiency that has not yet been treated, discussed possibility of injections, not being successful.  Will discuss with vascular attending timing and if patient will need to be seen back in the office to schedule and/or move forward with medical sclerotherapy injections and  Patient advised we will submit for authorization.  She will need to wear thigh-high compression post injections for 2-3 weeks.  Informed veins can look worse before better can be of dark, black or become scabbed.     History obtained from: patient    Review of Systems   Constitutional: Negative.    HENT: Negative.     Eyes: Negative.    Respiratory: Negative.     Cardiovascular: Negative.    Gastrointestinal: Negative.    Endocrine: Negative.    Genitourinary: Negative.    Musculoskeletal: Negative.    Skin: Negative.    Allergic/Immunologic: Negative.    Neurological: Negative.    Hematological: Negative.    Psychiatric/Behavioral: Negative.     I have reviewed and made appropriate changes to the review of systems input by the medical assistant.    Medical History Reviewed by provider this encounter:     .  Past Medical History   Past Medical History:   Diagnosis Date    Abdominal pain     Allergic     Arthritis     Asthma     Basal cell carcinoma     Bicuspid aortic valve     LAST ASSESSED 02AUG2012    Cancer (HCC)     skin    Cervical disc disease     last assessed 31aug2016    Cervical stenosis of spine     LAST ASSESSED [id number]    Colon polyp     COVID      Disease of thyroid gland     hypothyroid    Dry eyes     Endometriosis     GERD (gastroesophageal reflux disease)     Heart murmur     History of screening mammography 2023    Bi-Rads 1.    Hot flashes     Hyperglycemia     Hyperlipidemia     Hypertension     Hypothyroidism     Kidney stone     Left ventricular hypertrophy     Mitral valvular disorder     Nephrolithiasis     Ovarian cyst, complex     Pneumonia     800.00    Ptosis     LAST ASSESSED [id number]    Renal calculi     Right cervical radiculopathy     LAST ASSESSED 2016    Rotator cuff disorder, right     Seasonal allergies     Squamous cell skin cancer     Thyroid disease     Visual impairment     Wears contact lenses     Wears glasses     Wears partial dentures      Past Surgical History:   Procedure Laterality Date    BASAL CELL CARCINOMA EXCISION      CARPAL TUNNEL RELEASE Bilateral      SECTION      CHOLECYSTECTOMY      COLONOSCOPY      NEUROPLASTY / TRANSPOSITION MEDIAN NERVE AT CARPAL TUNNEL      NEUROPLASTY / TRANSPOSITION MEDIAN NERVE AT CARPAL TUNNEL  [facility]    OTHER SURGICAL HISTORY      surgically removed skin patch for skin cancer    PLANTAR FASCIECTOMY Left     RI COLONOSCOPY FLX DX W/COLLJ SPEC WHEN PFRMD N/A 10/09/2017    Procedure: COLONOSCOPY;  Surgeon: Franklyn Adam MD;  Location: [facility] GI LAB;  Service: Gastroenterology    RI HYSTEROSCOPY BX ENDOMETRIUM&/POLYPC W/WO D&C N/A 2018    Procedure: DILATATION AND CURETTAGE (D&C) WITH HYSTEROSCOPY;  Surgeon: Ila Smith DO;  Location: [facility];  Service: Gynecology    RI SURGICAL ARTHROSCOPY JOSE W/CORACOACRM LIGM RLS Right 2024    Procedure: ARTHROSCOPIC SUBACROMIAL DECOMPRESSION;  Surgeon: Richard Brown DO;  Location: [facility];  Service: Orthopedics    RI SURGICAL ARTHROSCOPY SHOULDER W/ROTATOR CUFF RPR Right 2024    Procedure: REPAIR ROTATOR CUFF  ARTHROSCOPIC;  Surgeon: Richard Brown DO;  Location: [facility];   Service: Orthopedics    ROTATOR CUFF REPAIR Right 01/04/2024    SHOULDER SURGERY Right 01/04/2024    SKIN BIOPSY      TUBAL LIGATION      UPPER GASTROINTESTINAL ENDOSCOPY       Family History   Problem Relation Age of Onset    Diabetes Mother     Alzheimer's disease Mother     Heart disease Mother     Hypertension Mother     Dementia Mother     No Known Problems Father     No Known Problems Sister     No Known Problems Sister     No Known Problems Maternal Grandmother     No Known Problems Maternal Grandfather     No Known Problems Paternal Grandmother     No Known Problems Paternal Grandfather     No Known Problems Daughter     No Known Problems Maternal Aunt     No Known Problems Maternal Aunt     No Known Problems Maternal Aunt     No Known Problems Maternal Aunt     No Known Problems Maternal Aunt     Other Family         back disorder    Cancer Family     Heart disease Family     Thyroid disease Family     Breast cancer Neg Hx     Ovarian cancer Neg Hx     Colon cancer Neg Hx       reports that she quit smoking about 12 years ago. Her smoking use included cigarettes. She started smoking about 52 years ago. She has a 40 pack-year smoking history. She has never used smokeless tobacco. She reports current alcohol use of about 8.0 standard drinks of alcohol per week. She reports that she does not use drugs.  Current Outpatient Medications on File Prior to Visit   Medication Sig Dispense Refill    albuterol (2.5 mg/3 mL) 0.083 % nebulizer solution Take 3 mL (2.5 mg total) by nebulization every 6 (six) hours as needed for wheezing or shortness of breath 125 mL 1    albuterol (Ventolin HFA) 90 mcg/act inhaler Inhale 2 puffs every 6 (six) hours as needed for wheezing or shortness of breath 18 g 3    amLODIPine (NORVASC) 5 mg tablet Take 1 tablet (5 mg total) by mouth daily 90 tablet 3    Ascorbic Acid (vitamin C) 100 MG tablet Take 100 mg by mouth daily      cholecalciferol (VITAMIN D3) 400 units tablet Take 400 Units  by mouth daily Pt taking 1000 units      Cyanocobalamin (VITAMIN B 12 PO) Take by mouth      fluticasone (Arnuity Ellipta) 100 MCG/ACT AEPB inhaler Inhale 1 puff daily Rinse mouth after use. 90 blister 3    guaiFENesin (MUCINEX) 600 mg 12 hr tablet Take 1,200 mg by mouth every 12 (twelve) hours      ketoconazole (NIZORAL) 2 % cream Apply topically daily      levothyroxine 125 mcg tablet Take 1 tablet (125 mcg total) by mouth daily 90 tablet 0    loratadine (CLARITIN REDITABS) 10 MG dissolvable tablet Take 10 mg by mouth daily      losartan-hydrochlorothiazide (HYZAAR) 100-25 MG per tablet Take 1 tablet by mouth daily 90 tablet 3    pantoprazole (PROTONIX) 40 mg tablet Take 1 tablet (40 mg total) by mouth daily      pravastatin (PRAVACHOL) 20 mg tablet Take 1 tablet (20 mg total) by mouth daily 90 tablet 3    Restasis 0.05 % ophthalmic emulsion       semaglutide (Rybelsus) 14 MG tablet Take 1 tablet (14 mg total) by mouth daily 90 tablet 1    Efinaconazole (Jublia) 10 % SOLN Apply 1 application topically in the morning (Patient not taking: Reported on 12/17/2024) 8 mL 6    nystatin (MYCOSTATIN) powder Apply topically 3 (three) times a day for 14 days (Patient not taking: Reported on 12/17/2024) 30 g 0     Current Facility-Administered Medications on File Prior to Visit   Medication Dose Route Frequency Provider Last Rate Last Admin    lidocaine (XYLOCAINE) 1 % injection 2 mL  2 mL Injection  Ramón Galdamez DPM   2 mL at 08/14/23 1500    lidocaine (XYLOCAINE) 1 % injection 2 mL  2 mL Injection     2 mL at 11/20/24 1530    triamcinolone acetonide (KENALOG-40) 40 mg/mL injection 20 mg  20 mg Intra-articular  Ramón Galdamez DPM   20 mg at 08/14/23 1500    triamcinolone acetonide (Kenalog-40) 40 mg/mL injection 20 mg  20 mg Intra-articular     20 mg at 11/20/24 1530     Allergies   Allergen Reactions    Erythromycin GI Intolerance     Reaction Date: 11Jul2011;     Penicillins Rash     unknown    Aztreonam Rash     Action Taken:  Allergy added by [employer] to replace Penicillins [employer];     Carbapenems Rash     Action Taken: Allergy added by [employer] to replace Penicillins [employer];     Cephalosporins Rash     Action Taken: Allergy added by [employer] to replace Penicillins Cross [employer];     Griseofulvin Rash     Action Taken: Allergy added by [employer] to replace Penicillins [employer];     Influenza Virus Vaccine Hives, Rash and GI Intolerance      Current Outpatient Medications on File Prior to Visit   Medication Sig Dispense Refill    albuterol (2.5 mg/3 mL) 0.083 % nebulizer solution Take 3 mL (2.5 mg total) by nebulization every 6 (six) hours as needed for wheezing or shortness of breath 125 mL 1    albuterol (Ventolin HFA) 90 mcg/act inhaler Inhale 2 puffs every 6 (six) hours as needed for wheezing or shortness of breath 18 g 3    amLODIPine (NORVASC) 5 mg tablet Take 1 tablet (5 mg total) by mouth daily 90 tablet 3    Ascorbic Acid (vitamin C) 100 MG tablet Take 100 mg by mouth daily      cholecalciferol (VITAMIN D3) 400 units tablet Take 400 Units by mouth daily Pt taking 1000 units      Cyanocobalamin (VITAMIN B 12 PO) Take by mouth      fluticasone (Arnuity Ellipta) 100 MCG/ACT AEPB inhaler Inhale 1 puff daily Rinse mouth after use. 90 blister 3    guaiFENesin (MUCINEX) 600 mg 12 hr tablet Take 1,200 mg by mouth every 12 (twelve) hours      ketoconazole (NIZORAL) 2 % cream Apply topically daily      levothyroxine 125 mcg tablet Take 1 tablet (125 mcg total) by mouth daily 90 tablet 0    loratadine (CLARITIN REDITABS) 10 MG dissolvable tablet Take 10 mg by mouth daily      losartan-hydrochlorothiazide (HYZAAR) 100-25 MG per tablet Take 1 tablet by mouth daily 90 tablet 3    pantoprazole (PROTONIX) 40 mg tablet Take 1 tablet (40 mg total) by mouth daily      pravastatin (PRAVACHOL) 20 mg tablet Take 1 tablet (20 mg total) by mouth daily 90 tablet 3    Restasis 0.05 % ophthalmic emulsion       semaglutide  "(Rybelsus) 14 MG tablet Take 1 tablet (14 mg total) by mouth daily 90 tablet 1    Efinaconazole (Jublia) 10 % SOLN Apply 1 application topically in the morning (Patient not taking: Reported on 2024) 8 mL 6    nystatin (MYCOSTATIN) powder Apply topically 3 (three) times a day for 14 days (Patient not taking: Reported on 2024) 30 g 0     Current Facility-Administered Medications on File Prior to Visit   Medication Dose Route Frequency Provider Last Rate Last Admin    lidocaine (XYLOCAINE) 1 % injection 2 mL  2 mL Injection  Ramón Galdamez, DPM   2 mL at 23 1500    lidocaine (XYLOCAINE) 1 % injection 2 mL  2 mL Injection     2 mL at 24 1530    triamcinolone acetonide (KENALOG-40) 40 mg/mL injection 20 mg  20 mg Intra-articular  Ramón Galdamez DPALAYNA   20 mg at 23 1500    triamcinolone acetonide (Kenalog-40) 40 mg/mL injection 20 mg  20 mg Intra-articular     20 mg at 24 1530      Social History     Tobacco Use    Smoking status: Former     Current packs/day: 0.00     Average packs/day: 1 pack/day for 40.0 years (40.0 ttl pk-yrs)     Types: Cigarettes     Start date:      Quit date: 2012     Years since quittin.9    Smokeless tobacco: Never   Vaping Use    Vaping status: Never Used   Substance and Sexual Activity    Alcohol use: Yes     Alcohol/week: 8.0 standard drinks of alcohol     Types: 4 Glasses of wine, 4 Standard drinks or equivalent per week     Comment: per month    Drug use: No    Sexual activity: Yes     Partners: Male     Birth control/protection: Female Sterilization     Comment: tubal ligation         Objective   /82 (BP Location: Left arm, Patient Position: Sitting)   Pulse 90   Ht 5' 7\" (1.702 m)   Wt 93.4 kg (206 lb) Comment: verbal, per pt  BMI 32.26 kg/m²      Physical Exam  Vitals reviewed.   Constitutional:       General: She is not in acute distress.  Cardiovascular:      Rate and Rhythm: Normal rate.      Pulses: Normal pulses.           Dorsalis pedis " pulses are 2+ on the right side and 2+ on the left side.   Pulmonary:      Effort: Pulmonary effort is normal. No respiratory distress.   Musculoskeletal:         General: Normal range of motion.      Right lower leg: No edema.      Left lower leg: No edema.   Skin:     General: Skin is warm.      Capillary Refill: Capillary refill takes less than 2 seconds.      Comments: R posterior calf VV    BLE scattered dilated spider veins   Neurological:      Mental Status: She is alert and oriented to person, place, and time.      Sensory: No sensory deficit.      Motor: No weakness.   Psychiatric:         Behavior: Behavior normal.         Bleeding event x 2- left    Bleeding event x 1 Right    Administrative Statements   I have spent a total time of 25 minutes in caring for this patient on the day of the visit/encounter including Risks and benefits of tx options, Instructions for management, Patient and family education, Importance of tx compliance, Risk factor reductions, Impressions, Documenting in the medical record, Reviewing / ordering tests, medicine, procedures  , and Obtaining or reviewing history  .

## 2024-12-17 ENCOUNTER — OFFICE VISIT (OUTPATIENT)
Dept: VASCULAR SURGERY | Facility: CLINIC | Age: 69
End: 2024-12-17
Payer: COMMERCIAL

## 2024-12-17 VITALS
BODY MASS INDEX: 32.33 KG/M2 | SYSTOLIC BLOOD PRESSURE: 116 MMHG | WEIGHT: 206 LBS | DIASTOLIC BLOOD PRESSURE: 82 MMHG | HEART RATE: 90 BPM | HEIGHT: 67 IN

## 2024-12-17 DIAGNOSIS — I83.893 BLEEDING FROM VARICOSE VEINS OF LOWER EXTREMITY, BILATERAL: Primary | ICD-10-CM

## 2024-12-17 PROCEDURE — 99213 OFFICE O/P EST LOW 20 MIN: CPT | Performed by: NURSE PRACTITIONER

## 2024-12-17 NOTE — PATIENT INSTRUCTIONS
-Continue Conservative medical management with daily use of medical grade compression stockings 20-30 mmHg.  On a.m., off in p.m.  Frequent ambulation   Leg elevation at rest   Moisturizer and diligent skin care to maintain skin integrity and prevent skin breakdown    -Medical sclerotherapy to bleeding veins. Will submit for authorization.    -I will talk to Dr Brandt and discuss timing/ planning +/- intervention and medical sclero and if office follow up is necessary

## 2024-12-17 NOTE — ASSESSMENT & PLAN NOTE
69-year-old with HTN, HLD, obesity, bilateral superficial venous insufficiency and BLE dilated varicose and spider veins.    - Presents with c/o bleeding vein episodes x 3 (2 left lateral calf and 1 R lateral thigh)  She was able to apply pressure and control bleeding all times. Did not seek medical attention / go to ED.   - Patient has underlying symptomatic venous insuffiencey of which she is compliant with compression use, however continues to experience symptoms on a daily basis.   - Patient previously seen in vascular office with discussion of trial of conservative management and return for consideration of venous intervention.   - Patient continues to endorse heavy aching pain bilaterally  - No edema  - BLE scattered dilated spider veins, R calf dilated VV  - Palpable DP pulses b/l    Plan:  -Continue conservative medical management with daily use of medical grade compression stockings 20-30 mmHg.  On a.m., off in p.m.  Frequent ambulation   Leg elevation at rest   Moisturizer and diligent skin care to maintain skin integrity and prevent skin breakdown  - Will consider medical sclerotherapy injections and submit for authorization.   - Will discuss with Dr Brandt timing of injections  and follow up in office to discuss intervention vs continued conservative measures.

## 2024-12-18 ENCOUNTER — PROCEDURE VISIT (OUTPATIENT)
Age: 69
End: 2024-12-18
Payer: COMMERCIAL

## 2024-12-18 VITALS
HEART RATE: 78 BPM | BODY MASS INDEX: 32.33 KG/M2 | HEIGHT: 67 IN | DIASTOLIC BLOOD PRESSURE: 86 MMHG | WEIGHT: 206 LBS | SYSTOLIC BLOOD PRESSURE: 140 MMHG

## 2024-12-18 DIAGNOSIS — S39.012A LUMBOSACRAL STRAIN, INITIAL ENCOUNTER: Primary | ICD-10-CM

## 2024-12-18 DIAGNOSIS — M99.03 SEGMENTAL DYSFUNCTION OF LUMBAR REGION: ICD-10-CM

## 2024-12-18 DIAGNOSIS — M62.838 MUSCLE SPASM: ICD-10-CM

## 2024-12-18 DIAGNOSIS — M99.02 SEGMENTAL DYSFUNCTION OF THORACIC REGION: ICD-10-CM

## 2024-12-18 DIAGNOSIS — M99.04 SEGMENTAL DYSFUNCTION OF SACRAL REGION: ICD-10-CM

## 2024-12-18 PROCEDURE — 97110 THERAPEUTIC EXERCISES: CPT | Performed by: CHIROPRACTOR

## 2024-12-18 PROCEDURE — 98941 CHIROPRACT MANJ 3-4 REGIONS: CPT | Performed by: CHIROPRACTOR

## 2024-12-19 NOTE — PROGRESS NOTES
Initial date of service: 7/15/24    Diagnoses and all orders for this visit:    Lumbosacral strain, initial encounter    Segmental dysfunction of sacral region    Segmental dysfunction of lumbar region    Segmental dysfunction of thoracic region    Muscle spasm    Pt suffered exacerbation but responded to tx with reduced pain and increased ROM. Due to her presentation being similar to issue prior, no imaging indicated at present, but will order if symptoms persist    TREATMENT: 24314, 00911  Ther-ex: IASTM; discussed post procedure soreness and/or ecchymosis for up to 36 hrs, applied to affected mm hypertonicities; supine hamstring stretch, supine gluteal stretch, side laying QL stretch, single knee to chest stretch, hip flexor pin-and-stretch, alternating prone hip extension, glute bridge, transitional mvmt education, abdominal bracing; greater than 15 min spent performing above mentioned ther-ex to improve ROM/flexibility. Thoracic mobilization/manipulation: prone P-A mob, supine A-P manip; Lumbar mobilization/manipulation: diversified side laying graded HVLA, flexion-traction; SIJ Manipulation/Mobilization: R/L SIJ HVLA - long axis distraction, roberson drop table maneuver to affected SIJ    HPI:  Ale Chavez is a 69 y.o. female  Chief Complaint   Patient presents with    Back Pain     Lower lumbar is slightly better but still has discomfort and sore couple nights a week . Pain score 4-5        Pt presents for tx of alternating SIJ/glute region onset Summer 2024  12/18: pt reports feeling and moving slightly better    Back Pain  This is a new problem. The current episode started more than 1 month ago. The problem occurs intermittently. The pain is present in the gluteal, lumbar spine, sacro-iliac and thoracic spine. The quality of the pain is described as aching. The pain radiates to the left thigh. The pain is Worse during the day. The symptoms are aggravated by sitting (transitional mvmts). Pertinent negatives  include no bladder incontinence or bowel incontinence.     Past Medical History:   Diagnosis Date    Abdominal pain     Allergic     Arthritis     Asthma     Basal cell carcinoma     Bicuspid aortic valve     LAST ASSESSED 2012    Cancer (HCC)     skin    Cervical disc disease     last assessed 2016    Cervical stenosis of spine     LAST ASSESSED 2016    Colon polyp     COVID     Disease of thyroid gland     hypothyroid    Dry eyes     Endometriosis     GERD (gastroesophageal reflux disease)     Heart murmur     History of screening mammography 2023    Bi-Rads 1.    Hot flashes     Hyperglycemia     Hyperlipidemia     Hypertension     Hypothyroidism     Kidney stone     Left ventricular hypertrophy     Mitral valvular disorder     Nephrolithiasis     Ovarian cyst, complex     Pneumonia     800.00    Ptosis     LAST ASSESSED [id number]    Renal calculi     Right cervical radiculopathy     LAST ASSESSED 2016    Rotator cuff disorder, right     Seasonal allergies     Squamous cell skin cancer     Thyroid disease     Visual impairment     Wears contact lenses     Wears glasses     Wears partial dentures       Past Surgical History:   Procedure Laterality Date    BASAL CELL CARCINOMA EXCISION      CARPAL TUNNEL RELEASE Bilateral      SECTION      CHOLECYSTECTOMY      COLONOSCOPY      NEUROPLASTY / TRANSPOSITION MEDIAN NERVE AT CARPAL TUNNEL      NEUROPLASTY / TRANSPOSITION MEDIAN NERVE AT CARPAL TUNNEL      [facility]    OTHER SURGICAL HISTORY      surgically removed skin patch for skin cancer    PLANTAR FASCIECTOMY Left     MI COLONOSCOPY FLX DX W/COLLJ SPEC WHEN PFRMD N/A 10/09/2017    Procedure: COLONOSCOPY;  Surgeon: Franklyn Adam MD;  Location: [facility] GI LAB;  Service: Gastroenterology    MI HYSTEROSCOPY BX ENDOMETRIUM&/POLYPC W/WO D&C N/A 2018    Procedure: DILATATION AND CURETTAGE (D&C) WITH HYSTEROSCOPY;  Surgeon: Ila Smith DO;  Location: [facility] OR;  Service:  Gynecology    ME SURGICAL ARTHROSCOPY JOSE W/CORACOACRM LIGM RLS Right 01/04/2024    Procedure: ARTHROSCOPIC SUBACROMIAL DECOMPRESSION;  Surgeon: Richard Brown DO;  Location: [facility];  Service: Orthopedics    ME SURGICAL ARTHROSCOPY SHOULDER W/ROTATOR CUFF RPR Right 01/04/2024    Procedure: REPAIR ROTATOR CUFF  ARTHROSCOPIC;  Surgeon: Richard Brown DO;  Location: [facility];  Service: Orthopedics    ROTATOR CUFF REPAIR Right 01/04/2024    SHOULDER SURGERY Right 01/04/2024    SKIN BIOPSY      TUBAL LIGATION      UPPER GASTROINTESTINAL ENDOSCOPY       The following portions of the patient's history were reviewed and updated as appropriate: allergies, past family history, past medical history, past social history, past surgical history, and problem list.  Review of Systems   Gastrointestinal:  Negative for bowel incontinence.   Genitourinary:  Negative for bladder incontinence.   Musculoskeletal:  Positive for back pain.     Physical Exam  Musculoskeletal:      Thoracic back: Spasms and tenderness present. Decreased range of motion.      Lumbar back: Spasms and tenderness present. Decreased range of motion. Negative right straight leg raise test and negative left straight leg raise test.        Back:       Comments: Pnful and limited in Ext     Skin:     General: Skin is warm and dry.   Neurological:      Mental Status: She is alert and oriented to person, place, and time.      Gait: Gait is intact.   Psychiatric:         Mood and Affect: Mood normal.         Behavior: Behavior normal.       SOFT TISSUE ASSESSMENT Hypertonicity and tenderness palpated L T10-S1 erector spinae, B glute med/min, L QL, Bhamstring JOINT RESTRICTIONS: T10-S1 and L SIJ     Return in about 2 weeks (around 1/1/2025) for Next scheduled follow up.

## 2024-12-20 ENCOUNTER — OFFICE VISIT (OUTPATIENT)
Dept: VASCULAR SURGERY | Facility: CLINIC | Age: 69
End: 2024-12-20
Payer: COMMERCIAL

## 2024-12-20 ENCOUNTER — TELEPHONE (OUTPATIENT)
Dept: VASCULAR SURGERY | Facility: CLINIC | Age: 69
End: 2024-12-20

## 2024-12-20 VITALS
OXYGEN SATURATION: 98 % | WEIGHT: 206 LBS | RESPIRATION RATE: 18 BRPM | BODY MASS INDEX: 32.33 KG/M2 | SYSTOLIC BLOOD PRESSURE: 128 MMHG | DIASTOLIC BLOOD PRESSURE: 82 MMHG | HEIGHT: 67 IN | HEART RATE: 77 BPM

## 2024-12-20 DIAGNOSIS — I83.892 BLEEDING FROM VARICOSE VEINS OF LEFT LOWER EXTREMITY: Primary | ICD-10-CM

## 2024-12-20 PROCEDURE — 99213 OFFICE O/P EST LOW 20 MIN: CPT | Performed by: SURGERY

## 2024-12-20 RX ORDER — CHLORHEXIDINE GLUCONATE ORAL RINSE 1.2 MG/ML
15 SOLUTION DENTAL ONCE
OUTPATIENT
Start: 2024-12-20 | End: 2024-12-20

## 2024-12-20 NOTE — LETTER
December 20, 2024     Angelo Pacheco MD    Patient: Ale Chavez   YOB: 1955   Date of Visit: 12/20/2024       Dear Dr. Pacheco:    Thank you for referring Ale Chavez to me for evaluation. Below are the relevant portions of my assessment and plan of care.     Bleeding from varicose veins of left lower extremity  Symptomatic bilateral lower extremity varicosities, spider veins with heaviness and aching. History of bleeding from spider veins in the left calf x 2 and R lateral thigh x 1. Symptoms are worse in the left lower extremity. She has been compliant with conservative measures and wearing compression stockings for years.      LEVDR - left GSV superficial venous incompetence, no deep vein incompetence     -We discussed the pathophysiology of venous disease, available treatment options and indications for treatment.  -Conservative measures help but do not fully control her symptoms. She continues to have left leg heaviness and aching with recurrent bleeding episodes.  This includes the daily use of gradient compression socks, periodic leg elevation and regular exercise  -Recommend left GSV EVLT. All risks and benefits of the procedure including bleeding, infection, injury to surrounding structures, EHIT, nerve injury, skin burn, inflammation/phebitis, wound healing complications were discussed with the patient and informed consent was obtained.    If you have questions, please do not hesitate to call me. I look forward to following Ale along with you.         Sincerely,        Sanaz Brandt MD        CC: No Recipients

## 2024-12-20 NOTE — PROGRESS NOTES
Name: Ale Chavez      : 1955      MRN: [id number]  Encounter Provider: Sanaz Brandt MD  Encounter Date: 2024   Encounter department: [facility] [place]  :  Assessment & Plan  Bleeding from varicose veins of left lower extremity  Symptomatic bilateral lower extremity varicosities, spider veins with heaviness and aching. History of bleeding from spider veins in the left calf x 2 and R lateral thigh x 1. Symptoms are worse in the left lower extremity. She has been compliant with conservative measures and wearing compression stockings for years.     LEVDR - left GSV superficial venous incompetence, no deep vein incompetence    -We discussed the pathophysiology of venous disease, available treatment options and indications for treatment.  -Conservative measures help but do not fully control her symptoms. She continues to have left leg heaviness and aching with recurrent bleeding episodes.  This includes the daily use of gradient compression socks, periodic leg elevation and regular exercise  -Recommend left GSV EVLT. All risks and benefits of the procedure including bleeding, infection, injury to surrounding structures, EHIT, nerve injury, skin burn, inflammation/phebitis, wound healing complications were discussed with the patient and informed consent was obtained.      Orders:    Case request operating room: ENDOVASCULAR LASER THERAPY (EVLT) OF LEFT GREATER SAPHENOUS VEIN; Standing    CBC and differential; Future    Comprehensive metabolic panel; Future    EKG 12 lead; Future    EVLT Operative Scheduling Information:    Hospital:  Other: [facility]    Physician:  Karly    Surgery: Left GSV EVLT    Urgency:  Standard    Level:  Level 4: Outpatients to be scheduled for screening procedures and elective surgery that can be delayed for longer than one month without reasonable expectation of detriment to patient.    Case Length:  Normal    Post-op Bed:  Outpatient    OR  Table:  Standard    Equipment Needs:  Vascular technologist    Medication Instructions:  None    Hydration:  No    Contrast Allergy:  No    Venous Clinical Severity Scores (VCSS)  Item Absent   (0 points) Mild   (1 point) Moderate   (2 points) Severe   (3 points)   Pain [] None [] Occasional [] Daily [x] Daily limiting   Varicose veins [] None [] Few [] Calf or thigh [x] Calf and thigh   Venous edema [x] None [] Foot and ankle [] Above ankle, below knee [] To knee of above   Skin pigmentation [x] None [] Perimalleolar [] Diffuse, lower 1/3 calf [] Wider, above lower 1/3 calf   Inflammation [x] None [] Perimalleolar [] Diffuse, lower 1/3 calf [] Wider, above lower 1/3 calf   Induration [x] None [] Perimalleolar [] Diffuse, lower 1/3 calf [] Wider, above lower 1/3 calf   No. active ulcers [x] None [] 1 [] 2 [] >=3   Active ulcer size [x] None [] <2 cm [] 2 - 6 cm [] >6 cm   Ulcer duration [x] None [] <3 months [] 3 - 12 months [] >1 year   Compression therapy [] None [] Intermittent [] Most days [x] Fully comply   Total 9          CEAP Clinical Classification  [x] Symptomatic   [] Asymptomatic     [] Class 0 No visible or palpable signs of venous disease   [] Class 1 Telangiectasies or reticular veins   [x] Class 2 Varicose veins; distinguished from reticular veins by a diameter of 3mm or more   [] Class 3 Edema   [] Class 4 Changes in skin and subcutaneous tissue secondary to CVD    [] Class 4a Pigmentation or eczema   [] Class 4b Lipodermatosclerosis or atrophie hunter   [] Class 5 Healed venous ulcer   [] Class 6 Active venous ulcer         History of Present Illness   HPI  Ale Chavez is a 69 y.o. female with HTN, HLD, obesity, bilateral superficial venous insufficiency and BLE dilated varicose and spider veins presenting for follow-up to review LEVDR. She has been compliant with conservative measures without significant relief and has still had bleeding episodes in bilateral legs.    Patient had a LEVDR on  "9/17/24. Pt c/o aching pain BLE and bulging veins. Lt leg > Rt leg. Pt does wear compression stockings and elevates when she is able. Pt has a hx of bleeding veins.       History obtained from: patient    I have reviewed and made appropriate changes to the review of systems input by the medical assistant.    Vitals:    12/20/24 1400   BP: 128/82   BP Location: Left arm   Patient Position: Sitting   Pulse: 77   Resp: 18   SpO2: 98%   Weight: 93.4 kg (206 lb)   Height: 5' 7\" (1.702 m)       Patient Active Problem List   Diagnosis    Complex ovarian cyst    Dry eye syndrome    Fatty infiltration of liver    Hyperglycemia    Abdominal pain    Mixed hyperlipidemia    Essential hypertension    Acquired hypothyroidism    Left ventricular hypertrophy    Lumbosacral pain    Mitral valvular disorder    Nephrolithiasis    Non-toxic multinodular goiter    BMI 32.0-32.9,adult    Rhinitis    Spondylosis of cervical region without myelopathy or radiculopathy    Thoracic neuritis    Endometrial polyp    Squamous cell cancer of skin of hand, right    Basal cell carcinoma (BCC) of ala n[place]    Former smoker    Sialoadenitis    Mild intermittent asthma without complication    Left ventricular outflow tract obstruction    Lumbar spondylosis    Gastroesophageal reflux disease with esophagitis without hemorrhage    Hiatal hernia    Primary osteoarthritis of first carpometacarpal joint of right hand    Urticaria    Chronic bilateral low back pain without sciatica    Left hip pain    Greater trochanteric bursitis of left hip    S/P right rotator cuff repair    Obesity (BMI 30-39.9)    Encounter for screening mammogram for malignant neoplasm of breast    Estrogen deficiency    Postmenopausal    Subacute vulvitis    Cutaneous candidiasis    Varicose veins of bilateral lower extremities with pain    Bleeding from varicose veins of left lower extremity       Past Surgical History:   Procedure Laterality Date    BASAL CELL CARCINOMA EXCISION   "    CARPAL TUNNEL RELEASE Bilateral      SECTION      CHOLECYSTECTOMY      COLONOSCOPY      NEUROPLASTY / TRANSPOSITION MEDIAN NERVE AT CARPAL TUNNEL      NEUROPLASTY / TRANSPOSITION MEDIAN NERVE AT CARPAL TUNNEL      [facility]    OTHER SURGICAL HISTORY      surgically removed skin patch for skin cancer    PLANTAR FASCIECTOMY Left     NM COLONOSCOPY FLX DX W/COLLJ SPEC WHEN PFRMD N/A 10/09/2017    Procedure: COLONOSCOPY;  Surgeon: Franklyn Adam MD;  Location: [facility];  Service: Gastroenterology    NM HYSTEROSCOPY BX ENDOMETRIUM&/POLYPC W/WO D&C N/A 2018    Procedure: DILATATION AND CURETTAGE (D&C) WITH HYSTEROSCOPY;  Surgeon: Ila Smith DO;  Location: [facility];  Service: Gynecology    NM SURGICAL ARTHROSCOPY JOSE W/CORACOACRM LIGM RLS Right 2024    Procedure: ARTHROSCOPIC SUBACROMIAL DECOMPRESSION;  Surgeon: Richard Brown DO;  Location: [facility];  Service: Orthopedics    NM SURGICAL ARTHROSCOPY SHOULDER W/ROTATOR CUFF RPR Right 2024    Procedure: REPAIR ROTATOR CUFF  ARTHROSCOPIC;  Surgeon: Richard Brown DO;  Location: [facility];  Service: Orthopedics    ROTATOR CUFF REPAIR Right 2024    SHOULDER SURGERY Right 2024    SKIN BIOPSY      TUBAL LIGATION      UPPER GASTROINTESTINAL ENDOSCOPY         Family History   Problem Relation Age of Onset    Diabetes Mother     Alzheimer's disease Mother     Heart disease Mother     Hypertension Mother     Dementia Mother     No Known Problems Father     No Known Problems Sister     No Known Problems Sister     No Known Problems Maternal Grandmother     No Known Problems Maternal Grandfather     No Known Problems Paternal Grandmother     No Known Problems Paternal Grandfather     No Known Problems Daughter     No Known Problems Maternal Aunt     No Known Problems Maternal Aunt     No Known Problems Maternal Aunt     No Known Problems Maternal Aunt     No Known Problems Maternal Aunt     Other Family         back  disorder    Cancer Family     Heart disease Family     Thyroid disease Family     Breast cancer Neg Hx     Ovarian cancer Neg Hx     Colon cancer Neg Hx        Social History     Socioeconomic History    Marital status: /Civil Union     Spouse name: Not on file    Number of children: 2    Years of education: Not on file    Highest education level: Not on file   Occupational History    Occupation: part time employment   Tobacco Use    Smoking status: Former     Current packs/day: 0.00     Average packs/day: 1 pack/day for 40.0 years (40.0 ttl pk-yrs)     Types: Cigarettes     Start date:      Quit date: 2012     Years since quittin.9    Smokeless tobacco: Never   Vaping Use    Vaping status: Never Used   Substance and Sexual Activity    Alcohol use: Yes     Alcohol/week: 8.0 standard drinks of alcohol     Types: 4 Glasses of wine, 4 Standard drinks or equivalent per week     Comment: per month    Drug use: No    Sexual activity: Yes     Partners: Male     Birth control/protection: Female Sterilization     Comment: tubal ligation    Other Topics Concern    Not on file   Social History Narrative    Caffeine use     Highschool or GED    Lives with      Orthodoxy affiliation Christian    Always uses seatbelt     Social Drivers of Health     Financial Resource Strain: Not on file   Food Insecurity: Not on file   Transportation Needs: Not on file   Physical Activity: Not on file   Stress: Not on file   Social Connections: Not on file   Intimate Partner Violence: Not on file   Housing Stability: Not on file       Allergies   Allergen Reactions    Erythromycin GI Intolerance     Reaction Date: 2011;     Penicillins Rash     unknown    Aztreonam Rash     Action Taken: Allergy added by [employer] to replace Penicillins Cross Reactors;     Carbapenems Rash     Action Taken: Allergy added by [employer] to replace Penicillins Cross Reactors;     Cephalosporins Rash     Action Taken: Allergy added  by [employer] to replace Penicillins Cross Reactors;     Griseofulvin Rash     Action Taken: Allergy added by [employer] to replace Penicillins Cross Reactors;     Influenza Virus Vaccine Hives, Rash and GI Intolerance         Current Outpatient Medications:     albuterol (2.5 mg/3 mL) 0.083 % nebulizer solution, Take 3 mL (2.5 mg total) by nebulization every 6 (six) hours as needed for wheezing or shortness of breath, Disp: 125 mL, Rfl: 1    albuterol (Ventolin HFA) 90 mcg/act inhaler, Inhale 2 puffs every 6 (six) hours as needed for wheezing or shortness of breath, Disp: 18 g, Rfl: 3    amLODIPine (NORVASC) 5 mg tablet, Take 1 tablet (5 mg total) by mouth daily, Disp: 90 tablet, Rfl: 3    Ascorbic Acid (vitamin C) 100 MG tablet, Take 100 mg by mouth daily, Disp: , Rfl:     cholecalciferol (VITAMIN D3) 400 units tablet, Take 400 Units by mouth daily Pt taking 1000 units, Disp: , Rfl:     Cyanocobalamin (VITAMIN B 12 PO), Take by mouth, Disp: , Rfl:     fluticasone (Arnuity Ellipta) 100 MCG/ACT AEPB inhaler, Inhale 1 puff daily Rinse mouth after use., Disp: 90 blister, Rfl: 3    guaiFENesin (MUCINEX) 600 mg 12 hr tablet, Take 1,200 mg by mouth every 12 (twelve) hours, Disp: , Rfl:     ketoconazole (NIZORAL) 2 % cream, Apply topically daily, Disp: , Rfl:     levothyroxine 125 mcg tablet, Take 1 tablet (125 mcg total) by mouth daily, Disp: 90 tablet, Rfl: 0    loratadine (CLARITIN REDITABS) 10 MG dissolvable tablet, Take 10 mg by mouth daily, Disp: , Rfl:     losartan-hydrochlorothiazide (HYZAAR) 100-25 MG per tablet, Take 1 tablet by mouth daily, Disp: 90 tablet, Rfl: 3    pantoprazole (PROTONIX) 40 mg tablet, Take 1 tablet (40 mg total) by mouth daily, Disp: , Rfl:     pravastatin (PRAVACHOL) 20 mg tablet, Take 1 tablet (20 mg total) by mouth daily, Disp: 90 tablet, Rfl: 3    Restasis 0.05 % ophthalmic emulsion, , Disp: , Rfl:     semaglutide (Rybelsus) 14 MG tablet, Take 1 tablet (14 mg total) by mouth daily, Disp:  "90 tablet, Rfl: 1    Efinaconazole (Jublia) 10 % SOLN, Apply 1 application topically in the morning (Patient not taking: Reported on 12/17/2024), Disp: 8 mL, Rfl: 6    nystatin (MYCOSTATIN) powder, Apply topically 3 (three) times a day for 14 days (Patient not taking: Reported on 12/17/2024), Disp: 30 g, Rfl: 0    Current Facility-Administered Medications:     lidocaine (XYLOCAINE) 1 % injection 2 mL, 2 mL, Injection, , Ramón Galdamez, DPM, 2 mL at 08/14/23 1500    lidocaine (XYLOCAINE) 1 % injection 2 mL, 2 mL, Injection, , , 2 mL at 11/20/24 1530    triamcinolone acetonide (KENALOG-40) 40 mg/mL injection 20 mg, 20 mg, Intra-articular, , Ramón Galdamez, DPM, 20 mg at 08/14/23 1500    triamcinolone acetonide (Kenalog-40) 40 mg/mL injection 20 mg, 20 mg, Intra-articular, , , 20 mg at 11/20/24 1530    Review of Systems   Constitutional: Negative.    HENT: Negative.     Eyes:  Positive for visual disturbance.        Hole in retna   Respiratory: Negative.     Cardiovascular: Negative.    Gastrointestinal: Negative.    Endocrine: Negative.    Genitourinary: Negative.    Musculoskeletal:  Positive for arthralgias and back pain.   Skin: Negative.    Allergic/Immunologic: Negative.    Neurological: Negative.    Hematological: Negative.    Psychiatric/Behavioral: Negative.       I have personally reviewed the ROS entered by MA and agree as documented.       Objective   /82 (BP Location: Left arm, Patient Position: Sitting)   Pulse 77   Resp 18   Ht 5' 7\" (1.702 m)   Wt 93.4 kg (206 lb)   SpO2 98%   BMI 32.26 kg/m²      Physical Exam  Constitutional:       Appearance: Normal appearance. She is obese.   HENT:      Head: Normocephalic and atraumatic.   Cardiovascular:      Rate and Rhythm: Normal rate.      Pulses: Normal pulses.   Pulmonary:      Effort: Pulmonary effort is normal.   Abdominal:      Palpations: Abdomen is soft.   Musculoskeletal:         General: Normal range of motion.      Cervical back: Normal range of " motion and neck supple.   Skin:     General: Skin is warm and dry.      Capillary Refill: Capillary refill takes less than 2 seconds.      Comments: Scattered spider veins bilateral legs, few truncal varicosities, right > left   Neurological:      General: No focal deficit present.      Mental Status: She is alert and oriented to person, place, and time.   Psychiatric:         Mood and Affect: Mood normal.         Behavior: Behavior normal.         Thought Content: Thought content normal.         Judgment: Judgment normal.                   Administrative Statements   I have spent a total time of 25 minutes in caring for this patient on the day of the visit/encounter including Diagnostic results, Prognosis, Risks and benefits of tx options, Instructions for management, Patient and family education, Importance of tx compliance, Risk factor reductions, Impressions, Counseling / Coordination of care, Documenting in the medical record, Reviewing / ordering tests, medicine, procedures  , and Obtaining or reviewing history  . Topics discussed with the patient / family include symptom assessment and management, supportive listening, and anticipatory guidance.

## 2024-12-20 NOTE — ASSESSMENT & PLAN NOTE
Symptomatic bilateral lower extremity varicosities, spider veins with heaviness and aching. History of bleeding from spider veins in the left calf x 2 and R lateral thigh x 1. Symptoms are worse in the left lower extremity. She has been compliant with conservative measures and wearing compression stockings for years.     LEVDR - left GSV superficial venous incompetence, no deep vein incompetence    -We discussed the pathophysiology of venous disease, available treatment options and indications for treatment.  -Conservative measures help but do not fully control her symptoms. She continues to have left leg heaviness and aching with recurrent bleeding episodes.  This includes the daily use of gradient compression socks, periodic leg elevation and regular exercise  -Recommend left GSV EVLT. All risks and benefits of the procedure including bleeding, infection, injury to surrounding structures, EHIT, nerve injury, skin burn, inflammation/phebitis, wound healing complications were discussed with the patient and informed consent was obtained.      Orders:    Case request operating room: ENDOVASCULAR LASER THERAPY (EVLT) OF LEFT GREATER SAPHENOUS VEIN; Standing    CBC and differential; Future    Comprehensive metabolic panel; Future    EKG 12 lead; Future

## 2024-12-20 NOTE — PATIENT INSTRUCTIONS
Bleeding from varicose veins of left lower extremity  Symptomatic bilateral lower extremity varicosities, spider veins with heaviness and aching. History of bleeding from spider veins in the left calf x 2 and R lateral thigh x 1. Symptoms are worse in the left lower extremity. She has been compliant with conservative measures and wearing compression stockings for years.      LEVDR - left GSV superficial venous incompetence, no deep vein incompetence     -We discussed the pathophysiology of venous disease, available treatment options and indications for treatment.  -Conservative measures help but do not fully control her symptoms. She continues to have left leg heaviness and aching with recurrent bleeding episodes.  This includes the daily use of gradient compression socks, periodic leg elevation and regular exercise  -Recommend left GSV EVLT. All risks and benefits of the procedure including bleeding, infection, injury to surrounding structures, EHIT, nerve injury, skin burn, inflammation/phebitis, wound healing complications were discussed with the patient and informed consent was obtained.

## 2024-12-20 NOTE — TELEPHONE ENCOUNTER
REMINDER: Under Reason For Call, comments MUST be formatted as:   MJL/LT EVLT     Special Instructions / FYI: UPPER BUCKS/ ELECTRONIC CONSENT     Consent: I certify that patient has signed, printed, timed, and dated their surgery consent.  I certify that the patient's LEGAL NAME and DATE OF BIRTH are written in the upper left corner on BOTH sides of the consent.  I certify that BOTH sides of the completed surgery consent have been scanned into the patient's Epic chart by myself on 12/20/2024.  Yes, I have LABELED the consent in Epic as Consent for Vascular Procedure.     For Surgical Clearances     Levels   1-3   ROUTE this encounter to [facility] Clearance Pool (AND)   [facility] Surgery Coordinator Pool     Level   4   ROUTE this encounter to [facility] Surgery Coordinator Pool       HYDRATION CLEARANCES   ONLY ROUTE TO  [facility] Clearance Pool       Yes, I have ROUTED this encounter to [facility] Surgery Coordinator and/or [facility] Clearance Pool.

## 2024-12-23 ENCOUNTER — TELEPHONE (OUTPATIENT)
Age: 69
End: 2024-12-23

## 2024-12-23 NOTE — TELEPHONE ENCOUNTER
EVLT Operative Scheduling Information:     Hospital:  Other: [facility]     Physician:  Karly     Surgery: Left GSV EVLT     Urgency:  Standard     Level:  Level 4: Outpatients to be scheduled for screening procedures and elective surgery that can be delayed for longer than one month without reasonable expectation of detriment to patient.     Case Length:  Normal     Post-op Bed:  Outpatient     OR Table:  Standard     Equipment Needs:  Vascular technologist     Medication Instructions:  None     Hydration:  No     Contrast Allergy:  No     Venous Clinical Severity Scores (VCSS)  Item Absent   (0 points) Mild   (1 point) Moderate   (2 points) Severe   (3 points)   Pain [] None [] Occasional [] Daily [x] Daily limiting   Varicose veins [] None [] Few [] Calf or thigh [x] Calf and thigh   Venous edema [x] None [] Foot and ankle [] Above ankle, below knee [] To knee of above   Skin pigmentation [x] None [] Perimalleolar [] Diffuse, lower 1/3 calf [] Wider, above lower 1/3 calf   Inflammation [x] None [] Perimalleolar [] Diffuse, lower 1/3 calf [] Wider, above lower 1/3 calf   Induration [x] None [] Perimalleolar [] Diffuse, lower 1/3 calf [] Wider, above lower 1/3 calf   No. active ulcers [x] None [] 1 [] 2 [] >=3   Active ulcer size [x] None [] <2 cm [] 2 - 6 cm [] >6 cm   Ulcer duration [x] None [] <3 months [] 3 - 12 months [] >1 year   Compression therapy [] None [] Intermittent [] Most days [x] Fully comply   Total 9               CEAP Clinical Classification  [x] Symptomatic   [] Asymptomatic      [] Class 0 No visible or palpable signs of venous disease   [] Class 1 Telangiectasies or reticular veins   [x] Class 2 Varicose veins; distinguished from reticular veins by a diameter of 3mm or more   [] Class 3 Edema   [] Class 4 Changes in skin and subcutaneous tissue secondary to CVD    [] Class 4a Pigmentation or eczema   [] Class 4b Lipodermatosclerosis or atrophie hunter   [] Class 5 Healed venous ulcer   []  Class 6 Active venous ulcer

## 2024-12-26 NOTE — TELEPHONE ENCOUNTER
Spoke patient in regards of scheduling procedure, I will contact patient back for an update on  campus.

## 2025-01-06 ENCOUNTER — PROCEDURE VISIT (OUTPATIENT)
Age: 70
End: 2025-01-06
Payer: COMMERCIAL

## 2025-01-06 VITALS
HEART RATE: 81 BPM | HEIGHT: 67 IN | BODY MASS INDEX: 32.33 KG/M2 | SYSTOLIC BLOOD PRESSURE: 121 MMHG | WEIGHT: 206 LBS | DIASTOLIC BLOOD PRESSURE: 74 MMHG

## 2025-01-06 DIAGNOSIS — M62.838 MUSCLE SPASM: ICD-10-CM

## 2025-01-06 DIAGNOSIS — S39.012A LUMBOSACRAL STRAIN, INITIAL ENCOUNTER: Primary | ICD-10-CM

## 2025-01-06 DIAGNOSIS — M99.04 SEGMENTAL DYSFUNCTION OF SACRAL REGION: ICD-10-CM

## 2025-01-06 DIAGNOSIS — M99.02 SEGMENTAL DYSFUNCTION OF THORACIC REGION: ICD-10-CM

## 2025-01-06 DIAGNOSIS — M99.03 SEGMENTAL DYSFUNCTION OF LUMBAR REGION: ICD-10-CM

## 2025-01-06 PROCEDURE — 98941 CHIROPRACT MANJ 3-4 REGIONS: CPT | Performed by: CHIROPRACTOR

## 2025-01-06 PROCEDURE — 97110 THERAPEUTIC EXERCISES: CPT | Performed by: CHIROPRACTOR

## 2025-01-07 NOTE — PROGRESS NOTES
Initial date of service: 7/15/24    Diagnoses and all orders for this visit:    Lumbosacral strain, initial encounter    Segmental dysfunction of sacral region    Segmental dysfunction of lumbar region    Muscle spasm    Segmental dysfunction of thoracic region    Pt improved with reduced pain and increased ROM. Due to her presentation being similar to issue prior, no imaging indicated at present, but will order if symptoms persist    TREATMENT: 74963, 38457  Ther-ex: IASTM; discussed post procedure soreness and/or ecchymosis for up to 36 hrs, applied to affected mm hypertonicities; supine hamstring stretch, supine gluteal stretch, side laying QL stretch, single knee to chest stretch, hip flexor pin-and-stretch, alternating prone hip extension, glute bridge, transitional mvmt education, abdominal bracing; greater than 15 min spent performing above mentioned ther-ex to improve ROM/flexibility. Thoracic mobilization/manipulation: prone P-A mob, supine A-P manip; Lumbar mobilization/manipulation: diversified side laying graded HVLA, flexion-traction; SIJ Manipulation/Mobilization: R/L SIJ HVLA - long axis distraction, robesron drop table maneuver to affected SIJ    HPI:  Ale Chavez is a 69 y.o. female  Chief Complaint   Patient presents with    Lower Back - Back Pain     Pain score - 2     Pt presents for tx of alternating SIJ/glute region onset Summer 2024  1/6: pt reports feeling and moving better    Back Pain  This is a new problem. The current episode started more than 1 month ago. The problem occurs intermittently. The pain is present in the gluteal, lumbar spine, sacro-iliac and thoracic spine. The quality of the pain is described as aching. The pain radiates to the left thigh. The pain is Worse during the day. The symptoms are aggravated by sitting (transitional mvmts). Pertinent negatives include no bladder incontinence or bowel incontinence.     Past Medical History:   Diagnosis Date    Abdominal pain      Allergic     Arthritis     Asthma     Basal cell carcinoma     Bicuspid aortic valve     LAST ASSESSED [id number]    Cancer (HCC)     skin    Cervical disc disease     last assessed [id number]    Cervical stenosis of spine     LAST ASSESSED 2016    Colon polyp     COVID     Disease of thyroid gland     hypothyroid    Dry eyes     Endometriosis     GERD (gastroesophageal reflux disease)     Heart murmur     History of screening mammography 2023    Bi-Rads 1.    Hot flashes     Hyperglycemia     Hyperlipidemia     Hypertension     Hypothyroidism     Kidney stone     Left ventricular hypertrophy     Mitral valvular disorder     Nephrolithiasis     Ovarian cyst, complex     Pneumonia     800.00    Ptosis     LAST ASSESSED [id number]    Renal calculi     Right cervical radiculopathy     LAST ASSESSED 2016    Rotator cuff disorder, right     Seasonal allergies     Squamous cell skin cancer     Thyroid disease     Visual impairment     Wears contact lenses     Wears glasses     Wears partial dentures       Past Surgical History:   Procedure Laterality Date    BASAL CELL CARCINOMA EXCISION      CARPAL TUNNEL RELEASE Bilateral      SECTION      CHOLECYSTECTOMY      COLONOSCOPY      NEUROPLASTY / TRANSPOSITION MEDIAN NERVE AT CARPAL TUNNEL      NEUROPLASTY / TRANSPOSITION MEDIAN NERVE AT CARPAL TUNNEL  [facility]    OTHER SURGICAL HISTORY      surgically removed skin patch for skin cancer    PLANTAR FASCIECTOMY Left     MS COLONOSCOPY FLX DX W/COLLJ SPEC WHEN PFRMD N/A 10/09/2017    Procedure: COLONOSCOPY;  Surgeon: Franklyn Adam MD;  Location: [facility] GI LAB;  Service: Gastroenterology    MS HYSTEROSCOPY BX ENDOMETRIUM&/POLYPC W/WO D&C N/A 2018    Procedure: DILATATION AND CURETTAGE (D&C) WITH HYSTEROSCOPY;  Surgeon: Ila Smith DO;  Location: [facility] OR;  Service: Gynecology    MS SURGICAL ARTHROSCOPY JOSE W/CORACOACRM LIGM RLS Right 2024    Procedure: ARTHROSCOPIC SUBACROMIAL  DECOMPRESSION;  Surgeon: Richard Brown DO;  Location:  MAIN OR;  Service: Orthopedics    AR SURGICAL ARTHROSCOPY SHOULDER W/ROTATOR CUFF RPR Right 01/04/2024    Procedure: REPAIR ROTATOR CUFF  ARTHROSCOPIC;  Surgeon: Richard Brown DO;  Location:  MAIN OR;  Service: Orthopedics    ROTATOR CUFF REPAIR Right 01/04/2024    SHOULDER SURGERY Right 01/04/2024    SKIN BIOPSY      TUBAL LIGATION      UPPER GASTROINTESTINAL ENDOSCOPY       The following portions of the patient's history were reviewed and updated as appropriate: allergies, past family history, past medical history, past social history, past surgical history, and problem list.  Review of Systems   Gastrointestinal:  Negative for bowel incontinence.   Genitourinary:  Negative for bladder incontinence.   Musculoskeletal:  Positive for back pain.     Physical Exam  Musculoskeletal:      Thoracic back: Spasms and tenderness present. Decreased range of motion.      Lumbar back: Spasms and tenderness present. Decreased range of motion. Negative right straight leg raise test and negative left straight leg raise test.        Back:       Comments: Pnful and limited in Ext     Skin:     General: Skin is warm and dry.   Neurological:      Mental Status: She is alert and oriented to person, place, and time.      Gait: Gait is intact.   Psychiatric:         Mood and Affect: Mood normal.         Behavior: Behavior normal.       SOFT TISSUE ASSESSMENT Hypertonicity and tenderness palpated L T10-S1 erector spinae, B glute med/min, L QL, Bhamstring JOINT RESTRICTIONS: T10-S1 and L SIJ     Return in about 2 weeks (around 1/20/2025) for Next scheduled follow up.

## 2025-01-08 ENCOUNTER — TELEPHONE (OUTPATIENT)
Dept: OBGYN CLINIC | Facility: CLINIC | Age: 70
End: 2025-01-08

## 2025-01-08 NOTE — TELEPHONE ENCOUNTER
Patient called in asking if it's normal for her right shoulder to be tingling off and on with no pain? It's been going on for about 2 weeks now.

## 2025-01-10 ENCOUNTER — PREP FOR PROCEDURE (OUTPATIENT)
Dept: VASCULAR SURGERY | Facility: CLINIC | Age: 70
End: 2025-01-10

## 2025-01-10 DIAGNOSIS — I83.813 VARICOSE VEINS OF BILATERAL LOWER EXTREMITIES WITH PAIN: ICD-10-CM

## 2025-01-10 DIAGNOSIS — I83.893 BLEEDING FROM VARICOSE VEINS OF LOWER EXTREMITY, BILATERAL: ICD-10-CM

## 2025-01-10 DIAGNOSIS — I83.892 BLEEDING FROM VARICOSE VEINS OF LEFT LOWER EXTREMITY: Primary | ICD-10-CM

## 2025-01-20 ENCOUNTER — PROCEDURE VISIT (OUTPATIENT)
Age: 70
End: 2025-01-20
Payer: COMMERCIAL

## 2025-01-20 VITALS
SYSTOLIC BLOOD PRESSURE: 119 MMHG | DIASTOLIC BLOOD PRESSURE: 71 MMHG | HEART RATE: 83 BPM | WEIGHT: 206 LBS | BODY MASS INDEX: 32.33 KG/M2 | HEIGHT: 67 IN

## 2025-01-20 DIAGNOSIS — M99.04 SEGMENTAL DYSFUNCTION OF SACRAL REGION: Primary | ICD-10-CM

## 2025-01-20 DIAGNOSIS — M62.838 MUSCLE SPASM: ICD-10-CM

## 2025-01-20 DIAGNOSIS — M99.01 SEGMENTAL DYSFUNCTION OF CERVICAL REGION: ICD-10-CM

## 2025-01-20 DIAGNOSIS — M99.03 SEGMENTAL DYSFUNCTION OF LUMBAR REGION: ICD-10-CM

## 2025-01-20 DIAGNOSIS — M99.02 SEGMENTAL DYSFUNCTION OF THORACIC REGION: ICD-10-CM

## 2025-01-20 PROCEDURE — 98941 CHIROPRACT MANJ 3-4 REGIONS: CPT | Performed by: CHIROPRACTOR

## 2025-01-20 PROCEDURE — 97110 THERAPEUTIC EXERCISES: CPT | Performed by: CHIROPRACTOR

## 2025-01-21 NOTE — PROGRESS NOTES
Initial date of service: 7/15/24    Diagnoses and all orders for this visit:    Segmental dysfunction of sacral region    Muscle spasm    Segmental dysfunction of lumbar region    Segmental dysfunction of thoracic region    Segmental dysfunction of cervical region    Pt's lbp improved with reduced pain and increased ROM. Patient's neck pain/stiffness and RUE N/T secondary to myofascial jt dysfunction; responded well to tx     TREATMENT: 21805, 17444  Ther-ex: IASTM; discussed post procedure soreness and/or ecchymosis for up to 36 hrs, applied to affected mm hypertonicities; supine hamstring stretch, supine gluteal stretch, side laying QL stretch, single knee to chest stretch, hip flexor pin-and-stretch, alternating prone hip extension, glute bridge, transitional mvmt education, abdominal bracing; greater than 15 min spent performing above mentioned ther-ex to improve ROM/flexibility. Thoracic mobilization/manipulation: prone P-A mob, supine A-P manip; Lumbar mobilization/manipulation: diversified side laying graded HVLA, flexion-traction; SIJ Manipulation/Mobilization: R/L SIJ HVLA - long axis distraction, roberson drop table maneuver to affected SIJ    HPI:  Ale Chavez is a 69 y.o. female  Chief Complaint   Patient presents with    Right Shoulder - Follow-up     Right shoulder discomfort with tingling Pain score 0    Back Pain     Lower lumbar pain score 0 Flare pain score 4     Pt presents for tx of alternating SIJ/glute region onset Summer 2024  1/20: pt reports back is feeling and moving better; getting some N/T into R elbow from neck    Back Pain  This is a new problem. The current episode started more than 1 month ago. The problem occurs intermittently. The pain is present in the gluteal, lumbar spine, sacro-iliac and thoracic spine. The quality of the pain is described as aching. The pain radiates to the left thigh. The pain is Worse during the day. The symptoms are aggravated by sitting (transitional  mvmts). Pertinent negatives include no bladder incontinence or bowel incontinence.     Past Medical History:   Diagnosis Date    Abdominal pain     Allergic     Arthritis     Asthma     Basal cell carcinoma     Bicuspid aortic valve     LAST ASSESSED 2012    Cancer (HCC)     skin    Cervical disc disease     last assessed 2016    Cervical stenosis of spine     LAST ASSESSED 2016    Colon polyp     COVID     Disease of thyroid gland     hypothyroid    Dry eyes     Endometriosis     GERD (gastroesophageal reflux disease)     Heart murmur     History of screening mammography 2023    Bi-Rads 1.    Hot flashes     Hyperglycemia     Hyperlipidemia     Hypertension     Hypothyroidism     Kidney stone     Left ventricular hypertrophy     Mitral valvular disorder     Nephrolithiasis     Ovarian cyst, complex     Pneumonia     800.00    Ptosis     LAST ASSESSED [id number]    Renal calculi     Right cervical radiculopathy     LAST ASSESSED 2016    Rotator cuff disorder, right     Seasonal allergies     Squamous cell skin cancer     Thyroid disease     Visual impairment     Wears contact lenses     Wears glasses     Wears partial dentures       Past Surgical History:   Procedure Laterality Date    BASAL CELL CARCINOMA EXCISION      CARPAL TUNNEL RELEASE Bilateral      SECTION      CHOLECYSTECTOMY      COLONOSCOPY      NEUROPLASTY / TRANSPOSITION MEDIAN NERVE AT CARPAL TUNNEL      NEUROPLASTY / TRANSPOSITION MEDIAN NERVE AT CARPAL TUNNEL      [facility]    OTHER SURGICAL HISTORY      surgically removed skin patch for skin cancer    PLANTAR FASCIECTOMY Left     VA COLONOSCOPY FLX DX W/COLLJ SPEC WHEN PFRMD N/A 10/09/2017    Procedure: COLONOSCOPY;  Surgeon: Franklyn Adam MD;  Location: [facility] GI LAB;  Service: Gastroenterology    VA HYSTEROSCOPY BX ENDOMETRIUM&/POLYPC W/WO D&C N/A 2018    Procedure: DILATATION AND CURETTAGE (D&C) WITH HYSTEROSCOPY;  Surgeon: Ila Smith DO;   Location: [facility] MA[facility] OR;  Service: Gynecology    OH SURGICAL ARTHROSCOPY JOSE W/CORACOACRM LIGM RLS Right 01/04/2024    Procedure: ARTHROSCOPIC SUBACROMIAL DECOMPRESSION;  Surgeon: Richard Brown DO;  Location: [facility] MAIN OR;  Service: Orthopedics    OH SURGICAL ARTHROSCOPY SHOULDER W/ROTATOR CUFF RPR Right 01/04/2024    Procedure: REPAIR ROTATOR CUFF  ARTHROSCOPIC;  Surgeon: Richard Brown DO;  Location: [facility] MAIN OR;  Service: Orthopedics    ROTATOR CUFF REPAIR Right 01/04/2024    SHOULDER SURGERY Right 01/04/2024    SKIN BIOPSY      TUBAL LIGATION      UPPER GASTROINTESTINAL ENDOSCOPY       The following portions of the patient's history were reviewed and updated as appropriate: allergies, past family history, past medical history, past social history, past surgical history, and problem list.  Review of Systems   Gastrointestinal:  Negative for bowel incontinence.   Genitourinary:  Negative for bladder incontinence.   Musculoskeletal:  Positive for back pain.     Physical Exam  Neck:     Musculoskeletal:      Cervical back: Crepitus present. Pain with movement and muscular tenderness present. Decreased range of motion.      Thoracic back: Spasms and tenderness present. Decreased range of motion.      Lumbar back: Spasms and tenderness present. Decreased range of motion. Negative right straight leg raise test and negative left straight leg raise test.        Back:       Comments: Pnful and limited in Ext     Skin:     General: Skin is warm and dry.   Neurological:      Mental Status: She is alert and oriented to person, place, and time.      Gait: Gait is intact.   Psychiatric:         Mood and Affect: Mood normal.         Behavior: Behavior normal.     SOFT TISSUE ASSESSMENT Hypertonicity and tenderness palpated R upper trap, R lev scap, L T10-S1 erector spinae, B glute med/min, L QL, Bhamstring JOINT RESTRICTIONS: C5-T1F53-J6 and L SIJ     Return in about 1 week (around 1/27/2025) for Next scheduled follow up.

## 2025-01-28 ENCOUNTER — TELEPHONE (OUTPATIENT)
Dept: GASTROENTEROLOGY | Facility: CLINIC | Age: 70
End: 2025-01-28

## 2025-01-28 ENCOUNTER — ANESTHESIA (OUTPATIENT)
Dept: GASTROENTEROLOGY | Facility: HOSPITAL | Age: 70
End: 2025-01-28
Payer: COMMERCIAL

## 2025-01-28 ENCOUNTER — ANESTHESIA EVENT (OUTPATIENT)
Dept: GASTROENTEROLOGY | Facility: HOSPITAL | Age: 70
End: 2025-01-28
Payer: COMMERCIAL

## 2025-01-28 ENCOUNTER — PREP FOR PROCEDURE (OUTPATIENT)
Dept: GASTROENTEROLOGY | Facility: CLINIC | Age: 70
End: 2025-01-28

## 2025-01-28 ENCOUNTER — HOSPITAL ENCOUNTER (OUTPATIENT)
Dept: GASTROENTEROLOGY | Facility: HOSPITAL | Age: 70
Setting detail: OUTPATIENT SURGERY
Discharge: HOME | End: 2025-01-28
Payer: COMMERCIAL

## 2025-01-28 VITALS
RESPIRATION RATE: 18 BRPM | OXYGEN SATURATION: 96 % | WEIGHT: 210 LBS | HEIGHT: 67 IN | SYSTOLIC BLOOD PRESSURE: 114 MMHG | TEMPERATURE: 98.3 F | DIASTOLIC BLOOD PRESSURE: 60 MMHG | HEART RATE: 70 BPM | BODY MASS INDEX: 32.96 KG/M2

## 2025-01-28 DIAGNOSIS — Z86.0100 HISTORY OF COLONIC POLYPS: ICD-10-CM

## 2025-01-28 DIAGNOSIS — Z86.0100 HISTORY OF COLON POLYPS: Primary | ICD-10-CM

## 2025-01-28 PROCEDURE — 45330 DIAGNOSTIC SIGMOIDOSCOPY: CPT | Performed by: INTERNAL MEDICINE

## 2025-01-28 RX ORDER — SODIUM CHLORIDE, SODIUM LACTATE, POTASSIUM CHLORIDE, CALCIUM CHLORIDE 600; 310; 30; 20 MG/100ML; MG/100ML; MG/100ML; MG/100ML
INJECTION, SOLUTION INTRAVENOUS CONTINUOUS PRN
Status: DISCONTINUED | OUTPATIENT
Start: 2025-01-28 | End: 2025-01-28

## 2025-01-28 RX ORDER — SODIUM CHLORIDE, SODIUM LACTATE, POTASSIUM CHLORIDE, CALCIUM CHLORIDE 600; 310; 30; 20 MG/100ML; MG/100ML; MG/100ML; MG/100ML
125 INJECTION, SOLUTION INTRAVENOUS CONTINUOUS
OUTPATIENT
Start: 2025-01-28

## 2025-01-28 RX ORDER — PROPOFOL 10 MG/ML
INJECTION, EMULSION INTRAVENOUS AS NEEDED
Status: DISCONTINUED | OUTPATIENT
Start: 2025-01-28 | End: 2025-01-28

## 2025-01-28 RX ORDER — LIDOCAINE HYDROCHLORIDE 20 MG/ML
INJECTION, SOLUTION EPIDURAL; INFILTRATION; INTRACAUDAL; PERINEURAL AS NEEDED
Status: DISCONTINUED | OUTPATIENT
Start: 2025-01-28 | End: 2025-01-28

## 2025-01-28 RX ADMIN — PROPOFOL 25 MG: 10 INJECTION, EMULSION INTRAVENOUS at 09:27

## 2025-01-28 RX ADMIN — LIDOCAINE HYDROCHLORIDE 100 MG: 20 INJECTION, SOLUTION EPIDURAL; INFILTRATION; INTRACAUDAL; PERINEURAL at 09:26

## 2025-01-28 RX ADMIN — PROPOFOL 25 MG: 10 INJECTION, EMULSION INTRAVENOUS at 09:29

## 2025-01-28 RX ADMIN — SODIUM CHLORIDE, SODIUM LACTATE, POTASSIUM CHLORIDE, AND CALCIUM CHLORIDE: .6; .31; .03; .02 INJECTION, SOLUTION INTRAVENOUS at 09:26

## 2025-01-28 RX ADMIN — PROPOFOL 100 MG: 10 INJECTION, EMULSION INTRAVENOUS at 09:26

## 2025-01-28 NOTE — TELEPHONE ENCOUNTER
----- Message from Luis JENKINS sent at 1/28/2025  9:48 AM EST -----    ----- Message -----  From: Francesca Raemsh DO  Sent: 1/28/2025   9:36 AM EST  To: #    Please schedule repeat colonoscopy for 3-6 months with 2 day bowel prep. Thank you

## 2025-01-28 NOTE — ANESTHESIA POSTPROCEDURE EVALUATION
Post-Op Assessment Note    CV Status:  Stable    Pain management: adequate       Mental Status:  Lethargic and sleepy   Hydration Status:  Stable   PONV Controlled:  None   Airway Patency:  Patent     Post Op Vitals Reviewed: Yes    No anethesia notable event occurred.    Staff: CRNA, Anesthesiologist         Last Filed PACU Vitals:  Vitals Value Taken Time   Temp     Pulse     BP     Resp     SpO2

## 2025-01-28 NOTE — H&P
History and Physical - [facility]  Ale Chavez 69 y.o. female MRN: [id number]                  HPI: Ale Chavez is a 69 y.o. year old female who presents for colonoscopy      REVIEW OF SYSTEMS: Per the HPI, and otherwise unremarkable.    Historical Information   Past Medical History:   Diagnosis Date    Abdominal pain     Allergic     Arthritis     Asthma     Basal cell carcinoma     Bicuspid aortic valve     LAST ASSESSED [id number]    Cancer (HCC)     skin    Cervical disc disease     last assessed [id number]    Cervical stenosis of spine     LAST ASSESSED 2016    Colon polyp     COVID     Disease of thyroid gland     hypothyroid    Dry eyes     Endometriosis     GERD (gastroesophageal reflux disease)     Heart murmur     History of screening mammography 2023    Bi-Rads 1.    Hot flashes     Hyperglycemia     Hyperlipidemia     Hypertension     Hypothyroidism     Kidney stone     Left ventricular hypertrophy     Mitral valvular disorder     Nephrolithiasis     Ovarian cyst, complex     Pneumonia     800.00    Ptosis     LAST ASSESSED [id number]    Renal calculi     Right cervical radiculopathy     LAST ASSESSED 2016    Rotator cuff disorder, right     Seasonal allergies     Squamous cell skin cancer     Thyroid disease     Visual impairment     Wears contact lenses     Wears glasses     Wears partial dentures      Past Surgical History:   Procedure Laterality Date    BASAL CELL CARCINOMA EXCISION      CARPAL TUNNEL RELEASE Bilateral      SECTION      CHOLECYSTECTOMY      COLONOSCOPY      NEUROPLASTY / TRANSPOSITION MEDIAN NERVE AT CARPAL TUNNEL      NEUROPLASTY / TRANSPOSITION MEDIAN NERVE AT CARPAL TUNNEL      [facility]    OTHER SURGICAL HISTORY      surgically removed skin patch for skin cancer    PLANTAR FASCIECTOMY Left     [place] COLONOSCOPY FLX DX W/COLLJ SPEC WHEN PFRMD N/A 10/09/2017    Procedure: COLONOSCOPY;  Surgeon: Franklyn Adam MD;  Location: [facility]  GI LAB;  Service: Gastroenterology    NC HYSTEROSCOPY BX ENDOMETRIUM&/POLYPC W/WO D&C N/A 2018    Procedure: DILATATION AND CURETTAGE (D&C) WITH HYSTEROSCOPY;  Surgeon: Ila Smith DO;  Location: [facility];  Service: Gynecology    NC SURGICAL ARTHROSCOPY JOSE W/CORACOACRM LIGM RLS Right 2024    Procedure: ARTHROSCOPIC SUBACROMIAL DECOMPRESSION;  Surgeon: Richard Brown DO;  Location: [facility] OR;  Service: Orthopedics    NC SURGICAL ARTHROSCOPY SHOULDER W/ROTATOR CUFF RPR Right 2024    Procedure: REPAIR ROTATOR CUFF  ARTHROSCOPIC;  Surgeon: Richard Brown DO;  Location: [facility] OR;  Service: Orthopedics    ROTATOR CUFF REPAIR Right 2024    SHOULDER SURGERY Right 2024    SKIN BIOPSY      TUBAL LIGATION      UPPER GASTROINTESTINAL ENDOSCOPY       Social History   Social History     Substance and Sexual Activity   Alcohol Use Yes    Alcohol/week: 8.0 standard drinks of alcohol    Types: 4 Glasses of wine, 4 Standard drinks or equivalent per week    Comment: per month     Social History     Substance and Sexual Activity   Drug Use No     Social History     Tobacco Use   Smoking Status Former    Current packs/day: 0.00    Average packs/day: 1 pack/day for 40.0 years (40.0 ttl pk-yrs)    Types: Cigarettes    Start date:     Quit date: 2012    Years since quittin.0   Smokeless Tobacco Never     Family History   Problem Relation Age of Onset    Diabetes Mother     Alzheimer's disease Mother     Heart disease Mother     Hypertension Mother     Dementia Mother     No Known Problems Father     No Known Problems Sister     No Known Problems Sister     No Known Problems Maternal Grandmother     No Known Problems Maternal Grandfather     No Known Problems Paternal Grandmother     No Known Problems Paternal Grandfather     No Known Problems Daughter     No Known Problems Maternal Aunt     No Known Problems Maternal Aunt     No Known Problems Maternal Aunt     No Known Problems  Maternal Aunt     No Known Problems Maternal Aunt     Other Family         back disorder    Cancer Family     Heart disease Family     Thyroid disease Family     Breast cancer Neg Hx     Ovarian cancer Neg Hx     Colon cancer Neg Hx        Meds/Allergies       Current Outpatient Medications:     amLODIPine (NORVASC) 5 mg tablet    Ascorbic Acid (vitamin C) 100 MG tablet    cholecalciferol (VITAMIN D3) 400 units tablet    Cyanocobalamin (VITAMIN B 12 PO)    Efinaconazole (Jublia) 10 % SOLN    fluticasone (Arnuity Ellipta) 100 MCG/ACT AEPB inhaler    guaiFENesin (MUCINEX) 600 mg 12 hr tablet    ketoconazole (NIZORAL) 2 % cream    levothyroxine 125 mcg tablet    loratadine (CLARITIN REDITABS) 10 MG dissolvable tablet    losartan-hydrochlorothiazide (HYZAAR) 100-25 MG per tablet    pantoprazole (PROTONIX) 40 mg tablet    pravastatin (PRAVACHOL) 20 mg tablet    Restasis 0.05 % ophthalmic emulsion    semaglutide (Rybelsus) 14 MG tablet    albuterol (2.5 mg/3 mL) 0.083 % nebulizer solution    albuterol (Ventolin HFA) 90 mcg/act inhaler    nystatin (MYCOSTATIN) powder    Current Facility-Administered Medications:     lidocaine (XYLOCAINE) 1 % injection 2 mL, 2 mL, Injection, , 2 mL at 08/14/23 1500    lidocaine (XYLOCAINE) 1 % injection 2 mL, 2 mL, Injection, , 2 mL at 11/20/24 1530    triamcinolone acetonide (KENALOG-40) 40 mg/mL injection 20 mg, 20 mg, Intra-articular, , 20 mg at 08/14/23 1500    triamcinolone acetonide (Kenalog-40) 40 mg/mL injection 20 mg, 20 mg, Intra-articular, , 20 mg at 11/20/24 1530    Allergies   Allergen Reactions    Erythromycin GI Intolerance     Reaction Date: 11Jul2011;     Penicillins Rash     unknown    Aztreonam Rash     Action Taken: Allergy added by [employer] to replace Penicillins Cross [employer]ors;     Carbapenems Rash     Action Taken: Allergy added by [employer] to replace Penicillins Cross Reactors;     Cephalosporins Rash     Action Taken: Allergy added by [employer] to replace  "Penicillins Cross Reactors;     Griseofulvin Rash     Action Taken: Allergy added by [employer] to replace Penicillins Cross Reactors;     Influenza Virus Vaccine Hives, Rash and GI Intolerance       Objective     Pulse 68   Temp 98.3 °F (36.8 °C) (Temporal)   Resp 18   Ht 5' 7\" (1.702 m)   Wt 95.3 kg (210 lb)   SpO2 97%   BMI 32.89 kg/m²       PHYSICAL EXAM    Gen: NAD  Head: NCAT  CV: RRR  CHEST: Clear  ABD: soft, NT/ND  EXT: no edema      ASSESSMENT/PLAN:  This is a 69 y.o. year old female here for colonoscopy, and she is stable and optimized for her procedure.       "

## 2025-01-28 NOTE — ANESTHESIA PREPROCEDURE EVALUATION
Procedure:  COLONOSCOPY    Relevant Problems   ANESTHESIA (within normal limits)      CARDIO   (+) Essential hypertension   (+) Mitral valvular disorder   (+) Mixed hyperlipidemia   (+) Varicose veins of bilateral lower extremities with pain      ENDO   (+) Acquired hypothyroidism      GI/HEPATIC   (+) Fatty infiltration of liver   (+) Gastroesophageal reflux disease with esophagitis without hemorrhage   (+) Hiatal hernia      /RENAL   (+) Nephrolithiasis      MUSCULOSKELETAL   (+) Chronic bilateral low back pain without sciatica   (+) Hiatal hernia   (+) Lumbar spondylosis   (+) Lumbosacral pain   (+) Primary osteoarthritis of first carpometacarpal joint of right hand   (+) Spondylosis of cervical region without myelopathy or radiculopathy      NEURO/PSYCH   (+) Chronic bilateral low back pain without sciatica      PULMONARY   (+) Mild intermittent asthma without complication        Physical Exam    Airway    Mallampati score: III  TM Distance: >3 FB  Neck ROM: full     Dental   No notable dental hx     Cardiovascular  Cardiovascular exam normal    Pulmonary  Pulmonary exam normal     Other Findings  post-pubertal.      Anesthesia Plan  ASA Score- 2     Anesthesia Type- IV sedation with anesthesia with ASA Monitors.         Additional Monitors:     Airway Plan:     Comment: I discussed risks (reviewed with patient on the anesthesia consent form), benefits and alternatives of monitored sedation including the possibility under sedation to have recall or mild discomfort.  .       Plan Factors-    Chart reviewed.    Patient summary reviewed.                  Induction- intravenous.    Postoperative Plan-         Informed Consent- Anesthetic plan and risks discussed with patient.  I personally reviewed this patient with the CRNA. Discussed and agreed on the Anesthesia Plan with the CRNA..      NPO Status:  Vitals Value Taken Time   Date of last liquid 01/28/25 01/28/25 0858   Time of last liquid 0415 01/28/25 0858    Date of last solid 01/26/25 01/28/25 0359   Time of last solid

## 2025-01-28 NOTE — TELEPHONE ENCOUNTER
Spoke to pt.  Pt wanted an office visit first to discuss bowel prep before scheduling repeat colonoscopy.  Office visit scheduled with Alba Ocampo on 03/20/25 in [facility] office.

## 2025-01-29 ENCOUNTER — PATIENT MESSAGE (OUTPATIENT)
Dept: GASTROENTEROLOGY | Facility: CLINIC | Age: 70
End: 2025-01-29

## 2025-02-03 DIAGNOSIS — E03.9 ACQUIRED HYPOTHYROIDISM: ICD-10-CM

## 2025-02-04 ENCOUNTER — PROCEDURE VISIT (OUTPATIENT)
Age: 70
End: 2025-02-04
Payer: COMMERCIAL

## 2025-02-04 ENCOUNTER — TELEPHONE (OUTPATIENT)
Dept: GASTROENTEROLOGY | Facility: CLINIC | Age: 70
End: 2025-02-04

## 2025-02-04 VITALS — BODY MASS INDEX: 32.96 KG/M2 | HEIGHT: 67 IN | WEIGHT: 210 LBS

## 2025-02-04 DIAGNOSIS — M99.04 SEGMENTAL DYSFUNCTION OF SACRAL REGION: Primary | ICD-10-CM

## 2025-02-04 DIAGNOSIS — M99.01 SEGMENTAL DYSFUNCTION OF CERVICAL REGION: ICD-10-CM

## 2025-02-04 DIAGNOSIS — M62.838 MUSCLE SPASM: ICD-10-CM

## 2025-02-04 DIAGNOSIS — M99.02 SEGMENTAL DYSFUNCTION OF THORACIC REGION: ICD-10-CM

## 2025-02-04 DIAGNOSIS — Z86.0100 HISTORY OF COLONIC POLYPS: Primary | ICD-10-CM

## 2025-02-04 DIAGNOSIS — M99.03 SEGMENTAL DYSFUNCTION OF LUMBAR REGION: ICD-10-CM

## 2025-02-04 PROCEDURE — 97110 THERAPEUTIC EXERCISES: CPT | Performed by: CHIROPRACTOR

## 2025-02-04 PROCEDURE — 98941 CHIROPRACT MANJ 3-4 REGIONS: CPT | Performed by: CHIROPRACTOR

## 2025-02-04 RX ORDER — SODIUM, POTASSIUM,MAG SULFATES 17.5-3.13G
SOLUTION, RECONSTITUTED, ORAL ORAL
Qty: 354 ML | Refills: 0 | Status: SHIPPED | OUTPATIENT
Start: 2025-02-04

## 2025-02-04 NOTE — PROGRESS NOTES
Initial date of service: 7/15/24    Diagnoses and all orders for this visit:    Segmental dysfunction of sacral region    Muscle spasm    Segmental dysfunction of lumbar region    Segmental dysfunction of thoracic region    Segmental dysfunction of cervical region    Pt improved with reduced and centralized pain/paresthesia    TREATMENT: 68626, 85882  Ther-ex: IASTM; discussed post procedure soreness and/or ecchymosis for up to 36 hrs, applied to affected mm hypertonicities; supine hamstring stretch, supine gluteal stretch, side laying QL stretch, single knee to chest stretch, hip flexor pin-and-stretch, alternating prone hip extension, glute bridge, transitional mvmt education, abdominal bracing; greater than 15 min spent performing above mentioned ther-ex to improve ROM/flexibility. Thoracic mobilization/manipulation: prone P-A mob, supine A-P manip; Lumbar mobilization/manipulation: diversified side laying graded HVLA, flexion-traction; SIJ Manipulation/Mobilization: R/L SIJ HVLA - long axis distraction, roberson drop table maneuver to affected SIJ    HPI:  Ale Chavez is a 69 y.o. female  Chief Complaint   Patient presents with    Back Pain     Lower back pain-2    Shoulder Pain     Right shoulder pain-2     Pt presents for tx of alternating SIJ/glute region onset Summer 2024  2/4: pt reports she is feeling and moving better    Back Pain  This is a new problem. The current episode started more than 1 month ago. The problem occurs intermittently. The pain is present in the gluteal, lumbar spine, sacro-iliac and thoracic spine. The quality of the pain is described as aching. The pain radiates to the left thigh. The pain is Worse during the day. The symptoms are aggravated by sitting (transitional mvmts). Pertinent negatives include no bladder incontinence or bowel incontinence.   Shoulder Pain       Past Medical History:   Diagnosis Date    Abdominal pain     Allergic     Arthritis     Asthma     Basal cell  carcinoma     Bicuspid aortic valve     LAST ASSESSED [id number]    Cancer (HCC)     skin    Cervical disc disease     last assessed [id number]    Cervical stenosis of spine     LAST ASSESSED 2016    Colon polyp     COVID     Disease of thyroid gland     hypothyroid    Dry eyes     Endometriosis     GERD (gastroesophageal reflux disease)     Heart murmur     History of screening mammography 2023    Bi-Rads 1.    Hot flashes     Hyperglycemia     Hyperlipidemia     Hypertension     Hypothyroidism     Kidney stone     Left ventricular hypertrophy     Mitral valvular disorder     Nephrolithiasis     Ovarian cyst, complex     Pneumonia     800.00    Ptosis     LAST ASSESSED [id number]    Renal calculi     Right cervical radiculopathy     LAST ASSESSED 2016    Rotator cuff disorder, right     Seasonal allergies     Squamous cell skin cancer     Thyroid disease     Visual impairment     Wears contact lenses     Wears glasses     Wears partial dentures       Past Surgical History:   Procedure Laterality Date    BASAL CELL CARCINOMA EXCISION      CARPAL TUNNEL RELEASE Bilateral      SECTION      CHOLECYSTECTOMY      COLONOSCOPY      NEUROPLASTY / TRANSPOSITION MEDIAN NERVE AT CARPAL TUNNEL      NEUROPLASTY / TRANSPOSITION MEDIAN NERVE AT CARPAL TUNNEL  2001     OTHER SURGICAL HISTORY      surgically removed skin patch for skin cancer    PLANTAR FASCIECTOMY Left     FL COLONOSCOPY FLX DX W/COLLJ SPEC WHEN PFRMD N/A 10/09/2017    Procedure: COLONOSCOPY;  Surgeon: Franklyn Adam MD;  Location: [facility];  Service: Gastroenterology    FL HYSTEROSCOPY BX ENDOMETRIUM&/POLYPC W/WO D&C N/A 2018    Procedure: DILATATION AND CURETTAGE (D&C) WITH HYSTEROSCOPY;  Surgeon: Ila Smith DO;  Location: [facility] OR;  Service: Gynecology    FL SURGICAL ARTHROSCOPY JOSE W/CORACOACRM LIGM RLS Right 2024    Procedure: ARTHROSCOPIC SUBACROMIAL DECOMPRESSION;  Surgeon: Richard Brown DO;   Location: [facility] OR;  Service: Orthopedics    [place] SURGICAL ARTHROSCOPY SHOULDER W/ROTATOR CUFF RPR Right 01/04/2024    Procedure: REPAIR ROTATOR CUFF  ARTHROSCOPIC;  Surgeon: Richard Brown DO;  Location: [facility];  Service: Orthopedics    ROTATOR CUFF REPAIR Right 01/04/2024    SHOULDER SURGERY Right 01/04/2024    SKIN BIOPSY      TUBAL LIGATION      UPPER GASTROINTESTINAL ENDOSCOPY       The following portions of the patient's history were reviewed and updated as appropriate: allergies, past family history, past medical history, past social history, past surgical history, and problem list.  Review of Systems   Gastrointestinal:  Negative for bowel incontinence.   Genitourinary:  Negative for bladder incontinence.   Musculoskeletal:  Positive for back pain.     Physical Exam  Neck:     Musculoskeletal:      Cervical back: Crepitus present. Pain with movement and muscular tenderness present. Decreased range of motion.      Thoracic back: Spasms and tenderness present. Decreased range of motion.      Lumbar back: Spasms and tenderness present. Decreased range of motion. Negative right straight leg raise test and negative left straight leg raise test.        Back:       Comments: Pnful and limited in Ext     Skin:     General: Skin is warm and dry.   Neurological:      Mental Status: She is alert and oriented to person, place, and time.      Gait: Gait is intact.   Psychiatric:         Mood and Affect: Mood normal.         Behavior: Behavior normal.     SOFT TISSUE ASSESSMENT Hypertonicity and tenderness palpated R upper trap, R lev scap, L T10-S1 erector spinae, B glute med/min, L QL, Bhamstring JOINT RESTRICTIONS: C5-Q8N45-P2 and L SIJ     Return in about 2 weeks (around 2/18/2025) for Next scheduled follow up.

## 2025-02-04 NOTE — TELEPHONE ENCOUNTER
Scheduled date of colonoscopy (as of today):04.14.25  Physician performing colonoscopy:DR ROMERO  Location of colonoscopy:[facility]  Bowel prep reviewed with patient:SUPREP  Instructions reviewed with patient by:MAILED  Clearances: N/A          Alba Pacheco PA-C to Gastroenterology [facility] Clerical       2/4/25 12:53 PM  Please call Kira to schedule repeat colonoscopy, with Suprep instructions, and please provide hold instructions for semaglutide. Thank you!

## 2025-02-05 RX ORDER — LEVOTHYROXINE SODIUM 125 UG/1
125 TABLET ORAL DAILY
Qty: 90 TABLET | Refills: 1 | Status: SHIPPED | OUTPATIENT
Start: 2025-02-05

## 2025-02-17 ENCOUNTER — ANNUAL EXAM (OUTPATIENT)
Dept: OBGYN CLINIC | Facility: CLINIC | Age: 70
End: 2025-02-17
Payer: COMMERCIAL

## 2025-02-17 VITALS
BODY MASS INDEX: 34.06 KG/M2 | SYSTOLIC BLOOD PRESSURE: 114 MMHG | HEIGHT: 67 IN | DIASTOLIC BLOOD PRESSURE: 68 MMHG | WEIGHT: 217 LBS

## 2025-02-17 DIAGNOSIS — L30.9 DERMATITIS: ICD-10-CM

## 2025-02-17 DIAGNOSIS — E28.39 ESTROGEN DEFICIENCY: ICD-10-CM

## 2025-02-17 DIAGNOSIS — Z12.31 ENCOUNTER FOR SCREENING MAMMOGRAM FOR MALIGNANT NEOPLASM OF BREAST: ICD-10-CM

## 2025-02-17 DIAGNOSIS — Z13.820 SCREENING FOR OSTEOPOROSIS: ICD-10-CM

## 2025-02-17 DIAGNOSIS — Z01.419 ENCOUNTER FOR GYNECOLOGICAL EXAMINATION WITHOUT ABNORMAL FINDING: Primary | ICD-10-CM

## 2025-02-17 DIAGNOSIS — Z78.0 POSTMENOPAUSAL: ICD-10-CM

## 2025-02-17 PROCEDURE — S0612 ANNUAL GYNECOLOGICAL EXAMINA: HCPCS | Performed by: OBSTETRICS & GYNECOLOGY

## 2025-02-17 RX ORDER — NYSTATIN AND TRIAMCINOLONE ACETONIDE 100000; 1 [USP'U]/G; MG/G
OINTMENT TOPICAL 2 TIMES DAILY
Qty: 60 G | Refills: 0 | Status: SHIPPED | OUTPATIENT
Start: 2025-02-17

## 2025-02-17 NOTE — PATIENT INSTRUCTIONS
Calcium 1200-1500mg + 800-1000 IU Vit D daily unless otherwise directed. Avoid falls. Exercise 150 minutes per week minimum including weight bearing exercises. DEXA scan-       . No further paps after age 65 as long as there has been adequate normal paps completed, no history of FLORESITA 2  or a more severe pap diagnosis in the last 20 years.   Annual mammogram and monthly breast self exam recommended .   Colonoscopy-        .  Kegels 20 times twice daily. Silicone based lubricant with sex. Vaginal moisturizers twice weekly as needed.

## 2025-02-17 NOTE — PROGRESS NOTES
[facility] - [address], [address], [place] [address]    ASSESSMENT/PLAN: Ale Chavez is a 69 y.o.  who presents for annual gynecologic exam.    Encounter for routine gynecologic examination  - Routine well woman exam completed today.  - Cervical Cancer Screening: Current ASCCP Guidelines reviewed. Last Pap: 2021  Next Pap Due: over age  65  - dexa scan  2022  - Breast Cancer Screening: Last Mammogram 2024,   - Colorectal cancer screening was not ordered.  - The following were reviewed in today's visit: breast self exam, mammography screening ordered, menopause, adequate intake of calcium and vitamin D, exercise, healthy diet, DEXA ordered, and colonoscopy discussed and ordered    Additional problems addressed during this visit:  1. Encounter for gynecological examination without abnormal finding  2. Encounter for screening mammogram for malignant neoplasm of breast  -     Mammo screening bilateral w 3d and cad; Future  3. Postmenopausal  4. BMI 33.0-33.9,adult  5. Estrogen deficiency  -     DXA bone density spine hip and pelvis; Future  6. Screening for osteoporosis  -     DXA bone density spine hip and pelvis; Future  7. Dermatitis  -     nystatin-triamcinolone (MYCOLOG-II) ointment; Apply topically 2 (two) times a day      CC:  Annual Gynecologic Examination    HPI: Ale Chavez is a 69 y.o.  who presents for annual gynecologic examination.  70 yo  here for wellness exam.  No bleeding, bloating or satiety.     Needs to  go back for  colonoscopy.     Not  clean ed out  .  + get s irritation  in folds of   inguinal and   abd.       The following portions of the patient's history were reviewed and updated as appropriate: She  has a past medical history of Abdominal pain, Allergic, Arthritis, Asthma, Basal cell carcinoma, Bicuspid aortic valve, Cancer (HCC), Cervical disc disease, Cervical stenosis of spine, Colon polyp, COVID (), Disease of thyroid gland,  Dry eyes, Endometriosis, GERD (gastroesophageal reflux disease), Heart murmur, History of screening mammography (2023), Hot flashes, Hyperglycemia, Hyperlipidemia, Hypertension, Hypothyroidism, Kidney stone, Left ventricular hypertrophy, Mitral valvular disorder, Nephrolithiasis, Ovarian cyst, complex, Pneumonia (), Ptosis, Renal calculi, Right cervical radiculopathy, Rotator cuff disorder, right, Seasonal allergies, Squamous cell skin cancer, Thyroid disease, Visual impairment, Wears contact lenses, Wears glasses, and Wears partial dentures.  She  has a past surgical history that includes  section; Cholecystectomy; Carpal tunnel release (Bilateral); Tubal ligation; Plantar fasciectomy (Left); pr colonoscopy flx dx w/collj spec when pfrmd (N/A, 10/09/2017); Neuroplasty / transposition median nerve at carpal tunnel; Neuroplasty / transposition median nerve at carpal tunnel (); Other surgical history; Colonoscopy; pr hysteroscopy bx endometrium&/polypc w/wo d&c (N/A, 2018); Excision basal cell carcinoma; Skin biopsy; Upper gastrointestinal endoscopy; pr surgical arthroscopy shoulder w/rotator cuff rpr (Right, 2024); pr surgical arthroscopy pineda w/coracoacrm ligm rls (Right, 2024); Rotator cuff repair (Right, 2024); and Shoulder surgery (Right, 2024).  Her family history includes Alzheimer's disease in her mother; Cancer in her family; Dementia in her mother; Diabetes in her mother; Heart disease in her family and mother; Hypertension in her mother; No Known Problems in her daughter, father, maternal aunt, maternal aunt, maternal aunt, maternal aunt, maternal aunt, maternal grandfather, maternal grandmother, paternal grandfather, paternal grandmother, sister, and sister; Other in her family; Thyroid disease in her family.  She  reports that she quit smoking about 13 years ago. Her smoking use included cigarettes. She started smoking about 53 years ago. She has a 40  pack-year smoking history. She has never used smokeless tobacco. She reports current alcohol use of about 8.0 standard drinks of alcohol per week. She reports that she does not use drugs.  Current Outpatient Medications   Medication Sig Dispense Refill   • albuterol (2.5 mg/3 mL) 0.083 % nebulizer solution Take 3 mL (2.5 mg total) by nebulization every 6 (six) hours as needed for wheezing or shortness of breath 125 mL 1   • albuterol (Ventolin HFA) 90 mcg/act inhaler Inhale 2 puffs every 6 (six) hours as needed for wheezing or shortness of breath 18 g 3   • amLODIPine (NORVASC) 5 mg tablet Take 1 tablet (5 mg total) by mouth daily 90 tablet 3   • Ascorbic Acid (vitamin C) 100 MG tablet Take 100 mg by mouth daily     • cholecalciferol (VITAMIN D3) 400 units tablet Take 400 Units by mouth daily Pt taking 1000 units     • Cyanocobalamin (VITAMIN B 12 PO) Take by mouth     • Efinaconazole (Jublia) 10 % SOLN Apply 1 application topically in the morning 8 mL 6   • fluticasone (Arnuity Ellipta) 100 MCG/ACT AEPB inhaler Inhale 1 puff daily Rinse mouth after use. 90 blister 3   • guaiFENesin (MUCINEX) 600 mg 12 hr tablet Take 1,200 mg by mouth every 12 (twelve) hours     • ketoconazole (NIZORAL) 2 % cream Apply topically daily     • levothyroxine 125 mcg tablet Take 1 tablet (125 mcg total) by mouth daily 90 tablet 1   • loratadine (CLARITIN REDITABS) 10 MG dissolvable tablet Take 10 mg by mouth daily     • losartan-hydrochlorothiazide (HYZAAR) 100-25 MG per tablet Take 1 tablet by mouth daily 90 tablet 3   • Na Sulfate-K Sulfate-Mg Sulf (Suprep Bowel Prep Kit) 17.5-3.13-1.6 GM/177ML SOLN Take as directed by the office. 354 mL 0   • nystatin (MYCOSTATIN) powder Apply topically 3 (three) times a day for 14 days 30 g 0   • nystatin-triamcinolone (MYCOLOG-II) ointment Apply topically 2 (two) times a day 60 g 0   • pantoprazole (PROTONIX) 40 mg tablet Take 1 tablet (40 mg total) by mouth daily     • polyethylene glycol  "(GOLYTELY) 4000 mL solution Take 4,000 mL by mouth once for 1 dose Follow GI office instructions 4000 mL 0   • pravastatin (PRAVACHOL) 20 mg tablet Take 1 tablet (20 mg total) by mouth daily 90 tablet 3   • Restasis 0.05 % ophthalmic emulsion      • semaglutide (Rybelsus) 14 MG tablet Take 1 tablet (14 mg total) by mouth daily 90 tablet 1     Current Facility-Administered Medications   Medication Dose Route Frequency Provider Last Rate Last Admin   • lidocaine (XYLOCAINE) 1 % injection 2 mL  2 mL Injection  Ramón Galdamez DPM   2 mL at 08/14/23 1500   • lidocaine (XYLOCAINE) 1 % injection 2 mL  2 mL Injection     2 mL at 11/20/24 1530   • triamcinolone acetonide (KENALOG-40) 40 mg/mL injection 20 mg  20 mg Intra-articular  Ramón Galdamez DPM   20 mg at 08/14/23 1500   • triamcinolone acetonide (Kenalog-40) 40 mg/mL injection 20 mg  20 mg Intra-articular     20 mg at 11/20/24 1530     She is allergic to erythromycin, penicillins, aztreonam, carbapenems, cephalosporins, griseofulvin, and influenza virus vaccine..    Review of Systems:  All systems normal except as noted in HPI          Objective:  /68 (BP Location: Left arm, Patient Position: Sitting, Cuff Size: Large)   Ht 5' 7\" (1.702 m)   Wt 98.4 kg (217 lb)   BMI 33.99 kg/m²    Physical Exam  Vitals and nursing note reviewed.   Constitutional:       Appearance: Normal appearance.   HENT:      Head: Normocephalic.   Cardiovascular:      Rate and Rhythm: Normal rate and regular rhythm.      Pulses: Normal pulses.      Heart sounds: Normal heart sounds.   Pulmonary:      Effort: Pulmonary effort is normal.      Breath sounds: Normal breath sounds.   Chest:      Chest wall: No mass, lacerations, swelling, tenderness or edema.   Breasts:     Alpesh Score is 4.      Breasts are symmetrical.      Right: Normal. No swelling, bleeding, inverted nipple, mass, nipple discharge, skin change or tenderness.      Left: No swelling, bleeding, inverted nipple, mass, nipple " discharge, skin change or tenderness.   Abdominal:      General: Abdomen is flat. Bowel sounds are normal.      Palpations: Abdomen is soft.   Genitourinary:     General: Normal vulva.      Exam position: Lithotomy position.      Pubic Area: No rash.       Alpesh stage (genital): 4.      Labia:         Right: No rash, tenderness or lesion.         Left: No rash, tenderness or lesion.       Urethra: No urethral pain, urethral swelling or urethral lesion.      Vagina: Normal.      Cervix: No cervical motion tenderness or discharge.      Uterus: Normal.       Adnexa: Right adnexa normal and left adnexa normal.      Rectum: Normal.   Musculoskeletal:         General: Normal range of motion.      Cervical back: Normal range of motion and neck supple.   Lymphadenopathy:      Upper Body:      Right upper body: No supraclavicular, axillary or pectoral adenopathy.      Left upper body: No supraclavicular, axillary or pectoral adenopathy.      Lower Body: No right inguinal adenopathy. No left inguinal adenopathy.   Skin:     General: Skin is warm and dry.   Neurological:      General: No focal deficit present.      Mental Status: She is alert and oriented to person, place, and time.   Psychiatric:         Mood and Affect: Mood normal.         Behavior: Behavior normal.         Thought Content: Thought content normal.         Judgment: Judgment normal.

## 2025-02-25 ENCOUNTER — TELEPHONE (OUTPATIENT)
Age: 70
End: 2025-02-25

## 2025-02-25 ENCOUNTER — OFFICE VISIT (OUTPATIENT)
Dept: FAMILY MEDICINE CLINIC | Facility: HOSPITAL | Age: 70
End: 2025-02-25
Payer: COMMERCIAL

## 2025-02-25 VITALS
TEMPERATURE: 99.8 F | HEART RATE: 81 BPM | SYSTOLIC BLOOD PRESSURE: 115 MMHG | DIASTOLIC BLOOD PRESSURE: 78 MMHG | WEIGHT: 213.8 LBS | BODY MASS INDEX: 33.56 KG/M2 | HEIGHT: 67 IN | OXYGEN SATURATION: 95 %

## 2025-02-25 DIAGNOSIS — H61.22 IMPACTED CERUMEN OF LEFT EAR: ICD-10-CM

## 2025-02-25 DIAGNOSIS — H61.21 IMPACTED CERUMEN OF RIGHT EAR: ICD-10-CM

## 2025-02-25 DIAGNOSIS — E66.9 OBESITY (BMI 30-39.9): ICD-10-CM

## 2025-02-25 DIAGNOSIS — I10 ESSENTIAL HYPERTENSION: ICD-10-CM

## 2025-02-25 DIAGNOSIS — R52 BODY ACHES: Primary | ICD-10-CM

## 2025-02-25 LAB
SARS-COV-2 AG UPPER RESP QL IA: NEGATIVE
SL AMB POCT RAPID FLU A: NORMAL
SL AMB POCT RAPID FLU B: NORMAL
VALID CONTROL: NORMAL

## 2025-02-25 PROCEDURE — 69210 REMOVE IMPACTED EAR WAX UNI: CPT

## 2025-02-25 PROCEDURE — 99214 OFFICE O/P EST MOD 30 MIN: CPT

## 2025-02-25 PROCEDURE — 87811 SARS-COV-2 COVID19 W/OPTIC: CPT

## 2025-02-25 PROCEDURE — 87804 INFLUENZA ASSAY W/OPTIC: CPT

## 2025-02-25 RX ORDER — SEMAGLUTIDE 0.5 MG/.5ML
INJECTION, SOLUTION SUBCUTANEOUS
Qty: 2 ML | Refills: 0 | Status: SHIPPED | OUTPATIENT
Start: 2025-02-25

## 2025-02-25 NOTE — TELEPHONE ENCOUNTER
Patient called stating she thinks she may have flu.  She has body aches and tiredness.  Patient would like to know if anything can be recommended or prescribed.  Please advise.

## 2025-02-25 NOTE — ASSESSMENT & PLAN NOTE
Orders:    Semaglutide-Weight Management (Wegovy) 0.5 MG/0.5ML; Inject 0.5 mg under the skin weekly.  Start on day after last dose of Rybelsus.  Discontinue Rybelsus upon starting wegovy. Do not take Rybelsus and Wegovy together.

## 2025-02-25 NOTE — ASSESSMENT & PLAN NOTE
Denies personal or family history of medullary thyroid cancer.  Counseled on lifestyle modifications.  Has been on Rybelsus 14 mg daily and is recently gaining weight.  Will switch to Wegovy 0.5 mg with plan to increase dose as needed.  Advised to discontinue Rybelsus upon starting Wegovy    Orders:    Semaglutide-Weight Management (Wegovy) 0.5 MG/0.5ML; Inject 0.5 mg under the skin weekly.  Start on day after last dose of Rybelsus.  Discontinue Rybelsus upon starting wegovy. Do not take Rybelsus and Wegovy together.

## 2025-02-25 NOTE — PROGRESS NOTES
Name: Ale Chavez      : 1955      MRN: [id number]  Encounter Provider: Cali Warner MD  Encounter Date: 2025   Encounter department: [facility] CARE [address]  :  Assessment & Plan  Body aches  Patient presents with symptoms of bodyaches starting yesterday along with chills.  Denies any other associated symptoms or cold-like symptoms.  COVID and flu negative. Suspected viral illness. Will treat symptomatically with Tylenol and ibuprofen.      Orders:    POCT Rapid Covid Ag    POCT rapid flu A and B    Impacted cerumen of right ear  Disimpaction by curette unsuccessful  Orders:    carbamide peroxide (DEBROX) 6.5 % otic solution; Administer 5 drops to the right ear 2 (two) times a day for 4 days    Obesity (BMI 30-39.9)      Denies personal or family history of medullary thyroid cancer.  Counseled on lifestyle modifications.  Has been on Rybelsus 14 mg daily and is recently gaining weight.  Will switch to Wegovy 0.5 mg with plan to increase dose as needed.  Advised to discontinue Rybelsus upon starting Wegovy    Orders:    Semaglutide-Weight Management (Wegovy) 0.5 MG/0.5ML; Inject 0.5 mg under the skin weekly.  Start on day after last dose of Rybelsus.  Discontinue Rybelsus upon starting wegovy. Do not take Rybelsus and Wegovy together.    Essential hypertension    Orders:    Semaglutide-Weight Management (Wegovy) 0.5 MG/0.5ML; Inject 0.5 mg under the skin weekly.  Start on day after last dose of Rybelsus.  Discontinue Rybelsus upon starting wegovy. Do not take Rybelsus and Wegovy together.    Impacted cerumen of left ear  Successfully disimpacted by curette       Ear cerumen removal    Date/Time: 2025 11:40 AM    Performed by: Cali Warner MD  Authorized by: Cali Warner MD  Universal Protocol:  Consent: Verbal consent obtained.  Consent given by: patient    Patient location:  Clinic  Procedure details:     Location:  Both ears    Procedure type: curette   "  Post-procedure details:     Complication:  None    Hearing quality:  Normal    Patient tolerance of procedure:  Tolerated well, no immediate complications  Comments:      Left ear successfully disimpacted, right ear not successful           History of Present Illness   Fatigue  Associated symptoms include arthralgias, chills, fatigue and myalgias. Pertinent negatives include no abdominal pain, congestion, coughing, headaches, nausea, sore throat or vomiting.     Chills yesterday and aches all over, Ibuprophen at 7 am  Review of Systems   Constitutional:  Positive for chills and fatigue.   HENT:  Negative for congestion, ear pain, sinus pain and sore throat.    Respiratory:  Negative for cough and shortness of breath.    Gastrointestinal:  Negative for abdominal pain, diarrhea, nausea and vomiting.   Musculoskeletal:  Positive for arthralgias and myalgias.   Neurological:  Negative for headaches.       Objective   /78   Pulse 81   Temp 99.8 °F (37.7 °C) (Oral)   Ht 5' 7\" (1.702 m)   Wt 97 kg (213 lb 12.8 oz)   SpO2 95%   BMI 33.49 kg/m²      Physical Exam  Constitutional:       General: She is not in acute distress.     Appearance: Normal appearance. She is obese. She is not ill-appearing, toxic-appearing or diaphoretic.   HENT:      Head: Normocephalic.      Right Ear: Tympanic membrane, ear canal and external ear normal. There is no impacted cerumen.      Left Ear: Tympanic membrane, ear canal and external ear normal. There is no impacted cerumen.      Mouth/Throat:      Mouth: Mucous membranes are moist.      Pharynx: Oropharynx is clear.   Eyes:      Conjunctiva/sclera: Conjunctivae normal.   Cardiovascular:      Rate and Rhythm: Normal rate and regular rhythm.      Heart sounds: Murmur (known systolic murmur) heard.   Pulmonary:      Effort: Pulmonary effort is normal. No respiratory distress.      Breath sounds: Normal breath sounds.   Neurological:      Mental Status: She is alert and oriented to " person, place, and time.   Psychiatric:         Mood and Affect: Mood normal.         Behavior: Behavior normal.

## 2025-02-27 ENCOUNTER — TELEPHONE (OUTPATIENT)
Age: 70
End: 2025-02-27

## 2025-02-27 NOTE — TELEPHONE ENCOUNTER
Pt had appt  [address] on  2/25   pt current complaint   GBA   fatigue   poor appetite      NO fever cough or congestion    symptoms started  2/24 Monday    taking OTC Advil     pt asking if she should be retested for Flu and Covid   any recommendations     formed dr cash patient   has     appt with dr patel in march thanks

## 2025-02-27 NOTE — TELEPHONE ENCOUNTER
Both are viral and symptomatic tx is fine - viral symptoms last 7 -10 days and cough can last 4-6 wks.  If she wishes to be re-tested she can but low likelihood of changes 2 days later.

## 2025-02-27 NOTE — TELEPHONE ENCOUNTER
PA for Wegovy 0.5mg SUBMITTED to [email or web address]    via    []CMM-KEY:   [x]Surescripts-Case ID #: [id number]   []Availity-Auth ID #   []Faxed to plan   []Other website   []Phone call Case ID #     []PA sent as URGENT    All office notes, labs and other pertaining documents and studies sent. Clinical questions answered. Awaiting determination from insurance company.     Turnaround time for your insurance to make a decision on your Prior Authorization can take 7-21 business days.

## 2025-02-27 NOTE — TELEPHONE ENCOUNTER
Patient was seen earlier this week in [address]. She was tested for covid and flu. Both were negative. She is achey and tired (no sore throat; fever). Her PCP was Angelo Pacheco MD and is most comfortable with the team at [facility]. She would like a call back from clinical to see an further recommendation. Please call

## 2025-02-28 DIAGNOSIS — R11.2 NAUSEA AND VOMITING, UNSPECIFIED VOMITING TYPE: Primary | ICD-10-CM

## 2025-02-28 RX ORDER — ONDANSETRON 4 MG/1
4 TABLET, FILM COATED ORAL EVERY 8 HOURS PRN
Qty: 20 TABLET | Refills: 0 | Status: SHIPPED | OUTPATIENT
Start: 2025-02-28

## 2025-02-28 NOTE — TELEPHONE ENCOUNTER
Patient called stating she is still having diarrhea and vomiting.  Patient would like to know if there is something stronger than imodium she can take.  Please advise.

## 2025-03-04 ENCOUNTER — TELEPHONE (OUTPATIENT)
Age: 70
End: 2025-03-04

## 2025-03-05 NOTE — TELEPHONE ENCOUNTER
PA for Wegovy 0.5mg APPROVED     Date(s) approved February 27, 2025 to February 27, 2026     Case #: [id number]     Patient advised by          []MyChart Message  [x]Phone call   []LMOM  []L/M to call office as no active Communication consent on file  []Unable to leave detailed message as VM not approved on Communication consent       Pharmacy advised by    [x]Fax  []Phone call  []Secure Chat    Approval letter scanned into [employer] Yes

## 2025-03-14 DIAGNOSIS — R19.7 DIARRHEA, UNSPECIFIED TYPE: Primary | ICD-10-CM

## 2025-03-15 ENCOUNTER — APPOINTMENT (OUTPATIENT)
Dept: LAB | Facility: CLINIC | Age: 70
End: 2025-03-15
Payer: COMMERCIAL

## 2025-03-15 ENCOUNTER — LAB (OUTPATIENT)
Dept: LAB | Facility: CLINIC | Age: 70
End: 2025-03-15
Payer: COMMERCIAL

## 2025-03-15 DIAGNOSIS — I83.892 BLEEDING FROM VARICOSE VEINS OF LEFT LOWER EXTREMITY: ICD-10-CM

## 2025-03-15 LAB
ALBUMIN SERPL BCG-MCNC: 4.3 G/DL (ref 3.5–5)
ALP SERPL-CCNC: 74 U/L (ref 34–104)
ALT SERPL W P-5'-P-CCNC: 24 U/L (ref 7–52)
ANION GAP SERPL CALCULATED.3IONS-SCNC: 10 MMOL/L (ref 4–13)
AST SERPL W P-5'-P-CCNC: 25 U/L (ref 13–39)
ATRIAL RATE: 65 BPM
BASOPHILS # BLD AUTO: 0.08 THOUSANDS/ÂΜL (ref 0–0.1)
BASOPHILS NFR BLD AUTO: 1 % (ref 0–1)
BILIRUB SERPL-MCNC: 0.31 MG/DL (ref 0.2–1)
BUN SERPL-MCNC: 18 MG/DL (ref 5–25)
CALCIUM SERPL-MCNC: 9.5 MG/DL (ref 8.4–10.2)
CHLORIDE SERPL-SCNC: 100 MMOL/L (ref 96–108)
CO2 SERPL-SCNC: 30 MMOL/L (ref 21–32)
CREAT SERPL-MCNC: 0.66 MG/DL (ref 0.6–1.3)
EOSINOPHIL # BLD AUTO: 0.37 THOUSAND/ÂΜL (ref 0–0.61)
EOSINOPHIL NFR BLD AUTO: 5 % (ref 0–6)
ERYTHROCYTE [DISTWIDTH] IN BLOOD BY AUTOMATED COUNT: 14.2 % (ref 11.6–15.1)
GFR SERPL CREATININE-BSD FRML MDRD: 91 ML/MIN/1.73SQ M
GLUCOSE P FAST SERPL-MCNC: 113 MG/DL (ref 65–99)
HCT VFR BLD AUTO: 42.7 % (ref 34.8–46.1)
HGB BLD-MCNC: 14.1 G/DL (ref 11.5–15.4)
IMM GRANULOCYTES # BLD AUTO: 0.02 THOUSAND/UL (ref 0–0.2)
IMM GRANULOCYTES NFR BLD AUTO: 0 % (ref 0–2)
LYMPHOCYTES # BLD AUTO: 1.95 THOUSANDS/ÂΜL (ref 0.6–4.47)
LYMPHOCYTES NFR BLD AUTO: 26 % (ref 14–44)
MCH RBC QN AUTO: 30.1 PG (ref 26.8–34.3)
MCHC RBC AUTO-ENTMCNC: 33 G/DL (ref 31.4–37.4)
MCV RBC AUTO: 91 FL (ref 82–98)
MONOCYTES # BLD AUTO: 0.74 THOUSAND/ÂΜL (ref 0.17–1.22)
MONOCYTES NFR BLD AUTO: 10 % (ref 4–12)
NEUTROPHILS # BLD AUTO: 4.43 THOUSANDS/ÂΜL (ref 1.85–7.62)
NEUTS SEG NFR BLD AUTO: 58 % (ref 43–75)
NRBC BLD AUTO-RTO: 0 /100 WBCS
P AXIS: 62 DEGREES
PLATELET # BLD AUTO: 258 THOUSANDS/UL (ref 149–390)
PMV BLD AUTO: 10.7 FL (ref 8.9–12.7)
POTASSIUM SERPL-SCNC: 3.8 MMOL/L (ref 3.5–5.3)
PR INTERVAL: 206 MS
PROT SERPL-MCNC: 6.7 G/DL (ref 6.4–8.4)
QRS AXIS: 20 DEGREES
QRSD INTERVAL: 92 MS
QT INTERVAL: 396 MS
QTC INTERVAL: 412 MS
RBC # BLD AUTO: 4.69 MILLION/UL (ref 3.81–5.12)
SODIUM SERPL-SCNC: 140 MMOL/L (ref 135–147)
T WAVE AXIS: 53 DEGREES
VENTRICULAR RATE: 65 BPM
WBC # BLD AUTO: 7.59 THOUSAND/UL (ref 4.31–10.16)

## 2025-03-15 PROCEDURE — 80053 COMPREHEN METABOLIC PANEL: CPT

## 2025-03-15 PROCEDURE — 93010 ELECTROCARDIOGRAM REPORT: CPT | Performed by: INTERNAL MEDICINE

## 2025-03-15 PROCEDURE — 36415 COLL VENOUS BLD VENIPUNCTURE: CPT

## 2025-03-15 PROCEDURE — 85025 COMPLETE CBC W/AUTO DIFF WBC: CPT

## 2025-03-19 PROBLEM — Z13.820 SCREENING FOR OSTEOPOROSIS: Status: RESOLVED | Noted: 2025-02-17 | Resolved: 2025-03-19

## 2025-03-20 ENCOUNTER — OFFICE VISIT (OUTPATIENT)
Dept: CARDIOLOGY CLINIC | Facility: CLINIC | Age: 70
End: 2025-03-20
Payer: COMMERCIAL

## 2025-03-20 VITALS
SYSTOLIC BLOOD PRESSURE: 116 MMHG | HEART RATE: 76 BPM | WEIGHT: 211 LBS | HEIGHT: 67 IN | BODY MASS INDEX: 33.12 KG/M2 | DIASTOLIC BLOOD PRESSURE: 66 MMHG | OXYGEN SATURATION: 96 %

## 2025-03-20 DIAGNOSIS — R93.1 ELEVATED CORONARY ARTERY CALCIUM SCORE: ICD-10-CM

## 2025-03-20 DIAGNOSIS — I10 ESSENTIAL HYPERTENSION: ICD-10-CM

## 2025-03-20 DIAGNOSIS — Q24.8 LEFT VENTRICULAR OUTFLOW TRACT OBSTRUCTION: Primary | ICD-10-CM

## 2025-03-20 PROCEDURE — 99214 OFFICE O/P EST MOD 30 MIN: CPT | Performed by: INTERNAL MEDICINE

## 2025-03-20 RX ORDER — AMLODIPINE BESYLATE 5 MG/1
5 TABLET ORAL DAILY
Qty: 90 TABLET | Refills: 3 | Status: SHIPPED | OUTPATIENT
Start: 2025-03-20

## 2025-03-20 RX ORDER — PRAVASTATIN SODIUM 20 MG
20 TABLET ORAL DAILY
Qty: 90 TABLET | Refills: 3 | Status: SHIPPED | OUTPATIENT
Start: 2025-03-20

## 2025-03-20 RX ORDER — LOSARTAN POTASSIUM AND HYDROCHLOROTHIAZIDE 25; 100 MG/1; MG/1
1 TABLET ORAL DAILY
Qty: 90 TABLET | Refills: 3 | Status: SHIPPED | OUTPATIENT
Start: 2025-03-20

## 2025-03-20 NOTE — LETTER
Cardiology Pre Operative Risk Assessment      PRE OPERATIVE CARDIAC RISK ASSESSMENT    03/20/25    Ale Chavez  1955  [id number]    Case: [id number] Date/Time: 04/21/25 0730   Procedure: ENDOVASCULAR LASER THERAPY (EVLT) OF LEFT GREATER SAPHENOUS VEIN (Left: Leg Upper)   Anesthesia type: Conscious Sedation   Diagnosis: Bleeding from varicose veins of left lower extremity [I83.892]   Pre-op diagnosis: Bleeding from varicose veins of left lower extremity [I83.892]   Location:  OR ROOM 08 / [facility]   Surgeons: Sanaz Brandt MD       No Cardiac Contraindication for Planned Surgical Procedures    Anticoagulation: not applicable    Physician Comment: Low risk    Electronically Signed: Serafin Collins MD

## 2025-03-20 NOTE — PROGRESS NOTES
Cardiology Outpatient Follow-Up Note - Ale Chavez 69 y.o. female MRN: [id number]      Assessment/Plan:    1. Essential hypertension  At goal on current therapy. Some BP values with SBP as low as 100-110. We will maintain current therapy. Continue Hyzaar 100-25 mg daily and amlodipine 5 mg daily.     2. Elevated coronary artery calcium score  Continue pravastatin for primary prevention of ASVD.     3. Atherosclerosis    4. Left ventricular outflow tract obstruction  We will periodically monitor with echocardiograms. Her AV morphology is unclear due to 2D image quality however the obstruction appears to be at the level of the LVOT. It has not progressed much over the years. We will check an echo again in 6 months          We will see Ale Chavez back in 12 months for routine follow-up.    Subjective:     HPI: Ale Chavez is a 69 y.o. year old female with HTN, pre-DM, elevated CAC and increased transaortic flow velocity of unclear etiology who presents for routine follow-up.     She has been noted to have increased transaortic flow velocity since echo on 10/30/2015, at which time her AV Vmax was 2.11m/s. There were no other abnormalities. On 6/25/2018, Vmax was 2.16m/s with LVOT Vmax of 1.75m/s. On 7/14/2020, her AV Vmax was 2.33m/s with LVOT Vmax of 1.9m/s. Unfortunately, 2D image quality is poor throughout the studies and AV leaflets cannot be visualized well. There has been some concern through the years that her valve may be bicuspid, however in my personal review of the 2020 and 2018 echo there is not sufficient image detail to make this determination. If anything, her 06/25/2018 echo has one view where the AV appears trileaflet. Her echo in Oct 2023 had a Vmax of 2.46 m/s and obstruction appeared to be at level of LVOT with a peak gradient of 23 mmHg.     Due to her slightly elevated 10-year ASCVD risk, we performed coronary artery calcium scoring on 1/30/2023.  Her LAD score was 218, circumflex 37, total  "calcium score 255. She did not tolerate rosuvastatin due to leg cramps. She is on pravastatin 20 mg daily now.       No new complaints      ROS:  Review of Systems:     Echo 10/17/23    Interpretation Summary       Left Ventricle: Left ventricular cavity size is normal. Wall thickness is normal. There is mild asymmetric hypertrophy of the basal septal wall. The left ventricular ejection fraction is 65-70%. Systolic function is vigorous. Global longitudinal strain is normal at -19%. Wall motion is normal. Diastolic function is mildly abnormal, consistent with grade I (abnormal) relaxation. There is  outflow tract dynamic obstruction at rest with a peak gradient of 23.0 mmHg.    Right Ventricle: Right ventricular cavity size is normal. Systolic function is normal.    Tricuspid Valve: There is mild regurgitation.     Strain was performed to quantify interventricular dyssynchrony and evaluate components of myocardial function due to LVH, chest pain. Results from the utilization of Strain Analysis are listed in the report below.            Objective:     Vitals:   Vitals:    03/20/25 0945   BP: 116/66   BP Location: Left arm   Patient Position: Sitting   Cuff Size: Large   Pulse: 76   SpO2: 96%   Weight: 95.7 kg (211 lb)   Height: 5' 7\" (1.702 m)    Body surface area is 2.07 meters squared.  Wt Readings from Last 3 Encounters:   03/20/25 95.7 kg (211 lb)   02/25/25 97 kg (213 lb 12.8 oz)   02/17/25 98.4 kg (217 lb)       Physical Exam:    General: Ale Chavez is a well appearing female, in no acute distress, sitting comfortably  HEENT: moist mucous membranes, EOMI  Neck:  No JVD, supple, trachea midline  Cardiovascular: unremarkable S1/S2, regular rate and rhythm, 3/6 SM  Pulmonary: normal respiratory effort, CTAB  Abdomen: soft and nondistended  Extremities: No lower extremity edema. Warm and well perfused extremities.  Neuro: no focal motor deficits, AAOx3 (person, place, time)  Psych: Normal mood and affect, " cooperative          Medications (at the START of this encounter):  Outpatient Medications Prior to Visit   Medication Sig Dispense Refill    albuterol (2.5 mg/3 mL) 0.083 % nebulizer solution Take 3 mL (2.5 mg total) by nebulization every 6 (six) hours as needed for wheezing or shortness of breath 125 mL 1    albuterol (Ventolin HFA) 90 mcg/act inhaler Inhale 2 puffs every 6 (six) hours as needed for wheezing or shortness of breath 18 g 3    amLODIPine (NORVASC) 5 mg tablet Take 1 tablet (5 mg total) by mouth daily 90 tablet 3    Ascorbic Acid (vitamin C) 100 MG tablet Take 100 mg by mouth daily      carbamide peroxide (DEBROX) 6.5 % otic solution Administer 5 drops to the right ear 2 (two) times a day for 4 days 2 mL 0    cholecalciferol (VITAMIN D3) 400 units tablet Take 400 Units by mouth daily Pt taking 1000 units      Cyanocobalamin (VITAMIN B 12 PO) Take by mouth      Efinaconazole (Jublia) 10 % SOLN Apply 1 application topically in the morning (Patient taking differently: Apply 1 application. topically if needed) 8 mL 6    fluticasone (Arnuity Ellipta) 100 MCG/ACT AEPB inhaler Inhale 1 puff daily Rinse mouth after use. 90 blister 3    guaiFENesin (MUCINEX) 600 mg 12 hr tablet Take 1,200 mg by mouth every 12 (twelve) hours      ketoconazole (NIZORAL) 2 % cream Apply topically daily      levothyroxine 125 mcg tablet Take 1 tablet (125 mcg total) by mouth daily 90 tablet 1    loratadine (CLARITIN REDITABS) 10 MG dissolvable tablet Take 10 mg by mouth daily      losartan-hydrochlorothiazide (HYZAAR) 100-25 MG per tablet Take 1 tablet by mouth daily 90 tablet 3    Na Sulfate-K Sulfate-Mg Sulf (Suprep Bowel Prep Kit) 17.5-3.13-1.6 GM/177ML SOLN Take as directed by the office. 354 mL 0    nystatin-triamcinolone (MYCOLOG-II) ointment Apply topically 2 (two) times a day 60 g 0    pantoprazole (PROTONIX) 40 mg tablet Take 1 tablet (40 mg total) by mouth daily      pravastatin (PRAVACHOL) 20 mg tablet Take 1 tablet (20  "mg total) by mouth daily 90 tablet 3    Restasis 0.05 % ophthalmic emulsion       semaglutide (Rybelsus) 14 MG tablet Take 1 tablet (14 mg total) by mouth daily 90 tablet 1    nystatin (MYCOSTATIN) powder Apply topically 3 (three) times a day for 14 days 30 g 0    ondansetron (ZOFRAN) 4 mg tablet Take 1 tablet (4 mg total) by mouth every 8 (eight) hours as needed for nausea or vomiting 20 tablet 0    Semaglutide-Weight Management (Wegovy) 0.5 MG/0.5ML Inject 0.5 mg under the skin weekly.  Start on day after last dose of Rybelsus.  Discontinue Rybelsus upon starting wegovy. Do not take Rybelsus and Wegovy together. 2 mL 0     Facility-Administered Medications Prior to Visit   Medication Dose Route Frequency Provider Last Rate Last Admin    lidocaine (XYLOCAINE) 1 % injection 2 mL  2 mL Injection  Ramón Galdamez DPM   2 mL at 08/14/23 1500    lidocaine (XYLOCAINE) 1 % injection 2 mL  2 mL Injection     2 mL at 11/20/24 1530    triamcinolone acetonide (KENALOG-40) 40 mg/mL injection 20 mg  20 mg Intra-articular  Ramón Galdamez DPM   20 mg at 08/14/23 1500    triamcinolone acetonide (Kenalog-40) 40 mg/mL injection 20 mg  20 mg Intra-articular     20 mg at 11/20/24 1530                 Labs & Results:  Lipid profile 09/22/2022 , , HDL 32, LDL 81 mg/dL  Lipid panel  10/3/23 - , , HDL 36, LDL 41 mg/dL     CMP 10/3/23 - K 3.4, Cr 0.73  HbA1c 10/3/23 - 6.1%    Lipid panel 6/22/24 - , , HDL 38, LDL 40 mg/dL   CMP 3/15/25 - K 3.8, Cr 0.66        EKG personally reviewed:        This note was completed in part utilizing [employer] direct voice recognition software. Grammatical errors, random word insertion, spelling mistakes, occasional wrong word or \"sound-alike\" substitutions and incomplete sentences may be an occasional consequence of the system secondary to software limitations, ambient noise and hardware issues. At the time of dictation, efforts were made to edit, clarify and /or correct " errors.  Please read the chart carefully and recognize, using context, where substitutions have occurred.  If you have any questions or concerns about the context, text or information contained within the body of this dictation, please contact myself, the provider, for further clarification.

## 2025-03-24 ENCOUNTER — OFFICE VISIT (OUTPATIENT)
Dept: FAMILY MEDICINE CLINIC | Facility: HOSPITAL | Age: 70
End: 2025-03-24
Payer: COMMERCIAL

## 2025-03-24 VITALS
WEIGHT: 211 LBS | HEIGHT: 64 IN | SYSTOLIC BLOOD PRESSURE: 114 MMHG | HEART RATE: 80 BPM | BODY MASS INDEX: 36.02 KG/M2 | OXYGEN SATURATION: 95 % | DIASTOLIC BLOOD PRESSURE: 70 MMHG

## 2025-03-24 DIAGNOSIS — E66.812 CLASS 2 SEVERE OBESITY DUE TO EXCESS CALORIES WITH SERIOUS COMORBIDITY AND BODY MASS INDEX (BMI) OF 36.0 TO 36.9 IN ADULT (HCC): ICD-10-CM

## 2025-03-24 DIAGNOSIS — F17.211 CIGARETTE NICOTINE DEPENDENCE IN REMISSION: ICD-10-CM

## 2025-03-24 DIAGNOSIS — Z00.00 ANNUAL PHYSICAL EXAM: Primary | ICD-10-CM

## 2025-03-24 DIAGNOSIS — E03.9 ACQUIRED HYPOTHYROIDISM: ICD-10-CM

## 2025-03-24 DIAGNOSIS — K21.00 GASTROESOPHAGEAL REFLUX DISEASE WITH ESOPHAGITIS WITHOUT HEMORRHAGE: ICD-10-CM

## 2025-03-24 DIAGNOSIS — E66.9 OBESITY (BMI 30-39.9): ICD-10-CM

## 2025-03-24 DIAGNOSIS — E66.01 CLASS 2 SEVERE OBESITY DUE TO EXCESS CALORIES WITH SERIOUS COMORBIDITY AND BODY MASS INDEX (BMI) OF 36.0 TO 36.9 IN ADULT (HCC): ICD-10-CM

## 2025-03-24 DIAGNOSIS — R73.03 PREDIABETES: ICD-10-CM

## 2025-03-24 DIAGNOSIS — I10 ESSENTIAL HYPERTENSION: ICD-10-CM

## 2025-03-24 PROCEDURE — 99214 OFFICE O/P EST MOD 30 MIN: CPT | Performed by: FAMILY MEDICINE

## 2025-03-24 PROCEDURE — 99396 PREV VISIT EST AGE 40-64: CPT | Performed by: FAMILY MEDICINE

## 2025-03-24 RX ORDER — SEMAGLUTIDE 0.25 MG/.5ML
INJECTION, SOLUTION SUBCUTANEOUS
Qty: 2 ML | Refills: 0 | Status: SHIPPED | OUTPATIENT
Start: 2025-03-24 | End: 2025-04-24

## 2025-03-24 RX ORDER — LEVOTHYROXINE SODIUM 125 UG/1
125 TABLET ORAL DAILY
Qty: 90 TABLET | Refills: 1 | Status: SHIPPED | OUTPATIENT
Start: 2025-03-24

## 2025-03-24 NOTE — ASSESSMENT & PLAN NOTE
Orders:    Semaglutide-Weight Management (Wegovy) 0.25 MG/0.5ML; Inject 0.25 mg under the skin weekly

## 2025-03-24 NOTE — PATIENT INSTRUCTIONS
"Patient Education     Routine physical for adults   The Basics   Written by the doctors and editors at [facility]   What is a physical? -- A physical is a routine visit, or \"check-up,\" with your doctor. You might also hear it called a \"wellness visit\" or \"preventive visit.\"  During each visit, the doctor will:   Ask about your physical and mental health   Ask about your habits, behaviors, and lifestyle   Do an exam   Give you vaccines if needed   Talk to you about any medicines you take   Give advice about your health   Answer your questions  Getting regular check-ups is an important part of taking care of your health. It can help your doctor find and treat any problems you have. But it's also important for preventing health problems.  A routine physical is different from a \"sick visit.\" A sick visit is when you see a doctor because of a health concern or problem. Since physicals are scheduled ahead of time, you can think about what you want to ask the doctor.  How often should I get a physical? -- It depends on your age and health. In general, for people age 21 years and older:   If you are younger than 50 years, you might be able to get a physical every 3 years.   If you are 50 years or older, your doctor might recommend a physical every year.  If you have an ongoing health condition, like diabetes or high blood pressure, your doctor will probably want to see you more often.  What happens during a physical? -- In general, each visit will include:   Physical exam - The doctor or nurse will check your height, weight, heart rate, and blood pressure. They will also look at your eyes and ears. They will ask about how you are feeling and whether you have any symptoms that bother you.   Medicines - It's a good idea to bring a list of all the medicines you take to each doctor visit. Your doctor will talk to you about your medicines and answer any questions. Tell them if you are having any side effects that bother you. You " "should also tell them if you are having trouble paying for any of your medicines.   Habits and behaviors - This includes:   Your diet   Your exercise habits   Whether you smoke, drink alcohol, or use drugs   Whether you are sexually active   Whether you feel safe at home  Your doctor will talk to you about things you can do to improve your health and lower your risk of health problems. They will also offer help and support. For example, if you want to quit smoking, they can give you advice and might prescribe medicines. If you want to improve your diet or get more physical activity, they can help you with this, too.   Lab tests, if needed - The tests you get will depend on your age and situation. For example, your doctor might want to check your:   Cholesterol   Blood sugar   Iron level   Vaccines - The recommended vaccines will depend on your age, health, and what vaccines you already had. Vaccines are very important because they can prevent certain serious or deadly infections.   Discussion of screening - \"Screening\" means checking for diseases or other health problems before they cause symptoms. Your doctor can recommend screening based on your age, risk, and preferences. This might include tests to check for:   Cancer, such as breast, prostate, cervical, ovarian, colorectal, prostate, lung, or skin cancer   Sexually transmitted infections, such as chlamydia and gonorrhea   Mental health conditions like depression and anxiety  Your doctor will talk to you about the different types of screening tests. They can help you decide which screenings to have. They can also explain what the results might mean.   Answering questions - The physical is a good time to ask the doctor or nurse questions about your health. If needed, they can refer you to other doctors or specialists, too.  Adults older than 65 years often need other care, too. As you get older, your doctor will talk to you about:   How to prevent falling at " home   Hearing or vision tests   Memory testing   How to take your medicines safely   Making sure that you have the help and support you need at home  All topics are updated as new evidence becomes available and our peer review process is complete.  This topic retrieved from [employer] on: May 02, 2024.  Topic 848275 Version 1.0  Release: 32.4.3 - C32.122  © 2024 UpToDate, Inc. and/or its affiliates. All rights reserved.  Consumer Information Use and Disclaimer   Disclaimer: This generalized information is a limited summary of diagnosis, treatment, and/or medication information. It is not meant to be comprehensive and should be used as a tool to help the user understand and/or assess potential diagnostic and treatment options. It does NOT include all information about conditions, treatments, medications, side effects, or risks that may apply to a specific patient. It is not intended to be medical advice or a substitute for the medical advice, diagnosis, or treatment of a health care provider based on the health care provider's examination and assessment of a patient's specific and unique circumstances. Patients must speak with a health care provider for complete information about their health, medical questions, and treatment options, including any risks or benefits regarding use of medications. This information does not endorse any treatments or medications as safe, effective, or approved for treating a specific patient. UpToDate, Inc. and its affiliates disclaim any warranty or liability relating to this information or the use thereof.The use of this information is governed by the Terms of Use, available at https://www.[employer]aintree Networksuwer.com/en/know/clinical-effectiveness-terms. 2024© UpToDate, Inc. and its affiliates and/or licensors. All rights reserved.  Copyright   © 2024 UpToDate, Inc. and/or its affiliates. All rights reserved.     - on chemo, s/p 3 cycles, most recent 2 weeks prior to admission  - follows [place]  - med rec    Plan;  - hemonc on board  - hospice referral

## 2025-03-24 NOTE — ASSESSMENT & PLAN NOTE
Addended by: MARIAN VAUGHN on: 2/5/2020 03:56 PM     Modules accepted: Orders     Update blood work.  Controlled.  Continue amlodipine, losartan, hydrochlorothiazide.  Orders:    Lipid Panel with Direct LDL reflex; Future

## 2025-03-24 NOTE — ASSESSMENT & PLAN NOTE
Has been stable.  Work on lowering Protonix.  She is currently on 40 mg daily but this has decreased from previous of 40 mg twice a day.  Work on weight loss, dietary control.

## 2025-03-24 NOTE — PROGRESS NOTES
Adult Annual Physical  Name: Ale Chavez      : 1955      MRN: [id number]  Encounter Provider: Murphy Rosa MD  Encounter Date: 3/24/2025   Encounter department: [facility] PRIMARY CARE [address]     Assessment & Plan  Acquired hypothyroidism  Update blood work.  Continue on Synthroid.  Orders:    levothyroxine 125 mcg tablet; Take 1 tablet (125 mcg total) by mouth daily    TSH, 3rd generation with Free T4 reflex; Future    Class 2 severe obesity due to excess calories with serious comorbidity and body mass index (BMI) of 36.0 to 36.9 in adult ([facility])      Has prior approval for Wegovy.    Discontinue Rybelsus.    Discussed Wegovy.  Start at 0.25 mg weekly.  She will contact me in about 4 weeks regarding tolerance of the medication with plan to increase to 0.5 mg weekly.  She will let us know.  Discussed side effects and adverse reactions.    She has no contraindication to Wegovy.  No family history of medullary thyroid cancer.  No personal history of pancreatitis.    Need to work on dietary control and improving exercise.  She intermittently does weight watchers.           Obesity (BMI 30-39.9)      Orders:    Semaglutide-Weight Management (Wegovy) 0.25 MG/0.5ML; Inject 0.25 mg under the skin weekly    Cigarette nicotine dependence in remission  In remission.    Due for lung cancer screening.  This is already been ordered.       Annual physical exam         Essential hypertension  Update blood work.  Controlled.  Continue amlodipine, losartan, hydrochlorothiazide.  Orders:    Lipid Panel with Direct LDL reflex; Future    Prediabetes  Work on dietary control, exercise, weight loss.  Orders:    CBC; Future    Comprehensive metabolic panel; Future    Lipid Panel with Direct LDL reflex; Future    Hemoglobin A1C; Future    TSH, 3rd generation with Free T4 reflex; Future    Gastroesophageal reflux disease with esophagitis without hemorrhage  Has been stable.  Work on lowering Protonix.  She is  currently on 40 mg daily but this has decreased from previous of 40 mg twice a day.  Work on weight loss, dietary control.       Preventive Screenings:  - Diabetes Screening: screening up-to-date  - Cholesterol Screening: screening not indicated and has hyperlipidemia   - Hepatitis C screening: screening up-to-date   - HIV screening: screening not indicated   - Cervical cancer screening: screening not indicated   - Breast cancer screening: screening up-to-date   - Colon cancer screening: screening up-to-date   - Lung cancer screening: risks/benefits discussed and orders placed   - Osteoporosis screening: orders placed     Immunizations:  - Immunizations due: Hepatitis A    Counseling/Anticipatory Guidance:  - Alcohol: discussed moderation in alcohol intake and recommendations for healthy alcohol use.   - Dental health: discussed importance of regular tooth brushing, flossing, and dental visits.   - Diet: discussed recommendations for a healthy/well-balanced diet.   - Exercise: the importance of regular exercise/physical activity was discussed. Recommend exercise 3-5 times per week for at least 30 minutes.          History of Present Illness     Adult Annual Physical:  Patient presents for annual physical. Kira is here for follow-up of chronic conditions and physical.  Overall has been feeling well.    Reports she has been on Rybelsus for weight loss.  This has not really been effective.  Previous provider already got Wegovy approved.  She is contemplating on using Wegovy..     Diet and Physical Activity:  - Diet/Nutrition: portion control. weight watchers  - Exercise: no formal exercise. working on exercise    Depression Screening:  - PHQ-2 Score: 0    General Health:  - Sleep: sleeps poorly and 4-6 hours of sleep on average.  - Hearing: normal hearing bilateral ears.  - Vision: most recent eye exam < 1 year ago, wears contacts and vision problems. hole in retnia  - Dental: regular dental visits, brushes teeth once  "daily and floss regularly.    /GYN Health:  - Follows with GYN: yes.   - Menopause: postmenopausal.   - History of STDs: no  - Contraception: tubal ligation.      Advanced Care Planning:  - Has an advanced directive?: no    - Has a durable medical POA?: no    - ACP document given to patient?: yes      Review of Systems   Constitutional: Negative.  Negative for activity change, appetite change, chills, diaphoresis, fatigue and fever.   HENT:  Negative for congestion, facial swelling and sore throat.    Respiratory: Negative.  Negative for apnea, cough, chest tightness and shortness of breath.    Cardiovascular: Negative.  Negative for chest pain and palpitations.   Gastrointestinal: Negative.  Negative for abdominal distention, abdominal pain, blood in stool, constipation, diarrhea and nausea.   Genitourinary: Negative.  Negative for difficulty urinating, dysuria, flank pain and frequency.         Objective   /70 (BP Location: Left arm, Patient Position: Sitting)   Pulse 80   Ht 5' 3.78\" (1.62 m)   Wt 95.7 kg (211 lb)   SpO2 95%   BMI 36.47 kg/m²     Physical Exam  Vitals and nursing note reviewed.   Constitutional:       General: She is not in acute distress.     Appearance: Normal appearance. She is well-developed.   HENT:      Head: Normocephalic and atraumatic.      Right Ear: External ear normal.      Left Ear: External ear normal.      Nose: Nose normal.      Mouth/Throat:      Mouth: Mucous membranes are moist.   Eyes:      Conjunctiva/sclera: Conjunctivae normal.      Pupils: Pupils are equal, round, and reactive to light.   Cardiovascular:      Rate and Rhythm: Normal rate and regular rhythm.      Heart sounds: Normal heart sounds.   Pulmonary:      Effort: Pulmonary effort is normal.      Breath sounds: Normal breath sounds.   Abdominal:      General: Bowel sounds are normal.      Palpations: Abdomen is soft.   Musculoskeletal:      Cervical back: Normal range of motion and neck supple. "   Skin:     General: Skin is warm and dry.   Neurological:      General: No focal deficit present.      Mental Status: She is alert and oriented to person, place, and time.   Psychiatric:         Mood and Affect: Mood normal.         Behavior: Behavior normal.

## 2025-03-24 NOTE — ASSESSMENT & PLAN NOTE
Update blood work.  Continue on Synthroid.  Orders:    levothyroxine 125 mcg tablet; Take 1 tablet (125 mcg total) by mouth daily    TSH, 3rd generation with Free T4 reflex; Future

## 2025-03-27 ENCOUNTER — OFFICE VISIT (OUTPATIENT)
Dept: FAMILY MEDICINE CLINIC | Facility: HOSPITAL | Age: 70
End: 2025-03-27
Payer: COMMERCIAL

## 2025-03-27 ENCOUNTER — TELEPHONE (OUTPATIENT)
Age: 70
End: 2025-03-27

## 2025-03-27 ENCOUNTER — HOSPITAL ENCOUNTER (OUTPATIENT)
Dept: RADIOLOGY | Facility: HOSPITAL | Age: 70
End: 2025-03-27
Payer: COMMERCIAL

## 2025-03-27 ENCOUNTER — APPOINTMENT (OUTPATIENT)
Dept: LAB | Facility: HOSPITAL | Age: 70
End: 2025-03-27
Payer: COMMERCIAL

## 2025-03-27 ENCOUNTER — RESULTS FOLLOW-UP (OUTPATIENT)
Dept: FAMILY MEDICINE CLINIC | Facility: HOSPITAL | Age: 70
End: 2025-03-27

## 2025-03-27 VITALS
DIASTOLIC BLOOD PRESSURE: 78 MMHG | TEMPERATURE: 97.5 F | SYSTOLIC BLOOD PRESSURE: 118 MMHG | HEART RATE: 72 BPM | BODY MASS INDEX: 36.02 KG/M2 | HEIGHT: 64 IN | WEIGHT: 211 LBS

## 2025-03-27 DIAGNOSIS — M25.531 ACUTE PAIN OF RIGHT WRIST: ICD-10-CM

## 2025-03-27 DIAGNOSIS — M25.531 ACUTE PAIN OF RIGHT WRIST: Primary | ICD-10-CM

## 2025-03-27 LAB
ERYTHROCYTE [SEDIMENTATION RATE] IN BLOOD: 18 MM/HOUR (ref 0–29)
RHEUMATOID FACT SERPL-ACNC: <10 IU/ML
URATE SERPL-MCNC: 2.9 MG/DL (ref 2–7.5)

## 2025-03-27 PROCEDURE — 73110 X-RAY EXAM OF WRIST: CPT

## 2025-03-27 PROCEDURE — 85652 RBC SED RATE AUTOMATED: CPT

## 2025-03-27 PROCEDURE — 84550 ASSAY OF BLOOD/URIC ACID: CPT

## 2025-03-27 PROCEDURE — 99213 OFFICE O/P EST LOW 20 MIN: CPT | Performed by: NURSE PRACTITIONER

## 2025-03-27 PROCEDURE — 36415 COLL VENOUS BLD VENIPUNCTURE: CPT

## 2025-03-27 PROCEDURE — 86431 RHEUMATOID FACTOR QUANT: CPT

## 2025-03-27 PROCEDURE — 73130 X-RAY EXAM OF HAND: CPT

## 2025-03-27 RX ORDER — PREDNISONE 10 MG/1
TABLET ORAL
Qty: 16 TABLET | Refills: 0 | Status: SHIPPED | OUTPATIENT
Start: 2025-03-27

## 2025-03-27 NOTE — PROGRESS NOTES
"Name: Ale Chavez      : 1955      MRN: [id number]  Encounter Provider: CHRISTIN Vizcaino  Encounter Date: 3/27/2025   Encounter department: [facility] CARE [address]   :  Assessment & Plan  Acute pain of right wrist  Given acute onset, will eval further w/labs and xrays as ordered   Advise she use ibuprofen instead of tylenol - 600mg prn  Script for prednisone issued to start (after labs and xrays completed) if pain persists/worsens  May need return to ortho      Orders:    Uric acid; Future    RHEUMATOID FACTOR; Future    Sedimentation rate, automated; Future    XR hand 3+ vw right; Future    XR wrist 3+ vw right; Future    predniSONE 10 mg tablet; Take 4 tablets today then 3 tablets for 2 days, 2 tablets for 2 days, and 1 tablet for 2 days           History of Present Illness       Started with right hand pain and swelling 2 nights ago in the middle of the night. Pain is in wrist and radiates into forearm. Fingers are swollen but don't hurt. 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 No numbness or tingling. Took tylenol with slight relief.  No injury or bite known.  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        Review of Systems   Constitutional:  Negative for chills and fever.   Musculoskeletal:  Positive for arthralgias (right wrist - sharp, stabbing, shooting). Negative for joint swelling.   Neurological:  Negative for numbness.       Objective   /78   Pulse 72   Temp 97.5 °F (36.4 °C)   Ht 5' 3.78\" (1.62 m)   Wt 95.7 kg (211 lb)   BMI 36.47 kg/m²          Physical Exam  Vitals reviewed.   Constitutional:       General: She is not in acute distress.     Appearance: Normal appearance.   HENT:      Head: Normocephalic.   Pulmonary:      Effort: Pulmonary effort is normal. No respiratory distress.   Musculoskeletal:      Right wrist: Tenderness (volar aspect and lateral joint) present. No swelling, deformity or effusion. " Decreased range of motion.      Right hand: Swelling (fingers (soft tissue)) present. No tenderness. Normal range of motion.      Comments: + finkelstein's right wrist   Skin:     General: Skin is warm and dry.   Neurological:      General: No focal deficit present.      Mental Status: She is alert and oriented to person, place, and time.      Motor: Weakness (decreased right hand  strength) present.   Psychiatric:         Mood and Affect: Mood normal.         Behavior: Behavior normal.         Administrative Statements   I have spent a total time of 15 minutes in caring for this patient on the day of the visit/encounter including Instructions for management, Patient and family education, Impressions, Counseling / Coordination of care, Documenting in the medical record, Reviewing/placing orders in the medical record (including tests, medications, and/or procedures), and Obtaining or reviewing history

## 2025-03-27 NOTE — TELEPHONE ENCOUNTER
Patient called has upcoming appts today to be seen for swollen hand. Pt currently at [facility] inquiring if Delio would place Xray prior to coming in to appt.        Please advise.  Thank you

## 2025-03-27 NOTE — TELEPHONE ENCOUNTER
Xray won't be resulted by today's appt, so would be best to be seen first and then I'll order xray....

## 2025-04-07 ENCOUNTER — TELEPHONE (OUTPATIENT)
Age: 70
End: 2025-04-07

## 2025-04-07 NOTE — TELEPHONE ENCOUNTER
Patient calling regarding her EVLT scheduled for 4/21/25. Surgery was denied by insurance (see media from 4/3/25). Patient would like to discuss with clinical reasons why surgery was denied, and if it was related to cosmetic reasons, or if the surgery was for her venous issues that are causing her pain.    Please review and contact patient to discuss.

## 2025-04-07 NOTE — TELEPHONE ENCOUNTER
See note placed by me from earlier today. Patient does not meet criteria that is medically necessary. Although patient has history of bleeding veins, she is not having swelling or hx of ulcerations which is required in order to have procedure approved.

## 2025-04-14 ENCOUNTER — ANESTHESIA EVENT (OUTPATIENT)
Dept: GASTROENTEROLOGY | Facility: HOSPITAL | Age: 70
End: 2025-04-14
Payer: COMMERCIAL

## 2025-04-14 ENCOUNTER — ANESTHESIA (OUTPATIENT)
Dept: GASTROENTEROLOGY | Facility: HOSPITAL | Age: 70
End: 2025-04-14
Payer: COMMERCIAL

## 2025-04-14 ENCOUNTER — HOSPITAL ENCOUNTER (OUTPATIENT)
Dept: GASTROENTEROLOGY | Facility: HOSPITAL | Age: 70
Setting detail: OUTPATIENT SURGERY
Discharge: HOME | End: 2025-04-14
Attending: INTERNAL MEDICINE
Payer: COMMERCIAL

## 2025-04-14 VITALS
SYSTOLIC BLOOD PRESSURE: 139 MMHG | RESPIRATION RATE: 18 BRPM | HEART RATE: 66 BPM | OXYGEN SATURATION: 97 % | TEMPERATURE: 98.2 F | DIASTOLIC BLOOD PRESSURE: 70 MMHG

## 2025-04-14 DIAGNOSIS — Z86.0100 HISTORY OF COLON POLYPS: ICD-10-CM

## 2025-04-14 PROCEDURE — 45385 COLONOSCOPY W/LESION REMOVAL: CPT | Performed by: INTERNAL MEDICINE

## 2025-04-14 PROCEDURE — 88305 TISSUE EXAM BY PATHOLOGIST: CPT | Performed by: PATHOLOGY

## 2025-04-14 RX ORDER — SODIUM CHLORIDE, SODIUM LACTATE, POTASSIUM CHLORIDE, CALCIUM CHLORIDE 600; 310; 30; 20 MG/100ML; MG/100ML; MG/100ML; MG/100ML
INJECTION, SOLUTION INTRAVENOUS CONTINUOUS PRN
Status: DISCONTINUED | OUTPATIENT
Start: 2025-04-14 | End: 2025-04-14

## 2025-04-14 RX ORDER — PROPOFOL 10 MG/ML
INJECTION, EMULSION INTRAVENOUS AS NEEDED
Status: DISCONTINUED | OUTPATIENT
Start: 2025-04-14 | End: 2025-04-14

## 2025-04-14 RX ADMIN — PROPOFOL 50 MG: 10 INJECTION, EMULSION INTRAVENOUS at 08:01

## 2025-04-14 RX ADMIN — PROPOFOL 50 MG: 10 INJECTION, EMULSION INTRAVENOUS at 07:55

## 2025-04-14 RX ADMIN — SODIUM CHLORIDE, SODIUM LACTATE, POTASSIUM CHLORIDE, AND CALCIUM CHLORIDE: .6; .31; .03; .02 INJECTION, SOLUTION INTRAVENOUS at 07:48

## 2025-04-14 RX ADMIN — PROPOFOL 100 MG: 10 INJECTION, EMULSION INTRAVENOUS at 07:52

## 2025-04-14 RX ADMIN — PROPOFOL 50 MG: 10 INJECTION, EMULSION INTRAVENOUS at 07:58

## 2025-04-14 NOTE — ANESTHESIA PREPROCEDURE EVALUATION
"Procedure:  COLONOSCOPY    Relevant Problems   CARDIO   (+) Bleeding from varicose veins of left lower extremity   (+) Essential hypertension   (+) Mitral valvular disorder   (+) Mixed hyperlipidemia   (+) Varicose veins of bilateral lower extremities with pain      ENDO   (+) Acquired hypothyroidism      GI/HEPATIC   (+) Fatty infiltration of liver   (+) Gastroesophageal reflux disease with esophagitis without hemorrhage   (+) Hiatal hernia      /RENAL   (+) Nephrolithiasis      MUSCULOSKELETAL   (+) Chronic bilateral low back pain without sciatica   (+) Hiatal hernia   (+) Lumbosacral pain   (+) Primary osteoarthritis of first carpometacarpal joint of right hand   (+) Spondylosis of cervical region without myelopathy or radiculopathy      NEURO/PSYCH   (+) Chronic bilateral low back pain without sciatica      PULMONARY   (+) Mild intermittent asthma without complication     Left ventricular outflow tract obstruction  Per cardiology \"We will periodically monitor with echocardiograms. Her AV morphology is unclear due to 2D image quality however the obstruction appears to be at the level of the LVOT. It has not progressed much over the years. We will check an echo again in 6 months\"     ECHO 2023:    Left Ventricle: Left ventricular cavity size is normal. Wall thickness is normal. There is mild asymmetric hypertrophy of the basal septal wall. The left ventricular ejection fraction is 65-70%. Systolic function is vigorous. Global longitudinal strain is normal at -19%. Wall motion is normal. Diastolic function is mildly abnormal, consistent with grade I (abnormal) relaxation. There is  outflow tract dynamic obstruction at rest with a peak gradient of 23.0 mmHg.    Right Ventricle: Right ventricular cavity size is normal. Systolic function is normal.    Tricuspid Valve: There is mild regurgitation.  Physical Exam    Airway    Mallampati score: III  TM Distance: >3 FB  Neck ROM: full     Dental   No notable dental hx "     Cardiovascular      Pulmonary      Other Findings  post-pubertal.      Anesthesia Plan  ASA Score- 3     Anesthesia Type- IV sedation with anesthesia with ASA Monitors.         Additional Monitors:     Airway Plan:            Plan Factors-Exercise tolerance (METS): >4 METS.    Chart reviewed.    Patient summary reviewed.                  Induction- intravenous.    Postoperative Plan-     Perioperative Resuscitation Plan - Level 1 - Full Code.       Informed Consent- Anesthetic plan and risks discussed with patient.  I personally reviewed this patient with the CRNA. Discussed and agreed on the Anesthesia Plan with the CRNA..      NPO Status:  Vitals Value Taken Time   Date of last liquid 04/14/25 04/14/25 0713   Time of last liquid 0600 04/14/25 0713   Date of last solid 04/13/25 04/14/25 0713   Time of last solid 0600 04/14/25 0713

## 2025-04-14 NOTE — H&P
History and Physical - [facility]  Ale Chavez 69 y.o. female MRN: [id number]                  HPI: Ale Chavez is a 69 y.o. year old female who presents for colonoscopy      REVIEW OF SYSTEMS: Per the HPI, and otherwise unremarkable.    Historical Information   Past Medical History:   Diagnosis Date    Abdominal pain     Allergic     Arthritis     Asthma     Basal cell carcinoma     Bicuspid aortic valve     LAST ASSESSED [id number]    Cancer (HCC)     skin    Cervical disc disease     last assessed [id number]    Cervical stenosis of spine     LAST ASSESSED 2016    Colon polyp     COVID     Disease of thyroid gland     hypothyroid    Dry eyes     Endometriosis     Fatty liver     GERD (gastroesophageal reflux disease)     Heart murmur     History of screening mammography 2023    Bi-Rads 1.    Hot flashes     Hyperglycemia     Hyperlipidemia     Hypertension     Hypothyroidism     Kidney stone     Left ventricular hypertrophy     Mitral valvular disorder     Nephrolithiasis     Ovarian cyst, complex     Pneumonia     800.00    Ptosis     LAST ASSESSED [id number]    Renal calculi     Right cervical radiculopathy     LAST ASSESSED 2016    Rotator cuff disorder, right     Seasonal allergies     Skin tag     Squamous cell skin cancer     Thyroid disease     Verruca     Visual impairment     Wears contact lenses     Wears glasses     Wears partial dentures      Past Surgical History:   Procedure Laterality Date    BASAL CELL CARCINOMA EXCISION      CARPAL TUNNEL RELEASE Bilateral      SECTION      CHOLECYSTECTOMY      COLONOSCOPY      MOHS SURGERY      NEUROPLASTY / TRANSPOSITION MEDIAN NERVE AT CARPAL TUNNEL      NEUROPLASTY / TRANSPOSITION MEDIAN NERVE AT CARPAL TUNNEL  [facility]    OTHER SURGICAL HISTORY      surgically removed skin patch for skin cancer    PLANTAR FASCIECTOMY Left     NE COLONOSCOPY FLX DX W/COLLJ SPEC WHEN PFRMD N/A 10/09/2017    Procedure:  COLONOSCOPY;  Surgeon: Franklyn Adam MD;  Location: [facility];  Service: Gastroenterology    CA HYSTEROSCOPY BX ENDOMETRIUM&/POLYPC W/WO D&C N/A 2018    Procedure: DILATATION AND CURETTAGE (D&C) WITH HYSTEROSCOPY;  Surgeon: Ila Smith DO;  Location: [facility];  Service: Gynecology    CA SURGICAL ARTHROSCOPY JOSE W/CORACOACRM LIGM RLS Right 2024    Procedure: ARTHROSCOPIC SUBACROMIAL DECOMPRESSION;  Surgeon: Richard Brown DO;  Location: [facility];  Service: Orthopedics    CA SURGICAL ARTHROSCOPY SHOULDER W/ROTATOR CUFF RPR Right 2024    Procedure: REPAIR ROTATOR CUFF  ARTHROSCOPIC;  Surgeon: Richard Brown DO;  Location: [facility];  Service: Orthopedics    ROTATOR CUFF REPAIR Right 2024    SHOULDER SURGERY Right 2024    SKIN BIOPSY      TUBAL LIGATION      UPPER GASTROINTESTINAL ENDOSCOPY       Social History   Social History     Substance and Sexual Activity   Alcohol Use Yes    Alcohol/week: 8.0 standard drinks of alcohol    Types: 4 Glasses of wine, 4 Standard drinks or equivalent per week    Comment: per month     Social History     Substance and Sexual Activity   Drug Use No     Social History     Tobacco Use   Smoking Status Former    Current packs/day: 0.00    Average packs/day: 1 pack/day for 40.0 years (40.0 ttl pk-yrs)    Types: Cigarettes    Start date:     Quit date: 2012    Years since quittin.2   Smokeless Tobacco Never     Family History   Problem Relation Age of Onset    Diabetes Mother     Alzheimer's disease Mother     Heart disease Mother     Hypertension Mother     Dementia Mother     Heart failure Mother     No Known Problems Father     No Known Problems Sister     No Known Problems Sister     No Known Problems Maternal Grandmother     No Known Problems Maternal Grandfather     No Known Problems Paternal Grandmother     No Known Problems Paternal Grandfather     No Known Problems Daughter     No Known Problems Maternal Aunt     No Known  Problems Maternal Aunt     No Known Problems Maternal Aunt     No Known Problems Maternal Aunt     No Known Problems Maternal Aunt     Cancer Family     Heart disease Family     Thyroid disease Family     Breast cancer Neg Hx     Ovarian cancer Neg Hx     Colon cancer Neg Hx        Meds/Allergies       Current Outpatient Medications:     amLODIPine (NORVASC) 5 mg tablet    Ascorbic Acid (vitamin C) 100 MG tablet    cholecalciferol (VITAMIN D3) 400 units tablet    Cyanocobalamin (VITAMIN B 12 PO)    fluticasone (Arnuity Ellipta) 100 MCG/ACT AEPB inhaler    guaiFENesin (MUCINEX) 600 mg 12 hr tablet    levothyroxine 125 mcg tablet    pantoprazole (PROTONIX) 40 mg tablet    albuterol (2.5 mg/3 mL) 0.083 % nebulizer solution    albuterol (Ventolin HFA) 90 mcg/act inhaler    Efinaconazole (Jublia) 10 % SOLN    ketoconazole (NIZORAL) 2 % cream    loratadine (CLARITIN REDITABS) 10 MG dissolvable tablet    losartan-hydrochlorothiazide (HYZAAR) 100-25 MG per tablet    Na Sulfate-K Sulfate-Mg Sulf (Suprep Bowel Prep Kit) 17.5-3.13-1.6 GM/177ML SOLN    nystatin (MYCOSTATIN) powder    nystatin-triamcinolone (MYCOLOG-II) ointment    pravastatin (PRAVACHOL) 20 mg tablet    Restasis 0.05 % ophthalmic emulsion    Semaglutide-Weight Management (Wegovy) 0.25 MG/0.5ML    Current Facility-Administered Medications:     lidocaine (XYLOCAINE) 1 % injection 2 mL, 2 mL, Injection, , 2 mL at 08/14/23 1500    lidocaine (XYLOCAINE) 1 % injection 2 mL, 2 mL, Injection, , 2 mL at 11/20/24 1530    triamcinolone acetonide (KENALOG-40) 40 mg/mL injection 20 mg, 20 mg, Intra-articular, , 20 mg at 08/14/23 1500    triamcinolone acetonide (Kenalog-40) 40 mg/mL injection 20 mg, 20 mg, Intra-articular, , 20 mg at 11/20/24 1530    Allergies   Allergen Reactions    Erythromycin GI Intolerance     Reaction Date: 11Jul2011;     Penicillins Rash     unknown    Aztreonam Rash     Action Taken: Allergy added by [employer] to replace Penicillins Cross Reactors;      Carbapenems Rash     Action Taken: Allergy added by [employer] to replace Penicillins Cross Reactors;     Cephalosporins Rash     Action Taken: Allergy added by [employer] to replace Penicillins Cross Reactors;     Griseofulvin Rash     Action Taken: Allergy added by [employer] to replace Penicillins Cross Reactors;     Influenza Virus Vaccine Hives, Rash and GI Intolerance       Objective     /65   Pulse 70   Temp 98.2 °F (36.8 °C) (Temporal)   Resp 18   SpO2 96%       PHYSICAL EXAM    Gen: NAD  Head: NCAT  CV: RRR  CHEST: Clear  ABD: soft, NT/ND  EXT: no edema      ASSESSMENT/PLAN:  This is a 69 y.o. year old female here for colonoscopy, and she is stable and optimized for her procedure.

## 2025-04-14 NOTE — ANESTHESIA POSTPROCEDURE EVALUATION
Post-Op Assessment Note    CV Status:  Stable  Pain Score: 0    Pain management: adequate       Mental Status:  Arousable and sleepy   Hydration Status:  Stable   PONV Controlled:  Controlled   Airway Patency:  Patent     Post Op Vitals Reviewed: Yes    No anethesia notable event occurred.    Staff: CRNA           Last Filed PACU Vitals:  Vitals Value Taken Time   Temp     Pulse 73    /73    Resp 13    SpO2 99

## 2025-04-16 DIAGNOSIS — E66.9 OBESITY (BMI 30-39.9): ICD-10-CM

## 2025-04-16 PROCEDURE — 88305 TISSUE EXAM BY PATHOLOGIST: CPT | Performed by: PATHOLOGY

## 2025-04-16 RX ORDER — SEMAGLUTIDE 0.5 MG/.5ML
0.5 INJECTION, SOLUTION SUBCUTANEOUS WEEKLY
Qty: 2 ML | Refills: 0 | Status: SHIPPED | OUTPATIENT
Start: 2025-04-16 | End: 2025-04-28

## 2025-04-17 ENCOUNTER — RESULTS FOLLOW-UP (OUTPATIENT)
Dept: GASTROENTEROLOGY | Facility: CLINIC | Age: 70
End: 2025-04-17

## 2025-04-24 DIAGNOSIS — E66.9 OBESITY (BMI 30-39.9): ICD-10-CM

## 2025-04-28 RX ORDER — SEMAGLUTIDE 0.5 MG/.5ML
INJECTION, SOLUTION SUBCUTANEOUS
Qty: 2 ML | Refills: 0 | Status: SHIPPED | OUTPATIENT
Start: 2025-04-28

## 2025-04-29 NOTE — MISCELLANEOUS
Message   Date: 15 Dec 2017 1:24 PM EST, Recorded By: Genevieve Chavez For: Mitul Linen: Ernesto Gallardo, Self   Phone: [phone] (Home), [phone] x,,,,, (Work)   Reason: Other   Patient called requesting results of last 2 US be faxed to her  Faxed per request         Active Problems    1  Abdominal pain (789 00) (R10 9)   2  Abdominal pain, RUQ (789 01) (R10 11)   3  Acute constipation (564 00) (K59 00)   4  Cancer screening (V76 9) (Z12 9)   5  Complex ovarian cyst (620 2) (N83 299)   6  Costochondritis (733 6) (M94 0)   7  Dry eye syndrome (375 15) (H04 129)   8  Fatty infiltration of liver (571 8) (K76 0)   9  Hyperglycemia (790 29) (R73 9)   10  Hyperlipidemia (272 4) (E78 5)   11  Hypertension (401 9) (I10)   12  Hypothyroidism (244 9) (E03 9)   13  Insomnia due to medical condition (327 01) (G47 01)   14  Left ventricular hypertrophy (429 3) (I51 7)   15  Lumbosacral pain (724 2,724 6) (M54 5)   16  Mitral valvular disorder (394 9) (I05 9)   17  Nephrolithiasis (592 0) (N20 0)   18  Nicotine dependence (305 1) (F17 200)   19  Night sweats (780 8) (R61)   20  Non-toxic multinodular goiter (241 1) (E04 2)   21  Obesity (278 00) (E66 9)   22  Rhinitis (472 0) (J31 0)   23  Spondylosis of cervical region without myelopathy or radiculopathy (721 0) (M47 812)   24  Thoracic neuritis (724 4) (M54 14)    Current Meds   1  Clotrimazole-Betamethasone 1-0 05 % External Cream; APPLY SPARINGLY TO THE   AFFECTED AREA(S) TWICE DAILY; Therapy: 33EJT5347 to (Evaluate:46Dzj7400)  Requested for: 12IGS3128; Last   Rx:54Mky2983 Ordered   2  Contrave 8-90 MG Oral Tablet Extended Release 12 Hour; TAKE ONE TO TWO TABLETS   BY MOUTH EVERY DAY TO TWICE DAILY AS PER dosing schedule; Therapy: 58Qbs9659 to (Evaluate:85Ifw2957); Last Rx:66Obg4399 Ordered   3  Hyoscyamine Sulfate 0 125 MG Sublingual Tablet Sublingual; PLACE 1 TABLET   UNDER THE TONGUE  3 TIMES DAILY AS NEEDED;    Therapy: 59JLZ6706 to [phone]  Requested for: 55ZPL5524; Last   Rx:28Bao4311 Ordered   4  Levothyroxine Sodium 125 MCG Oral Tablet; TAKE ONE TABLET BY MOUTH EVERY   MORNING; Therapy: 04JTY6872 to (Last Rx:06Mar2017)  Requested for: 17FMW5534 Ordered   5  Losartan Potassium 50 MG Oral Tablet; take one tablet by mouth every day; Therapy: 14Twr2680 to (Last Rx:06Mar2017)  Requested for: 58ALL7554 Ordered   6  Polyethylene Glycol 3350 Oral Packet; MIX 1 PACKET IN 8 OUNCES OF LIQUID AND   DRINK ONCE DAILY; Therapy: 07QMU1005 to (Evaluate:57Doy9482)  Requested for: 63MVZ7017; Last   Rx:67Syq2367 Ordered   7  Suprep Bowel Prep Kit 17 5-3 13-1 6 GM/180ML Oral Solution; USE AS DIRECTED; Therapy: 27RKM2475 to (Last Rx:31Gzi9150)  Requested for: 34Zzj4902 Ordered    Allergies    1  Aztreonam   2  Carbapenems   3  Cephalosporins   4  Erythromycin Base TABS   5  FLU (SPLT)   6  Griseofulvin   7   Penicillins    Signatures   Electronically signed by : Cee Christiansen, ; Dec 15 2017  1:25PM EST                       (Author) [employer]

## 2025-05-20 ENCOUNTER — OFFICE VISIT (OUTPATIENT)
Dept: FAMILY MEDICINE CLINIC | Facility: HOSPITAL | Age: 70
End: 2025-05-20
Payer: COMMERCIAL

## 2025-05-20 VITALS
TEMPERATURE: 97.2 F | SYSTOLIC BLOOD PRESSURE: 122 MMHG | WEIGHT: 210.4 LBS | BODY MASS INDEX: 35.92 KG/M2 | HEIGHT: 64 IN | HEART RATE: 72 BPM | OXYGEN SATURATION: 98 % | DIASTOLIC BLOOD PRESSURE: 72 MMHG

## 2025-05-20 DIAGNOSIS — H61.22 EXCESSIVE CERUMEN IN LEFT EAR CANAL: ICD-10-CM

## 2025-05-20 DIAGNOSIS — H61.21 IMPACTED CERUMEN OF RIGHT EAR: Primary | ICD-10-CM

## 2025-05-20 PROCEDURE — 99213 OFFICE O/P EST LOW 20 MIN: CPT | Performed by: NURSE PRACTITIONER

## 2025-05-20 NOTE — PROGRESS NOTES
"Name: Ale Chavez      : 1955      MRN: [id number]  Encounter Provider: CHRISTIN Vizcaino  Encounter Date: 2025   Encounter department: [facility] [address]   :  Assessment & Plan  Impacted cerumen of right ear  Complete easy removal w/lavage - pt tolerated well w/improved hearing       Excessive cerumen in left ear canal  Complete, easy removal w/lavage - pt tolerated well            History of Present Illness       Has clogged right ear past 2 days. Used debrox drops which made ear worse. Denies left ear sx's.           Review of Systems   HENT:  Positive for ear pain (right ear clogged) and hearing loss (right ear). Negative for ear discharge.            Objective   /72 (BP Location: Left arm, Patient Position: Sitting, Cuff Size: Large)   Pulse 72   Temp (!) 97.2 °F (36.2 °C)   Ht 5' 3.78\" (1.62 m)   Wt 95.4 kg (210 lb 6.4 oz)   SpO2 98%   BMI 36.36 kg/m²          Physical Exam  Vitals reviewed.   Constitutional:       General: She is not in acute distress.     Appearance: Normal appearance.   HENT:      Head: Normocephalic.      Right Ear: Tympanic membrane and ear canal normal. There is impacted cerumen.      Left Ear: Tympanic membrane and ear canal normal.      Ears:      Comments: Normal right TM and canal noted after removal of cerumen  Pulmonary:      Effort: Pulmonary effort is normal. No respiratory distress.     Neurological:      General: No focal deficit present.      Mental Status: She is alert and oriented to person, place, and time.     Psychiatric:         Mood and Affect: Mood normal.         Behavior: Behavior normal.         Administrative Statements   I have spent a total time of 15 minutes in caring for this patient on the day of the visit/encounter including Instructions for management, Impressions, Documenting in the medical record, and Obtaining or reviewing history    "

## 2025-06-05 DIAGNOSIS — E66.9 OBESITY (BMI 30-39.9): Primary | ICD-10-CM

## 2025-06-06 ENCOUNTER — TELEPHONE (OUTPATIENT)
Age: 70
End: 2025-06-06

## 2025-06-06 NOTE — TELEPHONE ENCOUNTER
PA for (WEGOVY) 1 MG/0.5ML SUBMITTED to     via    []CMM-KEY:   [x]Surescripts-Case ID # [id number]     []Availity-Auth ID # NDC #   []Faxed to plan   []Other website   []Phone call Case ID #     [x]PA sent as URGENT    All office notes, labs and other pertaining documents and studies sent. Clinical questions answered. Awaiting determination from insurance company.     Turnaround time for your insurance to make a decision on your Prior Authorization can take 7-21 business days.

## 2025-06-09 NOTE — TELEPHONE ENCOUNTER
PA for (WEGOVY) 1 MG/0.5ML  DENIED    Reason:        Message sent to office clinical pool Yes    Denial letter scanned into Media No      We can gladly do an appeal but the process can take about 30-60 days to provide determination. Please have the office staff schedule a Peer to Peer at phone [phone].  . If an appeal is truly warranted please have Provider send clinical documentation to the PA department to support the appeal.     **Please follow up with your patient regarding denial and next steps**

## 2025-06-12 DIAGNOSIS — E66.9 OBESITY (BMI 30-39.9): Primary | ICD-10-CM

## 2025-06-12 NOTE — TELEPHONE ENCOUNTER
The denial states the patient cannot get 6 mL per 84 days because the insurance will only cover 2 mL per 28 days. Patient can only  one month at a time. The denial also states the patient needs to move to 1.7 mg for maintenance dosing and this will process with no PA needed until 02/27/26. This approval will also cover the 2.4 mg dose.

## 2025-06-12 NOTE — TELEPHONE ENCOUNTER
Please call.   For insurance she cannot remain on 1 mg weekly.   But will cover higher dosing which I do think is appropriate for her unless having side effects ( which I think she has been doing well on ).   I rx 1.7 mg weekly x 4 weeks.   To be followed by 2 mg weekly , and will continue until reaching goal or as maintenance.

## 2025-06-24 DIAGNOSIS — K21.9 GASTROESOPHAGEAL REFLUX DISEASE WITHOUT ESOPHAGITIS: ICD-10-CM

## 2025-06-24 RX ORDER — PANTOPRAZOLE SODIUM 40 MG/1
40 TABLET, DELAYED RELEASE ORAL 2 TIMES DAILY
Qty: 180 TABLET | Refills: 0 | OUTPATIENT
Start: 2025-06-24

## 2025-06-24 RX ORDER — PANTOPRAZOLE SODIUM 40 MG/1
40 TABLET, DELAYED RELEASE ORAL 2 TIMES DAILY
Qty: 180 TABLET | Refills: 1 | Status: SHIPPED | OUTPATIENT
Start: 2025-06-24

## 2025-07-08 ENCOUNTER — HOSPITAL ENCOUNTER (OUTPATIENT)
Dept: NON INVASIVE DIAGNOSTICS | Age: 70
Discharge: HOME | End: 2025-07-08
Attending: INTERNAL MEDICINE
Payer: COMMERCIAL

## 2025-07-08 VITALS
DIASTOLIC BLOOD PRESSURE: 72 MMHG | HEART RATE: 75 BPM | HEIGHT: 64 IN | WEIGHT: 210 LBS | SYSTOLIC BLOOD PRESSURE: 122 MMHG | BODY MASS INDEX: 35.85 KG/M2

## 2025-07-08 DIAGNOSIS — Q24.8 LEFT VENTRICULAR OUTFLOW TRACT OBSTRUCTION: ICD-10-CM

## 2025-07-08 LAB
AORTIC ROOT: 3.1 CM
AORTIC VALVE MEAN VELOCITY: 15.4 M/S
ASCENDING AORTA: 2.9 CM
AV MEAN PRESS GRAD SYS DOP V1V2: 12 MMHG
AV PEAK GRADIENT: 22 MMHG
AV VMAX SYS DOP: 2.33 M/S
BSA FOR ECHO PROCEDURE: 1.99 M2
DOP CALC AO VTI: 48.24 CM
DOP CALC LVOT AREA: 3.14 CM2
DOP CALC LVOT DIAMETER: 2 CM
DOP CALC MV VTI: 33.07 CM
E WAVE DECELERATION TIME: 403 MS
E/A RATIO: 0.66
FRACTIONAL SHORTENING: 35 (ref 28–44)
GLOBAL LONGITUIDAL STRAIN: -18 %
INTERVENTRICULAR SEPTUM IN DIASTOLE (PARASTERNAL SHORT AXIS VIEW): 1.2 CM
INTERVENTRICULAR SEPTUM: 1.2 CM (ref 0.6–1.1)
LAAS-AP2: 15.8 CM2
LAAS-AP4: 13.5 CM2
LEFT ATRIUM SIZE: 3.5 CM
LEFT ATRIUM VOLUME (MOD BIPLANE): 33 ML
LEFT ATRIUM VOLUME INDEX (MOD BIPLANE): 16.6 ML/M2
LEFT INTERNAL DIMENSION IN SYSTOLE: 2.8 CM (ref 2.1–4)
LEFT VENTRICLE DIASTOLIC VOLUME (MOD BIPLANE): 128 ML
LEFT VENTRICLE DIASTOLIC VOLUME INDEX (MOD BIPLANE): 64.3 ML/M2
LEFT VENTRICLE SYSTOLIC VOLUME (MOD BIPLANE): 45 ML
LEFT VENTRICLE SYSTOLIC VOLUME INDEX (MOD BIPLANE): 22.6 ML/M2
LEFT VENTRICULAR INTERNAL DIMENSION IN DIASTOLE: 4.3 CM (ref 3.5–6)
LEFT VENTRICULAR POSTERIOR WALL IN END DIASTOLE: 1.4 CM
LEFT VENTRICULAR STROKE VOLUME: 53 ML
LV EF BIPLANE MOD: 65 %
LV EF US.2D.A4C+ESTIMATED: 68 %
LVSV (TEICH): 53 ML
MV E'TISSUE VEL-LAT: 4 CM/S
MV E'TISSUE VEL-SEP: 9 CM/S
MV MEAN GRADIENT: 3 MMHG
MV PEAK A VEL: 1.12 M/S
MV PEAK E VEL: 74 CM/S
MV PEAK GRADIENT: 6 MMHG
MV STENOSIS PRESSURE HALF TIME: 117 MS
MV VALVE AREA P 1/2 METHOD: 1.88
RIGHT ATRIAL 2D VOLUME: 53 ML
RIGHT ATRIUM AREA SYSTOLE A4C: 18.5 CM2
RIGHT VENTRICLE ID DIMENSION: 4.2 CM
SL CV LEFT ATRIUM LENGTH A2C: 5.1 CM
SL CV LV EF: 70
SL CV PED ECHO LEFT VENTRICLE DIASTOLIC VOLUME (MOD BIPLANE) 2D: 82 ML
SL CV PED ECHO LEFT VENTRICLE SYSTOLIC VOLUME (MOD BIPLANE) 2D: 29 ML
TR MAX PG: 25 MMHG
TR PEAK VELOCITY: 2.5 M/S
TRICUSPID ANNULAR PLANE SYSTOLIC EXCURSION: 2 CM
TRICUSPID VALVE PEAK REGURGITATION VELOCITY: 2.48 M/S

## 2025-07-08 PROCEDURE — 93306 TTE W/DOPPLER COMPLETE: CPT

## 2025-07-08 PROCEDURE — 93306 TTE W/DOPPLER COMPLETE: CPT | Performed by: INTERNAL MEDICINE

## 2025-07-08 PROCEDURE — 93356 MYOCRD STRAIN IMG SPCKL TRCK: CPT

## 2025-07-08 PROCEDURE — 93356 MYOCRD STRAIN IMG SPCKL TRCK: CPT | Performed by: INTERNAL MEDICINE

## 2025-07-10 ENCOUNTER — APPOINTMENT (OUTPATIENT)
Dept: LAB | Facility: CLINIC | Age: 70
End: 2025-07-10
Payer: COMMERCIAL

## 2025-07-10 DIAGNOSIS — E03.9 ACQUIRED HYPOTHYROIDISM: ICD-10-CM

## 2025-07-10 DIAGNOSIS — I10 ESSENTIAL HYPERTENSION: ICD-10-CM

## 2025-07-10 DIAGNOSIS — R73.03 PREDIABETES: ICD-10-CM

## 2025-07-10 LAB
ALBUMIN SERPL BCG-MCNC: 4.5 G/DL (ref 3.5–5)
ALP SERPL-CCNC: 80 U/L (ref 34–104)
ALT SERPL W P-5'-P-CCNC: 19 U/L (ref 7–52)
ANION GAP SERPL CALCULATED.3IONS-SCNC: 13 MMOL/L (ref 4–13)
AST SERPL W P-5'-P-CCNC: 23 U/L (ref 13–39)
BILIRUB SERPL-MCNC: 0.59 MG/DL (ref 0.2–1)
BUN SERPL-MCNC: 19 MG/DL (ref 5–25)
CALCIUM SERPL-MCNC: 9.7 MG/DL (ref 8.4–10.2)
CHLORIDE SERPL-SCNC: 99 MMOL/L (ref 96–108)
CHOLEST SERPL-MCNC: 108 MG/DL (ref ?–200)
CO2 SERPL-SCNC: 28 MMOL/L (ref 21–32)
CREAT SERPL-MCNC: 0.72 MG/DL (ref 0.6–1.3)
ERYTHROCYTE [DISTWIDTH] IN BLOOD BY AUTOMATED COUNT: 14 % (ref 11.6–15.1)
EST. AVERAGE GLUCOSE BLD GHB EST-MCNC: 126 MG/DL
GFR SERPL CREATININE-BSD FRML MDRD: 86 ML/MIN/1.73SQ M
GLUCOSE P FAST SERPL-MCNC: 84 MG/DL (ref 65–99)
HBA1C MFR BLD: 6 %
HCT VFR BLD AUTO: 45.5 % (ref 34.8–46.1)
HDLC SERPL-MCNC: 35 MG/DL
HGB BLD-MCNC: 14.8 G/DL (ref 11.5–15.4)
LDLC SERPL CALC-MCNC: 37 MG/DL (ref 0–100)
MCH RBC QN AUTO: 29.6 PG (ref 26.8–34.3)
MCHC RBC AUTO-ENTMCNC: 32.5 G/DL (ref 31.4–37.4)
MCV RBC AUTO: 91 FL (ref 82–98)
PLATELET # BLD AUTO: 230 THOUSANDS/UL (ref 149–390)
PMV BLD AUTO: 10.4 FL (ref 8.9–12.7)
POTASSIUM SERPL-SCNC: 3.3 MMOL/L (ref 3.5–5.3)
PROT SERPL-MCNC: 7.4 G/DL (ref 6.4–8.4)
RBC # BLD AUTO: 5 MILLION/UL (ref 3.81–5.12)
SODIUM SERPL-SCNC: 140 MMOL/L (ref 135–147)
T4 FREE SERPL-MCNC: 1.16 NG/DL (ref 0.61–1.12)
TRIGL SERPL-MCNC: 181 MG/DL (ref ?–150)
TSH SERPL DL<=0.05 MIU/L-ACNC: 0.43 UIU/ML (ref 0.45–4.5)
WBC # BLD AUTO: 9.58 THOUSAND/UL (ref 4.31–10.16)

## 2025-07-10 PROCEDURE — 85027 COMPLETE CBC AUTOMATED: CPT

## 2025-07-10 PROCEDURE — 83036 HEMOGLOBIN GLYCOSYLATED A1C: CPT

## 2025-07-10 PROCEDURE — 84439 ASSAY OF FREE THYROXINE: CPT

## 2025-07-10 PROCEDURE — 80061 LIPID PANEL: CPT

## 2025-07-10 PROCEDURE — 36415 COLL VENOUS BLD VENIPUNCTURE: CPT

## 2025-07-10 PROCEDURE — 84443 ASSAY THYROID STIM HORMONE: CPT

## 2025-07-10 PROCEDURE — 80053 COMPREHEN METABOLIC PANEL: CPT

## 2025-07-29 ENCOUNTER — TELEPHONE (OUTPATIENT)
Dept: FAMILY MEDICINE CLINIC | Facility: HOSPITAL | Age: 70
End: 2025-07-29

## 2025-07-29 ENCOUNTER — OFFICE VISIT (OUTPATIENT)
Dept: FAMILY MEDICINE CLINIC | Facility: HOSPITAL | Age: 70
End: 2025-07-29
Payer: COMMERCIAL

## 2025-07-29 VITALS
WEIGHT: 203 LBS | HEIGHT: 64 IN | HEART RATE: 72 BPM | SYSTOLIC BLOOD PRESSURE: 126 MMHG | OXYGEN SATURATION: 95 % | BODY MASS INDEX: 34.66 KG/M2 | DIASTOLIC BLOOD PRESSURE: 68 MMHG

## 2025-07-29 DIAGNOSIS — R73.03 PREDIABETES: ICD-10-CM

## 2025-07-29 DIAGNOSIS — I10 ESSENTIAL HYPERTENSION: ICD-10-CM

## 2025-07-29 DIAGNOSIS — E03.9 ACQUIRED HYPOTHYROIDISM: ICD-10-CM

## 2025-07-29 DIAGNOSIS — E66.01 CLASS 2 SEVERE OBESITY DUE TO EXCESS CALORIES WITH SERIOUS COMORBIDITY AND BODY MASS INDEX (BMI) OF 36.0 TO 36.9 IN ADULT (HCC): Primary | ICD-10-CM

## 2025-07-29 DIAGNOSIS — E66.812 CLASS 2 SEVERE OBESITY DUE TO EXCESS CALORIES WITH SERIOUS COMORBIDITY AND BODY MASS INDEX (BMI) OF 36.0 TO 36.9 IN ADULT (HCC): Primary | ICD-10-CM

## 2025-07-29 DIAGNOSIS — R93.1 ELEVATED CORONARY ARTERY CALCIUM SCORE: ICD-10-CM

## 2025-07-29 DIAGNOSIS — E66.9 OBESITY (BMI 30-39.9): ICD-10-CM

## 2025-07-29 PROCEDURE — 99214 OFFICE O/P EST MOD 30 MIN: CPT | Performed by: FAMILY MEDICINE

## 2025-07-29 RX ORDER — LEVOTHYROXINE SODIUM 112 UG/1
112 TABLET ORAL DAILY
Qty: 100 TABLET | Refills: 0 | Status: SHIPPED | OUTPATIENT
Start: 2025-07-29

## 2025-07-29 RX ORDER — PRAVASTATIN SODIUM 10 MG
10 TABLET ORAL DAILY
Qty: 100 TABLET | Refills: 1 | Status: SHIPPED | OUTPATIENT
Start: 2025-07-29 | End: 2025-07-29

## 2025-07-29 RX ORDER — PRAVASTATIN SODIUM 20 MG
20 TABLET ORAL DAILY
Qty: 100 TABLET | Refills: 1 | Status: SHIPPED | OUTPATIENT
Start: 2025-07-29 | End: 2026-02-14

## 2025-08-07 DIAGNOSIS — J45.21 MILD INTERMITTENT ASTHMA WITH ACUTE EXACERBATION: ICD-10-CM

## 2025-08-08 RX ORDER — FLUTICASONE FUROATE 100 UG/1
1 POWDER RESPIRATORY (INHALATION) DAILY
Qty: 90 BLISTER | Refills: 3 | Status: SHIPPED | OUTPATIENT
Start: 2025-08-08

## 2025-08-19 DIAGNOSIS — I10 ESSENTIAL HYPERTENSION: ICD-10-CM

## 2025-08-19 RX ORDER — LOSARTAN POTASSIUM AND HYDROCHLOROTHIAZIDE 25; 100 MG/1; MG/1
1 TABLET ORAL DAILY
Qty: 10 TABLET | Refills: 0 | Status: SHIPPED | OUTPATIENT
Start: 2025-08-19 | End: 2025-08-21 | Stop reason: SDUPTHER

## 2025-08-21 DIAGNOSIS — I10 ESSENTIAL HYPERTENSION: ICD-10-CM

## 2025-08-21 RX ORDER — LOSARTAN POTASSIUM AND HYDROCHLOROTHIAZIDE 25; 100 MG/1; MG/1
1 TABLET ORAL DAILY
Qty: 90 TABLET | Refills: 3 | Status: SHIPPED | OUTPATIENT
Start: 2025-08-21

## (undated) DEVICE — FIBERTAK ROTATOR CUFF DISPOSABLES KIT

## (undated) DEVICE — GLOVE INDICATOR PI UNDERGLOVE SZ 6.5 BLUE

## (undated) DEVICE — SCD SEQUENTIAL COMPRESSION COMFORT SLEEVE MEDIUM KNEE LENGTH: Brand: KENDALL SCD

## (undated) DEVICE — GLOVE SRG BIOGEL 6.5

## (undated) DEVICE — INTENDED FOR TISSUE SEPARATION, AND OTHER PROCEDURES THAT REQUIRE A SHARP SURGICAL BLADE TO PUNCTURE OR CUT.: Brand: BARD-PARKER ® CARBON RIB-BACK BLADES

## (undated) DEVICE — NEEDLE 23G X 1 1/2 SAFETY-GLIDE THIN WALL

## (undated) DEVICE — GLOVE INDICATOR PI UNDERGLOVE SZ 7.5 BLUE

## (undated) DEVICE — IMPERVIOUS STOCKINETTE: Brand: DEROYAL

## (undated) DEVICE — 3M™ STERI-DRAPE™ U-DRAPE 1015: Brand: STERI-DRAPE™

## (undated) DEVICE — STRL ALLENTOWN HYSTEROSCOPY PK: Brand: CARDINAL HEALTH

## (undated) DEVICE — NEEDLE SPINAL 22G X 3.5IN  QUINCKE

## (undated) DEVICE — SP FBRTAK RC FBRTPE BLK/BLU & STTPE BLU
Type: IMPLANTABLE DEVICE | Site: SHOULDER | Status: NON-FUNCTIONAL
Brand: ARTHREX®
Removed: 2024-01-04

## (undated) DEVICE — TUBING SUCTION 5MM X 12 FT

## (undated) DEVICE — BURR  OVAL 4MM 13CM 8 FLUTE COOLCUT

## (undated) DEVICE — NEEDLE SUT SCORPION MULTIFIRE

## (undated) DEVICE — SYRINGE 3ML LL

## (undated) DEVICE — PUDDLE VAC

## (undated) DEVICE — MAXI PAD5.51 X 13.78 IN. (14.0 X 35.0 CM)HEAVYCONTOUREDUNSCENTED: Brand: CURITY

## (undated) DEVICE — SUT MONOCRYL 3-0 PS-2 27 IN Y427H

## (undated) DEVICE — SURGI KIT INSTRUMENT ORGANIZER

## (undated) DEVICE — KERLIX BANDAGE ROLL: Brand: KERLIX

## (undated) DEVICE — STRAIGHT CATH RED RUBBER 12FR

## (undated) DEVICE — GLOVE SRG BIOGEL ORTHOPEDIC 8

## (undated) DEVICE — OCCLUSIVE GAUZE STRIP,3% BISMUTH TRIBROMOPHENATE IN PETROLATUM BLEND: Brand: XEROFORM

## (undated) DEVICE — POSITIONER TRIMANO LIMB BEACH CHAIR

## (undated) DEVICE — CANNULA 7 X70MM THRD SEAL SIDE PORT

## (undated) DEVICE — NEEDLE 18 G X 1 1/2

## (undated) DEVICE — CANNULA DISP 8.25 X 70MM W/SEAL SIDE PORT THRD

## (undated) DEVICE — GLOVE SRG BIOGEL 7.5

## (undated) DEVICE — CYSTO TUBING SINGLE IRRIGATION

## (undated) DEVICE — PASSER SUT 25DEG CRV RT QUICKPASS LASSO

## (undated) DEVICE — SYRINGE 10ML LL CONTROL TOP

## (undated) DEVICE — NEEDLE SPINAL18G X 3.5 IN QUINCKE

## (undated) DEVICE — TUBING ARTHROSCOPIC WAVE  MAIN PUMP

## (undated) DEVICE — ARTHROSCOPY FLOOR MAT

## (undated) DEVICE — FILTER STRAW 1.7

## (undated) DEVICE — T-MAX DISPOSABLE FACE MASK 8 PER BOX

## (undated) DEVICE — GLOVE SRG BIOGEL 7

## (undated) DEVICE — 3M™ MICROFOAM™ SURGICAL TAPE 4 ROLLS/CARTON 6 CARTONS/CASE 1528-3: Brand: 3M™ MICROFOAM™

## (undated) DEVICE — GLOVE INDICATOR PI UNDERGLOVE SZ 8 BLUE

## (undated) DEVICE — VAPR COOLPULSE 90 ELECTRODE 90 DEGREES SUCTION WITH INTEGRATED HANDPIECE: Brand: VAPR COOLPULSE

## (undated) DEVICE — 2000CC GUARDIAN II: Brand: GUARDIAN

## (undated) DEVICE — BETHLEHEM UNIV MAJOR ORTHO,KIT: Brand: CARDINAL HEALTH

## (undated) DEVICE — BLADE SHAVER EXCALIBUR 4MM 13CM COOLCUT

## (undated) DEVICE — WORKING LENGTH 235CM, WORKING CHANNEL 2.8MM: Brand: RESOLUTION 360 CLIP

## (undated) DEVICE — SPONGE STICK WITH PVP-I: Brand: KENDALL